# Patient Record
Sex: FEMALE | Race: WHITE | NOT HISPANIC OR LATINO | Employment: OTHER | URBAN - METROPOLITAN AREA
[De-identification: names, ages, dates, MRNs, and addresses within clinical notes are randomized per-mention and may not be internally consistent; named-entity substitution may affect disease eponyms.]

---

## 2017-04-20 ENCOUNTER — HOSPITAL ENCOUNTER (OUTPATIENT)
Facility: HOSPITAL | Age: 77
Setting detail: OBSERVATION
LOS: 1 days | Discharge: NURSING, REHAB OR LONG-TERM CARE | End: 2017-04-22
Attending: EMERGENCY MEDICINE | Admitting: FAMILY MEDICINE
Payer: COMMERCIAL

## 2017-04-20 DIAGNOSIS — D64.9 ANEMIA: ICD-10-CM

## 2017-04-20 DIAGNOSIS — R19.5 GUAIAC POSITIVE STOOLS: Primary | ICD-10-CM

## 2017-04-20 DIAGNOSIS — R13.10 DYSPHAGIA: ICD-10-CM

## 2017-04-20 PROBLEM — K62.5 RECTAL BLEEDING: Status: ACTIVE | Noted: 2017-04-20

## 2017-04-20 LAB
ABO GROUP BLD: NORMAL
ANION GAP SERPL CALCULATED.3IONS-SCNC: 8 MMOL/L (ref 4–13)
APTT PPP: 38 SECONDS (ref 24–36)
BASOPHILS # BLD AUTO: 0.03 THOUSANDS/ΜL (ref 0–0.1)
BASOPHILS NFR BLD AUTO: 1 % (ref 0–1)
BILIRUB UR QL STRIP: NEGATIVE
BLD GP AB SCN SERPL QL: NEGATIVE
BUN SERPL-MCNC: 12 MG/DL (ref 5–25)
CALCIUM SERPL-MCNC: 8.8 MG/DL (ref 8.3–10.1)
CHLORIDE SERPL-SCNC: 102 MMOL/L (ref 100–108)
CLARITY UR: CLEAR
CO2 SERPL-SCNC: 32 MMOL/L (ref 21–32)
COLOR UR: YELLOW
CREAT SERPL-MCNC: 0.79 MG/DL (ref 0.6–1.3)
EOSINOPHIL # BLD AUTO: 0.19 THOUSAND/ΜL (ref 0–0.61)
EOSINOPHIL NFR BLD AUTO: 3 % (ref 0–6)
ERYTHROCYTE [DISTWIDTH] IN BLOOD BY AUTOMATED COUNT: 17.1 % (ref 11.6–15.1)
GFR SERPL CREATININE-BSD FRML MDRD: >60 ML/MIN/1.73SQ M
GLUCOSE SERPL-MCNC: 99 MG/DL (ref 65–140)
GLUCOSE UR STRIP-MCNC: NEGATIVE MG/DL
HCT VFR BLD AUTO: 30.5 % (ref 34.8–46.1)
HGB BLD-MCNC: 8.5 G/DL (ref 11.5–15.4)
HGB UR QL STRIP.AUTO: NEGATIVE
INR PPP: 1.53 (ref 0.86–1.16)
KETONES UR STRIP-MCNC: NEGATIVE MG/DL
LEUKOCYTE ESTERASE UR QL STRIP: NEGATIVE
LYMPHOCYTES # BLD AUTO: 1.83 THOUSANDS/ΜL (ref 0.6–4.47)
LYMPHOCYTES NFR BLD AUTO: 28 % (ref 14–44)
MCH RBC QN AUTO: 21.5 PG (ref 26.8–34.3)
MCHC RBC AUTO-ENTMCNC: 27.9 G/DL (ref 31.4–37.4)
MCV RBC AUTO: 77 FL (ref 82–98)
MONOCYTES # BLD AUTO: 0.56 THOUSAND/ΜL (ref 0.17–1.22)
MONOCYTES NFR BLD AUTO: 8 % (ref 4–12)
NEUTROPHILS # BLD AUTO: 4.05 THOUSANDS/ΜL (ref 1.85–7.62)
NEUTS SEG NFR BLD AUTO: 60 % (ref 43–75)
NITRITE UR QL STRIP: NEGATIVE
PH UR STRIP.AUTO: 7 [PH] (ref 4.5–8)
PLATELET # BLD AUTO: 219 THOUSANDS/UL (ref 149–390)
PMV BLD AUTO: 9.3 FL (ref 8.9–12.7)
POTASSIUM SERPL-SCNC: 3.8 MMOL/L (ref 3.5–5.3)
PROT UR STRIP-MCNC: NEGATIVE MG/DL
PROTHROMBIN TIME: 17.9 SECONDS (ref 12–14.3)
RBC # BLD AUTO: 3.95 MILLION/UL (ref 3.81–5.12)
RH BLD: POSITIVE
SODIUM SERPL-SCNC: 142 MMOL/L (ref 136–145)
SP GR UR STRIP.AUTO: 1.01 (ref 1–1.03)
SPECIMEN EXPIRATION DATE: NORMAL
UROBILINOGEN UR QL STRIP.AUTO: 0.2 E.U./DL
WBC # BLD AUTO: 6.66 THOUSAND/UL (ref 4.31–10.16)

## 2017-04-20 PROCEDURE — 80048 BASIC METABOLIC PNL TOTAL CA: CPT | Performed by: PHYSICIAN ASSISTANT

## 2017-04-20 PROCEDURE — 99285 EMERGENCY DEPT VISIT HI MDM: CPT

## 2017-04-20 PROCEDURE — 85730 THROMBOPLASTIN TIME PARTIAL: CPT | Performed by: PHYSICIAN ASSISTANT

## 2017-04-20 PROCEDURE — 85025 COMPLETE CBC W/AUTO DIFF WBC: CPT | Performed by: PHYSICIAN ASSISTANT

## 2017-04-20 PROCEDURE — 94760 N-INVAS EAR/PLS OXIMETRY 1: CPT

## 2017-04-20 PROCEDURE — 85610 PROTHROMBIN TIME: CPT | Performed by: PHYSICIAN ASSISTANT

## 2017-04-20 PROCEDURE — 81003 URINALYSIS AUTO W/O SCOPE: CPT | Performed by: FAMILY MEDICINE

## 2017-04-20 PROCEDURE — 36415 COLL VENOUS BLD VENIPUNCTURE: CPT | Performed by: PHYSICIAN ASSISTANT

## 2017-04-20 PROCEDURE — 85014 HEMATOCRIT: CPT | Performed by: FAMILY MEDICINE

## 2017-04-20 PROCEDURE — 86900 BLOOD TYPING SEROLOGIC ABO: CPT | Performed by: PHYSICIAN ASSISTANT

## 2017-04-20 PROCEDURE — 86850 RBC ANTIBODY SCREEN: CPT | Performed by: PHYSICIAN ASSISTANT

## 2017-04-20 PROCEDURE — 94664 DEMO&/EVAL PT USE INHALER: CPT

## 2017-04-20 PROCEDURE — 85018 HEMOGLOBIN: CPT | Performed by: FAMILY MEDICINE

## 2017-04-20 PROCEDURE — 86901 BLOOD TYPING SEROLOGIC RH(D): CPT | Performed by: PHYSICIAN ASSISTANT

## 2017-04-20 RX ORDER — MAGNESIUM HYDROXIDE/ALUMINUM HYDROXICE/SIMETHICONE 120; 1200; 1200 MG/30ML; MG/30ML; MG/30ML
30 SUSPENSION ORAL EVERY 6 HOURS PRN
Status: DISCONTINUED | OUTPATIENT
Start: 2017-04-20 | End: 2017-04-22 | Stop reason: HOSPADM

## 2017-04-20 RX ORDER — NYSTATIN 100000 U/G
1 CREAM TOPICAL 2 TIMES DAILY
Status: DISCONTINUED | OUTPATIENT
Start: 2017-04-20 | End: 2017-04-22 | Stop reason: HOSPADM

## 2017-04-20 RX ORDER — NITROGLYCERIN 0.4 MG/1
0.4 TABLET SUBLINGUAL
Status: DISCONTINUED | OUTPATIENT
Start: 2017-04-20 | End: 2017-04-22 | Stop reason: HOSPADM

## 2017-04-20 RX ORDER — GABAPENTIN 300 MG/1
600 CAPSULE ORAL 3 TIMES DAILY
Status: DISCONTINUED | OUTPATIENT
Start: 2017-04-20 | End: 2017-04-22 | Stop reason: HOSPADM

## 2017-04-20 RX ORDER — LOSARTAN POTASSIUM 50 MG/1
50 TABLET ORAL DAILY
Status: DISCONTINUED | OUTPATIENT
Start: 2017-04-21 | End: 2017-04-22 | Stop reason: HOSPADM

## 2017-04-20 RX ORDER — MELATONIN
1000 DAILY
Status: DISCONTINUED | OUTPATIENT
Start: 2017-04-21 | End: 2017-04-22 | Stop reason: HOSPADM

## 2017-04-20 RX ORDER — AMLODIPINE BESYLATE 10 MG/1
10 TABLET ORAL DAILY
Status: DISCONTINUED | OUTPATIENT
Start: 2017-04-21 | End: 2017-04-22 | Stop reason: HOSPADM

## 2017-04-20 RX ORDER — TOLTERODINE 2 MG/1
4 CAPSULE, EXTENDED RELEASE ORAL DAILY
Status: DISCONTINUED | OUTPATIENT
Start: 2017-04-21 | End: 2017-04-22 | Stop reason: HOSPADM

## 2017-04-20 RX ORDER — ONDANSETRON 2 MG/ML
4 INJECTION INTRAMUSCULAR; INTRAVENOUS EVERY 6 HOURS PRN
Status: DISCONTINUED | OUTPATIENT
Start: 2017-04-20 | End: 2017-04-22 | Stop reason: HOSPADM

## 2017-04-20 RX ORDER — PANTOPRAZOLE SODIUM 40 MG/1
40 TABLET, DELAYED RELEASE ORAL
Status: DISCONTINUED | OUTPATIENT
Start: 2017-04-21 | End: 2017-04-22 | Stop reason: HOSPADM

## 2017-04-20 RX ORDER — ESCITALOPRAM OXALATE 10 MG/1
10 TABLET ORAL DAILY
Status: DISCONTINUED | OUTPATIENT
Start: 2017-04-21 | End: 2017-04-22 | Stop reason: HOSPADM

## 2017-04-20 RX ORDER — CRANBERRY FRUIT EXTRACT 425 MG
425 CAPSULE ORAL DAILY
Status: DISCONTINUED | OUTPATIENT
Start: 2017-04-21 | End: 2017-04-22 | Stop reason: HOSPADM

## 2017-04-20 RX ORDER — ACETAMINOPHEN 325 MG/1
650 TABLET ORAL EVERY 6 HOURS PRN
Status: DISCONTINUED | OUTPATIENT
Start: 2017-04-20 | End: 2017-04-22 | Stop reason: HOSPADM

## 2017-04-20 RX ORDER — ALBUTEROL SULFATE 2.5 MG/3ML
2.5 SOLUTION RESPIRATORY (INHALATION) EVERY 6 HOURS PRN
Status: DISCONTINUED | OUTPATIENT
Start: 2017-04-20 | End: 2017-04-22 | Stop reason: HOSPADM

## 2017-04-20 RX ORDER — METHOCARBAMOL 750 MG/1
750 TABLET, FILM COATED ORAL 4 TIMES DAILY
Status: DISCONTINUED | OUTPATIENT
Start: 2017-04-20 | End: 2017-04-22 | Stop reason: HOSPADM

## 2017-04-20 RX ADMIN — ACETAMINOPHEN 650 MG: 325 TABLET ORAL at 22:41

## 2017-04-20 RX ADMIN — NYSTATIN 1 APPLICATION: 100000 CREAM TOPICAL at 22:41

## 2017-04-20 RX ADMIN — METHOCARBAMOL 750 MG: 750 TABLET ORAL at 21:10

## 2017-04-20 RX ADMIN — GABAPENTIN 600 MG: 300 CAPSULE ORAL at 21:10

## 2017-04-21 LAB
ANION GAP SERPL CALCULATED.3IONS-SCNC: 11 MMOL/L (ref 4–13)
BUN SERPL-MCNC: 12 MG/DL (ref 5–25)
C DIFF TOX GENS STL QL NAA+PROBE: NORMAL
CALCIUM SERPL-MCNC: 9.1 MG/DL (ref 8.3–10.1)
CHLORIDE SERPL-SCNC: 104 MMOL/L (ref 100–108)
CHOLEST SERPL-MCNC: 138 MG/DL (ref 50–200)
CO2 SERPL-SCNC: 29 MMOL/L (ref 21–32)
CREAT SERPL-MCNC: 0.8 MG/DL (ref 0.6–1.3)
ERYTHROCYTE [DISTWIDTH] IN BLOOD BY AUTOMATED COUNT: 17.3 % (ref 11.6–15.1)
EST. AVERAGE GLUCOSE BLD GHB EST-MCNC: 105 MG/DL
FERRITIN SERPL-MCNC: 24 NG/ML (ref 8–388)
FOLATE SERPL-MCNC: 17.2 NG/ML (ref 3.1–17.5)
GFR SERPL CREATININE-BSD FRML MDRD: >60 ML/MIN/1.73SQ M
GLUCOSE SERPL-MCNC: 110 MG/DL (ref 65–140)
HBA1C MFR BLD: 5.3 % (ref 4.2–6.3)
HCT VFR BLD AUTO: 30 % (ref 34.8–46.1)
HCT VFR BLD AUTO: 32.9 % (ref 34.8–46.1)
HDLC SERPL-MCNC: 46 MG/DL (ref 40–60)
HEMOCCULT STL QL: NEGATIVE
HGB BLD-MCNC: 8.3 G/DL (ref 11.5–15.4)
HGB BLD-MCNC: 9 G/DL (ref 11.5–15.4)
IRON SATN MFR SERPL: 6 %
IRON SERPL-MCNC: 23 UG/DL (ref 50–170)
LDLC SERPL CALC-MCNC: 70 MG/DL (ref 0–100)
MCH RBC QN AUTO: 21 PG (ref 26.8–34.3)
MCHC RBC AUTO-ENTMCNC: 27.4 G/DL (ref 31.4–37.4)
MCV RBC AUTO: 77 FL (ref 82–98)
PLATELET # BLD AUTO: 234 THOUSANDS/UL (ref 149–390)
PMV BLD AUTO: 9.6 FL (ref 8.9–12.7)
POTASSIUM SERPL-SCNC: 3.8 MMOL/L (ref 3.5–5.3)
RBC # BLD AUTO: 4.29 MILLION/UL (ref 3.81–5.12)
SODIUM SERPL-SCNC: 144 MMOL/L (ref 136–145)
TIBC SERPL-MCNC: 404 UG/DL (ref 250–450)
TRIGL SERPL-MCNC: 109 MG/DL
VIT B12 SERPL-MCNC: 267 PG/ML (ref 100–900)
WBC # BLD AUTO: 5.52 THOUSAND/UL (ref 4.31–10.16)

## 2017-04-21 PROCEDURE — 87899 AGENT NOS ASSAY W/OPTIC: CPT | Performed by: FAMILY MEDICINE

## 2017-04-21 PROCEDURE — 82728 ASSAY OF FERRITIN: CPT | Performed by: FAMILY MEDICINE

## 2017-04-21 PROCEDURE — 82746 ASSAY OF FOLIC ACID SERUM: CPT | Performed by: FAMILY MEDICINE

## 2017-04-21 PROCEDURE — 82272 OCCULT BLD FECES 1-3 TESTS: CPT | Performed by: FAMILY MEDICINE

## 2017-04-21 PROCEDURE — G8998 SWALLOW D/C STATUS: HCPCS

## 2017-04-21 PROCEDURE — 83540 ASSAY OF IRON: CPT | Performed by: FAMILY MEDICINE

## 2017-04-21 PROCEDURE — 83036 HEMOGLOBIN GLYCOSYLATED A1C: CPT | Performed by: FAMILY MEDICINE

## 2017-04-21 PROCEDURE — 87493 C DIFF AMPLIFIED PROBE: CPT | Performed by: FAMILY MEDICINE

## 2017-04-21 PROCEDURE — 85027 COMPLETE CBC AUTOMATED: CPT | Performed by: FAMILY MEDICINE

## 2017-04-21 PROCEDURE — G8997 SWALLOW GOAL STATUS: HCPCS

## 2017-04-21 PROCEDURE — 80061 LIPID PANEL: CPT | Performed by: FAMILY MEDICINE

## 2017-04-21 PROCEDURE — 87045 FECES CULTURE AEROBIC BACT: CPT | Performed by: FAMILY MEDICINE

## 2017-04-21 PROCEDURE — 89055 LEUKOCYTE ASSESSMENT FECAL: CPT | Performed by: FAMILY MEDICINE

## 2017-04-21 PROCEDURE — G8996 SWALLOW CURRENT STATUS: HCPCS

## 2017-04-21 PROCEDURE — 87209 SMEAR COMPLEX STAIN: CPT | Performed by: FAMILY MEDICINE

## 2017-04-21 PROCEDURE — 80048 BASIC METABOLIC PNL TOTAL CA: CPT | Performed by: FAMILY MEDICINE

## 2017-04-21 PROCEDURE — 92610 EVALUATE SWALLOWING FUNCTION: CPT

## 2017-04-21 PROCEDURE — 82607 VITAMIN B-12: CPT | Performed by: FAMILY MEDICINE

## 2017-04-21 PROCEDURE — 87015 SPECIMEN INFECT AGNT CONCNTJ: CPT | Performed by: FAMILY MEDICINE

## 2017-04-21 PROCEDURE — 87177 OVA AND PARASITES SMEARS: CPT | Performed by: FAMILY MEDICINE

## 2017-04-21 PROCEDURE — 87046 STOOL CULTR AEROBIC BACT EA: CPT | Performed by: FAMILY MEDICINE

## 2017-04-21 PROCEDURE — 83550 IRON BINDING TEST: CPT | Performed by: FAMILY MEDICINE

## 2017-04-21 RX ORDER — FENTANYL 50 UG/H
50 PATCH TRANSDERMAL
Status: DISCONTINUED | OUTPATIENT
Start: 2017-04-21 | End: 2017-04-22 | Stop reason: HOSPADM

## 2017-04-21 RX ADMIN — LOSARTAN POTASSIUM 50 MG: 50 TABLET, FILM COATED ORAL at 09:22

## 2017-04-21 RX ADMIN — GABAPENTIN 600 MG: 300 CAPSULE ORAL at 21:58

## 2017-04-21 RX ADMIN — ACETAMINOPHEN 650 MG: 325 TABLET ORAL at 16:14

## 2017-04-21 RX ADMIN — PANTOPRAZOLE SODIUM 40 MG: 40 TABLET, DELAYED RELEASE ORAL at 06:18

## 2017-04-21 RX ADMIN — FENTANYL 50 MCG: 50 PATCH, EXTENDED RELEASE TRANSDERMAL at 16:01

## 2017-04-21 RX ADMIN — NYSTATIN 1 APPLICATION: 100000 CREAM TOPICAL at 18:01

## 2017-04-21 RX ADMIN — NYSTATIN 1 APPLICATION: 100000 CREAM TOPICAL at 09:23

## 2017-04-21 RX ADMIN — TOLTERODINE TARTRATE 4 MG: 2 CAPSULE, EXTENDED RELEASE ORAL at 09:22

## 2017-04-21 RX ADMIN — METHOCARBAMOL 750 MG: 750 TABLET ORAL at 21:58

## 2017-04-21 RX ADMIN — GABAPENTIN 600 MG: 300 CAPSULE ORAL at 09:21

## 2017-04-21 RX ADMIN — METHOCARBAMOL 750 MG: 750 TABLET ORAL at 09:22

## 2017-04-21 RX ADMIN — CHOLECALCIFEROL TAB 25 MCG (1000 UNIT) 1000 UNITS: 25 TAB at 09:21

## 2017-04-21 RX ADMIN — METHOCARBAMOL 750 MG: 750 TABLET ORAL at 14:24

## 2017-04-21 RX ADMIN — METHOCARBAMOL 750 MG: 750 TABLET ORAL at 18:00

## 2017-04-21 RX ADMIN — ESCITALOPRAM OXALATE 10 MG: 10 TABLET ORAL at 09:22

## 2017-04-21 RX ADMIN — GABAPENTIN 600 MG: 300 CAPSULE ORAL at 16:01

## 2017-04-22 VITALS
BODY MASS INDEX: 44.48 KG/M2 | OXYGEN SATURATION: 98 % | WEIGHT: 275.57 LBS | DIASTOLIC BLOOD PRESSURE: 71 MMHG | SYSTOLIC BLOOD PRESSURE: 127 MMHG | HEART RATE: 68 BPM | RESPIRATION RATE: 18 BRPM | TEMPERATURE: 98 F

## 2017-04-22 LAB
ANION GAP SERPL CALCULATED.3IONS-SCNC: 8 MMOL/L (ref 4–13)
BASOPHILS # BLD AUTO: 0.02 THOUSANDS/ΜL (ref 0–0.1)
BASOPHILS NFR BLD AUTO: 0 % (ref 0–1)
BUN SERPL-MCNC: 10 MG/DL (ref 5–25)
CALCIUM SERPL-MCNC: 8.8 MG/DL (ref 8.3–10.1)
CHLORIDE SERPL-SCNC: 105 MMOL/L (ref 100–108)
CO2 SERPL-SCNC: 29 MMOL/L (ref 21–32)
CREAT SERPL-MCNC: 0.73 MG/DL (ref 0.6–1.3)
EOSINOPHIL # BLD AUTO: 0.2 THOUSAND/ΜL (ref 0–0.61)
EOSINOPHIL NFR BLD AUTO: 3 % (ref 0–6)
ERYTHROCYTE [DISTWIDTH] IN BLOOD BY AUTOMATED COUNT: 17 % (ref 11.6–15.1)
GFR SERPL CREATININE-BSD FRML MDRD: >60 ML/MIN/1.73SQ M
GLUCOSE P FAST SERPL-MCNC: 108 MG/DL (ref 65–99)
GLUCOSE SERPL-MCNC: 108 MG/DL (ref 65–140)
HCT VFR BLD AUTO: 30.7 % (ref 34.8–46.1)
HGB BLD-MCNC: 8.7 G/DL (ref 11.5–15.4)
LYMPHOCYTES # BLD AUTO: 2.03 THOUSANDS/ΜL (ref 0.6–4.47)
LYMPHOCYTES NFR BLD AUTO: 32 % (ref 14–44)
MCH RBC QN AUTO: 21.7 PG (ref 26.8–34.3)
MCHC RBC AUTO-ENTMCNC: 28.3 G/DL (ref 31.4–37.4)
MCV RBC AUTO: 77 FL (ref 82–98)
MONOCYTES # BLD AUTO: 0.61 THOUSAND/ΜL (ref 0.17–1.22)
MONOCYTES NFR BLD AUTO: 10 % (ref 4–12)
NEUTROPHILS # BLD AUTO: 3.48 THOUSANDS/ΜL (ref 1.85–7.62)
NEUTS SEG NFR BLD AUTO: 55 % (ref 43–75)
PLATELET # BLD AUTO: 229 THOUSANDS/UL (ref 149–390)
PMV BLD AUTO: 9.8 FL (ref 8.9–12.7)
POTASSIUM SERPL-SCNC: 3.8 MMOL/L (ref 3.5–5.3)
RBC # BLD AUTO: 4.01 MILLION/UL (ref 3.81–5.12)
SODIUM SERPL-SCNC: 142 MMOL/L (ref 136–145)
WBC # BLD AUTO: 6.34 THOUSAND/UL (ref 4.31–10.16)

## 2017-04-22 PROCEDURE — 85025 COMPLETE CBC W/AUTO DIFF WBC: CPT | Performed by: FAMILY MEDICINE

## 2017-04-22 PROCEDURE — 80048 BASIC METABOLIC PNL TOTAL CA: CPT | Performed by: FAMILY MEDICINE

## 2017-04-22 RX ADMIN — ACETAMINOPHEN 650 MG: 325 TABLET ORAL at 09:33

## 2017-04-22 RX ADMIN — NYSTATIN 1 APPLICATION: 100000 CREAM TOPICAL at 09:34

## 2017-04-22 RX ADMIN — PANTOPRAZOLE SODIUM 40 MG: 40 TABLET, DELAYED RELEASE ORAL at 06:37

## 2017-04-22 RX ADMIN — METHOCARBAMOL 750 MG: 750 TABLET ORAL at 09:33

## 2017-04-22 RX ADMIN — TOLTERODINE TARTRATE 4 MG: 2 CAPSULE, EXTENDED RELEASE ORAL at 09:33

## 2017-04-22 RX ADMIN — AMLODIPINE BESYLATE 10 MG: 10 TABLET ORAL at 09:33

## 2017-04-22 RX ADMIN — CHOLECALCIFEROL TAB 25 MCG (1000 UNIT) 1000 UNITS: 25 TAB at 09:33

## 2017-04-22 RX ADMIN — GABAPENTIN 600 MG: 300 CAPSULE ORAL at 09:33

## 2017-04-22 RX ADMIN — GABAPENTIN 600 MG: 300 CAPSULE ORAL at 15:26

## 2017-04-22 RX ADMIN — METHOCARBAMOL 750 MG: 750 TABLET ORAL at 15:26

## 2017-04-22 RX ADMIN — ESCITALOPRAM OXALATE 10 MG: 10 TABLET ORAL at 09:33

## 2017-04-22 RX ADMIN — ACETAMINOPHEN 650 MG: 325 TABLET ORAL at 15:26

## 2017-04-22 RX ADMIN — LOSARTAN POTASSIUM 50 MG: 50 TABLET, FILM COATED ORAL at 09:33

## 2017-04-23 LAB
BACTERIA STL CULT: NORMAL
BACTERIA STL CULT: NORMAL

## 2017-04-24 LAB — WBC SPEC QL GRAM STN: NORMAL

## 2017-04-25 ENCOUNTER — ALLSCRIPTS OFFICE VISIT (OUTPATIENT)
Dept: OTHER | Facility: OTHER | Age: 77
End: 2017-04-25

## 2017-04-27 LAB — O+P STL CONC: NORMAL

## 2017-06-08 ENCOUNTER — HOSPITAL ENCOUNTER (EMERGENCY)
Facility: HOSPITAL | Age: 77
Discharge: HOME | DRG: 177 | End: 2017-06-08
Attending: EMERGENCY MEDICINE
Payer: COMMERCIAL

## 2017-06-08 VITALS
OXYGEN SATURATION: 97 % | DIASTOLIC BLOOD PRESSURE: 58 MMHG | SYSTOLIC BLOOD PRESSURE: 114 MMHG | HEART RATE: 67 BPM | TEMPERATURE: 99.1 F | WEIGHT: 264.99 LBS | RESPIRATION RATE: 14 BRPM | BODY MASS INDEX: 42.77 KG/M2

## 2017-06-08 DIAGNOSIS — R50.9 FEVER: Primary | ICD-10-CM

## 2017-06-08 PROCEDURE — 96361 HYDRATE IV INFUSION ADD-ON: CPT

## 2017-06-08 PROCEDURE — 96360 HYDRATION IV INFUSION INIT: CPT

## 2017-06-08 PROCEDURE — 99284 EMERGENCY DEPT VISIT MOD MDM: CPT

## 2017-06-08 RX ORDER — DOXYCYCLINE 100 MG/1
200 TABLET ORAL DAILY
COMMUNITY
End: 2017-06-14 | Stop reason: HOSPADM

## 2017-06-08 RX ORDER — SODIUM CHLORIDE 9 MG/ML
150 INJECTION, SOLUTION INTRAVENOUS CONTINUOUS
Status: DISCONTINUED | OUTPATIENT
Start: 2017-06-08 | End: 2017-06-08 | Stop reason: HOSPADM

## 2017-06-08 RX ORDER — ALPRAZOLAM 0.5 MG/1
0.5 TABLET ORAL
COMMUNITY
End: 2017-06-14 | Stop reason: HOSPADM

## 2017-06-08 RX ORDER — TRAMADOL HYDROCHLORIDE 50 MG/1
50 TABLET ORAL EVERY 6 HOURS PRN
Status: ON HOLD | COMMUNITY
End: 2018-07-24

## 2017-06-08 RX ORDER — ERTAPENEM 1 G/1
1 INJECTION, POWDER, LYOPHILIZED, FOR SOLUTION INTRAMUSCULAR; INTRAVENOUS
COMMUNITY
End: 2017-06-14 | Stop reason: HOSPADM

## 2017-06-08 RX ORDER — FENTANYL 50 UG/H
1 PATCH TRANSDERMAL
COMMUNITY
End: 2017-06-14 | Stop reason: HOSPADM

## 2017-06-08 RX ADMIN — SODIUM CHLORIDE 150 ML/HR: 0.9 INJECTION, SOLUTION INTRAVENOUS at 16:48

## 2017-06-10 ENCOUNTER — HOSPITAL ENCOUNTER (INPATIENT)
Facility: HOSPITAL | Age: 77
LOS: 4 days | Discharge: NURSING, REHAB OR LONG-TERM CARE | DRG: 177 | End: 2017-06-14
Attending: EMERGENCY MEDICINE | Admitting: INTERNAL MEDICINE
Payer: COMMERCIAL

## 2017-06-10 ENCOUNTER — APPOINTMENT (EMERGENCY)
Dept: RADIOLOGY | Facility: HOSPITAL | Age: 77
DRG: 177 | End: 2017-06-10
Payer: COMMERCIAL

## 2017-06-10 ENCOUNTER — APPOINTMENT (EMERGENCY)
Dept: CT IMAGING | Facility: HOSPITAL | Age: 77
DRG: 177 | End: 2017-06-10
Payer: COMMERCIAL

## 2017-06-10 DIAGNOSIS — E87.70 FLUID OVERLOAD: ICD-10-CM

## 2017-06-10 DIAGNOSIS — N39.0 URINARY TRACT INFECTION WITHOUT HEMATURIA, SITE UNSPECIFIED: ICD-10-CM

## 2017-06-10 DIAGNOSIS — R41.0 DELIRIUM: Primary | ICD-10-CM

## 2017-06-10 DIAGNOSIS — J18.9 PNEUMONIA OF LEFT LOWER LOBE DUE TO INFECTIOUS ORGANISM: ICD-10-CM

## 2017-06-10 DIAGNOSIS — I48.91 ATRIAL FIBRILLATION (HCC): Chronic | ICD-10-CM

## 2017-06-10 PROBLEM — N32.81 OVERACTIVE BLADDER: Status: ACTIVE | Noted: 2017-06-10

## 2017-06-10 PROBLEM — R41.82 ALTERED MENTAL STATUS: Status: ACTIVE | Noted: 2017-06-10

## 2017-06-10 PROBLEM — G93.40 ENCEPHALOPATHY ACUTE: Status: ACTIVE | Noted: 2017-06-10

## 2017-06-10 LAB
ALBUMIN SERPL BCP-MCNC: 2.8 G/DL (ref 3.5–5)
ALP SERPL-CCNC: 63 U/L (ref 46–116)
ALT SERPL W P-5'-P-CCNC: 13 U/L (ref 12–78)
ANION GAP SERPL CALCULATED.3IONS-SCNC: 8 MMOL/L (ref 4–13)
APTT PPP: 36 SECONDS (ref 23–35)
AST SERPL W P-5'-P-CCNC: 17 U/L (ref 5–45)
BACTERIA UR QL AUTO: ABNORMAL /HPF
BASE EXCESS BLDA CALC-SCNC: 2 MMOL/L (ref -2–3)
BASOPHILS # BLD AUTO: 0.02 THOUSANDS/ΜL (ref 0–0.1)
BASOPHILS NFR BLD AUTO: 0 % (ref 0–1)
BILIRUB DIRECT SERPL-MCNC: 0.11 MG/DL (ref 0–0.2)
BILIRUB SERPL-MCNC: 0.4 MG/DL (ref 0.2–1)
BILIRUB UR QL STRIP: ABNORMAL
BUN SERPL-MCNC: 21 MG/DL (ref 5–25)
CA-I BLD-SCNC: 1.18 MMOL/L (ref 1.12–1.32)
CALCIUM SERPL-MCNC: 8.6 MG/DL (ref 8.3–10.1)
CHLORIDE SERPL-SCNC: 106 MMOL/L (ref 100–108)
CLARITY UR: CLEAR
CO2 SERPL-SCNC: 30 MMOL/L (ref 21–32)
COLOR UR: YELLOW
CREAT SERPL-MCNC: 1.08 MG/DL (ref 0.6–1.3)
EOSINOPHIL # BLD AUTO: 0.21 THOUSAND/ΜL (ref 0–0.61)
EOSINOPHIL NFR BLD AUTO: 2 % (ref 0–6)
ERYTHROCYTE [DISTWIDTH] IN BLOOD BY AUTOMATED COUNT: 19.8 % (ref 11.6–15.1)
GFR SERPL CREATININE-BSD FRML MDRD: 49.5 ML/MIN/1.73SQ M
GLUCOSE SERPL-MCNC: 103 MG/DL (ref 65–140)
GLUCOSE SERPL-MCNC: 105 MG/DL (ref 65–140)
GLUCOSE UR STRIP-MCNC: NEGATIVE MG/DL
HCO3 BLDA-SCNC: 28.1 MMOL/L (ref 24–30)
HCT VFR BLD AUTO: 30.1 % (ref 34.8–46.1)
HCT VFR BLD CALC: 30 % (ref 34.8–46.1)
HGB BLD-MCNC: 8.5 G/DL (ref 11.5–15.4)
HGB BLDA-MCNC: 10.2 G/DL (ref 11.5–15.4)
HGB UR QL STRIP.AUTO: ABNORMAL
INR PPP: 1.34 (ref 0.86–1.16)
KETONES UR STRIP-MCNC: NEGATIVE MG/DL
LACTATE SERPL-SCNC: 0.7 MMOL/L (ref 0.5–2)
LEUKOCYTE ESTERASE UR QL STRIP: ABNORMAL
LYMPHOCYTES # BLD AUTO: 1.6 THOUSANDS/ΜL (ref 0.6–4.47)
LYMPHOCYTES NFR BLD AUTO: 17 % (ref 14–44)
MCH RBC QN AUTO: 20.9 PG (ref 26.8–34.3)
MCHC RBC AUTO-ENTMCNC: 28.2 G/DL (ref 31.4–37.4)
MCV RBC AUTO: 74 FL (ref 82–98)
MONOCYTES # BLD AUTO: 0.65 THOUSAND/ΜL (ref 0.17–1.22)
MONOCYTES NFR BLD AUTO: 7 % (ref 4–12)
MUCOUS THREADS UR QL AUTO: ABNORMAL
NEUTROPHILS # BLD AUTO: 7.08 THOUSANDS/ΜL (ref 1.85–7.62)
NEUTS SEG NFR BLD AUTO: 74 % (ref 43–75)
NITRITE UR QL STRIP: NEGATIVE
NON-SQ EPI CELLS URNS QL MICRO: ABNORMAL /HPF
NT-PROBNP SERPL-MCNC: 1786 PG/ML
OTHER STN SPEC: ABNORMAL
PCO2 BLD: 30 MMOL/L (ref 21–32)
PCO2 BLD: 48.2 MM HG (ref 42–50)
PH BLD: 7.37 [PH] (ref 7.3–7.4)
PH UR STRIP.AUTO: 5.5 [PH] (ref 4.5–8)
PLATELET # BLD AUTO: 201 THOUSANDS/UL (ref 149–390)
PMV BLD AUTO: 9.8 FL (ref 8.9–12.7)
PO2 BLD: 46 MM HG (ref 35–45)
POTASSIUM BLD-SCNC: 3.8 MMOL/L (ref 3.5–5.3)
POTASSIUM SERPL-SCNC: 3.9 MMOL/L (ref 3.5–5.3)
PROT SERPL-MCNC: 7.4 G/DL (ref 6.4–8.2)
PROT UR STRIP-MCNC: NEGATIVE MG/DL
PROTHROMBIN TIME: 17 SECONDS (ref 12.1–14.4)
RBC # BLD AUTO: 4.06 MILLION/UL (ref 3.81–5.12)
RBC #/AREA URNS AUTO: ABNORMAL /HPF
SAO2 % BLD FROM PO2: 80 % (ref 95–98)
SODIUM BLD-SCNC: 143 MMOL/L (ref 136–145)
SODIUM SERPL-SCNC: 144 MMOL/L (ref 136–145)
SP GR UR STRIP.AUTO: 1.02 (ref 1–1.03)
SPECIMEN SOURCE: ABNORMAL
TROPONIN I SERPL-MCNC: <0.02 NG/ML
UROBILINOGEN UR QL STRIP.AUTO: 0.2 E.U./DL
WBC # BLD AUTO: 9.56 THOUSAND/UL (ref 4.31–10.16)
WBC #/AREA URNS AUTO: ABNORMAL /HPF

## 2017-06-10 PROCEDURE — 80048 BASIC METABOLIC PNL TOTAL CA: CPT | Performed by: EMERGENCY MEDICINE

## 2017-06-10 PROCEDURE — 96374 THER/PROPH/DIAG INJ IV PUSH: CPT

## 2017-06-10 PROCEDURE — 71020 HB CHEST X-RAY 2VW FRONTAL&LATL: CPT

## 2017-06-10 PROCEDURE — 99285 EMERGENCY DEPT VISIT HI MDM: CPT

## 2017-06-10 PROCEDURE — 80076 HEPATIC FUNCTION PANEL: CPT | Performed by: EMERGENCY MEDICINE

## 2017-06-10 PROCEDURE — 93005 ELECTROCARDIOGRAM TRACING: CPT | Performed by: EMERGENCY MEDICINE

## 2017-06-10 PROCEDURE — 85025 COMPLETE CBC W/AUTO DIFF WBC: CPT | Performed by: EMERGENCY MEDICINE

## 2017-06-10 PROCEDURE — 87040 BLOOD CULTURE FOR BACTERIA: CPT | Performed by: EMERGENCY MEDICINE

## 2017-06-10 PROCEDURE — 70450 CT HEAD/BRAIN W/O DYE: CPT

## 2017-06-10 PROCEDURE — 83880 ASSAY OF NATRIURETIC PEPTIDE: CPT | Performed by: EMERGENCY MEDICINE

## 2017-06-10 PROCEDURE — 85014 HEMATOCRIT: CPT

## 2017-06-10 PROCEDURE — 85610 PROTHROMBIN TIME: CPT | Performed by: EMERGENCY MEDICINE

## 2017-06-10 PROCEDURE — 81001 URINALYSIS AUTO W/SCOPE: CPT | Performed by: EMERGENCY MEDICINE

## 2017-06-10 PROCEDURE — 84295 ASSAY OF SERUM SODIUM: CPT

## 2017-06-10 PROCEDURE — 85730 THROMBOPLASTIN TIME PARTIAL: CPT | Performed by: EMERGENCY MEDICINE

## 2017-06-10 PROCEDURE — 82803 BLOOD GASES ANY COMBINATION: CPT

## 2017-06-10 PROCEDURE — 87086 URINE CULTURE/COLONY COUNT: CPT | Performed by: EMERGENCY MEDICINE

## 2017-06-10 PROCEDURE — 84484 ASSAY OF TROPONIN QUANT: CPT | Performed by: EMERGENCY MEDICINE

## 2017-06-10 PROCEDURE — 82947 ASSAY GLUCOSE BLOOD QUANT: CPT

## 2017-06-10 PROCEDURE — 82330 ASSAY OF CALCIUM: CPT

## 2017-06-10 PROCEDURE — 83605 ASSAY OF LACTIC ACID: CPT | Performed by: EMERGENCY MEDICINE

## 2017-06-10 PROCEDURE — 84132 ASSAY OF SERUM POTASSIUM: CPT

## 2017-06-10 PROCEDURE — 36415 COLL VENOUS BLD VENIPUNCTURE: CPT | Performed by: EMERGENCY MEDICINE

## 2017-06-10 RX ORDER — FUROSEMIDE 40 MG/1
40 TABLET ORAL DAILY
COMMUNITY
End: 2020-11-13 | Stop reason: ALTCHOICE

## 2017-06-10 RX ORDER — ESCITALOPRAM OXALATE 10 MG/1
10 TABLET ORAL DAILY
Status: DISCONTINUED | OUTPATIENT
Start: 2017-06-11 | End: 2017-06-14 | Stop reason: HOSPADM

## 2017-06-10 RX ORDER — AMLODIPINE BESYLATE 10 MG/1
10 TABLET ORAL DAILY
Status: DISCONTINUED | OUTPATIENT
Start: 2017-06-11 | End: 2017-06-14 | Stop reason: HOSPADM

## 2017-06-10 RX ORDER — DOCUSATE SODIUM 100 MG/1
100 CAPSULE, LIQUID FILLED ORAL 2 TIMES DAILY PRN
Status: DISCONTINUED | OUTPATIENT
Start: 2017-06-10 | End: 2017-06-14 | Stop reason: HOSPADM

## 2017-06-10 RX ORDER — CALCIUM CARBONATE 200(500)MG
1000 TABLET,CHEWABLE ORAL DAILY PRN
Status: DISCONTINUED | OUTPATIENT
Start: 2017-06-10 | End: 2017-06-14 | Stop reason: HOSPADM

## 2017-06-10 RX ORDER — GABAPENTIN 300 MG/1
600 CAPSULE ORAL 3 TIMES DAILY
Status: DISCONTINUED | OUTPATIENT
Start: 2017-06-10 | End: 2017-06-14 | Stop reason: HOSPADM

## 2017-06-10 RX ORDER — ALPRAZOLAM 0.5 MG/1
0.5 TABLET ORAL
Status: DISCONTINUED | OUTPATIENT
Start: 2017-06-10 | End: 2017-06-12

## 2017-06-10 RX ORDER — OXYCODONE HYDROCHLORIDE 5 MG/1
5 TABLET ORAL EVERY 4 HOURS PRN
Status: DISCONTINUED | OUTPATIENT
Start: 2017-06-10 | End: 2017-06-14 | Stop reason: HOSPADM

## 2017-06-10 RX ORDER — TRAMADOL HYDROCHLORIDE 50 MG/1
50 TABLET ORAL EVERY 6 HOURS PRN
Status: DISCONTINUED | OUTPATIENT
Start: 2017-06-10 | End: 2017-06-14 | Stop reason: HOSPADM

## 2017-06-10 RX ORDER — FUROSEMIDE 10 MG/ML
40 INJECTION INTRAMUSCULAR; INTRAVENOUS ONCE
Status: COMPLETED | OUTPATIENT
Start: 2017-06-10 | End: 2017-06-10

## 2017-06-10 RX ORDER — METHOCARBAMOL 750 MG/1
750 TABLET, FILM COATED ORAL 4 TIMES DAILY
Status: DISCONTINUED | OUTPATIENT
Start: 2017-06-10 | End: 2017-06-14 | Stop reason: HOSPADM

## 2017-06-10 RX ORDER — CRANBERRY FRUIT EXTRACT 425 MG
425 CAPSULE ORAL DAILY
Status: DISCONTINUED | OUTPATIENT
Start: 2017-06-11 | End: 2017-06-11

## 2017-06-10 RX ORDER — MAGNESIUM HYDROXIDE/ALUMINUM HYDROXICE/SIMETHICONE 120; 1200; 1200 MG/30ML; MG/30ML; MG/30ML
15 SUSPENSION ORAL 2 TIMES DAILY PRN
Status: DISCONTINUED | OUTPATIENT
Start: 2017-06-10 | End: 2017-06-14 | Stop reason: HOSPADM

## 2017-06-10 RX ORDER — ALBUTEROL SULFATE 2.5 MG/3ML
2.5 SOLUTION RESPIRATORY (INHALATION) EVERY 4 HOURS PRN
Status: DISCONTINUED | OUTPATIENT
Start: 2017-06-10 | End: 2017-06-14 | Stop reason: HOSPADM

## 2017-06-10 RX ORDER — PANTOPRAZOLE SODIUM 40 MG/1
40 TABLET, DELAYED RELEASE ORAL
Status: DISCONTINUED | OUTPATIENT
Start: 2017-06-11 | End: 2017-06-14 | Stop reason: HOSPADM

## 2017-06-10 RX ORDER — TOLTERODINE 2 MG/1
4 CAPSULE, EXTENDED RELEASE ORAL DAILY
Status: DISCONTINUED | OUTPATIENT
Start: 2017-06-11 | End: 2017-06-14 | Stop reason: HOSPADM

## 2017-06-10 RX ORDER — LOSARTAN POTASSIUM 50 MG/1
50 TABLET ORAL DAILY
Status: DISCONTINUED | OUTPATIENT
Start: 2017-06-11 | End: 2017-06-14 | Stop reason: HOSPADM

## 2017-06-10 RX ORDER — ACETAMINOPHEN 325 MG/1
650 TABLET ORAL EVERY 6 HOURS PRN
Status: DISCONTINUED | OUTPATIENT
Start: 2017-06-10 | End: 2017-06-14 | Stop reason: HOSPADM

## 2017-06-10 RX ORDER — ONDANSETRON 2 MG/ML
4 INJECTION INTRAMUSCULAR; INTRAVENOUS EVERY 6 HOURS PRN
Status: DISCONTINUED | OUTPATIENT
Start: 2017-06-10 | End: 2017-06-14 | Stop reason: HOSPADM

## 2017-06-10 RX ORDER — NYSTATIN 100000 U/G
1 CREAM TOPICAL 2 TIMES DAILY
Status: DISCONTINUED | OUTPATIENT
Start: 2017-06-10 | End: 2017-06-14 | Stop reason: HOSPADM

## 2017-06-10 RX ADMIN — GABAPENTIN 600 MG: 300 CAPSULE ORAL at 21:23

## 2017-06-10 RX ADMIN — OXYCODONE HYDROCHLORIDE 5 MG: 5 TABLET ORAL at 23:11

## 2017-06-10 RX ADMIN — METHOCARBAMOL 750 MG: 750 TABLET ORAL at 21:23

## 2017-06-10 RX ADMIN — CEFEPIME 2000 MG: 2 INJECTION, POWDER, FOR SOLUTION INTRAMUSCULAR; INTRAVENOUS at 16:41

## 2017-06-10 RX ADMIN — ALPRAZOLAM 0.5 MG: 0.5 TABLET ORAL at 21:29

## 2017-06-10 RX ADMIN — FUROSEMIDE 40 MG: 10 INJECTION, SOLUTION INTRAMUSCULAR; INTRAVENOUS at 16:34

## 2017-06-11 ENCOUNTER — APPOINTMENT (INPATIENT)
Dept: NON INVASIVE DIAGNOSTICS | Facility: HOSPITAL | Age: 77
DRG: 177 | End: 2017-06-11
Payer: COMMERCIAL

## 2017-06-11 LAB
ANION GAP SERPL CALCULATED.3IONS-SCNC: 7 MMOL/L (ref 4–13)
ATRIAL RATE: 220 BPM
BUN SERPL-MCNC: 17 MG/DL (ref 5–25)
CALCIUM SERPL-MCNC: 8.8 MG/DL (ref 8.3–10.1)
CHLORIDE SERPL-SCNC: 102 MMOL/L (ref 100–108)
CO2 SERPL-SCNC: 30 MMOL/L (ref 21–32)
CREAT SERPL-MCNC: 0.81 MG/DL (ref 0.6–1.3)
ERYTHROCYTE [DISTWIDTH] IN BLOOD BY AUTOMATED COUNT: 19.9 % (ref 11.6–15.1)
GFR SERPL CREATININE-BSD FRML MDRD: >60 ML/MIN/1.73SQ M
GLUCOSE SERPL-MCNC: 88 MG/DL (ref 65–140)
HCT VFR BLD AUTO: 30.8 % (ref 34.8–46.1)
HEMOCCULT STL QL: NEGATIVE
HGB BLD-MCNC: 8.5 G/DL (ref 11.5–15.4)
MAGNESIUM SERPL-MCNC: 1.9 MG/DL (ref 1.6–2.6)
MCH RBC QN AUTO: 20.4 PG (ref 26.8–34.3)
MCHC RBC AUTO-ENTMCNC: 27.6 G/DL (ref 31.4–37.4)
MCV RBC AUTO: 74 FL (ref 82–98)
PLATELET # BLD AUTO: 191 THOUSANDS/UL (ref 149–390)
PMV BLD AUTO: 10.1 FL (ref 8.9–12.7)
POTASSIUM SERPL-SCNC: 3.4 MMOL/L (ref 3.5–5.3)
QRS AXIS: 76 DEGREES
QRSD INTERVAL: 86 MS
QT INTERVAL: 386 MS
QTC INTERVAL: 436 MS
RBC # BLD AUTO: 4.16 MILLION/UL (ref 3.81–5.12)
SODIUM SERPL-SCNC: 139 MMOL/L (ref 136–145)
T WAVE AXIS: 68 DEGREES
VENTRICULAR RATE: 77 BPM
WBC # BLD AUTO: 6.97 THOUSAND/UL (ref 4.31–10.16)

## 2017-06-11 PROCEDURE — 87081 CULTURE SCREEN ONLY: CPT | Performed by: INTERNAL MEDICINE

## 2017-06-11 PROCEDURE — 85027 COMPLETE CBC AUTOMATED: CPT | Performed by: INTERNAL MEDICINE

## 2017-06-11 PROCEDURE — 83735 ASSAY OF MAGNESIUM: CPT | Performed by: INTERNAL MEDICINE

## 2017-06-11 PROCEDURE — 93306 TTE W/DOPPLER COMPLETE: CPT

## 2017-06-11 PROCEDURE — 80048 BASIC METABOLIC PNL TOTAL CA: CPT | Performed by: INTERNAL MEDICINE

## 2017-06-11 PROCEDURE — 82272 OCCULT BLD FECES 1-3 TESTS: CPT | Performed by: INTERNAL MEDICINE

## 2017-06-11 RX ORDER — POTASSIUM CHLORIDE 20 MEQ/1
40 TABLET, EXTENDED RELEASE ORAL ONCE
Status: COMPLETED | OUTPATIENT
Start: 2017-06-11 | End: 2017-06-11

## 2017-06-11 RX ORDER — FUROSEMIDE 40 MG/1
40 TABLET ORAL DAILY
Status: DISCONTINUED | OUTPATIENT
Start: 2017-06-11 | End: 2017-06-12

## 2017-06-11 RX ADMIN — GABAPENTIN 600 MG: 300 CAPSULE ORAL at 21:51

## 2017-06-11 RX ADMIN — GABAPENTIN 600 MG: 300 CAPSULE ORAL at 08:47

## 2017-06-11 RX ADMIN — PANTOPRAZOLE SODIUM 40 MG: 40 TABLET, DELAYED RELEASE ORAL at 05:46

## 2017-06-11 RX ADMIN — METHOCARBAMOL 750 MG: 750 TABLET ORAL at 21:51

## 2017-06-11 RX ADMIN — METHOCARBAMOL 750 MG: 750 TABLET ORAL at 08:47

## 2017-06-11 RX ADMIN — NYSTATIN 1 APPLICATION: 100000 CREAM TOPICAL at 08:49

## 2017-06-11 RX ADMIN — CEFEPIME HYDROCHLORIDE 2000 MG: 2 INJECTION, POWDER, FOR SOLUTION INTRAVENOUS at 19:38

## 2017-06-11 RX ADMIN — AMLODIPINE BESYLATE 10 MG: 10 TABLET ORAL at 08:47

## 2017-06-11 RX ADMIN — METHOCARBAMOL 750 MG: 750 TABLET ORAL at 17:19

## 2017-06-11 RX ADMIN — POTASSIUM CHLORIDE 40 MEQ: 1500 TABLET, EXTENDED RELEASE ORAL at 08:47

## 2017-06-11 RX ADMIN — NYSTATIN 1 APPLICATION: 100000 CREAM TOPICAL at 19:39

## 2017-06-11 RX ADMIN — ESCITALOPRAM OXALATE 10 MG: 10 TABLET ORAL at 08:47

## 2017-06-11 RX ADMIN — TOLTERODINE TARTRATE 4 MG: 2 CAPSULE, EXTENDED RELEASE ORAL at 08:47

## 2017-06-11 RX ADMIN — FUROSEMIDE 40 MG: 40 TABLET ORAL at 12:54

## 2017-06-11 RX ADMIN — METHOCARBAMOL 750 MG: 750 TABLET ORAL at 12:54

## 2017-06-11 RX ADMIN — GABAPENTIN 600 MG: 300 CAPSULE ORAL at 17:19

## 2017-06-11 RX ADMIN — CEFEPIME HYDROCHLORIDE 2000 MG: 2 INJECTION, POWDER, FOR SOLUTION INTRAVENOUS at 05:47

## 2017-06-11 RX ADMIN — LOSARTAN POTASSIUM 50 MG: 50 TABLET, FILM COATED ORAL at 08:47

## 2017-06-11 RX ADMIN — ENOXAPARIN SODIUM 40 MG: 40 INJECTION SUBCUTANEOUS at 08:46

## 2017-06-12 LAB
ANION GAP SERPL CALCULATED.3IONS-SCNC: 6 MMOL/L (ref 4–13)
BACTERIA UR CULT: NORMAL
BUN SERPL-MCNC: 14 MG/DL (ref 5–25)
CALCIUM SERPL-MCNC: 9.3 MG/DL (ref 8.3–10.1)
CHLORIDE SERPL-SCNC: 101 MMOL/L (ref 100–108)
CO2 SERPL-SCNC: 30 MMOL/L (ref 21–32)
CREAT SERPL-MCNC: 0.83 MG/DL (ref 0.6–1.3)
ERYTHROCYTE [DISTWIDTH] IN BLOOD BY AUTOMATED COUNT: 19.6 % (ref 11.6–15.1)
GFR SERPL CREATININE-BSD FRML MDRD: >60 ML/MIN/1.73SQ M
GLUCOSE SERPL-MCNC: 96 MG/DL (ref 65–140)
HCT VFR BLD AUTO: 32.7 % (ref 34.8–46.1)
HEMOCCULT STL QL: NEGATIVE
HGB BLD-MCNC: 9.2 G/DL (ref 11.5–15.4)
MCH RBC QN AUTO: 20.2 PG (ref 26.8–34.3)
MCHC RBC AUTO-ENTMCNC: 28.1 G/DL (ref 31.4–37.4)
MCV RBC AUTO: 72 FL (ref 82–98)
PLATELET # BLD AUTO: 225 THOUSANDS/UL (ref 149–390)
PMV BLD AUTO: 9.9 FL (ref 8.9–12.7)
POTASSIUM SERPL-SCNC: 3.5 MMOL/L (ref 3.5–5.3)
RBC # BLD AUTO: 4.55 MILLION/UL (ref 3.81–5.12)
SODIUM SERPL-SCNC: 137 MMOL/L (ref 136–145)
WBC # BLD AUTO: 6.35 THOUSAND/UL (ref 4.31–10.16)

## 2017-06-12 PROCEDURE — 87449 NOS EACH ORGANISM AG IA: CPT | Performed by: INTERNAL MEDICINE

## 2017-06-12 PROCEDURE — 97110 THERAPEUTIC EXERCISES: CPT

## 2017-06-12 PROCEDURE — G8978 MOBILITY CURRENT STATUS: HCPCS

## 2017-06-12 PROCEDURE — G8980 MOBILITY D/C STATUS: HCPCS

## 2017-06-12 PROCEDURE — 80048 BASIC METABOLIC PNL TOTAL CA: CPT | Performed by: INTERNAL MEDICINE

## 2017-06-12 PROCEDURE — 82272 OCCULT BLD FECES 1-3 TESTS: CPT | Performed by: INTERNAL MEDICINE

## 2017-06-12 PROCEDURE — 97163 PT EVAL HIGH COMPLEX 45 MIN: CPT

## 2017-06-12 PROCEDURE — 85027 COMPLETE CBC AUTOMATED: CPT | Performed by: INTERNAL MEDICINE

## 2017-06-12 RX ORDER — FUROSEMIDE 10 MG/ML
40 INJECTION INTRAMUSCULAR; INTRAVENOUS DAILY
Status: DISCONTINUED | OUTPATIENT
Start: 2017-06-13 | End: 2017-06-12

## 2017-06-12 RX ORDER — QUETIAPINE FUMARATE 25 MG/1
12.5 TABLET, FILM COATED ORAL 2 TIMES DAILY PRN
Status: DISCONTINUED | OUTPATIENT
Start: 2017-06-12 | End: 2017-06-14 | Stop reason: HOSPADM

## 2017-06-12 RX ORDER — FUROSEMIDE 10 MG/ML
40 INJECTION INTRAMUSCULAR; INTRAVENOUS DAILY
Status: DISCONTINUED | OUTPATIENT
Start: 2017-06-12 | End: 2017-06-12

## 2017-06-12 RX ORDER — FUROSEMIDE 40 MG/1
40 TABLET ORAL DAILY
Status: DISCONTINUED | OUTPATIENT
Start: 2017-06-13 | End: 2017-06-14 | Stop reason: HOSPADM

## 2017-06-12 RX ORDER — LORAZEPAM 2 MG/ML
1 INJECTION INTRAMUSCULAR EVERY 4 HOURS PRN
Status: DISCONTINUED | OUTPATIENT
Start: 2017-06-12 | End: 2017-06-12

## 2017-06-12 RX ORDER — ALPRAZOLAM 0.5 MG/1
0.5 TABLET ORAL
Status: DISCONTINUED | OUTPATIENT
Start: 2017-06-12 | End: 2017-06-13

## 2017-06-12 RX ADMIN — LOSARTAN POTASSIUM 50 MG: 50 TABLET, FILM COATED ORAL at 07:53

## 2017-06-12 RX ADMIN — METHOCARBAMOL 750 MG: 750 TABLET ORAL at 21:30

## 2017-06-12 RX ADMIN — PANTOPRAZOLE SODIUM 40 MG: 40 TABLET, DELAYED RELEASE ORAL at 05:56

## 2017-06-12 RX ADMIN — ESCITALOPRAM OXALATE 10 MG: 10 TABLET ORAL at 07:52

## 2017-06-12 RX ADMIN — ALPRAZOLAM 0.5 MG: 0.5 TABLET ORAL at 00:14

## 2017-06-12 RX ADMIN — METHOCARBAMOL 750 MG: 750 TABLET ORAL at 07:53

## 2017-06-12 RX ADMIN — CEFEPIME HYDROCHLORIDE 1000 MG: 1 INJECTION, POWDER, FOR SOLUTION INTRAMUSCULAR; INTRAVENOUS at 17:21

## 2017-06-12 RX ADMIN — AMLODIPINE BESYLATE 10 MG: 10 TABLET ORAL at 07:53

## 2017-06-12 RX ADMIN — CEFEPIME HYDROCHLORIDE 2000 MG: 2 INJECTION, POWDER, FOR SOLUTION INTRAVENOUS at 05:56

## 2017-06-12 RX ADMIN — FUROSEMIDE 40 MG: 10 INJECTION, SOLUTION INTRAMUSCULAR; INTRAVENOUS at 10:15

## 2017-06-12 RX ADMIN — METHOCARBAMOL 750 MG: 750 TABLET ORAL at 17:12

## 2017-06-12 RX ADMIN — TOLTERODINE TARTRATE 4 MG: 2 CAPSULE, EXTENDED RELEASE ORAL at 07:53

## 2017-06-12 RX ADMIN — QUETIAPINE 12.5 MG: 25 TABLET, FILM COATED ORAL at 14:00

## 2017-06-12 RX ADMIN — ENOXAPARIN SODIUM 40 MG: 40 INJECTION SUBCUTANEOUS at 07:52

## 2017-06-12 RX ADMIN — METHOCARBAMOL 750 MG: 750 TABLET ORAL at 11:53

## 2017-06-12 RX ADMIN — GABAPENTIN 600 MG: 300 CAPSULE ORAL at 21:30

## 2017-06-12 RX ADMIN — NYSTATIN 1 APPLICATION: 100000 CREAM TOPICAL at 07:54

## 2017-06-12 RX ADMIN — GABAPENTIN 600 MG: 300 CAPSULE ORAL at 17:12

## 2017-06-12 RX ADMIN — FUROSEMIDE 40 MG: 40 TABLET ORAL at 07:53

## 2017-06-12 RX ADMIN — NYSTATIN 1 APPLICATION: 100000 CREAM TOPICAL at 17:13

## 2017-06-12 RX ADMIN — GABAPENTIN 600 MG: 300 CAPSULE ORAL at 07:52

## 2017-06-13 LAB
L PNEUMO1 AG UR QL IA.RAPID: NEGATIVE
MRSA NOSE QL CULT: NORMAL
S PNEUM AG UR QL: NEGATIVE

## 2017-06-13 RX ORDER — SACCHAROMYCES BOULARDII 250 MG
250 CAPSULE ORAL 2 TIMES DAILY
Status: DISCONTINUED | OUTPATIENT
Start: 2017-06-13 | End: 2017-06-14 | Stop reason: HOSPADM

## 2017-06-13 RX ORDER — ALPRAZOLAM 0.25 MG/1
0.25 TABLET ORAL
Status: DISCONTINUED | OUTPATIENT
Start: 2017-06-13 | End: 2017-06-14 | Stop reason: HOSPADM

## 2017-06-13 RX ORDER — QUETIAPINE FUMARATE 25 MG/1
25 TABLET, FILM COATED ORAL
Status: DISCONTINUED | OUTPATIENT
Start: 2017-06-13 | End: 2017-06-14 | Stop reason: HOSPADM

## 2017-06-13 RX ORDER — AMOXICILLIN AND CLAVULANATE POTASSIUM 875; 125 MG/1; MG/1
1 TABLET, FILM COATED ORAL EVERY 12 HOURS SCHEDULED
Status: DISCONTINUED | OUTPATIENT
Start: 2017-06-13 | End: 2017-06-14 | Stop reason: HOSPADM

## 2017-06-13 RX ADMIN — ALPRAZOLAM 0.25 MG: 0.25 TABLET ORAL at 21:46

## 2017-06-13 RX ADMIN — METHOCARBAMOL 750 MG: 750 TABLET ORAL at 12:01

## 2017-06-13 RX ADMIN — GABAPENTIN 600 MG: 300 CAPSULE ORAL at 17:27

## 2017-06-13 RX ADMIN — AMOXICILLIN AND CLAVULANATE POTASSIUM 1 TABLET: 875; 125 TABLET, FILM COATED ORAL at 21:46

## 2017-06-13 RX ADMIN — GABAPENTIN 600 MG: 300 CAPSULE ORAL at 21:46

## 2017-06-13 RX ADMIN — AMLODIPINE BESYLATE 10 MG: 10 TABLET ORAL at 10:38

## 2017-06-13 RX ADMIN — METHOCARBAMOL 750 MG: 750 TABLET ORAL at 21:46

## 2017-06-13 RX ADMIN — METHOCARBAMOL 750 MG: 750 TABLET ORAL at 17:27

## 2017-06-13 RX ADMIN — TOLTERODINE TARTRATE 4 MG: 2 CAPSULE, EXTENDED RELEASE ORAL at 10:37

## 2017-06-13 RX ADMIN — NYSTATIN 1 APPLICATION: 100000 CREAM TOPICAL at 17:28

## 2017-06-13 RX ADMIN — ESCITALOPRAM OXALATE 10 MG: 10 TABLET ORAL at 10:38

## 2017-06-13 RX ADMIN — LOSARTAN POTASSIUM 50 MG: 50 TABLET, FILM COATED ORAL at 10:37

## 2017-06-13 RX ADMIN — PANTOPRAZOLE SODIUM 40 MG: 40 TABLET, DELAYED RELEASE ORAL at 05:29

## 2017-06-13 RX ADMIN — QUETIAPINE 25 MG: 25 TABLET, FILM COATED ORAL at 21:46

## 2017-06-13 RX ADMIN — FUROSEMIDE 40 MG: 40 TABLET ORAL at 10:38

## 2017-06-13 RX ADMIN — Medication 250 MG: at 17:27

## 2017-06-13 RX ADMIN — GABAPENTIN 600 MG: 300 CAPSULE ORAL at 10:38

## 2017-06-13 RX ADMIN — CEFEPIME HYDROCHLORIDE 1000 MG: 1 INJECTION, POWDER, FOR SOLUTION INTRAMUSCULAR; INTRAVENOUS at 05:29

## 2017-06-13 RX ADMIN — Medication 250 MG: at 14:29

## 2017-06-13 RX ADMIN — AMOXICILLIN AND CLAVULANATE POTASSIUM 1 TABLET: 875; 125 TABLET, FILM COATED ORAL at 14:29

## 2017-06-13 RX ADMIN — METHOCARBAMOL 750 MG: 750 TABLET ORAL at 10:38

## 2017-06-13 RX ADMIN — NYSTATIN 1 APPLICATION: 100000 CREAM TOPICAL at 10:39

## 2017-06-14 VITALS
HEART RATE: 33 BPM | SYSTOLIC BLOOD PRESSURE: 137 MMHG | RESPIRATION RATE: 18 BRPM | TEMPERATURE: 98.1 F | OXYGEN SATURATION: 97 % | DIASTOLIC BLOOD PRESSURE: 65 MMHG | BODY MASS INDEX: 35.03 KG/M2 | HEIGHT: 70 IN | WEIGHT: 244.71 LBS

## 2017-06-14 PROBLEM — E87.6 HYPOKALEMIA: Status: ACTIVE | Noted: 2017-06-14

## 2017-06-14 PROBLEM — J96.01 ACUTE RESPIRATORY FAILURE WITH HYPOXIA (HCC): Status: ACTIVE | Noted: 2017-06-14

## 2017-06-14 PROBLEM — J15.6 PNEUMONIA DUE TO GRAM-NEGATIVE BACTERIA (HCC): Status: ACTIVE | Noted: 2017-06-14

## 2017-06-14 PROBLEM — J18.9 HCAP (HEALTHCARE-ASSOCIATED PNEUMONIA): Status: ACTIVE | Noted: 2017-06-14

## 2017-06-14 PROBLEM — G93.41 ACUTE METABOLIC ENCEPHALOPATHY: Status: ACTIVE | Noted: 2017-06-14

## 2017-06-14 LAB
ANION GAP SERPL CALCULATED.3IONS-SCNC: 9 MMOL/L (ref 4–13)
BASOPHILS # BLD AUTO: 0.04 THOUSANDS/ΜL (ref 0–0.1)
BASOPHILS NFR BLD AUTO: 1 % (ref 0–1)
BUN SERPL-MCNC: 16 MG/DL (ref 5–25)
CALCIUM SERPL-MCNC: 9.6 MG/DL (ref 8.3–10.1)
CHLORIDE SERPL-SCNC: 103 MMOL/L (ref 100–108)
CO2 SERPL-SCNC: 29 MMOL/L (ref 21–32)
CREAT SERPL-MCNC: 0.91 MG/DL (ref 0.6–1.3)
EOSINOPHIL # BLD AUTO: 0.23 THOUSAND/ΜL (ref 0–0.61)
EOSINOPHIL NFR BLD AUTO: 3 % (ref 0–6)
ERYTHROCYTE [DISTWIDTH] IN BLOOD BY AUTOMATED COUNT: 19.9 % (ref 11.6–15.1)
GFR SERPL CREATININE-BSD FRML MDRD: >60 ML/MIN/1.73SQ M
GLUCOSE SERPL-MCNC: 122 MG/DL (ref 65–140)
HCT VFR BLD AUTO: 37.2 % (ref 34.8–46.1)
HGB BLD-MCNC: 10.7 G/DL (ref 11.5–15.4)
LYMPHOCYTES # BLD AUTO: 1.85 THOUSANDS/ΜL (ref 0.6–4.47)
LYMPHOCYTES NFR BLD AUTO: 28 % (ref 14–44)
MCH RBC QN AUTO: 20.4 PG (ref 26.8–34.3)
MCHC RBC AUTO-ENTMCNC: 28.8 G/DL (ref 31.4–37.4)
MCV RBC AUTO: 71 FL (ref 82–98)
MONOCYTES # BLD AUTO: 0.81 THOUSAND/ΜL (ref 0.17–1.22)
MONOCYTES NFR BLD AUTO: 12 % (ref 4–12)
NEUTROPHILS # BLD AUTO: 3.75 THOUSANDS/ΜL (ref 1.85–7.62)
NEUTS SEG NFR BLD AUTO: 56 % (ref 43–75)
PLATELET # BLD AUTO: 286 THOUSANDS/UL (ref 149–390)
PMV BLD AUTO: 9.7 FL (ref 8.9–12.7)
POTASSIUM SERPL-SCNC: 3.1 MMOL/L (ref 3.5–5.3)
RBC # BLD AUTO: 5.24 MILLION/UL (ref 3.81–5.12)
SODIUM SERPL-SCNC: 141 MMOL/L (ref 136–145)
WBC # BLD AUTO: 6.68 THOUSAND/UL (ref 4.31–10.16)

## 2017-06-14 PROCEDURE — 85025 COMPLETE CBC W/AUTO DIFF WBC: CPT | Performed by: INTERNAL MEDICINE

## 2017-06-14 PROCEDURE — 80048 BASIC METABOLIC PNL TOTAL CA: CPT | Performed by: INTERNAL MEDICINE

## 2017-06-14 RX ORDER — AMOXICILLIN AND CLAVULANATE POTASSIUM 875; 125 MG/1; MG/1
1 TABLET, FILM COATED ORAL EVERY 12 HOURS SCHEDULED
Qty: 14 TABLET | Refills: 0 | Status: SHIPPED | OUTPATIENT
Start: 2017-06-14 | End: 2017-06-21

## 2017-06-14 RX ORDER — CALCIUM CARBONATE 200(500)MG
1000 TABLET,CHEWABLE ORAL DAILY PRN
Qty: 30 TABLET | Refills: 0 | Status: SHIPPED | OUTPATIENT
Start: 2017-06-14 | End: 2021-03-18 | Stop reason: HOSPADM

## 2017-06-14 RX ORDER — QUETIAPINE FUMARATE 25 MG/1
25 TABLET, FILM COATED ORAL
Qty: 30 TABLET | Refills: 0 | Status: SHIPPED | OUTPATIENT
Start: 2017-06-14 | End: 2018-07-24 | Stop reason: HOSPADM

## 2017-06-14 RX ORDER — SACCHAROMYCES BOULARDII 250 MG
250 CAPSULE ORAL 2 TIMES DAILY
Qty: 60 CAPSULE | Refills: 0 | Status: SHIPPED | OUTPATIENT
Start: 2017-06-14 | End: 2017-07-14

## 2017-06-14 RX ORDER — DOCUSATE SODIUM 100 MG/1
100 CAPSULE, LIQUID FILLED ORAL 2 TIMES DAILY PRN
Qty: 10 CAPSULE | Refills: 0 | Status: SHIPPED | OUTPATIENT
Start: 2017-06-14 | End: 2020-11-13 | Stop reason: ALTCHOICE

## 2017-06-14 RX ORDER — ALPRAZOLAM 0.25 MG/1
0.25 TABLET ORAL
Qty: 10 TABLET | Refills: 0 | Status: SHIPPED | OUTPATIENT
Start: 2017-06-14 | End: 2018-07-24 | Stop reason: HOSPADM

## 2017-06-14 RX ORDER — POTASSIUM CHLORIDE 20 MEQ/1
40 TABLET, EXTENDED RELEASE ORAL EVERY 4 HOURS
Status: COMPLETED | OUTPATIENT
Start: 2017-06-14 | End: 2017-06-14

## 2017-06-14 RX ADMIN — GABAPENTIN 600 MG: 300 CAPSULE ORAL at 08:41

## 2017-06-14 RX ADMIN — FUROSEMIDE 40 MG: 40 TABLET ORAL at 08:40

## 2017-06-14 RX ADMIN — TOLTERODINE TARTRATE 4 MG: 2 CAPSULE, EXTENDED RELEASE ORAL at 08:40

## 2017-06-14 RX ADMIN — ENOXAPARIN SODIUM 40 MG: 40 INJECTION SUBCUTANEOUS at 08:40

## 2017-06-14 RX ADMIN — LOSARTAN POTASSIUM 50 MG: 50 TABLET, FILM COATED ORAL at 08:40

## 2017-06-14 RX ADMIN — PANTOPRAZOLE SODIUM 40 MG: 40 TABLET, DELAYED RELEASE ORAL at 05:28

## 2017-06-14 RX ADMIN — ESCITALOPRAM OXALATE 10 MG: 10 TABLET ORAL at 08:41

## 2017-06-14 RX ADMIN — POTASSIUM CHLORIDE 40 MEQ: 1500 TABLET, EXTENDED RELEASE ORAL at 10:00

## 2017-06-14 RX ADMIN — Medication 250 MG: at 08:40

## 2017-06-14 RX ADMIN — METHOCARBAMOL 750 MG: 750 TABLET ORAL at 11:34

## 2017-06-14 RX ADMIN — NYSTATIN 1 APPLICATION: 100000 CREAM TOPICAL at 08:42

## 2017-06-14 RX ADMIN — METHOCARBAMOL 750 MG: 750 TABLET ORAL at 08:40

## 2017-06-14 RX ADMIN — AMOXICILLIN AND CLAVULANATE POTASSIUM 1 TABLET: 875; 125 TABLET, FILM COATED ORAL at 08:41

## 2017-06-14 RX ADMIN — AMLODIPINE BESYLATE 10 MG: 10 TABLET ORAL at 08:41

## 2017-06-14 RX ADMIN — POTASSIUM CHLORIDE 40 MEQ: 1500 TABLET, EXTENDED RELEASE ORAL at 12:07

## 2017-06-16 LAB
BACTERIA BLD CULT: NORMAL
BACTERIA BLD CULT: NORMAL

## 2017-10-16 ENCOUNTER — ALLSCRIPTS OFFICE VISIT (OUTPATIENT)
Dept: OTHER | Facility: OTHER | Age: 77
End: 2017-10-16

## 2017-10-17 NOTE — PROGRESS NOTES
Assessment  1  Frequent UTI (599 0) (N39 0)   2  Overactive bladder (596 51) (N32 81)    Discussion/Summary  Discussion Summary:   57-year-old female with overactive bladder  discussed that the 2 medications year on do the same thing and there is no point taking both medications  I will discontinue the VESIcare and increase her oxybutynin to 15 mg daily  We will continue on his medicine regimen  We briefly discussed tertiary treatment options for overactive bladder but she is not interested in any of these  She will follow up in 6 months  Chief Complaint  Chief Complaint Free Text Note Form: OAB; Frequent UTI      History of Present Illness  HPI: 57-year-old female presents for follow-up of overactive bladder symptoms  The patient is on VESIcare and oxybutynin  She was previously on Myrbetriq and VESIcare  She is unsure about the change in medication  She thought she had stop all medications  She states that her urinary symptoms improved significantly for about 3-4 months but she is starting to began to have urge incontinence again  She recently had a spine stimulator that did not work and was taken out and started with physical therapy and she is unsure if these are worsening her urinary symptoms  She has no other complaints  Review of Systems  Complete-Female Urology:   Constitutional: No fever, no chills, feels well, no tiredness, no recent weight gain or weight loss  Respiratory: No complaints of shortness of breath, no wheezing, no cough, no SOB on exertion, no orthopnea, no PND  Cardiovascular: No complaints of slow heart rate, no fast heart rate, no chest pain, no palpitations, no leg claudication, no lower extremity edema  Gastrointestinal: No complaints of abdominal pain, no constipation, no nausea or vomiting, no diarrhea, no bloody stools     Genitourinary: stream varies,-- Empty sensation-- and-- stream quality fair, but-- no dysuria,-- no urinary hesitancy,-- no feelings of urinary urgency-- and-- no hematuria--    The patient presents with complaints of nocturia (1x)  Musculoskeletal: No complaints of arthralgias, no myalgias, no joint swelling or stiffness, no limb pain or swelling  Integumentary: No complaints of skin rash or lesions, no itching, no skin wounds, no breast pain or lump  Hematologic/Lymphatic: No complaints of swollen glands, no swollen glands in the neck, does not bleed easily, does not bruise easily  Neurological: No complaints of headache, no confusion, no convulsions, no numbness, no dizziness or fainting, no tingling, no limb weakness, no difficulty walking  ROS Reviewed:   ROS reviewed  Active Problems  1  Anxiety (300 00) (F41 9)   2  Atrial fibrillation (427 31) (I48 91)   3  Bipolar disorder (296 80) (F31 9)   4  Bone/cartilage disorder (733 90) (M89 9,M94 9)   5  Chronic pain syndrome (338 4) (G89 4)   6  Conjunctivitis, acute (372 00) (H10 30)   7  Debility (799 3) (R53 81)   8  Dementia without behavioral disturbance (294 20) (F03 90)   9  Difficulty in walking (719 7) (R26 2)   10  Disc degeneration, lumbar (722 52) (M51 36)   11  Esophageal reflux (530 81) (K21 9)   12  Frequent UTI (599 0) (N39 0)   13  Hypertension (401 9) (I10)   14  Lumbar canal stenosis (724 02) (M48 061)   15  Muscle weakness (728 87) (M62 81)   16  Opioid dependence (304 00) (F11 20)   17  Overactive bladder (596 51) (N32 81)   18  Peripheral arterial disease (443 9) (I73 9)   19  Psychoses (298 9) (F29)   20  Rectal bleed (569 3) (K62 5)   21  Sciatica (724 3) (M54 30)   22  Thoracic back pain (724 1) (M54 6)    Past Medical History  1  History of pneumonia (V12 61) (Z87 01)   2  History of Presence of artificial hip (V43 64) (A84 146)  Active Problems And Past Medical History Reviewed: The active problems and past medical history were reviewed and updated today  Surgical History  1  History of Hip Surgery   2  History of Knee Surgery   3   History of Lower Back Surgery  Surgical History Reviewed: The surgical history was reviewed and updated today  Family History  Mother    1  No pertinent family history  Family History Reviewed: The family history was reviewed and updated today  Social History   · Never a smoker  Social History Reviewed: The social history was reviewed and is unchanged  Current Meds   1  Acetaminophen 325 MG Oral Tablet Recorded   2  Acetaminophen 650 MG Rectal Suppository; Therapy: (Recorded:16Oct2017) to Recorded   3  Albuterol Sulfate (2 5 MG/3ML) 0 083% Inhalation Nebulization Solution; USE 1 UNIT   DOSE EVERY 4-6 HOURS AS NEEDED FOR WHEEZING ; Therapy: (Recorded:88Qwm9821) to Recorded   4  Alendronate Sodium 70 MG Oral Tablet; TAKE 1 TABLET ONCE WEEKLY; Therapy: (Recorded:70Lay9444) to Recorded   5  Aluminum & Magnesium Hydroxide 200-200 MG/5ML SUSP; SWALLOW 15 ML Every   twelve hours; Therapy: (Recorded:87Tyf9738) to Recorded   6  AmLODIPine Besylate 10 MG Oral Tablet; TAKE 1 TABLET DAILY AS DIRECTED; Therapy: (Recorded:00Oul5862) to Recorded   7  Bisacodyl 10 MG Rectal Suppository; INSERT 1 SUPP  PRN; Therapy: (Recorded:97Qlr7215) to Recorded   8  Cholecalciferol Crystals; GIVE 1000 UNITS BY MOUTH ONCE DAILY; Therapy: (Recorded:12Iix5548) to Recorded   9  Citalopram Hydrobromide 10 MG Oral Tablet; Therapy: (Recorded:16Oct2017) to Recorded   10  Colace CAPS; TAKE 200 MG Twice daily; Therapy: (Recorded:47Enz0127) to Recorded   11  Cranberry TABS; TAKE 425 MG Daily; Therapy: (Recorded:20Qoo6366) to Recorded   12  Escitalopram Oxalate 10 MG Oral Tablet; TAKE 1 TABLET DAILY; Therapy: (Recorded:44Zrn2800) to Recorded   13  FentaNYL 25 MCG/HR Transdermal Patch 72 Hour; APPLY 1 PATCH EVERY 3 DAYS; Therapy: (Recorded:85Gkz1034) to Recorded   14  Florastor 250 MG Oral Capsule; Therapy: (Recorded:16Oct2017) to Recorded   15  Gabapentin 600 MG Oral Tablet Recorded   16   Lasix 40 MG Oral Tablet; TAKE 1 TABLET DAILY; Therapy: (Recorded:26Hid3604) to Recorded   17  Losartan Potassium 50 MG Oral Tablet; TAKE 1 TABLET DAILY; Therapy: (Recorded:93Obo0677) to Recorded   18  Magnesium Hydroxide SUSP; SWALLOW 30 ML  PRN; Therapy: (Recorded:75Mtr0474) to Recorded   19  Methocarbamol 750 MG Oral Tablet; TAKE 1 TABLET 4 TIMES DAILY; Therapy: (Recorded:13Vno1056) to Recorded   20  Nitroglycerin 0 4 MG Sublingual Tablet Sublingual; DISSOLVE 1 TABLET UNDER THE    TONGUE AS NEEDED FOR CHEST PAIN;    Therapy: (Recorded:85Idr8377) to Recorded   21  Nystatin-Triamcinolone 525482-0 5 UNIT/GM-% External Cream;    Therapy: (Recorded:19Jun2014) to Recorded   22  Omeprazole 20 MG Oral Capsule Delayed Release; TAKE 1 CAPSULE DAILY; Therapy: (Recorded:61Oav8056) to Recorded   23  Oxybutynin Chloride ER 10 MG Oral Tablet Extended Release 24 Hour; Therapy: (Recorded:16Oct2017) to Recorded   24  Percocet 5-325 MG Oral Tablet; TAKE 1 TABLET Every 8 hours; Therapy: (Recorded:22Xhn5367) to Recorded   25  Potassium Chloride 20 MEQ TBCR; TAKE 1 TABLET DAILY; Therapy: (Recorded:91Nau7554) to Recorded   26  QUEtiapine Fumarate 25 MG Oral Tablet; Therapy: (Recorded:16Oct2017) to Recorded   27  Refresh Tears 0 5 % Ophthalmic Solution; Therapy: (Recorded:16Oct2017) to Recorded   28  Solifenacin Succinate 10 MG TABS; Therapy: (Recorded:16Oct2017) to Recorded   29  TraMADol HCl - 50 MG Oral Tablet Recorded   30  Ultram 50 MG Oral Tablet; TAKE 2 TABLET 3 times daily; Therapy: (Recorded:93Nwl2161) to Recorded   31  Vitamin D3 1000 UNIT Oral Capsule; Therapy: (China Hayes) to Recorded   32  Xalatan 0 005 % Ophthalmic Solution; Therapy: (Recorded:16Oct2017) to Recorded   33  Xanax 0 5 MG Oral Tablet; TAKE 1 TABLET 3 TIMES DAILY; Therapy: (Recorded:26Gsu0079) to Recorded   34  Zantac 150 MG Oral Tablet; Therapy: (Recorded:16Oct2017) to Recorded  Medication List Reviewed:    The medication list was reviewed and updated today  Allergies  1  Iodine Crystals    Vitals  Vital Signs    Recorded: 53FGV5988 09:58AM   Heart Rate 60   Systolic 224   Diastolic 60   Height Unobtainable Yes   Weight Unobtainable Yes     Physical Exam    Constitutional   General appearance: Abnormal  -- Morbidly obese in wheelchair  Pulmonary   Respiratory effort: No increased work of breathing or signs of respiratory distress  Cardiovascular   Examination of extremities for edema and/or varicosities: Normal     Abdomen   Abdomen: Non-tender, no masses  Liver and spleen: No hepatomegaly or splenomegaly  Musculoskeletal   Gait and station: Normal     Skin   Skin and subcutaneous tissue: Normal without rashes or lesions         Future Appointments    Date/Time Provider Specialty Site   04/16/2018 10:30 AM Enid Hand, [facility] [place]     Signatures   Electronically signed by : DENSY Ramirez ; Oct 16 2017 10:52AM EST                       (Author)

## 2017-12-13 ENCOUNTER — GENERIC CONVERSION - ENCOUNTER (OUTPATIENT)
Dept: OTHER | Facility: OTHER | Age: 77
End: 2017-12-13

## 2018-01-13 VITALS
DIASTOLIC BLOOD PRESSURE: 80 MMHG | HEIGHT: 65 IN | WEIGHT: 290 LBS | SYSTOLIC BLOOD PRESSURE: 136 MMHG | BODY MASS INDEX: 48.32 KG/M2 | HEART RATE: 72 BPM

## 2018-01-13 VITALS — HEART RATE: 60 BPM | DIASTOLIC BLOOD PRESSURE: 60 MMHG | SYSTOLIC BLOOD PRESSURE: 124 MMHG

## 2018-01-15 NOTE — CONSULTS
Plan  Overactive bladder    · Myrbetriq 50 MG Oral Tablet Extended Release 24 Hour; Take 1 tablet daily   Rx By: Jonathan Monroe; Dispense: 90 Days ; #:90 Tablet Extended Release 24 Hour; Refill: 3; For: Overactive bladder; OLGA = N; Print Rx   · Urine Dip Non-Automated- POC; Status:Complete - Retrospective By Protocol  Authorization;   Done: 16EUA8428 01:41PM   Performed: In Office; JHD:[id number]; Last Updated Chelo Vargas; 2016 1:43:01 PM;Ordered;  For:Overactive bladder; Ordered By:Hannah Neves; Discussion/Summary  Discussion Summary:   69 yo F with urinary frequency and vaginal voidin ) Overactive bladder: Increase Myrbetriq to 50 mg daily, return in 1 month to evaluate for improvement  Consider surgical management if medical management fails  2 ) Vaginal voiding: Recommend continuing to lean forward after voiding to avoid leaking urine  Chief Complaint  Chief Complaint Free Text Note Form: Patient presents for OAB      History of Present Illness  HPI: 69 yo F presents with a 4 month history of urinary frequency and nocturia  She reports occasional urge incontinence but denies any dysuria or hematuria  She reports having to lean forward to completely void  Over the last 4 months she has tried Vesicare 5 mg, 10 mg and Myrbetriq 25 mg  She had no improvement of symptoms until this last week when her nocturia decreased from 5x per night to once per night  She is ambulatory with a walker  Wearing pads is not an option for her as 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 and needs to keep the area dry  Review of Systems  Complete-Female Urology:   Constitutional: No fever, no chills, feels well, no tiredness, no recent weight gain or weight loss  Respiratory: No complaints of shortness of breath, no wheezing, no cough, no SOB on exertion, no orthopnea, no PND     Cardiovascular: No complaints of slow heart rate, no fast heart rate, no chest pain, no palpitations, no leg claudication, no lower extremity edema  Gastrointestinal: No complaints of abdominal pain, no constipation, no nausea or vomiting, no diarrhea, no bloody stools  Genitourinary: feelings of urinary urgency, Empty sensation, stream quality good, urinary stream starts and stops, but no dysuria, no urinary hesitancy, no incontinence and no hematuria    The patient presents with complaints of nocturia (5x)  Musculoskeletal: No complaints of arthralgias, no myalgias, no joint swelling or stiffness, no limb pain or swelling  Integumentary: No complaints of skin rash or lesions, no itching, no skin wounds, no breast pain or lump  Hematologic/Lymphatic: No complaints of swollen glands, no swollen glands in the neck, does not bleed easily, does not bruise easily  Neurological: No complaints of headache, no confusion, no convulsions, no numbness, no dizziness or fainting, no tingling, no limb weakness, no difficulty walking  ROS Reviewed:   ROS reviewed  Active Problems    1  Bone/cartilage disorder (733 90) (M89 9,M94 9)   2  Debility (799 3) (R53 81)   3  Dementia without behavioral disturbance (294 20) (F03 90)   4  Difficulty in walking (719 7) (R26 2)   5  Esophageal reflux (530 81) (K21 9)   6  Hypertension (401 9) (I10)   7  Opioid dependence (304 00) (F11 20)   8  Overactive bladder (596 51) (N32 81)   9  Peripheral arterial disease (443 9) (I73 9)   10  Psychoses (298 9) (F29)   11  Thoracic back pain (724 1) (M54 6)    Past Medical History  Active Problems And Past Medical History Reviewed: The active problems and past medical history were reviewed and updated today  Surgical History  Surgical History Reviewed: The surgical history was reviewed and updated today  Family History    1  No pertinent family history  Family History Reviewed: The family history was reviewed and updated today  Social History    · Never a smoker  Social History Reviewed:  The social history was reviewed and is unchanged  Current Meds   1  AmLODIPine Besylate 10 MG Oral Tablet; Therapy: (Recorded:19Jun2014) to Recorded   2  Bengay PTCH; Therapy: (Cal Neither) to Recorded   3  Cholecalciferol Crystals; Therapy: (Recorded:19Jun2014) to Recorded   4  Coumadin 5 MG Oral Tablet; Therapy: (Recorded:19Jun2014) to Recorded   5  Cranberry TABS; Therapy: (Recorded:19Jun2014) to Recorded   6  Docusate Sodium 100 MG Oral Capsule; Therapy: (Recorded:19Jun2014) to Recorded   7  FentaNYL 25 MCG/HR Transdermal Patch 72 Hour Recorded   8  Gabapentin 100 MG Oral Capsule; Therapy: (Recorded:19Jun2014) to Recorded   9  Hydrochlorothiazide 12 5 MG Oral Capsule; Therapy: (Recorded:19Jun2014) to Recorded   10  Kaopectate 750 MG/15ML LIQD;    Therapy: (Cal Neither) to Recorded   11  Lexapro 10 MG Oral Tablet; Therapy: (Cal Neither) to Recorded   12  Losartan Potassium 50 MG Oral Tablet; Therapy: (Recorded:19Jun2014) to Recorded   13  Methocarbamol TABS; Therapy: (Cal Neither) to Recorded   14  Myrbetriq 25 MG Oral Tablet Extended Release 24 Hour; Therapy: (Cal Neither) to Recorded   15  Nystatin-Triamcinolone 372475-3 9 UNIT/GM-% External Cream;    Therapy: (Recorded:19Jun2014) to Recorded   16  Percocet 5-325 MG Oral Tablet; Therapy: (Cal Neither) to Recorded   17  Potassium Chloride 20 MEQ TBCR; Therapy: (Recorded:19Jun2014) to Recorded   18  Robitussin A-C SYRP;    Therapy: (Cal Neither) to Recorded   19  Tylenol CAPS; Therapy: (Recorded:19Jun2014) to Recorded   20  Ultram 50 MG Oral Tablet; Therapy: (Cal Neither) to Recorded   21  Xanax 0 25 MG Oral Tablet; Therapy: (Recorded:19Jun2014) to Recorded   22  Xanax 0 5 MG Oral Tablet; Therapy: (Recorded:19Jun2014) to Recorded  Medication List Reviewed: The medication list was reviewed and updated today  Allergies    1   Iodine Crystals    Vitals  Vital Signs [Data Includes: Current Encounter]    Recorded: 21CAX1260 01:39PM   Heart Rate 80   Systolic 131   Diastolic 80   Height 5 ft 4 25 in   Weight 300 lb    BMI Calculated 51 09   BSA Calculated 2 33     Physical Exam    Constitutional   General appearance: Abnormal   Morbidly obese with walker  Pulmonary   Respiratory effort: No increased work of breathing or signs of respiratory distress  Cardiovascular   Examination of extremities for edema and/or varicosities: Normal     Abdomen   Abdomen: Non-tender, no masses  Liver and spleen: No hepatomegaly or splenomegaly  Additional Exam:  Neuro exam nonfocal       Results/Data  Encounter Results   Urine Dip Non-Automated- POC [id number] 01:41PM Jonathan Monroe     Test Name Result Flag Reference   Color Clear     Clarity Transparent     Leukocytes -     Nitrite -     Blood -     Protein trace     Ph 5 0     Specific Gravity 1 010     Ketone -     Glucose -         Future Appointments    Date/Time Provider Specialty Site   03/10/2016 12:45 PM DENYS Alston   Urology [address]     Signatures   Electronically signed by : DENYS Quiros ; Jan 26 2016  2:57PM EST                       (Author)

## 2018-01-23 NOTE — MISCELLANEOUS
Message  GI Reminder Recall [address] [place] [address] [address]:   Date: 12/13/2017   Dear Ino Martin:     Review of our records shows you are due for the following: EGD  Or records indicate that you are due at this time to have a follow-up examination for a EGD  As you know, these tests are done to prevent cancer, a very common disease in the [place] and responsible for thousands of patient deaths each year  We at [facility] are concerned for your health, and would very much appreciate you getting in touch with us at your earliest convenience  Again, this examination is vital to your proper health maintenance and for the prevention of cancer and Colonoscopy  Our records indicate that you are due at this time to have a follow-up examination for a colonoscopy  As you now, these tests are done to prevent colon cancer, a very common disease in the [place] and responsible for the thousands of patient deaths each year  We at [facility] are concerned for your health, and would very much appreciate you getting in touch with us at your earliest convenience, Again, this examination is vital to your proper health maintenance and for the prevention of cancer  Please call the following office to schedule your appointment:   [facility] [address], [address], [place], [address] [place] [address] [phone]  We look forward to hearing from you!      Sincerely,     [facility]      Signatures   Electronically signed by : Anup Bean, ; Dec 13 2017 10:30AM EST                       (Author)

## 2018-07-23 ENCOUNTER — HOSPITAL ENCOUNTER (OUTPATIENT)
Facility: HOSPITAL | Age: 78
Setting detail: OBSERVATION
Discharge: NURSING, REHAB OR LONG-TERM CARE | End: 2018-07-24
Attending: EMERGENCY MEDICINE | Admitting: INTERNAL MEDICINE
Payer: COMMERCIAL

## 2018-07-23 ENCOUNTER — APPOINTMENT (EMERGENCY)
Dept: CT IMAGING | Facility: HOSPITAL | Age: 78
End: 2018-07-23
Payer: COMMERCIAL

## 2018-07-23 ENCOUNTER — APPOINTMENT (OUTPATIENT)
Dept: MRI IMAGING | Facility: HOSPITAL | Age: 78
End: 2018-07-23
Payer: COMMERCIAL

## 2018-07-23 DIAGNOSIS — M54.50 ACUTE EXACERBATION OF CHRONIC LOW BACK PAIN: Primary | ICD-10-CM

## 2018-07-23 DIAGNOSIS — G89.29 ACUTE EXACERBATION OF CHRONIC LOW BACK PAIN: Primary | ICD-10-CM

## 2018-07-23 PROBLEM — M54.9 BACK PAIN: Status: ACTIVE | Noted: 2018-07-23

## 2018-07-23 PROBLEM — F99 PSYCHIATRIC DISORDER: Status: ACTIVE | Noted: 2018-07-23

## 2018-07-23 LAB
ALBUMIN SERPL BCP-MCNC: 2.8 G/DL (ref 3.5–5)
ALP SERPL-CCNC: 55 U/L (ref 46–116)
ALT SERPL W P-5'-P-CCNC: 17 U/L (ref 12–78)
ANION GAP SERPL CALCULATED.3IONS-SCNC: 4 MMOL/L (ref 4–13)
APAP SERPL-MCNC: <2 UG/ML (ref 10–30)
AST SERPL W P-5'-P-CCNC: 32 U/L (ref 5–45)
BACTERIA UR QL AUTO: ABNORMAL /HPF
BASOPHILS # BLD AUTO: 0.02 THOUSANDS/ΜL (ref 0–0.1)
BASOPHILS NFR BLD AUTO: 0 % (ref 0–1)
BILIRUB SERPL-MCNC: 0.4 MG/DL (ref 0.2–1)
BILIRUB UR QL STRIP: NEGATIVE
BUN SERPL-MCNC: 11 MG/DL (ref 5–25)
CALCIUM SERPL-MCNC: 9 MG/DL (ref 8.3–10.1)
CHLORIDE SERPL-SCNC: 104 MMOL/L (ref 100–108)
CLARITY UR: CLEAR
CO2 SERPL-SCNC: 29 MMOL/L (ref 21–32)
COLOR UR: YELLOW
CREAT SERPL-MCNC: 0.84 MG/DL (ref 0.6–1.3)
EOSINOPHIL # BLD AUTO: 0.19 THOUSAND/ΜL (ref 0–0.61)
EOSINOPHIL NFR BLD AUTO: 3 % (ref 0–6)
ERYTHROCYTE [DISTWIDTH] IN BLOOD BY AUTOMATED COUNT: 15.6 % (ref 11.6–15.1)
GFR SERPL CREATININE-BSD FRML MDRD: 68 ML/MIN/1.73SQ M
GLUCOSE SERPL-MCNC: 115 MG/DL (ref 65–140)
GLUCOSE UR STRIP-MCNC: NEGATIVE MG/DL
HCT VFR BLD AUTO: 35.1 % (ref 34.8–46.1)
HGB BLD-MCNC: 10.7 G/DL (ref 11.5–15.4)
HGB UR QL STRIP.AUTO: NEGATIVE
INR PPP: 1.63 (ref 0.86–1.17)
KETONES UR STRIP-MCNC: NEGATIVE MG/DL
LACTATE SERPL-SCNC: 1.7 MMOL/L (ref 0.5–2)
LEUKOCYTE ESTERASE UR QL STRIP: NEGATIVE
LIPASE SERPL-CCNC: 41 U/L (ref 73–393)
LYMPHOCYTES # BLD AUTO: 1.87 THOUSANDS/ΜL (ref 0.6–4.47)
LYMPHOCYTES NFR BLD AUTO: 26 % (ref 14–44)
MCH RBC QN AUTO: 25.8 PG (ref 26.8–34.3)
MCHC RBC AUTO-ENTMCNC: 30.5 G/DL (ref 31.4–37.4)
MCV RBC AUTO: 85 FL (ref 82–98)
MONOCYTES # BLD AUTO: 0.69 THOUSAND/ΜL (ref 0.17–1.22)
MONOCYTES NFR BLD AUTO: 10 % (ref 4–12)
NEUTROPHILS # BLD AUTO: 4.35 THOUSANDS/ΜL (ref 1.85–7.62)
NEUTS SEG NFR BLD AUTO: 61 % (ref 43–75)
NITRITE UR QL STRIP: NEGATIVE
NON-SQ EPI CELLS URNS QL MICRO: ABNORMAL /HPF
PH UR STRIP.AUTO: 6 [PH] (ref 4.5–8)
PLATELET # BLD AUTO: 234 THOUSANDS/UL (ref 149–390)
PMV BLD AUTO: 9.7 FL (ref 8.9–12.7)
POTASSIUM SERPL-SCNC: 4.1 MMOL/L (ref 3.5–5.3)
PROT SERPL-MCNC: 7.9 G/DL (ref 6.4–8.2)
PROT UR STRIP-MCNC: ABNORMAL MG/DL
PROTHROMBIN TIME: 18.8 SECONDS (ref 11.8–14.2)
RBC # BLD AUTO: 4.14 MILLION/UL (ref 3.81–5.12)
RBC #/AREA URNS AUTO: ABNORMAL /HPF
SODIUM SERPL-SCNC: 137 MMOL/L (ref 136–145)
SP GR UR STRIP.AUTO: 1.02 (ref 1–1.03)
UROBILINOGEN UR QL STRIP.AUTO: 0.2 E.U./DL
WBC # BLD AUTO: 7.12 THOUSAND/UL (ref 4.31–10.16)
WBC #/AREA URNS AUTO: ABNORMAL /HPF

## 2018-07-23 PROCEDURE — 87081 CULTURE SCREEN ONLY: CPT | Performed by: PHYSICIAN ASSISTANT

## 2018-07-23 PROCEDURE — 74176 CT ABD & PELVIS W/O CONTRAST: CPT

## 2018-07-23 PROCEDURE — 80329 ANALGESICS NON-OPIOID 1 OR 2: CPT | Performed by: PHYSICIAN ASSISTANT

## 2018-07-23 PROCEDURE — 80053 COMPREHEN METABOLIC PANEL: CPT | Performed by: EMERGENCY MEDICINE

## 2018-07-23 PROCEDURE — 99285 EMERGENCY DEPT VISIT HI MDM: CPT

## 2018-07-23 PROCEDURE — 85025 COMPLETE CBC W/AUTO DIFF WBC: CPT | Performed by: EMERGENCY MEDICINE

## 2018-07-23 PROCEDURE — 85610 PROTHROMBIN TIME: CPT | Performed by: EMERGENCY MEDICINE

## 2018-07-23 PROCEDURE — 83605 ASSAY OF LACTIC ACID: CPT | Performed by: EMERGENCY MEDICINE

## 2018-07-23 PROCEDURE — 81001 URINALYSIS AUTO W/SCOPE: CPT

## 2018-07-23 PROCEDURE — 99220 PR INITIAL OBSERVATION CARE/DAY 70 MINUTES: CPT | Performed by: INTERNAL MEDICINE

## 2018-07-23 PROCEDURE — 96374 THER/PROPH/DIAG INJ IV PUSH: CPT

## 2018-07-23 PROCEDURE — 83690 ASSAY OF LIPASE: CPT | Performed by: EMERGENCY MEDICINE

## 2018-07-23 PROCEDURE — 36415 COLL VENOUS BLD VENIPUNCTURE: CPT | Performed by: EMERGENCY MEDICINE

## 2018-07-23 RX ORDER — OXYBUTYNIN CHLORIDE 5 MG/1
15 TABLET, EXTENDED RELEASE ORAL DAILY
Status: DISCONTINUED | OUTPATIENT
Start: 2018-07-23 | End: 2018-07-24 | Stop reason: HOSPADM

## 2018-07-23 RX ORDER — LATANOPROST 50 UG/ML
1 SOLUTION/ DROPS OPHTHALMIC
Status: DISCONTINUED | OUTPATIENT
Start: 2018-07-23 | End: 2018-07-24 | Stop reason: HOSPADM

## 2018-07-23 RX ORDER — MUSCLE RUB CREAM 100; 150 MG/G; MG/G
CREAM TOPICAL 4 TIMES DAILY PRN
Status: DISCONTINUED | OUTPATIENT
Start: 2018-07-23 | End: 2018-07-24 | Stop reason: HOSPADM

## 2018-07-23 RX ORDER — LIDOCAINE 50 MG/G
2 PATCH TOPICAL DAILY
Status: DISCONTINUED | OUTPATIENT
Start: 2018-07-23 | End: 2018-07-24 | Stop reason: HOSPADM

## 2018-07-23 RX ORDER — ALBUTEROL SULFATE 2.5 MG/3ML
2.5 SOLUTION RESPIRATORY (INHALATION) EVERY 4 HOURS PRN
Status: DISCONTINUED | OUTPATIENT
Start: 2018-07-23 | End: 2018-07-24 | Stop reason: HOSPADM

## 2018-07-23 RX ORDER — NYSTATIN 100000 U/G
1 CREAM TOPICAL 2 TIMES DAILY
Status: DISCONTINUED | OUTPATIENT
Start: 2018-07-23 | End: 2018-07-24 | Stop reason: HOSPADM

## 2018-07-23 RX ORDER — AMLODIPINE BESYLATE 10 MG/1
10 TABLET ORAL DAILY
Status: DISCONTINUED | OUTPATIENT
Start: 2018-07-23 | End: 2018-07-24 | Stop reason: HOSPADM

## 2018-07-23 RX ORDER — DOCUSATE SODIUM 100 MG/1
100 CAPSULE, LIQUID FILLED ORAL 2 TIMES DAILY PRN
Status: DISCONTINUED | OUTPATIENT
Start: 2018-07-23 | End: 2018-07-24 | Stop reason: HOSPADM

## 2018-07-23 RX ORDER — QUETIAPINE FUMARATE 25 MG/1
12.5 TABLET, FILM COATED ORAL
Status: DISCONTINUED | OUTPATIENT
Start: 2018-07-23 | End: 2018-07-24 | Stop reason: HOSPADM

## 2018-07-23 RX ORDER — OXYCODONE AND ACETAMINOPHEN 7.5; 325 MG/1; MG/1
1 TABLET ORAL
COMMUNITY
End: 2018-07-24 | Stop reason: HOSPADM

## 2018-07-23 RX ORDER — LOSARTAN POTASSIUM 50 MG/1
50 TABLET ORAL DAILY
Status: DISCONTINUED | OUTPATIENT
Start: 2018-07-23 | End: 2018-07-24 | Stop reason: HOSPADM

## 2018-07-23 RX ORDER — GABAPENTIN 300 MG/1
600 CAPSULE ORAL 3 TIMES DAILY
Status: DISCONTINUED | OUTPATIENT
Start: 2018-07-23 | End: 2018-07-24 | Stop reason: HOSPADM

## 2018-07-23 RX ORDER — NITROGLYCERIN 0.4 MG/1
0.4 TABLET SUBLINGUAL
Status: DISCONTINUED | OUTPATIENT
Start: 2018-07-23 | End: 2018-07-24 | Stop reason: HOSPADM

## 2018-07-23 RX ORDER — CITALOPRAM 10 MG/1
10 TABLET ORAL DAILY
COMMUNITY
End: 2018-09-28 | Stop reason: HOSPADM

## 2018-07-23 RX ORDER — CRANBERRY FRUIT EXTRACT 425 MG
425 CAPSULE ORAL DAILY
Status: DISCONTINUED | OUTPATIENT
Start: 2018-07-23 | End: 2018-07-24 | Stop reason: HOSPADM

## 2018-07-23 RX ORDER — MELATONIN
1000 DAILY
Status: DISCONTINUED | OUTPATIENT
Start: 2018-07-23 | End: 2018-07-24 | Stop reason: HOSPADM

## 2018-07-23 RX ORDER — ALPRAZOLAM 0.25 MG/1
0.25 TABLET ORAL
Status: DISCONTINUED | OUTPATIENT
Start: 2018-07-23 | End: 2018-07-23

## 2018-07-23 RX ORDER — ONDANSETRON 2 MG/ML
4 INJECTION INTRAMUSCULAR; INTRAVENOUS EVERY 4 HOURS PRN
Status: DISCONTINUED | OUTPATIENT
Start: 2018-07-23 | End: 2018-07-24 | Stop reason: HOSPADM

## 2018-07-23 RX ORDER — OXYCODONE HYDROCHLORIDE 10 MG/1
10 TABLET ORAL EVERY 4 HOURS PRN
Status: DISCONTINUED | OUTPATIENT
Start: 2018-07-23 | End: 2018-07-24 | Stop reason: HOSPADM

## 2018-07-23 RX ORDER — TRAMADOL HYDROCHLORIDE 50 MG/1
50 TABLET ORAL EVERY 6 HOURS PRN
Status: DISCONTINUED | OUTPATIENT
Start: 2018-07-23 | End: 2018-07-24 | Stop reason: HOSPADM

## 2018-07-23 RX ORDER — FENTANYL 50 UG/H
1 PATCH TRANSDERMAL
Status: ON HOLD | COMMUNITY
End: 2018-07-24

## 2018-07-23 RX ORDER — CITALOPRAM 20 MG/1
10 TABLET ORAL DAILY
Status: DISCONTINUED | OUTPATIENT
Start: 2018-07-23 | End: 2018-07-24 | Stop reason: HOSPADM

## 2018-07-23 RX ORDER — RANITIDINE 150 MG/1
150 TABLET ORAL
Status: ON HOLD | COMMUNITY
End: 2018-08-28

## 2018-07-23 RX ORDER — CARBOXYMETHYLCELLULOSE SODIUM 5 MG/ML
1 SOLUTION/ DROPS OPHTHALMIC 2 TIMES DAILY
COMMUNITY
End: 2020-11-13 | Stop reason: ALTCHOICE

## 2018-07-23 RX ORDER — CALCIUM CARBONATE 200(500)MG
1000 TABLET,CHEWABLE ORAL DAILY PRN
Status: DISCONTINUED | OUTPATIENT
Start: 2018-07-23 | End: 2018-07-24 | Stop reason: HOSPADM

## 2018-07-23 RX ORDER — CYCLOBENZAPRINE HCL 10 MG
10 TABLET ORAL 3 TIMES DAILY
Status: DISCONTINUED | OUTPATIENT
Start: 2018-07-23 | End: 2018-07-23

## 2018-07-23 RX ORDER — MINERAL OIL AND PETROLATUM 150; 830 MG/G; MG/G
OINTMENT OPHTHALMIC 2 TIMES DAILY
Status: DISCONTINUED | OUTPATIENT
Start: 2018-07-23 | End: 2018-07-24 | Stop reason: HOSPADM

## 2018-07-23 RX ORDER — BISACODYL 10 MG
10 SUPPOSITORY, RECTAL RECTAL DAILY PRN
Status: DISCONTINUED | OUTPATIENT
Start: 2018-07-23 | End: 2018-07-24 | Stop reason: HOSPADM

## 2018-07-23 RX ORDER — FUROSEMIDE 40 MG/1
40 TABLET ORAL DAILY
Status: DISCONTINUED | OUTPATIENT
Start: 2018-07-23 | End: 2018-07-24 | Stop reason: HOSPADM

## 2018-07-23 RX ORDER — FENTANYL 50 UG/H
1 PATCH TRANSDERMAL
Status: DISCONTINUED | OUTPATIENT
Start: 2018-07-23 | End: 2018-07-24 | Stop reason: HOSPADM

## 2018-07-23 RX ORDER — DIAZEPAM 5 MG/ML
5 INJECTION, SOLUTION INTRAMUSCULAR; INTRAVENOUS EVERY 6 HOURS PRN
Status: DISCONTINUED | OUTPATIENT
Start: 2018-07-23 | End: 2018-07-24

## 2018-07-23 RX ORDER — LATANOPROST 50 UG/ML
1 SOLUTION/ DROPS OPHTHALMIC
COMMUNITY
End: 2020-11-13 | Stop reason: SDUPTHER

## 2018-07-23 RX ORDER — OXYBUTYNIN CHLORIDE 5 MG/1
15 TABLET, EXTENDED RELEASE ORAL DAILY
Status: DISCONTINUED | OUTPATIENT
Start: 2018-07-24 | End: 2018-07-23

## 2018-07-23 RX ORDER — FAMOTIDINE 20 MG/1
20 TABLET, FILM COATED ORAL DAILY
Status: DISCONTINUED | OUTPATIENT
Start: 2018-07-23 | End: 2018-07-24 | Stop reason: HOSPADM

## 2018-07-23 RX ORDER — PANTOPRAZOLE SODIUM 20 MG/1
20 TABLET, DELAYED RELEASE ORAL
Status: DISCONTINUED | OUTPATIENT
Start: 2018-07-24 | End: 2018-07-24 | Stop reason: HOSPADM

## 2018-07-23 RX ORDER — MAGNESIUM HYDROXIDE/ALUMINUM HYDROXICE/SIMETHICONE 120; 1200; 1200 MG/30ML; MG/30ML; MG/30ML
15 SUSPENSION ORAL 2 TIMES DAILY PRN
Status: DISCONTINUED | OUTPATIENT
Start: 2018-07-23 | End: 2018-07-24 | Stop reason: HOSPADM

## 2018-07-23 RX ADMIN — RIVAROXABAN 20 MG: 20 TABLET, FILM COATED ORAL at 16:35

## 2018-07-23 RX ADMIN — GABAPENTIN 600 MG: 300 CAPSULE ORAL at 16:35

## 2018-07-23 RX ADMIN — VITAMIN D, TAB 1000IU (100/BT) 1000 UNITS: 25 TAB at 16:30

## 2018-07-23 RX ADMIN — FAMOTIDINE 20 MG: 20 TABLET, FILM COATED ORAL at 16:35

## 2018-07-23 RX ADMIN — HYDROMORPHONE HYDROCHLORIDE 1 MG: 1 INJECTION, SOLUTION INTRAMUSCULAR; INTRAVENOUS; SUBCUTANEOUS at 12:15

## 2018-07-23 RX ADMIN — Medication 5 MG: at 22:32

## 2018-07-23 RX ADMIN — FENTANYL 1 PATCH: 50 PATCH TRANSDERMAL at 19:24

## 2018-07-23 RX ADMIN — GABAPENTIN 600 MG: 300 CAPSULE ORAL at 21:24

## 2018-07-23 RX ADMIN — MENTHOL, METHYL SALICYLATE 1 APPLICATION: 10; 15 CREAM TOPICAL at 21:30

## 2018-07-23 RX ADMIN — AMLODIPINE BESYLATE 10 MG: 10 TABLET ORAL at 16:35

## 2018-07-23 RX ADMIN — QUETIAPINE FUMARATE 12.5 MG: 25 TABLET ORAL at 21:26

## 2018-07-23 RX ADMIN — HYDROMORPHONE HYDROCHLORIDE 0.5 MG: 1 INJECTION, SOLUTION INTRAMUSCULAR; INTRAVENOUS; SUBCUTANEOUS at 15:52

## 2018-07-23 RX ADMIN — LOSARTAN POTASSIUM 50 MG: 50 TABLET, FILM COATED ORAL at 16:30

## 2018-07-23 RX ADMIN — FUROSEMIDE 40 MG: 40 TABLET ORAL at 16:35

## 2018-07-23 RX ADMIN — LIDOCAINE 2 PATCH: 50 PATCH CUTANEOUS at 16:48

## 2018-07-23 RX ADMIN — NYSTATIN 1 APPLICATION: 100000 CREAM TOPICAL at 19:36

## 2018-07-23 RX ADMIN — CITALOPRAM HYDROBROMIDE 10 MG: 20 TABLET ORAL at 16:30

## 2018-07-23 RX ADMIN — MINERAL OIL AND WHITE PETROLATUM: 150; 830 OINTMENT OPHTHALMIC at 19:23

## 2018-07-23 RX ADMIN — LATANOPROST 1 DROP: 50 SOLUTION/ DROPS OPHTHALMIC at 21:25

## 2018-07-23 NOTE — ASSESSMENT & PLAN NOTE
· Patient with reports of acute progressive back pain superimposed on many years of chronic back pain  · She denies any acute traumatic injury  · Prior to arrival she was on Percocet 7 5 mg p o  5 times a day, fentanyl patch 50 mcg every 72 hours, Robaxin 500 mg 4 times a day, and gabapentin 600 mg t i d ; she also apparently has been taking fairly sizable quantities of Tylenol (will check Tylenol level but hold any additional doses at this point )  I was unable to confirm this in the [facility] website but did review the records from the nursing home and specifically with her nurse  · For pain management during this hospitalization we will provide: tramadol 50 mg every 6 hours for moderate pain, Oxy Contin for 10 mg q 4 hours for severe pain, and Dilaudid 0 5 mg IV q 4 hours for breakthrough pain  We will continue gabapentin for neuropathic pain and also add Lidoderm patch/Aqua K-pad/menthol rub for topical relief; will change Robaxin to Valium 5 mg IV q6h for muscle spasm/agitation  ·  CT scan of lumbar sacral spine "There is extensive postoperative change of the lumbar spine with significant artifacts related to fixation hardware which extends from L1 to S1  There is a chronic appearing compression deformity of the posterior aspect of L5 with grade 2 anterolisthesis L5 on S1  Within the limits of the exam, there does not appear to be any definitive evidence of abnormal fluid collections in the paraspinal region    Not able to assess central canal   Of note, the patient's posterior spinal fixation rods appear fractured and malalignment at the lumbosacral junction "  · CT scan was reviewed by Neurosurgery and they will see her in consultation; in the meantime we will do an MRI of the lumbar sacral spine to assess for significant radiculopathy

## 2018-07-23 NOTE — ASSESSMENT & PLAN NOTE
· Patient reporting overflow and stress incontinence-I do not feel this is in any way related to her back pain or representing acute neurologic compromise  She has longstanding history of bladder dysfunction previously evaluated by Urology and is noncompliant with taking her medication  Would resume oxybutynin

## 2018-07-23 NOTE — ED NOTES
Pharmacy notified of released admission orders  Medications to be verified by pharmacy and sent to AMADA Martínez RN  07/23/18 0074

## 2018-07-23 NOTE — ASSESSMENT & PLAN NOTE
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

## 2018-07-23 NOTE — H&P
H&P- Yamel Roll 1940, 70 y o  female MRN: [id number]    Unit/Bed#: ED 09 Encounter: [id number]    Primary Care Provider: Telma Ravi MD   Date and time admitted to hospital: 7/23/2018  8:58 AM  * Back pain   Assessment & Plan    · Patient with reports of acute progressive back pain superimposed on many years of chronic back pain  · She denies any acute traumatic injury  · Prior to arrival she was on Percocet 7 5 mg p o  5 times a day, fentanyl patch 50 mcg every 72 hours, Robaxin 500 mg 4 times a day, and gabapentin 600 mg t i d ; she also apparently has been taking fairly sizable quantities of Tylenol (will check Tylenol level but hold any additional doses at this point )  I was unable to confirm this in the [facility] website but did review the records from the nursing home and specifically with her nurse  · For pain management during this hospitalization we will provide: tramadol 50 mg every 6 hours for moderate pain, Oxy Contin for 10 mg q 4 hours for severe pain, and Dilaudid 0 5 mg IV q 4 hours for breakthrough pain  We will continue gabapentin for neuropathic pain and also add Lidoderm patch/Aqua K-pad/menthol rub for topical relief; will change Robaxin to Valium 5 mg IV q6h for muscle spasm/agitation  ·  CT scan of lumbar sacral spine "There is extensive postoperative change of the lumbar spine with significant artifacts related to fixation hardware which extends from L1 to S1  There is a chronic appearing compression deformity of the posterior aspect of L5 with grade 2 anterolisthesis L5 on S1  Within the limits of the exam, there does not appear to be any definitive evidence of abnormal fluid collections in the paraspinal region    Not able to assess central canal   Of note, the patient's posterior spinal fixation rods appear fractured and malalignment at the lumbosacral junction "  · CT scan was reviewed by Neurosurgery and they will see her in consultation; in the meantime we will do an MRI of the lumbar sacral spine to assess for significant radiculopathy          Continuous opioid dependence (HCC)   Assessment & Plan    · Longstanding dependence on fentanyl and Percocet, reportedly being followed by pain management  · I attempted to find her records in the [facility] website but she was not listed   · Caution to avoid overuse of narcotic medication as patient has history of seeking behaviors and significant dependence         Atrial fibrillation ([facility] )   Assessment & Plan    · Patient remains on Xarelto        Morbid obesity due to excess calories ([facility] )   Assessment & Plan    · Weight 283 lb, BMI to be determined        COPD (chronic obstructive pulmonary disease) ([facility] )   Assessment & Plan    · COPD without acute exacerbation  Continue respiratory protocol with albuterol p r n  Please note that she utilizes nasal cannula oxygen only as needed and not routinely at her facility        Overactive bladder   Assessment & Plan    · Patient reporting overflow and stress incontinence-I do not feel this is in any way related to her back pain or representing acute neurologic compromise  She has longstanding history of bladder dysfunction previously evaluated by Urology and is noncompliant with taking her medication  Would resume oxybutynin  Psychiatric disorder   Assessment & Plan    · Patient on Celexa and Seroquel with Xanax as needed routinely  · Has reported significant psychiatric history including bipolar disorder and tells me she previously "was mental" and screaming/carrying on initially when she was admitted to [address] [place] [address] Drive approximately 4 years ago  · In reviewing the case with the nurse at [address] [place] [address] they state that recently she has been carrying on, "yelling and screaming" again due to her back pain; I would not qualify this as  altered mental status" or pursue additional workup    Initially patient was cooperative in the ER but recently was noted to be screaming and uncontrollably agitated  Will try Valium          VTE Prophylaxis: Rivaroxaban (Xarelto)  / sequential compression device   Code Status: full code per record review  POLST: There is no POLST form on file for this patient (pre-hospital)  Discussion with family:     Anticipated Length of Stay:  Patient will be admitted on an Observation basis with an anticipated length of stay of  less than 2 midnights  Justification for Hospital Stay:  Chronic pain with acute exacerbation will proceed with MRI and neurosurgery evaluation  Will also ask for Physical therapy to assess the patient  Total Time for Visit, including Counseling / Coordination of Care: 75 min  Greater than 50% of this total time spent on direct patient counseling and coordination of care  Chief Complaint:   Per patient- Back pain; per records "altered mental status "    History of Present Illness:    Khushi Schneider is a 70 y o  female who was transferred from [address] [place] [address] for reported "altered mental status " I called their facility directly and spoke with the nurse who states that patient has been "yelling and screaming" and carrying on about her pain  There has not been any events of lethargy or confusion reported  They have not noted any fever or chills  They tell me that she has a significant psychiatric history and previously did this in the past initially when she came to the facility  The nurse reports to me that at baseline she takes Percocet 7 5 mg 5 times a day and will complain if the nurse is 5 minutes late with her medication  In speaking with the patient, she reports that she has had approximately 1 week of worsening back pain   She does have longstanding history of chronic back pain with prior surgeries done more than 6 years ago at [facility] and subsequently at [facility]   She does have chronic back pain but states that lately it has been going downhill "  She reports pain is worse with movement and better with lying completely still  She describes the severity as extremely severe and location as low back and into her legs  She denies any numbness, tingling, or other paresthesia type symptoms  She has not had any recent traumatic injury to her back  She has been stating that she can't walk but has been ambulating with her walker at the nursing home according to nursing there  She is supposed to wear a MAFO brace on her left foot for foot drop but refuses to wear it  She is reportedly followed by pain management and they attempted to try a brace a few weeks ago which she states has not helped  She has not had any groin numbness or sudden new bowel or bladder incontinence or any reports of lower extremity weakness  Review of Systems:    Review of Systems   Constitutional: Positive for activity change  Negative for appetite change, chills, diaphoresis, fatigue, fever and unexpected weight change  HENT:        Recent upper respiratory illness   Respiratory: Negative for chest tightness, shortness of breath and wheezing  Cardiovascular: Positive for palpitations (Nursing home states patient complained of palpitations yesterday but had normal vital signs) and leg swelling (She reports left foot swelling)  Negative for chest pain  Gastrointestinal: Negative for abdominal pain, blood in stool, constipation, diarrhea, nausea and vomiting  Genitourinary: Positive for urgency  Patient has incontinence when she can't get to the bathroom in time and when she coughs; she refuses to take her medication for overactive bladder   Musculoskeletal: Positive for back pain and gait problem  Negative for joint swelling  Patient reports that she can't walk" but nursing home tells me that she has been ambulating with a walker   Skin: Positive for rash (Recently had a rash on her face and received steroids)  Negative for color change, pallor and wound     Neurological: Negative for dizziness, weakness (She does not have any leg weakness), numbness and headaches  Psychiatric/Behavioral: Positive for agitation, behavioral problems and dysphoric mood  Negative for confusion  Past Medical and Surgical History:     Past Medical History:   Diagnosis Date    A-fib ([facility] )     Chronic pain     Chronic respiratory failure with hypoxia (HCC)     COPD (chronic obstructive pulmonary disease) (HCC)     Dementia     Dorsalgia, unspecified     Edema     Encephalopathy     GERD (gastroesophageal reflux disease)     Hypertension     Morbid obesity ([facility])     Muscle weakness (generalized)     Opioid dependence ([facility])     Overactive bladder     Pneumonia     Psychiatric disorder     anxiety, bipolar    Radiculopathy of thoracolumbar region     Sciatica     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 Authorizing Provider   acetaminophen (TYLENOL) 325 mg tablet Take 650 mg by mouth every 6 (six) hours as needed for mild pain  Yes Historical Provider, MD   albuterol (2 5 mg/3 mL) 0 083 % nebulizer solution Take 2 5 mg by nebulization every 4 (four) hours as needed for wheezing  Yes Historical Provider, MD   alendronate (FOSAMAX) 70 mg tablet Take 70 mg by mouth every 7 days  Yes Historical Provider, MD   ALPRAZolam Jimmey Maze) 0 25 mg tablet Take 1 tablet by mouth daily at bedtime for 10 days 6/14/17 7/23/18 Yes Enrico Bateman MD   aluminum-magnesium hydroxide-simethicone (MYLANTA) 200-200-20 mg/5 mL suspension Take 15 mL by mouth 2 (two) times a day as needed for indigestion or heartburn     Yes Historical Provider, MD   amLODIPine (NORVASC) 10 mg tablet Take 10 mg by mouth daily  Yes Historical Provider, MD   BISACODYL RE Insert 10 mg into the rectum as needed Indications: hs as needed at bedtime when the patient has not had bm in 4 days  Yes Historical Provider, MD   calcium carbonate (TUMS) 500 mg chewable tablet Chew 2 tablets daily as needed for indigestion or heartburn 6/14/17  Yes Samuel Dandy, MD   carboxymethylcellulose 0 5 % SOLN Administer 1 drop to both eyes 2 (two) times a day   Yes Historical Provider, MD   Cholecalciferol 1000 UNIT/10ML LIQD Take 1 tablet by mouth daily  Yes Historical Provider, MD   citalopram (CeleXA) 10 mg tablet Take 10 mg by mouth daily   Yes Historical Provider, MD   Cranberry 425 MG CAPS Take 1 tablet by mouth daily Indications: once daily for chronic UTI  Yes Historical Provider, MD   docusate sodium (COLACE) 100 mg capsule Take 1 capsule by mouth 2 (two) times a day as needed for constipation 6/14/17  Yes Samuel Dandy, MD   fentaNYL (DURAGESIC) 50 mcg/hr Place 1 patch on the skin every third day   Yes Historical Provider, MD   furosemide (LASIX) 40 mg tablet Take 40 mg by mouth daily   Yes Historical Provider, MD   gabapentin (NEURONTIN) 600 MG tablet Take 600 mg by mouth 3 (three) times a day  Yes Historical Provider, MD   latanoprost (XALATAN) 0 005 % ophthalmic solution Administer 1 drop to both eyes daily at bedtime   Yes Historical Provider, MD   losartan (COZAAR) 50 mg tablet Take 50 mg by mouth daily  Yes Historical Provider, MD   magnesium hydroxide (MILK OF MAGNESIA) 400 mg/5 mL oral suspension Take 30 mL by mouth daily as needed for constipation Indications: 400mg/5ml (30ml) by mouth as needed for constipation at bedtime when no bm in 3 days  Yes Historical Provider, MD   methocarbamol (ROBAXIN) 750 mg tablet Take 750 mg by mouth 4 (four) times a day     Yes Historical Provider, MD   nitroglycerin (NITROSTAT) 0 4 mg SL tablet Place 0 4 mg under the tongue every 5 (five) minutes as needed for chest pain    Yes Historical Provider, MD   nystatin (MYCOSTATIN) cream Apply 1 application topically 2 (two) times a day  Yes Historical Provider, MD   oxyCODONE-acetaminophen (PERCOCET) 7 5-325 MG per tablet Take 1 tablet by mouth 5 (five) times a day   Yes Historical Provider, MD   OXYGEN-HELIUM IN Inhale 3 L as needed 3 L NC O2 as needed for SOB for SpO2 less than 90   Yes Historical Provider, MD   QUEtiapine (SEROquel) 25 mg tablet Take 1 tablet by mouth daily at bedtime for 30 days  Patient taking differently: Take 12 5 mg by mouth daily at bedtime   6/14/17 7/23/18 Yes Jenesn Ramesh MD   ranitidine (ZANTAC) 150 mg tablet Take 150 mg by mouth daily in the early morning   Yes Historical Provider, MD   rivaroxaban (XARELTO) 20 mg tablet Take 20 mg by mouth daily with dinner   Yes Historical Provider, MD   escitalopram (LEXAPRO) 10 mg tablet Take 10 mg by mouth daily  Historical Provider, MD   MIRABEGRON ER PO Take 50 mg by mouth daily  Historical Provider, MD   omeprazole (PriLOSEC) 20 mg delayed release capsule Take 20 mg by mouth daily  Historical Provider, MD   solifenacin (VESICARE) 10 MG tablet Take 10 mg by mouth daily  Historical Provider, MD   traMADol (ULTRAM) 50 mg tablet Take 50 mg by mouth every 6 (six) hours as needed for moderate pain    Historical Provider, MD     I have reveiwed home medications using records provided by [facility]  Allergies: Allergies   Allergen Reactions    Aspirin     Iodinated Diagnostic Agents Throat Swelling    Iodine     Nsaids        Social History:     Marital Status:     Occupation:   Patient Pre-hospital Living Situation:  Patient has been at [address] [place] [address] for approximately 4 years  Patient Pre-hospital Level of Mobility:  Ambulates with walker  Patient Pre-hospital Diet Restrictions:   Substance Use History:   History   Alcohol Use No     History   Smoking Status    Former Smoker    Types: Cigarettes   Smokeless Tobacco    Never Used     History   Drug Use No       Family History:    non-contributory    Physical Exam:     Vitals:   Blood Pressure: 162/77 (07/23/18 1400)  Pulse: 73 (07/23/18 1400)  Temperature: 98 1 °F (36 7 °C) (07/23/18 0900)  Temp Source: Oral (07/23/18 0900)  Respirations: 18 (07/23/18 1400)  Weight - Scale: 129 kg (283 lb 4 7 oz) (07/23/18 0900)  SpO2: 96 % (07/23/18 1400)    Physical Exam   Constitutional: She appears well-developed and well-nourished  No distress  Initially when I entered the room to assess the patient she was sitting up in bed about to eat and appeared comfortable and nontoxic  During the time of my visit she requested to be repositioned several times and as time has she began yelling out   HENT:   Head: Normocephalic and atraumatic  Mouth/Throat: No oropharyngeal exudate  Eyes: Conjunctivae are normal  Right eye exhibits no discharge  Left eye exhibits no discharge  No scleral icterus  Neck: Neck supple  Cardiovascular: Normal rate, regular rhythm and normal heart sounds  No murmur heard  Pulmonary/Chest: Effort normal and breath sounds normal  No respiratory distress  She has no wheezes  She has no rales  Abdominal: Soft  Bowel sounds are normal  She exhibits no distension  There is no tenderness  There is no guarding  Soft obese abdomen   Musculoskeletal: She exhibits edema (Left pedal edema) and deformity (Left drop foot)  Neurological: She is alert  She is awake alert and interactive  No dysarthria or confusion noted  Upon attempts to do straight leg raise patient reported severe pain with only limited movement  She has footdrop noted on the left   Skin: Skin is warm and dry  No rash noted  She is not diaphoretic  No erythema  No pallor  Psychiatric:   Patient was initially cooperative but during her time in the ER became quite combative and agitated   Vitals reviewed  Additional Data:     Lab Results: I have personally reviewed pertinent reports          Results from last 7 days  Lab Units 07/23/18  0925   WBC Thousand/uL 7 12   HEMOGLOBIN g/dL 10 7*   HEMATOCRIT % 35 1   PLATELETS Thousands/uL 234   NEUTROS PCT % 61   LYMPHS PCT % 26   MONOS PCT % 10   EOS PCT % 3       Results from last 7 days  Lab Units 07/23/18  0925   SODIUM mmol/L 137   POTASSIUM mmol/L 4 1   CHLORIDE mmol/L 104   CO2 mmol/L 29   BUN mg/dL 11   CREATININE mg/dL 0 84   CALCIUM mg/dL 9 0   TOTAL PROTEIN g/dL 7 9   BILIRUBIN TOTAL mg/dL 0 40   ALK PHOS U/L 55   ALT U/L 17   AST U/L 32   GLUCOSE RANDOM mg/dL 115       Results from last 7 days  Lab Units 07/23/18  0925   INR  1 63*               Imaging: I have personally reviewed pertinent reports  CT abdomen pelvis wo contrast   Final Result by Shon Buerger, MD (07/23 1122)      No acute abdominopelvic pathology seen  Colonic diverticulosis without diverticulitis  Small fat-containing umbilical and left inguinal hernias  Postop changes of the lumbar spine with evidence of fracture and malalignment of the fixation rods at the L5-S1 level bilaterally with grade 2 anterolisthesis L5 on S1  Workstation performed: [id number]         MRI inpatient order    (Results Pending)       EKG, Pathology, and Other Studies Reviewed on Admission:     Allscripts / Epic Records Reviewed: Yes     ** Please Note: This note has been constructed using a voice recognition system   **

## 2018-07-23 NOTE — ASSESSMENT & PLAN NOTE
· COPD without acute exacerbation  Continue respiratory protocol with albuterol p r n    Please note that she utilizes nasal cannula oxygen only as needed and not routinely at her facility

## 2018-07-23 NOTE — ED NOTES
Pt repositioned self onto left side   Pt states "this feels better for now "     Darlene Aguirre  07/23/18 0905

## 2018-07-23 NOTE — ED NOTES
Provider at bedside - [id number] [facility] [address]  07/23/18 6438

## 2018-07-23 NOTE — ED NOTES
Patient changed into hospital gown, fentanyl patch noted on left side of upper chest     Inge Rankin  07/23/18 1138

## 2018-07-23 NOTE — ASSESSMENT & PLAN NOTE
· Patient on Celexa and Seroquel with Xanax as needed routinely  · Has reported significant psychiatric history including bipolar disorder and tells me she previously "was mental" and screaming/carrying on initially when she was admitted to [address] [place] [address] approximately 4 years ago  · In reviewing the case with the nurse at [address] [place] [address] they state that recently she has been carrying on, "yelling and screaming" again due to her back pain; I would not qualify this as  altered mental status" or pursue additional workup  Initially patient was cooperative in the ER but recently was noted to be screaming and uncontrollably agitated   Will try Valium

## 2018-07-23 NOTE — ED PROVIDER NOTES
History  Chief Complaint   Patient presents with    Back Pain     Pt brought to Er via EMS from [facility] with c/o lower back pain that radiates down B/L LE x 5 days  61-year-old female presents to the emergency department for evaluation of back pain  She relates that this is "the worst pain I've had in a very long time  "  She notes that this started 5 days ago  She had a milder discomfort for the few days prior to that and then notes that a belt was prescribed for her to wear while walking  She notes that the pain has been "progressively worse  "  This radiates down to both legs to the feet  She denies numbness in the legs the note that they do feel weaker than usual   She has had decreased ability to ambulate with her walker  States that she was able to take only 2 steps today to go from the bed to the stretcher  She denies having had any fevers  She notes that approximately a month ago she had  Loss of voice for a couple of weeks and then a cough  She also developed a rash on her face for which she was prescribed oral steroids and noted improvement of all 3 things  She has not had chest discomfort or shortness of breath  She does note difficulty with swallowing were foods feel like they get stuck  She gestures to the epigastrium at the site this location  She has not noticed any lower abdominal discomfort  She denies any problems with bowel movements and notes that she does take a stool softener twice daily assist with this  When questioned regarding urination she denies having had urinary frequency or dysuria but notes that people are frequently watching out for her to be "crazy  "  She then explains that she gets significant confusion with UTIs  She later mention over the last month or so she occasionally has urinary incontinence with coughing  She does report long-term history of back problems  She had 2 back surgeries -approximately 5 years ago and 6 to 7 years ago    The initial was on the lumbar region on the 2nd the sacral region  She reported good relief of back discomfort for approximately a year after the 2nd surgery  She notes that she has been on multiple medications in aim to control the pain since then including fentanyl patch and Percocet  Medical reconciliation form reviewed  Patient does take gabapentin 600 mg t i d , Robaxin 500 mg q i d , Percocet 7 5 mg 5 times daily and fentanyl patch  50 mcg daily  Records accompanying patient notes that she has had increase in confusion, back discomfort & decreased ambulation  Prior to Admission Medications   Prescriptions Last Dose Informant Patient Reported? Taking? ALPRAZolam (XANAX) 0 25 mg tablet 7/22/2018 at 2100  No Yes   Sig: Take 1 tablet by mouth daily at bedtime for 10 days   BISACODYL RE   Yes Yes   Sig: Insert 10 mg into the rectum as needed Indications: hs as needed at bedtime when the patient has not had bm in 4 days  Cholecalciferol 1000 UNIT/10ML LIQD 7/22/2018 at 1000  Yes Yes   Sig: Take 1 tablet by mouth daily  Cranberry 425 MG CAPS 7/22/2018 at 1000  Yes Yes   Sig: Take 1 tablet by mouth daily Indications: once daily for chronic UTI  MIRABEGRON ER PO   Yes No   Sig: Take 50 mg by mouth daily  OXYGEN-HELIUM IN   Yes Yes   Sig: Inhale 3 L as needed 3 L NC O2 as needed for SOB for SpO2 less than 90   QUEtiapine (SEROquel) 25 mg tablet 7/22/2018 at 2100  No Yes   Sig: Take 1 tablet by mouth daily at bedtime for 30 days   Patient taking differently: Take 12 5 mg by mouth daily at bedtime     acetaminophen (TYLENOL) 325 mg tablet 7/22/2018 at 2202  Yes Yes   Sig: Take 650 mg by mouth every 6 (six) hours as needed for mild pain  albuterol (2 5 mg/3 mL) 0 083 % nebulizer solution   Yes Yes   Sig: Take 2 5 mg by nebulization every 4 (four) hours as needed for wheezing  alendronate (FOSAMAX) 70 mg tablet 7/20/2018 at Unknown time  Yes Yes   Sig: Take 70 mg by mouth every 7 days     aluminum-magnesium hydroxide-simethicone (MYLANTA) 200-200-20 mg/5 mL suspension 7/8/2018 at Unknown time  Yes Yes   Sig: Take 15 mL by mouth 2 (two) times a day as needed for indigestion or heartburn  amLODIPine (NORVASC) 10 mg tablet 7/22/2018 at 1000  Yes Yes   Sig: Take 10 mg by mouth daily  calcium carbonate (TUMS) 500 mg chewable tablet   No Yes   Sig: Chew 2 tablets daily as needed for indigestion or heartburn   carboxymethylcellulose 0 5 % SOLN   Yes Yes   Sig: Administer 1 drop to both eyes 2 (two) times a day   citalopram (CeleXA) 10 mg tablet 7/22/2018 at 0900  Yes Yes   Sig: Take 10 mg by mouth daily   docusate sodium (COLACE) 100 mg capsule 7/22/2018 at 1800  No Yes   Sig: Take 1 capsule by mouth 2 (two) times a day as needed for constipation   escitalopram (LEXAPRO) 10 mg tablet   Yes No   Sig: Take 10 mg by mouth daily  fentaNYL (DURAGESIC) 50 mcg/hr 7/20/2018 at Unknown time  Yes Yes   Sig: Place 1 patch on the skin every third day   furosemide (LASIX) 40 mg tablet 7/22/2018 at 1000  Yes Yes   Sig: Take 40 mg by mouth daily   gabapentin (NEURONTIN) 600 MG tablet 7/22/2018 at 2100  Yes Yes   Sig: Take 600 mg by mouth 3 (three) times a day  latanoprost (XALATAN) 0 005 % ophthalmic solution 7/22/2018 at 2100  Yes Yes   Sig: Administer 1 drop to both eyes daily at bedtime   losartan (COZAAR) 50 mg tablet 7/22/2018 at 1000  Yes Yes   Sig: Take 50 mg by mouth daily  magnesium hydroxide (MILK OF MAGNESIA) 400 mg/5 mL oral suspension   Yes Yes   Sig: Take 30 mL by mouth daily as needed for constipation Indications: 400mg/5ml (30ml) by mouth as needed for constipation at bedtime when no bm in 3 days  methocarbamol (ROBAXIN) 750 mg tablet 7/23/2018 at 0700  Yes Yes   Sig: Take 750 mg by mouth 4 (four) times a day  nitroglycerin (NITROSTAT) 0 4 mg SL tablet   Yes Yes   Sig: Place 0 4 mg under the tongue every 5 (five) minutes as needed for chest pain     nystatin (MYCOSTATIN) cream 7/22/2018 at 2100  Yes Yes   Sig: Apply 1 application topically 2 (two) times a day  omeprazole (PriLOSEC) 20 mg delayed release capsule   Yes No   Sig: Take 20 mg by mouth daily  oxyCODONE-acetaminophen (PERCOCET) 7 5-325 MG per tablet 7/23/2018 at 0700  Yes Yes   Sig: Take 1 tablet by mouth 5 (five) times a day   ranitidine (ZANTAC) 150 mg tablet 7/23/2018 at 0600  Yes Yes   Sig: Take 150 mg by mouth daily in the early morning   rivaroxaban (XARELTO) 20 mg tablet 7/22/2018 at 2100  Yes Yes   Sig: Take 20 mg by mouth daily with dinner   solifenacin (VESICARE) 10 MG tablet   Yes No   Sig: Take 10 mg by mouth daily  traMADol (ULTRAM) 50 mg tablet   Yes No   Sig: Take 50 mg by mouth every 6 (six) hours as needed for moderate pain      Facility-Administered Medications: None       Past Medical History:   Diagnosis Date    A-fib ([place] [address] )     Chronic pain     [facility] failure with hypoxia ([facility])     COPD (chronic obstructive pulmonary disease) ([facility])     Dementia     Dorsalgia, unspecified     Edema     Encephalopathy     GERD (gastroesophageal reflux disease)     Hypertension     [facility]bid [facility] ([facility] )     Muscle weakness (generalized)     Opioid dependence ([facility])     Overactive bladder     Pneumonia     Psychiatric disorder     anxiety, bipolar    Radiculopathy of thoracolumbar region     Sciatica     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     Urinary incontinence     UTI (urinary tract infection)        Past Surgical History:   Procedure Laterality Date    APPENDECTOMY      BACK SURGERY      CHOLECYSTECTOMY      KNEE SURGERY         History reviewed  No pertinent family history  I have reviewed and agree with the history as documented      Social History   Substance Use Topics    Smoking status: Former Smoker     Types: Cigarettes    Smokeless tobacco: Never Used    Alcohol use No        Review of Systems   Musculoskeletal:        "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  - chronic & unchanged   Skin: Negative for rash  All other systems reviewed and are negative  Physical Exam  Physical Exam   Constitutional: She is oriented to person, place, and time  She appears well-developed and well-nourished  She appears distressed (uncomfortable appearing upon changing position in the stretcher)  HENT:   Head: Normocephalic  Eyes: Conjunctivae and EOM are normal    Cardiovascular: Normal rate and regular rhythm  Pulmonary/Chest: Effort normal and breath sounds normal    Abdominal: Soft  There is tenderness (epigastric)  There is no guarding  Musculoskeletal: Normal range of motion  Patient with tenderness in the midline low lumbar/ sacral region and  Adjacent paraspinal musculature  No defect/deformity appreciated  Neurological: She is alert and oriented to person, place, and time  Patient with 5/5 strength on hip flexion and plantar flexion  2/5 strength on L dorsiflexion  5/5 strength on R dorsiflexion   Skin: Skin is warm and dry  Psychiatric: She has a normal mood and affect  Her behavior is normal    Nursing note and vitals reviewed        Vital Signs  ED Triage Vitals [07/23/18 0900]   Temperature Pulse Respirations Blood Pressure SpO2   98 1 °F (36 7 °C) (!) 52 18 153/66 97 %      Temp Source Heart Rate Source Patient Position - Orthostatic VS BP Location FiO2 (%)   Oral Monitor Lying Right arm --      Pain Score       7           Vitals:    07/23/18 1759 07/23/18 2319 07/24/18 0716 07/24/18 1504   BP: 133/95 162/75 130/66 127/61   Pulse: 56 62 73 59   Patient Position - Orthostatic VS: Lying Lying Lying Lying       Visual Acuity  Visual Acuity      Most Recent Value   L Pupil Size (mm)  3   R Pupil Size (mm)  3          ED Medications  Medications   HYDROmorphone (DILAUDID) injection 1 mg (1 mg Intravenous Given 7/23/18 1215)   pneumococcal 23-valent polysaccharide vaccine (PNEUMOVAX-23) injection 0 5 mL (0 5 mL Subcutaneous Given 7/24/18 1351)   potassium chloride (K-DUR,KLOR-CON) CR tablet 40 mEq (40 mEq Oral Given 7/24/18 1125)       Diagnostic Studies  Results Reviewed     Procedure Component Value Units Date/Time    MRSA culture [[id number]]  (Normal) Collected:  07/23/18 1640    Lab Status:  Final result Specimen:  Nares from Nose Updated:  07/24/18 2026     MRSA Culture Only No Methicillin Resistant Staphlyococcus aureus (MRSA) isolated    Basic metabolic panel [[id number]]  (Abnormal) Collected:  07/24/18 0545    Lab Status:  Final result Specimen:  Blood from Hand, Left Updated:  07/24/18 1918     Sodium 143 mmol/L      Potassium 3 4 (L) mmol/L      Chloride 104 mmol/L      CO2 30 mmol/L      Anion Gap 9 mmol/L      BUN 10 mg/dL      Creatinine 0 79 mg/dL      Glucose 111 mg/dL      Glucose, Fasting 111 (H) mg/dL      Calcium 9 1 mg/dL      eGFR 73 ml/min/1 73sq m     Narrative:         National Kidney Disease Education Program recommendations are as follows:  GFR calculation is accurate only with a steady state creatinine  Chronic Kidney disease less than 60 ml/min/1 73 sq  meters  Kidney failure less than 15 ml/min/1 73 sq  meters      Acetaminophen level [[id number]]  (Abnormal) Collected:  07/23/18 1606    Lab Status:  Final result Specimen:  Blood from Arm, Right Updated:  07/23/18 1629     Acetaminophen Level <2 (L) ug/mL     Urine Microscopic [[id number]]  (Abnormal) Collected:  07/23/18 1016    Lab Status:  Final result Specimen:  Urine from Urine, Clean Catch Updated:  07/23/18 1046     RBC, UA None Seen /hpf      WBC, UA 2-4 (A) /hpf      Epithelial Cells Occasional /hpf      Bacteria, UA Occasional /hpf     ED Urine Macroscopic [[id number]]  (Abnormal) Collected:  07/23/18 1016    Lab Status:  Final result Specimen:  Urine Updated:  07/23/18 1009     Color, UA Yellow     Clarity, UA Clear     pH, UA 6 0     Leukocytes, UA Negative     Nitrite, UA Negative     Protein, UA Trace (A) mg/dl      Glucose, UA Negative mg/dl      Ketones, UA Negative mg/dl      Urobilinogen, UA 0 2 E U /dl      Bilirubin, UA Negative     Blood, UA Negative     Specific Gravity, UA 1 020    Narrative:       CLINITEK RESULT    Lactic acid, plasma [[id number]]  (Normal) Collected:  07/23/18 0925    Lab Status:  Final result Specimen:  Blood from Arm, Right Updated:  07/23/18 1003     LACTIC ACID 1 7 mmol/L     Narrative:         Result may be elevated if tourniquet was used during collection  Comprehensive metabolic panel [[id number]]  (Abnormal) Collected:  07/23/18 0925    Lab Status:  Final result Specimen:  Blood from Arm, Right Updated:  07/23/18 1001     Sodium 137 mmol/L      Potassium 4 1 mmol/L      Chloride 104 mmol/L      CO2 29 mmol/L      Anion Gap 4 mmol/L      BUN 11 mg/dL      Creatinine 0 84 mg/dL      Glucose 115 mg/dL      Calcium 9 0 mg/dL      AST 32 U/L      ALT 17 U/L      Alkaline Phosphatase 55 U/L      Total Protein 7 9 g/dL      Albumin 2 8 (L) g/dL      Total Bilirubin 0 40 mg/dL      eGFR 68 ml/min/1 73sq m     Narrative:         National Kidney Disease Education Program recommendations are as follows:  GFR calculation is accurate only with a steady state creatinine  Chronic Kidney disease less than 60 ml/min/1 73 sq  meters  Kidney failure less than 15 ml/min/1 73 sq  meters      Lipase [[id number]]  (Abnormal) Collected:  07/23/18 0925    Lab Status:  Final result Specimen:  Blood from Arm, Right Updated:  07/23/18 0955     Lipase 41 (L) u/L     Protime-INR [[id number]]  (Abnormal) Collected:  07/23/18 0925    Lab Status:  Final result Specimen:  Blood from Arm, Right Updated:  07/23/18 0952     Protime 18 8 (H) seconds      INR 1 63 (H)    CBC and differential [[id number]]  (Abnormal) Collected:  07/23/18 0925    Lab Status:  Final result Specimen:  Blood from Arm, Right Updated:  07/23/18 0940     WBC 7 12 Thousand/uL      RBC 4 14 Million/uL      Hemoglobin 10 7 (L) g/dL      Hematocrit 35 1 %      MCV 85 fL      MCH 25 8 (L) pg      MCHC 30 5 (L) g/dL      RDW 15 6 (H) %      MPV 9 7 fL      Platelets 100 Thousands/uL      Neutrophils Relative 61 %      Lymphocytes Relative 26 %      Monocytes Relative 10 %      Eosinophils Relative 3 %      Basophils Relative 0 %      Neutrophils Absolute 4 35 Thousands/µL      Lymphocytes Absolute 1 87 Thousands/µL      Monocytes Absolute 0 69 Thousand/µL      Eosinophils Absolute 0 19 Thousand/µL      Basophils Absolute 0 02 Thousands/µL                  CT abdomen pelvis wo contrast   Final Result by Cammy Reynolds MD (07/23 1122)      No acute abdominopelvic pathology seen  Colonic diverticulosis without diverticulitis  Small fat-containing umbilical and left inguinal hernias  Postop changes of the lumbar spine with evidence of fracture and malalignment of the fixation rods at the L5-S1 level bilaterally with grade 2 anterolisthesis L5 on S1  Workstation performed: [id number]                    Procedures  Procedures       Phone Contacts  ED Phone Contact    ED Course  ED Course as of Jul 26 1016   Mon Jul 23, 2018   1201   Lab studies unremarkable  CT scan reveals evidence of hardware  failure at the L5/S1 region  Going back patient did have an x-ray in 2016 of this region which suggested the presence of hardware failure and grade 1 anterolisthesis of L5 on S1  A subsequent  neurosurgery counter was reviewed  Physician question whether this was true the hardware failure but noted that the patient was not a surgical candidate and recommended additional nonsurgical options  Patient relates that she had tried to spinal cord stimulator but had no relief with that  She notes that she had side effects from some of the medications most recently recommended by her pain specialist and was switched back to her older regimen    Her next appointment is not until August Will d/w Ortho     1225 I spoke w/ VELVET Morales (Ortho) re: concern for hardware failure & stability of this site/ appropriate management of pt  She will review case w/ Orthopedic Surgeon & re-contact me  1250 I spoke w/ Dixon Hobbs from [employer]  He notes that Orthopedics does not have anyone on for Spine here & advises I speak w/ Neurosurgery  [phone]  I spoke with the neurosurgical physician assistant Kerri Jesus  She will review patient images and recontact me     1331 I respoke w/ Kerri Jesus  She is uncertain whether the findings truly represent hardware fracture verses these having been 2 entirely separate sets of hardware  Patient does have a degree of anterolisthesis though no surgical treatment is needed this time  Instability should not limit patient activity/ preclude patient  from participating in physical therapy  MDM  CritCare Time    Disposition  Final diagnoses:   Acute exacerbation of chronic low back pain     Time reflects when diagnosis was documented in both MDM as applicable and the Disposition within this note     Time User Action Codes Description Comment    7/23/2018  2:01 PM Cyndee ESCOBAR Add [M54 5,  G89 29] Acute exacerbation of chronic low back pain       ED Disposition     ED Disposition Condition Comment    Admit  Case was discussed with Dr Alejandra Chong and the patient's admission status was agreed to be Admission Status: observation status to the service of Lisa Harrington MD Documentation      Most Recent Value   Accepting Facility Name, [facility] at [address] [place] [address] by [employer] and Unit #)  [facility]      RN Documentation      Most [address] Name, [facility] at [address] [place] [address] by [employer] and Unit #)  [facility]      Follow-up Information     Follow up With Specialties Details Why Contact Info    Santos Qureshi MD Pain Medicine Schedule an appointment as soon as possible for a visit in 1 week(s) Patient has an appt scheduled for August 7th, 2018 [address] [place] [address] [address] [place] [address]  [phone]      Sandrine Best MD Internal Medicine Schedule an appointment as soon as possible for a visit in 1 week(s)  [address]  [phone]            Discharge Medication List as of 7/24/2018  1:25 PM      START taking these medications    Details   diazepam (VALIUM) 5 mg tablet Take 1 tablet (5 mg total) by mouth 4 (four) times a day for 10 days, Starting Tue 7/24/2018, Until Fri 8/3/2018, Print      ibuprofen (MOTRIN) 400 mg tablet Take 1 tablet (400 mg total) by mouth every 6 (six) hours as needed for mild pain, Starting Tue 7/24/2018, No Print      lidocaine (LIDODERM) 5 % Place 2 patches on the skin daily Remove & Discard patch within 12 hours or as directed by MD, Starting Wed 7/25/2018, Print      menthol-methyl salicylate (BENGAY) 16-12 % cream Apply topically 4 (four) times a day as needed (back pain), Starting Tue 7/24/2018, No Print      oxybutynin (DITROPAN XL) 15 MG 24 hr tablet Take 1 tablet (15 mg total) by mouth daily, Starting Wed 7/25/2018, No Print      oxyCODONE (ROXICODONE) 10 MG TABS Take 1 tablet (10 mg total) by mouth every 4 (four) hours as needed for severe pain for up to 10 days Max Daily Amount: 60 mg, Starting Tue 7/24/2018, Until Fri 8/3/2018, Print         CONTINUE these medications which have CHANGED    Details   fentaNYL (DURAGESIC) 50 mcg/hr Place 1 patch on the skin every third day for 10 days Max Daily Amount: 1 patch, Starting Tue 7/24/2018, Until Fri 8/3/2018, Print      QUEtiapine (SEROquel) 25 mg tablet Take 0 5 tablets (12 5 mg total) by mouth daily at bedtime, Starting Tue 7/24/2018, No Print      traMADol (ULTRAM) 50 mg tablet Take 1 tablet (50 mg total) by mouth every 6 (six) hours as needed for moderate pain for up to 10 days, Starting Tue 7/24/2018, Until Fri 8/3/2018, Print         CONTINUE these medications which have NOT CHANGED    Details   albuterol (2 5 mg/3 mL) 0 083 % nebulizer solution Take 2 5 mg by nebulization every 4 (four) hours as needed for wheezing , Until Discontinued, Historical Med      alendronate (FOSAMAX) 70 mg tablet Take 70 mg by mouth every 7 days  , Until Discontinued, Historical Med      aluminum-magnesium hydroxide-simethicone (MYLANTA) 200-200-20 mg/5 mL suspension Take 15 mL by mouth 2 (two) times a day as needed for indigestion or heartburn , Until Discontinued, Historical Med      amLODIPine (NORVASC) 10 mg tablet Take 10 mg by mouth daily  , Until Discontinued, Historical Med      BISACODYL RE Insert 10 mg into the rectum as needed Indications: hs as needed at bedtime when the patient has not had bm in 4 days  , Until Discontinued, Historical Med      calcium carbonate (TUMS) 500 mg chewable tablet Chew 2 tablets daily as needed for indigestion or heartburn, Starting Wed 6/14/2017, Print      carboxymethylcellulose 0 5 % SOLN Administer 1 drop to both eyes 2 (two) times a day, Historical Med      Cholecalciferol 1000 UNIT/10ML LIQD Take 1 tablet by mouth daily  , Until Discontinued, Historical Med      citalopram (CeleXA) 10 mg tablet Take 10 mg by mouth daily, Historical Med      Cranberry 425 MG CAPS Take 1 tablet by mouth daily Indications: once daily for chronic UTI   , Until Discontinued, Historical Med      docusate sodium (COLACE) 100 mg capsule Take 1 capsule by mouth 2 (two) times a day as needed for constipation, Starting Wed 6/14/2017, Print      furosemide (LASIX) 40 mg tablet Take 40 mg by mouth daily, Until Discontinued, Historical Med      gabapentin (NEURONTIN) 600 MG tablet Take 600 mg by mouth 3 (three) times a day , Until Discontinued, Historical Med      latanoprost (XALATAN) 0 005 % ophthalmic solution Administer 1 drop to both eyes daily at bedtime, Historical Med      losartan (COZAAR) 50 mg tablet Take 50 mg by mouth daily  , Until Discontinued, Historical Med      magnesium hydroxide (MILK OF MAGNESIA) 400 mg/5 mL oral suspension Take 30 mL by mouth daily as needed for constipation Indications: 400mg/5ml (30ml) by mouth as needed for constipation at bedtime when no bm in 3 days  , Until Discontinued, Historical Med      nitroglycerin (NITROSTAT) 0 4 mg SL tablet Place 0 4 mg under the tongue every 5 (five) minutes as needed for chest pain , Until Discontinued, Historical Med      nystatin (MYCOSTATIN) cream Apply 1 application topically 2 (two) times a day , Until Discontinued, Historical Med      OXYGEN-HELIUM IN Inhale 3 L as needed 3 L NC O2 as needed for SOB for SpO2 less than 90, Historical Med      ranitidine (ZANTAC) 150 mg tablet Take 150 mg by mouth daily in the early morning, Historical Med      rivaroxaban (XARELTO) 20 mg tablet Take 20 mg by mouth daily with dinner, Historical Med      omeprazole (PriLOSEC) 20 mg delayed release capsule Take 20 mg by mouth daily  , Until Discontinued, Historical Med         STOP taking these medications       acetaminophen (TYLENOL) 325 mg tablet Comments:   Reason for Stopping:         ALPRAZolam (XANAX) 0 25 mg tablet Comments:   Reason for Stopping:         methocarbamol (ROBAXIN) 750 mg tablet Comments:   Reason for Stopping:         oxyCODONE-acetaminophen (PERCOCET) 7 5-325 MG per tablet Comments:   Reason for Stopping:         escitalopram (LEXAPRO) 10 mg tablet Comments:   Reason for Stopping:         MIRABEGRON ER PO Comments:   Reason for Stopping:         solifenacin (VESICARE) 10 MG tablet Comments:   Reason for Stopping:               Outpatient Discharge Orders  Discharge Diet     Physical Therapy Eval And Treat     Occupational Therapy Eval and Treat         ED Provider  Electronically Signed by           Efrain Adames MD  07/26/18 1016

## 2018-07-24 VITALS
DIASTOLIC BLOOD PRESSURE: 61 MMHG | OXYGEN SATURATION: 92 % | RESPIRATION RATE: 18 BRPM | BODY MASS INDEX: 40.65 KG/M2 | SYSTOLIC BLOOD PRESSURE: 127 MMHG | TEMPERATURE: 98.6 F | HEART RATE: 59 BPM | WEIGHT: 283.29 LBS

## 2018-07-24 LAB
ANION GAP SERPL CALCULATED.3IONS-SCNC: 9 MMOL/L (ref 4–13)
BUN SERPL-MCNC: 10 MG/DL (ref 5–25)
CALCIUM SERPL-MCNC: 9.1 MG/DL (ref 8.3–10.1)
CHLORIDE SERPL-SCNC: 104 MMOL/L (ref 100–108)
CO2 SERPL-SCNC: 30 MMOL/L (ref 21–32)
CREAT SERPL-MCNC: 0.79 MG/DL (ref 0.6–1.3)
GFR SERPL CREATININE-BSD FRML MDRD: 73 ML/MIN/1.73SQ M
GLUCOSE P FAST SERPL-MCNC: 111 MG/DL (ref 65–99)
GLUCOSE SERPL-MCNC: 111 MG/DL (ref 65–140)
MRSA NOSE QL CULT: NORMAL
POTASSIUM SERPL-SCNC: 3.4 MMOL/L (ref 3.5–5.3)
SODIUM SERPL-SCNC: 143 MMOL/L (ref 136–145)

## 2018-07-24 PROCEDURE — 97163 PT EVAL HIGH COMPLEX 45 MIN: CPT | Performed by: PHYSICAL THERAPIST

## 2018-07-24 PROCEDURE — G0009 ADMIN PNEUMOCOCCAL VACCINE: HCPCS | Performed by: INTERNAL MEDICINE

## 2018-07-24 PROCEDURE — 99204 OFFICE O/P NEW MOD 45 MIN: CPT | Performed by: NEUROLOGICAL SURGERY

## 2018-07-24 PROCEDURE — 99217 PR OBSERVATION CARE DISCHARGE MANAGEMENT: CPT | Performed by: PHYSICIAN ASSISTANT

## 2018-07-24 PROCEDURE — 80048 BASIC METABOLIC PNL TOTAL CA: CPT | Performed by: PHYSICIAN ASSISTANT

## 2018-07-24 PROCEDURE — G8979 MOBILITY GOAL STATUS: HCPCS | Performed by: PHYSICAL THERAPIST

## 2018-07-24 PROCEDURE — 97116 GAIT TRAINING THERAPY: CPT | Performed by: PHYSICAL THERAPIST

## 2018-07-24 PROCEDURE — G8978 MOBILITY CURRENT STATUS: HCPCS | Performed by: PHYSICAL THERAPIST

## 2018-07-24 PROCEDURE — 90732 PPSV23 VACC 2 YRS+ SUBQ/IM: CPT | Performed by: INTERNAL MEDICINE

## 2018-07-24 RX ORDER — TRAMADOL HYDROCHLORIDE 50 MG/1
50 TABLET ORAL EVERY 6 HOURS PRN
Qty: 30 TABLET | Refills: 0 | Status: SHIPPED | OUTPATIENT
Start: 2018-07-24 | End: 2018-08-03

## 2018-07-24 RX ORDER — DIAZEPAM 5 MG/1
5 TABLET ORAL 4 TIMES DAILY
Qty: 30 TABLET | Refills: 0 | Status: SHIPPED | OUTPATIENT
Start: 2018-07-24 | End: 2018-09-28 | Stop reason: HOSPADM

## 2018-07-24 RX ORDER — FENTANYL 50 UG/H
1 PATCH TRANSDERMAL
Qty: 3 PATCH | Refills: 0 | Status: SHIPPED | OUTPATIENT
Start: 2018-07-24 | End: 2018-08-03

## 2018-07-24 RX ORDER — OXYCODONE HYDROCHLORIDE 10 MG/1
10 TABLET ORAL EVERY 4 HOURS PRN
Qty: 30 TABLET | Refills: 0 | Status: ON HOLD | OUTPATIENT
Start: 2018-07-24 | End: 2018-08-01

## 2018-07-24 RX ORDER — LIDOCAINE 50 MG/G
2 PATCH TOPICAL DAILY
Qty: 30 PATCH | Refills: 0 | Status: SHIPPED | OUTPATIENT
Start: 2018-07-25 | End: 2018-07-29

## 2018-07-24 RX ORDER — IBUPROFEN 400 MG/1
400 TABLET ORAL EVERY 6 HOURS PRN
Start: 2018-07-24 | End: 2020-11-13 | Stop reason: ALTCHOICE

## 2018-07-24 RX ORDER — DIAZEPAM 5 MG/1
5 TABLET ORAL 4 TIMES DAILY
Status: DISCONTINUED | OUTPATIENT
Start: 2018-07-24 | End: 2018-07-24 | Stop reason: HOSPADM

## 2018-07-24 RX ORDER — OXYBUTYNIN CHLORIDE 15 MG/1
15 TABLET, EXTENDED RELEASE ORAL DAILY
Refills: 0
Start: 2018-07-25 | End: 2020-11-13 | Stop reason: ALTCHOICE

## 2018-07-24 RX ORDER — QUETIAPINE FUMARATE 25 MG/1
12.5 TABLET, FILM COATED ORAL
Refills: 0 | Status: ON HOLD
Start: 2018-07-24 | End: 2018-08-01

## 2018-07-24 RX ORDER — MUSCLE RUB CREAM 100; 150 MG/G; MG/G
CREAM TOPICAL 4 TIMES DAILY PRN
Refills: 0
Start: 2018-07-24 | End: 2020-11-13 | Stop reason: ALTCHOICE

## 2018-07-24 RX ORDER — POTASSIUM CHLORIDE 20 MEQ/1
40 TABLET, EXTENDED RELEASE ORAL ONCE
Status: COMPLETED | OUTPATIENT
Start: 2018-07-24 | End: 2018-07-24

## 2018-07-24 RX ADMIN — Medication 5 MG: at 10:07

## 2018-07-24 RX ADMIN — VITAMIN D, TAB 1000IU (100/BT) 1000 UNITS: 25 TAB at 07:45

## 2018-07-24 RX ADMIN — POTASSIUM CHLORIDE 40 MEQ: 1500 TABLET, EXTENDED RELEASE ORAL at 11:25

## 2018-07-24 RX ADMIN — PNEUMOCOCCAL VACCINE POLYVALENT 0.5 ML
25; 25; 25; 25; 25; 25; 25; 25; 25; 25; 25; 25; 25; 25; 25; 25; 25; 25; 25; 25; 25; 25; 25 INJECTION, SOLUTION INTRAMUSCULAR; SUBCUTANEOUS at 13:51

## 2018-07-24 RX ADMIN — GABAPENTIN 600 MG: 300 CAPSULE ORAL at 07:38

## 2018-07-24 RX ADMIN — OXYCODONE HYDROCHLORIDE 10 MG: 10 TABLET ORAL at 11:54

## 2018-07-24 RX ADMIN — PANTOPRAZOLE SODIUM 20 MG: 20 TABLET, DELAYED RELEASE ORAL at 05:53

## 2018-07-24 RX ADMIN — AMLODIPINE BESYLATE 10 MG: 10 TABLET ORAL at 07:38

## 2018-07-24 RX ADMIN — OXYBUTYNIN CHLORIDE 15 MG: 5 TABLET, EXTENDED RELEASE ORAL at 07:45

## 2018-07-24 RX ADMIN — LIDOCAINE 2 PATCH: 50 PATCH CUTANEOUS at 07:40

## 2018-07-24 RX ADMIN — FUROSEMIDE 40 MG: 40 TABLET ORAL at 07:45

## 2018-07-24 RX ADMIN — OXYCODONE HYDROCHLORIDE 10 MG: 10 TABLET ORAL at 07:37

## 2018-07-24 RX ADMIN — DIAZEPAM 5 MG: 5 TABLET ORAL at 11:26

## 2018-07-24 RX ADMIN — CITALOPRAM HYDROBROMIDE 10 MG: 20 TABLET ORAL at 07:44

## 2018-07-24 RX ADMIN — LOSARTAN POTASSIUM 50 MG: 50 TABLET, FILM COATED ORAL at 07:45

## 2018-07-24 RX ADMIN — FAMOTIDINE 20 MG: 20 TABLET, FILM COATED ORAL at 07:45

## 2018-07-24 NOTE — PLAN OF CARE
Problem: DISCHARGE PLANNING - CARE MANAGEMENT  Goal: Discharge to post-acute care or home with appropriate resources  INTERVENTIONS:  - Conduct assessment to determine patient/family and health care team treatment goals, and need for post-acute services based on payer coverage, community resources, and patient preferences, and barriers to discharge  - Address psychosocial, clinical, and financial barriers to discharge as identified in assessment in conjunction with the patient/family and health care team  - Arrange appropriate level of post-acute services according to patients   needs and preference and payer coverage in collaboration with the physician and health care team  - Communicate with and update the patient/family, physician, and health care team regarding progress on the discharge plan  - Arrange appropriate transportation to post-acute venues   Outcome: Completed Date Met: 07/24/18  [facility], [address] is able to return and receive therapy  CM called [employer] and spoke to [facility] to start [employer]  Reference number is [id number] and [facility] will [facility] with [facility] when clinicals are due  CM provided [facility] number to [facility] through [facility]  Pt will need [facility] transport as discussed with JO ANN Hammonds earlier, CM called [employer] and spoke to Brandon Vinson to arrange [facility] to [facility]  Per [facility] will  Pt at 2pm  JO ANN made BLOSSOM Thompson, Pt, and Kashif at [facility]  Pt reports she already spoke to her daughter and declines CM offer to call daughter with  time

## 2018-07-24 NOTE — SOCIAL WORK
Pt resides at [facility], [address] is able to return and receive therapy  CM called [employer] and spoke to [facility] to start [employer]  Reference number is [id number] and [facility] will follow up with [facility] when clinicals are due  CM provided reference number to [facility] through [facility]  Pt will need [facility] transport as discussed with JO ANN Hammonds earlier, CM called [employer] and spoke to Simon Khan to arrange [facility]CV to [facility]  Per [facility] will  Pt at 2pm  JO ANN made nurse BLOSSOM Wilhelm, Pt, and Kashif at [facility]  Pt reports she already spoke to her daughter and declines CM offer to call daughter with  time

## 2018-07-24 NOTE — ASSESSMENT & PLAN NOTE
· Patient with reports of acute progressive back pain superimposed on many years of chronic back pain  · She denies any acute traumatic injury  · Prior to arrival she was on Percocet 7 5 mg p o  5 times a day, fentanyl patch 50 mcg every 72 hours, Robaxin 500 mg 4 times a day, and gabapentin 600 mg t i d ; she also apparently has been taking fairly sizable quantities of Tylenol  I was unable to confirm this in the [facility] website but did review the records from the nursing home and specifically with her nurse  · For pain management we will provide: tramadol 50 mg every 6 hours for moderate pain, Oxy Contin for 10 mg q 4 hours for severe pain  We will continue gabapentin for neuropathic pain and Lidoderm patch/Aqua K-pad/menthol rub for topical relief; will change Robaxin to Valium 5 mg po q6h for muscle spasm/agitation  I reviewed this medication plan with the patient in detail  ·  CT scan of lumbar sacral spine "There is extensive postoperative change of the lumbar spine with significant artifacts related to fixation hardware which extends from L1 to S1  There is a chronic appearing compression deformity of the posterior aspect of L5 with grade 2 anterolisthesis L5 on S1  Within the limits of the exam, there does not appear to be any definitive evidence of abnormal fluid collections in the paraspinal region  Not able to assess central canal   Of note, the patient's posterior spinal fixation rods appear fractured and malalignment at the lumbosacral junction "  · CT scan was reviewed by Neurosurgery--appreciate their consultation  They do not feel patient has any hardware fracture or malalignment and do not recommend any surgical intervention    · Patient refused MRI  · Would not continue to utilize IV narcotics in this patient  · She needs close follow-up with her pain management specialist

## 2018-07-24 NOTE — DISCHARGE SUMMARY
Addendum 12:49 p m  called case management to review the case with them; she will be set up for short-term rehab at [address] [place] [address] which was approved by her insurance company  I also attempted to call her daughter but the phone number would not go through on 2 attempts  Discharge- Aviva Richards 1940, 70 y o  female MRN: [id number]    Unit/Bed#: -01 Encounter: [id number]    Primary Care Provider: Chago Chen MD   Date and time admitted to hospital: 7/23/2018  8:58 AM  Psychiatric disorder   Assessment & Plan    · Patient on Celexa and Seroquel   · Has reported significant psychiatric history including bipolar disorder and tells me she previously "was mental" and screaming/carrying on initially when she was admitted to [address] [place] [address] approximately 4 years ago  · In reviewing the case with the nurse at [address] [place] [address] they state that recently she has been carrying on, "yelling and screaming" again due to her back pain; I would not qualify this as  altered mental status" or pursue additional workup  Would continue Valium PO QID        * Back pain   Assessment & Plan    · Patient with reports of acute progressive back pain superimposed on many years of chronic back pain  · She denies any acute traumatic injury  · Prior to arrival she was on Percocet 7 5 mg p o  5 times a day, fentanyl patch 50 mcg every 72 hours, Robaxin 500 mg 4 times a day, and gabapentin 600 mg t i d ; she also apparently has been taking fairly sizable quantities of Tylenol  I was unable to confirm this [facility] the [facility] website but did review the records from the nursing home and specifically with her nurse  · For pain management we will provide: tramadol 50 mg every 6 hours for moderate pain, Oxy Contin for 10 mg q 4 hours for severe pain    We will continue gabapentin for neuropathic pain and Lidoderm patch/Aqua K-pad/menthol rub for topical relief; will change Robaxin to Valium 5 mg po q6h for muscle spasm/agitation  I reviewed this medication plan with the patient in detail  ·  CT scan of lumbar sacral spine "There is extensive postoperative change of the lumbar spine with significant artifacts related to fixation hardware which extends from L1 to S1  There is a chronic appearing compression deformity of the posterior aspect of L5 with grade 2 anterolisthesis L5 on S1  Within the limits of the exam, there does not appear to be any definitive evidence of abnormal fluid collections in the paraspinal region  Not able to assess central canal   Of note, the patient's posterior spinal fixation rods appear fractured and malalignment at the lumbosacral junction "  · CT scan was reviewed by Neurosurgery--appreciate their consultation  They do not feel patient has any hardware fracture or malalignment and do not recommend any surgical intervention  · Patient refused MRI  · Would not continue to utilize IV narcotics in this patient  · She needs close follow-up with her pain management specialist          Continuous opioid dependence ([facility] )   Assessment & Plan    · Longstanding dependence on fentanyl and Percocet, reportedly being followed by pain management Dr Bill Bella  · I attempted to find her records in the [facility] website but she was not listed   · Caution to avoid overuse of narcotic medication as patient has history of seeking behaviors and significant dependence         Atrial fibrillation ([address] )   Assessment & Plan    · Patient remains on Xarelto        Morbid obesity due to excess calories ([address] )   Assessment & Plan    · Weight 283 lb, BMI to be determined        COPD (chronic obstructive pulmonary disease) ([facility] )   Assessment & Plan    · COPD without acute exacerbation  Continue respiratory protocol with albuterol p r n    Please note that she utilizes nasal cannula oxygen only as needed and not routinely at her facility        Overactive bladder   Assessment & Plan    · Patient reporting overflow and stress incontinence-I do not feel this is in any way related to her back pain or representing acute neurologic compromise  She has longstanding history of bladder dysfunction previously evaluated by Urology and is noncompliant with taking her medication  Would resume oxybutynin  Discharging Physician / Practitioner: Nerissa Harry PA-C  PCP: Cresencio Dennison MD  Admission Date:   Admission Orders     Ordered        07/23/18 1401  Place in Observation (expected length of stay for this patient is less than two midnights)  Once             Discharge Date: 07/24/18    Resolved Problems  Date Reviewed: 7/24/2018    None        Consultations During Hospital Stay:  · Neurosurgery    Procedures Performed:     · CT scan as above    Significant Findings / Test Results:     · None    Incidental Findings:   · none    Test Results Pending at Discharge (will require follow up):   · none     Outpatient Tests Requested:  · none    Complications:  Extreme agitation    Reason for Admission:  Back pain with associated agitation    Hospital Course:     Patricia Wilson is a 70 y o  female patient with underlying psychiatric conditions/bipolar disorder who presented to the hospital on 7/23/2018 due to uncontrollable back pain  Patient has longstanding history of back pain followed by pain management as an outpatient with associated continuous opioid dependence  CT scan on admission initially was concerning for malfunction of her hardware from prior surgery but she was evaluated by Neurosurgery and they felt her spine with stable and no surgical intervention was warranted  We augmented her pain medication as listed above and consulted Physical therapy  We explained to her in detail that her planned for chronic back pain would continue after this hospitalization  Patient did become extremely agitated and yelling out  She was noted to have severe mood swings    As such we felt she would benefit from use of Valium for her muscle spasms and for her agitation  Ongoing physical therapy would be of benefit for her and she will need close follow-up with her pain management physician as she does have prior history of significant drug-seeking behaviors  Please see above list of diagnoses and related plan for additional information  Condition at Discharge: fair     Discharge Day Visit / Exam:     Subjective:  Patient did have a bowel movement today  She continues to complain of pain but has hardly taken any medication overnight  In addition she says that she cannot walk but was up walking with therapy with the roller walker earlier  Vitals: Blood Pressure: 130/66 (07/24/18 0716)  Pulse: 73 (07/24/18 0716)  Temperature: 98 4 °F (36 9 °C) (07/24/18 0716)  Temp Source: Oral (07/24/18 0716)  Respirations: 20 (07/24/18 0716)  Weight - Scale: 129 kg (283 lb 4 7 oz) (07/23/18 0900)  SpO2: 95 % (07/24/18 0716)  Exam:   Physical Exam   Constitutional: No distress  HENT:   Head: Normocephalic and atraumatic  Eyes: Conjunctivae are normal  Right eye exhibits no discharge  Left eye exhibits no discharge  No scleral icterus  Neck: Neck supple  Cardiovascular: Normal rate and regular rhythm  No murmur heard  Pulmonary/Chest: Effort normal and breath sounds normal  No respiratory distress  She has no wheezes  She has no rales  Abdominal: Soft  obese   Musculoskeletal: She exhibits no edema  Neurological: She is alert  Awake alert and interactive she is not noted to be lethargic or confused  Skin: Skin is warm and dry  No rash noted  She is not diaphoretic  No erythema  No pallor  Psychiatric:   Patient's moods are wildly fluctuating  One moment she is referring to me as "honey", smiling and talkative and the next moment she is screaming out uncontrollably and agitated   Vitals reviewed      Discussion with Family:     Discharge instructions/Information to patient and family:   See after visit summary for information provided to patient and family  Provisions for Follow-Up Care:  See after visit summary for information related to follow-up care and any pertinent home health orders  Disposition:     Other: long term SNF at [address] [place] [address], with PT    For Discharges to [facility] SNF:   · Not Applicable to this Patient - Not Applicable to this Patient    Planned Readmission: none     Discharge Statement:  I spent 45 minutes discharging the patient  This time was spent on the day of discharge  I had direct contact with the patient on the day of discharge  Greater than 50% of the total time was spent examining patient, answering all patient questions, arranging and discussing plan of care with patient as well as directly providing post-discharge instructions  Additional time then spent on discharge activities  Bedside nursing rounds performed twice, case discussed with Physical therapy twice and case management  Discharge Medications:  See after visit summary for reconciled discharge medications provided to patient and family        ** Please Note: This note has been constructed using a voice recognition system **

## 2018-07-24 NOTE — CONSULTS
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  Recent CT scan is stable compared to plain films from 2016 without evidence of infection, hardware failure or change in lumbosacral alignment  Would not recommend any surgical intervention as this will not help her chronic back pain or chronic left footdrop  Neurosurgery will sign off  Follow up on a p r n  basis  Plan:  1  Try to optimize pain control  2   Physical therapy to in curve inch ambulation  3   Consider initiating DVT prophylaxis  Currently no SCDs on   2   Neurosurgery will sign off  Follow up p r n  History of Present Illness     HPI: Kirsten Mcneil is a 70y o  year old female who presents with acute exacerbation of more chronic lower back pain  Patient's last back operation was 6 years ago and she has had back pain since  Approximately 1 week ago this became acutely worse without inciting event  She was unable to ambulate as she had beforehand and present to the emergency department for further assessment  She has a longstanding history of alternating bilateral leg pain and left footdrop since at least the last surgery  She follows with Dr Nette úNñez, and serial injections and spinal cord stimulator trial were not helpful  She denies any difficulties with bowel bladder function or changing perineal sensation  She denies any fever, chills or sweats  The neurosurgical service has been consulted  Inpatient consult to Neurosurgery  Consult performed by: Zenon Mendoza  Consult ordered by: Vaishali Rock          Review of Systems   HENT: Negative  Eyes: Negative  Respiratory: Negative  Cardiovascular: Negative  Gastrointestinal: Negative  Endocrine: Negative  Genitourinary: Negative for difficulty urinating  Musculoskeletal: Positive for back pain  Skin: Negative  Allergic/Immunologic: Negative  Neurological: Positive for weakness (Longstanding left footdrop )  Hematological: Negative  Psychiatric/Behavioral: Negative  Historical Information   Past Medical History:   Diagnosis Date    A-fib ([facility] [address] )     Chronic pain     Chronic respiratory failure with hypoxia (HCC)     COPD (chronic obstructive pulmonary disease) (HCC)     Dementia     Dorsalgia, unspecified     Edema     Encephalopathy     GERD (gastroesophageal reflux disease)     Hypertension     Morbid obesity (HCC)     Muscle weakness (generalized)     Opioid dependence ([facility])     Overactive bladder     Pneumonia     Psychiatric disorder     anxiety, bipolar    Radiculopathy of thoracolumbar region     Sciatica     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     Urinary incontinence     UTI (urinary tract infection)      Past Surgical History:   Procedure Laterality Date    APPENDECTOMY      BACK SURGERY      CHOLECYSTECTOMY      KNEE SURGERY       History   Alcohol Use No     History   Drug Use No     History   Smoking Status    Former Smoker    Types: Cigarettes   Smokeless Tobacco    Never Used     History reviewed  No pertinent family history      Meds/Allergies   all current active meds have been reviewed, current meds:   Current Facility-Administered Medications   Medication Dose Route Frequency    albuterol inhalation solution 2 5 mg  2 5 mg Nebulization Q4H PRN    aluminum-magnesium hydroxide-simethicone (MYLANTA) 200-200-20 mg/5 mL oral suspension 15 mL  15 mL Oral BID PRN    amLODIPine (NORVASC) tablet 10 mg  10 mg Oral Daily    artificial tear (LUBRIFRESH P M ) ophthalmic ointment   Both Eyes BID    bisacodyl (DULCOLAX) rectal suppository 10 mg  10 mg Rectal Daily PRN    calcium carbonate (TUMS) chewable tablet 1,000 mg  1,000 mg Oral Daily PRN    cholecalciferol (VITAMIN D3) tablet 1,000 Units  1,000 Units Oral Daily    citalopram (CeleXA) tablet 10 mg  10 mg Oral Daily    Cranberry CAPS 425 mg  425 mg Oral Daily    diazepam (VALIUM) injection 5 mg  5 mg Intravenous Q6H PRN    docusate sodium (COLACE) capsule 100 mg  100 mg Oral BID PRN    famotidine (PEPCID) tablet 20 mg  20 mg Oral Daily    fentaNYL (DURAGESIC) 50 mcg/hr TD 72 hr patch 1 patch  1 patch Transdermal Q72H    furosemide (LASIX) tablet 40 mg  40 mg Oral Daily    gabapentin (NEURONTIN) capsule 600 mg  600 mg Oral TID    HYDROmorphone (DILAUDID) injection 0 5 mg  0 5 mg Intravenous Q4H PRN    HYDROmorphone (DILAUDID) injection 1 mg  1 mg Intravenous Once    latanoprost (XALATAN) 0 005 % ophthalmic solution 1 drop  1 drop Both Eyes HS    lidocaine (LIDODERM) 5 % patch 2 patch  2 patch Transdermal Daily    losartan (COZAAR) tablet 50 mg  50 mg Oral Daily    magnesium hydroxide (MILK OF MAGNESIA) 400 mg/5 mL oral suspension 30 mL  30 mL Oral Daily PRN    menthol-methyl salicylate (BENGAY) 78-40 % cream   Apply externally 4x Daily PRN    nitroglycerin (NITROSTAT) SL tablet 0 4 mg  0 4 mg Sublingual Q5 Min PRN    nystatin (MYCOSTATIN) cream 1 application  1 application Topical BID    ondansetron (ZOFRAN) injection 4 mg  4 mg Intravenous Q4H PRN    oxybutynin (DITROPAN-XL) 24 hr tablet 15 mg  15 mg Oral Daily    oxyCODONE (ROXICODONE) immediate release tablet 10 mg  10 mg Oral Q4H PRN    pantoprazole (PROTONIX) EC tablet 20 mg  20 mg Oral Early Morning    pneumococcal 23-valent polysaccharide vaccine (PNEUMOVAX-23) injection 0 5 mL  0 5 mL Subcutaneous Prior to discharge    QUEtiapine (SEROquel) tablet 12 5 mg  12 5 mg Oral HS    rivaroxaban (XARELTO) tablet 20 mg  20 mg Oral Daily With Dinner    traMADol (ULTRAM) tablet 50 mg  50 mg Oral Q6H PRN    and PTA meds:   Prior to Admission Medications   Prescriptions Last Dose Informant Patient Reported? Taking? ALPRAZolam (XANAX) 0 25 mg tablet 7/22/2018 at 2100  No Yes   Sig: Take 1 tablet by mouth daily at bedtime for 10 days   BISACODYL RE   Yes Yes   Sig: Insert 10 mg into the rectum as needed Indications: hs as needed at bedtime when the patient has not had bm in 4 days  Cholecalciferol 1000 UNIT/10ML LIQD 7/22/2018 at 1000  Yes Yes   Sig: Take 1 tablet by mouth daily  Cranberry 425 MG CAPS 7/22/2018 at 1000  Yes Yes   Sig: Take 1 tablet by mouth daily Indications: once daily for chronic UTI  MIRABEGRON ER PO   Yes No   Sig: Take 50 mg by mouth daily  OXYGEN-HELIUM IN   Yes Yes   Sig: Inhale 3 L as needed 3 L NC O2 as needed for SOB for SpO2 less than 90   QUEtiapine (SEROquel) 25 mg tablet 7/22/2018 at 2100  No Yes   Sig: Take 1 tablet by mouth daily at bedtime for 30 days   Patient taking differently: Take 12 5 mg by mouth daily at bedtime     acetaminophen (TYLENOL) 325 mg tablet 7/22/2018 at 2202  Yes Yes   Sig: Take 650 mg by mouth every 6 (six) hours as needed for mild pain  albuterol (2 5 mg/3 mL) 0 083 % nebulizer solution   Yes Yes   Sig: Take 2 5 mg by nebulization every 4 (four) hours as needed for wheezing  alendronate (FOSAMAX) 70 mg tablet 7/20/2018 at Unknown time  Yes Yes   Sig: Take 70 mg by mouth every 7 days  aluminum-magnesium hydroxide-simethicone (MYLANTA) 200-200-20 mg/5 mL suspension 7/8/2018 at Unknown time  Yes Yes   Sig: Take 15 mL by mouth 2 (two) times a day as needed for indigestion or heartburn  amLODIPine (NORVASC) 10 mg tablet 7/22/2018 at 1000  Yes Yes   Sig: Take 10 mg by mouth daily     calcium carbonate (TUMS) 500 mg chewable tablet   No Yes   Sig: Chew 2 tablets daily as needed for indigestion or heartburn   carboxymethylcellulose 0 5 % SOLN   Yes Yes   Sig: Administer 1 drop to both eyes 2 (two) times a day   citalopram (CeleXA) 10 mg tablet 7/22/2018 at 0900  Yes Yes   Sig: Take 10 mg by mouth daily   docusate sodium (COLACE) 100 mg capsule 7/22/2018 at 1800  No Yes   Sig: Take 1 capsule by mouth 2 (two) times a day as needed for constipation   escitalopram (LEXAPRO) 10 mg tablet   Yes No   Sig: Take 10 mg by mouth daily  fentaNYL (DURAGESIC) 50 mcg/hr 7/20/2018 at Unknown time  Yes Yes   Sig: Place 1 patch on the skin every third day   furosemide (LASIX) 40 mg tablet 7/22/2018 at 1000  Yes Yes   Sig: Take 40 mg by mouth daily   gabapentin (NEURONTIN) 600 MG tablet 7/22/2018 at 2100  Yes Yes   Sig: Take 600 mg by mouth 3 (three) times a day  latanoprost (XALATAN) 0 005 % ophthalmic solution 7/22/2018 at 2100  Yes Yes   Sig: Administer 1 drop to both eyes daily at bedtime   losartan (COZAAR) 50 mg tablet 7/22/2018 at 1000  Yes Yes   Sig: Take 50 mg by mouth daily  magnesium hydroxide (MILK OF MAGNESIA) 400 mg/5 mL oral suspension   Yes Yes   Sig: Take 30 mL by mouth daily as needed for constipation Indications: 400mg/5ml (30ml) by mouth as needed for constipation at bedtime when no bm in 3 days  methocarbamol (ROBAXIN) 750 mg tablet 7/23/2018 at 0700  Yes Yes   Sig: Take 750 mg by mouth 4 (four) times a day  nitroglycerin (NITROSTAT) 0 4 mg SL tablet   Yes Yes   Sig: Place 0 4 mg under the tongue every 5 (five) minutes as needed for chest pain  nystatin (MYCOSTATIN) cream 7/22/2018 at 2100  Yes Yes   Sig: Apply 1 application topically 2 (two) times a day  omeprazole (PriLOSEC) 20 mg delayed release capsule   Yes No   Sig: Take 20 mg by mouth daily  oxyCODONE-acetaminophen (PERCOCET) 7 5-325 MG per tablet 7/23/2018 at 0700  Yes Yes   Sig: Take 1 tablet by mouth 5 (five) times a day   ranitidine (ZANTAC) 150 mg tablet 7/23/2018 at 0600  Yes Yes   Sig: Take 150 mg by mouth daily in the early morning   rivaroxaban (XARELTO) 20 mg tablet 7/22/2018 at 2100  Yes Yes   Sig: Take 20 mg by mouth daily with dinner   solifenacin (VESICARE) 10 MG tablet   Yes No   Sig: Take 10 mg by mouth daily     traMADol (ULTRAM) 50 mg tablet   Yes No   Sig: Take 50 mg by mouth every 6 (six) hours as needed for moderate pain      Facility-Administered Medications: None     Allergies   Allergen Reactions    Aspirin     Iodinated Diagnostic Agents Throat Swelling    Iodine     Nsaids        Objective     Intake/Output Summary (Last 24 hours) at 07/24/18 0707  Last data filed at 07/24/18 0558   Gross per 24 hour   Intake                0 ml   Output             1550 ml   Net            -1550 ml       Physical Exam   Constitutional: She is oriented to person, place, and time  She appears well-developed and well-nourished  No distress  Musculoskeletal:        Lumbar back: She exhibits pain  She exhibits no tenderness  Neurological: She is alert and oriented to person, place, and time  Skin: Skin is warm and dry  Midline lumbar incision well healed  Vitals reviewed  Neurologic Exam     Mental Status   Oriented to person, place, and time  Motor Exam 5/5 power in lower extremities aside from 0/5 power on left dorsiflexion and EHL  Patient has 4/5 power on hip flexion bilaterally which is likely antalgic in nature as this exacerbates her lower back pain  Sensory Exam   Right leg light touch: normal  Left leg light touch: decreased from knee  Right leg pinprick: normal  Left leg pinprick: decreased from knee    Gait, Coordination, and Reflexes     Reflexes   Right plantar: normal  Left plantar: normal  Right ankle clonus: absent  Left ankle clonus: absent      Vitals:Blood pressure 162/75, pulse 62, temperature 98 6 °F (37 °C), temperature source Oral, resp  rate 18, weight 129 kg (283 lb 4 7 oz), SpO2 95 %  ,Body mass index is 40 65 kg/m²  Lab Results:   I have personally reviewed pertinent results      Lab Results   Component Value Date    WBC 7 12 07/23/2018    HGB 10 7 (L) 07/23/2018    HCT 35 1 07/23/2018    MCV 85 07/23/2018     07/23/2018    MCH 25 8 (L) 07/23/2018    MCHC 30 5 (L) 07/23/2018    RDW 15 6 (H) 07/23/2018    MPV 9 7 07/23/2018  07/24/2018     07/24/2018    CO2 30 07/24/2018    ANIONGAP 9 07/24/2018    BUN 10 07/24/2018    CREATININE 0 79 07/24/2018    GLUCOSE 111 07/24/2018    CALCIUM 9 1 07/24/2018    AST 32 07/23/2018    ALT 17 07/23/2018    ALKPHOS 55 07/23/2018    PROT 7 9 07/23/2018    BILITOT 0 40 07/23/2018    EGFR 73 07/24/2018    COLORU Yellow 07/23/2018    CLARITYU Clear 07/23/2018    SPECGRAV 1 020 07/23/2018    PHUR 6 0 07/23/2018    LEUKOCYTESUR Negative 07/23/2018    NITRITE Negative 07/23/2018    PROTEINUA Trace (A) 07/23/2018    GLUCOSEU Negative 07/23/2018    KETONESU Negative 07/23/2018    BILIRUBINUR Negative 07/23/2018    BLOODU Negative 07/23/2018    INR 1 63 (H) 07/23/2018       Imaging Studies: I have personally reviewed pertinent reports  and I have personally reviewed pertinent films in PACS    CT scan of the abdomen pelvis with spinal reconstructions dated June 24, 2018  Previous L1 to pelvis fusion with posterior lateral fusion mass extending from L2-L5  Construct is in continuity with cross connectors connecting the L1-S1 rods to the iliac wing screws  Overall the appearance of instrumentation is stable compared to plain film from 2016 without evidence of hardware loosening or failure in the interval   Stable lumbar alignment with grade 1-2 L5-S1 anterolisthesis compared to 2016  While limited to some extent by instrumentation artifact, there is no significant central or foraminal stenosis  EKG, Pathology, and Other Studies: I have personally reviewed pertinent reports  VTE Prophylaxis: Reason for no pharmacologic prophylaxis Unknown  Code Status: Level 1 - Full Code  Advance Directive and Living Will:      Power of :    POLST:      Counseling / Coordination of Care  Counseling/Coordination of Care: Total floor / unit time spent today 20 minutes  Greater than 50% of total time was spent with the patient and / or family counseling and / or coordination of care   A description of the counseling / coordination of care: see assessment and plan documentation above for description

## 2018-07-24 NOTE — ASSESSMENT & PLAN NOTE
· Longstanding dependence on fentanyl and Percocet, reportedly being followed by pain management Dr Marko Lazcano  · I attempted to find her records in the [facility]med website but she was not listed   · Caution to avoid overuse of narcotic medication as patient has history of seeking behaviors and significant dependence

## 2018-07-24 NOTE — PLAN OF CARE
Problem: PHYSICAL THERAPY ADULT  Goal: Performs mobility at highest level of function for planned discharge setting  See evaluation for individualized goals  Treatment/Interventions: Functional transfer training, LE strengthening/ROM, Therapeutic exercise, Endurance training, Cognitive reorientation, Gait training, Spoke to MD, Spoke to nursing, Spoke to case management  Equipment Recommended: Noe Fields (Comment) (may need dependent means for OOB mobility pending pain/cog)       See flowsheet documentation for full assessment, interventions and recommendations  Prognosis: Fair  Problem List: Decreased strength, Decreased range of motion, Decreased endurance, Impaired balance, Decreased mobility, Decreased coordination, Decreased cognition, Impaired judgement, Decreased safety awareness, Obesity, Pain  Assessment: Wojciech Clarke is a 70 y o  female who was admitted to [address] [place] [address] on 7/23/18, 2/2 altered mental status and chronic LBP  PMH includes but is not limited to dementia, HTN, bipolar and anxiety d/o and LBP  Prior to admission, patient lived in nursing home at [facility]  She reports that she was mostly immobile, would take occasional walks down the [place] but over the last 2 weeks has had too much pain to do so   Upon evaluation, they demonstrate impairments in LE strength, ROM, balance, endurance  She demonstrates emotional liability, initially yelling and screaming when attempting to roll and sit up  Once calm and encouragement was provided she sat up with min A and stood with Min/mod A  Advance retreat performed 3x with fair balance  She then needed to sit and lie back in bed 2/2 pain  Until DC from hospital, They can continue to benefit from IP PT at this time in order to address the impairments discussed with pain, bed mobility, transfers and amb  Treatment to focus on improving independence with the impairments and functional limitations discussed  Recommend return to [facility] with PT  Clinical complexity is unpredictable 2/2 complicated PMH, current pain and resulting mobility impairments  Barriers to Discharge: Decreased caregiver support     Recommendation: Other (Comment) (return to [facility] with [facility])     PT - OK to Discharge: Yes    See flowsheet documentation for full assessment

## 2018-07-24 NOTE — PHYSICAL THERAPY NOTE
PHYSICAL THERAPY EVALUATION NOTE   07/24/18 1005   Note Type   Note type Eval/Treat   Pain Assessment   Pain Assessment 0-10   Pain Score 8   Pain Location Back;Leg   Pain Orientation Bilateral;Lower   Home Living   Type of Home Assisted living   Home Layout One level   Home Equipment (rollator)   Additional Comments Patient lives in [facility], reports being amb very minimally throughout the day  Would use a rollator to walk to nurses station  Prior Function   Level of [place] Needs assistance with IADLs; Needs assistance with ADLs and functional mobility   Lives With Facility staff   Receives Help From Personal care attendant   ADL Assistance Needs assistance   IADLs Needs assistance   Falls in the last 6 months 0  (none reported)   Restrictions/Precautions   Weight Bearing Precautions Per Order No   Other Precautions Fall Risk;Pain;Bed Alarm;Cognitive   General   Additional Pertinent History per H&P: Khushi Schneider is a 70 y o  female who was transferred from [address] [place] [address] for reported "altered mental status " I called their facility directly and spoke with the nurse who states that patient has been "yelling and screaming" and carrying on about her pain  There has not been any events of lethargy or confusion reported  They have not noted any fever or chills  They tell me that she has a significant psychiatric history and previously did this in the past initially when she came to the facility  The nurse reports to me that at baseline she takes Percocet 7 5 mg 5 times a day and will complain if the nurse is 5 minutes late with her medication  In speaking with the patient, she reports that she has had approximately 1 week of worsening back pain   She does have longstanding history of chronic back pain with prior surgeries done more than 6 years ago at [place] and subsequently at [place]   She does have chronic back pain but states that lately it has been going downhill "  She reports pain is worse with movement and better with lying completely still  She describes the severity as extremely severe and location as low back and into her legs  She denies any numbness, tingling, or other paresthesia type symptoms  She has not had any recent traumatic injury to her back  She has been stating that she can't walk but has been ambulating with her walker at the nursing home according to nursing there  She is supposed to wear a MAFO brace on her left foot for foot drop but refuses to wear it  She is reportedly followed by pain management and they attempted to try a brace a few weeks ago which she states has not helped  She has not had any groin numbness or sudden new bowel or bladder incontinence or any reports of lower extremity weakness  Cognition   Overall Cognitive Status Impaired   Arousal/Participation Cooperative   Orientation Level Oriented X4   Memory Decreased recall of recent events;Decreased short term memory   Following Commands Follows one step commands inconsistently   RUE Assessment   RUE Assessment WFL   LUE Assessment   LUE Assessment WFL   RLE Assessment   RLE Assessment X  (ROM 50%, strength 3/5)   LLE Assessment   LLE Assessment X  (ROM 50%, strength 3/5)   Coordination   Movements are Fluid and Coordinated 0   Bed Mobility   Rolling R 4  Minimal assistance   Additional items Assist x 1;Verbal cues; Bedrails   Supine to Sit 4  Minimal assistance   Additional items Assist x 1;Bedrails; Increased time required;Verbal cues;LE management   Sit to Supine 3  Moderate assistance   Additional items Assist x 1;LE management;Verbal cues   Transfers   Sit to Stand 4  Minimal assistance   Additional items Assist x 1;Verbal cues   Stand to Sit 4  Minimal assistance   Additional items Assist x 1;Verbal cues   Ambulation/Elevation   Gait pattern (left foot drag  )   Gait Assistance 3  Moderate assist   Additional items Assist x 1 Assistive Device Rolling walker   Distance advance retreat performed 3x   Stair Management Assistance Not tested   Balance   Static Sitting Fair   Static Standing Poor   Ambulatory Poor   Endurance Deficit   Endurance Deficit No   Activity Tolerance   Activity Tolerance Patient limited by pain   Medical Staff Made Aware spoke with [address] [place] [address]  with on call RN   Assessment   Prognosis Fair   Problem List Decreased strength;Decreased range of motion;Decreased endurance; Impaired balance;Decreased mobility; Decreased coordination;Decreased cognition; Impaired judgement;Decreased safety awareness; Obesity;Pain   Assessment Mckayla Nunes is a 70 y o  female who was admitted to [facility] on 7/23/18, 2/2 altered mental status and chronic LBP  PMH includes but is not limited to dementia, HTN, bipolar and anxiety d/o and LBP  Prior to admission, patient lived in nursing home at [facility]  She reports that she was mostly immobile, would take occasional walks down the [place]a but over the last 2 weeks has had too much pain to do so   Upon evaluation, they demonstrate impairments in LE strength, ROM, balance, endurance  She demonstrates emotional liability, initially yelling and screaming when attempting to roll and sit up  Once calm and encouragement was provided she sat up with min A and stood with Min/mod A  Advance retreat performed 3x with fair balance  She then needed to sit and lie back in bed 2/2 pain  Until DC from hospital, They can continue to benefit from IP PT at this time in order to address the impairments discussed with pain, bed mobility, transfers and amb  Treatment to focus on improving independence with the impairments and functional limitations discussed  Recommend return to [facility] with PT  Clinical complexity is unpredictable 2/2 complicated PMH, current pain and resulting mobility impairments     Barriers to Discharge Decreased caregiver support   Goals Patient Goals be able to walk   STG Expiration Date 07/31/18   Short Term Goal #1 Patient will be mod I with all bed mobility using bed rail PRN  Patient will perform sit to stand with supervision  Patient will amb 25 feet with RW and [place]  Patient will decrease LBP to 4/10 to allow for improved activity tolerance with bed mobility, transfers and walking   Plan   Treatment/Interventions Functional transfer training;LE strengthening/ROM; Therapeutic exercise; Endurance training;Cognitive reorientation;Gait training;Spoke to MD;Spoke to nursing;Spoke to case management   PT Frequency 2-3x/wk   Recommendation   Recommendation Other (Comment)  (return to [facility] with PT)   Equipment Recommended Walker; Other (Comment)  (may need dependent means for OOB mobility pending pain/cog)   PT - OK to Discharge Yes   Additional Comments to [facility]   Barthel Index   Feeding 5   Bathing 0   Grooming Score 0   Dressing Score 5   Bladder Score 5   Bowels Score 5   Toilet Use Score 0   Transfers (Bed/Chair) Score 5   Mobility (Level Surface) Score 0   Stairs Score 0   Barthel Index Score 25       Lula Johnson PT        Patient Name: Robin Avilez  Today's Date: 7/24/2018     Time In: 1130 am  Time Out: 11 48 am  Total Time: 23 minutes      S:  Patient reports that she has to be able to walk more then 2 steps before leaving hospital    O: Min A with supine to sit, sit to stand performed with supervision  Amb 6 feet to window, stand pivot to chair and sat  Return sit to stand from chair made with mod A 2/2 to emotional liability  A:  Patient demonstrated improved amb distance vs eval this morning but reported increased pain  Difficulty to differentiate level of pain 2/2 emotional liability  P:  Return to [employer] with PT  Rest breaks required as well as increased time for positive reinforcement       Lula Johnson PT

## 2018-07-24 NOTE — PLAN OF CARE
DISCHARGE PLANNING     Discharge to home or other facility with appropriate resources Progressing        Knowledge Deficit     Patient/family/caregiver demonstrates understanding of disease process, treatment plan, medications, and discharge instructions Progressing        MUSCULOSKELETAL - ADULT     Maintain or return mobility to safest level of function Progressing     Maintain proper alignment of affected body part Progressing        PAIN - ADULT     Verbalizes/displays adequate comfort level or baseline comfort level Progressing        Potential for Falls     Patient will remain free of falls Progressing        Prexisting or High Potential for Compromised Skin Integrity     Skin integrity is maintained or improved Progressing        SKIN/TISSUE INTEGRITY - ADULT     Skin integrity remains intact Progressing     Incision(s), wounds(s) or drain site(s) healing without S/S of infection Progressing     Oral mucous membranes remain intact Progressing

## 2018-07-24 NOTE — SOCIAL WORK
CM met with patient at bedside, patient alert and oriented  Patient is a resident at [place] [facility]  OBS status explained to patient, patient signed OBS form, copy given to patient and copy sent to medical records for scanning  Patient will need WCV to return to the facility  CM sent referral through [employer] to [place] [facility]  Patient requested that CM reach out to her daughter Simon Khan to let her know that patient is being treated at [facility]  CM left voicemail for patient's daughter  CM Department will continue to assess for needs and will follow through discharge

## 2018-07-24 NOTE — SOCIAL WORK
[facility] WC-V will not transport Pt due to Pt screaming in pain  CM called [facility] and arranged BLS  Per [facility] will  Pt BLS within 45 minutes  CM made nurse Edite aware

## 2018-07-24 NOTE — CASE MANAGEMENT
Initial Clinical Review    Admission: Date/Time/Statement: 2018  1400 OBSERVATION    Orders Placed This Encounter   Procedures    Place in Observation (expected length of stay for this patient is less than two midnights)     Standing Status:   Standing     Number of Occurrences:   1     Order Specific Question:   Admitting Physician     Answer:   Roberto Goodson [[id number]]     Order Specific Question:   Level of Care     Answer:   Med Surg [16]         ED: Date/Time/Mode of Arrival:   ED Arrival Information     Expected Arrival Acuity Means of Arrival Escorted By Service Admission Type    - 2018 08:58 Urgent Ambulance [facility] General Medicine Urgent    Arrival Complaint    weakness          Chief Complaint:   Chief Complaint   Patient presents with    Back Pain     Pt brought to Er via EMS from NH with c/o lower back pain that radiates down B/L LE x 5 days  History of Illness:  Per attendin y o  female who was transferred from [address] [place] [address] for reported "altered mental status " I called their facility directly and spoke with the nurse who states that patient has been "yelling and screaming" and carrying on about her pain  There has not been any events of lethargy or confusion reported  They have not noted any fever or chills  They tell me that she has a significant psychiatric history and previously did this in the past initially when she came to the facility  The nurse reports to me that at baseline she takes Percocet 7 5 mg 5 times a day and will complain if the nurse is 5 minutes late with her medication        In speaking with the patient, she reports that she has had approximately 1 week of worsening back pain   She does have longstanding history of chronic back pain with prior surgeries done more than 6 years ago at [place] and subsequently at [facility]   She does have chronic back pain but states that lately it has been going downhill "  She reports pain is worse with movement and better with lying completely still  She describes the severity as extremely severe and location as low back and into her legs  She has been stating that she can't walk but has been ambulating with her walker at the nursing home according to nursing there  She is supposed to wear a MAFO brace on her left foot for foot drop but refuses to wear it  She is reportedly followed by pain management and they attempted to try a brace a few weeks ago which she states has not helped  She has not had any groin numbness or sudden new bowel or bladder incontinence or any reports of lower extremity weakness    ED Vital Signs:   ED Triage Vitals [07/23/18 0900]   Temperature Pulse Respirations Blood Pressure SpO2   98 1 °F (36 7 °C) (!) 52 18 153/66 97 %      Temp Source Heart Rate Source Patient Position - Orthostatic VS BP Location FiO2 (%)   Oral Monitor Lying Right arm --      Pain Score       7        Wt Readings from Last 1 Encounters:   07/23/18 129 kg (283 lb 4 7 oz)       Vital Signs (abnormal): none  Exam - epigastric tenderness  Tenderness in the midline low lumbar/sacral region and adjacent paraspinal musculature  Abnormal Labs/Diagnostic Test Results: Albumin 2 8  INR 1 63  Wbc 7 12, hgb 10 7, hct 35 1  Ct abdomen - No acute abdominopelvic pathology seen  Colonic diverticulosis without diverticulitis  Small fat-containing umbilical and left inguinal hernias  Postop changes of the lumbar spine with evidence of fracture and malalignment of the fixation rods at the L5-S1 level bilaterally with grade 2 anterolisthesis L5 on S1    7/24/2018-  K 3 4  Glucose 111        ED Treatment:   Medication Administration from 07/23/2018 0858 to 07/23/2018 1715       Date/Time Order Dose Route Action Comments     07/23/2018 1215 HYDROmorphone (DILAUDID) injection 1 mg 1 mg Intravenous Given      07/23/2018 1552 HYDROmorphone (DILAUDID) injection 1 mg 1 mg Intravenous Not Given pt medicated by Angie Elmore RN with Dilaudid 0 5mg per order     07/23/2018 1635 amLODIPine (NORVASC) tablet 10 mg 10 mg Oral Given      07/23/2018 1630 citalopram (CeleXA) tablet 10 mg 10 mg Oral Given      07/23/2018 1635 rivaroxaban (XARELTO) tablet 20 mg 20 mg Oral Given      07/23/2018 1635 famotidine (PEPCID) tablet 20 mg 20 mg Oral Given      07/23/2018 1630 cholecalciferol (VITAMIN D3) tablet 1,000 Units 1,000 Units Oral Given      07/23/2018 1635 furosemide (LASIX) tablet 40 mg 40 mg Oral Given      07/23/2018 1635 gabapentin (NEURONTIN) capsule 600 mg 600 mg Oral Given      07/23/2018 1630 losartan (COZAAR) tablet 50 mg 50 mg Oral Given      07/23/2018 1648 lidocaine (LIDODERM) 5 % patch 2 patch 2 patch Transdermal Medication Applied      07/23/2018 1552 HYDROmorphone (DILAUDID) injection 0 5 mg 0 5 mg Intravenous Given      07/23/2018 1630 oxybutynin (DITROPAN-XL) 24 hr tablet 15 mg 15 mg Oral Not Given           Past Medical/Surgical History:    Active Ambulatory Problems     Diagnosis Date Noted    Atrial fibrillation ([facility] ) 05/11/2016    Hypertension 05/11/2016    Dementia with behavioral disturbance 05/11/2016    PAD (peripheral artery disease) ([facility] ) 05/11/2016    Warfarin-induced coagulopathy ([facility] ) 05/11/2016    Morbid obesity due to excess calories ([facility] ) 05/12/2016    Acute cystitis without hematuria 07/17/2016    Bronchitis 07/21/2016    Continuous opioid dependence ([facility] ) 07/21/2016    Chronic anemia 08/06/2016    COPD (chronic obstructive pulmonary disease) ([facility] ) 08/06/2016    UTI (urinary tract infection) 08/07/2016    Rectal bleeding 04/20/2017    Overactive bladder 06/10/2017    Hypokalemia 06/14/2017    HCAP (healthcare-associated pneumonia) 06/14/2017    Pneumonia due to gram-negative bacteria ([facility] ) 06/14/2017    Acute metabolic encephalopathy 04/01/4959    Acute respiratory failure with hypoxia ([facility] ) 06/14/2017     Resolved Ambulatory Problems     Diagnosis Date Noted    Encephalopathy acute 07/19/2016    Dehydration 08/06/2016     Past Medical History:   Diagnosis Date    A-fib [facility])     Chronic pain     Chronic respiratory failure with hypoxia (HCC)     COPD (chronic obstructive pulmonary disease) (HCC)     Dementia     Dorsalgia, unspecified     Edema     Encephalopathy     GERD (gastroesophageal reflux disease)     Hypertension     Morbid obesity (HCC)     Muscle weakness (generalized)     Opioid dependence (HCC)     Overactive bladder     Pneumonia     Psychiatric disorder     Radiculopathy of thoracolumbar region     Sciatica     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     Urinary incontinence     UTI (urinary tract infection)        Admitting Diagnosis: Weakness [R53 1]  Acute exacerbation of chronic low back pain [M54 5, G89 29]    Age/Sex: 70 y o  female    Assessment/Plan:   Back pain   Assessment & Plan     · Patient with reports of acute progressive back pain superimposed on many years of chronic back pain  · She denies any acute traumatic injury  · Prior to arrival she was on Percocet 7 5 mg p o  5 times a day, fentanyl patch 50 mcg every 72 hours, Robaxin 500 mg 4 times a day, and gabapentin 600 mg t i d ; she also apparently has been taking fairly sizable quantities of Tylenol (will check Tylenol level but hold any additional doses at this point )  I was unable to confirm this in the [facility] website but did review the records from the nursing home and specifically with her nurse  · For pain management during this hospitalization we will provide: tramadol 50 mg every 6 hours for moderate pain, Oxy Contin for 10 mg q 4 hours for severe pain, and Dilaudid 0 5 mg IV q 4 hours for breakthrough pain    We will continue gabapentin for neuropathic pain and also add Lidoderm patch/Aqua K-pad/menthol rub for topical relief; will change Robaxin to Valium 5 mg IV q6h for muscle spasm/agitation  ·  CT scan of lumbar sacral spine "There is extensive postoperative change of the lumbar spine with significant artifacts related to fixation hardware which extends from L1 to S1   There is a chronic appearing compression deformity of the posterior aspect of L5 with grade 2 anterolisthesis L5 on S1   Within the limits of the exam, there does not appear to be any definitive evidence of abnormal fluid collections in the paraspinal region   Not able to assess central canal   Of note, the patient's posterior spinal fixation rods appear fractured and malalignment at the lumbosacral junction "  · CT scan was reviewed by Neurosurgery and they will see her in consultation; in the meantime we will do an MRI of the lumbar sacral spine to assess for significant radiculopathy             Continuous opioid dependence (HCC)   Assessment & Plan     · Longstanding dependence on fentanyl and Percocet, reportedly being followed by pain management  · I attempted to find her records in the [facility] website but she was not listed   · Caution to avoid overuse of narcotic medication as patient has history of seeking behaviors and significant dependence           Atrial fibrillation ([facility] )   Assessment & Plan     · Patient remains on Xarelto          Morbid obesity due to excess calories ([facility] )   Assessment & Plan     · Weight 283 lb, BMI to be determined          COPD (chronic obstructive pulmonary disease) ([facility])   Assessment & Plan     · COPD without acute exacerbation  Continue respiratory protocol with albuterol p r n  Please note that she utilizes nasal cannula oxygen only as needed and not routinely at her facility          Overactive bladder   Assessment & Plan     · Patient reporting overflow and stress incontinence-I do not feel this is in any way related to her back pain or representing acute neurologic compromise  She has longstanding history of bladder dysfunction previously evaluated by Urology and is noncompliant with taking her medication  Would resume oxybutynin           Psychiatric disorder   Assessment & Plan     · Patient on Celexa and Seroquel with Xanax as needed routinely  · Has reported significant psychiatric history including bipolar disorder and tells me she previously "was mental" and screaming/carrying on initially when she was admitted to [address] [place] [address] Drive approximately 4 years ago  · In reviewing the case with the nurse at [address] [place] [address] Drive they state that recently she has been carrying on, "yelling and screaming" again due to her back pain; I would not qualify this as  altered mental status" or pursue additional workup  Initially patient was cooperative in the ER but recently was noted to be screaming and uncontrollably agitated  Will try Valium         Admission Orders:  7/23/2018  1400 OBSERVATION  Scheduled Meds:   Current Facility-Administered Medications:  amLODIPine 10 mg Oral Daily   artificial tear  Both Eyes BID   cholecalciferol 1,000 Units Oral Daily   citalopram 10 mg Oral Daily   Cranberry 425 mg Oral Daily   famotidine 20 mg Oral Daily   fentaNYL 1 patch Transdermal Q72H   furosemide 40 mg Oral Daily   gabapentin 600 mg Oral TID   HYDROmorphone 1 mg Intravenous Once   latanoprost 1 drop Both Eyes HS   lidocaine 2 patch Transdermal Daily   losartan 50 mg Oral Daily   menthol-methyl salicylate  Apply externally 4x Daily PRN   nitroglycerin 0 4 mg Sublingual Q5 Min PRN   nystatin 1 application Topical BID   oxybutynin 15 mg Oral Daily   pantoprazole 20 mg Oral Early Morning   pneumococcal 23-valent polysaccharide vaccine 0 5 mL Subcutaneous Prior to discharge   QUEtiapine 12 5 mg Oral HS   rivaroxaban 20 mg Oral Daily With Dinner     Continuous Infusions:    PRN Meds:    Diazepam 5 mg iv - used x 1      HYDROmorphone 0 5 iv - used x 1  OTHER ORDERS:  scds  Aqua K   Knee high compression stockings  MAFO brace left     Consult neuro surgery  PT/OT      Thank you,  Rasheeda Aqq  [facility] Utilization Review Department  Phone: [phone]; Fax [phone]  ATTENTION: The Network Utilization Review Department is now centralized for our 9 Facilities  Make a note that we have a new phone and fax numbers for our Department  Please call with any questions or concerns to [phone] and carefully follow the prompts so that you are directed to the right person  All voicemails are confidential  Fax any determinations, approvals, denials, and requests for initial or continue stay review clinical to [phone]  Due to HIGH CALL volume, it would be easier if you could please send faxed requests to expedite your requests and in part, help us provide discharge notifications faster

## 2018-07-24 NOTE — ASSESSMENT & PLAN NOTE
· Patient on Celexa and Seroquel   · Has reported significant psychiatric history including bipolar disorder and tells me she previously "was mental" and screaming/carrying on initially when she was admitted to [address] [place] [address] approximately 4 years ago  · In reviewing the case with the nurse at [address] [place] [address] they state that recently she has been carrying on, "yelling and screaming" again due to her back pain; I would not qualify this as  altered mental status" or pursue additional workup    Would continue Valium PO QID

## 2018-07-24 NOTE — PLAN OF CARE
Problem: DISCHARGE PLANNING - CARE MANAGEMENT  Goal: Discharge to post-acute care or home with appropriate resources  INTERVENTIONS:  - Conduct assessment to determine patient/family and health care team treatment goals, and need for post-acute services based on payer coverage, community resources, and patient preferences, and barriers to discharge  - Address psychosocial, clinical, and financial barriers to discharge as identified in assessment in conjunction with the patient/family and health care team  - Arrange appropriate level of post-acute services according to patients   needs and preference and payer coverage in collaboration with the physician and health care team  - Communicate with and update the patient/family, physician, and health care team regarding progress on the discharge plan  - Arrange appropriate transportation to post-acute venues  Outcome: Progressing  CM met with patient at bedside, patient alert and oriented  Patient is a resident at [facility]  OBS status explained to patient, patient signed OBS form, copy given to patient and copy sent to medical records for scanning  Patient will need WCV to return to the facility  CM sent referral through [employer] to [facility]  Patient requested that CM reach out to her daughter Karina Boyd to let her know that patient is being [facility] at [facility]  CM left voicemail for patient's daughter  CM Department will continue to assess for needs and will follow through discharge

## 2018-07-24 NOTE — PLAN OF CARE
Problem: PHYSICAL THERAPY ADULT  Goal: Performs mobility at highest level of function for planned discharge setting  See evaluation for individualized goals  Treatment/Interventions: Functional transfer training, LE strengthening/ROM, Therapeutic exercise, Endurance training, Cognitive reorientation, Gait training, Spoke to MD, Spoke to nursing, Spoke to case management  Equipment Recommended: Noe Fields (Comment) (may need dependent means for OOB mobility pending pain/cog)       See flowsheet documentation for full assessment, interventions and recommendations  Outcome: Progressing  Prognosis: Fair  Problem List: Decreased strength, Decreased range of motion, Decreased endurance, Impaired balance, Decreased mobility, Decreased coordination, Decreased cognition, Impaired judgement, Decreased safety awareness, Obesity, Pain  Assessment: Wojciech Clarke is a 70 y o  female who was admitted to [address] [place] [address] on 7/23/18, 2/2 altered mental status and chronic LBP  PMH includes but is not limited to dementia, HTN, bipolar and anxiety d/o and LBP  Prior to admission, patient lived in nursing home at [facility]  She reports that she was mostly immobile, would take occasional walks down the shea but over the last 2 weeks has had too much pain to do so   Upon evaluation, they demonstrate impairments in LE strength, ROM, balance, endurance  She demonstrates emotional liability, initially yelling and screaming when attempting to roll and sit up  Once calm and encouragement was provided she sat up with min A and stood with Min/mod A  Advance retreat performed 3x with fair balance  She then needed to sit and lie back in bed 2/2 pain  Until DC from hospital, They can continue to benefit from IP PT at this time in order to address the impairments discussed with pain, bed mobility, transfers and amb  Treatment to focus on improving independence with the impairments and functional limitations discussed  Recommend [facility] to [facility]  Clinical complexity is unpredictable 2/2 complicated PMH, current pain and resulting mobility impairments  Barriers to Discharge: Decreased caregiver support     Recommendation: Other (Comment) (return to [facility] with PT)     PT - OK to Discharge: Yes    See flowsheet documentation for full assessment

## 2018-07-24 NOTE — CASE MANAGEMENT
Notification of Observation Care Status/Observation Authorization Request  This is a Notification of Observation Care Status to our facility [facility]  Please be advised that this patient is currently in our facility under Observation Status  Below you will find the Attending Physician and Facilitys information including NPI# and contact information for the Utilization  assigned to the [facility] where the patient is receiving services  Please feel free to contact the Utilization Review Department with any questions  Patient Information:  PATIENT NAME: Aviva Richards  MRN: [id number]  YOB: 1940    PRESENTATION DATE/TIME: 7/23/2018  8:58 AM  OBS ADMISSION DATE/TIME: 7/23/18 1400pm  DISCHARGE DATE/TIME: 7/24/2018  3:14 PM   DISPOSITION: Non [facility] SNF/TCU/SNU    Attending Physician:  DENYS Oseguera  Specialty- Internal Medicine,   [facility] ID- [id number]  Primary Office:  [address] [place] [address]  Phone: [phone]  Fax: [phone]  Facility:  [facility]  Address: [address] [place] [address], [address] [place] [address]    Phone: [phone]  Tax ID [id number]  NPI [id number]   Medicare: [id number]    Thank you,  Rasheeda Smith  [facility] Utilization Review Department  Phone: [phone]; Fax [phone]  ATTENTION: The Network Utilization Review Department is now centralized for our 9 Facilities  Make a note that we have a new phone and fax numbers for our Department  Please call with any questions or concerns to [phone] and carefully follow the prompts so that you are directed to the right person  All voicemails are confidential  Fax any determinations, approvals, denials, and requests for initial or continue stay review clinical to [phone]   Due to HIGH CALL volume, it would be easier if you could please send faxed requests to expedite your requests and in part, help us provide discharge notifications faster

## 2018-07-29 ENCOUNTER — APPOINTMENT (EMERGENCY)
Dept: RADIOLOGY | Facility: HOSPITAL | Age: 78
DRG: 864 | End: 2018-07-29
Payer: COMMERCIAL

## 2018-07-29 ENCOUNTER — APPOINTMENT (EMERGENCY)
Dept: CT IMAGING | Facility: HOSPITAL | Age: 78
DRG: 864 | End: 2018-07-29
Payer: COMMERCIAL

## 2018-07-29 ENCOUNTER — HOSPITAL ENCOUNTER (INPATIENT)
Facility: HOSPITAL | Age: 78
LOS: 2 days | Discharge: NURSING, REHAB OR LONG-TERM CARE | DRG: 864 | End: 2018-08-01
Attending: EMERGENCY MEDICINE | Admitting: INTERNAL MEDICINE
Payer: COMMERCIAL

## 2018-07-29 DIAGNOSIS — M54.50 ACUTE EXACERBATION OF CHRONIC LOW BACK PAIN: ICD-10-CM

## 2018-07-29 DIAGNOSIS — N39.0 UTI (URINARY TRACT INFECTION): Primary | ICD-10-CM

## 2018-07-29 DIAGNOSIS — R50.9 FEVER: ICD-10-CM

## 2018-07-29 DIAGNOSIS — G89.29 ACUTE EXACERBATION OF CHRONIC LOW BACK PAIN: ICD-10-CM

## 2018-07-29 DIAGNOSIS — R41.82 ALTERED MENTAL STATUS: ICD-10-CM

## 2018-07-29 LAB
ALBUMIN SERPL BCP-MCNC: 2.7 G/DL (ref 3.5–5)
ALP SERPL-CCNC: 78 U/L (ref 46–116)
ALT SERPL W P-5'-P-CCNC: 27 U/L (ref 12–78)
ANION GAP SERPL CALCULATED.3IONS-SCNC: 4 MMOL/L (ref 4–13)
AST SERPL W P-5'-P-CCNC: 17 U/L (ref 5–45)
BACTERIA UR QL AUTO: ABNORMAL /HPF
BASOPHILS # BLD AUTO: 0.03 THOUSANDS/ΜL (ref 0–0.1)
BASOPHILS NFR BLD AUTO: 0 % (ref 0–1)
BILIRUB SERPL-MCNC: 0.7 MG/DL (ref 0.2–1)
BILIRUB UR QL STRIP: ABNORMAL
BUN SERPL-MCNC: 15 MG/DL (ref 5–25)
CALCIUM SERPL-MCNC: 9.1 MG/DL (ref 8.3–10.1)
CHLORIDE SERPL-SCNC: 101 MMOL/L (ref 100–108)
CLARITY UR: CLEAR
CO2 SERPL-SCNC: 33 MMOL/L (ref 21–32)
COLOR UR: YELLOW
CREAT SERPL-MCNC: 0.96 MG/DL (ref 0.6–1.3)
EOSINOPHIL # BLD AUTO: 0.06 THOUSAND/ΜL (ref 0–0.61)
EOSINOPHIL NFR BLD AUTO: 1 % (ref 0–6)
ERYTHROCYTE [DISTWIDTH] IN BLOOD BY AUTOMATED COUNT: 15.4 % (ref 11.6–15.1)
GFR SERPL CREATININE-BSD FRML MDRD: 58 ML/MIN/1.73SQ M
GLUCOSE SERPL-MCNC: 126 MG/DL (ref 65–140)
GLUCOSE UR STRIP-MCNC: NEGATIVE MG/DL
HCT VFR BLD AUTO: 36.6 % (ref 34.8–46.1)
HGB BLD-MCNC: 11 G/DL (ref 11.5–15.4)
HGB UR QL STRIP.AUTO: ABNORMAL
KETONES UR STRIP-MCNC: NEGATIVE MG/DL
LEUKOCYTE ESTERASE UR QL STRIP: NEGATIVE
LYMPHOCYTES # BLD AUTO: 1.28 THOUSANDS/ΜL (ref 0.6–4.47)
LYMPHOCYTES NFR BLD AUTO: 12 % (ref 14–44)
MCH RBC QN AUTO: 25.3 PG (ref 26.8–34.3)
MCHC RBC AUTO-ENTMCNC: 30.1 G/DL (ref 31.4–37.4)
MCV RBC AUTO: 84 FL (ref 82–98)
MONOCYTES # BLD AUTO: 1.06 THOUSAND/ΜL (ref 0.17–1.22)
MONOCYTES NFR BLD AUTO: 10 % (ref 4–12)
MUCOUS THREADS UR QL AUTO: ABNORMAL
NEUTROPHILS # BLD AUTO: 8.61 THOUSANDS/ΜL (ref 1.85–7.62)
NEUTS SEG NFR BLD AUTO: 78 % (ref 43–75)
NITRITE UR QL STRIP: NEGATIVE
NON-SQ EPI CELLS URNS QL MICRO: ABNORMAL /HPF
PH UR STRIP.AUTO: 6 [PH] (ref 4.5–8)
PLATELET # BLD AUTO: 251 THOUSANDS/UL (ref 149–390)
PMV BLD AUTO: 9.5 FL (ref 8.9–12.7)
POTASSIUM SERPL-SCNC: 4.1 MMOL/L (ref 3.5–5.3)
PROT SERPL-MCNC: 8.4 G/DL (ref 6.4–8.2)
PROT UR STRIP-MCNC: ABNORMAL MG/DL
RBC # BLD AUTO: 4.34 MILLION/UL (ref 3.81–5.12)
RBC #/AREA URNS AUTO: ABNORMAL /HPF
SODIUM SERPL-SCNC: 138 MMOL/L (ref 136–145)
SP GR UR STRIP.AUTO: 1.02 (ref 1–1.03)
UROBILINOGEN UR QL STRIP.AUTO: 1 E.U./DL
WBC # BLD AUTO: 11.04 THOUSAND/UL (ref 4.31–10.16)
WBC #/AREA URNS AUTO: ABNORMAL /HPF

## 2018-07-29 PROCEDURE — 70450 CT HEAD/BRAIN W/O DYE: CPT

## 2018-07-29 PROCEDURE — 36415 COLL VENOUS BLD VENIPUNCTURE: CPT | Performed by: EMERGENCY MEDICINE

## 2018-07-29 PROCEDURE — 85610 PROTHROMBIN TIME: CPT | Performed by: EMERGENCY MEDICINE

## 2018-07-29 PROCEDURE — 71045 X-RAY EXAM CHEST 1 VIEW: CPT

## 2018-07-29 PROCEDURE — 80053 COMPREHEN METABOLIC PANEL: CPT | Performed by: EMERGENCY MEDICINE

## 2018-07-29 PROCEDURE — 85730 THROMBOPLASTIN TIME PARTIAL: CPT | Performed by: EMERGENCY MEDICINE

## 2018-07-29 PROCEDURE — 83605 ASSAY OF LACTIC ACID: CPT | Performed by: EMERGENCY MEDICINE

## 2018-07-29 PROCEDURE — 81001 URINALYSIS AUTO W/SCOPE: CPT | Performed by: EMERGENCY MEDICINE

## 2018-07-29 PROCEDURE — 96365 THER/PROPH/DIAG IV INF INIT: CPT

## 2018-07-29 PROCEDURE — 87040 BLOOD CULTURE FOR BACTERIA: CPT | Performed by: EMERGENCY MEDICINE

## 2018-07-29 PROCEDURE — 85025 COMPLETE CBC W/AUTO DIFF WBC: CPT | Performed by: EMERGENCY MEDICINE

## 2018-07-29 PROCEDURE — 96374 THER/PROPH/DIAG INJ IV PUSH: CPT

## 2018-07-29 RX ORDER — 0.9 % SODIUM CHLORIDE 0.9 %
3 VIAL (ML) INJECTION AS NEEDED
Status: DISCONTINUED | OUTPATIENT
Start: 2018-07-29 | End: 2018-08-01 | Stop reason: HOSPADM

## 2018-07-29 RX ORDER — LORAZEPAM 2 MG/ML
1 INJECTION INTRAMUSCULAR ONCE
Status: COMPLETED | OUTPATIENT
Start: 2018-07-30 | End: 2018-07-29

## 2018-07-29 RX ADMIN — CEFTRIAXONE SODIUM 2000 MG: 2 INJECTION, POWDER, FOR SOLUTION INTRAMUSCULAR; INTRAVENOUS at 23:38

## 2018-07-29 RX ADMIN — LORAZEPAM 1 MG: 2 INJECTION INTRAMUSCULAR; INTRAVENOUS at 23:53

## 2018-07-30 ENCOUNTER — APPOINTMENT (INPATIENT)
Dept: CT IMAGING | Facility: HOSPITAL | Age: 78
DRG: 864 | End: 2018-07-30
Payer: COMMERCIAL

## 2018-07-30 PROBLEM — R09.02 HYPOXIA: Status: ACTIVE | Noted: 2018-07-30

## 2018-07-30 LAB
ANION GAP SERPL CALCULATED.3IONS-SCNC: 6 MMOL/L (ref 4–13)
APTT PPP: 37 SECONDS (ref 24–36)
ATRIAL RATE: 110 BPM
BASE EX.OXY STD BLDV CALC-SCNC: 95.1 % (ref 60–80)
BASE EXCESS BLDV CALC-SCNC: 3.3 MMOL/L
BASOPHILS # BLD AUTO: 0.02 THOUSANDS/ΜL (ref 0–0.1)
BASOPHILS NFR BLD AUTO: 0 % (ref 0–1)
BUN SERPL-MCNC: 16 MG/DL (ref 5–25)
CALCIUM SERPL-MCNC: 9 MG/DL (ref 8.3–10.1)
CHLORIDE SERPL-SCNC: 102 MMOL/L (ref 100–108)
CO2 SERPL-SCNC: 31 MMOL/L (ref 21–32)
CREAT SERPL-MCNC: 0.87 MG/DL (ref 0.6–1.3)
EOSINOPHIL # BLD AUTO: 0.06 THOUSAND/ΜL (ref 0–0.61)
EOSINOPHIL NFR BLD AUTO: 1 % (ref 0–6)
ERYTHROCYTE [DISTWIDTH] IN BLOOD BY AUTOMATED COUNT: 15.4 % (ref 11.6–15.1)
FOLATE SERPL-MCNC: >20 NG/ML (ref 3.1–17.5)
GFR SERPL CREATININE-BSD FRML MDRD: 65 ML/MIN/1.73SQ M
GLUCOSE SERPL-MCNC: 126 MG/DL (ref 65–140)
HCO3 BLDV-SCNC: 28.5 MMOL/L (ref 24–30)
HCT VFR BLD AUTO: 35 % (ref 34.8–46.1)
HGB BLD-MCNC: 10.5 G/DL (ref 11.5–15.4)
INR PPP: 1.49 (ref 0.86–1.17)
LACTATE SERPL-SCNC: 0.7 MMOL/L (ref 0.5–2)
LYMPHOCYTES # BLD AUTO: 1.31 THOUSANDS/ΜL (ref 0.6–4.47)
LYMPHOCYTES NFR BLD AUTO: 13 % (ref 14–44)
MCH RBC QN AUTO: 25.6 PG (ref 26.8–34.3)
MCHC RBC AUTO-ENTMCNC: 30 G/DL (ref 31.4–37.4)
MCV RBC AUTO: 85 FL (ref 82–98)
MONOCYTES # BLD AUTO: 1.1 THOUSAND/ΜL (ref 0.17–1.22)
MONOCYTES NFR BLD AUTO: 11 % (ref 4–12)
NEUTROPHILS # BLD AUTO: 7.69 THOUSANDS/ΜL (ref 1.85–7.62)
NEUTS SEG NFR BLD AUTO: 76 % (ref 43–75)
O2 CT BLDV-SCNC: 15.3 ML/DL
PCO2 BLDV: 45.6 MM HG (ref 42–50)
PH BLDV: 7.41 [PH] (ref 7.3–7.4)
PLATELET # BLD AUTO: 251 THOUSANDS/UL (ref 149–390)
PMV BLD AUTO: 9.7 FL (ref 8.9–12.7)
PO2 BLDV: 108.5 MM HG (ref 35–45)
POTASSIUM SERPL-SCNC: 3.7 MMOL/L (ref 3.5–5.3)
PROCALCITONIN SERPL-MCNC: 0.11 NG/ML
PROTHROMBIN TIME: 17.6 SECONDS (ref 11.8–14.2)
QRS AXIS: 42 DEGREES
QRSD INTERVAL: 96 MS
QT INTERVAL: 338 MS
QTC INTERVAL: 417 MS
RBC # BLD AUTO: 4.1 MILLION/UL (ref 3.81–5.12)
SODIUM SERPL-SCNC: 139 MMOL/L (ref 136–145)
T WAVE AXIS: 18 DEGREES
TSH SERPL DL<=0.05 MIU/L-ACNC: 0.48 UIU/ML (ref 0.36–3.74)
VENTRICULAR RATE: 92 BPM
VIT B12 SERPL-MCNC: 294 PG/ML (ref 100–900)
WBC # BLD AUTO: 10.18 THOUSAND/UL (ref 4.31–10.16)

## 2018-07-30 PROCEDURE — 84145 PROCALCITONIN (PCT): CPT | Performed by: INTERNAL MEDICINE

## 2018-07-30 PROCEDURE — 84443 ASSAY THYROID STIM HORMONE: CPT | Performed by: PHYSICIAN ASSISTANT

## 2018-07-30 PROCEDURE — 97163 PT EVAL HIGH COMPLEX 45 MIN: CPT

## 2018-07-30 PROCEDURE — G8978 MOBILITY CURRENT STATUS: HCPCS

## 2018-07-30 PROCEDURE — 96375 TX/PRO/DX INJ NEW DRUG ADDON: CPT

## 2018-07-30 PROCEDURE — 82746 ASSAY OF FOLIC ACID SERUM: CPT | Performed by: PHYSICIAN ASSISTANT

## 2018-07-30 PROCEDURE — 85025 COMPLETE CBC W/AUTO DIFF WBC: CPT | Performed by: PHYSICIAN ASSISTANT

## 2018-07-30 PROCEDURE — 94760 N-INVAS EAR/PLS OXIMETRY 1: CPT

## 2018-07-30 PROCEDURE — 80048 BASIC METABOLIC PNL TOTAL CA: CPT | Performed by: PHYSICIAN ASSISTANT

## 2018-07-30 PROCEDURE — G8979 MOBILITY GOAL STATUS: HCPCS

## 2018-07-30 PROCEDURE — 97530 THERAPEUTIC ACTIVITIES: CPT

## 2018-07-30 PROCEDURE — 71250 CT THORAX DX C-: CPT

## 2018-07-30 PROCEDURE — 99285 EMERGENCY DEPT VISIT HI MDM: CPT

## 2018-07-30 PROCEDURE — 82607 VITAMIN B-12: CPT | Performed by: PHYSICIAN ASSISTANT

## 2018-07-30 PROCEDURE — 93005 ELECTROCARDIOGRAM TRACING: CPT

## 2018-07-30 PROCEDURE — 82805 BLOOD GASES W/O2 SATURATION: CPT | Performed by: PHYSICIAN ASSISTANT

## 2018-07-30 PROCEDURE — 87081 CULTURE SCREEN ONLY: CPT | Performed by: PHYSICIAN ASSISTANT

## 2018-07-30 PROCEDURE — 94664 DEMO&/EVAL PT USE INHALER: CPT

## 2018-07-30 PROCEDURE — 99223 1ST HOSP IP/OBS HIGH 75: CPT | Performed by: INTERNAL MEDICINE

## 2018-07-30 PROCEDURE — 93010 ELECTROCARDIOGRAM REPORT: CPT | Performed by: INTERNAL MEDICINE

## 2018-07-30 RX ORDER — QUETIAPINE FUMARATE 25 MG/1
25 TABLET, FILM COATED ORAL
Status: DISCONTINUED | OUTPATIENT
Start: 2018-07-30 | End: 2018-08-01 | Stop reason: HOSPADM

## 2018-07-30 RX ORDER — LOSARTAN POTASSIUM 50 MG/1
50 TABLET ORAL DAILY
Status: DISCONTINUED | OUTPATIENT
Start: 2018-07-30 | End: 2018-08-01 | Stop reason: HOSPADM

## 2018-07-30 RX ORDER — BISACODYL 10 MG
10 SUPPOSITORY, RECTAL RECTAL DAILY PRN
Status: DISCONTINUED | OUTPATIENT
Start: 2018-07-30 | End: 2018-08-01 | Stop reason: HOSPADM

## 2018-07-30 RX ORDER — FENTANYL 50 UG/H
1 PATCH TRANSDERMAL
Status: DISCONTINUED | OUTPATIENT
Start: 2018-07-31 | End: 2018-08-01 | Stop reason: HOSPADM

## 2018-07-30 RX ORDER — ACETAMINOPHEN 325 MG/1
650 TABLET ORAL EVERY 6 HOURS PRN
Status: DISCONTINUED | OUTPATIENT
Start: 2018-07-30 | End: 2018-08-01 | Stop reason: HOSPADM

## 2018-07-30 RX ORDER — PANTOPRAZOLE SODIUM 40 MG/1
40 TABLET, DELAYED RELEASE ORAL
Status: DISCONTINUED | OUTPATIENT
Start: 2018-07-30 | End: 2018-08-01 | Stop reason: HOSPADM

## 2018-07-30 RX ORDER — ONDANSETRON 2 MG/ML
4 INJECTION INTRAMUSCULAR; INTRAVENOUS EVERY 6 HOURS PRN
Status: DISCONTINUED | OUTPATIENT
Start: 2018-07-30 | End: 2018-08-01 | Stop reason: HOSPADM

## 2018-07-30 RX ORDER — TRAMADOL HYDROCHLORIDE 50 MG/1
50 TABLET ORAL EVERY 6 HOURS PRN
Status: DISCONTINUED | OUTPATIENT
Start: 2018-07-30 | End: 2018-08-01 | Stop reason: HOSPADM

## 2018-07-30 RX ORDER — OXYBUTYNIN CHLORIDE 5 MG/1
15 TABLET, EXTENDED RELEASE ORAL DAILY
Status: DISCONTINUED | OUTPATIENT
Start: 2018-07-30 | End: 2018-08-01 | Stop reason: HOSPADM

## 2018-07-30 RX ORDER — GABAPENTIN 300 MG/1
600 CAPSULE ORAL 3 TIMES DAILY
Status: DISCONTINUED | OUTPATIENT
Start: 2018-07-30 | End: 2018-08-01 | Stop reason: HOSPADM

## 2018-07-30 RX ORDER — MELATONIN
1000 DAILY
Status: DISCONTINUED | OUTPATIENT
Start: 2018-07-30 | End: 2018-08-01 | Stop reason: HOSPADM

## 2018-07-30 RX ORDER — NYSTATIN 100000 U/G
1 CREAM TOPICAL 2 TIMES DAILY
Status: DISCONTINUED | OUTPATIENT
Start: 2018-07-30 | End: 2018-08-01 | Stop reason: HOSPADM

## 2018-07-30 RX ORDER — DIAZEPAM 5 MG/1
5 TABLET ORAL 4 TIMES DAILY
Status: DISCONTINUED | OUTPATIENT
Start: 2018-07-30 | End: 2018-08-01 | Stop reason: HOSPADM

## 2018-07-30 RX ORDER — CALCIUM CARBONATE 200(500)MG
1000 TABLET,CHEWABLE ORAL DAILY PRN
Status: DISCONTINUED | OUTPATIENT
Start: 2018-07-30 | End: 2018-08-01 | Stop reason: HOSPADM

## 2018-07-30 RX ORDER — NITROGLYCERIN 0.4 MG/1
0.4 TABLET SUBLINGUAL
Status: DISCONTINUED | OUTPATIENT
Start: 2018-07-30 | End: 2018-08-01 | Stop reason: HOSPADM

## 2018-07-30 RX ORDER — ALBUTEROL SULFATE 2.5 MG/3ML
2.5 SOLUTION RESPIRATORY (INHALATION) EVERY 6 HOURS PRN
Status: DISCONTINUED | OUTPATIENT
Start: 2018-07-30 | End: 2018-08-01 | Stop reason: HOSPADM

## 2018-07-30 RX ORDER — CARBOXYMETHYLCELLULOSE SODIUM 5 MG/ML
1 SOLUTION/ DROPS OPHTHALMIC 2 TIMES DAILY
Status: DISCONTINUED | OUTPATIENT
Start: 2018-07-30 | End: 2018-08-01 | Stop reason: HOSPADM

## 2018-07-30 RX ORDER — OXYCODONE HYDROCHLORIDE 10 MG/1
10 TABLET ORAL EVERY 4 HOURS PRN
Status: DISCONTINUED | OUTPATIENT
Start: 2018-07-30 | End: 2018-08-01 | Stop reason: HOSPADM

## 2018-07-30 RX ORDER — MAGNESIUM HYDROXIDE/ALUMINUM HYDROXICE/SIMETHICONE 120; 1200; 1200 MG/30ML; MG/30ML; MG/30ML
15 SUSPENSION ORAL 2 TIMES DAILY PRN
Status: DISCONTINUED | OUTPATIENT
Start: 2018-07-30 | End: 2018-08-01 | Stop reason: HOSPADM

## 2018-07-30 RX ORDER — AMLODIPINE BESYLATE 10 MG/1
10 TABLET ORAL DAILY
Status: DISCONTINUED | OUTPATIENT
Start: 2018-07-30 | End: 2018-08-01 | Stop reason: HOSPADM

## 2018-07-30 RX ORDER — MUSCLE RUB CREAM 100; 150 MG/G; MG/G
CREAM TOPICAL 4 TIMES DAILY PRN
Status: DISCONTINUED | OUTPATIENT
Start: 2018-07-30 | End: 2018-08-01 | Stop reason: HOSPADM

## 2018-07-30 RX ORDER — LATANOPROST 50 UG/ML
1 SOLUTION/ DROPS OPHTHALMIC
Status: DISCONTINUED | OUTPATIENT
Start: 2018-07-30 | End: 2018-08-01 | Stop reason: HOSPADM

## 2018-07-30 RX ORDER — CITALOPRAM 20 MG/1
10 TABLET ORAL DAILY
Status: DISCONTINUED | OUTPATIENT
Start: 2018-07-30 | End: 2018-08-01 | Stop reason: HOSPADM

## 2018-07-30 RX ORDER — DOCUSATE SODIUM 100 MG/1
100 CAPSULE, LIQUID FILLED ORAL 2 TIMES DAILY PRN
Status: DISCONTINUED | OUTPATIENT
Start: 2018-07-30 | End: 2018-08-01 | Stop reason: HOSPADM

## 2018-07-30 RX ORDER — FUROSEMIDE 40 MG/1
40 TABLET ORAL DAILY
Status: DISCONTINUED | OUTPATIENT
Start: 2018-07-30 | End: 2018-08-01 | Stop reason: HOSPADM

## 2018-07-30 RX ORDER — LIDOCAINE 50 MG/G
2 PATCH TOPICAL DAILY
Status: DISCONTINUED | OUTPATIENT
Start: 2018-07-30 | End: 2018-08-01 | Stop reason: HOSPADM

## 2018-07-30 RX ORDER — FAMOTIDINE 20 MG/1
20 TABLET, FILM COATED ORAL DAILY
Status: DISCONTINUED | OUTPATIENT
Start: 2018-07-30 | End: 2018-08-01 | Stop reason: HOSPADM

## 2018-07-30 RX ORDER — ALBUTEROL SULFATE 2.5 MG/3ML
2.5 SOLUTION RESPIRATORY (INHALATION) EVERY 4 HOURS PRN
Status: DISCONTINUED | OUTPATIENT
Start: 2018-07-30 | End: 2018-07-30

## 2018-07-30 RX ADMIN — LIDOCAINE 2 PATCH: 50 PATCH TOPICAL at 16:48

## 2018-07-30 RX ADMIN — NYSTATIN 1 APPLICATION: 100000 CREAM TOPICAL at 17:06

## 2018-07-30 RX ADMIN — OXYBUTYNIN CHLORIDE 15 MG: 5 TABLET, EXTENDED RELEASE ORAL at 08:55

## 2018-07-30 RX ADMIN — LATANOPROST 1 DROP: 50 SOLUTION OPHTHALMIC at 22:38

## 2018-07-30 RX ADMIN — GABAPENTIN 600 MG: 300 CAPSULE ORAL at 16:47

## 2018-07-30 RX ADMIN — GABAPENTIN 600 MG: 300 CAPSULE ORAL at 08:55

## 2018-07-30 RX ADMIN — DIAZEPAM 5 MG: 5 TABLET ORAL at 08:55

## 2018-07-30 RX ADMIN — AMLODIPINE BESYLATE 10 MG: 10 TABLET ORAL at 08:55

## 2018-07-30 RX ADMIN — FUROSEMIDE 40 MG: 40 TABLET ORAL at 08:55

## 2018-07-30 RX ADMIN — PANTOPRAZOLE SODIUM 40 MG: 40 TABLET, DELAYED RELEASE ORAL at 06:05

## 2018-07-30 RX ADMIN — AZITHROMYCIN MONOHYDRATE 500 MG: 500 INJECTION, POWDER, LYOPHILIZED, FOR SOLUTION INTRAVENOUS at 10:20

## 2018-07-30 RX ADMIN — VITAMIN D, TAB 1000IU (100/BT) 1000 UNITS: 25 TAB at 08:55

## 2018-07-30 RX ADMIN — LOSARTAN POTASSIUM 50 MG: 50 TABLET ORAL at 08:55

## 2018-07-30 RX ADMIN — CITALOPRAM HYDROBROMIDE 10 MG: 20 TABLET ORAL at 08:55

## 2018-07-30 RX ADMIN — DIAZEPAM 5 MG: 5 TABLET ORAL at 17:06

## 2018-07-30 RX ADMIN — FAMOTIDINE 20 MG: 20 TABLET ORAL at 08:55

## 2018-07-30 RX ADMIN — DIAZEPAM 5 MG: 5 TABLET ORAL at 21:28

## 2018-07-30 RX ADMIN — GABAPENTIN 600 MG: 300 CAPSULE ORAL at 21:28

## 2018-07-30 RX ADMIN — NYSTATIN 1 APPLICATION: 100000 CREAM TOPICAL at 08:56

## 2018-07-30 RX ADMIN — HYDROMORPHONE HYDROCHLORIDE 1 MG: 1 INJECTION, SOLUTION INTRAMUSCULAR; INTRAVENOUS; SUBCUTANEOUS at 00:26

## 2018-07-30 RX ADMIN — DIAZEPAM 5 MG: 5 TABLET ORAL at 11:49

## 2018-07-30 RX ADMIN — QUETIAPINE FUMARATE 25 MG: 25 TABLET ORAL at 22:37

## 2018-07-30 RX ADMIN — RIVAROXABAN 20 MG: 20 TABLET, FILM COATED ORAL at 17:05

## 2018-07-30 NOTE — ED PROVIDER NOTES
History  Chief Complaint   Patient presents with    Fever - 76 years or older     pt presents via EMS from [facility] with fever of 101 per EMS starting today, also reports pt althought normally confused, is more altered than normal  also report "goopiness" of the right eye     60-year-old female sent in from the nursing home for fever and altered mental status  Nursing home staff reports that she seemed more confused than her baseline today took her fever and reported to be 101  Also noted some redness and draining of her right eye  The patient unable to to give any significant history  Patient has no complaints        History provided by:  Nursing home and EMS personnel   used: No    Fever - 75 years or older   Max temp prior to arrival:  101  Temp source:  Oral  Severity:  Moderate  Onset quality:  Sudden  Duration:  1 hour  Timing:  Constant  Progression:  Improving  Chronicity:  New  Ineffective treatments:  None tried  Associated symptoms: confusion    Associated symptoms: no chest pain, no congestion, no cough, no diarrhea, no ear pain, no headaches, no rash and no vomiting    Risk factors: no contaminated food, no occupational exposure and no recent travel        Prior to Admission Medications   Prescriptions Last Dose Informant Patient Reported? Taking? BISACODYL RE   Yes Yes   Sig: Insert 10 mg into the rectum as needed Indications: hs as needed at bedtime when the patient has not had bm in 4 days  Cholecalciferol 1000 UNIT/10ML LIQD   Yes Yes   Sig: Take 1 tablet by mouth daily  Cranberry 425 MG CAPS   Yes Yes   Sig: Take 1 tablet by mouth daily Indications: once daily for chronic UTI       OXYGEN-HELIUM IN   Yes Yes   Sig: Inhale 3 L as needed 3 L NC O2 as needed for SOB for SpO2 less than 90   QUEtiapine (SEROquel) 25 mg tablet   No Yes   Sig: Take 0 5 tablets (12 5 mg total) by mouth daily at bedtime   Patient taking differently: Take 25 mg by mouth daily at bedtime     albuterol (2 5 mg/3 mL) 0 083 % nebulizer solution   Yes Yes   Sig: Take 2 5 mg by nebulization every 4 (four) hours as needed for wheezing  alendronate (FOSAMAX) 70 mg tablet   Yes Yes   Sig: Take 70 mg by mouth every 7 days  aluminum-magnesium hydroxide-simethicone (MYLANTA) 200-200-20 mg/5 mL suspension   Yes Yes   Sig: Take 15 mL by mouth 2 (two) times a day as needed for indigestion or heartburn  amLODIPine (NORVASC) 10 mg tablet   Yes Yes   Sig: Take 10 mg by mouth daily  calcium carbonate (TUMS) 500 mg chewable tablet   No Yes   Sig: Chew 2 tablets daily as needed for indigestion or heartburn   carboxymethylcellulose 0 5 % SOLN   Yes Yes   Sig: Administer 1 drop to both eyes 2 (two) times a day   citalopram (CeleXA) 10 mg tablet   Yes Yes   Sig: Take 10 mg by mouth daily   diazepam (VALIUM) 5 mg tablet   No Yes   Sig: Take 1 tablet (5 mg total) by mouth 4 (four) times a day for 10 days   docusate sodium (COLACE) 100 mg capsule   No Yes   Sig: Take 1 capsule by mouth 2 (two) times a day as needed for constipation   fentaNYL (DURAGESIC) 50 mcg/hr   No Yes   Sig: Place 1 patch on the skin every third day for 10 days Max Daily Amount: 1 patch   furosemide (LASIX) 40 mg tablet   Yes Yes   Sig: Take 40 mg by mouth daily   gabapentin (NEURONTIN) 600 MG tablet   Yes Yes   Sig: Take 600 mg by mouth 3 (three) times a day  ibuprofen (MOTRIN) 400 mg tablet   No Yes   Sig: Take 1 tablet (400 mg total) by mouth every 6 (six) hours as needed for mild pain   latanoprost (XALATAN) 0 005 % ophthalmic solution   Yes Yes   Sig: Administer 1 drop to both eyes daily at bedtime   losartan (COZAAR) 50 mg tablet   Yes Yes   Sig: Take 50 mg by mouth daily  magnesium hydroxide (MILK OF MAGNESIA) 400 mg/5 mL oral suspension   Yes Yes   Sig: Take 30 mL by mouth daily as needed for constipation Indications: 400mg/5ml (30ml) by mouth as needed for constipation at bedtime when no bm in 3 days     menthol-methyl salicylate (BENGAY) 68-00 % cream   No Yes   Sig: Apply topically 4 (four) times a day as needed (back pain)   nitroglycerin (NITROSTAT) 0 4 mg SL tablet   Yes Yes   Sig: Place 0 4 mg under the tongue every 5 (five) minutes as needed for chest pain  nystatin (MYCOSTATIN) cream   Yes Yes   Sig: Apply 1 application topically 2 (two) times a day  omeprazole (PriLOSEC) 20 mg delayed release capsule   Yes Yes   Sig: Take 20 mg by mouth daily     oxyCODONE (ROXICODONE) 10 MG TABS   No Yes   Sig: Take 1 tablet (10 mg total) by mouth every 4 (four) hours as needed for severe pain for up to 10 days Max Daily Amount: 60 mg   oxybutynin (DITROPAN XL) 15 MG 24 hr tablet   No Yes   Sig: Take 1 tablet (15 mg total) by mouth daily   ranitidine (ZANTAC) 150 mg tablet   Yes Yes   Sig: Take 150 mg by mouth daily in the early morning   rivaroxaban (XARELTO) 20 mg tablet   Yes Yes   Sig: Take 20 mg by mouth daily with dinner   traMADol (ULTRAM) 50 mg tablet   No Yes   Sig: Take 1 tablet (50 mg total) by mouth every 6 (six) hours as needed for moderate pain for up to 10 days      Facility-Administered Medications: None       Past Medical History:   Diagnosis Date    [facility]     Chronic pain     Chronic respiratory failure with hypoxia (HCC)     COPD (chronic obstructive pulmonary disease) (HCC)     Dementia     Dorsalgia, unspecified     Edema     Encephalopathy     GERD (gastroesophageal reflux disease)     Hypertension     Morbid obesity (HCC)     Muscle weakness (generalized)     Opioid dependence (HCC)     Overactive bladder     Pneumonia     Psychiatric disorder     anxiety, bipolar    Radiculopathy of thoracolumbar region     Sciatica     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     Urinary incontinence     UTI (urinary tract infection)        Past Surgical History:   Procedure Laterality Date    APPENDECTOMY      BACK SURGERY      CHOLECYSTECTOMY      KNEE SURGERY         History reviewed  No pertinent family history  I have reviewed and agree with the history as documented  Social History   Substance Use Topics    Smoking status: Former Smoker     Types: Cigarettes    Smokeless tobacco: Never Used    Alcohol use No        Review of Systems   Constitutional: Negative for fatigue and fever  HENT: Negative for congestion and ear pain  Eyes: Negative for discharge and redness  Respiratory: Negative for apnea, cough, shortness of breath and wheezing  Cardiovascular: Negative for chest pain  Gastrointestinal: Negative for abdominal pain, diarrhea and vomiting  Endocrine: Negative for cold intolerance and polydipsia  Genitourinary: Negative for difficulty urinating and hematuria  Musculoskeletal: Negative for arthralgias and back pain  Skin: Negative for color change and rash  Allergic/Immunologic: Negative for environmental allergies and immunocompromised state  Neurological: Negative for numbness and headaches  Hematological: Negative for adenopathy  Does not bruise/bleed easily  Psychiatric/Behavioral: Positive for confusion  Negative for agitation and behavioral problems  Physical Exam  Physical Exam   Constitutional: She is oriented to person, place, and time  Vital signs are normal  She appears well-developed and well-nourished  Non-toxic appearance  She appears ill  She appears distressed  HENT:   Head: Normocephalic and atraumatic  Right Ear: Tympanic membrane and external ear normal    Left Ear: Tympanic membrane and external ear normal    Nose: Nose normal  No rhinorrhea, sinus tenderness or nasal deformity  Mouth/Throat: Uvula is midline and oropharynx is clear and moist  Normal dentition  Eyes: EOM and lids are normal  Pupils are equal, round, and reactive to light  Right eye exhibits no discharge  Left eye exhibits no discharge  Right conjunctiva is injected  Neck: Trachea normal and normal range of motion  Neck supple  No JVD present  Carotid bruit is not present  Cardiovascular: Normal rate, regular rhythm, intact distal pulses and normal pulses  No extrasystoles are present  PMI is not displaced  Pulmonary/Chest: Effort normal and breath sounds normal  No accessory muscle usage  No respiratory distress  She has no wheezes  She has no rhonchi  She has no rales  Abdominal: Soft  Normal appearance and bowel sounds are normal  She exhibits no mass  There is no tenderness  There is no rigidity, no rebound and no guarding  Musculoskeletal:        Right shoulder: She exhibits normal range of motion, no bony tenderness, no swelling and no deformity  Cervical back: Normal  She exhibits normal range of motion, no tenderness, no bony tenderness and no deformity  Lymphadenopathy:     She has no cervical adenopathy  She has no axillary adenopathy  Neurological: She is alert and oriented to person, place, and time  She has normal strength and normal reflexes  No cranial nerve deficit or sensory deficit  GCS eye subscore is 4  GCS verbal subscore is 5  GCS motor subscore is 6  Skin: Skin is warm and dry  No rash noted  Psychiatric: She has a normal mood and affect  Her speech is normal and behavior is normal    Nursing note and vitals reviewed        Vital Signs  ED Triage Vitals [07/29/18 2245]   Temperature Pulse Respirations Blood Pressure SpO2   100 3 °F (37 9 °C) 97 18 148/96 (!) 84 %      Temp Source Heart Rate Source Patient Position - Orthostatic VS BP Location FiO2 (%)   Oral Monitor Sitting Right arm --      Pain Score       5           Vitals:    07/30/18 0031 07/30/18 0100 07/30/18 0130 07/30/18 0208   BP: 157/65 149/73 150/64 158/71   Pulse: 89 90 94 95   Patient Position - Orthostatic VS: Lying Lying Lying Lying       Visual Acuity      ED Medications  Medications   sodium chloride (PF) 0 9 % injection 3 mL (not administered)   amLODIPine (NORVASC) tablet 10 mg (not administered) gabapentin (NEURONTIN) capsule 600 mg (not administered)   aluminum-magnesium hydroxide-simethicone (MYLANTA) 200-200-20 mg/5 mL oral suspension 15 mL (not administered)   losartan (COZAAR) tablet 50 mg (not administered)   nitroglycerin (NITROSTAT) SL tablet 0 4 mg (not administered)   pantoprazole (PROTONIX) EC tablet 40 mg (not administered)   nystatin (MYCOSTATIN) cream 1 application (not administered)   albuterol inhalation solution 2 5 mg (not administered)   bisacodyl (DULCOLAX) rectal suppository 10 mg (not administered)   magnesium hydroxide (MILK OF MAGNESIA) 400 mg/5 mL oral suspension 30 mL (not administered)   furosemide (LASIX) tablet 40 mg (not administered)   calcium carbonate (TUMS) chewable tablet 1,000 mg (not administered)   docusate sodium (COLACE) capsule 100 mg (not administered)   citalopram (CeleXA) tablet 10 mg (not administered)   latanoprost (XALATAN) 0 005 % ophthalmic solution 1 drop (not administered)   rivaroxaban (XARELTO) tablet 20 mg (not administered)   famotidine (PEPCID) tablet 20 mg (not administered)   carboxymethylcellulose 0 5 % ophthalmic solution 1 drop (not administered)   diazepam (VALIUM) tablet 5 mg (not administered)   fentaNYL (DURAGESIC) 50 mcg/hr TD 72 hr patch 1 patch (not administered)   menthol-methyl salicylate (BENGAY) 45-19 % cream (not administered)   oxybutynin (DITROPAN-XL) 24 hr tablet 15 mg (not administered)   oxyCODONE (ROXICODONE) immediate release tablet 10 mg (not administered)   QUEtiapine (SEROquel) tablet 25 mg (not administered)   traMADol (ULTRAM) tablet 50 mg (not administered)   cholecalciferol (VITAMIN D3) tablet 1,000 Units (not administered)   ondansetron (ZOFRAN) injection 4 mg (not administered)   acetaminophen (TYLENOL) tablet 650 mg (not administered)   cefTRIAXone (ROCEPHIN) 1,000 mg in dextrose 5 % 50 mL IVPB (not administered)   cefTRIAXone (ROCEPHIN) 2,000 mg in dextrose 5 % 50 mL IVPB (0 mg Intravenous Stopped 7/30/18 0014) LORazepam (ATIVAN) 2 mg/mL injection 1 mg (1 mg Intravenous Given 7/29/18 2353)   HYDROmorphone (DILAUDID) injection 1 mg (1 mg Intravenous Given 7/30/18 0026)       Diagnostic Studies  Results Reviewed     Procedure Component Value Units Date/Time    APTT [[id number]]  (Abnormal) Collected:  07/29/18 2334    Lab Status:  Final result Specimen:  Blood from Arm, Left Updated:  07/30/18 0044     PTT 37 (H) seconds     Protime-INR [[id number]]  (Abnormal) Collected:  07/29/18 2334    Lab Status:  Final result Specimen:  Blood from Arm, Left Updated:  07/30/18 0044     Protime 17 6 (H) seconds      INR 1 49 (H)    Blood culture #2 [[id number]] Collected:  07/29/18 2305    Lab Status: In process Specimen:  Blood from Arm, Right Updated:  07/30/18 0017    Lactic Acid x2 [[id number]]  (Normal) Collected:  07/29/18 2334    Lab Status:  Final result Specimen:  Blood from Arm, Left Updated:  07/30/18 0000     LACTIC ACID 0 7 mmol/L     Narrative:         Result may be elevated if tourniquet was used during collection  Comprehensive metabolic panel [[id number]]  (Abnormal) Collected:  07/29/18 2334    Lab Status:  Final result Specimen:  Blood from Arm, Left Updated:  07/29/18 2358     Sodium 138 mmol/L      Potassium 4 1 mmol/L      Chloride 101 mmol/L      CO2 33 (H) mmol/L      Anion Gap 4 mmol/L      BUN 15 mg/dL      Creatinine 0 96 mg/dL      Glucose 126 mg/dL      Calcium 9 1 mg/dL      AST 17 U/L      ALT 27 U/L      Alkaline Phosphatase 78 U/L      Total Protein 8 4 (H) g/dL      Albumin 2 7 (L) g/dL      Total Bilirubin 0 70 mg/dL      eGFR 58 ml/min/1 73sq m     Narrative:         National Kidney Disease Education Program recommendations are as follows:  GFR calculation is accurate only with a steady state creatinine  Chronic Kidney disease less than 60 ml/min/1 73 sq  meters  Kidney failure less than 15 ml/min/1 73 sq  meters      Urine Microscopic [[id number]]  (Abnormal) Collected:  07/29/18 2333    Lab Status:  Final result Specimen:  Urine from Urine, Indwelling Talbot Catheter Updated:  07/29/18 2355     RBC, UA 1-2 (A) /hpf      WBC, UA 2-4 (A) /hpf      Epithelial Cells Occasional /hpf      Bacteria, UA Occasional /hpf      MUCOUS THREADS Occasional (A)    UA w Reflex to Microscopic w Reflex to Culture [[id number]]  (Abnormal) Collected:  07/29/18 2333    Lab Status:  Final result Specimen:  Urine from Urine, Indwelling Talbot Catheter Updated:  07/29/18 2342     Color, UA Yellow     Clarity, UA Clear     Specific Gravity, UA 1 025     pH, UA 6 0     Leukocytes, UA Negative     Nitrite, UA Negative     Protein, UA Trace (A) mg/dl      Glucose, UA Negative mg/dl      Ketones, UA Negative mg/dl      Urobilinogen, UA 1 0 E U /dl      Bilirubin, UA Small (A)     Blood, UA Moderate (A)    CBC and differential [[id number]]  (Abnormal) Collected:  07/29/18 2334    Lab Status:  Final result Specimen:  Blood from Arm, Left Updated:  07/29/18 2341     WBC 11 04 (H) Thousand/uL      RBC 4 34 Million/uL      Hemoglobin 11 0 (L) g/dL      Hematocrit 36 6 %      MCV 84 fL      MCH 25 3 (L) pg      MCHC 30 1 (L) g/dL      RDW 15 4 (H) %      MPV 9 5 fL      Platelets 176 Thousands/uL      Neutrophils Relative 78 (H) %      Lymphocytes Relative 12 (L) %      Monocytes Relative 10 %      Eosinophils Relative 1 %      Basophils Relative 0 %      Neutrophils Absolute 8 61 (H) Thousands/µL      Lymphocytes Absolute 1 28 Thousands/µL      Monocytes Absolute 1 06 Thousand/µL      Eosinophils Absolute 0 06 Thousand/µL      Basophils Absolute 0 03 Thousands/µL     Blood culture #1 [[id number]] Collected:  07/29/18 2334    Lab Status: In process Specimen:  Blood from Arm, Left Updated:  07/29/18 2338                 CT head without contrast   Final Result by Joel Germain MD (07/30 0251)      No acute intracranial abnormality  Moderate scattered sinus mucosal thickening is noted          Findings are consistent with the preliminary report from [facility] which was provided shortly after completion of the exam                Workstation performed: [id number]         XR chest 1 view portable    (Results Pending)              Procedures  ECG 12 Lead Documentation  Date/Time: 7/30/2018 1:27 AM  Performed by: Abraham Brewster  Authorized by: Santi WHITE     Patient location:  ED  Rate:     ECG rate:  92  Rhythm:     Rhythm: atrial fibrillation             Phone Contacts  ED Phone Contact    ED Course                         Initial Sepsis Screening     [address] Name 07/30/18 0113                Is the patient's history suggestive of a new or worsening infection? (!)  Yes (Proceed)  -MT        Suspected source of infection suspect infection, source unknown  -MT        Are two or more of the following signs & symptoms of infection both present and new to the patient?         Indicate SIRS criteria Altered mental status; Hyperthemia > 38 3C (100 9F)  -MT        If the answer is yes to both questions, suspicion of sepsis is present          If severe sepsis is present AND tissue hypoperfusion perists in the hour after fluid resuscitation or lactate > 4, the patient meets criteria for SEPTIC SHOCK          Are any of the following organ dysfunction criteria present within 6 hours of suspected infection and SIRS criteria that are NOT considered to be chronic conditions? No  -MT        Organ dysfunction          Date of presentation of severe sepsis          Time of presentation of severe sepsis          Tissue hypoperfusion persists in the hour after crystalloid fluid administration, evidenced, by either:          Was hypotension present within one hour of the conclusion of crystalloid fluid administration?           Date of presentation of septic shock          Time of presentation of septic shock            User Key  (r) = Recorded By, (t) = Taken By, (c) = Cosigned By    Initials Name Provider Type    [address] [place] [address] Physician MDM  Number of Diagnoses or Management Options  Altered mental status: new and requires workup  Fever: new and requires workup  UTI (urinary tract infection): new and requires workup     Amount and/or Complexity of Data Reviewed  Clinical lab tests: ordered and reviewed  Tests in the radiology section of CPT®: ordered and reviewed  Tests in the medicine section of CPT®: ordered and reviewed  Review and summarize past medical records: yes  Discuss the patient with other providers: yes  Independent visualization of images, tracings, or specimens: yes    Patient Progress  Patient progress: stable    CritCare Time    Disposition  Final diagnoses:   UTI (urinary tract infection)   Fever   Altered mental status     Time reflects when diagnosis was documented in both MDM as applicable and the Disposition within this note     Time User Action Codes Description Comment    7/30/2018  1:16 AM Stephanie Madsen Add [N39 0] UTI (urinary tract infection)     7/30/2018  1:16 AM Kita Madsen Add [R50 9] Fever     7/30/2018  1:16 AM Kita Madsen Add [R41 82] Altered mental status       ED Disposition     ED Disposition Condition Comment    Admit  Case was discussed with Rosemarie Singh and the patient's admission status was agreed to be Admission Status: inpatient status to the service of Dr Leslie New   Follow-up Information    None         Current Discharge Medication List      CONTINUE these medications which have NOT CHANGED    Details   albuterol (2 5 mg/3 mL) 0 083 % nebulizer solution Take 2 5 mg by nebulization every 4 (four) hours as needed for wheezing  alendronate (FOSAMAX) 70 mg tablet Take 70 mg by mouth every 7 days  aluminum-magnesium hydroxide-simethicone (MYLANTA) 200-200-20 mg/5 mL suspension Take 15 mL by mouth 2 (two) times a day as needed for indigestion or heartburn  amLODIPine (NORVASC) 10 mg tablet Take 10 mg by mouth daily        BISACODYL RE Insert 10 mg into the rectum as needed Indications: hs as needed at bedtime when the patient has not had bm in 4 days  calcium carbonate (TUMS) 500 mg chewable tablet Chew 2 tablets daily as needed for indigestion or heartburn  Qty: 30 tablet, Refills: 0      carboxymethylcellulose 0 5 % SOLN Administer 1 drop to both eyes 2 (two) times a day      Cholecalciferol 1000 UNIT/10ML LIQD Take 1 tablet by mouth daily  citalopram (CeleXA) 10 mg tablet Take 10 mg by mouth daily      Cranberry 425 MG CAPS Take 1 tablet by mouth daily Indications: once daily for chronic UTI         diazepam (VALIUM) 5 mg tablet Take 1 tablet (5 mg total) by mouth 4 (four) times a day for 10 days  Qty: 30 tablet, Refills: 0    Comments: Hold for sedation  Associated Diagnoses: Acute exacerbation of chronic low back pain      docusate sodium (COLACE) 100 mg capsule Take 1 capsule by mouth 2 (two) times a day as needed for constipation  Qty: 10 capsule, Refills: 0      fentaNYL (DURAGESIC) 50 mcg/hr Place 1 patch on the skin every third day for 10 days Max Daily Amount: 1 patch  Qty: 3 patch, Refills: 0    Associated Diagnoses: Acute exacerbation of chronic low back pain      furosemide (LASIX) 40 mg tablet Take 40 mg by mouth daily      gabapentin (NEURONTIN) 600 MG tablet Take 600 mg by mouth 3 (three) times a day  ibuprofen (MOTRIN) 400 mg tablet Take 1 tablet (400 mg total) by mouth every 6 (six) hours as needed for mild pain    Associated Diagnoses: Acute exacerbation of chronic low back pain      latanoprost (XALATAN) 0 005 % ophthalmic solution Administer 1 drop to both eyes daily at bedtime      losartan (COZAAR) 50 mg tablet Take 50 mg by mouth daily  magnesium hydroxide (MILK OF MAGNESIA) 400 mg/5 mL oral suspension Take 30 mL by mouth daily as needed for constipation Indications: 400mg/5ml (30ml) by mouth as needed for constipation at bedtime when no bm in 3 days        menthol-methyl salicylate (BENGAY) 98-61 % cream Apply topically 4 (four) times a day as needed (back pain)  Refills: 0    Associated Diagnoses: Acute exacerbation of chronic low back pain      nitroglycerin (NITROSTAT) 0 4 mg SL tablet Place 0 4 mg under the tongue every 5 (five) minutes as needed for chest pain  nystatin (MYCOSTATIN) cream Apply 1 application topically 2 (two) times a day  omeprazole (PriLOSEC) 20 mg delayed release capsule Take 20 mg by mouth daily  oxybutynin (DITROPAN XL) 15 MG 24 hr tablet Take 1 tablet (15 mg total) by mouth daily  Refills: 0    Associated Diagnoses: Acute exacerbation of chronic low back pain      oxyCODONE (ROXICODONE) 10 MG TABS Take 1 tablet (10 mg total) by mouth every 4 (four) hours as needed for severe pain for up to 10 days Max Daily Amount: 60 mg  Qty: 30 tablet, Refills: 0    Associated Diagnoses: Acute exacerbation of chronic low back pain      OXYGEN-HELIUM IN Inhale 3 L as needed 3 L NC O2 as needed for SOB for SpO2 less than 90      QUEtiapine (SEROquel) 25 mg tablet Take 0 5 tablets (12 5 mg total) by mouth daily at bedtime  Refills: 0    Associated Diagnoses: Acute exacerbation of chronic low back pain      ranitidine (ZANTAC) 150 mg tablet Take 150 mg by mouth daily in the early morning      rivaroxaban (XARELTO) 20 mg tablet Take 20 mg by mouth daily with dinner      traMADol (ULTRAM) 50 mg tablet Take 1 tablet (50 mg total) by mouth every 6 (six) hours as needed for moderate pain for up to 10 days  Qty: 30 tablet, Refills: 0    Associated Diagnoses: Acute exacerbation of chronic low back pain           No discharge procedures on file      ED Provider  Electronically Signed by           Roxanne Roy,   07/30/18 9870

## 2018-07-30 NOTE — PLAN OF CARE
Problem: Potential for Falls  Goal: Patient will remain free of falls  INTERVENTIONS:  - Assess patient frequently for physical needs  -  Identify cognitive and physical deficits and behaviors that affect risk of falls    -  [place] fall precautions as indicated by assessment   - Educate patient/family on patient safety including physical limitations  - Instruct patient to call for assistance with activity based on assessment  - Modify environment to reduce risk of injury  - Consider OT/PT consult to assist with strengthening/mobility   Outcome: Progressing  Alarm on    Problem: Prexisting or High Potential for Compromised Skin Integrity  Goal: Skin integrity is maintained or improved  INTERVENTIONS:  - Identify patients at risk for skin breakdown  - Assess and monitor skin integrity  - Assess and monitor nutrition and hydration status  - Monitor labs (i e  albumin)  - Assess for incontinence   - Turn and reposition patient  - Assist with mobility/ambulation  - Relieve pressure over bony prominences  - Avoid friction and shearing  - Provide appropriate hygiene as needed including keeping skin clean and dry  - Evaluate need for skin moisturizer/barrier cream  - Collaborate with interdisciplinary team (i e  Nutrition, Rehabilitation, etc )   - Patient/family teaching   Outcome: Progressing

## 2018-07-30 NOTE — ED NOTES
Pt's vital signs stable and hemodynamically stable  Pt (-) new arrhythmias present and (-) chest pain  Therefore, pt transported to assigned room by OFELIA Mejia RN  07/30/18 4746

## 2018-07-30 NOTE — RESPIRATORY THERAPY NOTE
RT Protocol Note  Deshawn Yanes 70 y o  female MRN: [id number]  Unit/Bed#: MS 12-5 Encounter: [id number]    Assessment    Principal Problem:    UTI (urinary tract infection)  Active Problems:    Atrial fibrillation ([place] [address] )    Hypertension    Dementia with behavioral disturbance    Morbid obesity due to excess calories (HCC)    Acute encephalopathy    Continuous opioid dependence (HCC)    COPD (chronic obstructive pulmonary disease) ([facility])    Hypoxia      Home Pulmonary Medications:  Albuterol PRN       Past Medical History:   Diagnosis Date    A-fib ([place] [address] )     Chronic pain     Chronic respiratory failure with hypoxia ([facility])     COPD (chronic obstructive pulmonary disease) ([facility])     Dementia     Dorsalgia, unspecified     Edema     Encephalopathy     GERD (gastroesophageal reflux disease)     Hypertension     Morbid obesity ([place] [address] )     Muscle weakness (generalized)     Opioid dependence ([facility])     Overactive bladder     Pneumonia     Psychiatric disorder     anxiety, bipolar    Radiculopathy of thoracolumbar region     Sciatica     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     Urinary incontinence     UTI (urinary tract infection)      Social History     Social History    Marital status:      Spouse name: N/A    Number of children: N/A    Years of education: N/A     Social History Main Topics    Smoking status: Former Smoker     Types: Cigarettes    Smokeless tobacco: Never Used    Alcohol use No    Drug use: No    Sexual activity: Not Asked     Other Topics Concern    None     Social History Narrative    None       Subjective  Patient unable to answer any questions appropriately  Objective  Patient awake but extremely inappropriate  Shouting and very combative  No respiratory distress observed  Breath sounds clear bilaterally to auscultation  No wheezing present     Physical Exam:   Assessment Type: Assess only  General Appearance: Awake  Respiratory Pattern: Normal  Chest Assessment: Chest expansion symmetrical  Bilateral Breath Sounds: Clear  R Breath Sounds: Clear  L Breath Sounds: Clear    Vitals:  Blood pressure 158/71, pulse 95, temperature 98 7 °F (37 1 °C), temperature source Oral, resp  rate 18, height 5' 6" (1 676 m), weight 128 kg (282 lb 3 oz), SpO2 94 %  Imaging and other studies: I have personally reviewed pertinent reports  Plan    Respiratory Plan: Home Bronchodilator Patient pathway        Resp Comments: Patient examined per RT protocol  SPO2 94% on 4L NC  Breath sounds clear bilaterally  No respiratory distress observed  Will order Q6PRN ALB per home regiment

## 2018-07-30 NOTE — ASSESSMENT & PLAN NOTE
· Presented with reported fever of 101 at facility as well as altered mental status/ increased confusion  · Afebrile thus far  WBC 11 04 on presentation  Lactic acid level WNL  · Obtain repeat CBC in AM    · Urine: moderate blood, 2-4 WBC  · Follow-up on urine culture  · Received 1 dose of IV ceftriaxone in the ED for suspected UTI; continue IV ceftriaxone and follow-up on results of urine culture  · Of note, nelson catheter was placed in the ED on presentation  Discontinue nelson catheter and initiate voiding trial    · Follow-up on blood cultures

## 2018-07-30 NOTE — PLAN OF CARE
Problem: Potential for Falls  Goal: Patient will remain free of falls  INTERVENTIONS:  - Assess patient frequently for physical needs  -  Identify cognitive and physical deficits and behaviors that affect risk of falls  -  [place] fall precautions as indicated by assessment   - Educate patient/family on patient safety including physical limitations  - Instruct patient to call for assistance with activity based on assessment  - Modify environment to reduce risk of injury  - Consider OT/PT consult to assist with strengthening/mobility   Outcome: Progressing      Problem: Prexisting or High Potential for Compromised Skin Integrity  Goal: Skin integrity is maintained or improved  INTERVENTIONS:  - Identify patients at risk for skin breakdown  - Assess and monitor skin integrity  - Assess and monitor nutrition and hydration status  - Monitor labs (i e  albumin)  - Assess for incontinence   - Turn and reposition patient  - Assist with mobility/ambulation  - Relieve pressure over bony prominences  - Avoid friction and shearing  - Provide appropriate hygiene as needed including keeping skin clean and dry  - Evaluate need for skin moisturizer/barrier cream  - Collaborate with interdisciplinary team (i e  Nutrition, Rehabilitation, etc )   - Patient/family teaching   Outcome: Progressing      Problem: NEUROSENSORY - ADULT  Goal: Achieves stable or improved neurological status  INTERVENTIONS  - Monitor and report changes in neurological status  - Initiate measures to prevent increased intracranial pressure  - Maintain blood pressure and fluid volume within ordered parameters to optimize cerebral perfusion  - Monitor temperature, glucose, and sodium or any other associated labs   Initiate appropriate interventions as ordered  - Monitor for seizure activity   - Administer anti-seizure medications as ordered  Outcome: Progressing    Goal: Achieves maximal functionality and self care  INTERVENTIONS  - Monitor swallowing and airway patency with patient fatigue and changes in neurological status  - Encourage and assist patient to increase activity and self care with guidance from rehab services  - Encourage visually impaired, hearing impaired and aphasic patients to use assistive/communication devices  Outcome: Progressing

## 2018-07-30 NOTE — ASSESSMENT & PLAN NOTE
· Suspect due to infectious process/ UTI in the setting of dementia  · CT head: No acute intracranial abnormality  Moderate scattered sinus mucosal thickening is noted  · Continue IV antibiotics as per above  · Monitor neuro checks    · Obtain TSH level, vitamin W43 and folic acid level in AM

## 2018-07-30 NOTE — PLAN OF CARE
Problem: PHYSICAL THERAPY ADULT  Goal: Performs mobility at highest level of function for planned discharge setting  See evaluation for individualized goals  Treatment/Interventions: Functional transfer training, LE strengthening/ROM, Elevations, Therapeutic exercise, Endurance training, Equipment eval/education, Bed mobility, Gait training, Patient/family training, Cognitive reorientation, Spoke to nursing  Equipment Recommended: Hurtis Shannon, Wheelchair       See flowsheet documentation for full assessment, interventions and recommendations  Prognosis: Guarded  Problem List: Decreased strength, Decreased range of motion, Decreased endurance, Decreased mobility, Impaired balance, Decreased coordination, Decreased cognition, Decreased safety awareness, Impaired judgement, Obesity, Pain  Assessment: Assessment: Pt is a 70 y o  female seen for PT evaluation s/p admit to [facility] on 7/29/2018 w/ UTI (urinary tract infection)  Order placed for PT  Prior to admission, pt required A for mobility and lived at the [address] [place] [address] Drive where she had been waling a few weeks ago with restorative therapy, but not recently due to back pain per pt  Zulema Perdomo Upon evaluation: Pt requires 2 person assistance for bed mobilty and not appropriate for WB transfers nor ambulation due to pain  Pt's clinical presentation is currently unstable/unpredictable given the functional mobility deficits above, especially weakness, decreased ROM, decreased endurance, pain, decreased activity tolerance and decreased functional mobility tolerance, coupled with fall risks including hx of falls, impaired balance, impaired coordination and decreased cognition, and combined with medical complications of multiple readmissions and agitation  Pt to benefit from continued skilled PT tx while in hospital and upon DC to address deficits as defined above and maximize level of functional mobility   From PT/mobility standpoint, recommendation at time of d/c would be inpatient rehab pending progress in order to maximize pt's functional independence and consistency w/ mobility in order to facilitate return to PLOF  Recommend trial with walker next 1-2 sessions, trial with quick move next 1-2 sessions, ther ex next 1-2 sessions and mechanical conveyance from nursing standpoint for OOB mobility  Pt may benefit from further intervention from [facility] seating once further assessed as she does display a gluteal shelf body shape, which can be aided by specific back supports in [facility]  Barriers to Discharge: Decreased caregiver support     Recommendation: Post acute IP rehab          See flowsheet documentation for full assessment

## 2018-07-30 NOTE — CASE MANAGEMENT
Initial Clinical Review    Admission: Date/Time/Statement: 7/30/18 @ 0117 Inpatient Written     Orders Placed This Encounter   Procedures    Inpatient Admission (expected length of stay for this patient is greater than two midnights)     Standing Status:   Standing     Number of Occurrences:   1     Order Specific Question:   Admitting Physician     Answer:   Bibi Lawrence [[id number]]     Order Specific Question:   Level of Care     Answer:   Med Surg [[id number]]     Order Specific Question:   Estimated length of stay     Answer:   More than 2 Midnights     Order Specific Question:   Certification     Answer:   I certify that inpatient services are medically necessary for this patient for a duration of greater than two midnights  See H&P and MD Progress Notes for additional information about the patient's course of treatment  ED: Date/Time/Mode of Arrival:   ED Arrival Information     Expected Arrival Acuity Means of Arrival Escorted By Service Admission Type    - 7/29/2018 22:40 Emergent Ambulance [facility] Emergency    Arrival Complaint    ALTERED MENTAL STATUS          Chief Complaint:   Chief Complaint   Patient presents with    Fever - 76 years or older     pt presents via EMS from [facility] with fever of 101 per EMS starting today, also reports pt although normally confused, is more altered than normal  also report "goopiness" of the right eye       History of Illness: 17-year-old female sent in from the nursing home for fever and altered mental status  Nursing home staff reports that she seemed more confused than her baseline today took her fever and reported to be 101  Also noted some redness and draining of her right eye  The patient unable to to give any significant history       ED Vital Signs:   ED Triage Vitals [07/29/18 2245]   Temperature Pulse Respirations Blood Pressure SpO2   100 3 °F (37 9 °C) 97 18 148/96 (!) 84 %      Temp Source Heart Rate Source Patient Position - Orthostatic VS BP Location FiO2 (%)   Oral Monitor Sitting Right arm --      Pain Score       5        Wt Readings from Last 1 Encounters:   07/30/18 128 kg (282 lb 3 oz)       Vital Signs (abnormal): temp 100 3, O2 sat 84% on RA    Abnormal Labs/Diagnostic Test Results:   WBC 10 18*   HEMOGLOBIN 10 5*   NEUTROS PCT 76*   LYMPHS PCT 13*     CO2 33*   TOTAL PROTEIN 8 4*     Protime 17 6     INR 1 49         Protein, UA Trace     Bilirubin, UA Small     Blood, UA Moderate             RBC, UA 1-2     WBC, UA 2-4     MUCOUS THREADS Occasional             CT head without contrast   No acute intracranial abnormality  Moderate scattered sinus mucosal thickening is noted  EKG:  AFib  CXR:  NAD    ED Treatment:   Medication Administration from 07/29/2018 2240 to 07/30/2018 0201       Date/Time Order Dose Route Action     07/29/2018 2338 cefTRIAXone (ROCEPHIN) 2,000 mg in dextrose 5 % 50 mL IVPB 2,000 mg Intravenous New Bag     07/29/2018 2353 LORazepam (ATIVAN) 2 mg/mL injection 1 mg 1 mg Intravenous Given     07/30/2018 0026 HYDROmorphone (DILAUDID) injection 1 mg 1 mg Intravenous Given          Past Medical/Surgical History:    Active Ambulatory Problems     Diagnosis Date Noted    Atrial fibrillation ([facility] ) 05/11/2016    Hypertension 05/11/2016    Dementia with behavioral disturbance 05/11/2016    PAD (peripheral artery disease) ([facility] ) 05/11/2016    Warfarin-induced coagulopathy ([facility] ) 05/11/2016    Morbid obesity due to excess calories ([facility] ) 05/12/2016    Acute cystitis without hematuria 07/17/2016    Acute encephalopathy 07/19/2016    Bronchitis 07/21/2016    [facility] 07/21/2016    Chronic anemia 08/06/2016    COPD (chronic obstructive pulmonary disease) ([facility] ) 08/06/2016    UTI (urinary tract infection) 08/07/2016    Rectal bleeding 04/20/2017    Overactive bladder 06/10/2017    Hypokalemia 06/14/2017    HCAP (healthcare-associated pneumonia) 06/14/2017    Pneumonia due to gram-negative bacteria ([facility] ) 06/14/2017    Acute metabolic encephalopathy 48/86/2358    Acute respiratory failure with hypoxia ([facility] ) 06/14/2017    Back pain 07/23/2018    Psychiatric disorder 07/23/2018     Resolved Ambulatory Problems     Diagnosis Date Noted    Dehydration 08/06/2016     Past Medical History:   Diagnosis Date    A-fib [facility])     Chronic pain     [facility]     COPD (chronic obstructive pulmonary disease) (HCC)     Dementia     Dorsalgia, unspecified     Edema     Encephalopathy     GERD (gastroesophageal reflux disease)     Hypertension     Morbid obesity (HCC)     Muscle weakness (generalized)     Opioid dependence (HCC)     Overactive bladder     Pneumonia     Psychiatric disorder     Radiculopathy of thoracolumbar region     Sciatica     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     Urinary incontinence     UTI (urinary tract infection)        Admitting Diagnosis: UTI (urinary tract infection) [N39 0]  Altered mental status [R41 82]  Fever [R50 9]    Age/Sex: 70 y o  female    Assessment/Plan:   UTI (urinary tract infection)   Assessment & Plan     · Presented with reported fever of 101 at facility as well as altered mental status/ increased confusion  · Afebrile thus far  WBC 11 04 on presentation  Lactic acid level WNL  ? Obtain repeat CBC in AM    · Urine: moderate blood, 2-4 WBC  · Follow-up on urine culture  · Received 1 dose of IV ceftriaxone in the ED for suspected UTI; continue IV ceftriaxone and follow-up on results of urine culture  ? Of note, nelson catheter was placed in the ED on presentation  Discontinue nelson catheter and initiate voiding trial    · Follow-up on blood cultures               Hypoxia   Assessment & Plan     · Pulse ox recorded at 84% on RA on presentation  · Pulse ox currently stable in the mid 90s on 6L nasal cannula oxygen     · Does not use oxygen at baseline  · Follow-up on official chest x-ray results in AM   · Respiratory protocol  · Obtain ABG given AMS/ confusion  · Wean O2 as tolerated  Obtain ambulatory pulse ox prior to d/c           Acute encephalopathy   Assessment & Plan     · Suspect due to infectious process/ UTI in the setting of dementia  · CT head: No acute intracranial abnormality  Moderate scattered sinus mucosal thickening is noted  · Continue IV antibiotics as per above  · Monitor neuro checks  · Obtain TSH level, vitamin W98 and folic acid level in AM            COPD (chronic obstructive pulmonary disease) ([facility])   Assessment & Plan     · Respiratory protocol  · Continue prn albuterol           Continuous opioid dependence ([facility])   Assessment & Plan     · Chronic pain and continuous opioid dependence  · Continue outpatient pain medication regimen with prn oxycodone, prn Tramadol, Valium and Fentanyl            Morbid obesity due to excess calories ([facility])   Assessment & Plan     · BMI 45 69   · Recommend diet modifications           Dementia with behavioral disturbance   Assessment & Plan     · Patient reportedly became agitated while in the ED and wrist restraints were placed  · Supportive care  · Continue Seroquel            Hypertension   Assessment & Plan     · -150  · Continue amlodipine and losartan            Atrial fibrillation ([facility])   Assessment & Plan     · Rate controlled  · AC with Xarelto               VTE Prophylaxis: Rivaroxaban (Xarelto)  / sequential compression device     Anticipated Length of Stay:  Patient will be admitted on an Inpatient basis with an anticipated length of stay of  > 2 midnights  Justification for Hospital Stay: suspected UTI, need for IV antibiotics pending cultures       Admission Orders:  Telemetry, Ambulating pulse ox  OOB with assist  Daily weights, I/O  Neuro checks q4h  Respiratory protocol  CT chest  O2 NC 2L  Sequential compression device  Blood cxs pending  ABG    Scheduled Meds:   Current Facility-Administered Medications:  acetaminophen 650 mg Oral Q6H PRN   albuterol 2 5 mg Nebulization Q6H PRN   aluminum-magnesium hydroxide-simethicone 15 mL Oral BID PRN   amLODIPine 10 mg Oral Daily   azithromycin 500 mg Intravenous Q24H   bisacodyl 10 mg Rectal Daily PRN   calcium carbonate 1,000 mg Oral Daily PRN   carboxymethylcellulose 1 drop Both Eyes BID   [START ON 7/31/2018] cefTRIAXone 1,000 mg Intravenous Q24H   cholecalciferol 1,000 Units Oral Daily   citalopram 10 mg Oral Daily   diazepam 5 mg Oral 4x Daily   docusate sodium 100 mg Oral BID PRN   famotidine 20 mg Oral Daily   [START ON 7/31/2018] fentaNYL 1 patch Transdermal Q72H   furosemide 40 mg Oral Daily   gabapentin 600 mg Oral TID   latanoprost 1 drop Both Eyes HS   losartan 50 mg Oral Daily   magnesium hydroxide 30 mL Oral Daily PRN   menthol-methyl salicylate  Apply externally 4x Daily PRN   nitroglycerin 0 4 mg Sublingual Q5 Min PRN   nystatin 1 application Topical BID   ondansetron 4 mg Intravenous Q6H PRN   oxybutynin 15 mg Oral Daily   oxyCODONE 10 mg Oral Q4H PRN   pantoprazole 40 mg Oral Early Morning   QUEtiapine 25 mg Oral HS   rivaroxaban 20 mg Oral Daily With Dinner   sodium chloride (PF) 3 mL Intravenous PRN   traMADol 50 mg Oral Q6H PRN     Continuous Infusions:    PRN Meds:   acetaminophen    albuterol    aluminum-magnesium hydroxide-simethicone    bisacodyl    calcium carbonate    docusate sodium    magnesium hydroxide    menthol-methyl salicylate    nitroglycerin    ondansetron    oxyCODONE    sodium chloride (PF)    traMADol    Thank you,  Rasheeda Aqq  [facility] Utilization Review Department  Phone: [phone]; Fax [phone]  ATTENTION: The Network Utilization Review Department is now centralized for our 9 Facilities  Make a note that we have a new phone and fax numbers for our Department   Please call with any questions or concerns to [phone] and carefully follow the prompts so that you are directed to the right person  All voicemails are confidential  Fax any determinations, approvals, denials, and requests for initial or continue stay review clinical to [phone]  Due to HIGH CALL volume, it would be easier if you could please send faxed requests to expedite your requests and in part, help us provide discharge notifications faster

## 2018-07-30 NOTE — ASSESSMENT & PLAN NOTE
· Patient reportedly became agitated while in the ED and wrist restraints were placed  · Supportive care  · Continue Seroquel

## 2018-07-30 NOTE — H&P
H&P- Milton Lentz 1940, 70 y o  female MRN: [id number]    Unit/Bed#: -01 Encounter: [id number]    Primary Care Provider: Criselda Marinelli MD   Date and time admitted to hospital: 7/29/2018 10:40 PM        * UTI (urinary tract infection)   Assessment & Plan    · Presented with reported fever of 101 at facility as well as altered mental status/ increased confusion  · Afebrile thus far  WBC 11 04 on presentation  Lactic acid level WNL  · Obtain repeat CBC in AM    · Urine: moderate blood, 2-4 WBC  · Follow-up on urine culture  · Received 1 dose of IV ceftriaxone in the ED for suspected UTI; continue IV ceftriaxone and follow-up on results of urine culture  · Of note, nelson catheter was placed in the ED on presentation  Discontinue nelson catheter and initiate voiding trial    · Follow-up on blood cultures  Hypoxia   Assessment & Plan    · Pulse ox recorded at 84% on RA on presentation  · Pulse ox currently stable in the mid 90s on 6L nasal cannula oxygen  · Does not use oxygen at baseline  · Follow-up on official chest x-ray results in AM   · Respiratory protocol  · Obtain ABG given AMS/ confusion  · Wean O2 as tolerated  Obtain ambulatory pulse ox prior to d/c  Acute encephalopathy   Assessment & Plan    · Suspect due to infectious process/ UTI in the setting of dementia  · CT head: No acute intracranial abnormality  Moderate scattered sinus mucosal thickening is noted  · Continue IV antibiotics as per above  · Monitor neuro checks  · Obtain TSH level, vitamin N05 and folic acid level in AM          COPD (chronic obstructive pulmonary disease) (HCC)   Assessment & Plan    · Respiratory protocol  · Continue prn albuterol  Continuous opioid dependence ([facility])   Assessment & Plan    · Chronic pain and continuous opioid dependence  · Continue outpatient pain medication regimen with prn oxycodone, prn Tramadol, Valium and Fentanyl           Morbid obesity due to excess calories (HCC)   Assessment & Plan    · BMI 45 69   · Recommend diet modifications  Dementia with behavioral disturbance   Assessment & Plan    · Patient reportedly became agitated while in the ED and wrist restraints were placed  · Supportive care  · Continue Seroquel  Hypertension   Assessment & Plan    · -150  · Continue amlodipine and losartan  Atrial fibrillation ([address] )   Assessment & Plan    · Rate controlled  · AC with Xarelto  VTE Prophylaxis: Rivaroxaban (Xarelto)  / sequential compression device   Code Status: Level 1- Full Code per SNF paperwork  POLST: POLST form is on file already (pre-hospital)    Anticipated Length of Stay:  Patient will be admitted on an Inpatient basis with an anticipated length of stay of  > 2 midnights  Justification for Hospital Stay: suspected UTI, need for IV antibiotics pending cultures  Total Time for Visit, including Counseling / Coordination of Care: 45 minutes  Greater than 50% of this total time spent on direct patient counseling and coordination of care  Chief Complaint:   Altered mental status, fever    History of Present Illness:    Jesusita Mercado is a 70 y o  female with a history of dementia, chronic pain with opioid dependence, atrial fibrillation, HTN and psychiatric disorder who presents with altered mental status  History was obtained from the patient's medical records given her altered mental status/ dementia  Patient resides at [facility] and was reportedly noted to have a fever with temp of 101 last evening  Patient was also reportedly more confused than at baseline  Staff at the facility also reported some erythema and drainage from her right eye  On arrival to the hospital, patient was afebrile but was noted to have a pulse ox of 84% on RA  CT head was obtained and showed no acute findings  Urine sample was obtained revealing 2-4 WBC and moderate blood   Patient was started on IV ceftriaxone for suspected UTI  Of note, nelson catheter was placed in the ED on presentation  Patient reportedly became combative/ agitated in the ED and wrist restraints were placed  Patient currently reports pain "all over " She is disoriented  Patient was recently admitted from 7/23-7/24/18 for acute on chronic back pain during which time her pain medication regimen was adjusted and she was evaluated by PT  She was discharged back to [facility] and was instructed to follow-up with her pain management specialist as an outpatient  Review of Systems:    Review of Systems   Unable to perform ROS: Mental status change       Past Medical and Surgical History:     Past Medical History:   Diagnosis Date    [facility]     Chronic pain     [facility] with [facility] ([facility])     COPD (chronic obstructive pulmonary disease) ([facility])     Dementia     Dorsalgia, unspecified     Edema     Encephalopathy     GERD (gastroesophageal reflux disease)     Hypertension     Morbid obesity ([facility] )     Muscle weakness (generalized)     Opioid dependence ([facility])     Overactive bladder     Pneumonia     Psychiatric disorder     anxiety, bipolar    Radiculopathy of thoracolumbar region     Sciatica     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 Authorizing Provider   albuterol (2 5 mg/3 mL) 0 083 % nebulizer solution Take 2 5 mg by nebulization every 4 (four) hours as needed for wheezing  Yes Historical Provider, MD   alendronate (FOSAMAX) 70 mg tablet Take 70 mg by mouth every 7 days     Yes Historical Provider, MD   aluminum-magnesium hydroxide-simethicone (MYLANTA) 200-200-20 mg/5 mL suspension Take 15 mL by mouth 2 (two) times a day as needed for indigestion or heartburn  Yes Historical Provider, MD   amLODIPine (NORVASC) 10 mg tablet Take 10 mg by mouth daily  Yes Historical Provider, MD   BISACODYL RE Insert 10 mg into the rectum as needed Indications: hs as needed at bedtime when the patient has not had bm in 4 days  Yes Historical Provider, MD   calcium carbonate (TUMS) 500 mg chewable tablet Chew 2 tablets daily as needed for indigestion or heartburn 6/14/17  Yes Terrance Mittal MD   carboxymethylcellulose 0 5 % SOLN Administer 1 drop to both eyes 2 (two) times a day   Yes Historical Provider, MD   Cholecalciferol 1000 UNIT/10ML LIQD Take 1 tablet by mouth daily  Yes Historical Provider, MD   citalopram (CeleXA) 10 mg tablet Take 10 mg by mouth daily   Yes Historical Provider, MD   Cranberry 425 MG CAPS Take 1 tablet by mouth daily Indications: once daily for chronic UTI  Yes Historical Provider, MD   diazepam (VALIUM) 5 mg tablet Take 1 tablet (5 mg total) by mouth 4 (four) times a day for 10 days 7/24/18 8/3/18 Yes Muriel Evans PA-C   docusate sodium (COLACE) 100 mg capsule Take 1 capsule by mouth 2 (two) times a day as needed for constipation 6/14/17  Yes Terrance Mittal MD   fentaNYL (DURAGESIC) 50 mcg/hr Place 1 patch on the skin every third day for 10 days Max Daily Amount: 1 patch 7/24/18 8/3/18 Yes Muriel Evans PA-C   furosemide (LASIX) 40 mg tablet Take 40 mg by mouth daily   Yes Historical Provider, MD   gabapentin (NEURONTIN) 600 MG tablet Take 600 mg by mouth 3 (three) times a day     Yes Historical Provider, MD   ibuprofen (MOTRIN) 400 mg tablet Take 1 tablet (400 mg total) by mouth every 6 (six) hours as needed for mild pain 7/24/18  Yes Muriel Evans PA-C   latanoprost (XALATAN) 0 005 % ophthalmic solution Administer 1 drop to both eyes daily at bedtime   Yes Historical Provider, MD   losartan (COZAAR) 50 mg tablet Take 50 mg by mouth daily  Yes Historical Provider, MD   magnesium hydroxide (MILK OF MAGNESIA) 400 mg/5 mL oral suspension Take 30 mL by mouth daily as needed for constipation Indications: 400mg/5ml (30ml) by mouth as needed for constipation at bedtime when no bm in 3 days  Yes Historical Provider, MD   menthol-methyl salicylate (BENGAY) 36-16 % cream Apply topically 4 (four) times a day as needed (back pain) 7/24/18  Yes Muriel Evans PA-C   nitroglycerin (NITROSTAT) 0 4 mg SL tablet Place 0 4 mg under the tongue every 5 (five) minutes as needed for chest pain  Yes Historical Provider, MD   nystatin (MYCOSTATIN) cream Apply 1 application topically 2 (two) times a day  Yes Historical Provider, MD   omeprazole (PriLOSEC) 20 mg delayed release capsule Take 20 mg by mouth daily  Yes Historical Provider, MD   oxybutynin (DITROPAN XL) 15 MG 24 hr tablet Take 1 tablet (15 mg total) by mouth daily 7/25/18  Yes Muriel Evans PA-C   oxyCODONE (ROXICODONE) 10 MG TABS Take 1 tablet (10 mg total) by mouth every 4 (four) hours as needed for severe pain for up to 10 days Max Daily Amount: 60 mg 7/24/18 8/3/18 Yes Muriel Evans PA-C   OXYGEN-HELIUM IN Inhale 3 L as needed 3 L NC O2 as needed for SOB for SpO2 less than 90   Yes Historical Provider, MD   QUEtiapine (SEROquel) 25 mg tablet Take 0 5 tablets (12 5 mg total) by mouth daily at bedtime  Patient taking differently: Take 25 mg by mouth daily at bedtime   7/24/18  Yes Muriel Evans PA-C   ranitidine (ZANTAC) 150 mg tablet Take 150 mg by mouth daily in the early morning   Yes Historical Provider, MD   rivaroxaban (XARELTO) 20 mg tablet Take 20 mg by mouth daily with dinner   Yes Historical Provider, MD   traMADol (ULTRAM) 50 mg tablet Take 1 tablet (50 mg total) by mouth every 6 (six) hours as needed for moderate pain for up to 10 days 7/24/18 8/3/18 Yes Muriel Evans PA-C     I have reveiwed home medications using records provided by [facility]  Allergies:    Allergies Allergen Reactions    Aspirin     Iodinated Diagnostic Agents Throat Swelling    Iodine     Nsaids        Social History:     Marital Status:    [facility]  Patient Pre-hospital Level of Mobility: ambulates with walker  Patient Pre-hospital Diet Restrictions:   Substance Use History:   History   Alcohol Use No     History   Smoking Status    Former Smoker    Types: Cigarettes   Smokeless Tobacco    Never Used     History   Drug Use No       Family History:    History reviewed  No pertinent family history  Physical Exam:     Vitals:   Blood Pressure: 158/71 (07/30/18 0208)  Pulse: 95 (07/30/18 0208)  Temperature: 98 7 °F (37 1 °C) (07/30/18 0208)  Temp Source: Oral (07/30/18 6705)  Respirations: 18 (07/30/18 0208)  Height: 5' 6" (167 6 cm) (07/30/18 5315)  Weight - Scale: 128 kg (282 lb 3 oz) (07/30/18 0553)  SpO2: 94 % (07/30/18 0208)    Physical Exam   Constitutional: No distress  HENT:   Head: Normocephalic and atraumatic  Eyes: Conjunctivae are normal    Cardiovascular: Normal rate  An irregularly irregular rhythm present  Pulmonary/Chest: Effort normal  No respiratory distress  She has decreased breath sounds  Nasal cannula oxygen in place  Abdominal: Soft  Bowel sounds are normal  There is no tenderness  obeses   Neurological:   Patient asleep upon entering room, awoke to hearing her name called and became agitated, asking that the lights be turned out  Answers select questions briefly like when asked if she is having pain reports "yes, all over " Patient disoriented to place  Skin: Skin is warm and dry  She is not diaphoretic  No erythema  Nursing note and vitals reviewed  Additional Data:     Lab Results: I have personally reviewed pertinent reports          Results from last 7 days  Lab Units 07/30/18  0533   WBC Thousand/uL 10 18*   HEMOGLOBIN g/dL 10 5*   HEMATOCRIT % 35 0   PLATELETS Thousands/uL 251   NEUTROS PCT % 76*   LYMPHS PCT % 13*   MONOS PCT % 11   EOS PCT % 1       Results from last 7 days  Lab Units 07/29/18  2334   SODIUM mmol/L 138   POTASSIUM mmol/L 4 1   CHLORIDE mmol/L 101   CO2 mmol/L 33*   BUN mg/dL 15   CREATININE mg/dL 0 96   CALCIUM mg/dL 9 1   TOTAL PROTEIN g/dL 8 4*   BILIRUBIN TOTAL mg/dL 0 70   ALK PHOS U/L 78   ALT U/L 27   AST U/L 17   GLUCOSE RANDOM mg/dL 126       Results from last 7 days  Lab Units 07/29/18  2334   INR  1 49*               Imaging: I have personally reviewed pertinent reports  CT head without contrast   Final Result by Nunu Alas MD (07/30 0251)      No acute intracranial abnormality  Moderate scattered sinus mucosal thickening is noted  Findings are consistent with the preliminary report from [facility] which was provided shortly after completion of the exam                Workstation performed: [id number]         XR chest 1 view portable    (Results Pending)       EKG, Pathology, and Other Studies Reviewed on Admission:   · EKG: atrial fibrillation  Allscripts / Epic Records Reviewed: Yes     ** Please Note: This note has been constructed using a voice recognition system   **

## 2018-07-30 NOTE — ED NOTES
Provider aware pt interfering w/ care, pulling off blankets, exposing genitals, pulling at urinary cath, pulling off NC  Soft restraint order for bilateral arms to follow        Enriqueta Peace RN  07/30/18 7687

## 2018-07-30 NOTE — ASSESSMENT & PLAN NOTE
· Chronic pain and continuous opioid dependence  · Continue outpatient pain medication regimen with prn oxycodone, prn Tramadol, Valium and Fentanyl

## 2018-07-30 NOTE — PLAN OF CARE
Problem: PHYSICAL THERAPY ADULT  Goal: Performs mobility at highest level of function for planned discharge setting  See evaluation for individualized goals  Treatment/Interventions: Functional transfer training, LE strengthening/ROM, Elevations, Therapeutic exercise, Endurance training, Equipment eval/education, Bed mobility, Gait training, Patient/family training, Cognitive reorientation, Spoke to nursing  Equipment Recommended: Javier Hernandez, Wheelchair       See flowsheet documentation for full assessment, interventions and recommendations  Outcome: Progressing  Prognosis: Guarded  Problem List: Decreased strength, Decreased range of motion, Decreased endurance, Decreased mobility, Impaired balance, Decreased coordination, Decreased cognition, Decreased safety awareness, Impaired judgement, Obesity, Pain  Assessment: Pt still requires A of 2 for bed mobility, but did initiate weight bearing by performing lateral scoot  Barriers to Discharge: Decreased caregiver support     Recommendation: Post acute IP rehab          See flowsheet documentation for full assessment

## 2018-07-30 NOTE — PHYSICAL THERAPY NOTE
PHYSICAL THERAPY EVALUATION  NAME:  Pepe Warner  DATE: 07/30/18    AGE:   70 y o  Mrn:   [id number]  ADMIT DX:  UTI (urinary tract infection) [N39 0]  Altered mental status [R41 82]  Fever [R50 9]    Past Medical History:   Diagnosis Date    [facility] ([facility] )     Chronic pain     Chronic respiratory failure with hypoxia (HCC)     COPD (chronic obstructive pulmonary disease) (HCC)     Dementia     Dorsalgia, unspecified     Edema     Encephalopathy     GERD (gastroesophageal reflux disease)     Hypertension     Morbid obesity (HCC)     Muscle weakness (generalized)     Opioid dependence (HCC)     Overactive bladder     Pneumonia     Psychiatric disorder     anxiety, bipolar    Radiculopathy of thoracolumbar region     Sciatica     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     Urinary incontinence     UTI (urinary tract infection)      Length Of Stay: 0  Performed at least 2 patient identifiers during session: Name and Birthday  PHYSICAL THERAPY EVALUATION :    07/30/18 1410-14:40   Note Type   Note type Eval only   Pain Assessment   Pain Type Chronic pain   Pain Location Back   Home Living   Type of Home Other (Comment)  ([facility]   [address] [place] [address],[address]   Additional Comments Pt only amb with restrorative therapy using walker 1x/day, but not over the last 2 weeks  Prior Function   Level of [place] Needs assistance with IADLs; Needs assistance with ADLs and functional mobility   ADL Assistance Needs assistance   IADLs Needs assistance   Falls in the last 6 months (as per pt)   Restrictions/Precautions   Weight Bearing Precautions Per Order No   General   Additional Pertinent History Per H&P: Patient was recently admitted from 7/23-7/24/18 for acute on chronic back pain during which time her pain medication regimen was adjusted and she was evaluated by PT   She was discharged back to [facility] and was instructed to follow-up with her pain management specialist as an outpatient  Pt reports that pain management has not happened yet  Family/Caregiver Present No   Cognition   Overall Cognitive Status Impaired   Arousal/Participation Alert   Orientation Level Oriented to person   Memory Decreased recall of precautions;Decreased recall of recent events;Decreased short term memory   Following Commands Follows one step commands inconsistently   RUE Strength   RUE Overall Strength Unable to assess  (due to time spent supporting self while eating)   LUE Strength   LUE Overall Strength Unable to assess  (due to time spent supporting self while eating)   RLE Assessment   RLE Assessment (limited hip flexion due to back pain; - SLR)   Strength RLE   R Hip Flexion (tested to 3)   R Knee Extension (tested to 3)   R Ankle Dorsiflexion (tested to 3)   LLE Assessment   LLE Assessment (limited hip flexion due to back pain, - SLR)   Strength LLE   L Hip Flexion (tested to 3)   L Knee Extension (tested to 3)   L Ankle Dorsiflexion (tested to 3)   Coordination   Movements are Fluid and Coordinated 0   Coordination and Movement Description ataxic   Sensation WFL  (vision and hearing during session)   Light Touch   RLE Light Touch Grossly intact   LLE Light Touch Grossly intact   Bed Mobility   Supine to Sit 2  Maximal assistance   Additional items Assist x 2; Increased time required;Verbal cues;LE management; Bedrails  (using sidelying to sit)   Transfers   Sit to Stand Unable to assess  (due to pain)   Ambulation/Elevation   Gait pattern Not appropriate  (due to increased pain)   Kim's Bariatric Body type: + Gluteal shelf   Balance   Static Sitting Fair +   Dynamic Sitting Fair   Endurance Deficit   Endurance Deficit Yes   Endurance Deficit Description limited sititng tolerance on edge of bed   Activity Tolerance   Activity Tolerance Patient limited by pain; Patient limited by fatigue;Treatment limited secondary to agitation  (Pt redirected after several bouts of cursing)   Nurse Made Aware spoke to Bernie RN and Meron Mendoza MS2   Assessment   Prognosis Guarded   Problem List Decreased strength;Decreased range of motion;Decreased endurance;Decreased mobility; Impaired balance;Decreased coordination;Decreased cognition;Decreased safety awareness; Impaired judgement;Obesity;Pain   Barriers to Discharge Decreased caregiver support   Goals   Patient Goals Initially she reported to lay in mbed, but later reported that she would like to get back to walking with much less pain while using her walker  STG Expiration Date 08/10/18   Treatment Day 1  (treatment documented in notes)   Plan   Treatment/Interventions Functional transfer training;LE strengthening/ROM; Elevations; Therapeutic exercise; Endurance training;Equipment eval/education; Bed mobility;Gait training;Patient/family training;Cognitive reorientation;Spoke to nursing   PT Frequency Other (Comment)  (3-5x/wk)   Recommendation   Recommendation Post acute IP rehab   Equipment Recommended Nancy Ga; Wheelchair   Barthel Index   Feeding 5   Bathing 0   Grooming Score 0   Dressing Score 0   Bladder Score 5   Bowels Score 10   Toilet Use Score 0   Transfers (Bed/Chair) Score 0   Mobility (Level Surface) Score 0   Stairs Score 0   Barthel Index Score 20   (Please find full objective findings from PT assessment regarding body systems outlined above)  Assessment: Pt is a 70 y o  female seen for PT evaluation s/p admit to [facility] on 7/29/2018 w/ UTI (urinary tract infection)  Order placed for PT  Prior to admission, pt required A for mobility and lived at the [address] [place] [address] where she had been waling a few weeks ago with restorative therapy, but not recently due to back pain per pt  Melvina Juárez Upon evaluation: Pt requires 2 person assistance for bed mobilty and not appropriate for WB transfers nor ambulation due to pain    Pt's clinical presentation is currently unstable/unpredictable given the functional mobility deficits above, especially weakness, decreased ROM, decreased endurance, pain, decreased activity tolerance and decreased functional mobility tolerance, coupled with fall risks including hx of falls, impaired balance, impaired coordination and decreased cognition, and combined with medical complications of multiple readmissions and agitation  Pt to benefit from continued skilled PT tx while in hospital and upon DC to address deficits as defined above and maximize level of functional mobility  From PT/mobility standpoint, recommendation at time of d/c would be inpatient rehab pending progress in order to maximize pt's functional independence and consistency w/ mobility in order to facilitate return to PLOF  Recommend trial with walker next 1-2 sessions, trial with quick move next 1-2 sessions, ther ex next 1-2 sessions and mechanical conveyance from nursing standpoint for OOB mobility  Pt may benefit from further intervention from [facility] seating once further assessed as she does display a gluteal shelf body shape, which can be aided by specific back supports in   Goals: Pt will: Perform bed mobility tasks to consistent A Of 1 using sidelying to sit techniques to decrease burden of care and increase Indep in prior living environment  Perform sit to stand transfers to consistent A of 1 to decrease burden of care, decrease risk for falls and improve ease of transfers  Assess   ambulation with rolling walker and set goals to decrease burden of care, increase Indep in prior living environment and progress pt to goals of amb with less pain  Perform 75% upright standing posture to improve ability to perform upright tasks at home and perform quick move to increase mobility into chair  Increase sitting tolerance to 30 min either w/ supported or unsupported back to improve activity tolerance    The following objective measures were performed on IE: Barthel Index 20/100     Comorbidities affecting pt's physical performance at time of assessment include: HTN, obesity, limited cognition, COPD and chronic pain, back and knee surgery  Personal factors affecting pt at time of IE include: anxiety, behavioral pattern, inability to perform IADLs, inability to perform ADLs, inability to ambulate household distances, inability to navigate community distances, limited insight into impairments and bipolar  Estrella Rossi PT, DPT  PHYSICAL THERAPY TREATMENT NOTE  Time In:14:46  Time Out:14:55  Total Time: 9 min    S:  Pt needs encouragement to assist with further mobility trials  O:  Pt declined trials with walker or full upright standing, but Did perform lateral scoot with S on edge of bed 2x via WB into legs to get higher to head of bed  Bed mobility: Pt initially performed without A, but was screaming in pain and required A of 2 to complete sit--> supine transfer despite not twisting body, but having longer level arm with LE's  Pt required Max A of 2 for repositioning toward head of bed , using her BUE's to assist herself along with verbal instruction  A:  Pt still requires A of 2 for bed mobility, but did initiate weight bearing by performing lateral scoot  P:  Recommend addition of therapeutic exercises to current functional mobility program, and progression as able with standing tolerance of walker vs quick move       Estrella Rossi PT, DPT

## 2018-07-31 LAB
AMMONIA PLAS-SCNC: <10 UMOL/L (ref 11–35)
ANION GAP SERPL CALCULATED.3IONS-SCNC: 6 MMOL/L (ref 4–13)
BASOPHILS # BLD AUTO: 0.04 THOUSANDS/ΜL (ref 0–0.1)
BASOPHILS NFR BLD AUTO: 1 % (ref 0–1)
BUN SERPL-MCNC: 16 MG/DL (ref 5–25)
CALCIUM SERPL-MCNC: 9 MG/DL (ref 8.3–10.1)
CHLORIDE SERPL-SCNC: 105 MMOL/L (ref 100–108)
CO2 SERPL-SCNC: 32 MMOL/L (ref 21–32)
CREAT SERPL-MCNC: 0.83 MG/DL (ref 0.6–1.3)
EOSINOPHIL # BLD AUTO: 0.23 THOUSAND/ΜL (ref 0–0.61)
EOSINOPHIL NFR BLD AUTO: 3 % (ref 0–6)
ERYTHROCYTE [DISTWIDTH] IN BLOOD BY AUTOMATED COUNT: 15.3 % (ref 11.6–15.1)
GFR SERPL CREATININE-BSD FRML MDRD: 69 ML/MIN/1.73SQ M
GLUCOSE SERPL-MCNC: 115 MG/DL (ref 65–140)
HCT VFR BLD AUTO: 35.9 % (ref 34.8–46.1)
HGB BLD-MCNC: 10.7 G/DL (ref 11.5–15.4)
LYMPHOCYTES # BLD AUTO: 1.06 THOUSANDS/ΜL (ref 0.6–4.47)
LYMPHOCYTES NFR BLD AUTO: 16 % (ref 14–44)
MCH RBC QN AUTO: 25.4 PG (ref 26.8–34.3)
MCHC RBC AUTO-ENTMCNC: 29.8 G/DL (ref 31.4–37.4)
MCV RBC AUTO: 85 FL (ref 82–98)
MONOCYTES # BLD AUTO: 0.88 THOUSAND/ΜL (ref 0.17–1.22)
MONOCYTES NFR BLD AUTO: 13 % (ref 4–12)
MRSA NOSE QL CULT: NORMAL
NEUTROPHILS # BLD AUTO: 4.61 THOUSANDS/ΜL (ref 1.85–7.62)
NEUTS SEG NFR BLD AUTO: 68 % (ref 43–75)
PLATELET # BLD AUTO: 241 THOUSANDS/UL (ref 149–390)
PMV BLD AUTO: 10 FL (ref 8.9–12.7)
POTASSIUM SERPL-SCNC: 3.6 MMOL/L (ref 3.5–5.3)
RBC # BLD AUTO: 4.21 MILLION/UL (ref 3.81–5.12)
SODIUM SERPL-SCNC: 143 MMOL/L (ref 136–145)
VIT B12 SERPL-MCNC: 318 PG/ML (ref 100–900)
WBC # BLD AUTO: 6.82 THOUSAND/UL (ref 4.31–10.16)

## 2018-07-31 PROCEDURE — 82607 VITAMIN B-12: CPT | Performed by: INTERNAL MEDICINE

## 2018-07-31 PROCEDURE — 80048 BASIC METABOLIC PNL TOTAL CA: CPT | Performed by: INTERNAL MEDICINE

## 2018-07-31 PROCEDURE — 82140 ASSAY OF AMMONIA: CPT | Performed by: INTERNAL MEDICINE

## 2018-07-31 PROCEDURE — 99232 SBSQ HOSP IP/OBS MODERATE 35: CPT | Performed by: INTERNAL MEDICINE

## 2018-07-31 PROCEDURE — 85025 COMPLETE CBC W/AUTO DIFF WBC: CPT | Performed by: INTERNAL MEDICINE

## 2018-07-31 RX ADMIN — LATANOPROST 1 DROP: 50 SOLUTION OPHTHALMIC at 21:41

## 2018-07-31 RX ADMIN — RIVAROXABAN 20 MG: 20 TABLET, FILM COATED ORAL at 17:00

## 2018-07-31 RX ADMIN — DIAZEPAM 5 MG: 5 TABLET ORAL at 17:00

## 2018-07-31 RX ADMIN — LIDOCAINE 2 PATCH: 50 PATCH TOPICAL at 08:19

## 2018-07-31 RX ADMIN — DIAZEPAM 5 MG: 5 TABLET ORAL at 08:19

## 2018-07-31 RX ADMIN — AMLODIPINE BESYLATE 10 MG: 10 TABLET ORAL at 08:18

## 2018-07-31 RX ADMIN — DIAZEPAM 5 MG: 5 TABLET ORAL at 21:41

## 2018-07-31 RX ADMIN — FAMOTIDINE 20 MG: 20 TABLET ORAL at 08:19

## 2018-07-31 RX ADMIN — GABAPENTIN 600 MG: 300 CAPSULE ORAL at 21:41

## 2018-07-31 RX ADMIN — VITAMIN D, TAB 1000IU (100/BT) 1000 UNITS: 25 TAB at 08:18

## 2018-07-31 RX ADMIN — AZITHROMYCIN MONOHYDRATE 500 MG: 500 INJECTION, POWDER, LYOPHILIZED, FOR SOLUTION INTRAVENOUS at 10:10

## 2018-07-31 RX ADMIN — QUETIAPINE FUMARATE 25 MG: 25 TABLET ORAL at 21:41

## 2018-07-31 RX ADMIN — GABAPENTIN 600 MG: 300 CAPSULE ORAL at 17:00

## 2018-07-31 RX ADMIN — OXYBUTYNIN CHLORIDE 15 MG: 5 TABLET, EXTENDED RELEASE ORAL at 08:18

## 2018-07-31 RX ADMIN — OXYCODONE HYDROCHLORIDE 10 MG: 10 TABLET ORAL at 12:39

## 2018-07-31 RX ADMIN — FUROSEMIDE 40 MG: 40 TABLET ORAL at 08:18

## 2018-07-31 RX ADMIN — CITALOPRAM HYDROBROMIDE 10 MG: 20 TABLET ORAL at 08:19

## 2018-07-31 RX ADMIN — GABAPENTIN 600 MG: 300 CAPSULE ORAL at 08:19

## 2018-07-31 RX ADMIN — NYSTATIN 1 APPLICATION: 100000 CREAM TOPICAL at 17:01

## 2018-07-31 RX ADMIN — OXYCODONE HYDROCHLORIDE 10 MG: 10 TABLET ORAL at 21:41

## 2018-07-31 RX ADMIN — LOSARTAN POTASSIUM 50 MG: 50 TABLET ORAL at 08:19

## 2018-07-31 RX ADMIN — CEFTRIAXONE 1000 MG: 1 INJECTION, POWDER, FOR SOLUTION INTRAMUSCULAR; INTRAVENOUS at 01:12

## 2018-07-31 RX ADMIN — PANTOPRAZOLE SODIUM 40 MG: 40 TABLET, DELAYED RELEASE ORAL at 06:21

## 2018-07-31 RX ADMIN — DIAZEPAM 5 MG: 5 TABLET ORAL at 12:19

## 2018-07-31 RX ADMIN — NYSTATIN 1 APPLICATION: 100000 CREAM TOPICAL at 08:20

## 2018-07-31 RX ADMIN — FENTANYL 1 PATCH: 50 PATCH TRANSDERMAL at 17:00

## 2018-07-31 NOTE — PROGRESS NOTES
Progress Note - Geno Engle 1940, 70 y o  female MRN: [id number]    Unit/Bed#: -01 Encounter: [id number]    Primary Care Provider: Dixie Harp MD   Date and time admitted to hospital: 7/29/2018 10:40 PM    Hypoxia   Assessment & Plan    · Patient at baseline on continues 3 L supplemental O2  Was up at 4 L on admission  Probably related to CT findings  · Will titrate to baseline as tolerated  · Respiratory protocol, continue nebulizers, incentive spirometry as able      COPD (chronic obstructive pulmonary disease) ([facility] )   Assessment & Plan    · Respiratory protocol  · Continue prn albuterol  Continuous opioid dependence (HCC)   Assessment & Plan    · Chronic pain and continuous opioid dependence  · Continue outpatient pain medication regimen with prn oxycodone, prn Tramadol, Valium and Fentanyl  Acute encephalopathy   Assessment & Plan    · Suspected secondary to infectious process however workup so far has been unremarkable except CT of the chest revealed "Multifocal airspace disease in the right upper lobe and left lower lobe suspicious for multifocal pneumonia and/or atelectasis or aspiration  Recommend follow-up repeat CT of the chest in 3-6 months to evaluate for resolution and to exclude underlying central obstructing mass lesion"  · Procalcitonin level was unremarkable at 0 11  · She is currently on azithromycin/ceftriaxone  Will recheck procalcitonin level tomorrow  If remains negative, will discontinue antibiotics      Morbid obesity due to excess calories (HCC)   Assessment & Plan    · BMI 45 69   · Recommend lifestyle modification to promote weight loss      Dementia with behavioral disturbance   Assessment & Plan    · Continue Seroquel, supportive care  Currently off restraints      Hypertension   Assessment & Plan    · BP stable   · Continue amlodipine and losartan  Atrial fibrillation ([facility] )   Assessment & Plan    · Rate controlled    Continue anticoagulation with Xarelto      * SIRS (systemic inflammatory response syndrome) ([facility])   Assessment & Plan    · Presented with reported fever of 101 at facility as well as altered mental status/ increased confusion  · WBC 11 04 on presentation on now resolved to within normal limits  Lactic acid level WNL  · No significant pyuria noted on urinalysis  Blood cultures negative times 24 hours  Urine cultures not done  · She has been afebrile since admission        VTE Pharmacologic Prophylaxis:   Pharmacologic: Rivaroxaban (Xarelto)  Mechanical VTE Prophylaxis in Place: Yes    Patient Centered Rounds: I have performed bedside rounds with nursing staff today  Discussions with Specialists or Other Care Team Provider:  Nursing    Education and Discussions with Family / Patient:     Current Length of Stay: 1 day(s)    Current Patient Status: Inpatient   Certification Statement: The patient will continue to require additional inpatient hospital stay due to Acute encephalopathy, hypoxia    Discharge Plan:  Not medically cleared for discharge    Code Status: Level 1 - Full Code      Subjective:   Patient agitated and yelling in the room  She is confused    Objective:     Vitals:   Temp (24hrs), Av 1 °F (36 7 °C), Min:98 °F (36 7 °C), Max:98 2 °F (36 8 °C)    HR:  [75-95] 78  Resp:  [18-20] 18  BP: (122-142)/(58-78) 130/60  SpO2:  [95 %-96 %] 96 %  Body mass index is 43 66 kg/m²  Input and Output Summary (last 24 hours):        Intake/Output Summary (Last 24 hours) at 18 1838  Last data filed at 18 1408   Gross per 24 hour   Intake              500 ml   Output              300 ml   Net              200 ml       Physical Exam:  General Appearance:    Yelling in the room, alert but appropriately responsive and follows commands    Head:    Normocephalic, without obvious abnormality, atraumatic, mucous membranes moist    Eyes:    Conjunctiva/corneas clear   Neck:   Supple   Lungs:     Course breath sounds bilaterally with scattered rhonchi    Heart:    Irregularly irregular, S1 and S2    Abdomen:     Soft, obese, non-tender, nondistended   Extremities:   No edema   Neurologic:  Confused, moves all extremities         Additional Data:     Labs:      Results from last 7 days  Lab Units 07/31/18  0626   WBC Thousand/uL 6 82   HEMOGLOBIN g/dL 10 7*   HEMATOCRIT % 35 9   PLATELETS Thousands/uL 241   NEUTROS PCT % 68   LYMPHS PCT % 16   MONOS PCT % 13*   EOS PCT % 3       Results from last 7 days  Lab Units 07/31/18  0626  07/29/18  2334   SODIUM mmol/L 143  < > 138   POTASSIUM mmol/L 3 6  < > 4 1   CHLORIDE mmol/L 105  < > 101   CO2 mmol/L 32  < > 33*   BUN mg/dL 16  < > 15   CREATININE mg/dL 0 83  < > 0 96   CALCIUM mg/dL 9 0  < > 9 1   TOTAL PROTEIN g/dL  --   --  8 4*   BILIRUBIN TOTAL mg/dL  --   --  0 70   ALK PHOS U/L  --   --  78   ALT U/L  --   --  27   AST U/L  --   --  17   GLUCOSE RANDOM mg/dL 115  < > 126   < > = values in this interval not displayed  Results from last 7 days  Lab Units 07/29/18  2334   INR  1 49*       * I Have Reviewed All Lab Data Listed Above  * Additional Pertinent Lab Tests Reviewed: [facility] Admission Reviewed    Cultures:   Blood Culture:   Lab Results   Component Value Date    BLOODCX No Growth at 24 hrs  07/29/2018    BLOODCX No Growth at 24 hrs  07/29/2018    BLOODCX No Growth After 5 Days  06/10/2017    BLOODCX No Growth After 5 Days  06/10/2017    BLOODCX No Growth After 5 Days  08/05/2016    BLOODCX No Growth After 5 Days  08/05/2016    BLOODCX No Growth After 5 Days   07/17/2016     Urine Culture:   Lab Results   Component Value Date    URINECX No Growth <1000 cfu/mL 06/10/2017    URINECX >100,000 cfu/ml Escherichia coli 08/05/2016    URINECX No Growth <1000 cfu/mL 07/17/2016    URINECX >100,000 cfu/ml Enterococcus faecalis 05/27/2016     Sputum Culture: No components found for: SPUTUMCX  Wound Culture: No results found for: WOUNDCULT    Last 24 Hours Medication List:     Current Facility-Administered Medications:  acetaminophen 650 mg Oral Q6H PRN Maverick Souza PA-C    albuterol 2 5 mg Nebulization Q6H PRN Joyner Mass, DO    aluminum-magnesium hydroxide-simethicone 15 mL Oral BID PRN VELVET Astudillo-C    amLODIPine 10 mg Oral Daily Maverick Souza, PA-C    azithromycin 500 mg Intravenous Q24H Joyner Mass, DO Last Rate: 500 mg (07/31/18 1010)   bisacodyl 10 mg Rectal Daily PRN VELVET Astudillo-C    calcium carbonate 1,000 mg Oral Daily PRN Maverick Souza, PA-C    carboxymethylcellulose 1 drop Both Eyes BID Maverick Souza PA-C    cefTRIAXone 1,000 mg Intravenous Q24H VELVET Astudillo-SIOBHAN Last Rate: 1,000 mg (07/31/18 0112)   cholecalciferol 1,000 Units Oral Daily VELVET Astudillo-C    citalopram 10 mg Oral Daily Maverick Souza PA-C    diazepam 5 mg Oral 4x Daily VELVET Milian-SIOBHAN    docusate sodium 100 mg Oral BID PRN VELVET Astudillo-C    famotidine 20 mg Oral Daily Maverick Souza, PA-C    fentaNYL 1 patch Transdermal Q72H Maverick Souza PA-C    furosemide 40 mg Oral Daily Maverick Souza, PA-C    gabapentin 600 mg Oral TID Maverick Souza, PA-C    latanoprost 1 drop Both Eyes HS VELVET Astudillo-C    lidocaine 2 patch Transdermal Daily Joyner Mass, DO    losartan 50 mg Oral Daily VELVET Milian-SIOBHAN    magnesium hydroxide 30 mL Oral Daily PRN Maverick Souza PA-C    menthol-methyl salicylate  Apply externally 4x Daily PRN Maverick Souza PA-C    nitroglycerin 0 4 mg Sublingual Q5 Min PRN Maverick Souza, PA-C    nystatin 1 application Topical BID Maverick Souza PA-C    ondansetron 4 mg Intravenous Q6H PRN Maverick Souza PA-C    oxybutynin 15 mg Oral Daily Maverick Souza, PA-C    oxyCODONE 10 mg Oral Q4H PRN Maverick Souza, PA-C    pantoprazole 40 mg Oral Early Morning VELVET Milian-C    QUEtiapine 25 mg Oral HS Maverick Souza, PA-C    rivaroxaban 20 mg Oral Daily With [employer], PA-C    sodium chloride (PF) 3 mL Intravenous PRN Catheline Alosa, DO traMADol 50 mg Oral Q6H PRN Chance BLOSSOM Hewitt         Today, Patient Was Seen By: Cathleen Davis MD    ** Please Note: Dragon 360 Dictation voice to text software may have been used in the creation of this document   **

## 2018-07-31 NOTE — ASSESSMENT & PLAN NOTE
· Patient at baseline on continues 3 L supplemental O2  Was up at 4 L on admission    Probably related to CT findings  · Will titrate to baseline as tolerated  · Respiratory protocol, continue nebulizers, incentive spirometry as able

## 2018-07-31 NOTE — ASSESSMENT & PLAN NOTE
· Presented with reported fever of 101 at facility as well as altered mental status/ increased confusion  · WBC 11 04 on presentation on now resolved to within normal limits  Lactic acid level WNL  · No significant pyuria noted on urinalysis  Blood cultures negative times 24 hours    Urine cultures not done  · She has been afebrile since admission

## 2018-07-31 NOTE — PLAN OF CARE
NEUROSENSORY - ADULT     Achieves stable or improved neurological status Progressing     Achieves maximal functionality and self care Progressing        Potential for Falls     Patient will remain free of falls Progressing        Prexisting or High Potential for Compromised Skin Integrity     Skin integrity is maintained or improved Progressing

## 2018-07-31 NOTE — ASSESSMENT & PLAN NOTE
· Suspected secondary to infectious process however workup so far has been unremarkable except CT of the chest revealed "Multifocal airspace disease in the right upper lobe and left lower lobe suspicious for multifocal pneumonia and/or atelectasis or aspiration  Recommend follow-up repeat CT of the chest in 3-6 months to evaluate for resolution and to exclude underlying central obstructing mass lesion"  · Procalcitonin level was unremarkable at 0 11  · She is currently on azithromycin/ceftriaxone  Will recheck procalcitonin level tomorrow    If remains negative, will discontinue antibiotics

## 2018-08-01 VITALS
DIASTOLIC BLOOD PRESSURE: 65 MMHG | BODY MASS INDEX: 43.26 KG/M2 | RESPIRATION RATE: 18 BRPM | SYSTOLIC BLOOD PRESSURE: 131 MMHG | WEIGHT: 269.18 LBS | HEART RATE: 73 BPM | HEIGHT: 66 IN | TEMPERATURE: 98.3 F | OXYGEN SATURATION: 97 %

## 2018-08-01 LAB — PROCALCITONIN SERPL-MCNC: 0.07 NG/ML

## 2018-08-01 PROCEDURE — 99239 HOSP IP/OBS DSCHRG MGMT >30: CPT | Performed by: INTERNAL MEDICINE

## 2018-08-01 PROCEDURE — 97530 THERAPEUTIC ACTIVITIES: CPT

## 2018-08-01 PROCEDURE — 84145 PROCALCITONIN (PCT): CPT | Performed by: INTERNAL MEDICINE

## 2018-08-01 RX ORDER — QUETIAPINE FUMARATE 25 MG/1
12.5 TABLET, FILM COATED ORAL
Refills: 0
Start: 2018-08-01 | End: 2018-09-28 | Stop reason: HOSPADM

## 2018-08-01 RX ORDER — OXYCODONE HYDROCHLORIDE 10 MG/1
10 TABLET ORAL EVERY 4 HOURS PRN
Qty: 30 TABLET | Refills: 0 | Status: SHIPPED | OUTPATIENT
Start: 2018-08-01 | End: 2018-08-11

## 2018-08-01 RX ADMIN — GABAPENTIN 600 MG: 300 CAPSULE ORAL at 08:35

## 2018-08-01 RX ADMIN — DIAZEPAM 5 MG: 5 TABLET ORAL at 17:43

## 2018-08-01 RX ADMIN — CEFTRIAXONE 1000 MG: 1 INJECTION, POWDER, FOR SOLUTION INTRAMUSCULAR; INTRAVENOUS at 00:37

## 2018-08-01 RX ADMIN — VITAMIN D, TAB 1000IU (100/BT) 1000 UNITS: 25 TAB at 08:36

## 2018-08-01 RX ADMIN — NYSTATIN 1 APPLICATION: 100000 CREAM TOPICAL at 17:43

## 2018-08-01 RX ADMIN — AMLODIPINE BESYLATE 10 MG: 10 TABLET ORAL at 08:38

## 2018-08-01 RX ADMIN — LIDOCAINE 2 PATCH: 50 PATCH TOPICAL at 08:36

## 2018-08-01 RX ADMIN — PANTOPRAZOLE SODIUM 40 MG: 40 TABLET, DELAYED RELEASE ORAL at 05:20

## 2018-08-01 RX ADMIN — DIAZEPAM 5 MG: 5 TABLET ORAL at 11:47

## 2018-08-01 RX ADMIN — NYSTATIN 1 APPLICATION: 100000 CREAM TOPICAL at 08:39

## 2018-08-01 RX ADMIN — CITALOPRAM HYDROBROMIDE 10 MG: 20 TABLET ORAL at 08:37

## 2018-08-01 RX ADMIN — OXYCODONE HYDROCHLORIDE 10 MG: 10 TABLET ORAL at 08:51

## 2018-08-01 RX ADMIN — AZITHROMYCIN MONOHYDRATE 500 MG: 500 INJECTION, POWDER, LYOPHILIZED, FOR SOLUTION INTRAVENOUS at 11:41

## 2018-08-01 RX ADMIN — GABAPENTIN 600 MG: 300 CAPSULE ORAL at 17:43

## 2018-08-01 RX ADMIN — OXYBUTYNIN CHLORIDE 15 MG: 5 TABLET, EXTENDED RELEASE ORAL at 08:35

## 2018-08-01 RX ADMIN — RIVAROXABAN 20 MG: 20 TABLET, FILM COATED ORAL at 17:42

## 2018-08-01 RX ADMIN — FAMOTIDINE 20 MG: 20 TABLET ORAL at 08:36

## 2018-08-01 RX ADMIN — DIAZEPAM 5 MG: 5 TABLET ORAL at 08:36

## 2018-08-01 RX ADMIN — FUROSEMIDE 40 MG: 40 TABLET ORAL at 08:37

## 2018-08-01 RX ADMIN — OXYCODONE HYDROCHLORIDE 10 MG: 10 TABLET ORAL at 14:21

## 2018-08-01 RX ADMIN — LOSARTAN POTASSIUM 50 MG: 50 TABLET ORAL at 08:36

## 2018-08-01 NOTE — SOCIAL WORK
CM informed by SLIM this morning that patient is a tentative discharge today  CM met with patient at bedside, patient resides at [facility] [place]  Patient had been using a walker to ambulate up until two weeks ago but has had increasing weakness and has been bed bound recently  Patient was receiving STR at [facility] prior to admission and is interested in STR at discharge  Patient asked that CM also contact her daughter, Harmeet Tabares, to discuss discharge planning  Harmeet Tabares is in agreement with discharge to STR  CM called [employer] at [phone] and spoke to Irons to start [employer]  Reference number is [id number] and [facility] with [facility] when clinicals are due  Pre auth not needed for transportation  CM faxed current clinicals to Fernie Caceres at [phone] as per the payor grid  CM provided reference number to [facility] [place] through [facility]  Pt will require BLS transport at discharge  CM called [facility] and spoke to Maria E Zuluaga to arrange BLS to [facility]  Medical necessity and facesheet faxed and placed in sticker chart  Per [facility] will  Pt at 8:30 pm  CM made RN, SLIM, Patient, patient's daughter and [facility] at [facility]  Patient does not appear to have any other needs at this time  CM Department will continue to assess for needs and will follow through discharge

## 2018-08-01 NOTE — DISCHARGE SUMMARY
Discharge Summary - [facility] Internal Medicine    Patient Information: Kevyn Ramos 70 y o  female MRN: [id number]  Unit/Bed#: -01 Encounter: [id number]    Discharging Physician / Practitioner: Kenneth Cobos MD  PCP: Ya Burgos MD  Admission Date: 7/29/2018  Discharge Date: 08/01/18    Reason for Admission:  Hypoxic respiratory failure, acute on chronic    Discharge Diagnoses:     Principal Problem:    SIRS (systemic inflammatory response syndrome) ([facility] )  Active Problems:    Atrial fibrillation ([facility] )    Hypertension    Dementia with behavioral disturbance    Morbid obesity due to excess calories ([facility] )    Acute encephalopathy    Continuous opioid dependence ([facility] )    COPD (chronic obstructive pulmonary disease) ([facility] )    Hypoxia      Consultations During Hospital Stay:  · None    Procedures Performed:   · None    Significant findings:  · CT chest - Multifocal airspace disease in the right upper lobe and left lower lobe suspicious for multifocal pneumonia and/or atelectasis or aspiration  Recommend follow-up repeat CT of the chest in 3-6 months to evaluate for resolution and to exclude underlying central obstructing mass lesion    Hospital Course:   Kevyn Ramos is a 70 y o  female patient who originally presented to the hospital on 7/29/2018 due to altered mental status  Patient had been noted with a fever of 1 0 prior to admission with increased confusion  She has a history of chronic hypoxic respiratory failure on 3 L supplemental O2 via nasal cannular at baseline  On presentation she was placed on 4 L due to hypoxia in the 80s  CT of the chest showed findings as indicated above  She was initially started on IV antibiotics for pneumonia however over the course of her hospital stay, pneumonia was ruled out  She was afebrile and had resolution of leukocytosis  Procalcitonin levels were negative  Antibiotics were discontinued prior to discharge   At the request of patient's daughter, instructions were given to rehab facility to wean the patient off seroquel as it was indicated that this was started for depression  Patient's mental status improved back to her baseline prior to discharge  O2 requirements were also back at her baseline  She was discharged to rehab in stable condition  Condition at Discharge: stable     Discharge Day Visit / Exam:     Subjective:  Complains of chronic pain  Otherwise no new complaints  No fever or shortness of breath  Vitals: Blood Pressure: 124/60 (08/01/18 0701)  Pulse: 71 (08/01/18 0701)  Temperature: 98 4 °F (36 9 °C) (08/01/18 0701)  Temp Source: Axillary (08/01/18 0100)  Respirations: 18 (08/01/18 0701)  Height: 5' 6" (167 6 cm) (07/30/18 0208)  Weight - Scale: 122 kg (269 lb 2 9 oz) (08/01/18 0600)  SpO2: 92 % (08/01/18 0701)    General Appearance:    Alert, cooperative, no distress, appropriately responsive    Head:    Normocephalic, without obvious abnormality, atraumatic, mucous membranes moist    Eyes:    Conjunctiva/corneas clear, EOM's intact   Neck:   Supple   Lungs:     Course breath sounds bilaterally improved from day prior, no wheezes    Heart:    Irregularly irregular, S1 and S2    Abdomen:     Soft, non-tender, bowel sounds active all four quadrants,     no masses, no organomegaly   Extremities:   Extremities normal, atraumatic, no cyanosis or edema   Neurologic:  nonfocal, alert and oriented x3      Discharge instructions/Information to patient and family:   See after visit summary for information provided to patient and family  Provisions for Follow-Up Care:  See after visit summary for information related to follow-up care and any pertinent home health orders  Disposition: Rehab    Discussed with patient's daughter Keyur Gomez on phone  All questions answered  Updated on clinical status and discharge plans for today    Discharge Statement:  I spent >30 minutes discharging the patient   This time was spent on the day of discharge  I had direct contact with the patient on the day of discharge  Greater than 50% of the total time was spent examining patient, answering all patient questions, arranging and discussing plan of care with patient as well as directly providing post-discharge instructions  Additional time then spent on discharge activities  Discharge Medications:  See after visit summary for reconciled discharge medications provided to patient and family  ** Please Note: Dragon 360 Dictation voice to text software may have been used in the creation of this document   **

## 2018-08-01 NOTE — PROGRESS NOTES
Pt sleeping comfortably in bed  Pt has no signs of distress and has no pain  Call bell within reach  Will continue to monitor

## 2018-08-01 NOTE — PLAN OF CARE
Problem: PHYSICAL THERAPY ADULT  Goal: Performs mobility at highest level of function for planned discharge setting  See evaluation for individualized goals  Treatment/Interventions: Functional transfer training, LE strengthening/ROM, Elevations, Therapeutic exercise, Endurance training, Equipment eval/education, Bed mobility, Gait training, Patient/family training, Cognitive reorientation, Spoke to nursing  Equipment Recommended: Merlin Sovereign, Wheelchair       See flowsheet documentation for full assessment, interventions and recommendations  Outcome: Progressing  Prognosis: Guarded  Problem List: Decreased strength, Decreased range of motion, Decreased endurance, Decreased mobility, Impaired balance, Decreased coordination, Decreased cognition, Decreased safety awareness, Impaired judgement, Obesity, Pain  Assessment: Patient requires max assist x 2 for supine<>sit transfers with sidelying technique and verbal instruction for body positioning and hand use of bredrail to assist  Patient tolerated sitting EOB for 13 minutes with redirection and assurance required  Patient with increased agitiation and screaming during transfer  Continue to focus on bed mobiity tasks and transfers with progression to sit to stand as appropriate  Barriers to Discharge: Decreased caregiver support     Recommendation: Post acute IP rehab          See flowsheet documentation for full assessment

## 2018-08-01 NOTE — PHYSICAL THERAPY NOTE
PHYSICAL THERAPY NOTE    Patient Name: Myriam Marti  UVSLQ'R Date: 18 1158   Pain Assessment   Pain Assessment 0-10   Pain Score 8   Restrictions/Precautions   Weight Bearing Precautions Per Order No   General   Family/Caregiver Present No   Subjective   Subjective Patient supine in bed and is agreeable to therapy session  Patient identifers obtained by name &   Bed Mobility   Supine to Sit 2  Maximal assistance   Additional items Assist x 2;HOB elevated; Bedrails; Increased time required;Verbal cues;LE management  (sidelying sit)   Sit to Supine 2  Maximal assistance   Additional items Assist x 2;Bedrails; Increased time required;Verbal cues;LE management   Additional Comments Tolerated sitting EOB with support x 13 minutes   Balance   Static Sitting Fair +   Dynamic Sitting Fair   Endurance Deficit   Endurance Deficit Yes   Endurance Deficit Description limited sitting tolerance   Activity Tolerance   Activity Tolerance Patient limited by pain; Patient limited by fatigue;Treatment limited secondary to agitation  (screamining with mobilitization)   Nurse Made Aware Spoke to Damion Nowak RN & PCA   Assessment   Prognosis Guarded   Problem List Decreased strength;Decreased range of motion;Decreased endurance;Decreased mobility; Impaired balance;Decreased coordination;Decreased cognition;Decreased safety awareness; Impaired judgement;Obesity;Pain   Assessment Patient requires max assist x 2 for supine<>sit transfers with sidelying technique and verbal instruction for body positioning and hand use of bredrail to assist  Patient tolerated sitting EOB for 13 minutes with redirection and assurance required  Patient with increased agitiation and screaming during transfer  Continue to focus on bed mobiity tasks and transfers with progression to sit to stand as appropriate     Barriers to Discharge Decreased caregiver support Goals   Patient Goals none specific stated when asked   STG Expiration Date 08/10/18   Treatment Day 2   Plan   Treatment/Interventions Functional transfer training;LE strengthening/ROM; Elevations; Therapeutic exercise; Endurance training;Equipment eval/education; Bed mobility;Gait training;Patient/family training;Cognitive reorientation;Spoke to nursing   PT Frequency Other (Comment)  (3-5x/week)   Recommendation   Recommendation Post acute IP rehab   Equipment Recommended Ruthine Prime; Wheelchair       [place], [place]

## 2018-08-01 NOTE — DISCHARGE INSTRUCTIONS
· Wean off Seroquel over the next week and discontinue    · Follow-up with your primary care physician for repeat CT of the chest in 3-6 months to evaluate for resolution of multifocal airspace disease and to exclude underlying central obstructing mass lesion

## 2018-08-01 NOTE — PLAN OF CARE
Problem: DISCHARGE PLANNING - CARE MANAGEMENT  Goal: Discharge to post-acute care or home with appropriate resources  INTERVENTIONS:  - Conduct assessment to determine patient/family and health care team treatment goals, and need for post-acute services based on payer coverage, community resources, and patient preferences, and barriers to discharge  - Address psychosocial, clinical, and financial barriers to discharge as identified in assessment in conjunction with the patient/family and health care team  - Arrange appropriate level of post-acute services according to patients   needs and preference and payer coverage in collaboration with the physician and health care team  - Communicate with and update the patient/family, physician, and health care team regarding progress on the discharge plan  - Arrange appropriate transportation to post-acute venues  Outcome: Progressing  CM informed by SLIM this morning that patient is a tentative discharge today  CM met with patient at bedside, patient resides at [facility]  Patient had been using a walker to ambulate up until two weeks ago but has had increasing weakness and has been bed bound recently  Patient was receiving STR at [place] prior to admission and is interested in STR at discharge  Patient asked that CM also contact her daughter, Marygrace Boast, to discuss discharge planning  Marygrace Boast is in agreement with discharge to STR  CM called [employer] at [phone] and spoke to Werner Stout to start [employer]  Reference number is [id number] and [facility] will follow up with [facility] [place] when clinicals are due  Pre auth not needed for transportation  CM faxed current clinicals to [facility] at [phone] as per the payor grid  CM provided reference number to [facility] through [address]  Pt will require BLS transport at discharge  CM called [facility] and spoke to [place] to arrange BLS to [place] [facility] [place]   Medical necessity and facesheet faxed and placed in sticker chart  [facility] will  Pt at 8:30 pm  CM made RN, SLIM, Patient, patient's daughter and [facility] [employer]  Patient does not appear to have any other needs at this time  CM Department will continue to assess for needs and will follow through discharge

## 2018-08-01 NOTE — PROGRESS NOTES
Pt found asleep at beginning of shift  As shift progressed pt was found screaming and attempting to get out of bed  RN and PCA attempted to reorient patient and patient continued to yell  Call bell was within reach, vitals stable

## 2018-08-04 LAB
BACTERIA BLD CULT: NORMAL
BACTERIA BLD CULT: NORMAL

## 2018-08-27 ENCOUNTER — HOSPITAL ENCOUNTER (INPATIENT)
Facility: HOSPITAL | Age: 78
LOS: 4 days | DRG: 884 | End: 2018-09-01
Attending: EMERGENCY MEDICINE | Admitting: HOSPITALIST
Payer: COMMERCIAL

## 2018-08-27 ENCOUNTER — APPOINTMENT (EMERGENCY)
Dept: RADIOLOGY | Facility: HOSPITAL | Age: 78
DRG: 884 | End: 2018-08-27
Payer: COMMERCIAL

## 2018-08-27 DIAGNOSIS — R46.89 AGGRESSION: ICD-10-CM

## 2018-08-27 DIAGNOSIS — F03.91 DEMENTIA WITH BEHAVIORAL DISTURBANCE, UNSPECIFIED DEMENTIA TYPE (HCC): Chronic | ICD-10-CM

## 2018-08-27 DIAGNOSIS — F03.91 DEMENTIA WITH BEHAVIORAL DISTURBANCE (HCC): Chronic | ICD-10-CM

## 2018-08-27 DIAGNOSIS — I48.91 ATRIAL FIBRILLATION, UNSPECIFIED TYPE (HCC): Chronic | ICD-10-CM

## 2018-08-27 DIAGNOSIS — E87.2 LACTIC ACIDOSIS: Primary | ICD-10-CM

## 2018-08-27 DIAGNOSIS — R41.82 ALTERED MENTAL STATUS: ICD-10-CM

## 2018-08-27 PROBLEM — E87.20 LACTIC ACIDOSIS: Status: ACTIVE | Noted: 2018-08-27

## 2018-08-27 LAB
ALBUMIN SERPL BCP-MCNC: 3.4 G/DL (ref 3.5–5)
ALP SERPL-CCNC: 65 U/L (ref 46–116)
ALT SERPL W P-5'-P-CCNC: 32 U/L (ref 12–78)
AMPHETAMINES SERPL QL SCN: NEGATIVE
ANION GAP SERPL CALCULATED.3IONS-SCNC: 9 MMOL/L (ref 4–13)
APTT PPP: 38 SECONDS (ref 24–36)
AST SERPL W P-5'-P-CCNC: 50 U/L (ref 5–45)
ATRIAL RATE: 375 BPM
BACTERIA UR QL AUTO: ABNORMAL /HPF
BARBITURATES UR QL: NEGATIVE
BASOPHILS # BLD AUTO: 0.05 THOUSANDS/ΜL (ref 0–0.1)
BASOPHILS NFR BLD AUTO: 1 % (ref 0–1)
BENZODIAZ UR QL: POSITIVE
BILIRUB SERPL-MCNC: 0.62 MG/DL (ref 0.2–1)
BILIRUB UR QL STRIP: ABNORMAL
BUN SERPL-MCNC: 12 MG/DL (ref 5–25)
CALCIUM SERPL-MCNC: 9.7 MG/DL (ref 8.3–10.1)
CAOX CRY URNS QL MICRO: ABNORMAL /HPF
CHLORIDE SERPL-SCNC: 106 MMOL/L (ref 100–108)
CLARITY UR: CLEAR
CO2 SERPL-SCNC: 25 MMOL/L (ref 21–32)
COCAINE UR QL: NEGATIVE
COLOR UR: YELLOW
COLOR, POC: NORMAL
CREAT SERPL-MCNC: 1.09 MG/DL (ref 0.6–1.3)
EOSINOPHIL # BLD AUTO: 0.09 THOUSAND/ΜL (ref 0–0.61)
EOSINOPHIL NFR BLD AUTO: 1 % (ref 0–6)
ERYTHROCYTE [DISTWIDTH] IN BLOOD BY AUTOMATED COUNT: 16.5 % (ref 11.6–15.1)
GFR SERPL CREATININE-BSD FRML MDRD: 49 ML/MIN/1.73SQ M
GLUCOSE SERPL-MCNC: 91 MG/DL (ref 65–140)
GLUCOSE UR STRIP-MCNC: NEGATIVE MG/DL
HCT VFR BLD AUTO: 44.5 % (ref 34.8–46.1)
HGB BLD-MCNC: 13.6 G/DL (ref 11.5–15.4)
HGB UR QL STRIP.AUTO: ABNORMAL
HYALINE CASTS #/AREA URNS LPF: ABNORMAL /LPF
IMM GRANULOCYTES # BLD AUTO: 0.03 THOUSAND/UL (ref 0–0.2)
IMM GRANULOCYTES NFR BLD AUTO: 0 % (ref 0–2)
INR PPP: 2.28 (ref 0.86–1.17)
KETONES UR STRIP-MCNC: ABNORMAL MG/DL
LACTATE SERPL-SCNC: 3.1 MMOL/L (ref 0.5–2)
LEUKOCYTE ESTERASE UR QL STRIP: ABNORMAL
LYMPHOCYTES # BLD AUTO: 1.88 THOUSANDS/ΜL (ref 0.6–4.47)
LYMPHOCYTES NFR BLD AUTO: 25 % (ref 14–44)
MCH RBC QN AUTO: 25.1 PG (ref 26.8–34.3)
MCHC RBC AUTO-ENTMCNC: 30.6 G/DL (ref 31.4–37.4)
MCV RBC AUTO: 82 FL (ref 82–98)
METHADONE UR QL: NEGATIVE
MONOCYTES # BLD AUTO: 0.68 THOUSAND/ΜL (ref 0.17–1.22)
MONOCYTES NFR BLD AUTO: 9 % (ref 4–12)
NEUTROPHILS # BLD AUTO: 4.76 THOUSANDS/ΜL (ref 1.85–7.62)
NEUTS SEG NFR BLD AUTO: 64 % (ref 43–75)
NITRITE UR QL STRIP: NEGATIVE
NON-SQ EPI CELLS URNS QL MICRO: ABNORMAL /HPF
NRBC BLD AUTO-RTO: 0 /100 WBCS
OPIATES UR QL SCN: NEGATIVE
PCP UR QL: NEGATIVE
PH UR STRIP.AUTO: 5 [PH] (ref 4.5–8)
PLATELET # BLD AUTO: 273 THOUSANDS/UL (ref 149–390)
PMV BLD AUTO: 10.6 FL (ref 8.9–12.7)
POTASSIUM SERPL-SCNC: 4.2 MMOL/L (ref 3.5–5.3)
PROT SERPL-MCNC: 8.8 G/DL (ref 6.4–8.2)
PROT UR STRIP-MCNC: ABNORMAL MG/DL
PROTHROMBIN TIME: 25.2 SECONDS (ref 11.8–14.2)
QRS AXIS: 5 DEGREES
QRSD INTERVAL: 86 MS
QT INTERVAL: 384 MS
QTC INTERVAL: 414 MS
RBC # BLD AUTO: 5.42 MILLION/UL (ref 3.81–5.12)
RBC #/AREA URNS AUTO: ABNORMAL /HPF
SODIUM SERPL-SCNC: 140 MMOL/L (ref 136–145)
SP GR UR STRIP.AUTO: >=1.03 (ref 1–1.03)
T WAVE AXIS: 17 DEGREES
THC UR QL: NEGATIVE
TROPONIN I SERPL-MCNC: <0.02 NG/ML
UROBILINOGEN UR QL STRIP.AUTO: 0.2 E.U./DL
VENTRICULAR RATE: 70 BPM
WBC # BLD AUTO: 7.49 THOUSAND/UL (ref 4.31–10.16)
WBC #/AREA URNS AUTO: ABNORMAL /HPF

## 2018-08-27 PROCEDURE — 81001 URINALYSIS AUTO W/SCOPE: CPT

## 2018-08-27 PROCEDURE — 80307 DRUG TEST PRSMV CHEM ANLYZR: CPT | Performed by: EMERGENCY MEDICINE

## 2018-08-27 PROCEDURE — 99220 PR INITIAL OBSERVATION CARE/DAY 70 MINUTES: CPT | Performed by: HOSPITALIST

## 2018-08-27 PROCEDURE — 96361 HYDRATE IV INFUSION ADD-ON: CPT

## 2018-08-27 PROCEDURE — 93010 ELECTROCARDIOGRAM REPORT: CPT | Performed by: INTERNAL MEDICINE

## 2018-08-27 PROCEDURE — 96372 THER/PROPH/DIAG INJ SC/IM: CPT

## 2018-08-27 PROCEDURE — 99285 EMERGENCY DEPT VISIT HI MDM: CPT

## 2018-08-27 PROCEDURE — 93005 ELECTROCARDIOGRAM TRACING: CPT

## 2018-08-27 PROCEDURE — 85025 COMPLETE CBC W/AUTO DIFF WBC: CPT | Performed by: EMERGENCY MEDICINE

## 2018-08-27 PROCEDURE — 85610 PROTHROMBIN TIME: CPT | Performed by: EMERGENCY MEDICINE

## 2018-08-27 PROCEDURE — 71045 X-RAY EXAM CHEST 1 VIEW: CPT

## 2018-08-27 PROCEDURE — 36415 COLL VENOUS BLD VENIPUNCTURE: CPT

## 2018-08-27 PROCEDURE — 96374 THER/PROPH/DIAG INJ IV PUSH: CPT

## 2018-08-27 PROCEDURE — 85730 THROMBOPLASTIN TIME PARTIAL: CPT | Performed by: EMERGENCY MEDICINE

## 2018-08-27 PROCEDURE — 87040 BLOOD CULTURE FOR BACTERIA: CPT | Performed by: EMERGENCY MEDICINE

## 2018-08-27 PROCEDURE — 80053 COMPREHEN METABOLIC PANEL: CPT | Performed by: EMERGENCY MEDICINE

## 2018-08-27 PROCEDURE — 83605 ASSAY OF LACTIC ACID: CPT | Performed by: EMERGENCY MEDICINE

## 2018-08-27 PROCEDURE — 84484 ASSAY OF TROPONIN QUANT: CPT | Performed by: EMERGENCY MEDICINE

## 2018-08-27 RX ORDER — QUETIAPINE FUMARATE 25 MG/1
12.5 TABLET, FILM COATED ORAL
Status: DISCONTINUED | OUTPATIENT
Start: 2018-08-27 | End: 2018-08-28

## 2018-08-27 RX ORDER — LORAZEPAM 2 MG/ML
2 INJECTION INTRAMUSCULAR ONCE
Status: COMPLETED | OUTPATIENT
Start: 2018-08-27 | End: 2018-08-27

## 2018-08-27 RX ORDER — DIAZEPAM 5 MG/1
5 TABLET ORAL 4 TIMES DAILY
Status: DISCONTINUED | OUTPATIENT
Start: 2018-08-27 | End: 2018-08-28

## 2018-08-27 RX ORDER — CITALOPRAM 10 MG/1
10 TABLET ORAL DAILY
Status: DISCONTINUED | OUTPATIENT
Start: 2018-08-28 | End: 2018-08-31

## 2018-08-27 RX ORDER — FUROSEMIDE 40 MG/1
40 TABLET ORAL DAILY
Status: DISCONTINUED | OUTPATIENT
Start: 2018-08-28 | End: 2018-08-28

## 2018-08-27 RX ORDER — LORAZEPAM 2 MG/ML
1 INJECTION INTRAMUSCULAR EVERY 4 HOURS PRN
Status: DISCONTINUED | OUTPATIENT
Start: 2018-08-27 | End: 2018-08-28

## 2018-08-27 RX ORDER — LATANOPROST 50 UG/ML
1 SOLUTION/ DROPS OPHTHALMIC
Status: DISCONTINUED | OUTPATIENT
Start: 2018-08-27 | End: 2018-09-01 | Stop reason: HOSPADM

## 2018-08-27 RX ORDER — NYSTATIN 100000 U/G
1 CREAM TOPICAL 2 TIMES DAILY
Status: DISCONTINUED | OUTPATIENT
Start: 2018-08-27 | End: 2018-09-01 | Stop reason: HOSPADM

## 2018-08-27 RX ORDER — MUSCLE RUB CREAM 100; 150 MG/G; MG/G
CREAM TOPICAL 4 TIMES DAILY PRN
Status: DISCONTINUED | OUTPATIENT
Start: 2018-08-27 | End: 2018-09-01 | Stop reason: HOSPADM

## 2018-08-27 RX ORDER — HALOPERIDOL 5 MG/ML
5 INJECTION INTRAMUSCULAR EVERY 6 HOURS PRN
Status: DISCONTINUED | OUTPATIENT
Start: 2018-08-27 | End: 2018-08-28

## 2018-08-27 RX ORDER — HALOPERIDOL 5 MG/ML
10 INJECTION INTRAMUSCULAR ONCE
Status: DISCONTINUED | OUTPATIENT
Start: 2018-08-27 | End: 2018-08-27

## 2018-08-27 RX ORDER — PANTOPRAZOLE SODIUM 40 MG/1
40 TABLET, DELAYED RELEASE ORAL
Status: DISCONTINUED | OUTPATIENT
Start: 2018-08-28 | End: 2018-09-01 | Stop reason: HOSPADM

## 2018-08-27 RX ORDER — HALOPERIDOL 5 MG/ML
10 INJECTION INTRAMUSCULAR ONCE
Status: COMPLETED | OUTPATIENT
Start: 2018-08-27 | End: 2018-08-27

## 2018-08-27 RX ORDER — HEPARIN SODIUM 5000 [USP'U]/ML
5000 INJECTION, SOLUTION INTRAVENOUS; SUBCUTANEOUS EVERY 8 HOURS SCHEDULED
Status: DISCONTINUED | OUTPATIENT
Start: 2018-08-27 | End: 2018-08-28

## 2018-08-27 RX ORDER — LOSARTAN POTASSIUM 50 MG/1
50 TABLET ORAL DAILY
Status: DISCONTINUED | OUTPATIENT
Start: 2018-08-28 | End: 2018-09-01 | Stop reason: HOSPADM

## 2018-08-27 RX ORDER — ALBUTEROL SULFATE 2.5 MG/3ML
2.5 SOLUTION RESPIRATORY (INHALATION) EVERY 4 HOURS PRN
Status: DISCONTINUED | OUTPATIENT
Start: 2018-08-27 | End: 2018-09-01 | Stop reason: HOSPADM

## 2018-08-27 RX ORDER — NITROGLYCERIN 0.4 MG/1
0.4 TABLET SUBLINGUAL
Status: DISCONTINUED | OUTPATIENT
Start: 2018-08-27 | End: 2018-09-01 | Stop reason: HOSPADM

## 2018-08-27 RX ORDER — AMLODIPINE BESYLATE 10 MG/1
10 TABLET ORAL DAILY
Status: DISCONTINUED | OUTPATIENT
Start: 2018-08-28 | End: 2018-09-01 | Stop reason: HOSPADM

## 2018-08-27 RX ADMIN — SODIUM CHLORIDE 1000 ML: 0.9 INJECTION, SOLUTION INTRAVENOUS at 23:58

## 2018-08-27 RX ADMIN — HALOPERIDOL LACTATE 10 MG: 5 INJECTION, SOLUTION INTRAMUSCULAR at 18:55

## 2018-08-27 RX ADMIN — HALOPERIDOL LACTATE 5 MG: 5 INJECTION, SOLUTION INTRAMUSCULAR at 22:04

## 2018-08-27 RX ADMIN — LORAZEPAM 2 MG: 2 INJECTION INTRAMUSCULAR; INTRAVENOUS at 19:31

## 2018-08-27 RX ADMIN — QUETIAPINE FUMARATE 12.5 MG: 25 TABLET ORAL at 23:22

## 2018-08-27 RX ADMIN — SODIUM CHLORIDE 1000 ML: 0.9 INJECTION, SOLUTION INTRAVENOUS at 19:47

## 2018-08-27 RX ADMIN — HEPARIN SODIUM 5000 UNITS: 5000 INJECTION, SOLUTION INTRAVENOUS; SUBCUTANEOUS at 23:21

## 2018-08-27 RX ADMIN — LORAZEPAM 2 MG: 2 INJECTION INTRAMUSCULAR; INTRAVENOUS at 20:38

## 2018-08-27 RX ADMIN — CEFEPIME HYDROCHLORIDE 2000 MG: 2 INJECTION, POWDER, FOR SOLUTION INTRAVENOUS at 20:57

## 2018-08-28 PROBLEM — R41.0 DELIRIUM: Status: ACTIVE | Noted: 2018-08-28

## 2018-08-28 PROBLEM — Z79.899 POLYPHARMACY: Status: ACTIVE | Noted: 2018-08-28

## 2018-08-28 PROBLEM — R53.81 PHYSICAL DECONDITIONING: Status: ACTIVE | Noted: 2018-08-28

## 2018-08-28 PROBLEM — G89.29 CHRONIC PAIN: Status: ACTIVE | Noted: 2018-08-28

## 2018-08-28 LAB
ALBUMIN SERPL BCP-MCNC: 3 G/DL (ref 3.5–5)
ALP SERPL-CCNC: 61 U/L (ref 46–116)
ALT SERPL W P-5'-P-CCNC: 31 U/L (ref 12–78)
ANION GAP SERPL CALCULATED.3IONS-SCNC: 8 MMOL/L (ref 4–13)
AST SERPL W P-5'-P-CCNC: 65 U/L (ref 5–45)
BASOPHILS # BLD AUTO: 0.04 THOUSANDS/ΜL (ref 0–0.1)
BASOPHILS NFR BLD AUTO: 1 % (ref 0–1)
BILIRUB SERPL-MCNC: 0.63 MG/DL (ref 0.2–1)
BUN SERPL-MCNC: 11 MG/DL (ref 5–25)
CALCIUM SERPL-MCNC: 8.7 MG/DL (ref 8.3–10.1)
CHLORIDE SERPL-SCNC: 109 MMOL/L (ref 100–108)
CK MB SERPL-MCNC: 7.4 NG/ML (ref 0–5)
CK MB SERPL-MCNC: <1 % (ref 0–2.5)
CK SERPL-CCNC: 1137 U/L (ref 26–192)
CO2 SERPL-SCNC: 23 MMOL/L (ref 21–32)
CREAT SERPL-MCNC: 0.83 MG/DL (ref 0.6–1.3)
EOSINOPHIL # BLD AUTO: 0.25 THOUSAND/ΜL (ref 0–0.61)
EOSINOPHIL NFR BLD AUTO: 4 % (ref 0–6)
ERYTHROCYTE [DISTWIDTH] IN BLOOD BY AUTOMATED COUNT: 16.4 % (ref 11.6–15.1)
FOLATE SERPL-MCNC: 16.1 NG/ML (ref 3.1–17.5)
GFR SERPL CREATININE-BSD FRML MDRD: 69 ML/MIN/1.73SQ M
GLUCOSE P FAST SERPL-MCNC: 101 MG/DL (ref 65–99)
GLUCOSE SERPL-MCNC: 101 MG/DL (ref 65–140)
HCT VFR BLD AUTO: 40.5 % (ref 34.8–46.1)
HGB BLD-MCNC: 12.1 G/DL (ref 11.5–15.4)
IMM GRANULOCYTES # BLD AUTO: 0.02 THOUSAND/UL (ref 0–0.2)
IMM GRANULOCYTES NFR BLD AUTO: 0 % (ref 0–2)
INR PPP: 1.56 (ref 0.86–1.17)
LACTATE SERPL-SCNC: 1.1 MMOL/L (ref 0.5–2)
LYMPHOCYTES # BLD AUTO: 2.45 THOUSANDS/ΜL (ref 0.6–4.47)
LYMPHOCYTES NFR BLD AUTO: 37 % (ref 14–44)
MAGNESIUM SERPL-MCNC: 1.9 MG/DL (ref 1.6–2.6)
MCH RBC QN AUTO: 25.2 PG (ref 26.8–34.3)
MCHC RBC AUTO-ENTMCNC: 29.9 G/DL (ref 31.4–37.4)
MCV RBC AUTO: 84 FL (ref 82–98)
MONOCYTES # BLD AUTO: 0.66 THOUSAND/ΜL (ref 0.17–1.22)
MONOCYTES NFR BLD AUTO: 10 % (ref 4–12)
NEUTROPHILS # BLD AUTO: 3.15 THOUSANDS/ΜL (ref 1.85–7.62)
NEUTS SEG NFR BLD AUTO: 48 % (ref 43–75)
NRBC BLD AUTO-RTO: 0 /100 WBCS
PHOSPHATE SERPL-MCNC: 2.8 MG/DL (ref 2.3–4.1)
PLATELET # BLD AUTO: 204 THOUSANDS/UL (ref 149–390)
PLATELET # BLD AUTO: 205 THOUSANDS/UL (ref 149–390)
PMV BLD AUTO: 10.2 FL (ref 8.9–12.7)
PMV BLD AUTO: 11 FL (ref 8.9–12.7)
POTASSIUM SERPL-SCNC: 3.1 MMOL/L (ref 3.5–5.3)
PROCALCITONIN SERPL-MCNC: <0.05 NG/ML
PROT SERPL-MCNC: 7.8 G/DL (ref 6.4–8.2)
PROTHROMBIN TIME: 18.8 SECONDS (ref 11.8–14.2)
RBC # BLD AUTO: 4.81 MILLION/UL (ref 3.81–5.12)
SODIUM SERPL-SCNC: 140 MMOL/L (ref 136–145)
TSH SERPL DL<=0.05 MIU/L-ACNC: 1.28 UIU/ML (ref 0.36–3.74)
WBC # BLD AUTO: 6.57 THOUSAND/UL (ref 4.31–10.16)

## 2018-08-28 PROCEDURE — 97163 PT EVAL HIGH COMPLEX 45 MIN: CPT

## 2018-08-28 PROCEDURE — G8979 MOBILITY GOAL STATUS: HCPCS

## 2018-08-28 PROCEDURE — 80053 COMPREHEN METABOLIC PANEL: CPT | Performed by: HOSPITALIST

## 2018-08-28 PROCEDURE — 82553 CREATINE MB FRACTION: CPT | Performed by: HOSPITALIST

## 2018-08-28 PROCEDURE — 84100 ASSAY OF PHOSPHORUS: CPT | Performed by: HOSPITALIST

## 2018-08-28 PROCEDURE — 84145 PROCALCITONIN (PCT): CPT | Performed by: NURSE PRACTITIONER

## 2018-08-28 PROCEDURE — 83605 ASSAY OF LACTIC ACID: CPT | Performed by: HOSPITALIST

## 2018-08-28 PROCEDURE — 84443 ASSAY THYROID STIM HORMONE: CPT | Performed by: HOSPITALIST

## 2018-08-28 PROCEDURE — 97167 OT EVAL HIGH COMPLEX 60 MIN: CPT

## 2018-08-28 PROCEDURE — 83918 ORGANIC ACIDS TOTAL QUANT: CPT | Performed by: NURSE PRACTITIONER

## 2018-08-28 PROCEDURE — 99232 SBSQ HOSP IP/OBS MODERATE 35: CPT | Performed by: PHYSICIAN ASSISTANT

## 2018-08-28 PROCEDURE — 82746 ASSAY OF FOLIC ACID SERUM: CPT | Performed by: NURSE PRACTITIONER

## 2018-08-28 PROCEDURE — 83735 ASSAY OF MAGNESIUM: CPT | Performed by: HOSPITALIST

## 2018-08-28 PROCEDURE — G8978 MOBILITY CURRENT STATUS: HCPCS

## 2018-08-28 PROCEDURE — 85610 PROTHROMBIN TIME: CPT | Performed by: HOSPITALIST

## 2018-08-28 PROCEDURE — G8987 SELF CARE CURRENT STATUS: HCPCS

## 2018-08-28 PROCEDURE — 85025 COMPLETE CBC W/AUTO DIFF WBC: CPT | Performed by: HOSPITALIST

## 2018-08-28 PROCEDURE — 99223 1ST HOSP IP/OBS HIGH 75: CPT | Performed by: PHYSICIAN ASSISTANT

## 2018-08-28 PROCEDURE — 82550 ASSAY OF CK (CPK): CPT | Performed by: HOSPITALIST

## 2018-08-28 PROCEDURE — 99254 IP/OBS CNSLTJ NEW/EST MOD 60: CPT | Performed by: PSYCHIATRY & NEUROLOGY

## 2018-08-28 PROCEDURE — G8988 SELF CARE GOAL STATUS: HCPCS

## 2018-08-28 PROCEDURE — 85049 AUTOMATED PLATELET COUNT: CPT | Performed by: HOSPITALIST

## 2018-08-28 PROCEDURE — 94760 N-INVAS EAR/PLS OXIMETRY 1: CPT

## 2018-08-28 RX ORDER — SODIUM CHLORIDE 9 MG/ML
75 INJECTION, SOLUTION INTRAVENOUS ONCE
Status: COMPLETED | OUTPATIENT
Start: 2018-08-28 | End: 2018-08-28

## 2018-08-28 RX ORDER — METHOCARBAMOL 500 MG/1
500 TABLET, FILM COATED ORAL EVERY 6 HOURS PRN
Status: DISCONTINUED | OUTPATIENT
Start: 2018-08-28 | End: 2018-09-01 | Stop reason: HOSPADM

## 2018-08-28 RX ORDER — FENTANYL 50 UG/H
1 PATCH TRANSDERMAL
COMMUNITY
End: 2018-09-28 | Stop reason: HOSPADM

## 2018-08-28 RX ORDER — DOCUSATE SODIUM 100 MG/1
100 CAPSULE, LIQUID FILLED ORAL 2 TIMES DAILY
Status: DISCONTINUED | OUTPATIENT
Start: 2018-08-28 | End: 2018-09-01 | Stop reason: HOSPADM

## 2018-08-28 RX ORDER — METHOCARBAMOL 500 MG/1
500 TABLET, FILM COATED ORAL 4 TIMES DAILY
Status: DISCONTINUED | OUTPATIENT
Start: 2018-08-28 | End: 2018-08-28

## 2018-08-28 RX ORDER — OXYCODONE HYDROCHLORIDE 10 MG/1
10 TABLET ORAL EVERY 4 HOURS PRN
Status: ON HOLD | COMMUNITY
End: 2018-08-28

## 2018-08-28 RX ORDER — METHOCARBAMOL 500 MG/1
500 TABLET, FILM COATED ORAL 4 TIMES DAILY
COMMUNITY
End: 2020-11-13 | Stop reason: ALTCHOICE

## 2018-08-28 RX ORDER — YOHIMBE BARK 500 MG
1 CAPSULE ORAL 2 TIMES DAILY
COMMUNITY
Start: 2018-08-06 | End: 2018-09-28 | Stop reason: HOSPADM

## 2018-08-28 RX ORDER — OXYCODONE HYDROCHLORIDE 5 MG/1
5 TABLET ORAL ONCE
Status: COMPLETED | OUTPATIENT
Start: 2018-08-28 | End: 2018-08-28

## 2018-08-28 RX ORDER — METHOCARBAMOL 500 MG/1
500 TABLET, FILM COATED ORAL EVERY 6 HOURS PRN
Status: DISCONTINUED | OUTPATIENT
Start: 2018-08-28 | End: 2018-08-28

## 2018-08-28 RX ORDER — SODIUM PHOSPHATE, DIBASIC AND SODIUM PHOSPHATE, MONOBASIC 7; 19 G/133ML; G/133ML
1 ENEMA RECTAL DAILY PRN
COMMUNITY
End: 2020-11-13 | Stop reason: ALTCHOICE

## 2018-08-28 RX ORDER — ALPRAZOLAM 0.25 MG/1
0.25 TABLET ORAL
Status: DISCONTINUED | OUTPATIENT
Start: 2018-08-28 | End: 2018-08-31

## 2018-08-28 RX ORDER — OXYCODONE AND ACETAMINOPHEN 7.5; 325 MG/1; MG/1
1 TABLET ORAL
COMMUNITY
End: 2020-11-13 | Stop reason: ALTCHOICE

## 2018-08-28 RX ORDER — OXYCODONE HYDROCHLORIDE AND ACETAMINOPHEN 5; 325 MG/1; MG/1
1 TABLET ORAL EVERY 4 HOURS PRN
Status: DISCONTINUED | OUTPATIENT
Start: 2018-08-28 | End: 2018-08-28

## 2018-08-28 RX ORDER — ALPRAZOLAM 0.25 MG/1
0.25 TABLET ORAL
COMMUNITY
End: 2018-09-28 | Stop reason: HOSPADM

## 2018-08-28 RX ORDER — QUETIAPINE FUMARATE 25 MG/1
12.5 TABLET, FILM COATED ORAL
Status: DISCONTINUED | OUTPATIENT
Start: 2018-08-28 | End: 2018-09-01

## 2018-08-28 RX ORDER — OXYCODONE HYDROCHLORIDE 5 MG/1
5 TABLET ORAL EVERY 4 HOURS PRN
Status: DISCONTINUED | OUTPATIENT
Start: 2018-08-28 | End: 2018-09-01

## 2018-08-28 RX ORDER — DIAZEPAM 5 MG/1
5 TABLET ORAL 4 TIMES DAILY PRN
Status: DISCONTINUED | OUTPATIENT
Start: 2018-08-28 | End: 2018-08-28

## 2018-08-28 RX ORDER — ACETAMINOPHEN 325 MG/1
650 TABLET ORAL EVERY 8 HOURS SCHEDULED
Status: DISCONTINUED | OUTPATIENT
Start: 2018-08-28 | End: 2018-09-01 | Stop reason: HOSPADM

## 2018-08-28 RX ORDER — POTASSIUM CHLORIDE 20MEQ/15ML
40 LIQUID (ML) ORAL ONCE
Status: COMPLETED | OUTPATIENT
Start: 2018-08-28 | End: 2018-08-28

## 2018-08-28 RX ORDER — FENTANYL 50 UG/H
50 PATCH TRANSDERMAL
Status: DISCONTINUED | OUTPATIENT
Start: 2018-08-28 | End: 2018-08-31

## 2018-08-28 RX ORDER — SODIUM CHLORIDE 9 MG/ML
75 INJECTION, SOLUTION INTRAVENOUS CONTINUOUS
Status: DISCONTINUED | OUTPATIENT
Start: 2018-08-28 | End: 2018-08-28

## 2018-08-28 RX ADMIN — HEPARIN SODIUM 5000 UNITS: 5000 INJECTION, SOLUTION INTRAVENOUS; SUBCUTANEOUS at 06:19

## 2018-08-28 RX ADMIN — CITALOPRAM 10 MG: 10 TABLET ORAL at 09:38

## 2018-08-28 RX ADMIN — NYSTATIN 1 APPLICATION: 100000 CREAM TOPICAL at 17:47

## 2018-08-28 RX ADMIN — SODIUM CHLORIDE 1000 ML: 0.9 INJECTION, SOLUTION INTRAVENOUS at 02:16

## 2018-08-28 RX ADMIN — ACETAMINOPHEN 650 MG: 325 TABLET, FILM COATED ORAL at 22:28

## 2018-08-28 RX ADMIN — ACETAMINOPHEN 650 MG: 325 TABLET, FILM COATED ORAL at 14:20

## 2018-08-28 RX ADMIN — SODIUM CHLORIDE 75 ML/HR: 0.9 INJECTION, SOLUTION INTRAVENOUS at 11:34

## 2018-08-28 RX ADMIN — DIAZEPAM 5 MG: 5 TABLET ORAL at 00:01

## 2018-08-28 RX ADMIN — LORAZEPAM 1 MG: 2 INJECTION INTRAMUSCULAR; INTRAVENOUS at 01:20

## 2018-08-28 RX ADMIN — RIVAROXABAN 20 MG: 20 TABLET, FILM COATED ORAL at 16:04

## 2018-08-28 RX ADMIN — PANTOPRAZOLE SODIUM 40 MG: 40 TABLET, DELAYED RELEASE ORAL at 06:19

## 2018-08-28 RX ADMIN — LATANOPROST 1 DROP: 50 SOLUTION OPHTHALMIC at 22:28

## 2018-08-28 RX ADMIN — DOCUSATE SODIUM 100 MG: 100 CAPSULE, LIQUID FILLED ORAL at 17:48

## 2018-08-28 RX ADMIN — DIAZEPAM 5 MG: 5 TABLET ORAL at 11:29

## 2018-08-28 RX ADMIN — NYSTATIN 1 APPLICATION: 100000 CREAM TOPICAL at 09:38

## 2018-08-28 RX ADMIN — QUETIAPINE FUMARATE 12.5 MG: 25 TABLET ORAL at 19:57

## 2018-08-28 RX ADMIN — SODIUM CHLORIDE 75 ML/HR: 0.9 INJECTION, SOLUTION INTRAVENOUS at 14:25

## 2018-08-28 RX ADMIN — POTASSIUM CHLORIDE 40 MEQ: 20 SOLUTION ORAL at 11:29

## 2018-08-28 RX ADMIN — OXYCODONE HYDROCHLORIDE 5 MG: 5 TABLET ORAL at 11:52

## 2018-08-28 RX ADMIN — FENTANYL 50 MCG: 50 PATCH TRANSDERMAL at 14:16

## 2018-08-29 LAB
ANION GAP SERPL CALCULATED.3IONS-SCNC: 7 MMOL/L (ref 4–13)
ATRIAL RATE: 80 BPM
BACTERIA UR QL AUTO: ABNORMAL /HPF
BASOPHILS # BLD AUTO: 0.05 THOUSANDS/ΜL (ref 0–0.1)
BASOPHILS NFR BLD AUTO: 1 % (ref 0–1)
BILIRUB UR QL STRIP: NEGATIVE
BUN SERPL-MCNC: 7 MG/DL (ref 5–25)
CALCIUM SERPL-MCNC: 8.9 MG/DL (ref 8.3–10.1)
CHLORIDE SERPL-SCNC: 109 MMOL/L (ref 100–108)
CK MB SERPL-MCNC: 1.9 NG/ML (ref 0–5)
CK MB SERPL-MCNC: <1 % (ref 0–2.5)
CK SERPL-CCNC: 276 U/L (ref 26–192)
CLARITY UR: CLEAR
CO2 SERPL-SCNC: 27 MMOL/L (ref 21–32)
COLOR UR: YELLOW
CREAT SERPL-MCNC: 0.72 MG/DL (ref 0.6–1.3)
EOSINOPHIL # BLD AUTO: 0.47 THOUSAND/ΜL (ref 0–0.61)
EOSINOPHIL NFR BLD AUTO: 9 % (ref 0–6)
ERYTHROCYTE [DISTWIDTH] IN BLOOD BY AUTOMATED COUNT: 16.6 % (ref 11.6–15.1)
GFR SERPL CREATININE-BSD FRML MDRD: 82 ML/MIN/1.73SQ M
GLUCOSE SERPL-MCNC: 98 MG/DL (ref 65–140)
GLUCOSE UR STRIP-MCNC: NEGATIVE MG/DL
HCT VFR BLD AUTO: 39.3 % (ref 34.8–46.1)
HGB BLD-MCNC: 11.4 G/DL (ref 11.5–15.4)
HGB UR QL STRIP.AUTO: NEGATIVE
HYALINE CASTS #/AREA URNS LPF: ABNORMAL /LPF
IMM GRANULOCYTES # BLD AUTO: 0.02 THOUSAND/UL (ref 0–0.2)
IMM GRANULOCYTES NFR BLD AUTO: 0 % (ref 0–2)
INR PPP: 1.42 (ref 0.86–1.17)
KETONES UR STRIP-MCNC: NEGATIVE MG/DL
LEUKOCYTE ESTERASE UR QL STRIP: ABNORMAL
LYMPHOCYTES # BLD AUTO: 2.23 THOUSANDS/ΜL (ref 0.6–4.47)
LYMPHOCYTES NFR BLD AUTO: 45 % (ref 14–44)
MCH RBC QN AUTO: 24.8 PG (ref 26.8–34.3)
MCHC RBC AUTO-ENTMCNC: 29 G/DL (ref 31.4–37.4)
MCV RBC AUTO: 85 FL (ref 82–98)
MONOCYTES # BLD AUTO: 0.48 THOUSAND/ΜL (ref 0.17–1.22)
MONOCYTES NFR BLD AUTO: 9 % (ref 4–12)
NEUTROPHILS # BLD AUTO: 1.83 THOUSANDS/ΜL (ref 1.85–7.62)
NEUTS SEG NFR BLD AUTO: 36 % (ref 43–75)
NITRITE UR QL STRIP: NEGATIVE
NON-SQ EPI CELLS URNS QL MICRO: ABNORMAL /HPF
NRBC BLD AUTO-RTO: 0 /100 WBCS
PH UR STRIP.AUTO: 6 [PH] (ref 4.5–8)
PLATELET # BLD AUTO: 197 THOUSANDS/UL (ref 149–390)
PMV BLD AUTO: 10.5 FL (ref 8.9–12.7)
POTASSIUM SERPL-SCNC: 3.4 MMOL/L (ref 3.5–5.3)
PROT UR STRIP-MCNC: NEGATIVE MG/DL
PROTHROMBIN TIME: 17.5 SECONDS (ref 11.8–14.2)
QRS AXIS: 1 DEGREES
QRSD INTERVAL: 92 MS
QT INTERVAL: 414 MS
QTC INTERVAL: 477 MS
RBC # BLD AUTO: 4.6 MILLION/UL (ref 3.81–5.12)
RBC #/AREA URNS AUTO: ABNORMAL /HPF
SODIUM SERPL-SCNC: 143 MMOL/L (ref 136–145)
SP GR UR STRIP.AUTO: 1.01 (ref 1–1.03)
T WAVE AXIS: 14 DEGREES
UROBILINOGEN UR QL STRIP.AUTO: 0.2 E.U./DL
VENTRICULAR RATE: 80 BPM
WBC # BLD AUTO: 5.08 THOUSAND/UL (ref 4.31–10.16)
WBC #/AREA URNS AUTO: ABNORMAL /HPF

## 2018-08-29 PROCEDURE — 93010 ELECTROCARDIOGRAM REPORT: CPT | Performed by: INTERNAL MEDICINE

## 2018-08-29 PROCEDURE — 87086 URINE CULTURE/COLONY COUNT: CPT | Performed by: NURSE PRACTITIONER

## 2018-08-29 PROCEDURE — 93005 ELECTROCARDIOGRAM TRACING: CPT

## 2018-08-29 PROCEDURE — 82550 ASSAY OF CK (CPK): CPT | Performed by: NURSE PRACTITIONER

## 2018-08-29 PROCEDURE — 80048 BASIC METABOLIC PNL TOTAL CA: CPT | Performed by: PHYSICIAN ASSISTANT

## 2018-08-29 PROCEDURE — 85610 PROTHROMBIN TIME: CPT | Performed by: HOSPITALIST

## 2018-08-29 PROCEDURE — 81001 URINALYSIS AUTO W/SCOPE: CPT | Performed by: NURSE PRACTITIONER

## 2018-08-29 PROCEDURE — 99232 SBSQ HOSP IP/OBS MODERATE 35: CPT | Performed by: PHYSICIAN ASSISTANT

## 2018-08-29 PROCEDURE — 94760 N-INVAS EAR/PLS OXIMETRY 1: CPT

## 2018-08-29 PROCEDURE — 85025 COMPLETE CBC W/AUTO DIFF WBC: CPT | Performed by: PHYSICIAN ASSISTANT

## 2018-08-29 PROCEDURE — 82553 CREATINE MB FRACTION: CPT | Performed by: NURSE PRACTITIONER

## 2018-08-29 RX ORDER — POTASSIUM CHLORIDE 20 MEQ/1
40 TABLET, EXTENDED RELEASE ORAL ONCE
Status: DISCONTINUED | OUTPATIENT
Start: 2018-08-29 | End: 2018-08-29

## 2018-08-29 RX ORDER — OLANZAPINE 10 MG/1
2.5 INJECTION, POWDER, LYOPHILIZED, FOR SOLUTION INTRAMUSCULAR ONCE
Status: COMPLETED | OUTPATIENT
Start: 2018-08-29 | End: 2018-08-29

## 2018-08-29 RX ORDER — POTASSIUM CHLORIDE 20MEQ/15ML
40 LIQUID (ML) ORAL ONCE
Status: COMPLETED | OUTPATIENT
Start: 2018-08-29 | End: 2018-08-29

## 2018-08-29 RX ORDER — OLANZAPINE 10 MG/1
2.5 INJECTION, POWDER, LYOPHILIZED, FOR SOLUTION INTRAMUSCULAR EVERY 8 HOURS PRN
Status: DISCONTINUED | OUTPATIENT
Start: 2018-08-29 | End: 2018-08-30

## 2018-08-29 RX ADMIN — QUETIAPINE FUMARATE 12.5 MG: 25 TABLET ORAL at 21:01

## 2018-08-29 RX ADMIN — DOCUSATE SODIUM 100 MG: 100 CAPSULE, LIQUID FILLED ORAL at 08:33

## 2018-08-29 RX ADMIN — CITALOPRAM 10 MG: 10 TABLET ORAL at 08:35

## 2018-08-29 RX ADMIN — METHOCARBAMOL 500 MG: 500 TABLET ORAL at 15:41

## 2018-08-29 RX ADMIN — AMLODIPINE BESYLATE 10 MG: 10 TABLET ORAL at 08:32

## 2018-08-29 RX ADMIN — NYSTATIN 1 APPLICATION: 100000 CREAM TOPICAL at 19:04

## 2018-08-29 RX ADMIN — POTASSIUM CHLORIDE 40 MEQ: 20 SOLUTION ORAL at 08:31

## 2018-08-29 RX ADMIN — LOSARTAN POTASSIUM 50 MG: 50 TABLET ORAL at 08:31

## 2018-08-29 RX ADMIN — DOCUSATE SODIUM 100 MG: 100 CAPSULE, LIQUID FILLED ORAL at 18:59

## 2018-08-29 RX ADMIN — ACETAMINOPHEN 650 MG: 325 TABLET, FILM COATED ORAL at 21:02

## 2018-08-29 RX ADMIN — CEFTRIAXONE 1000 MG: 1 INJECTION, POWDER, FOR SOLUTION INTRAMUSCULAR; INTRAVENOUS at 18:59

## 2018-08-29 RX ADMIN — OLANZAPINE 2.5 MG: 10 INJECTION, POWDER, FOR SOLUTION INTRAMUSCULAR at 20:37

## 2018-08-29 RX ADMIN — ACETAMINOPHEN 650 MG: 325 TABLET, FILM COATED ORAL at 14:05

## 2018-08-29 RX ADMIN — OLANZAPINE 2.5 MG: 10 INJECTION, POWDER, FOR SOLUTION INTRAMUSCULAR at 14:05

## 2018-08-29 RX ADMIN — OXYCODONE HYDROCHLORIDE 5 MG: 5 TABLET ORAL at 19:57

## 2018-08-29 RX ADMIN — ALPRAZOLAM 0.25 MG: 0.25 TABLET ORAL at 21:01

## 2018-08-29 RX ADMIN — NYSTATIN 1 APPLICATION: 100000 CREAM TOPICAL at 09:00

## 2018-08-29 RX ADMIN — OXYCODONE HYDROCHLORIDE 5 MG: 5 TABLET ORAL at 15:41

## 2018-08-29 RX ADMIN — RIVAROXABAN 20 MG: 20 TABLET, FILM COATED ORAL at 18:59

## 2018-08-29 RX ADMIN — OLANZAPINE 2.5 MG: 10 INJECTION, POWDER, FOR SOLUTION INTRAMUSCULAR at 13:07

## 2018-08-30 LAB
ANION GAP SERPL CALCULATED.3IONS-SCNC: 5 MMOL/L (ref 4–13)
BACTERIA UR CULT: NORMAL
BASOPHILS # BLD AUTO: 0.02 THOUSANDS/ΜL (ref 0–0.1)
BASOPHILS NFR BLD AUTO: 1 % (ref 0–1)
BUN SERPL-MCNC: 6 MG/DL (ref 5–25)
CALCIUM SERPL-MCNC: 9.3 MG/DL (ref 8.3–10.1)
CHLORIDE SERPL-SCNC: 107 MMOL/L (ref 100–108)
CO2 SERPL-SCNC: 28 MMOL/L (ref 21–32)
CREAT SERPL-MCNC: 0.74 MG/DL (ref 0.6–1.3)
EOSINOPHIL # BLD AUTO: 0.34 THOUSAND/ΜL (ref 0–0.61)
EOSINOPHIL NFR BLD AUTO: 8 % (ref 0–6)
ERYTHROCYTE [DISTWIDTH] IN BLOOD BY AUTOMATED COUNT: 16.8 % (ref 11.6–15.1)
GFR SERPL CREATININE-BSD FRML MDRD: 79 ML/MIN/1.73SQ M
GLUCOSE SERPL-MCNC: 118 MG/DL (ref 65–140)
HCT VFR BLD AUTO: 42.7 % (ref 34.8–46.1)
HGB BLD-MCNC: 12.8 G/DL (ref 11.5–15.4)
IMM GRANULOCYTES # BLD AUTO: 0.01 THOUSAND/UL (ref 0–0.2)
IMM GRANULOCYTES NFR BLD AUTO: 0 % (ref 0–2)
INR PPP: 1.47 (ref 0.86–1.17)
LYMPHOCYTES # BLD AUTO: 1.13 THOUSANDS/ΜL (ref 0.6–4.47)
LYMPHOCYTES NFR BLD AUTO: 28 % (ref 14–44)
MCH RBC QN AUTO: 25.4 PG (ref 26.8–34.3)
MCHC RBC AUTO-ENTMCNC: 30 G/DL (ref 31.4–37.4)
MCV RBC AUTO: 85 FL (ref 82–98)
MONOCYTES # BLD AUTO: 0.37 THOUSAND/ΜL (ref 0.17–1.22)
MONOCYTES NFR BLD AUTO: 9 % (ref 4–12)
NEUTROPHILS # BLD AUTO: 2.18 THOUSANDS/ΜL (ref 1.85–7.62)
NEUTS SEG NFR BLD AUTO: 54 % (ref 43–75)
NRBC BLD AUTO-RTO: 0 /100 WBCS
PLATELET # BLD AUTO: 191 THOUSANDS/UL (ref 149–390)
PMV BLD AUTO: 9.8 FL (ref 8.9–12.7)
POTASSIUM SERPL-SCNC: 3.9 MMOL/L (ref 3.5–5.3)
PROTHROMBIN TIME: 17.9 SECONDS (ref 11.8–14.2)
RBC # BLD AUTO: 5.04 MILLION/UL (ref 3.81–5.12)
SODIUM SERPL-SCNC: 140 MMOL/L (ref 136–145)
WBC # BLD AUTO: 4.05 THOUSAND/UL (ref 4.31–10.16)

## 2018-08-30 PROCEDURE — 85610 PROTHROMBIN TIME: CPT | Performed by: HOSPITALIST

## 2018-08-30 PROCEDURE — 85025 COMPLETE CBC W/AUTO DIFF WBC: CPT | Performed by: NURSE PRACTITIONER

## 2018-08-30 PROCEDURE — 80048 BASIC METABOLIC PNL TOTAL CA: CPT | Performed by: NURSE PRACTITIONER

## 2018-08-30 PROCEDURE — 94760 N-INVAS EAR/PLS OXIMETRY 1: CPT

## 2018-08-30 PROCEDURE — 99232 SBSQ HOSP IP/OBS MODERATE 35: CPT | Performed by: PHYSICIAN ASSISTANT

## 2018-08-30 RX ORDER — DIVALPROEX SODIUM 250 MG/1
250 TABLET, DELAYED RELEASE ORAL EVERY 12 HOURS SCHEDULED
Status: DISCONTINUED | OUTPATIENT
Start: 2018-08-30 | End: 2018-09-01 | Stop reason: HOSPADM

## 2018-08-30 RX ORDER — LIDOCAINE 50 MG/G
1 PATCH TOPICAL DAILY
Status: DISCONTINUED | OUTPATIENT
Start: 2018-08-30 | End: 2018-09-01 | Stop reason: HOSPADM

## 2018-08-30 RX ADMIN — QUETIAPINE FUMARATE 12.5 MG: 25 TABLET ORAL at 21:06

## 2018-08-30 RX ADMIN — CEFTRIAXONE 1000 MG: 1 INJECTION, POWDER, FOR SOLUTION INTRAMUSCULAR; INTRAVENOUS at 17:46

## 2018-08-30 RX ADMIN — RIVAROXABAN 20 MG: 20 TABLET, FILM COATED ORAL at 17:47

## 2018-08-30 RX ADMIN — LATANOPROST 1 DROP: 50 SOLUTION OPHTHALMIC at 22:29

## 2018-08-30 RX ADMIN — METHOCARBAMOL 500 MG: 500 TABLET ORAL at 10:50

## 2018-08-30 RX ADMIN — NYSTATIN 1 APPLICATION: 100000 CREAM TOPICAL at 21:18

## 2018-08-30 RX ADMIN — AMLODIPINE BESYLATE 10 MG: 10 TABLET ORAL at 10:55

## 2018-08-30 RX ADMIN — OXYCODONE HYDROCHLORIDE 5 MG: 5 TABLET ORAL at 00:07

## 2018-08-30 RX ADMIN — NYSTATIN 1 APPLICATION: 100000 CREAM TOPICAL at 11:02

## 2018-08-30 RX ADMIN — ACETAMINOPHEN 650 MG: 325 TABLET, FILM COATED ORAL at 21:05

## 2018-08-30 RX ADMIN — DOCUSATE SODIUM 100 MG: 100 CAPSULE, LIQUID FILLED ORAL at 17:47

## 2018-08-30 RX ADMIN — DIVALPROEX SODIUM 250 MG: 250 TABLET, DELAYED RELEASE ORAL at 21:07

## 2018-08-30 RX ADMIN — CITALOPRAM 10 MG: 10 TABLET ORAL at 10:58

## 2018-08-30 RX ADMIN — DOCUSATE SODIUM 100 MG: 100 CAPSULE, LIQUID FILLED ORAL at 10:51

## 2018-08-30 RX ADMIN — LOSARTAN POTASSIUM 50 MG: 50 TABLET ORAL at 10:55

## 2018-08-30 RX ADMIN — OXYCODONE HYDROCHLORIDE 5 MG: 5 TABLET ORAL at 10:50

## 2018-08-31 LAB
ALBUMIN SERPL BCP-MCNC: 2.9 G/DL (ref 3.5–5)
ALP SERPL-CCNC: 62 U/L (ref 46–116)
ALT SERPL W P-5'-P-CCNC: 24 U/L (ref 12–78)
ANION GAP SERPL CALCULATED.3IONS-SCNC: 8 MMOL/L (ref 4–13)
AST SERPL W P-5'-P-CCNC: 23 U/L (ref 5–45)
BASOPHILS # BLD AUTO: 0.06 THOUSANDS/ΜL (ref 0–0.1)
BASOPHILS NFR BLD AUTO: 1 % (ref 0–1)
BILIRUB SERPL-MCNC: 0.32 MG/DL (ref 0.2–1)
BUN SERPL-MCNC: 10 MG/DL (ref 5–25)
CALCIUM SERPL-MCNC: 9.3 MG/DL (ref 8.3–10.1)
CHLORIDE SERPL-SCNC: 107 MMOL/L (ref 100–108)
CO2 SERPL-SCNC: 26 MMOL/L (ref 21–32)
CREAT SERPL-MCNC: 0.87 MG/DL (ref 0.6–1.3)
EOSINOPHIL # BLD AUTO: 0.48 THOUSAND/ΜL (ref 0–0.61)
EOSINOPHIL NFR BLD AUTO: 8 % (ref 0–6)
ERYTHROCYTE [DISTWIDTH] IN BLOOD BY AUTOMATED COUNT: 16.6 % (ref 11.6–15.1)
GFR SERPL CREATININE-BSD FRML MDRD: 65 ML/MIN/1.73SQ M
GLUCOSE SERPL-MCNC: 108 MG/DL (ref 65–140)
HCT VFR BLD AUTO: 40.2 % (ref 34.8–46.1)
HGB BLD-MCNC: 11.8 G/DL (ref 11.5–15.4)
IMM GRANULOCYTES # BLD AUTO: 0.02 THOUSAND/UL (ref 0–0.2)
IMM GRANULOCYTES NFR BLD AUTO: 0 % (ref 0–2)
LYMPHOCYTES # BLD AUTO: 1.86 THOUSANDS/ΜL (ref 0.6–4.47)
LYMPHOCYTES NFR BLD AUTO: 31 % (ref 14–44)
MCH RBC QN AUTO: 24.9 PG (ref 26.8–34.3)
MCHC RBC AUTO-ENTMCNC: 29.4 G/DL (ref 31.4–37.4)
MCV RBC AUTO: 85 FL (ref 82–98)
METHYLMALONATE SERPL-SCNC: 193 NMOL/L (ref 0–378)
MONOCYTES # BLD AUTO: 0.56 THOUSAND/ΜL (ref 0.17–1.22)
MONOCYTES NFR BLD AUTO: 10 % (ref 4–12)
NEUTROPHILS # BLD AUTO: 2.94 THOUSANDS/ΜL (ref 1.85–7.62)
NEUTS SEG NFR BLD AUTO: 50 % (ref 43–75)
NRBC BLD AUTO-RTO: 0 /100 WBCS
PLATELET # BLD AUTO: 195 THOUSANDS/UL (ref 149–390)
PMV BLD AUTO: 10.7 FL (ref 8.9–12.7)
POTASSIUM SERPL-SCNC: 3.6 MMOL/L (ref 3.5–5.3)
PROT SERPL-MCNC: 7.5 G/DL (ref 6.4–8.2)
RBC # BLD AUTO: 4.73 MILLION/UL (ref 3.81–5.12)
SL AMB DISCLAIMER: NORMAL
SODIUM SERPL-SCNC: 141 MMOL/L (ref 136–145)
WBC # BLD AUTO: 5.92 THOUSAND/UL (ref 4.31–10.16)

## 2018-08-31 PROCEDURE — 85025 COMPLETE CBC W/AUTO DIFF WBC: CPT | Performed by: PHYSICIAN ASSISTANT

## 2018-08-31 PROCEDURE — 99232 SBSQ HOSP IP/OBS MODERATE 35: CPT | Performed by: NURSE PRACTITIONER

## 2018-08-31 PROCEDURE — 80053 COMPREHEN METABOLIC PANEL: CPT | Performed by: PHYSICIAN ASSISTANT

## 2018-08-31 PROCEDURE — 94760 N-INVAS EAR/PLS OXIMETRY 1: CPT

## 2018-08-31 RX ORDER — ALPRAZOLAM 0.25 MG/1
0.25 TABLET ORAL
Status: DISCONTINUED | OUTPATIENT
Start: 2018-08-31 | End: 2018-09-01 | Stop reason: HOSPADM

## 2018-08-31 RX ORDER — CITALOPRAM 10 MG/1
5 TABLET ORAL DAILY
Status: DISCONTINUED | OUTPATIENT
Start: 2018-09-01 | End: 2018-09-01

## 2018-08-31 RX ORDER — FUROSEMIDE 40 MG/1
40 TABLET ORAL DAILY
Status: DISCONTINUED | OUTPATIENT
Start: 2018-08-31 | End: 2018-09-01 | Stop reason: HOSPADM

## 2018-08-31 RX ORDER — FENTANYL 50 UG/H
50 PATCH TRANSDERMAL
Status: DISCONTINUED | OUTPATIENT
Start: 2018-08-31 | End: 2018-09-01 | Stop reason: HOSPADM

## 2018-08-31 RX ORDER — ALPRAZOLAM 0.25 MG/1
0.25 TABLET ORAL ONCE AS NEEDED
Status: COMPLETED | OUTPATIENT
Start: 2018-08-31 | End: 2018-08-31

## 2018-08-31 RX ADMIN — FUROSEMIDE 40 MG: 40 TABLET ORAL at 09:56

## 2018-08-31 RX ADMIN — DOCUSATE SODIUM 100 MG: 100 CAPSULE, LIQUID FILLED ORAL at 09:31

## 2018-08-31 RX ADMIN — OXYCODONE HYDROCHLORIDE 5 MG: 5 TABLET ORAL at 13:57

## 2018-08-31 RX ADMIN — DIVALPROEX SODIUM 250 MG: 250 TABLET, DELAYED RELEASE ORAL at 09:31

## 2018-08-31 RX ADMIN — ALPRAZOLAM 0.25 MG: 0.25 TABLET ORAL at 21:18

## 2018-08-31 RX ADMIN — LOSARTAN POTASSIUM 50 MG: 50 TABLET ORAL at 09:31

## 2018-08-31 RX ADMIN — ACETAMINOPHEN 650 MG: 325 TABLET, FILM COATED ORAL at 13:55

## 2018-08-31 RX ADMIN — ACETAMINOPHEN 650 MG: 325 TABLET, FILM COATED ORAL at 21:18

## 2018-08-31 RX ADMIN — CITALOPRAM 10 MG: 10 TABLET ORAL at 09:31

## 2018-08-31 RX ADMIN — DIVALPROEX SODIUM 250 MG: 250 TABLET, DELAYED RELEASE ORAL at 21:18

## 2018-08-31 RX ADMIN — DOCUSATE SODIUM 100 MG: 100 CAPSULE, LIQUID FILLED ORAL at 17:05

## 2018-08-31 RX ADMIN — AMLODIPINE BESYLATE 10 MG: 10 TABLET ORAL at 09:31

## 2018-08-31 RX ADMIN — QUETIAPINE FUMARATE 12.5 MG: 25 TABLET ORAL at 21:18

## 2018-08-31 RX ADMIN — LIDOCAINE 1 PATCH: 50 PATCH TOPICAL at 09:30

## 2018-08-31 RX ADMIN — FENTANYL 50 MCG: 50 PATCH TRANSDERMAL at 09:56

## 2018-08-31 RX ADMIN — ACETAMINOPHEN 650 MG: 325 TABLET, FILM COATED ORAL at 05:42

## 2018-08-31 RX ADMIN — METHOCARBAMOL 500 MG: 500 TABLET ORAL at 16:33

## 2018-08-31 RX ADMIN — METHOCARBAMOL 500 MG: 500 TABLET ORAL at 09:32

## 2018-08-31 RX ADMIN — OXYCODONE HYDROCHLORIDE 5 MG: 5 TABLET ORAL at 09:31

## 2018-08-31 RX ADMIN — NYSTATIN 1 APPLICATION: 100000 CREAM TOPICAL at 09:51

## 2018-08-31 RX ADMIN — NYSTATIN 1 APPLICATION: 100000 CREAM TOPICAL at 17:05

## 2018-08-31 RX ADMIN — LATANOPROST 1 DROP: 50 SOLUTION OPHTHALMIC at 21:18

## 2018-08-31 RX ADMIN — RIVAROXABAN 20 MG: 20 TABLET, FILM COATED ORAL at 16:33

## 2018-08-31 RX ADMIN — PANTOPRAZOLE SODIUM 40 MG: 40 TABLET, DELAYED RELEASE ORAL at 05:42

## 2018-08-31 RX ADMIN — ALPRAZOLAM 0.25 MG: 0.25 TABLET ORAL at 17:35

## 2018-09-01 ENCOUNTER — HOSPITAL ENCOUNTER (INPATIENT)
Facility: HOSPITAL | Age: 78
LOS: 27 days | Discharge: NURSING, REHAB OR LONG-TERM CARE | DRG: 885 | End: 2018-09-28
Attending: PSYCHIATRY & NEUROLOGY | Admitting: PSYCHIATRY & NEUROLOGY
Payer: COMMERCIAL

## 2018-09-01 VITALS
HEART RATE: 94 BPM | OXYGEN SATURATION: 99 % | SYSTOLIC BLOOD PRESSURE: 149 MMHG | WEIGHT: 268.96 LBS | BODY MASS INDEX: 43.23 KG/M2 | DIASTOLIC BLOOD PRESSURE: 78 MMHG | RESPIRATION RATE: 22 BRPM | TEMPERATURE: 99.3 F | HEIGHT: 66 IN

## 2018-09-01 DIAGNOSIS — G93.40 ACUTE ENCEPHALOPATHY: ICD-10-CM

## 2018-09-01 DIAGNOSIS — T45.515A WARFARIN-INDUCED COAGULOPATHY (HCC): ICD-10-CM

## 2018-09-01 DIAGNOSIS — M54.50 CHRONIC MIDLINE LOW BACK PAIN WITHOUT SCIATICA: ICD-10-CM

## 2018-09-01 DIAGNOSIS — G31.09 BEHAVIORAL VARIANT FRONTOTEMPORAL DEMENTIA (HCC): Chronic | ICD-10-CM

## 2018-09-01 DIAGNOSIS — E87.6 HYPOKALEMIA: ICD-10-CM

## 2018-09-01 DIAGNOSIS — R82.79 URINE CULTURE POSITIVE: ICD-10-CM

## 2018-09-01 DIAGNOSIS — F11.20 CONTINUOUS OPIOID DEPENDENCE (HCC): ICD-10-CM

## 2018-09-01 DIAGNOSIS — D64.9 CHRONIC ANEMIA: ICD-10-CM

## 2018-09-01 DIAGNOSIS — I73.9 PAD (PERIPHERAL ARTERY DISEASE) (HCC): Chronic | ICD-10-CM

## 2018-09-01 DIAGNOSIS — R41.82 MENTAL STATUS CHANGE: ICD-10-CM

## 2018-09-01 DIAGNOSIS — E66.01 MORBID OBESITY DUE TO EXCESS CALORIES (HCC): Chronic | ICD-10-CM

## 2018-09-01 DIAGNOSIS — R09.02 HYPOXIA: ICD-10-CM

## 2018-09-01 DIAGNOSIS — R41.0 DELIRIUM: ICD-10-CM

## 2018-09-01 DIAGNOSIS — J44.9 COPD (CHRONIC OBSTRUCTIVE PULMONARY DISEASE) (HCC): ICD-10-CM

## 2018-09-01 DIAGNOSIS — G89.29 CHRONIC MIDLINE LOW BACK PAIN WITHOUT SCIATICA: ICD-10-CM

## 2018-09-01 DIAGNOSIS — I10 HYPERTENSION: Chronic | ICD-10-CM

## 2018-09-01 DIAGNOSIS — F03.91 DEMENTIA WITH BEHAVIORAL DISTURBANCE (HCC): ICD-10-CM

## 2018-09-01 DIAGNOSIS — S81.801A WOUND OF RIGHT LOWER EXTREMITY, INITIAL ENCOUNTER: ICD-10-CM

## 2018-09-01 DIAGNOSIS — G93.41 ACUTE METABOLIC ENCEPHALOPATHY: ICD-10-CM

## 2018-09-01 DIAGNOSIS — G89.29 CHRONIC PAIN: ICD-10-CM

## 2018-09-01 DIAGNOSIS — F02.81 BEHAVIORAL VARIANT FRONTOTEMPORAL DEMENTIA (HCC): Chronic | ICD-10-CM

## 2018-09-01 DIAGNOSIS — I48.91 ATRIAL FIBRILLATION, UNSPECIFIED TYPE (HCC): Primary | Chronic | ICD-10-CM

## 2018-09-01 DIAGNOSIS — N30.00 ACUTE CYSTITIS WITHOUT HEMATURIA: ICD-10-CM

## 2018-09-01 DIAGNOSIS — D68.32 WARFARIN-INDUCED COAGULOPATHY (HCC): ICD-10-CM

## 2018-09-01 DIAGNOSIS — F33.1 MODERATE EPISODE OF RECURRENT MAJOR DEPRESSIVE DISORDER (HCC): ICD-10-CM

## 2018-09-01 LAB
ATRIAL RATE: 258 BPM
BACTERIA BLD CULT: NORMAL
BACTERIA BLD CULT: NORMAL
QRS AXIS: 10 DEGREES
QRSD INTERVAL: 98 MS
QT INTERVAL: 408 MS
QTC INTERVAL: 431 MS
T WAVE AXIS: 0 DEGREES
VENTRICULAR RATE: 67 BPM

## 2018-09-01 PROCEDURE — 99239 HOSP IP/OBS DSCHRG MGMT >30: CPT | Performed by: NURSE PRACTITIONER

## 2018-09-01 PROCEDURE — 93010 ELECTROCARDIOGRAM REPORT: CPT | Performed by: INTERNAL MEDICINE

## 2018-09-01 PROCEDURE — 93005 ELECTROCARDIOGRAM TRACING: CPT

## 2018-09-01 RX ORDER — OLANZAPINE 10 MG/1
2.5 INJECTION, POWDER, LYOPHILIZED, FOR SOLUTION INTRAMUSCULAR ONCE
Status: COMPLETED | OUTPATIENT
Start: 2018-09-01 | End: 2018-09-01

## 2018-09-01 RX ORDER — ALPRAZOLAM 0.25 MG/1
0.25 TABLET ORAL ONCE
Status: COMPLETED | OUTPATIENT
Start: 2018-09-01 | End: 2018-09-01

## 2018-09-01 RX ORDER — QUETIAPINE FUMARATE 25 MG/1
25 TABLET, FILM COATED ORAL 2 TIMES DAILY
Status: DISCONTINUED | OUTPATIENT
Start: 2018-09-01 | End: 2018-09-01 | Stop reason: HOSPADM

## 2018-09-01 RX ORDER — MAGNESIUM HYDROXIDE/ALUMINUM HYDROXICE/SIMETHICONE 120; 1200; 1200 MG/30ML; MG/30ML; MG/30ML
15 SUSPENSION ORAL EVERY 4 HOURS PRN
Status: DISCONTINUED | OUTPATIENT
Start: 2018-09-01 | End: 2018-09-28 | Stop reason: HOSPADM

## 2018-09-01 RX ORDER — OLANZAPINE 10 MG/1
5 INJECTION, POWDER, LYOPHILIZED, FOR SOLUTION INTRAMUSCULAR ONCE
Status: COMPLETED | OUTPATIENT
Start: 2018-09-01 | End: 2018-09-01

## 2018-09-01 RX ORDER — QUETIAPINE FUMARATE 25 MG/1
25 TABLET, FILM COATED ORAL
Status: DISCONTINUED | OUTPATIENT
Start: 2018-09-01 | End: 2018-09-01

## 2018-09-01 RX ORDER — RISPERIDONE 0.5 MG/1
0.5 TABLET, ORALLY DISINTEGRATING ORAL EVERY 8 HOURS PRN
Status: DISCONTINUED | OUTPATIENT
Start: 2018-09-01 | End: 2018-09-13

## 2018-09-01 RX ORDER — ACETAMINOPHEN 325 MG/1
325 TABLET ORAL EVERY 4 HOURS PRN
Status: DISCONTINUED | OUTPATIENT
Start: 2018-09-01 | End: 2018-09-02

## 2018-09-01 RX ORDER — HALOPERIDOL 5 MG/ML
2 INJECTION INTRAMUSCULAR EVERY 8 HOURS PRN
Status: CANCELLED | OUTPATIENT
Start: 2018-09-01

## 2018-09-01 RX ORDER — HYDROXYZINE HYDROCHLORIDE 25 MG/1
25 TABLET, FILM COATED ORAL EVERY 6 HOURS PRN
Status: CANCELLED | OUTPATIENT
Start: 2018-09-01

## 2018-09-01 RX ORDER — HALOPERIDOL 2 MG/1
2 TABLET ORAL EVERY 8 HOURS PRN
Status: DISCONTINUED | OUTPATIENT
Start: 2018-09-01 | End: 2018-09-08

## 2018-09-01 RX ORDER — HALOPERIDOL 5 MG/ML
2 INJECTION INTRAMUSCULAR EVERY 8 HOURS PRN
Status: DISCONTINUED | OUTPATIENT
Start: 2018-09-01 | End: 2018-09-08

## 2018-09-01 RX ORDER — RISPERIDONE 0.5 MG/1
0.5 TABLET, ORALLY DISINTEGRATING ORAL EVERY 8 HOURS PRN
Status: CANCELLED | OUTPATIENT
Start: 2018-09-01

## 2018-09-01 RX ORDER — OLANZAPINE 10 MG/1
5 INJECTION, POWDER, LYOPHILIZED, FOR SOLUTION INTRAMUSCULAR 2 TIMES DAILY PRN
Status: DISCONTINUED | OUTPATIENT
Start: 2018-09-02 | End: 2018-09-01 | Stop reason: HOSPADM

## 2018-09-01 RX ORDER — OXYCODONE HYDROCHLORIDE 5 MG/1
5 TABLET ORAL EVERY 4 HOURS PRN
Status: DISCONTINUED | OUTPATIENT
Start: 2018-09-01 | End: 2018-09-01 | Stop reason: HOSPADM

## 2018-09-01 RX ORDER — BENZTROPINE MESYLATE 1 MG/ML
1 INJECTION INTRAMUSCULAR; INTRAVENOUS EVERY 8 HOURS PRN
Status: CANCELLED | OUTPATIENT
Start: 2018-09-01

## 2018-09-01 RX ORDER — HYDROXYZINE HYDROCHLORIDE 25 MG/1
25 TABLET, FILM COATED ORAL EVERY 6 HOURS PRN
Status: DISCONTINUED | OUTPATIENT
Start: 2018-09-01 | End: 2018-09-28 | Stop reason: HOSPADM

## 2018-09-01 RX ORDER — HALOPERIDOL 1 MG/1
2 TABLET ORAL EVERY 8 HOURS PRN
Status: CANCELLED | OUTPATIENT
Start: 2018-09-01

## 2018-09-01 RX ORDER — ACETAMINOPHEN 325 MG/1
325 TABLET ORAL EVERY 4 HOURS PRN
Status: CANCELLED | OUTPATIENT
Start: 2018-09-01

## 2018-09-01 RX ORDER — BENZTROPINE MESYLATE 1 MG/1
1 TABLET ORAL EVERY 8 HOURS PRN
Status: CANCELLED | OUTPATIENT
Start: 2018-09-01

## 2018-09-01 RX ORDER — BENZTROPINE MESYLATE 1 MG/1
1 TABLET ORAL EVERY 8 HOURS PRN
Status: DISCONTINUED | OUTPATIENT
Start: 2018-09-01 | End: 2018-09-28 | Stop reason: HOSPADM

## 2018-09-01 RX ORDER — MAGNESIUM HYDROXIDE/ALUMINUM HYDROXICE/SIMETHICONE 120; 1200; 1200 MG/30ML; MG/30ML; MG/30ML
15 SUSPENSION ORAL EVERY 4 HOURS PRN
Status: CANCELLED | OUTPATIENT
Start: 2018-09-01

## 2018-09-01 RX ORDER — BENZTROPINE MESYLATE 1 MG/ML
1 INJECTION INTRAMUSCULAR; INTRAVENOUS EVERY 8 HOURS PRN
Status: DISCONTINUED | OUTPATIENT
Start: 2018-09-01 | End: 2018-09-28 | Stop reason: HOSPADM

## 2018-09-01 RX ADMIN — AMLODIPINE BESYLATE 10 MG: 10 TABLET ORAL at 09:31

## 2018-09-01 RX ADMIN — ALPRAZOLAM 0.25 MG: 0.25 TABLET ORAL at 10:20

## 2018-09-01 RX ADMIN — OXYCODONE HYDROCHLORIDE 5 MG: 5 TABLET ORAL at 09:30

## 2018-09-01 RX ADMIN — NYSTATIN 1 APPLICATION: 100000 CREAM TOPICAL at 09:32

## 2018-09-01 RX ADMIN — OLANZAPINE 5 MG: 10 INJECTION, POWDER, FOR SOLUTION INTRAMUSCULAR at 17:28

## 2018-09-01 RX ADMIN — METHOCARBAMOL 500 MG: 500 TABLET ORAL at 17:27

## 2018-09-01 RX ADMIN — OLANZAPINE 5 MG: 10 INJECTION, POWDER, FOR SOLUTION INTRAMUSCULAR at 11:09

## 2018-09-01 RX ADMIN — ACETAMINOPHEN 650 MG: 325 TABLET, FILM COATED ORAL at 21:34

## 2018-09-01 RX ADMIN — LIDOCAINE 1 PATCH: 50 PATCH TOPICAL at 09:32

## 2018-09-01 RX ADMIN — LATANOPROST 1 DROP: 50 SOLUTION OPHTHALMIC at 21:34

## 2018-09-01 RX ADMIN — METHOCARBAMOL 500 MG: 500 TABLET ORAL at 09:30

## 2018-09-01 RX ADMIN — NYSTATIN 1 APPLICATION: 100000 CREAM TOPICAL at 17:29

## 2018-09-01 RX ADMIN — FUROSEMIDE 40 MG: 40 TABLET ORAL at 09:31

## 2018-09-01 RX ADMIN — RIVAROXABAN 20 MG: 20 TABLET, FILM COATED ORAL at 17:27

## 2018-09-01 RX ADMIN — QUETIAPINE FUMARATE 25 MG: 25 TABLET ORAL at 17:27

## 2018-09-01 RX ADMIN — ALPRAZOLAM 0.25 MG: 0.25 TABLET ORAL at 21:34

## 2018-09-01 RX ADMIN — DOCUSATE SODIUM 100 MG: 100 CAPSULE, LIQUID FILLED ORAL at 17:27

## 2018-09-01 RX ADMIN — DIVALPROEX SODIUM 250 MG: 250 TABLET, DELAYED RELEASE ORAL at 09:32

## 2018-09-01 RX ADMIN — LOSARTAN POTASSIUM 50 MG: 50 TABLET ORAL at 09:31

## 2018-09-01 RX ADMIN — OXYCODONE HYDROCHLORIDE 5 MG: 5 TABLET ORAL at 13:33

## 2018-09-01 RX ADMIN — DIVALPROEX SODIUM 250 MG: 250 TABLET, DELAYED RELEASE ORAL at 21:34

## 2018-09-01 RX ADMIN — DOCUSATE SODIUM 100 MG: 100 CAPSULE, LIQUID FILLED ORAL at 09:32

## 2018-09-01 RX ADMIN — ACETAMINOPHEN 650 MG: 325 TABLET, FILM COATED ORAL at 13:33

## 2018-09-01 RX ADMIN — OLANZAPINE 2.5 MG: 10 INJECTION, POWDER, FOR SOLUTION INTRAMUSCULAR at 13:54

## 2018-09-01 RX ADMIN — CITALOPRAM HYDROBROMIDE 5 MG: 10 TABLET ORAL at 09:31

## 2018-09-02 PROBLEM — F33.1 MODERATE EPISODE OF RECURRENT MAJOR DEPRESSIVE DISORDER (HCC): Status: ACTIVE | Noted: 2018-07-23

## 2018-09-02 PROCEDURE — 99232 SBSQ HOSP IP/OBS MODERATE 35: CPT | Performed by: NURSE PRACTITIONER

## 2018-09-02 PROCEDURE — 93005 ELECTROCARDIOGRAM TRACING: CPT

## 2018-09-02 PROCEDURE — 99223 1ST HOSP IP/OBS HIGH 75: CPT | Performed by: STUDENT IN AN ORGANIZED HEALTH CARE EDUCATION/TRAINING PROGRAM

## 2018-09-02 RX ORDER — QUETIAPINE FUMARATE 25 MG/1
25 TABLET, FILM COATED ORAL 2 TIMES DAILY
Status: DISCONTINUED | OUTPATIENT
Start: 2018-09-02 | End: 2018-09-06

## 2018-09-02 RX ORDER — LORAZEPAM 2 MG/ML
1 INJECTION INTRAMUSCULAR EVERY 6 HOURS PRN
Status: DISCONTINUED | OUTPATIENT
Start: 2018-09-02 | End: 2018-09-02

## 2018-09-02 RX ORDER — ALENDRONATE SODIUM 70 MG/1
70 TABLET ORAL
Status: DISCONTINUED | OUTPATIENT
Start: 2018-09-07 | End: 2018-09-08 | Stop reason: RX

## 2018-09-02 RX ORDER — FUROSEMIDE 40 MG/1
40 TABLET ORAL DAILY
Status: DISCONTINUED | OUTPATIENT
Start: 2018-09-02 | End: 2018-09-28 | Stop reason: HOSPADM

## 2018-09-02 RX ORDER — DIVALPROEX SODIUM 250 MG/1
250 TABLET, DELAYED RELEASE ORAL EVERY 12 HOURS SCHEDULED
Status: DISCONTINUED | OUTPATIENT
Start: 2018-09-02 | End: 2018-09-04

## 2018-09-02 RX ORDER — FENTANYL 50 UG/H
50 PATCH TRANSDERMAL
Status: DISCONTINUED | OUTPATIENT
Start: 2018-09-02 | End: 2018-09-18

## 2018-09-02 RX ORDER — LIDOCAINE 50 MG/G
1 PATCH TOPICAL DAILY
Status: DISCONTINUED | OUTPATIENT
Start: 2018-09-02 | End: 2018-09-28 | Stop reason: HOSPADM

## 2018-09-02 RX ORDER — LANOLIN ALCOHOL/MO/W.PET/CERES
3 CREAM (GRAM) TOPICAL
Status: DISCONTINUED | OUTPATIENT
Start: 2018-09-02 | End: 2018-09-16

## 2018-09-02 RX ORDER — ACETAMINOPHEN 325 MG/1
650 TABLET ORAL EVERY 8 HOURS SCHEDULED
Status: DISCONTINUED | OUTPATIENT
Start: 2018-09-02 | End: 2018-09-28 | Stop reason: HOSPADM

## 2018-09-02 RX ORDER — POLYETHYLENE GLYCOL 3350 17 G/17G
17 POWDER, FOR SOLUTION ORAL DAILY
Status: DISCONTINUED | OUTPATIENT
Start: 2018-09-02 | End: 2018-09-02

## 2018-09-02 RX ORDER — LOSARTAN POTASSIUM 25 MG/1
25 TABLET ORAL DAILY
Status: DISCONTINUED | OUTPATIENT
Start: 2018-09-02 | End: 2018-09-28 | Stop reason: HOSPADM

## 2018-09-02 RX ORDER — SENNOSIDES 8.6 MG
2 TABLET ORAL
Status: DISCONTINUED | OUTPATIENT
Start: 2018-09-02 | End: 2018-09-02

## 2018-09-02 RX ORDER — MELATONIN
1000 DAILY
Status: DISCONTINUED | OUTPATIENT
Start: 2018-09-02 | End: 2018-09-28 | Stop reason: HOSPADM

## 2018-09-02 RX ORDER — QUETIAPINE FUMARATE 25 MG/1
25 TABLET, FILM COATED ORAL
Status: DISCONTINUED | OUTPATIENT
Start: 2018-09-02 | End: 2018-09-11

## 2018-09-02 RX ORDER — PANTOPRAZOLE SODIUM 40 MG/1
40 TABLET, DELAYED RELEASE ORAL
Status: DISCONTINUED | OUTPATIENT
Start: 2018-09-02 | End: 2018-09-23

## 2018-09-02 RX ORDER — LATANOPROST 50 UG/ML
1 SOLUTION/ DROPS OPHTHALMIC
Status: DISCONTINUED | OUTPATIENT
Start: 2018-09-02 | End: 2018-09-28 | Stop reason: HOSPADM

## 2018-09-02 RX ORDER — ZOLPIDEM TARTRATE 5 MG/1
2.5 TABLET ORAL
Status: DISCONTINUED | OUTPATIENT
Start: 2018-09-02 | End: 2018-09-02

## 2018-09-02 RX ORDER — GABAPENTIN 300 MG/1
600 CAPSULE ORAL 3 TIMES DAILY
Status: DISCONTINUED | OUTPATIENT
Start: 2018-09-02 | End: 2018-09-11

## 2018-09-02 RX ORDER — QUETIAPINE FUMARATE 25 MG/1
25 TABLET, FILM COATED ORAL
Status: DISCONTINUED | OUTPATIENT
Start: 2018-09-02 | End: 2018-09-02

## 2018-09-02 RX ORDER — ALPRAZOLAM 0.25 MG/1
0.25 TABLET ORAL
Status: DISCONTINUED | OUTPATIENT
Start: 2018-09-02 | End: 2018-09-11

## 2018-09-02 RX ORDER — METHOCARBAMOL 500 MG/1
500 TABLET, FILM COATED ORAL EVERY 6 HOURS SCHEDULED
Status: DISCONTINUED | OUTPATIENT
Start: 2018-09-02 | End: 2018-09-28 | Stop reason: HOSPADM

## 2018-09-02 RX ORDER — LORAZEPAM 0.5 MG/1
0.5 TABLET ORAL EVERY 6 HOURS PRN
Status: DISCONTINUED | OUTPATIENT
Start: 2018-09-02 | End: 2018-09-02

## 2018-09-02 RX ORDER — OXYCODONE HYDROCHLORIDE 5 MG/1
5 TABLET ORAL EVERY 4 HOURS PRN
Status: DISCONTINUED | OUTPATIENT
Start: 2018-09-02 | End: 2018-09-04

## 2018-09-02 RX ORDER — AMLODIPINE BESYLATE 10 MG/1
10 TABLET ORAL DAILY
Status: DISCONTINUED | OUTPATIENT
Start: 2018-09-02 | End: 2018-09-28 | Stop reason: HOSPADM

## 2018-09-02 RX ORDER — DOCUSATE SODIUM 100 MG/1
100 CAPSULE, LIQUID FILLED ORAL 2 TIMES DAILY
Status: DISCONTINUED | OUTPATIENT
Start: 2018-09-02 | End: 2018-09-22

## 2018-09-02 RX ADMIN — LOSARTAN POTASSIUM 25 MG: 25 TABLET, FILM COATED ORAL at 10:12

## 2018-09-02 RX ADMIN — LIDOCAINE 1 PATCH: 50 PATCH CUTANEOUS at 09:16

## 2018-09-02 RX ADMIN — OXYCODONE HYDROCHLORIDE 5 MG: 5 TABLET ORAL at 17:29

## 2018-09-02 RX ADMIN — Medication 1 SPRAY: at 10:13

## 2018-09-02 RX ADMIN — FUROSEMIDE 40 MG: 40 TABLET ORAL at 09:15

## 2018-09-02 RX ADMIN — QUETIAPINE 25 MG: 25 TABLET ORAL at 13:36

## 2018-09-02 RX ADMIN — ACETAMINOPHEN 650 MG: 325 TABLET ORAL at 09:15

## 2018-09-02 RX ADMIN — OXYCODONE HYDROCHLORIDE 5 MG: 5 TABLET ORAL at 09:16

## 2018-09-02 RX ADMIN — RIVAROXABAN 20 MG: 20 TABLET, FILM COATED ORAL at 17:29

## 2018-09-02 RX ADMIN — METHOCARBAMOL 500 MG: 500 TABLET ORAL at 09:15

## 2018-09-02 RX ADMIN — VITAMIN D, TAB 1000IU (100/BT) 1000 UNITS: 25 TAB at 09:16

## 2018-09-02 RX ADMIN — MELATONIN 3 MG: at 22:15

## 2018-09-02 RX ADMIN — METHOCARBAMOL 500 MG: 500 TABLET ORAL at 23:26

## 2018-09-02 RX ADMIN — ACETAMINOPHEN 650 MG: 325 TABLET ORAL at 22:15

## 2018-09-02 RX ADMIN — OXYCODONE HYDROCHLORIDE 5 MG: 5 TABLET ORAL at 22:14

## 2018-09-02 RX ADMIN — DIVALPROEX SODIUM 250 MG: 250 TABLET, DELAYED RELEASE ORAL at 09:15

## 2018-09-02 RX ADMIN — DIVALPROEX SODIUM 250 MG: 250 TABLET, DELAYED RELEASE ORAL at 22:15

## 2018-09-02 RX ADMIN — LATANOPROST 1 DROP: 50 SOLUTION OPHTHALMIC at 22:18

## 2018-09-02 RX ADMIN — ACETAMINOPHEN 650 MG: 325 TABLET ORAL at 13:36

## 2018-09-02 RX ADMIN — AMLODIPINE BESYLATE 10 MG: 10 TABLET ORAL at 09:15

## 2018-09-02 RX ADMIN — FENTANYL 50 MCG: 50 PATCH, EXTENDED RELEASE TRANSDERMAL at 09:16

## 2018-09-02 RX ADMIN — GABAPENTIN 600 MG: 300 CAPSULE ORAL at 22:15

## 2018-09-02 RX ADMIN — METHOCARBAMOL 500 MG: 500 TABLET ORAL at 17:28

## 2018-09-02 RX ADMIN — GABAPENTIN 600 MG: 300 CAPSULE ORAL at 09:15

## 2018-09-02 RX ADMIN — RISPERIDONE 0.5 MG: 0.5 TABLET, ORALLY DISINTEGRATING ORAL at 18:19

## 2018-09-02 RX ADMIN — QUETIAPINE 25 MG: 25 TABLET ORAL at 17:28

## 2018-09-02 RX ADMIN — PANTOPRAZOLE SODIUM 40 MG: 40 TABLET, DELAYED RELEASE ORAL at 09:16

## 2018-09-02 RX ADMIN — LORAZEPAM 0.5 MG: 0.5 TABLET ORAL at 10:15

## 2018-09-02 RX ADMIN — HALOPERIDOL LACTATE 2 MG: 5 INJECTION, SOLUTION INTRAMUSCULAR at 11:20

## 2018-09-02 RX ADMIN — GABAPENTIN 600 MG: 300 CAPSULE ORAL at 17:29

## 2018-09-02 NOTE — PLAN OF CARE
Alteration in Orientation     Treatment Goal: Demonstrate a reduction of confusion and improved orientation to person, place, time and/or situation upon discharge, according to optimum baseline/ability Not Progressing     Express concerns related to confused thinking related to: Not Progressing        Alteration in Thoughts and Perception     Treatment Goal: Gain control of psychotic behaviors/thinking, reduce/eliminate presenting symptoms and demonstrate improved reality functioning upon discharge Not Progressing     Refrain from acting on delusional thinking/internal stimuli Not Progressing        Anxiety     Anxiety is at manageable level Not Progressing        Risk for Violence/Aggression Toward Others     Control angry outbursts Not Progressing        Patient is labile, delusional and hallucinating    Alteration in Thoughts and Perception     Agree to be compliant with medication regime, as prescribed and report medication side effects Progressing        Prexisting or High Potential for Compromised Skin Integrity     Skin integrity is maintained or improved Progressing

## 2018-09-02 NOTE — CONSULTS
[address] Internal Medicine    Consult- Wojciech Clarke 1940, 70 y o  female MRN: [id number]    Unit/Bed#: CY3 571-01 Encounter: [id number]    Primary Care Provider: Dari Hudson MD   Date and time admitted to hospital: 9/1/2018 11:11 PM      Inpatient consult to Internal Medicine  Consult performed by: Lesley Nuno ordered by: Kirby Bernstein        Will sign off, please call with questions  * Delirium   Assessment & Plan    · Plan as per primary team   Infection has been extensively ruled out during recent medical admission  · Would avoid doing any further urine studies unless there is strong suspicion for infection such has fevers, chills, leukocytosis, specific urinary complaints  · Avoid deliriogenic meds, anticholinergic has been stopped  · Safety precautions  Dementia with behavioral disturbance   Assessment & Plan    · Re-orient PRN  · Safety precautions  · Additional plan as per primary team        Atrial fibrillation [facility])   Assessment & Plan    · Currently rate controlled without medications  · Continue Xarelto for stroke prevention  · Monitor QTc with psychiatric meds  · Will check EKG today  Hypertension   Assessment & Plan    · Blood pressures are acceptable  · Continue Norvasc and Cozaar  · Monitor per unit routine        Continuous opioid dependence ([facility] [address] )   Assessment & Plan    · Longstanding history  Continue Fentanyl patch Q72 hours  · Continue scheduled Tylenol Q8 hours along with lidocaine patch and Oxycodone PRN  · Continue Robaxin Q6 hours  · Bowel regimen  Patient has been constipated for several days, likely opioid induced, per nursing staff patient had a large loose bowel movement today  Will stop Miralax and Senna  Continue colace, hold for loose stools  Recommendations for Discharge:  · Follow up with PCP  · Additional neuro cog testing as outpatient  Counseling / Coordination of Care Time: 45 minutes  Greater than 50% of total time spent on patient counseling and coordination of care  Collaboration of Care: Were Recommendations Directly Discussed with Primary Treatment Team? - Yes Yuki Chan    History of Present Illness:    Nestor Contreras is a 70 y o  female who is originally admitted to the medical service and later transitioned to psychiatric service on 9/1/2018 due to ongoing delirium, agitation, anxiety and aggressive behavior  We are consulted for medical management  I discussed the patient extensively with the [address] on psych Mamadou Campbell  Review of Systems:    Review of Systems   Respiratory: Negative for shortness of breath  Cardiovascular: Negative for chest pain and palpitations  Gastrointestinal: Negative for abdominal pain  Genitourinary: Negative for difficulty urinating, dysuria, frequency and urgency  Musculoskeletal: Positive for back pain  Neurological: Negative for dizziness and light-headedness  Psychiatric/Behavioral: The patient is nervous/anxious          Past Medical and Surgical History:     Past Medical History:   Diagnosis Date    [facility]     Chronic pain     Chronic respiratory failure with hypoxia (HCC)     COPD (chronic obstructive pulmonary disease) (HCC)     Dementia     Dorsalgia, unspecified     Edema     Encephalopathy     GERD (gastroesophageal reflux disease)     Hypertension     Morbid obesity (HCC)     Muscle weakness (generalized)     Opioid dependence (HCC)     Overactive bladder     Pneumonia     Psychiatric disorder     anxiety, bipolar    Radiculopathy of thoracolumbar region     Sciatica     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     Urinary incontinence     UTI (urinary tract infection)        Past Surgical History:   Procedure Laterality Date    APPENDECTOMY      BACK SURGERY      CHOLECYSTECTOMY      KNEE SURGERY         Meds/Allergies:    PTA meds:   Prior to Admission Medications Prescriptions Last Dose Informant Patient Reported? Taking? ALPRAZolam (XANAX) 0 25 mg tablet Unknown at Unknown time  Yes No   Sig: Take 0 25 mg by mouth daily at bedtime as needed for anxiety     BISACODYL RE Unknown at Unknown time  Yes No   Sig: Insert 10 mg into the rectum as needed Indications: hs as needed at bedtime when the patient has not had bm in 4 days  Cholecalciferol 1000 units tablet Unknown at Unknown time  Yes No   Sig: Take 1 tablet by mouth daily  Cranberry 425 MG CAPS Unknown at Unknown time  Yes No   Sig: Take 1 tablet by mouth daily Indications: once daily for chronic UTI  Lactobacillus (ACIDOPHILUS) 100 MG CAPS Unknown at Unknown time  Yes No   Sig: Take 1 capsule by mouth 2 (two) times a day   OXYGEN-HELIUM IN Unknown at Unknown time  Yes No   Sig: Inhale 3 L as needed 3 L NC O2 as needed for SOB for SpO2 less than 90   QUEtiapine (SEROquel) 25 mg tablet Unknown at Unknown time  No No   Sig: Take 0 5 tablets (12 5 mg total) by mouth daily at bedtime   albuterol (2 5 mg/3 mL) 0 083 % nebulizer solution Unknown at Unknown time  Yes No   Sig: Take 2 5 mg by nebulization every 4 (four) hours as needed for wheezing  alendronate (FOSAMAX) 70 mg tablet Unknown at Unknown time  Yes No   Sig: Take 70 mg by mouth once a week Friday    aluminum-magnesium hydroxide-simethicone (MYLANTA) 200-200-20 mg/5 mL suspension Unknown at Unknown time  Yes No   Sig: Take 15 mL by mouth 2 (two) times a day as needed for indigestion or heartburn  amLODIPine (NORVASC) 10 mg tablet Unknown at Unknown time  Yes No   Sig: Take 10 mg by mouth daily     calcium carbonate (TUMS) 500 mg chewable tablet Unknown at Unknown time  No No   Sig: Chew 2 tablets daily as needed for indigestion or heartburn   carboxymethylcellulose 0 5 % SOLN Unknown at Unknown time  Yes No   Sig: Administer 1 drop to both eyes 2 (two) times a day   citalopram (CeleXA) 10 mg tablet Unknown at Unknown time  Yes No   Sig: Take 10 mg by mouth daily   diazepam (VALIUM) 5 mg tablet   No No   Sig: Take 1 tablet (5 mg total) by mouth 4 (four) times a day for 10 days   docusate sodium (COLACE) 100 mg capsule Unknown at Unknown time  No No   Sig: Take 1 capsule by mouth 2 (two) times a day as needed for constipation   Patient taking differently: Take 100 mg by mouth 2 (two) times a day     fentaNYL (DURAGESIC) 50 mcg/hr Unknown at Unknown time  Yes No   Sig: Place 1 patch on the skin every third day   furosemide (LASIX) 40 mg tablet Unknown at Unknown time  Yes No   Sig: Take 40 mg by mouth daily   gabapentin (NEURONTIN) 600 MG tablet Unknown at Unknown time  Yes No   Sig: Take 600 mg by mouth 3 (three) times a day  ibuprofen (MOTRIN) 400 mg tablet Unknown at Unknown time  No No   Sig: Take 1 tablet (400 mg total) by mouth every 6 (six) hours as needed for mild pain   latanoprost (XALATAN) 0 005 % ophthalmic solution Unknown at Unknown time  Yes No   Sig: Administer 1 drop to both eyes daily at bedtime   losartan (COZAAR) 50 mg tablet Unknown at Unknown time  Yes No   Sig: Take 50 mg by mouth daily  magnesium hydroxide (MILK OF MAGNESIA) 400 mg/5 mL oral suspension Unknown at Unknown time  Yes No   Sig: Take 30 mL by mouth daily as needed for constipation Indications: 400mg/5ml (30ml) by mouth as needed for constipation at bedtime when no bm in 3 days  menthol-methyl salicylate (BENGAY) 48-51 % cream Unknown at Unknown time  No No   Sig: Apply topically 4 (four) times a day as needed (back pain)   methocarbamol (ROBAXIN) 500 mg tablet Unknown at Unknown time  Yes No   Sig: Take 500 mg by mouth 4 (four) times a day   nitroglycerin (NITROSTAT) 0 4 mg SL tablet Unknown at Unknown time  Yes No   Sig: Place 0 4 mg under the tongue every 5 (five) minutes as needed for chest pain  nystatin (MYCOSTATIN) cream Unknown at Unknown time  Yes No   Sig: Apply 1 application topically 2 (two) times a day     omeprazole (PriLOSEC) 20 mg delayed release capsule Unknown at Unknown time  Yes No   Sig: Take 20 mg by mouth daily  oxyCODONE-acetaminophen (PERCOCET) 7 5-325 MG per tablet Unknown at Unknown time  Yes No   Sig: Take 1 tablet by mouth 5 (five) times a day As needed   oxybutynin (DITROPAN XL) 15 MG 24 hr tablet Unknown at Unknown time  No No   Sig: Take 1 tablet (15 mg total) by mouth daily   rivaroxaban (XARELTO) 20 mg tablet Unknown at Unknown time  Yes No   Sig: Take 20 mg by mouth daily with dinner   sodium phosphate-biphosphate (FLEET) 7-19 g 118 mL enema Unknown at Unknown time  Yes No   Sig: Insert 1 application into the rectum daily as needed      Facility-Administered Medications: None       Allergies: Allergies   Allergen Reactions    Aspirin     Iodinated Diagnostic Agents Throat Swelling    Iodine     Nsaids        Social History:     Marital Status:     Substance Use History:   History   Alcohol Use No     History   Smoking Status    Former Smoker    Types: Cigarettes   Smokeless Tobacco    Never Used     History   Drug Use No       Family History:    History reviewed  No pertinent family history  Physical Exam:     Vitals:   Blood Pressure: 130/73 (09/02/18 0744)  Pulse: 88 (09/02/18 0744)  Temperature: 98 1 °F (36 7 °C) (09/02/18 0744)  Temp Source: Tympanic (09/02/18 0744)  Respirations: 20 (09/02/18 0744)  Height: 5' 6 5" (168 9 cm) (09/01/18 2320)  Weight - Scale: 118 kg (261 lb 3 9 oz) (09/01/18 2320)  SpO2: 96 % (09/02/18 0744)    Physical Exam   Constitutional: No distress  Obese    HENT:   Head: Normocephalic  Neck: Normal range of motion  Cardiovascular: Intact distal pulses  An irregularly irregular rhythm present  No murmur heard  Pulmonary/Chest: Effort normal and breath sounds normal    Abdominal: Soft  Bowel sounds are normal    Musculoskeletal: Normal range of motion  She exhibits no edema  Neurological: She is alert  [place] to person only, able to say she is in the hospital but unsure which one    She does know who the president is  Skin: Skin is warm and dry  Psychiatric: Her mood appears anxious  She is agitated  Cognition and memory are impaired  She expresses impulsivity and inappropriate judgment  Patient was very fixated on pain medication during my assessment  She was specifically asking for Xanax and "6 pain pills "    Nursing note and vitals reviewed  Additional Data:     Lab Results: I have personally reviewed pertinent reports  Results from last 7 days  Lab Units 08/31/18  0450   WBC Thousand/uL 5 92   HEMOGLOBIN g/dL 11 8   HEMATOCRIT % 40 2   PLATELETS Thousands/uL 195   NEUTROS PCT % 50   LYMPHS PCT % 31   MONOS PCT % 10   EOS PCT % 8*       Results from last 7 days  Lab Units 08/31/18  0450   SODIUM mmol/L 141   POTASSIUM mmol/L 3 6   CHLORIDE mmol/L 107   CO2 mmol/L 26   BUN mg/dL 10   CREATININE mg/dL 0 87   CALCIUM mg/dL 9 3   ALK PHOS U/L 62   ALT U/L 24   AST U/L 23       Results from last 7 days  Lab Units 08/30/18  1049   INR  1 47*       Imaging: I have personally reviewed pertinent reports  Xr Chest 1 View Portable    Result Date: 8/28/2018  Narrative: CHEST INDICATION:   UTI Change of mental status  COMPARISON:  Chest radiographs July 29, 2018 and CT chest August 30, 2018  EXAM PERFORMED/VIEWS:  XR CHEST PORTABLE FINDINGS: The heart is enlarged  Atherosclerotic changes in the aorta  Lung volumes diminished  Elevation of right hemidiaphragm  Near complete resolution of multifocal pneumonia  Persistent right perihilar infiltrate  Osseous structures appear within normal limits for patient age  Impression: Diminished inspiration  Near complete resolution of multifocal pneumonia  Persistent infiltrate in the right perihilar region  Workstation performed: [id number]       EKG, Pathology, and Other Studies Reviewed on Admission:   · EKG: reviewed    ** Please Note: This note has been constructed using a voice recognition system   **

## 2018-09-02 NOTE — ASSESSMENT & PLAN NOTE
· Plan as per primary team   Infection has been extensively ruled out during recent medical admission  · Would avoid doing any further urine studies unless there is strong suspicion for infection such has fevers, chills, leukocytosis, specific urinary complaints  · Avoid deliriogenic meds, anticholinergic has been stopped  · Safety precautions

## 2018-09-02 NOTE — PROGRESS NOTES
Pt is a resident of [address] [place] [address] a [facility] admitted to [facility] Med/ Surgical unit P-9 on 8/27/18 after experiencing worsening confusion/agitation  Pt was suffering from dehydration which resulted in mild LIU which resolved after IVF  Pt's PMH includes Dementia, and pt was suspected to be suffering from opioid/benzo withdrawal while under care at another facility  As per [facility] note from 8/27/2018, pt's family are reluctant to accept her condition as psychiatric in nature  While on P-9, pt experienced agitation, paranoia and aggression with episodes of screaming and yelling out  She had also been in 4 pt locked restraints which were D/C on 8/31  Pt was medically cleared and transferred to [facility] on 9/1/2018

## 2018-09-02 NOTE — PROGRESS NOTES
Patient has been yelling agitated today  Patient threw her food against the wall this morning  Patient had a very large loose BM medical NP reports that patient has not had a BM for some time and was on medication to facilitate BM  Patient is only oriented to self  Patient demonstrates confusion refers to this writer as "Linette Hirsch" and has been screaming for a "Mr Garcia Oquendo"  Patient is actively hallucinating  Patient is shouting at people who are not in the room and asking staff to remove them from her room  Patient received PRN PO Ativan at 1015 with no demonstrable effect negative or positive  Patient received PRN IM Haldol at 1120 also with no effect  Patient was screaming different statements one of which was, "I want to kill myself"  Patient does not recall saying this and currently denies SI  Patient does admit to being anxious and depressed  Patient currently denies any pain despite being preoccupied with pain the majority of the morning  Patient has been labile at times apologetic and at times cursing  Patient has expressed that she loves staff members and also cursed at the same staff members

## 2018-09-02 NOTE — ASSESSMENT & PLAN NOTE
· Currently rate controlled without medications  · Continue Xarelto for stroke prevention  · Monitor QTc with psychiatric meds  · Will check EKG today

## 2018-09-02 NOTE — ASSESSMENT & PLAN NOTE
· Longstanding history  Continue Fentanyl patch Q72 hours  · Continue scheduled Tylenol Q8 hours along with lidocaine patch and Oxycodone PRN  · Continue Robaxin Q6 hours  · Bowel regimen  Patient has been constipated for several days, likely opioid induced, per nursing staff patient had a large loose bowel movement today  Will stop Miralax and Senna  Continue colace, hold for loose stools
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
## 2018-09-03 ENCOUNTER — APPOINTMENT (INPATIENT)
Dept: RADIOLOGY | Facility: HOSPITAL | Age: 78
DRG: 885 | End: 2018-09-03
Payer: COMMERCIAL

## 2018-09-03 PROCEDURE — 71045 X-RAY EXAM CHEST 1 VIEW: CPT

## 2018-09-03 PROCEDURE — 99232 SBSQ HOSP IP/OBS MODERATE 35: CPT | Performed by: STUDENT IN AN ORGANIZED HEALTH CARE EDUCATION/TRAINING PROGRAM

## 2018-09-03 RX ADMIN — MELATONIN 3 MG: at 21:31

## 2018-09-03 RX ADMIN — DOCUSATE SODIUM 100 MG: 100 CAPSULE, LIQUID FILLED ORAL at 17:40

## 2018-09-03 RX ADMIN — GABAPENTIN 600 MG: 300 CAPSULE ORAL at 17:39

## 2018-09-03 RX ADMIN — VITAMIN D, TAB 1000IU (100/BT) 1000 UNITS: 25 TAB at 08:38

## 2018-09-03 RX ADMIN — ACETAMINOPHEN 650 MG: 325 TABLET ORAL at 13:05

## 2018-09-03 RX ADMIN — GABAPENTIN 600 MG: 300 CAPSULE ORAL at 21:32

## 2018-09-03 RX ADMIN — QUETIAPINE 25 MG: 25 TABLET ORAL at 21:32

## 2018-09-03 RX ADMIN — LATANOPROST 1 DROP: 50 SOLUTION OPHTHALMIC at 21:32

## 2018-09-03 RX ADMIN — QUETIAPINE 25 MG: 25 TABLET ORAL at 08:38

## 2018-09-03 RX ADMIN — PANTOPRAZOLE SODIUM 40 MG: 40 TABLET, DELAYED RELEASE ORAL at 06:38

## 2018-09-03 RX ADMIN — GABAPENTIN 600 MG: 300 CAPSULE ORAL at 08:37

## 2018-09-03 RX ADMIN — METHOCARBAMOL 500 MG: 500 TABLET ORAL at 12:42

## 2018-09-03 RX ADMIN — METHOCARBAMOL 500 MG: 500 TABLET ORAL at 06:38

## 2018-09-03 RX ADMIN — ALPRAZOLAM 0.25 MG: 0.25 TABLET ORAL at 21:31

## 2018-09-03 RX ADMIN — DIVALPROEX SODIUM 250 MG: 250 TABLET, DELAYED RELEASE ORAL at 21:31

## 2018-09-03 RX ADMIN — METHOCARBAMOL 500 MG: 500 TABLET ORAL at 17:40

## 2018-09-03 RX ADMIN — QUETIAPINE 25 MG: 25 TABLET ORAL at 17:40

## 2018-09-03 RX ADMIN — DIVALPROEX SODIUM 250 MG: 250 TABLET, DELAYED RELEASE ORAL at 08:38

## 2018-09-03 RX ADMIN — RISPERIDONE 0.5 MG: 0.5 TABLET, ORALLY DISINTEGRATING ORAL at 14:13

## 2018-09-03 RX ADMIN — HALOPERIDOL 2 MG: 2 TABLET ORAL at 17:40

## 2018-09-03 RX ADMIN — ACETAMINOPHEN 650 MG: 325 TABLET ORAL at 21:31

## 2018-09-03 RX ADMIN — OXYCODONE HYDROCHLORIDE 5 MG: 5 TABLET ORAL at 12:42

## 2018-09-03 RX ADMIN — RIVAROXABAN 20 MG: 20 TABLET, FILM COATED ORAL at 17:43

## 2018-09-03 RX ADMIN — LIDOCAINE 1 PATCH: 50 PATCH CUTANEOUS at 08:37

## 2018-09-03 RX ADMIN — ACETAMINOPHEN 650 MG: 325 TABLET ORAL at 06:38

## 2018-09-03 RX ADMIN — DOCUSATE SODIUM 100 MG: 100 CAPSULE, LIQUID FILLED ORAL at 08:38

## 2018-09-03 RX ADMIN — OXYCODONE HYDROCHLORIDE 5 MG: 5 TABLET ORAL at 19:15

## 2018-09-03 NOTE — PROGRESS NOTES
Patient agitated and screaming at staff, not redirectable  PRN Risperdal given  Will continue to monitor

## 2018-09-03 NOTE — PROGRESS NOTES
Patient's daughter called she would like the treatment team to be aware that in her opinion the patient had a dramatic improvement on medical when she was placed on antibiotics and was dramatically worse when she was taken off antibiotics

## 2018-09-03 NOTE — PROGRESS NOTES
Patient out of bed, sitting in activity room  Patient cooperative with care and medications  Patient labile, occasionally yelling out and screaming at staff  Patient denies all symptoms  Will continue to monitor

## 2018-09-03 NOTE — PROGRESS NOTES
Progress Note - Behavioral Health     Katerin Story 70 y o  female MRN: [id number]   Unit/Bed#: NW5 571-01 Encounter: [id number]    Behavior over the last 24 hours: minimal improvement  Yasir Dorman denies active suicidal or homicidal ideation though she continues to have a passive death wish  She is very tangential 1 moment telling team members that she loves them and the next she is screaming at them and cursing at them  Overall she is less agitated and aggressive today  She is more cooperative with the team she remains demanding  She remains disorganized and illogical in her thought process she continues to have flight of ideas and she is negative in her thinking  She continues to have paranoid somatic fixed delusions  She is focused on her chronic back pain for which she is receiving scheduled pain medications that she has received for many years  Yasir Dorman is oriented to self only  She is tolerating her diet without issue, she is compliant with medications  Her vital signs are stable, patient remains labile  Yasir Dorman had approximately 12 hours of restfulness from 10:00 p m  last evening until 10:00 a m  this morning  No episodes of shouting or screaming were noted during this time      Sleep: normal  Appetite: normal  Medication side effects: No   ROS: reports back pain and "pain all over", denies any headache, shortness of breath, chest pain or abdominal pain    Mental Status Evaluation:    Appearance:  disheveled, wearing hospital clothes   Behavior:  demanding, less agitated, more cooperative, more pleasant   Speech:  normal rate, normal volume, normal pitch, loud, Screaming out at times   Mood:  slightly less anxious, slightly less depressed   Affect:  labile   Thought Process:  disorganized, illogical, tangential, flight of ideas, negative thinking   Associations: tangential associations, flight of ideas   Thought Content:  paranoid, somatic and fixed delusions   Perceptual Disturbances: denies auditory or visual hallucinations when asked, but appears distracted, does not appear responding to internal stimuli   Risk Potential: Suicidal ideation - None at present, continues to have passive death wish  Homicidal ideation - None  Potential for aggression - Yes, due to agitation   Sensorium:  oriented to person   Memory:  recent memory mildly impaired, remote memory mildly impaired, recent and remote memory: unable to assess due to lack of cooperation   Consciousness:  alert and awake   Attention: poor concentration and poor attention span   Insight:  nil   Judgment: nil   Gait/Station: in bed and unable to assess   Motor Activity: no abnormal movements     Vital signs in last 24 hours:    Temp:  [98 4 °F (36 9 °C)] 98 4 °F (36 9 °C)  HR:  [67] 67  Resp:  [18] 18  BP: (106)/(62) 106/62    Laboratory results:  No new labs to review    Progress Toward Goals: limited improvement, patient remained labile, oriented to self only  Assessment/Plan   Principal Problem:    Delirium  Active Problems:    Atrial fibrillation (HCC)    Hypertension    Dementia with behavioral disturbance    PAD (peripheral artery disease) (HCC)    Morbid obesity due to excess calories (HCC)    Acute cystitis without hematuria    Continuous opioid dependence (HCC)    COPD (chronic obstructive pulmonary disease) (HCC)    Moderate episode of recurrent major depressive disorder ([facility] [address] )    Recommended Treatment:     Planned medication and treatment changes:    -All current active medications have been reviewed  -Encourage group therapy, milieu therapy and occupational therapy  -Behavioral Health checks every 7 minutes  -Patient here on a 302 commitment  -Infection has been ruled out per internal medicine (repeat CXR results pending)  (0/3 object recall in 1 and 5 minutes, only oriented to self) Possible neuro-psych testing when patient clears to determine baseline cognitive status    -Check Depakote level in francisco j Perez   -Continued Depakote  mg p o  Q 12 hours  -Continue Neurontin 600 mg p o  T i d   -Continue melatonin 3 mg p o  Q h s   -Continue Seroquel 25 mg p o  B i d   -Continue current medications:    Current Facility-Administered Medications:  acetaminophen 650 mg Oral Q8H [address] Yamel M Homa, CRNP   [START ON 9/7/2018] alendronate 70 mg Oral Weekly Before Breakfast Yamel M Homa, CRNP   ALPRAZolam 0 25 mg Oral HS PRN Yamel M Homa, CRNP   aluminum-magnesium hydroxide-simethicone 15 mL Oral Q4H PRN Aura Hernandez MD   amLODIPine 10 mg Oral Daily Yamel M Homa, CRNP   benztropine 1 mg Intramuscular Q8H PRN Aura Hernandez MD   benztropine 1 mg Oral Q8H PRN Aura Hernandez MD   cholecalciferol 1,000 Units Oral Daily Yamel M Homa, CRNP   divalproex sodium 250 mg Oral Q12H [address] Yamel M Homa, CRNP   docusate sodium 100 mg Oral BID Yamel M Homa, CRNP   fentaNYL 50 mcg Transdermal Q72H Yamel M Homa, CRNP   furosemide 40 mg Oral Daily Yamel M Homa, CRNP   gabapentin 600 mg Oral TID Yamel M Homa, CRNP   haloperidol 2 mg Oral Q8H PRN Aura Hernandez MD   haloperidol lactate 2 mg Intramuscular Q8H PRN Aura Hernandez MD   hydrOXYzine HCL 25 mg Oral Q6H PRN Aura Hernandez MD   latanoprost 1 drop Both Eyes HS Yamel M Homa, CRNP   lidocaine 1 patch Transdermal Daily Yamel M Homa, CRNP   losartan 25 mg Oral Daily Yamel M Homa, CRNP   magnesium hydroxide 30 mL Oral Daily PRN Aura Hernandez MD   melatonin 3 mg Oral HS Muniz Primrose, CRNP   methocarbamol 500 mg Oral Q6H [address] Yamel Corinda Canal, CRNP   oxyCODONE 5 mg Oral Q4H PRN Yamel M Homa, CRNP   pantoprazole 40 mg Oral Early Morning Yamel M Homa, CRNP   phenol 1 spray Mouth/Throat Q2H PRN Yamel M Homa, CRNP   QUEtiapine 25 mg Oral BID Muniz Primrose, CRNP   QUEtiapine 25 mg Oral HS PRN Flavio Primrose, CRNP   risperiDONE 0 5 mg Oral Q8H PRN Aura Hernandez MD   rivaroxaban 20 mg Oral Daily With [employer], CRNP       Risks / Benefits of Treatment:    Risks, benefits, and possible side effects of medications explained to patient and patient verbalizes understanding and agreement for treatment  Counseling / Coordination of Care:    Patient's progress discussed with staff in treatment team meeting  Medications, treatment progress and treatment plan reviewed with patient  Supportive counseling provided to the patient

## 2018-09-03 NOTE — PROGRESS NOTES
Pt continues to display increasing agitation  Pt is screaming at hallucinations    Medication had no effect

## 2018-09-03 NOTE — PROGRESS NOTES
Patient's daughter signed CARMELO for [address], phone number is [phone]  Ask for the second floor nurse's station, and then ask for [facility]

## 2018-09-03 NOTE — PLAN OF CARE
Alteration in Orientation     Treatment Goal: Demonstrate a reduction of confusion and improved orientation to person, place, time and/or situation upon discharge, according to optimum baseline/ability Not Progressing     Express concerns related to confused thinking related to: Not Progressing        Alteration in Thoughts and Perception     Treatment Goal: Gain control of psychotic behaviors/thinking, reduce/eliminate presenting symptoms and demonstrate improved reality functioning upon discharge Not Progressing     Refrain from acting on delusional thinking/internal stimuli Not Progressing        Anxiety     Anxiety is at manageable level Not Progressing        Risk for Violence/Aggression Toward Others     Control angry outbursts Not Progressing          Alteration in Thoughts and Perception     Agree to be compliant with medication regime, as prescribed and report medication side effects Progressing        Prexisting or High Potential for Compromised Skin Integrity     Skin integrity is maintained or improved Progressing

## 2018-09-04 ENCOUNTER — APPOINTMENT (INPATIENT)
Dept: RADIOLOGY | Facility: HOSPITAL | Age: 78
DRG: 885 | End: 2018-09-04
Payer: COMMERCIAL

## 2018-09-04 PROBLEM — F31.62 BIPOLAR DISORDER, CURRENT EPISODE MIXED, MODERATE (HCC): Chronic | Status: ACTIVE | Noted: 2018-09-04

## 2018-09-04 PROBLEM — R41.0 DELIRIUM: Status: RESOLVED | Noted: 2018-08-28 | Resolved: 2018-09-04

## 2018-09-04 LAB
ATRIAL RATE: 100 BPM
BASOPHILS # BLD AUTO: 0.02 THOUSANDS/ΜL (ref 0–0.1)
BASOPHILS NFR BLD AUTO: 0 % (ref 0–1)
EOSINOPHIL # BLD AUTO: 0.16 THOUSAND/ΜL (ref 0–0.61)
EOSINOPHIL NFR BLD AUTO: 3 % (ref 0–6)
ERYTHROCYTE [DISTWIDTH] IN BLOOD BY AUTOMATED COUNT: 16.4 % (ref 11.6–15.1)
HCT VFR BLD AUTO: 40.5 % (ref 34.8–46.1)
HGB BLD-MCNC: 12.2 G/DL (ref 11.5–15.4)
IMM GRANULOCYTES # BLD AUTO: 0.02 THOUSAND/UL (ref 0–0.2)
IMM GRANULOCYTES NFR BLD AUTO: 0 % (ref 0–2)
LYMPHOCYTES # BLD AUTO: 1.67 THOUSANDS/ΜL (ref 0.6–4.47)
LYMPHOCYTES NFR BLD AUTO: 31 % (ref 14–44)
MCH RBC QN AUTO: 25.6 PG (ref 26.8–34.3)
MCHC RBC AUTO-ENTMCNC: 30.1 G/DL (ref 31.4–37.4)
MCV RBC AUTO: 85 FL (ref 82–98)
MONOCYTES # BLD AUTO: 0.36 THOUSAND/ΜL (ref 0.17–1.22)
MONOCYTES NFR BLD AUTO: 7 % (ref 4–12)
NEUTROPHILS # BLD AUTO: 3.13 THOUSANDS/ΜL (ref 1.85–7.62)
NEUTS SEG NFR BLD AUTO: 59 % (ref 43–75)
NRBC BLD AUTO-RTO: 0 /100 WBCS
PLATELET # BLD AUTO: 144 THOUSANDS/UL (ref 149–390)
PMV BLD AUTO: 10.6 FL (ref 8.9–12.7)
PROCALCITONIN SERPL-MCNC: <0.05 NG/ML
QRS AXIS: -11 DEGREES
QRSD INTERVAL: 92 MS
QT INTERVAL: 388 MS
QTC INTERVAL: 455 MS
RBC # BLD AUTO: 4.77 MILLION/UL (ref 3.81–5.12)
T WAVE AXIS: 6 DEGREES
VALPROATE SERPL-MCNC: 25 UG/ML (ref 50–100)
VENTRICULAR RATE: 83 BPM
WBC # BLD AUTO: 5.36 THOUSAND/UL (ref 4.31–10.16)

## 2018-09-04 PROCEDURE — 93010 ELECTROCARDIOGRAM REPORT: CPT | Performed by: INTERNAL MEDICINE

## 2018-09-04 PROCEDURE — 71250 CT THORAX DX C-: CPT

## 2018-09-04 PROCEDURE — 99232 SBSQ HOSP IP/OBS MODERATE 35: CPT | Performed by: PSYCHIATRY & NEUROLOGY

## 2018-09-04 PROCEDURE — 80164 ASSAY DIPROPYLACETIC ACD TOT: CPT | Performed by: NURSE PRACTITIONER

## 2018-09-04 PROCEDURE — 85025 COMPLETE CBC W/AUTO DIFF WBC: CPT | Performed by: NURSE PRACTITIONER

## 2018-09-04 PROCEDURE — 84145 PROCALCITONIN (PCT): CPT | Performed by: NURSE PRACTITIONER

## 2018-09-04 RX ORDER — ACETAMINOPHEN 325 MG/1
325 TABLET ORAL EVERY 8 HOURS PRN
Status: DISCONTINUED | OUTPATIENT
Start: 2018-09-04 | End: 2018-09-28 | Stop reason: HOSPADM

## 2018-09-04 RX ORDER — DIVALPROEX SODIUM 500 MG/1
500 TABLET, DELAYED RELEASE ORAL EVERY 12 HOURS SCHEDULED
Status: DISCONTINUED | OUTPATIENT
Start: 2018-09-04 | End: 2018-09-06

## 2018-09-04 RX ORDER — OXYCODONE HYDROCHLORIDE 5 MG/1
5 TABLET ORAL EVERY 4 HOURS PRN
Status: DISCONTINUED | OUTPATIENT
Start: 2018-09-04 | End: 2018-09-28 | Stop reason: HOSPADM

## 2018-09-04 RX ADMIN — AMLODIPINE BESYLATE 10 MG: 10 TABLET ORAL at 08:21

## 2018-09-04 RX ADMIN — LATANOPROST 1 DROP: 50 SOLUTION OPHTHALMIC at 21:16

## 2018-09-04 RX ADMIN — ACETAMINOPHEN 650 MG: 325 TABLET ORAL at 14:41

## 2018-09-04 RX ADMIN — ACETAMINOPHEN 650 MG: 325 TABLET ORAL at 08:20

## 2018-09-04 RX ADMIN — FUROSEMIDE 40 MG: 40 TABLET ORAL at 08:22

## 2018-09-04 RX ADMIN — ALPRAZOLAM 0.25 MG: 0.25 TABLET ORAL at 21:16

## 2018-09-04 RX ADMIN — RIVAROXABAN 20 MG: 20 TABLET, FILM COATED ORAL at 17:20

## 2018-09-04 RX ADMIN — RISPERIDONE 0.5 MG: 0.5 TABLET, ORALLY DISINTEGRATING ORAL at 19:06

## 2018-09-04 RX ADMIN — DIVALPROEX SODIUM 250 MG: 250 TABLET, DELAYED RELEASE ORAL at 08:22

## 2018-09-04 RX ADMIN — ACETAMINOPHEN 650 MG: 325 TABLET ORAL at 21:17

## 2018-09-04 RX ADMIN — LIDOCAINE 1 PATCH: 50 PATCH CUTANEOUS at 08:23

## 2018-09-04 RX ADMIN — GABAPENTIN 600 MG: 300 CAPSULE ORAL at 17:20

## 2018-09-04 RX ADMIN — METHOCARBAMOL 500 MG: 500 TABLET ORAL at 17:20

## 2018-09-04 RX ADMIN — GABAPENTIN 600 MG: 300 CAPSULE ORAL at 08:21

## 2018-09-04 RX ADMIN — OXYCODONE HYDROCHLORIDE 5 MG: 5 TABLET ORAL at 17:21

## 2018-09-04 RX ADMIN — MELATONIN 3 MG: at 21:17

## 2018-09-04 RX ADMIN — GABAPENTIN 600 MG: 300 CAPSULE ORAL at 21:17

## 2018-09-04 RX ADMIN — QUETIAPINE 25 MG: 25 TABLET ORAL at 08:22

## 2018-09-04 RX ADMIN — DOCUSATE SODIUM 100 MG: 100 CAPSULE, LIQUID FILLED ORAL at 08:20

## 2018-09-04 RX ADMIN — DOCUSATE SODIUM 100 MG: 100 CAPSULE, LIQUID FILLED ORAL at 17:20

## 2018-09-04 RX ADMIN — PANTOPRAZOLE SODIUM 40 MG: 40 TABLET, DELAYED RELEASE ORAL at 08:21

## 2018-09-04 RX ADMIN — METHOCARBAMOL 500 MG: 500 TABLET ORAL at 08:21

## 2018-09-04 RX ADMIN — HYDROXYZINE HYDROCHLORIDE 25 MG: 25 TABLET ORAL at 14:42

## 2018-09-04 RX ADMIN — METHOCARBAMOL 500 MG: 500 TABLET ORAL at 12:19

## 2018-09-04 RX ADMIN — DIVALPROEX SODIUM 500 MG: 500 TABLET, DELAYED RELEASE ORAL at 21:16

## 2018-09-04 RX ADMIN — VITAMIN D, TAB 1000IU (100/BT) 1000 UNITS: 25 TAB at 08:22

## 2018-09-04 RX ADMIN — QUETIAPINE 25 MG: 25 TABLET ORAL at 21:16

## 2018-09-04 RX ADMIN — LOSARTAN POTASSIUM 25 MG: 25 TABLET, FILM COATED ORAL at 08:33

## 2018-09-04 RX ADMIN — QUETIAPINE 25 MG: 25 TABLET ORAL at 17:20

## 2018-09-04 RX ADMIN — METHOCARBAMOL 500 MG: 500 TABLET ORAL at 23:53

## 2018-09-04 RX ADMIN — Medication 1 SPRAY: at 09:10

## 2018-09-04 NOTE — PROGRESS NOTES
Patient was screaming  I went into room  She needed throat spray for a sore throat  Gave chloraseptic, effective  We got patient out of bed with sanjuanita lift  She did not want to get out of bed  She was very upset, yelling, so she was placed back in bed by sanjuanita lift  She said should would get up later  Agreement was that she would stop screaming if she was put back into bed and to get out of bed for group therapy  Patient ate bed in breakfast  Patient is currently at CAT scan  She is compliant with medications  She denies all symptoms  C/o of back pain which she received scheduled medication for  Patient states she has mild dizziness that is constant  Teachback was done with incentive spirometer  She will calm down when spoken to and redirected

## 2018-09-04 NOTE — CMS CERTIFICATION NOTE
Certification: Based upon physical, mental and social evaluations, I certify that inpatient psychiatric services are medically necessary for this patient for a duration of 8 midnights for the treatment of bipolar disorder  Available alternative community resources do not meet the patient's mental health care needs  I further attest that an established written individualized plan of care has been implemented and is outlined in the patient's medical records

## 2018-09-04 NOTE — PLAN OF CARE
Alteration in Thoughts and Perception     Treatment Goal: Gain control of psychotic behaviors/thinking, reduce/eliminate presenting symptoms and demonstrate improved reality functioning upon discharge Not Progressing        Risk for Violence/Aggression Toward Others     Control angry outbursts Not Progressing        Patient had two outbursts this morning   Alteration in Orientation     Treatment Goal: Demonstrate a reduction of confusion and improved orientation to person, place, time and/or situation upon discharge, according to optimum baseline/ability Progressing     Express concerns related to confused thinking related to: Progressing        Alteration in Thoughts and Perception     Refrain from acting on delusional thinking/internal stimuli Progressing     Agree to be compliant with medication regime, as prescribed and report medication side effects Progressing        Anxiety     Anxiety is at manageable level Progressing        Prexisting or High Potential for Compromised Skin Integrity     Skin integrity is maintained or improved Progressing

## 2018-09-04 NOTE — MEDICAL STUDENT
Psychiatric Evaluation - [address] 70 y o  female MRN: [id number]  Unit/Bed#: CC7 571-01 Encounter: [id number]    Assessment/Plan   Principal Problem:    Delirium  Active Problems:    Atrial fibrillation ([place] [address] )    Hypertension    Dementia with behavioral disturbance    PAD (peripheral artery disease) ([place] [address] )    Morbid obesity due to excess calories ([place] [address] )    Acute cystitis without hematuria    Continuous opioid dependence ([facility])    COPD (chronic obstructive pulmonary disease) ([facility])    Moderate episode of recurrent major depressive disorder ([place] [address] )    Plan:   Encourage group therapy, milieu therapy, and occupational therapy  Behavioral Health checks every 5 minutes  Start close observation for safety  Increase Seroquel  Check Depakote levels on 9/4/18  Continue other medications    Current Facility-Administered Medications:     acetaminophen (TYLENOL) tablet 325 mg, 325 mg, Oral, Q8H PRN, LUCY Cain    acetaminophen (TYLENOL) tablet 650 mg, 650 mg, Oral, Q8H [facility], LUCY Sanchez, 650 mg at 09/05/18 0803    [START ON 9/7/2018] alendronate (FOSAMAX) tablet 70 mg, 70 mg, Oral, Weekly Before Breakfast, LUCY Sanchez    ALPRAZolam Gayleen Orozco) tablet 0 25 mg, 0 25 mg, Oral, HS PRN, LUCY Sanchez, 0 25 mg at 09/04/18 2116    aluminum-magnesium hydroxide-simethicone (MYLANTA) 200-200-20 mg/5 mL oral suspension 15 mL, 15 mL, Oral, Q4H PRN, Richard Soriano MD    amLODIPine (NORVASC) tablet 10 mg, 10 mg, Oral, Daily, LUCY Sanchez, 10 mg at 09/05/18 0810    benztropine (COGENTIN) injection 1 mg, 1 mg, Intramuscular, Q8H PRN, Richard Soriano MD    benztropine (COGENTIN) tablet 1 mg, 1 mg, Oral, Q8H PRN, Richard Soriano MD    cholecalciferol (VITAMIN D3) tablet 1,000 Units, 1,000 Units, Oral, Daily, LUCY Sanchez, 1,000 Units at 09/05/18 0809    divalproex sodium (DEPAKOTE) EC tablet 500 mg, 500 mg, Oral, Q12H [facility], Eric De La Fuente MD, 500 mg at 09/05/18 0809    docusate sodium (COLACE) capsule 100 mg, 100 mg, Oral, BID, Yamel M Homa, CRNP, 100 mg at 09/05/18 0810    fentaNYL (DURAGESIC) 50 mcg/hr TD 72 hr patch 50 mcg, 50 mcg, Transdermal, Q72H, Yamel M Homa, CRNP, 50 mcg at 09/05/18 0813    furosemide (LASIX) tablet 40 mg, 40 mg, Oral, Daily, Yamel M Homa, CRNP, 40 mg at 09/05/18 0810    gabapentin (NEURONTIN) capsule 600 mg, 600 mg, Oral, TID, Yamel M Homa, CRNP, 600 mg at 09/05/18 0809    haloperidol (HALDOL) tablet 2 mg, 2 mg, Oral, Q8H PRN, Chris Bundy MD, 2 mg at 09/05/18 0805    haloperidol lactate (HALDOL) injection 2 mg, 2 mg, Intramuscular, Q8H PRN, Chris Bundy MD, 2 mg at 09/02/18 1120    hydrOXYzine HCL (ATARAX) tablet 25 mg, 25 mg, Oral, Q6H PRN, Chris Bundy MD, 25 mg at 09/05/18 1144    latanoprost (XALATAN) 0 005 % ophthalmic solution 1 drop, 1 drop, Both Eyes, HS, Yamel DENYS Luther, CRNP, 1 drop at 09/04/18 2116    lidocaine (LIDODERM) 5 % patch 1 patch, 1 patch, Transdermal, Daily, Yamel DENYS Homa, CRNP, 1 patch at 09/05/18 1709    losartan (COZAAR) tablet 25 mg, 25 mg, Oral, Daily, Yamel M Homa, CRNP, 25 mg at 09/05/18 0810    magnesium hydroxide (MILK OF MAGNESIA) 400 mg/5 mL oral suspension 30 mL, 30 mL, Oral, Daily PRN, Chris Bundy MD    melatonin tablet 3 mg, 3 mg, Oral, HS, Tricia Sue, CRNP, 3 mg at 09/04/18 2117    methocarbamol (ROBAXIN) tablet 500 mg, 500 mg, Oral, Q6H [address], Yamel M Homa, CRNP, 500 mg at 09/05/18 1144    oxyCODONE (ROXICODONE) IR tablet 5 mg, 5 mg, Oral, Q4H PRN, Doloris Asai, CRNP, 5 mg at 09/04/18 1721    pantoprazole (PROTONIX) EC tablet 40 mg, 40 mg, Oral, Early Morning, Yamel M LISA LutherNP, 40 mg at 09/05/18 0606    phenol (CHLORASEPTIC) 1 4 % mucosal liquid 1 spray, 1 spray, Mouth/Throat, Q2H PRN, Yamel M LUCY Luther, 1 spray at 09/04/18 0910    QUEtiapine (SEROquel) tablet 25 mg, 25 mg, Oral, BID, Tricia Sue, CRNP, 25 mg at 09/05/18 0809    QUEtiapine (SEROquel) tablet 25 mg, 25 mg, Oral, HS PRN, LUCY Wagner, 25 mg at 09/04/18 2116    risperiDONE (RisperDAL M-TABS) dispersible tablet 0 5 mg, 0 5 mg, Oral, Q8H PRN, Lewis Buckner MD, 0 5 mg at 09/04/18 1906    rivaroxaban (XARELTO) tablet 20 mg, 20 mg, Oral, Daily With Dinner, LUCY Awan, 20 mg at 09/04/18 1724    Chief Complaint: "I am crazy"    History of Present Illness     Patient is a 70 y o  female with history of depression vs  Bipolar disorder, generalized anxiety disorder, and substance use was admitted to psychiatric unit on a involuntarily 302 commitment basis due to depression, anxiety, signs of acute psychosis, substance abuse, aggressive behaviors, increased confusion, and suicidal ideation without plan  Aleyda Cook states that she was brought to the hospital by her son because she "was losing it" and has been "acting weird" in the past year  She hasn't been feeling like herself  When asked what she has been doing, she reports that she has been "singing and dancing a lot " She also reports that she has been getting into fights with "girls" at the nursing home she stays at in [place]  She seems confused, and doesn't recall many events prior to admission  Presently, Aleyda Cook is in a happy mood, is cooperative and joking during assessment  She denies any depression or anxiety  She seems to be moderately confused and is not oriented to time, but is oriented to person and place  Aleyda Cook also kept knocking on her bed stating that she is "calling the nurse " She also also denies any SI/HI and hallucinations  [facility] SLUMS Examination:Patient was not able to complete because she went down for a CT scan but has already scored below 20 points which suggests dementia  Patient is not a reliable historian        Psychiatric Review Of Systems:  sleep: no, states that she's been sleeping well 7-8 hours a night  appetite changes: no  weight changes: no  energy/anergy: yes, "usually fine, but sometimes I have very low energy some days"  interest/pleasure/anhedonia: no  somatic symptoms: no  anxiety/panic: no  dalia: no  guilty/hopeless: no  self injurious behavior/risky behavior: no    Historical Information     Past Psychiatric History:    In Patient has history of inpatient hospitalizations, but patient denies any at this moment  Currently in treatment with Dr Sheryle Minor, according to patient, is not currently seeing a therapist   Past Suicide attempts: denies  Past Violent behavior: denies  Past Psychiatric medication trial: Celexa, Lexapro, Buspar, patient can't recall any other the rest    Substance Abuse History:  Social History     Tobacco History     Smoking Status  Former Smoker Smoking Tobacco Type  Cigarettes    Smokeless Tobacco Use  Never Used          Alcohol History     Alcohol Use Status  No          Drug Use     Drug Use Status  No          Sexual Activity     Sexually Active  Not Currently          Activities of Daily Living    Not Asked               Additional Substance Use Detail     Questions Responses    Substance Use Assessment Denies substance use within the past 12 months    Alcohol Use Frequency Denies use in past 12 months    Cannabis frequency Never used    Comment: Never used on 9/2/2018     Heroin Frequency Denies use in past 12 months    Cocaine frequency Never used    Comment: Never used on 9/2/2018     Crack Cocaine Frequency Denies use in past 12 months    Methamphetamine Frequency Denies use in past 12 months    Narcotic Frequency Denies use in past 12 months    Benzodiazepine Frequency Denies use in past 12 months    Amphetamine frequency Denies use in past 12 months    Barbituate Frequency Denies use use in past 12 months    Inhalant frequency Never used    Comment: Never used on 9/2/2018     Hallucinogen frequency Never used    Comment: Never used on 9/2/2018     Ecstasy frequency Never used    Comment: Never used on 9/2/2018     Other drug frequency Never used Comment: Never used on 9/2/2018     Opiate frequency Denies use in past 12 months    Last reviewed by Bettie Sherman RN on 9/2/2018        I have assessed this patient for substance use within the past 12 months    Family Psychiatric History:   Psychiatric Illness: denies   Substance Abuse: denies  Suicide Attempts: denies  Social History:  Education: high school diploma/GED  Learning Disabilities: denies  Marital history:   Living arrangement, social support: lives in nursing home in [place]  Occupational History: retired  Functioning Relationships: good support system  Other Pertinent History: None      Traumatic History:   Abuse: none  Other Traumatic Events: none    Past Medical History:   Diagnosis Date    A-fib ([facility] [address] )     Chronic pain     [facility] failure with hypoxia [facility])     COPD (chronic obstructive pulmonary disease) ([facility])     Dementia     Dorsalgia, unspecified     Edema     Encephalopathy     GERD (gastroesophageal reflux disease)     Hypertension     Morbid obesity ([facility])     Muscle weakness (generalized)     Opioid dependence ([facility])     Overactive bladder     Pneumonia     Psychiatric disorder     anxiety, bipolar    Radiculopathy of thoracolumbar region     Sciatica     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     Urinary incontinence     UTI (urinary tract infection)        Medical Review Of Systems:  Pertinent items are noted in HPI      Meds/Allergies   current meds:   Current Facility-Administered Medications   Medication Dose Route Frequency    acetaminophen (TYLENOL) tablet 325 mg  325 mg Oral Q8H PRN    acetaminophen (TYLENOL) tablet 650 mg  650 mg Oral Q8H [address]    [START ON 9/7/2018] alendronate (FOSAMAX) tablet 70 mg  70 mg Oral Weekly Before Breakfast    ALPRAZolam (XANAX) tablet 0 25 mg  0 25 mg Oral HS PRN    aluminum-magnesium hydroxide-simethicone (MYLANTA) 200-200-20 mg/5 mL oral suspension 15 mL  15 mL Oral Q4H PRN    amLODIPine (NORVASC) tablet 10 mg  10 mg Oral Daily    benztropine (COGENTIN) injection 1 mg  1 mg Intramuscular Q8H PRN    benztropine (COGENTIN) tablet 1 mg  1 mg Oral Q8H PRN    cholecalciferol (VITAMIN D3) tablet 1,000 Units  1,000 Units Oral Daily    divalproex sodium (DEPAKOTE) EC tablet 250 mg  250 mg Oral Q12H [address]    docusate sodium (COLACE) capsule 100 mg  100 mg Oral BID    fentaNYL (DURAGESIC) 50 mcg/hr TD 72 hr patch 50 mcg  50 mcg Transdermal Q72H    furosemide (LASIX) tablet 40 mg  40 mg Oral Daily    gabapentin (NEURONTIN) capsule 600 mg  600 mg Oral TID    haloperidol (HALDOL) tablet 2 mg  2 mg Oral Q8H PRN    haloperidol lactate (HALDOL) injection 2 mg  2 mg Intramuscular Q8H PRN    hydrOXYzine HCL (ATARAX) tablet 25 mg  25 mg Oral Q6H PRN    latanoprost (XALATAN) 0 005 % ophthalmic solution 1 drop  1 drop Both Eyes HS    lidocaine (LIDODERM) 5 % patch 1 patch  1 patch Transdermal Daily    losartan (COZAAR) tablet 25 mg  25 mg Oral Daily    magnesium hydroxide (MILK OF MAGNESIA) 400 mg/5 mL oral suspension 30 mL  30 mL Oral Daily PRN    melatonin tablet 3 mg  3 mg Oral HS    methocarbamol (ROBAXIN) tablet 500 mg  500 mg Oral Q6H [address]    oxyCODONE (ROXICODONE) IR tablet 5 mg  5 mg Oral Q4H PRN    pantoprazole (PROTONIX) EC tablet 40 mg  40 mg Oral Early Morning    phenol (CHLORASEPTIC) 1 4 % mucosal liquid 1 spray  1 spray Mouth/Throat Q2H PRN    QUEtiapine (SEROquel) tablet 25 mg  25 mg Oral BID    QUEtiapine (SEROquel) tablet 25 mg  25 mg Oral HS PRN    risperiDONE (RisperDAL M-TABS) dispersible tablet 0 5 mg  0 5 mg Oral Q8H PRN    rivaroxaban (XARELTO) tablet 20 mg  20 mg Oral Daily With Dinner     Allergies   Allergen Reactions    Aspirin     Iodinated Diagnostic Agents Throat Swelling    Iodine     Nsaids        Objective   Vital signs in last 24 hours:  Temp:  [97 8 °F (36 6 °C)-99 °F (37 2 °C)] 97 8 °F (36 6 °C)  HR:  [69-85] 69  Resp:  [16] 16  BP: (122-153)/(72-76) 153/76    No intake or output data in the 24 hours ending 09/04/18 0948    Mental Status Evaluation:  Appearance:  disheveled and overweight   Behavior:  cooperative   Speech:  loud   Mood:  normal   Affect:  mood-congruent   Language: naming objects   Thought Process:  disorganized and loose associations   Thought Content:  confused   Perceptual Disturbances: None   Risk Potential: Suicidal Ideations none and Homicidal Ideations none   Sensorium:  person and place   Cognition:  impaired due to possible dementia   Consciousness:  alert    Attention: attention span and concentration were age appropriate   Intellect: decreased   Fund of Knowledge: awareness of current events: fair, past history: fair and vocabulary: fair   Insight:  limited   Judgment: limited   Muscle Strength and Tone: unable to assess   Gait/Station: unable to assess   Motor Activity: no abnormal movements     Lab Results:   CBC:   Lab Results   Component Value Date    WBC 5 36 09/04/2018    HGB 12 2 09/04/2018    HCT 40 5 09/04/2018    MCV 85 09/04/2018     (L) 09/04/2018    MCH 25 6 (L) 09/04/2018    MCHC 30 1 (L) 09/04/2018    RDW 16 4 (H) 09/04/2018    MPV 10 6 09/04/2018    NRBC 0 09/04/2018   , BMP/CMP: No results found for: NA, K, CL, CO2, ANIONGAP, BUN, CREATININE, GLUCOSE, CALCIUM, AST, ALT, ALKPHOS, PROT, ALBUMIN, BILITOT, EGFR, TSH: No results found for: TSH, Drug Screen: No results found for: AMPHETUR, BARBTUR, BDZUR, THCUR, COCAINEUR, METHADONEUR, OPIATEUR, PCPUR, MTHAMUR, ECSTASYUR, TRICYCLICSUR        Code Status: Level 1 - Full Code  Advance Directive and Living Will:      Power of : Yes  POLST:        Patient Strengths/Assets: cooperative, interpersonal skills    Patient Barriers/Limitations: impaired cognition, patient is on an involuntary commitment, poor insight

## 2018-09-04 NOTE — PLAN OF CARE
Alteration in Thoughts and Perception     Treatment Goal: Gain control of psychotic behaviors/thinking, reduce/eliminate presenting symptoms and demonstrate improved reality functioning upon discharge Not Progressing        Risk for Violence/Aggression Toward Others     Control angry outbursts Not Progressing          Alteration in Orientation     Treatment Goal: Demonstrate a reduction of confusion and improved orientation to person, place, time and/or situation upon discharge, according to optimum baseline/ability Progressing     Express concerns related to confused thinking related to: Progressing        Alteration in Thoughts and Perception     Refrain from acting on delusional thinking/internal stimuli Progressing     Agree to be compliant with medication regime, as prescribed and report medication side effects Progressing        Anxiety     Anxiety is at manageable level Progressing        Prexisting or High Potential for Compromised Skin Integrity     Skin integrity is maintained or improved Progressing

## 2018-09-04 NOTE — PROGRESS NOTES
Pt display agitations, yelling at staff and audio/visual hallucinations  Pt is compliant with treatment  Pt denies depression, anxiety, SI/HI  Pt hallucinates that children are in her room and behind/under her bed  Pt sat at the nurse's station and was visible and social for dinner  After dinner pt became agitated again and wanted to go to her room    Pt continues to yell

## 2018-09-04 NOTE — TREATMENT PLAN
TREATMENT PLAN REVIEW - [address] 70 y o  1940 female MRN: [id number]    [address] [facility] Room / Bed: [place] [address] 571-01 Encounter: [id number]          Admit Date/Time:  9/1/2018 11:11 PM    Treatment Team: Attending Provider: Vick Teague MD; Registered Nurse: Abigail Murillo RN; Patient Care Technician: So Andrade; Patient Care Technician: Consuelo Perez;  Registered Nurse: Dalia Diaz RN; : Hannah Gutierrez OT; Nurse Practitioner: LUCY Cabral; Occupational Therapy Assistant: JAZZY Calixto    Diagnosis: Principal Problem:    Bipolar disorder, current episode mixed, moderate ([facility] [address] )  Active Problems:    Dementia with behavioral disturbance    Acute cystitis without hematuria    Continuous opioid dependence ([facility] )      Patient Strengths/Assets: cooperative    Patient Barriers/Limitations: difficulty adapting    Short Term Goals:  Improvement of disruptive behavior    Long Term Goals:  Avoid hospitalization    Progress Towards Goals: starting psychiatric medications as prescribed    Recommended Treatment: medication management, patient medication education, group therapy, milieu therapy, continued Behavioral Health psychiatric evaluation/assessment process    Treatment Frequency: daily medication monitoring, group and milieu therapy daily, monitoring through interdisciplinary rounds, monitoring through weekly patient care conferences    Expected Discharge Date:  Seven days    Discharge Plan: return to previous living arrangement    Treatment Plan Created/Updated By: Vick Teague MD

## 2018-09-04 NOTE — PROGRESS NOTES
Pt has been yelling and agitated  Pt continues to have audio/visual hallucinations  Constant reminders needed for pt to stop yelling  Pt was given PRN haldol at 1740 without improvement  Pt is cooperative and compliant with medications and treatment  Pt denies depression or anxiety  Denies HI/SI  Reported back pain and received PRN oxycodone at 1915   Pt reported pain relief was satisfactory    Pt is reclusive to room

## 2018-09-04 NOTE — PROGRESS NOTES
Progress Note - [address] 70 y o  female MRN: [id number]  Unit/Bed#: JO8 571-01 Encounter: [id number]    The patient was seen for continuing care and reviewed with treatment team   The patient is well known to me from prior inpatient in outpatient treatment  She carries a diagnosis of bipolar disorder with depressed and mixed episodes  She is currently calm and pleasant  She instantly recognized me  The patient does not appear to be delirious at this time  Valproic acid level is 25    Mental Status Evaluation:  Appearance:  Good eye contact and disheveled   Behavior:  calm, cooperative and friendly   Mood:  euthymic or even slightly elevated   Thought Content:  Does not verbalize delusional material   Perceptual Disturbances: Denies hallucinations and does not appear to be responding to internal stimuli   Risk Potential: Potentially aggresive and violent   Orientation:            Assessment/Plan    Principal Problem:    Bipolar disorder, current episode mixed, moderate (HCC)  Active Problems:    Dementia with behavioral disturbance    Acute cystitis without hematuria    Continuous opioid dependence ([facility] [address] )    Delirium      Recommended Treatment:  Increase Depakote Continue with pharmacotherapy, group therapy, milieu therapy and occupational therapy    The patient will be maintained on the following medications:    Current Facility-Administered Medications:  acetaminophen 325 mg Oral Q8H PRN LUCY Dowell   acetaminophen 650 mg Oral [facility] [place], [address]   [START ON 9/7/2018] alendronate 70 mg Oral Weekly Before Breakfast LUCY Sanchez   ALPRAZolam 0 25 mg Oral HS PRN LUCY Sanchez   aluminum-magnesium hydroxide-simethicone 15 mL Oral Q4H PRN Chris Bundy MD   amLODIPine 10 mg Oral Daily LUCY Humphrey   benztropine 1 mg Intramuscular Q8H PRN Chris Bundy MD   benztropine 1 mg Oral Q8H PRN Chris Bundy MD   cholecalciferol 1,000 Units Oral Daily Yamel M Homa, CRNP   divalproex sodium 250 mg Oral Q12H [address] Yamel M Homa, CRNP   docusate sodium 100 mg Oral BID Yamel M Homa, CRNP   fentaNYL 50 mcg Transdermal Q72H Yamel M Homa, CRNP   furosemide 40 mg Oral Daily Yamel M Homa, CRNP   gabapentin 600 mg Oral TID Yamel M Homa, CRNP   haloperidol 2 mg Oral Q8H PRN Lewis Buckner MD   haloperidol lactate 2 mg Intramuscular Q8H PRN Lewis Buckner MD   hydrOXYzine HCL 25 mg Oral Q6H PRN Lewis Buckner MD   latanoprost 1 drop Both Eyes HS Yamel M Homa, CRNP   lidocaine 1 patch Transdermal Daily Yamel M Homa, CRNP   losartan 25 mg Oral Daily Yamel M Homa, CRNP   magnesium hydroxide 30 mL Oral Daily PRN Lewis Buckner MD   melatonin 3 mg Oral HS William Velazco, CRNP   methocarbamol 500 mg Oral Q6H [address] Yamel Roland Montejo, CRNP   oxyCODONE 5 mg Oral Q4H PRN Micheal Mash, CRNP   pantoprazole 40 mg Oral Early Morning Yamel M Homa, CRNP   phenol 1 spray Mouth/Throat Q2H PRN Yamel M Homa, CRNP   QUEtiapine 25 mg Oral BID William Velazco, CRNP   QUEtiapine 25 mg Oral HS PRN William Velazco, CRNP   risperiDONE 0 5 mg Oral Q8H PRN Lewis Buckner MD   rivaroxaban 20 mg Oral Daily With [employer], [address]

## 2018-09-04 NOTE — PROGRESS NOTES
Patient having hallucinations this afternoon  She said she thought that a girl was coming through the door

## 2018-09-04 NOTE — PROGRESS NOTES
Reviewed CT scan of chest, no radiographic evidence of pneumonia  Procalcitonin normal   No leukocytosis or fevers  Patient does not have an active infection contributing to her delirium  Encourage incentive spirometer  Please defer any additional imaging, urinary studies etc unless patient develops symptoms concerning for any infection such as fevers  Continue psychiatric management as per primary team   No further medical recommendations at this time      Dragan Robles, [facility]   Internal Medicine

## 2018-09-04 NOTE — PROGRESS NOTES
CXR ordered by psychiatry to assess further resolution of pneumonia  Portable CXR was done, showing worsening bilateral pneumonia with bilateral hilar adenopathy, recommending follow up CT scan  Will check CT  Patient with no respiratory complaints, no cough  On room air  No leukocytosis, no fevers  Will check repeat CBC and pro-calcitonin and order incentive spirometry  F/U results of CT scan  Monitor off antibiotics for now

## 2018-09-05 PROCEDURE — 99232 SBSQ HOSP IP/OBS MODERATE 35: CPT | Performed by: NURSE PRACTITIONER

## 2018-09-05 RX ORDER — LORAZEPAM 2 MG/ML
1 INJECTION INTRAMUSCULAR ONCE
Status: COMPLETED | OUTPATIENT
Start: 2018-09-05 | End: 2018-09-05

## 2018-09-05 RX ORDER — LORAZEPAM 2 MG/ML
1 INJECTION INTRAMUSCULAR ONCE
Status: DISCONTINUED | OUTPATIENT
Start: 2018-09-05 | End: 2018-09-05

## 2018-09-05 RX ADMIN — MELATONIN 3 MG: at 21:31

## 2018-09-05 RX ADMIN — ACETAMINOPHEN 650 MG: 325 TABLET ORAL at 08:03

## 2018-09-05 RX ADMIN — VITAMIN D, TAB 1000IU (100/BT) 1000 UNITS: 25 TAB at 08:09

## 2018-09-05 RX ADMIN — FUROSEMIDE 40 MG: 40 TABLET ORAL at 08:10

## 2018-09-05 RX ADMIN — DIVALPROEX SODIUM 500 MG: 500 TABLET, DELAYED RELEASE ORAL at 08:09

## 2018-09-05 RX ADMIN — GABAPENTIN 600 MG: 300 CAPSULE ORAL at 08:09

## 2018-09-05 RX ADMIN — ACETAMINOPHEN 650 MG: 325 TABLET ORAL at 13:17

## 2018-09-05 RX ADMIN — ALPRAZOLAM 0.25 MG: 0.25 TABLET ORAL at 21:30

## 2018-09-05 RX ADMIN — AMLODIPINE BESYLATE 10 MG: 10 TABLET ORAL at 08:10

## 2018-09-05 RX ADMIN — METHOCARBAMOL 500 MG: 500 TABLET ORAL at 11:44

## 2018-09-05 RX ADMIN — METHOCARBAMOL 500 MG: 500 TABLET ORAL at 06:08

## 2018-09-05 RX ADMIN — DIVALPROEX SODIUM 500 MG: 500 TABLET, DELAYED RELEASE ORAL at 21:30

## 2018-09-05 RX ADMIN — METHOCARBAMOL 500 MG: 500 TABLET ORAL at 17:05

## 2018-09-05 RX ADMIN — HALOPERIDOL 2 MG: 2 TABLET ORAL at 08:05

## 2018-09-05 RX ADMIN — GABAPENTIN 600 MG: 300 CAPSULE ORAL at 21:30

## 2018-09-05 RX ADMIN — HYDROXYZINE HYDROCHLORIDE 25 MG: 25 TABLET ORAL at 11:44

## 2018-09-05 RX ADMIN — QUETIAPINE 25 MG: 25 TABLET ORAL at 08:09

## 2018-09-05 RX ADMIN — DOCUSATE SODIUM 100 MG: 100 CAPSULE, LIQUID FILLED ORAL at 08:10

## 2018-09-05 RX ADMIN — DOCUSATE SODIUM 100 MG: 100 CAPSULE, LIQUID FILLED ORAL at 17:06

## 2018-09-05 RX ADMIN — LATANOPROST 1 DROP: 50 SOLUTION OPHTHALMIC at 21:30

## 2018-09-05 RX ADMIN — RIVAROXABAN 20 MG: 20 TABLET, FILM COATED ORAL at 17:06

## 2018-09-05 RX ADMIN — QUETIAPINE 25 MG: 25 TABLET ORAL at 21:31

## 2018-09-05 RX ADMIN — LIDOCAINE 1 PATCH: 50 PATCH CUTANEOUS at 08:14

## 2018-09-05 RX ADMIN — QUETIAPINE 25 MG: 25 TABLET ORAL at 17:05

## 2018-09-05 RX ADMIN — PANTOPRAZOLE SODIUM 40 MG: 40 TABLET, DELAYED RELEASE ORAL at 06:06

## 2018-09-05 RX ADMIN — HALOPERIDOL 2 MG: 2 TABLET ORAL at 13:17

## 2018-09-05 RX ADMIN — FENTANYL 50 MCG: 50 PATCH, EXTENDED RELEASE TRANSDERMAL at 08:13

## 2018-09-05 RX ADMIN — LOSARTAN POTASSIUM 25 MG: 25 TABLET, FILM COATED ORAL at 08:10

## 2018-09-05 RX ADMIN — GABAPENTIN 600 MG: 300 CAPSULE ORAL at 17:00

## 2018-09-05 RX ADMIN — ACETAMINOPHEN 650 MG: 325 TABLET ORAL at 21:31

## 2018-09-05 RX ADMIN — LORAZEPAM 1 MG: 2 INJECTION INTRAMUSCULAR; INTRAVENOUS at 14:30

## 2018-09-05 RX ADMIN — METHOCARBAMOL 500 MG: 500 TABLET ORAL at 23:32

## 2018-09-05 RX ADMIN — LORAZEPAM 1 MG: 2 INJECTION INTRAMUSCULAR; INTRAVENOUS at 14:44

## 2018-09-05 NOTE — CASE MANAGEMENT
Approached pt in her room as she was yelling for help  As I entered the room she immediately smiled and complimented this writer and asked what she could do for me  I introduced myself as he  and proceeded to inform her of her 18 rights regarding the upcoming hearing on 9/6 to ask the courts for further time to treat this pt    She told me to call her daughter about having a  represent her vs hiring her own   Pt then asked if she could have the paper with her rights so she can read it later  I obliged  Pt gave the date as March 2018  She is not willing to participate in review of her Tx plan and directs me to call her daughter regarding same  I have spoken to Meghanfrancisco j MAGGI Ignacio/ daughter and reviewed Tx plan with her and she is supportive of same / Holly Ling is also supportive of the 303 and may attend, but knows this it not expected  She is supportive of the Magisto representing her mother at the 18  Holly Ling is not pleased with how [facility] [address] has been sending her mother to hospitals and not providing the care she hoped would be available  She is now hopeful that we will stabilize Lei Hayes before sending her back  I have been in contact with Dr Janie Vance, the physician petitioner for the [phone]   He will be available by phone on 9/6 at 9:45 at [phone]

## 2018-09-05 NOTE — PLAN OF CARE
Alteration in Orientation     Treatment Goal: Demonstrate a reduction of confusion and improved orientation to person, place, time and/or situation upon discharge, according to optimum baseline/ability Progressing     Express concerns related to confused thinking related to: Progressing        Alteration in Thoughts and Perception     Treatment Goal: Gain control of psychotic behaviors/thinking, reduce/eliminate presenting symptoms and demonstrate improved reality functioning upon discharge Progressing     Refrain from acting on delusional thinking/internal stimuli Progressing     Agree to be compliant with medication regime, as prescribed and report medication side effects Progressing        Anxiety     Anxiety is at manageable level [address] Discharge to home or other facility with appropriate resources Progressing        Ineffective Coping     Participates in unit activities Progressing        Prexisting or High Potential for Compromised Skin Integrity     Skin integrity is maintained or improved Progressing        Risk for Violence/Aggression Toward Others     Control angry outbursts Progressing

## 2018-09-05 NOTE — PLAN OF CARE
Problem: Ineffective Coping  Goal: Participates in unit activities  Interventions:  - Provide therapeutic environment   - Provide required programming   - Redirect inappropriate behaviors    Outcome: Not Progressing  No group attendance today

## 2018-09-05 NOTE — PROGRESS NOTES
Pt continues to have periods of screaming and banging on bed  Verbal redirection is effective but required constantly  Fluids offered and accepted   Monitoring,

## 2018-09-05 NOTE — PROGRESS NOTES
Pt continues to yell out and banging on bed  Not able to verbally redirect  Ativan 1mg IM given   Will continue to monitor

## 2018-09-05 NOTE — PLAN OF CARE
Problem: DISCHARGE PLANNING  Goal: Discharge to home or other facility with appropriate resources  INTERVENTIONS:  - Identify barriers to discharge w/patient and caregiver  - Arrange for needed discharge resources and transportation as appropriate  - Identify discharge learning needs (meds, wound care, etc )  - Arrange for interpretive services to assist at discharge as needed  - Refer to Case Management Department for coordinating discharge planning if the patient needs post-hospital services based on physician/advanced practitioner order or complex needs related to functional status, cognitive ability, or social support system   Outcome: Progressing  Treating pt, scheduled 303 and have been in contact with pt's daughter and The [address] [place] [address]

## 2018-09-05 NOTE — PROGRESS NOTES
Pt found to have opening of sacrum  Applied barrier cream to this area, abdominal fold and inner thighs  Haldol effective for agitation

## 2018-09-05 NOTE — PROGRESS NOTES
Pt calm, cooperative, and brightens with approach  Medication compliant, is responding to internal stimuli  Continues to yell out at times but is verbally redirectable at this time   Will continue to monitor

## 2018-09-05 NOTE — PROGRESS NOTES
Progress Note - [address] 70 y o  female MRN: [id number]  Unit/Bed#: EF2 571-01 Encounter: [id number]    The patient was seen for continuing care and reviewed with treatment team   Staff reports that the patient brightens upon approach and is medication compliant  She continues to yell out at times and is difficult to redirect at times  This morning she woke up and was very loud and agitated and was medicated with p r n  Haldol  The patient brightens upon approach and is very pleasant with me although I have observed her yelling at and being demanding and unpleasant with other staff  She tells me that she is feeling better now  She says that she was just depressed from being hospitalized and that throws her down a black hole  She said now she is sleeping wonderful and her appetite is always wonderful  She denies auditory and visual hallucinations but according to staff seems to be responding to internal stimuli at times  She knows she is 70years old but she thinks we are in [id number]  She is oriented to year  She continues to complain about back pain  No complaints regarding medication side effects  Appears to be tolerating increased Depakote well        Mental Status Evaluation:  Appearance:  Hospital gown, lying in bed, disheveled   Behavior:  cooperative and friendly   Mood:  euthymic   Affect: labile   Speech: Normal rate and Normal volume   Thought Process:  Goal directed and coherent   Thought Content:  Does not verbalize delusional material   Perceptual Disturbances: Appears to be responding to internal stimuli at times   Risk Potential: No suicidal or homicidal ideation   Attention/Concentration [place] than expected   Orientation:   Oriented to self and year   Gait/Station: Not observed   Motor Activity: No abnormal movement noted     Progress Toward Goals:  No change    Assessment/Plan    Principal Problem:    Bipolar disorder, current episode mixed, moderate (HCC)  Active Problems:    Dementia with behavioral disturbance    Acute cystitis without hematuria    Continuous opioid dependence ([facility] [address] )      Recommended Treatment:      Continue Depakote 500 mg Q 12 hr  Monitor response and check level in a few days as this was increased yesterday  Continue gabapentin 600 mg t i d       Continue melatonin 3 mg HS  Continue Seroquel 25 mg b i d       Continue with pharmacotherapy, group therapy, milieu therapy and occupational therapy    The patient will be maintained on the following medications:    Current Facility-Administered Medications:  acetaminophen 325 mg Oral Q8H PRN LUCY Callaway   acetaminophen 650 mg Oral [facility] LUCY Sanchez   [START ON 9/7/2018] alendronate 70 mg Oral Weekly Before Breakfast LUCY Sanchez   ALPRAZolam 0 25 mg Oral HS PRN LUCY Sanchez   aluminum-magnesium hydroxide-simethicone 15 mL Oral Q4H PRN Mark Decker MD   amLODIPine 10 mg Oral Daily LUCY Sanchez   benztropine 1 mg Intramuscular Q8H PRN Mark Decker MD   benztropine 1 mg Oral Q8H PRN Mark Decker MD   cholecalciferol 1,000 Units Oral Daily Yamel DENYS Luther, LUCY   divalproex sodium 500 mg Oral Q12H [facility] Benjamin Pierson MD   docusate sodium 100 mg Oral BID LUCY Sanchez   fentaNYL 50 mcg Transdermal Q72H Yamel DENYS Luther, LUCY   furosemide 40 mg Oral Daily Yamel DENYS Luther, LUCY   gabapentin 600 mg Oral TID LUCY Sanchez   haloperidol 2 mg Oral Q8H PRN Mark Decker MD   haloperidol lactate 2 mg Intramuscular Q8H PRN Mark Decker MD   hydrOXYzine HCL 25 mg Oral Q6H PRN Mark Decker MD   latanoprost 1 drop Both Eyes HS Yamel DENYS Luther, LUCY   lidocaine 1 patch Transdermal Daily Yamel DENYS Luther, LUCY   losartan 25 mg Oral Daily Yamel DENYS Luther, LUCY   magnesium hydroxide 30 mL Oral Daily PRN Mark Decker MD   melatonin 3 mg Oral HS LuLUCY Max   methocarbamol 500 mg Oral Q6H [facility] HOME LUCY Peck oxyCODONE 5 mg Oral Q4H PRN LUCY Olivia   pantoprazole 40 mg Oral Early Morning Yamel LUCY Ko   phenol 1 spray Mouth/Throat Q2H PRN Yamel M Homa CRNP   QUEtiapine 25 mg Oral BID [place] LUCY Mauro   QUEtiapine 25 mg Oral HS PRN [place] LUCY Mauro   risperiDONE 0 5 mg Oral Q8H PRN Deyanira Llanos MD   rivaroxaban 20 mg Oral Daily With [employer], CRNP       Risks, benefits and possible side effects of Medications:   Patient does not verbalize understanding at this time and will require further explanation

## 2018-09-05 NOTE — PROGRESS NOTES
Pt is screaming, banging on the wall, and constantly ringing call bell with out needing anything  PRN Atarax 25mg given  Will continue to monitor

## 2018-09-05 NOTE — PROGRESS NOTES
Pt continues to yell out, threw her lunch all over her bed and floor, and is not verbally redirectable  Is having both visual and auditory hallucinations of people in her room  PRN haldol given  Will continue to monitor

## 2018-09-06 PROCEDURE — 99232 SBSQ HOSP IP/OBS MODERATE 35: CPT | Performed by: PSYCHIATRY & NEUROLOGY

## 2018-09-06 RX ORDER — OLANZAPINE 5 MG/1
5 TABLET ORAL
Status: DISCONTINUED | OUTPATIENT
Start: 2018-09-06 | End: 2018-09-11

## 2018-09-06 RX ORDER — LORAZEPAM 2 MG/ML
1 INJECTION INTRAMUSCULAR EVERY 6 HOURS PRN
Status: DISCONTINUED | OUTPATIENT
Start: 2018-09-06 | End: 2018-09-28 | Stop reason: HOSPADM

## 2018-09-06 RX ORDER — OLANZAPINE 10 MG/1
5 INJECTION, POWDER, LYOPHILIZED, FOR SOLUTION INTRAMUSCULAR
Status: DISCONTINUED | OUTPATIENT
Start: 2018-09-06 | End: 2018-09-11

## 2018-09-06 RX ORDER — QUETIAPINE FUMARATE 25 MG/1
50 TABLET, FILM COATED ORAL
Status: DISCONTINUED | OUTPATIENT
Start: 2018-09-06 | End: 2018-09-11

## 2018-09-06 RX ORDER — LORAZEPAM 0.5 MG/1
0.5 TABLET ORAL EVERY 8 HOURS PRN
Status: DISCONTINUED | OUTPATIENT
Start: 2018-09-06 | End: 2018-09-11

## 2018-09-06 RX ORDER — QUETIAPINE FUMARATE 25 MG/1
50 TABLET, FILM COATED ORAL 2 TIMES DAILY
Status: DISCONTINUED | OUTPATIENT
Start: 2018-09-06 | End: 2018-09-11

## 2018-09-06 RX ADMIN — FUROSEMIDE 40 MG: 40 TABLET ORAL at 09:21

## 2018-09-06 RX ADMIN — AMLODIPINE BESYLATE 10 MG: 10 TABLET ORAL at 09:21

## 2018-09-06 RX ADMIN — METHOCARBAMOL 500 MG: 500 TABLET ORAL at 17:30

## 2018-09-06 RX ADMIN — QUETIAPINE 50 MG: 25 TABLET ORAL at 17:30

## 2018-09-06 RX ADMIN — DIVALPROEX SODIUM 750 MG: 500 TABLET, DELAYED RELEASE ORAL at 21:13

## 2018-09-06 RX ADMIN — METHOCARBAMOL 500 MG: 500 TABLET ORAL at 13:00

## 2018-09-06 RX ADMIN — ACETAMINOPHEN 650 MG: 325 TABLET ORAL at 13:05

## 2018-09-06 RX ADMIN — METHOCARBAMOL 500 MG: 500 TABLET ORAL at 06:59

## 2018-09-06 RX ADMIN — VITAMIN D, TAB 1000IU (100/BT) 1000 UNITS: 25 TAB at 09:21

## 2018-09-06 RX ADMIN — GABAPENTIN 600 MG: 300 CAPSULE ORAL at 09:21

## 2018-09-06 RX ADMIN — OLANZAPINE 5 MG: 5 TABLET, FILM COATED ORAL at 13:41

## 2018-09-06 RX ADMIN — ACETAMINOPHEN 650 MG: 325 TABLET ORAL at 06:59

## 2018-09-06 RX ADMIN — GABAPENTIN 600 MG: 300 CAPSULE ORAL at 21:12

## 2018-09-06 RX ADMIN — LORAZEPAM 1 MG: 2 INJECTION INTRAMUSCULAR; INTRAVENOUS at 19:01

## 2018-09-06 RX ADMIN — RIVAROXABAN 20 MG: 20 TABLET, FILM COATED ORAL at 17:30

## 2018-09-06 RX ADMIN — GABAPENTIN 600 MG: 300 CAPSULE ORAL at 17:31

## 2018-09-06 RX ADMIN — ACETAMINOPHEN 650 MG: 325 TABLET ORAL at 21:13

## 2018-09-06 RX ADMIN — QUETIAPINE 25 MG: 25 TABLET ORAL at 09:21

## 2018-09-06 RX ADMIN — MELATONIN 3 MG: at 21:13

## 2018-09-06 RX ADMIN — DOCUSATE SODIUM 100 MG: 100 CAPSULE, LIQUID FILLED ORAL at 09:21

## 2018-09-06 RX ADMIN — METHOCARBAMOL 500 MG: 500 TABLET ORAL at 23:07

## 2018-09-06 RX ADMIN — DOCUSATE SODIUM 100 MG: 100 CAPSULE, LIQUID FILLED ORAL at 17:30

## 2018-09-06 RX ADMIN — LIDOCAINE 1 PATCH: 50 PATCH CUTANEOUS at 09:21

## 2018-09-06 RX ADMIN — LATANOPROST 1 DROP: 50 SOLUTION OPHTHALMIC at 21:14

## 2018-09-06 RX ADMIN — QUETIAPINE 50 MG: 25 TABLET ORAL at 21:13

## 2018-09-06 RX ADMIN — LOSARTAN POTASSIUM 25 MG: 25 TABLET, FILM COATED ORAL at 09:21

## 2018-09-06 RX ADMIN — PANTOPRAZOLE SODIUM 40 MG: 40 TABLET, DELAYED RELEASE ORAL at 06:59

## 2018-09-06 RX ADMIN — DIVALPROEX SODIUM 500 MG: 500 TABLET, DELAYED RELEASE ORAL at 09:21

## 2018-09-06 NOTE — PLAN OF CARE
Alteration in Thoughts and Perception     Refrain from acting on delusional thinking/internal stimuli Not Progressing        Risk for Violence/Aggression Toward Others     Control angry outbursts Not Progressing          Alteration in Orientation     Treatment Goal: Demonstrate a reduction of confusion and improved orientation to person, place, time and/or situation upon discharge, according to optimum baseline/ability Progressing     Express concerns related to confused thinking related to: Progressing        Alteration in Thoughts and Perception     Treatment Goal: Gain control of psychotic behaviors/thinking, reduce/eliminate presenting symptoms and demonstrate improved reality functioning upon discharge Progressing     Agree to be compliant with medication regime, as prescribed and report medication side effects Progressing        Anxiety     Anxiety is at manageable level Progressing        Prexisting or High Potential for Compromised Skin Integrity     Skin integrity is maintained or improved Progressing

## 2018-09-06 NOTE — DISCHARGE INSTRUCTIONS
Metabolic Syndrome X   WHAT YOU NEED TO KNOW:   Metabolic syndrome is a group of medical conditions that can lead to heart disease, stroke, or type 2 diabetes  The medical conditions include high triglycerides, low HDL cholesterol, high blood pressure, high blood sugar levels, and extra abdominal fat  DISCHARGE INSTRUCTIONS:   Medicines:   · Cholesterol medicine: This type of medicine is given to help decrease (lower) the amount of cholesterol (fat) in your blood  Cholesterol medicine works best if you also exercise and eat a healthy diet that is low in certain kinds of fats  Some cholesterol medicines may cause liver problems  You may need to have blood taken for tests while using this medicine  · Blood pressure medicine: This is given to lower your blood pressure  A controlled blood pressure helps protect your organs, such as your heart, lungs, brain, and kidneys  Take your blood pressure medicine exactly as directed  · Hypoglycemic medicine: This medicine may be given to decrease the amount of sugar in your blood  Hypoglycemic medicine helps your body move the sugar to your cells, where it is needed for energy  · Take your medicine as directed  Contact your healthcare provider if you think your medicine is not helping or if you have side effects  Tell him of her if you are allergic to any medicine  Keep a list of the medicines, vitamins, and herbs you take  Include the amounts, and when and why you take them  Bring the list or the pill bottles to follow-up visits  Carry your medicine list with you in case of an emergency  Follow up with your healthcare provider as directed:  Write down your questions so you remember to ask them during your visits  Manage metabolic syndrome:   · Maintain a healthy weight:  Weight loss helps lower cholesterol, triglycerides, blood pressure, and blood glucose levels  It can also raise HDL (good cholesterol)   Ask your healthcare provider how much you should weigh  Ask him to help you create a weight loss plan if you are overweight  · Eat a variety of healthy foods:  Healthy foods include fruits, vegetables, whole-grain breads, low-fat dairy products, beans, lean meats, and fish  Eat foods that are low in fat and sodium (salt)  A dietitian can help you plan healthy meals  · Exercise:  Ask your healthcare provider to help you create an exercise plan  Exercise can help lower your blood pressure, cholesterol, and blood sugar levels  Exercise can also help raise your HDL level and help you to lose weight  Get at least 30 minutes of exercise, 5 days each week  · Check your blood pressure as directed: You may be asked to keep a record of your blood pressure and bring it with you to follow-up visits  Ask your healthcare provider what your blood pressure should be and how to check it  · Limit alcohol:  Women should limit alcohol to 1 drink a day  Men should limit alcohol to 2 drinks a day  A drink of alcohol is 12 ounces of beer, 5 ounces of wine, or 1½ ounces of liquor  · Do not smoke: If you smoke, it is never too late to quit  Smoking further increases your risk for heart disease and stroke  Ask your healthcare provider for information if you need help quitting  Contact your healthcare provider if:   · You have more thirst or hunger than usual      · You urinate more frequently  · You have blurred vision  · You have questions or concerns about your condition or care  Return to the emergency department if:   · Your blood pressure is higher than healthcare providers told you it should be  · You have chest pain or discomfort that spreads to your arms, jaw, or back  · You have a severe headache or dizziness  · You have trouble thinking, speaking, or understanding others  © 2017 [facility] Information is for End User's use only and may not be sold, redistributed or otherwise used for commercial purposes   All illustrations and images included in CareNotes® are the copyrighted property of A D A M , Inc  or Nilesh Freitas  The above information is an  only  It is not intended as medical advice for individual conditions or treatments  Talk to your doctor, nurse or pharmacist before following any medical regimen to see if it is safe and effective for you  Metabolic Syndrome X   WHAT YOU NEED TO KNOW:   What is metabolic syndrome? Metabolic syndrome is a group of medical conditions that can increase your risk for heart disease, stroke, or type 2 diabetes  You may have metabolic syndrome if you have at least 3 of the following medical conditions:  · High triglycerides (a type of fat in your blood)    · Low HDL cholesterol (good cholesterol)    · High blood pressure    · High blood sugar levels    · Extra abdominal fat  What increases my risk for metabolic syndrome? The exact cause of metabolic syndrome is not known  Your risk for metabolic syndrome increases if you have insulin resistance  Insulin is a hormone that helps your body take sugar out of your blood and use it for energy  Insulin resistance means your pancreas keeps making insulin but your body cannot use it correctly  Your risk for metabolic syndrome also increases as you age, if you are overweight or obese, or you do not exercise  What are the signs and symptoms of metabolic syndrome? Most people with metabolic syndrome do not have any signs or symptoms  You may have more thirst or hunger than usual, urinate more often, or have blurred vision or headaches  How is metabolic syndrome diagnosed? Your healthcare provider will examine you and ask about other medical conditions you may have  He may ask if you have any family members with metabolic syndrome, diabetes, obesity, or heart disease  · Blood tests: These are used to check your glucose and cholesterol levels       · Oral glucose tolerance test:  Your blood sugar level is tested after you fast for 8 hours, then again after you are given a glucose drink  The test measures how high your blood sugar level rises from the glucose drink  How is metabolic syndrome treated? · Cholesterol medicine: This type of medicine is given to help decrease (lower) the amount of cholesterol (fat) in your blood  Cholesterol medicine works best if you also exercise and eat a healthy diet that is low in certain kinds of fats  Some cholesterol medicines may cause liver problems  You may need to have blood taken for tests while using this medicine  · Blood pressure medicine: This is given to lower your blood pressure  A controlled blood pressure helps protect your organs, such as your heart, lungs, brain, and kidneys  Take your blood pressure medicine exactly as directed  · Hypoglycemic medicine: This medicine may be given to decrease the amount of sugar in your blood  Hypoglycemic medicine helps your body move the sugar to your cells, where it is needed for energy  What are the risks of metabolic syndrome? If untreated, metabolic syndrome increases your risk of heart disease, stroke, and diabetes  These conditions lead to life-threatening illness  How can I manage metabolic syndrome? · Maintain a healthy weight:  Weight loss helps lower cholesterol, triglycerides, blood pressure, and blood glucose levels  It can also raise HDL (good cholesterol)  Ask your healthcare provider how much you should weigh  Ask him to help you create a weight loss plan if you are overweight  · Eat a variety of healthy foods:  Healthy foods include fruits, vegetables, whole-grain breads, low-fat dairy products, beans, lean meats, and fish  Eat foods that are low in fat and sodium (salt)  A dietitian can help you plan healthy meals  · Exercise:  Ask your healthcare provider to help you create an exercise plan  Exercise can help lower your blood pressure, cholesterol, and blood sugar levels   Exercise can also help raise your HDL level and help you to lose weight  Get at least 30 minutes of exercise, 5 days each week  · Check your blood pressure as directed: You may be asked to keep a record of your blood pressure and bring it with you to follow-up visits  Ask your healthcare provider what your blood pressure should be and how to check it  · Limit alcohol:  Women should limit alcohol to 1 drink a day  Men should limit alcohol to 2 drinks a day  A drink of alcohol is 12 ounces of beer, 5 ounces of wine, or 1½ ounces of liquor  · Do not smoke: If you smoke, it is never too late to quit  Smoking further increases your risk for heart disease and stroke  Ask your healthcare provider for information if you need help quitting  When should I contact my healthcare provider? · You have more thirst or hunger than usual      · You urinate more frequently  · You have blurred vision  · You have questions or concerns about your condition or care  When should I seek immediate care or call 911? · Your blood pressure is higher than your healthcare provider told you it should be  · You have chest pain or discomfort that spreads to your arms, jaw, or back  · You have a severe headache or dizziness  · You have trouble thinking, speaking, or understanding others  CARE AGREEMENT:   You have the right to help plan your care  Learn about your health condition and how it may be treated  Discuss treatment options with your caregivers to decide what care you want to receive  You always have the right to refuse treatment  The above information is an  only  It is not intended as medical advice for individual conditions or treatments  Talk to your doctor, nurse or pharmacist before following any medical regimen to see if it is safe and effective for you    © 2017 [employer] Information is for End User's use only and may not be sold, redistributed or otherwise used for commercial purposes  All illustrations and images included in CareNotes® are the copyrighted property of A D A M , Inc  or Nilesh Freitas

## 2018-09-06 NOTE — PLAN OF CARE
Alteration in Thoughts and Perception     Refrain from acting on delusional thinking/internal stimuli Not Progressing        Anxiety     Anxiety is at manageable level Not Progressing        Risk for Violence/Aggression Toward Others     Control angry outbursts Not Progressing          Alteration in Orientation     Treatment Goal: Demonstrate a reduction of confusion and improved orientation to person, place, time and/or situation upon discharge, according to optimum baseline/ability Progressing     Express concerns related to confused thinking related to: Progressing        Alteration in Thoughts and Perception     Treatment Goal: Gain control of psychotic behaviors/thinking, reduce/eliminate presenting symptoms and demonstrate improved reality functioning upon discharge Progressing     Agree to be compliant with medication regime, as prescribed and report medication side effects Progressing        Prexisting or High Potential for Compromised Skin Integrity     Skin integrity is maintained or improved Progressing

## 2018-09-06 NOTE — CASE MANAGEMENT
303 hearing held by [facility]  A  met with patient (for quite a while) prior to hearing and [facility] [employer] returned stating that patient was waiving her right to appear and is in agreement with request for additional treatment for up to 20 days  Petition upheld  303 will  on 18  If a 304 will be needed, a request must be submitted by 18  Pt's daughter is aware of outcome of the hearing

## 2018-09-06 NOTE — PROGRESS NOTES
Pt was cooperative with morning ADL's, and is medication compliant  Pt was being assisted using ila lift and was uncooperative with transfer  Pt was agitated and aggressive toward staff  Pt was redirectable  Pt is currently in her bed  Will monitor

## 2018-09-06 NOTE — PLAN OF CARE
Problem: Ineffective Coping  Goal: Participates in unit activities  Interventions:  - Provide therapeutic environment   - Provide required programming   - Redirect inappropriate behaviors    Outcome: Not Progressing  Pt not attending groups at this time due to verbally disruptive behavior

## 2018-09-06 NOTE — PROGRESS NOTES
Pt is agitated and responding to internal stimuli  Pt can be verbally aggressive  Pt brightens with approach  Verbal redirection is easy but required constantly  Pt is cooperative and compliant with medications  Pt is reclusive to room and not social or visible in the milieu  Pts daughter visited tonight and the visit went well

## 2018-09-06 NOTE — PROGRESS NOTES
Progress Note - [address] 70 y o  female MRN: [id number]  Unit/Bed#: OI5 571-01 Encounter: [id number]    The patient was seen for continuing care and reviewed with treatment team   303 hearing was attended  The patient has been disorganized and loud and aggressive, assaulting staff when approached to provide care  She calls out incessantly demanding different things  She threw her plate and a banana skin on the floor as I was interviewing her  Her behavior is quite disruptive to the milieu    Mental Status Evaluation:  Appearance:  Good eye contact and disheveled   Behavior:  restless and fidgety and Uncooperative   Mood:  irritable   Affect: labile   Speech: Loud and Pressured   Thought Process:  disorganized   Thought Content:  Paranoid and mistrustful   Perceptual Disturbances: Denies hallucinations and does not appear to be responding to internal stimuli   Risk Potential: Potentially aggresive and violent   Attention/Concentration Distractible and inattentive   Orientation:      Gait/Station: Not observed   Motor Activity: No abnormal movement noted     Progress Toward Goals:  No improvement    Assessment/Plan    Principal Problem:    Bipolar disorder, current episode mixed, moderate (HCC)  Active Problems:    Dementia with behavioral disturbance    Acute cystitis without hematuria    Continuous opioid dependence ([facility] [address] )      Recommended Treatment: We will gradually increase divalproex and quetiapine Continue with pharmacotherapy, group therapy, milieu therapy and occupational therapy  The patient will be maintained on the following medications:  Scan    Risks, benefits and possible side effects of Medications:   Patient does not verbalize understanding at this time and will require further explanation

## 2018-09-06 NOTE — PROGRESS NOTES
Pt displays increasing agitation and aggression with staff  Pt experiencing verbal/auditory hallucinations  Pt is sometimes verbally redirectable  Pt was more agitated after daughter visit  Pt is in bed

## 2018-09-06 NOTE — PROGRESS NOTES
Pt had increased agitation and active hallucinations  PRN Zyprexa administered as ordered  Will continue to monitor

## 2018-09-06 NOTE — PROGRESS NOTES
Spoke with Lorrie Mondragon the pts daughter about bringing in pts scheduled Fosamax from her facility  She is unsure if she can get it  If she can't she was wondering if a prescription could be written for her to  and bring in as we do not carry it in our pharmacy

## 2018-09-07 LAB
BACTERIA UR QL AUTO: ABNORMAL /HPF
BASOPHILS # BLD AUTO: 0.03 THOUSANDS/ΜL (ref 0–0.1)
BASOPHILS NFR BLD AUTO: 1 % (ref 0–1)
BILIRUB UR QL STRIP: NEGATIVE
CLARITY UR: CLEAR
COLOR UR: ABNORMAL
EOSINOPHIL # BLD AUTO: 0.16 THOUSAND/ΜL (ref 0–0.61)
EOSINOPHIL NFR BLD AUTO: 4 % (ref 0–6)
ERYTHROCYTE [DISTWIDTH] IN BLOOD BY AUTOMATED COUNT: 16.3 % (ref 11.6–15.1)
GLUCOSE UR STRIP-MCNC: NEGATIVE MG/DL
HCT VFR BLD AUTO: 40.9 % (ref 34.8–46.1)
HGB BLD-MCNC: 12.3 G/DL (ref 11.5–15.4)
HGB UR QL STRIP.AUTO: NEGATIVE
HYALINE CASTS #/AREA URNS LPF: ABNORMAL /LPF
IMM GRANULOCYTES # BLD AUTO: 0.01 THOUSAND/UL (ref 0–0.2)
IMM GRANULOCYTES NFR BLD AUTO: 0 % (ref 0–2)
KETONES UR STRIP-MCNC: NEGATIVE MG/DL
LEUKOCYTE ESTERASE UR QL STRIP: ABNORMAL
LYMPHOCYTES # BLD AUTO: 1.62 THOUSANDS/ΜL (ref 0.6–4.47)
LYMPHOCYTES NFR BLD AUTO: 36 % (ref 14–44)
MCH RBC QN AUTO: 25.4 PG (ref 26.8–34.3)
MCHC RBC AUTO-ENTMCNC: 30.1 G/DL (ref 31.4–37.4)
MCV RBC AUTO: 85 FL (ref 82–98)
MONOCYTES # BLD AUTO: 0.36 THOUSAND/ΜL (ref 0.17–1.22)
MONOCYTES NFR BLD AUTO: 8 % (ref 4–12)
NEUTROPHILS # BLD AUTO: 2.35 THOUSANDS/ΜL (ref 1.85–7.62)
NEUTS SEG NFR BLD AUTO: 51 % (ref 43–75)
NITRITE UR QL STRIP: NEGATIVE
NON-SQ EPI CELLS URNS QL MICRO: ABNORMAL /HPF
NRBC BLD AUTO-RTO: 0 /100 WBCS
PH UR STRIP.AUTO: 5.5 [PH] (ref 4.5–8)
PLATELET # BLD AUTO: 165 THOUSANDS/UL (ref 149–390)
PMV BLD AUTO: 10.4 FL (ref 8.9–12.7)
PROT UR STRIP-MCNC: NEGATIVE MG/DL
RBC # BLD AUTO: 4.84 MILLION/UL (ref 3.81–5.12)
RBC #/AREA URNS AUTO: ABNORMAL /HPF
SP GR UR STRIP.AUTO: 1.02 (ref 1–1.03)
UROBILINOGEN UR QL STRIP.AUTO: 0.2 E.U./DL
WBC # BLD AUTO: 4.53 THOUSAND/UL (ref 4.31–10.16)
WBC #/AREA URNS AUTO: ABNORMAL /HPF

## 2018-09-07 PROCEDURE — 99232 SBSQ HOSP IP/OBS MODERATE 35: CPT | Performed by: PSYCHIATRY & NEUROLOGY

## 2018-09-07 PROCEDURE — 85025 COMPLETE CBC W/AUTO DIFF WBC: CPT | Performed by: PSYCHIATRY & NEUROLOGY

## 2018-09-07 PROCEDURE — 87186 SC STD MICRODIL/AGAR DIL: CPT | Performed by: PSYCHIATRY & NEUROLOGY

## 2018-09-07 PROCEDURE — 87077 CULTURE AEROBIC IDENTIFY: CPT | Performed by: PSYCHIATRY & NEUROLOGY

## 2018-09-07 PROCEDURE — 87086 URINE CULTURE/COLONY COUNT: CPT | Performed by: PSYCHIATRY & NEUROLOGY

## 2018-09-07 PROCEDURE — 81001 URINALYSIS AUTO W/SCOPE: CPT | Performed by: PSYCHIATRY & NEUROLOGY

## 2018-09-07 RX ADMIN — QUETIAPINE 50 MG: 25 TABLET ORAL at 21:25

## 2018-09-07 RX ADMIN — BENZTROPINE MESYLATE 1 MG: 1 TABLET ORAL at 21:32

## 2018-09-07 RX ADMIN — VITAMIN D, TAB 1000IU (100/BT) 1000 UNITS: 25 TAB at 08:21

## 2018-09-07 RX ADMIN — ACETAMINOPHEN 650 MG: 325 TABLET ORAL at 13:45

## 2018-09-07 RX ADMIN — ACETAMINOPHEN 650 MG: 325 TABLET ORAL at 06:55

## 2018-09-07 RX ADMIN — OXYCODONE HYDROCHLORIDE 5 MG: 5 TABLET ORAL at 10:26

## 2018-09-07 RX ADMIN — QUETIAPINE 50 MG: 25 TABLET ORAL at 08:20

## 2018-09-07 RX ADMIN — RIVAROXABAN 20 MG: 20 TABLET, FILM COATED ORAL at 17:23

## 2018-09-07 RX ADMIN — METHOCARBAMOL 500 MG: 500 TABLET ORAL at 11:49

## 2018-09-07 RX ADMIN — MELATONIN 3 MG: at 21:25

## 2018-09-07 RX ADMIN — AMLODIPINE BESYLATE 10 MG: 10 TABLET ORAL at 08:24

## 2018-09-07 RX ADMIN — METHOCARBAMOL 500 MG: 500 TABLET ORAL at 17:23

## 2018-09-07 RX ADMIN — DOCUSATE SODIUM 100 MG: 100 CAPSULE, LIQUID FILLED ORAL at 08:20

## 2018-09-07 RX ADMIN — LOSARTAN POTASSIUM 25 MG: 25 TABLET, FILM COATED ORAL at 08:25

## 2018-09-07 RX ADMIN — ACETAMINOPHEN 650 MG: 325 TABLET ORAL at 21:29

## 2018-09-07 RX ADMIN — DIVALPROEX SODIUM 750 MG: 500 TABLET, DELAYED RELEASE ORAL at 21:26

## 2018-09-07 RX ADMIN — GABAPENTIN 600 MG: 300 CAPSULE ORAL at 08:20

## 2018-09-07 RX ADMIN — LIDOCAINE 1 PATCH: 50 PATCH CUTANEOUS at 08:30

## 2018-09-07 RX ADMIN — DIVALPROEX SODIUM 750 MG: 500 TABLET, DELAYED RELEASE ORAL at 08:20

## 2018-09-07 RX ADMIN — OXYCODONE HYDROCHLORIDE 5 MG: 5 TABLET ORAL at 21:32

## 2018-09-07 RX ADMIN — LORAZEPAM 0.5 MG: 0.5 TABLET ORAL at 10:26

## 2018-09-07 RX ADMIN — OLANZAPINE 5 MG: 10 INJECTION, POWDER, FOR SOLUTION INTRAMUSCULAR at 18:59

## 2018-09-07 RX ADMIN — GABAPENTIN 600 MG: 300 CAPSULE ORAL at 17:22

## 2018-09-07 RX ADMIN — DOCUSATE SODIUM 100 MG: 100 CAPSULE, LIQUID FILLED ORAL at 17:22

## 2018-09-07 RX ADMIN — PANTOPRAZOLE SODIUM 40 MG: 40 TABLET, DELAYED RELEASE ORAL at 06:52

## 2018-09-07 RX ADMIN — METHOCARBAMOL 500 MG: 500 TABLET ORAL at 23:33

## 2018-09-07 RX ADMIN — QUETIAPINE 50 MG: 25 TABLET ORAL at 17:23

## 2018-09-07 RX ADMIN — FUROSEMIDE 40 MG: 40 TABLET ORAL at 08:25

## 2018-09-07 RX ADMIN — GABAPENTIN 600 MG: 300 CAPSULE ORAL at 21:33

## 2018-09-07 RX ADMIN — LATANOPROST 1 DROP: 50 SOLUTION OPHTHALMIC at 21:24

## 2018-09-07 RX ADMIN — METHOCARBAMOL 500 MG: 500 TABLET ORAL at 06:52

## 2018-09-07 NOTE — PROGRESS NOTES
Progress Note - Behavioral Health   Dana Flores 70 y o  female MRN: [id number]  Unit/Bed#: NF4 571-01 Encounter: [id number]    The patient was seen for continuing care and reviewed with treatment team   The patient continues to have episodes of calling out and agitation and in fact she has become physically aggressive  She received p r n  doses of lorazepam and olanzapine last night with minimal effects  Several days ago she had WBCs in her urine  Her platelet count has also been trending down  Mental Status Evaluation:  Appearance:  Marginal/poor hygiene and Good eye contact   Behavior:  psychomotor agitation   Mood:  irritable   Thought Content:  Does not verbalize delusional material   Perceptual Disturbances: Uncertain   Risk Potential: Potentially aggresive and violent   Orientation:            Assessment/Plan    Principal Problem:    Bipolar disorder, current episode mixed, moderate (HCC)  Active Problems:    Dementia with behavioral disturbance    Acute cystitis without hematuria    Continuous opioid dependence ([facility] [address] )      Recommended Treatment: We will check for UTI and also for thrombocytopenia  Depakote has been known to cause that Continue with pharmacotherapy, group therapy, milieu therapy and occupational therapy    The patient will be maintained on the following medications:    Current Facility-Administered Medications:  acetaminophen 325 mg Oral Q8H PRN LUCY Rios   acetaminophen 650 mg Oral Q8H [facility] LUCY Sanchez   alendronate 70 mg Oral Weekly Before Breakfast LUCY Sanchez   ALPRAZolam 0 25 mg Oral HS PRN LUCY Sanchez   aluminum-magnesium hydroxide-simethicone 15 mL Oral Q4H PRN Stephanie Avilez MD   amLODIPine 10 mg Oral Daily LUCY Sanchez   benztropine 1 mg Intramuscular Q8H PRN Stephanie Avilez MD   benztropine 1 mg Oral Q8H PRN Stephanie Avilez MD   cholecalciferol 1,000 Units Oral Daily LUCY Sanchez   divalproex sodium 750 mg Oral Q12H [address] Taylor Nguyen MD   docusate sodium 100 mg Oral BID Yamel M Homa, CRNP   fentaNYL 50 mcg Transdermal Q72H Ymael M Homa, CRNP   furosemide 40 mg Oral Daily Yamel M Homa, CRNP   gabapentin 600 mg Oral TID Yamel Mariam Alisa, CRNP   haloperidol 2 mg Oral Q8H PRN Jose Francisco Vergara MD   haloperidol lactate 2 mg Intramuscular Q8H PRN Jose Francisco Vergara MD   hydrOXYzine HCL 25 mg Oral Q6H PRN Jose Francisco Vergara MD   latanoprost 1 drop Both Eyes HS Yamel M Homa, CRNP   lidocaine 1 patch Transdermal Daily Yamel M Homa, CRNP   LORazepam 1 mg Intramuscular Q6H PRN Henry Kenney, CRNP   LORazepam 0 5 mg Oral Q8H PRN Henry Kenney, CRNP   losartan 25 mg Oral Daily Yamel M Homa, CRNP   magnesium hydroxide 30 mL Oral Daily PRN Jose Francisco Vergara MD   melatonin 3 mg Oral HS Kelprisca Shirvanesa, CRNP   methocarbamol 500 mg Oral Q6H [address] Yamel M Homa, CRNP   OLANZapine 5 mg Intramuscular Q3H PRN Henry Kenney, CRNP   OLANZapine 5 mg Oral Q3H PRN Henry Kenney, CRNP   oxyCODONE 5 mg Oral Q4H PRN Henry Kenney, CRNP   pantoprazole 40 mg Oral Early Morning Yamel M Homa, CRNP   phenol 1 spray Mouth/Throat Q2H PRN Yamel M Homa, CRNP   QUEtiapine 25 mg Oral HS PRN Kelselmaen Shirts, CRNP   QUEtiapine 50 mg Oral BID Taylor Nguyen MD   QUEtiapine 50 mg Oral HS Taylor Nguyen MD   risperiDONE 0 5 mg Oral Q8H PRN Jose Francisco Vergara MD   rivaroxaban 20 mg Oral Daily With [employer], [place]

## 2018-09-07 NOTE — PLAN OF CARE
Problem: Ineffective Coping  Goal: Participates in unit activities  Interventions:  - Provide therapeutic environment   - Provide required programming   - Redirect inappropriate behaviors    Outcome: Not Progressing  Pt calm in the morning yet in her bed and verbally disruptive in the afternoon  No group participation today

## 2018-09-07 NOTE — PROGRESS NOTES
PRN oxycodone was effective  No further c/o B/L lower leg pain reported  PRN Ativan not effective  Pt continues to scream, yell and bang on table  Pt redirectable at present time but reverts back to behaviors shortly after  Monitoring

## 2018-09-07 NOTE — MEDICAL STUDENT
Progress Note - Behavioral Health   Vero Class 70 y o  female MRN: [id number]  Unit/Bed#: WS1 571-01 Encounter: [id number]    Assessment/Plan   Principal Problem:    Bipolar disorder, current episode mixed, moderate (HCC)  Active Problems:    Dementia with behavioral disturbance    Acute cystitis without hematuria    Continuous opioid dependence ([facility] [address] )    Arvind Ayoub was happy and smiling a lot this morning  She recognized me but does not remember my name  She is calm and cooperative but continues to be very confused  Upon entering, Arvind Ayoub was eating breakfast and pouring milk on her pancakes  She stated that she is "like a kid smooshing my food " When asked if that is how she usually eats her breakfast, she responded, "no, just today " She was pleasant and states that she has no thoughts of hurting herself or others  Arvind Ayoub states that he is feeling tired today  Denies any auditory or visual hallucinations  Behavior over the last 24 hours:  unchanged  Sleep: states  Appetite: normal  ROS: unable to accurately assess    Mental Status Evaluation:  Appearance:  disheveled and overweight   Behavior:  evasive   Speech:  loud   Mood:  normal   Affect:  mood-congruent   Thought Process:  disorganized, illogical, loose associations and perserverative   Thought Content:  delusions  obsessive/rumination   Perceptual Disturbances: None   Risk Potential: Suicidal Ideations none and Homicidal Ideations none   Sensorium:  person   Cognition:  impaired due to possible dementia   Consciousness:  alert    Attention: attention span appeared shorter than expected for age   Insight:  limited   Judgment: limited   Gait/Station: unable to assess   Motor Activity: no abnormal movements     Progress Toward Goals: decrease aggressive behaviors, return to baseline    Recommended Treatment: Continue with group therapy, milieu therapy and occupational therapy        Risks, benefits and possible side effects of Medications:   Risks, benefits, and possible side effects of medications explained to patient and patient verbalizes understanding  Medications: all current active meds have been reviewed

## 2018-09-07 NOTE — PROGRESS NOTES
Prn Zyprexa IM 5mg given for increased agitation  Pt began screaming and throwing stuff in her room

## 2018-09-07 NOTE — PROGRESS NOTES
Pt c/o B/L lower extremity pain 8/10  PRN Oxycodone given  Increase anxiety noted  Pt screaming yelling and was not cooperative with care  PRN Ativan given   Will continue to monitor

## 2018-09-08 PROCEDURE — 99232 SBSQ HOSP IP/OBS MODERATE 35: CPT | Performed by: PHYSICIAN ASSISTANT

## 2018-09-08 RX ORDER — HALOPERIDOL 5 MG
5 TABLET ORAL EVERY 8 HOURS PRN
Status: DISCONTINUED | OUTPATIENT
Start: 2018-09-08 | End: 2018-09-17

## 2018-09-08 RX ORDER — HALOPERIDOL 5 MG/ML
5 INJECTION INTRAMUSCULAR EVERY 8 HOURS PRN
Status: DISCONTINUED | OUTPATIENT
Start: 2018-09-08 | End: 2018-09-17

## 2018-09-08 RX ADMIN — OXYCODONE HYDROCHLORIDE 5 MG: 5 TABLET ORAL at 08:19

## 2018-09-08 RX ADMIN — HALOPERIDOL 5 MG: 5 TABLET ORAL at 13:29

## 2018-09-08 RX ADMIN — METHOCARBAMOL 500 MG: 500 TABLET ORAL at 17:12

## 2018-09-08 RX ADMIN — QUETIAPINE 50 MG: 25 TABLET ORAL at 17:13

## 2018-09-08 RX ADMIN — ALPRAZOLAM 0.25 MG: 0.25 TABLET ORAL at 21:03

## 2018-09-08 RX ADMIN — AMLODIPINE BESYLATE 10 MG: 10 TABLET ORAL at 08:22

## 2018-09-08 RX ADMIN — ACETAMINOPHEN 650 MG: 325 TABLET ORAL at 13:24

## 2018-09-08 RX ADMIN — GABAPENTIN 600 MG: 300 CAPSULE ORAL at 17:12

## 2018-09-08 RX ADMIN — QUETIAPINE 50 MG: 25 TABLET ORAL at 08:19

## 2018-09-08 RX ADMIN — FUROSEMIDE 40 MG: 40 TABLET ORAL at 08:22

## 2018-09-08 RX ADMIN — LATANOPROST 1 DROP: 50 SOLUTION OPHTHALMIC at 21:07

## 2018-09-08 RX ADMIN — BENZTROPINE MESYLATE 1 MG: 1 TABLET ORAL at 14:31

## 2018-09-08 RX ADMIN — GABAPENTIN 600 MG: 300 CAPSULE ORAL at 21:02

## 2018-09-08 RX ADMIN — METHOCARBAMOL 500 MG: 500 TABLET ORAL at 13:24

## 2018-09-08 RX ADMIN — DIVALPROEX SODIUM 750 MG: 500 TABLET, DELAYED RELEASE ORAL at 21:03

## 2018-09-08 RX ADMIN — PANTOPRAZOLE SODIUM 40 MG: 40 TABLET, DELAYED RELEASE ORAL at 06:31

## 2018-09-08 RX ADMIN — DIVALPROEX SODIUM 750 MG: 500 TABLET, DELAYED RELEASE ORAL at 08:19

## 2018-09-08 RX ADMIN — METHOCARBAMOL 500 MG: 500 TABLET ORAL at 08:17

## 2018-09-08 RX ADMIN — GABAPENTIN 600 MG: 300 CAPSULE ORAL at 08:18

## 2018-09-08 RX ADMIN — OXYCODONE HYDROCHLORIDE 5 MG: 5 TABLET ORAL at 13:24

## 2018-09-08 RX ADMIN — ACETAMINOPHEN 650 MG: 325 TABLET ORAL at 08:16

## 2018-09-08 RX ADMIN — ACETAMINOPHEN 325 MG: 325 TABLET, FILM COATED ORAL at 14:31

## 2018-09-08 RX ADMIN — METHOCARBAMOL 500 MG: 500 TABLET ORAL at 23:22

## 2018-09-08 RX ADMIN — DOCUSATE SODIUM 100 MG: 100 CAPSULE, LIQUID FILLED ORAL at 08:19

## 2018-09-08 RX ADMIN — VITAMIN D, TAB 1000IU (100/BT) 1000 UNITS: 25 TAB at 08:19

## 2018-09-08 RX ADMIN — RIVAROXABAN 20 MG: 20 TABLET, FILM COATED ORAL at 17:12

## 2018-09-08 RX ADMIN — ACETAMINOPHEN 650 MG: 325 TABLET ORAL at 21:03

## 2018-09-08 RX ADMIN — LIDOCAINE 1 PATCH: 50 PATCH CUTANEOUS at 08:19

## 2018-09-08 RX ADMIN — QUETIAPINE 50 MG: 25 TABLET ORAL at 21:03

## 2018-09-08 RX ADMIN — MELATONIN 3 MG: at 21:02

## 2018-09-08 RX ADMIN — OXYCODONE HYDROCHLORIDE 5 MG: 5 TABLET ORAL at 18:14

## 2018-09-08 RX ADMIN — FENTANYL 50 MCG: 50 PATCH, EXTENDED RELEASE TRANSDERMAL at 08:34

## 2018-09-08 RX ADMIN — DOCUSATE SODIUM 100 MG: 100 CAPSULE, LIQUID FILLED ORAL at 17:12

## 2018-09-08 RX ADMIN — LOSARTAN POTASSIUM 25 MG: 25 TABLET, FILM COATED ORAL at 08:19

## 2018-09-08 NOTE — PROGRESS NOTES
Patient has not showed any positive effect from PRN Haldol  Patient continued to scream and threw the contents of her lunch tray onto the floor  Patient was given PRN cogentin for apparent restlessness possibly caused by PRN Haldol

## 2018-09-08 NOTE — PROGRESS NOTES
Pt is slightly less agitated, pt screaming and banging on the walls     Zyprexa 5mg IM was minimally effective

## 2018-09-08 NOTE — PROGRESS NOTES
Patient continued to be restless PRN cogentin ineffective  Patient's son visited and spoke with this Angel Lopez  Patient's son had a letter and with multiple questions  Patient's son was recommended to ask the doctor and  questions to get a more complete understanding  Patient remained agitated while her son was visiting often shouting  After her son had left patient began to respond to unknown stimuli telling an unseen person to leave her room

## 2018-09-08 NOTE — PROGRESS NOTES
Pt c/o pain of Rt  Leg and hip  Medicated with oxycodone which was effective for pain  Pt appeared to be experiencing restless legs after Zyprexa injection  Effective for symptom

## 2018-09-08 NOTE — PROGRESS NOTES
Patient is only oriented to self  Patient states she feels "horrible"  Patient has been yelling often  Patient demonstrates confusion  Patient's speech is disorganized  Patient is compliant with medication

## 2018-09-08 NOTE — PLAN OF CARE
Alteration in Orientation     Treatment Goal: Demonstrate a reduction of confusion and improved orientation to person, place, time and/or situation upon discharge, according to optimum baseline/ability Not Progressing     Express concerns related to confused thinking related to: Not Progressing        Alteration in Thoughts and Perception     Treatment Goal: Gain control of psychotic behaviors/thinking, reduce/eliminate presenting symptoms and demonstrate improved reality functioning upon discharge Not Progressing     Refrain from acting on delusional thinking/internal stimuli Not Progressing        Anxiety     Anxiety is at manageable level Not Progressing        Risk for Violence/Aggression Toward Others     Control angry outbursts Not Progressing        Patient is confused and continually shouting    Alteration in Thoughts and Perception     Agree to be compliant with medication regime, as prescribed and report medication side effects Progressing        Prexisting or High Potential for Compromised Skin Integrity     Skin integrity is maintained or improved Progressing

## 2018-09-08 NOTE — PROGRESS NOTES
Patient continues to scream  Patient is not redirectable  Patient will often scream even if a person is in the room actively trying to calm her down  Patient was given PRN PO Haldol

## 2018-09-08 NOTE — PROGRESS NOTES
Pt displays increasing agitation and aggression  Pt experiencing auditory/visual hallucinations  Pt is not verbally redirectable  Pt is in bed, not social or visible in the milieu

## 2018-09-08 NOTE — PROGRESS NOTES
Progress Note - Behavioral Health     Mckayla Nunes 70 y o  female MRN: [id number]  Unit/Bed#: GX2 571-01 Encounter: [id number]    Mckayla Nunes was seen for continuing care and reviewed with treatment team   Pt is in bed on my arrival   She is pleasant this morning and later did not remember our conversation  In the morning she reported less depression and anxiety "Not really a lot" and denied any SI, HI or psychotic Sxs  She became demanding and agitated a very short while later and started yelling incessantly for various things and threatening to throw things  (She did throw things yesterday)  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 and seems to often seek attention calling people to come into her room  Haloperidol 2mg then raised to 5mg prn given  Staff relayed she has been angry, labile in general, and requiring multiple prn doses of antipsychotic medications since admission due to her behaviors  She has been medication compliant however  She has been evaluated by PT and OT who recommended ST rehab and a walker  Sleep:Good  Appetite: Good   Medication side effects: None per Pt    ROS: No complaints per Pt    Labs/CXR/EKG: Reviewed      Mental Status Evaluation:  Appearance:  Dressed in hospital attire, a bit disheveled, Good eye contact, a bit disheveled   Behavior:  Pleasant and cooperative for interview, quickly then became agitated   Speech:  Clear overal but loud and voice is raspy, with normal rate  Mood:  Agitated, Labile   Affect:  Labile   Thought Process:  Disorganized   Associations: Tangential associations   Thought Content:  Paranoid delusions   Perceptual Disturbances: Pt denies these Sxs    She does not seem overtly to be responding to internal stimuli    Risk Potential: Pt presently denies SI or HI    Sensorium:  Self   Memory:  short term memory impaired   Consciousness:  alert, awake   Attention: Fair   Insight:  Nil   Judgment: Poor   Gait/Station: Unobserved--Pt in bed   Motor Activity: No abnormal movements     Vitals:    09/07/18 0820 09/07/18 1534 09/08/18 0700 09/08/18 0818   BP: 118/67 121/69 136/78 135/66   BP Location:  Right arm Left arm    Pulse:  68 76    Resp:  16 18    Temp:  97 8 °F (36 6 °C) 97 8 °F (36 6 °C)    TempSrc:  Tympanic Tympanic    SpO2:  94% 98%    Weight:       Height:           Admission on 09/01/2018   Component Date Value    Valproic Acid, Total 09/04/2018 25*    Procalcitonin 09/04/2018 <0 05     WBC 09/04/2018 5 36     RBC 09/04/2018 4 77     Hemoglobin 09/04/2018 12 2     Hematocrit 09/04/2018 40 5     MCV 09/04/2018 85     MCH 09/04/2018 25 6*    MCHC 09/04/2018 30 1*    RDW 09/04/2018 16 4*    MPV 09/04/2018 10 6     Platelets 54/54/6570 144*    nRBC 09/04/2018 0     Neutrophils Relative 09/04/2018 59     Immat GRANS % 09/04/2018 0     Lymphocytes Relative 09/04/2018 31     Monocytes Relative 09/04/2018 7     Eosinophils Relative 09/04/2018 3     Basophils Relative 09/04/2018 0     Neutrophils Absolute 09/04/2018 3 13     Immature Grans Absolute 09/04/2018 0 02     Lymphocytes Absolute 09/04/2018 1 67     Monocytes Absolute 09/04/2018 0 36     Eosinophils Absolute 09/04/2018 0 16     Basophils Absolute 09/04/2018 0 02     Ventricular Rate 09/02/2018 83     Atrial Rate 09/02/2018 100     QRSD Interval 09/02/2018 92     QT Interval 09/02/2018 388     QTC Interval 09/02/2018 455     QRS Axis 09/02/2018 -11     T Wave Axis 09/02/2018 6     Color, UA 09/07/2018 Dk Yellow     Clarity, UA 09/07/2018 Clear     Specific Gravity, UA 09/07/2018 1 025     pH, UA 09/07/2018 5 5     Leukocytes, UA 09/07/2018 Moderate*    Nitrite, UA 09/07/2018 Negative     Protein, UA 09/07/2018 Negative     Glucose, UA 09/07/2018 Negative     Ketones, UA 09/07/2018 Negative     Urobilinogen, UA 09/07/2018 0 2     Bilirubin, UA 09/07/2018 Negative     Blood, UA 09/07/2018 Negative     WBC 09/07/2018 4 53     RBC 09/07/2018 4 84     Hemoglobin 09/07/2018 12 3     Hematocrit 09/07/2018 40 9     MCV 09/07/2018 85     MCH 09/07/2018 25 4*    MCHC 09/07/2018 30 1*    RDW 09/07/2018 16 3*    MPV 09/07/2018 10 4     Platelets 90/80/2215 165     nRBC 09/07/2018 0     Neutrophils Relative 09/07/2018 51     Immat GRANS % 09/07/2018 0     Lymphocytes Relative 09/07/2018 36     Monocytes Relative 09/07/2018 8     Eosinophils Relative 09/07/2018 4     Basophils Relative 09/07/2018 1     Neutrophils Absolute 09/07/2018 2 35     Immature Grans Absolute 09/07/2018 0 01     Lymphocytes Absolute 09/07/2018 1 62     Monocytes Absolute 09/07/2018 0 36     Eosinophils Absolute 09/07/2018 0 16     Basophils Absolute 09/07/2018 0 03     RBC, UA 09/07/2018 None Seen     WBC, UA 09/07/2018 30-50*    Epithelial Cells 09/07/2018 None Seen     Bacteria, UA 09/07/2018 Occasional     Hyaline Casts, UA 09/07/2018 None Seen        Progress Toward Goals:   No apparent progress  Depakote was increased yesterday and I will observe for effect and increase tomorrow if no change in behavior  Will cautiously consider medication adjustments given her h/o prolonged QT and that there is a Urine Cx pending  Assessment/Plan   Principal Problem:    Bipolar disorder, current episode mixed, moderate (HCC)  Active Problems:    Dementia with behavioral disturbance    Acute cystitis without hematuria    Continuous opioid dependence ([facility] [address] )      Recommended Treatment: Continue with pharmacotherapy, group therapy, milieu therapy and occupational therapy    The patient will be maintained on the following medications:    Current Facility-Administered Medications:  acetaminophen 325 mg Oral Q8H PRN Dorothy LaxLISANP   acetaminophen 650 mg Oral Q8H [address] Yamel M LUCY Luther   ALPRAZolam 0 25 mg Oral HS PRN Yamel M LUCY Luther   aluminum-magnesium hydroxide-simethicone 15 mL Oral Q4H PRN Harini Smith MD   amLODIPine 10 mg Oral Daily Yamel M Homa, CRNP   benztropine 1 mg Intramuscular Q8H PRN Lewis Buckner MD   benztropine 1 mg Oral Q8H PRN Lewis Buckner MD   cholecalciferol 1,000 Units Oral Daily Yamel M Homa, CRNP   divalproex sodium 750 mg Oral Q12H [address] Rafi Sellers MD   docusate sodium 100 mg Oral BID Yamel M Homa, CRNP   fentaNYL 50 mcg Transdermal Q72H Yamel M Homa, CRNP   furosemide 40 mg Oral Daily Yamel M Homa, CRNP   gabapentin 600 mg Oral TID Yamel M Homa, CRNP   haloperidol 5 mg Oral Q8H PRN Carmella Kline PA-C   haloperidol lactate 5 mg Intramuscular Q8H PRN Carmella Kline PA-C   hydrOXYzine HCL 25 mg Oral Q6H PRN Lewis Buckner MD   latanoprost 1 drop Both Eyes HS Ymael M Homa, CRNP   lidocaine 1 patch Transdermal Daily Yamel M Homa, CRNP   LORazepam 1 mg Intramuscular Q6H PRN Micheal Mash, CRNP   LORazepam 0 5 mg Oral Q8H PRN Micheal Mash, CRNP   losartan 25 mg Oral Daily Yamel M Homa, CRNP   magnesium hydroxide 30 mL Oral Daily PRN Lewis Buckner MD   melatonin 3 mg Oral HS William Pizza, CRNP   methocarbamol 500 mg Oral Q6H [address] Yamel M Homa, CRNP   OLANZapine 5 mg Intramuscular Q3H PRN Micheal Mash, CRNP   OLANZapine 5 mg Oral Q3H PRN Micheal Mash, CRNP   oxyCODONE 5 mg Oral Q4H PRN Micheal Mash, CRNP   pantoprazole 40 mg Oral Early Morning Yamel M Homa, CRNP   phenol 1 spray Mouth/Throat Q2H PRN Yamel M Homa, CRNP   QUEtiapine 25 mg Oral HS PRN William Pizza, CRNP   QUEtiapine 50 mg Oral BID Rafi Sellers MD   QUEtiapine 50 mg Oral HS Rafi Sellers MD   risperiDONE 0 5 mg Oral Q8H PRN Lewis Buckner MD   rivaroxaban 20 mg Oral Daily With [employer], CRNP   sterile water           Risks, benefits and possible side effects of Medications:   Patient does not verbalize understanding at this time and will require further explanation

## 2018-09-09 PROCEDURE — 99232 SBSQ HOSP IP/OBS MODERATE 35: CPT | Performed by: PSYCHIATRY & NEUROLOGY

## 2018-09-09 RX ORDER — NYSTATIN 100000 [USP'U]/G
POWDER TOPICAL 2 TIMES DAILY
Status: DISCONTINUED | OUTPATIENT
Start: 2018-09-09 | End: 2018-09-28 | Stop reason: HOSPADM

## 2018-09-09 RX ORDER — BUSPIRONE HYDROCHLORIDE 10 MG/1
10 TABLET ORAL 2 TIMES DAILY
Status: DISCONTINUED | OUTPATIENT
Start: 2018-09-09 | End: 2018-09-11

## 2018-09-09 RX ADMIN — FUROSEMIDE 40 MG: 40 TABLET ORAL at 08:17

## 2018-09-09 RX ADMIN — LOSARTAN POTASSIUM 25 MG: 25 TABLET, FILM COATED ORAL at 08:18

## 2018-09-09 RX ADMIN — LATANOPROST 1 DROP: 50 SOLUTION OPHTHALMIC at 21:05

## 2018-09-09 RX ADMIN — DIVALPROEX SODIUM 750 MG: 500 TABLET, DELAYED RELEASE ORAL at 21:04

## 2018-09-09 RX ADMIN — MELATONIN 3 MG: at 21:05

## 2018-09-09 RX ADMIN — ACETAMINOPHEN 325 MG: 325 TABLET, FILM COATED ORAL at 08:57

## 2018-09-09 RX ADMIN — METHOCARBAMOL 500 MG: 500 TABLET ORAL at 23:05

## 2018-09-09 RX ADMIN — DOCUSATE SODIUM 100 MG: 100 CAPSULE, LIQUID FILLED ORAL at 17:12

## 2018-09-09 RX ADMIN — NYSTATIN 1 APPLICATION: 100000 POWDER TOPICAL at 17:25

## 2018-09-09 RX ADMIN — QUETIAPINE 50 MG: 25 TABLET ORAL at 17:12

## 2018-09-09 RX ADMIN — ACETAMINOPHEN 650 MG: 325 TABLET ORAL at 05:06

## 2018-09-09 RX ADMIN — QUETIAPINE 50 MG: 25 TABLET ORAL at 08:17

## 2018-09-09 RX ADMIN — OXYCODONE HYDROCHLORIDE 5 MG: 5 TABLET ORAL at 12:33

## 2018-09-09 RX ADMIN — PANTOPRAZOLE SODIUM 40 MG: 40 TABLET, DELAYED RELEASE ORAL at 05:06

## 2018-09-09 RX ADMIN — ACETAMINOPHEN 650 MG: 325 TABLET ORAL at 15:08

## 2018-09-09 RX ADMIN — GABAPENTIN 600 MG: 300 CAPSULE ORAL at 17:12

## 2018-09-09 RX ADMIN — AMLODIPINE BESYLATE 10 MG: 10 TABLET ORAL at 08:16

## 2018-09-09 RX ADMIN — QUETIAPINE 50 MG: 25 TABLET ORAL at 21:05

## 2018-09-09 RX ADMIN — VITAMIN D, TAB 1000IU (100/BT) 1000 UNITS: 25 TAB at 08:18

## 2018-09-09 RX ADMIN — DOCUSATE SODIUM 100 MG: 100 CAPSULE, LIQUID FILLED ORAL at 08:18

## 2018-09-09 RX ADMIN — METHOCARBAMOL 500 MG: 500 TABLET ORAL at 05:06

## 2018-09-09 RX ADMIN — BUSPIRONE HYDROCHLORIDE 10 MG: 10 TABLET ORAL at 09:50

## 2018-09-09 RX ADMIN — METHOCARBAMOL 500 MG: 500 TABLET ORAL at 12:33

## 2018-09-09 RX ADMIN — GABAPENTIN 600 MG: 300 CAPSULE ORAL at 08:16

## 2018-09-09 RX ADMIN — LORAZEPAM 0.5 MG: 0.5 TABLET ORAL at 08:16

## 2018-09-09 RX ADMIN — ACETAMINOPHEN 650 MG: 325 TABLET ORAL at 21:05

## 2018-09-09 RX ADMIN — RIVAROXABAN 20 MG: 20 TABLET, FILM COATED ORAL at 17:12

## 2018-09-09 RX ADMIN — DIVALPROEX SODIUM 750 MG: 500 TABLET, DELAYED RELEASE ORAL at 08:17

## 2018-09-09 RX ADMIN — METHOCARBAMOL 500 MG: 500 TABLET ORAL at 17:12

## 2018-09-09 RX ADMIN — GABAPENTIN 600 MG: 300 CAPSULE ORAL at 21:04

## 2018-09-09 RX ADMIN — BUSPIRONE HYDROCHLORIDE 10 MG: 10 TABLET ORAL at 17:12

## 2018-09-09 RX ADMIN — LIDOCAINE 1 PATCH: 50 PATCH CUTANEOUS at 08:20

## 2018-09-09 RX ADMIN — OXYCODONE HYDROCHLORIDE 5 MG: 5 TABLET ORAL at 08:17

## 2018-09-09 NOTE — PROGRESS NOTES
Patient is oriented to self, with limited orientation to time she knows the year the season and who the president is  Patient has been shouting loudly for most of the morning  Patient was given PRN Ativan for anxiety and PRN Oxycodone and PRN Tylenol for pain, all of which have been ineffective  Patient was showered at her request but screamed throughout  Patient has been incontinent of urine multiple times this morning  Patient has redness in folds under panis and underneath armpits  Patient is compliant with medication  Patient at times has shouted, "I am going to kill myself"

## 2018-09-09 NOTE — PROGRESS NOTES
Pt calling out most of evening  Yelling for her children Changed and cleaned for urinary incontinence  Actively hallucinating at times  Medication compliant

## 2018-09-09 NOTE — PLAN OF CARE
Alteration in Orientation     Treatment Goal: Demonstrate a reduction of confusion and improved orientation to person, place, time and/or situation upon discharge, according to optimum baseline/ability Not Progressing     Express concerns related to confused thinking related to: Not Progressing        Alteration in Thoughts and Perception     Treatment Goal: Gain control of psychotic behaviors/thinking, reduce/eliminate presenting symptoms and demonstrate improved reality functioning upon discharge Not Progressing     Refrain from acting on delusional thinking/internal stimuli Not Progressing        Anxiety     Anxiety is at manageable level Not Progressing        Risk for Violence/Aggression Toward Others     Control angry outbursts Not Progressing        Patient continues to be confused and agitated    Alteration in Thoughts and Perception     Agree to be compliant with medication regime, as prescribed and report medication side effects Progressing        Prexisting or High Potential for Compromised Skin Integrity     Skin integrity is maintained or improved Progressing

## 2018-09-09 NOTE — PROGRESS NOTES
C/O" I discussed not of 8 have to keep calling so I can get my intention'    Report from staff regarding this patient received and record reviewed  prior to seeing this patient   Behavior over the last 24 hours: This patient seen chart reviewed, she lays in the her room in the bed and keep calling loudly nonstop, I went and talked to her, she was oriented, appeared anxious nervous, going to help herself, when you talk to her she becomes some attended sleep, pleasant, she is a little off on the month and day, she thought she was in at [facility], this is a 2, year 2018  Heart calling loudly nonstop has become annoying for her as well as disturbing the therapeutic meliew  Sleep: Good  Appetite:ok  Medication side effects:none noticed and reported  ROS:significant louldy calling non stop   Mental Status Evaluation:  Appearance:  Dressed appropraitely   Behavior:  cooperative   Speech:  Normal but call out loudly   Mood:  anxious   Affect:  blunted   Thought Process:  Goal directed   Thought Content:  paranoid   Perceptual Disturbances: Denied AV hallucination   Risk Potential: NO BATSHEVA    Sensorium:  intact   Cognition:  intact   Consciousness:  Alert, OX2   Attention: Fair   Insight:  poor   Judgment: poor   Gait/Station: In wheal chair   Motor Activity: No changes compare to yesterday      Progress Toward Goals: working on current treatment goals, no changes  Made in treatment plan   Recommended Treatment: Continue with group therapy, milieu therapy and occupational therapy  Risks, benefits and possible side effects of Medications:   Risks, benefits, and possible side effects of medications explained to patient and patient verbalizes understanding        Medications:   current meds:   Current Facility-Administered Medications   Medication Dose Route Frequency    acetaminophen (TYLENOL) tablet 325 mg  325 mg Oral Q8H PRN    acetaminophen (TYLENOL) tablet 650 mg  650 mg Oral Q8H [facility]    Ariel ClemonsmsMichael tablet 0 25 mg  0 25 mg Oral HS PRN    aluminum-magnesium hydroxide-simethicone (MYLANTA) 200-200-20 mg/5 mL oral suspension 15 mL  15 mL Oral Q4H PRN    amLODIPine (NORVASC) tablet 10 mg  10 mg Oral Daily    benztropine (COGENTIN) injection 1 mg  1 mg Intramuscular Q8H PRN    benztropine (COGENTIN) tablet 1 mg  1 mg Oral Q8H PRN    cholecalciferol (VITAMIN D3) tablet 1,000 Units  1,000 Units Oral Daily    divalproex sodium (DEPAKOTE) EC tablet 750 mg  750 mg Oral Q12H [address]    docusate sodium (COLACE) capsule 100 mg  100 mg Oral BID    fentaNYL (DURAGESIC) 50 mcg/hr TD 72 hr patch 50 mcg  50 mcg Transdermal Q72H    furosemide (LASIX) tablet 40 mg  40 mg Oral Daily    gabapentin (NEURONTIN) capsule 600 mg  600 mg Oral TID    haloperidol (HALDOL) tablet 5 mg  5 mg Oral Q8H PRN    haloperidol lactate (HALDOL) injection 5 mg  5 mg Intramuscular Q8H PRN    hydrOXYzine HCL (ATARAX) tablet 25 mg  25 mg Oral Q6H PRN    latanoprost (XALATAN) 0 005 % ophthalmic solution 1 drop  1 drop Both Eyes HS    lidocaine (LIDODERM) 5 % patch 1 patch  1 patch Transdermal Daily    LORazepam (ATIVAN) 2 mg/mL injection 1 mg  1 mg Intramuscular Q6H PRN    LORazepam (ATIVAN) tablet 0 5 mg  0 5 mg Oral Q8H PRN    losartan (COZAAR) tablet 25 mg  25 mg Oral Daily    magnesium hydroxide (MILK OF MAGNESIA) 400 mg/5 mL oral suspension 30 mL  30 mL Oral Daily PRN    melatonin tablet 3 mg  3 mg Oral HS    methocarbamol (ROBAXIN) tablet 500 mg  500 mg Oral Q6H CHRISTINE    OLANZapine (ZyPREXA) IM injection 5 mg  5 mg Intramuscular Q3H PRN    OLANZapine (ZyPREXA) tablet 5 mg  5 mg Oral Q3H PRN    oxyCODONE (ROXICODONE) IR tablet 5 mg  5 mg Oral Q4H PRN    pantoprazole (PROTONIX) EC tablet 40 mg  40 mg Oral Early Morning    phenol (CHLORASEPTIC) 1 4 % mucosal liquid 1 spray  1 spray Mouth/Throat Q2H PRN    QUEtiapine (SEROquel) tablet 25 mg  25 mg Oral HS PRN    QUEtiapine (SEROquel) tablet 50 mg  50 mg Oral BID    QUEtiapine (SEROquel) tablet 50 mg  50 mg Oral HS    risperiDONE (RisperDAL M-TABS) dispersible tablet 0 5 mg  0 5 mg Oral Q8H PRN    rivaroxaban (XARELTO) tablet 20 mg  20 mg Oral Daily With [employer] sterile water injection **AcuDose Override Pull**         Labs: NA    Assessment, Diagnosis  and Plan:  Patient is showing agitation, loudly calling due to impatient and anxious,  No change in diagnosis, continue with current meds and goals, WILL ADD BUSPAR 10 MG bid TO ADDDRESS HER ANXIETY , F/U tomorrow    Counseling / Coordination of Care  Total floor / unit time spent today20 minutes  minutes  Greater than 50% of total time was spent with the patient and / or family counseling and / or coordination of care  A description of the counseling / coordination of care: , start on buspar 10 mg bid  for anxiety and lability      Barry Ann MD

## 2018-09-10 LAB
BACTERIA UR CULT: ABNORMAL
BACTERIA UR CULT: ABNORMAL

## 2018-09-10 PROCEDURE — 99232 SBSQ HOSP IP/OBS MODERATE 35: CPT | Performed by: PSYCHIATRY & NEUROLOGY

## 2018-09-10 PROCEDURE — G8979 MOBILITY GOAL STATUS: HCPCS | Performed by: PHYSICAL THERAPIST

## 2018-09-10 PROCEDURE — G8978 MOBILITY CURRENT STATUS: HCPCS | Performed by: PHYSICAL THERAPIST

## 2018-09-10 PROCEDURE — 97163 PT EVAL HIGH COMPLEX 45 MIN: CPT | Performed by: PHYSICAL THERAPIST

## 2018-09-10 RX ADMIN — HYDROXYZINE HYDROCHLORIDE 25 MG: 25 TABLET ORAL at 09:25

## 2018-09-10 RX ADMIN — GABAPENTIN 600 MG: 300 CAPSULE ORAL at 17:31

## 2018-09-10 RX ADMIN — QUETIAPINE 50 MG: 25 TABLET ORAL at 17:31

## 2018-09-10 RX ADMIN — METHOCARBAMOL 500 MG: 500 TABLET ORAL at 12:15

## 2018-09-10 RX ADMIN — DIVALPROEX SODIUM 750 MG: 500 TABLET, DELAYED RELEASE ORAL at 21:15

## 2018-09-10 RX ADMIN — OLANZAPINE 5 MG: 5 TABLET, FILM COATED ORAL at 14:02

## 2018-09-10 RX ADMIN — FUROSEMIDE 40 MG: 40 TABLET ORAL at 09:09

## 2018-09-10 RX ADMIN — GABAPENTIN 600 MG: 300 CAPSULE ORAL at 09:08

## 2018-09-10 RX ADMIN — MELATONIN 3 MG: at 21:16

## 2018-09-10 RX ADMIN — GABAPENTIN 600 MG: 300 CAPSULE ORAL at 21:15

## 2018-09-10 RX ADMIN — ACETAMINOPHEN 650 MG: 325 TABLET ORAL at 07:06

## 2018-09-10 RX ADMIN — DOCUSATE SODIUM 100 MG: 100 CAPSULE, LIQUID FILLED ORAL at 17:31

## 2018-09-10 RX ADMIN — DOCUSATE SODIUM 100 MG: 100 CAPSULE, LIQUID FILLED ORAL at 09:12

## 2018-09-10 RX ADMIN — LOSARTAN POTASSIUM 25 MG: 25 TABLET, FILM COATED ORAL at 09:18

## 2018-09-10 RX ADMIN — DIVALPROEX SODIUM 750 MG: 500 TABLET, DELAYED RELEASE ORAL at 09:10

## 2018-09-10 RX ADMIN — VITAMIN D, TAB 1000IU (100/BT) 1000 UNITS: 25 TAB at 09:11

## 2018-09-10 RX ADMIN — NYSTATIN: 100000 POWDER TOPICAL at 10:20

## 2018-09-10 RX ADMIN — ACETAMINOPHEN 650 MG: 325 TABLET ORAL at 14:02

## 2018-09-10 RX ADMIN — METHOCARBAMOL 500 MG: 500 TABLET ORAL at 23:09

## 2018-09-10 RX ADMIN — AMLODIPINE BESYLATE 10 MG: 10 TABLET ORAL at 09:08

## 2018-09-10 RX ADMIN — BUSPIRONE HYDROCHLORIDE 10 MG: 10 TABLET ORAL at 09:10

## 2018-09-10 RX ADMIN — ACETAMINOPHEN 650 MG: 325 TABLET ORAL at 21:16

## 2018-09-10 RX ADMIN — LORAZEPAM 1 MG: 2 INJECTION INTRAMUSCULAR; INTRAVENOUS at 14:54

## 2018-09-10 RX ADMIN — NYSTATIN 1 APPLICATION: 100000 POWDER TOPICAL at 17:33

## 2018-09-10 RX ADMIN — OXYCODONE HYDROCHLORIDE 5 MG: 5 TABLET ORAL at 09:25

## 2018-09-10 RX ADMIN — BUSPIRONE HYDROCHLORIDE 10 MG: 10 TABLET ORAL at 17:31

## 2018-09-10 RX ADMIN — PANTOPRAZOLE SODIUM 40 MG: 40 TABLET, DELAYED RELEASE ORAL at 07:07

## 2018-09-10 RX ADMIN — LATANOPROST 1 DROP: 50 SOLUTION OPHTHALMIC at 21:18

## 2018-09-10 RX ADMIN — RIVAROXABAN 20 MG: 20 TABLET, FILM COATED ORAL at 17:35

## 2018-09-10 RX ADMIN — HALOPERIDOL 5 MG: 5 TABLET ORAL at 12:59

## 2018-09-10 RX ADMIN — LIDOCAINE 1 PATCH: 50 PATCH CUTANEOUS at 09:26

## 2018-09-10 RX ADMIN — QUETIAPINE 50 MG: 25 TABLET ORAL at 09:09

## 2018-09-10 RX ADMIN — QUETIAPINE 50 MG: 25 TABLET ORAL at 21:15

## 2018-09-10 RX ADMIN — METHOCARBAMOL 500 MG: 500 TABLET ORAL at 17:31

## 2018-09-10 NOTE — PLAN OF CARE
Alteration in Orientation     Treatment Goal: Demonstrate a reduction of confusion and improved orientation to person, place, time and/or situation upon discharge, according to optimum baseline/ability Not Progressing     Express concerns related to confused thinking related to: Not Progressing        Alteration in Thoughts and Perception     Treatment Goal: Gain control of psychotic behaviors/thinking, reduce/eliminate presenting symptoms and demonstrate improved reality functioning upon discharge Not Progressing     Refrain from acting on delusional thinking/internal stimuli Not Progressing        Risk for Violence/Aggression Toward Others     Control angry outbursts Not Progressing          Alteration in Thoughts and Perception     Agree to be compliant with medication regime, as prescribed and report medication side effects Progressing        Anxiety     Anxiety is at manageable level Progressing        Prexisting or High Potential for Compromised Skin Integrity     Skin integrity is maintained or improved Progressing

## 2018-09-10 NOTE — PHYSICAL THERAPY NOTE
Physical Therapy Evaluation      Patient Active Problem List   Diagnosis    Atrial fibrillation ([facility] [address] )    Hypertension    Dementia with behavioral disturbance    PAD (peripheral artery disease) (HCC)    Warfarin-induced coagulopathy (HCC)    Morbid obesity due to excess calories (HCC)    Acute cystitis without hematuria    Acute encephalopathy    Bronchitis    Continuous opioid dependence (HCC)    Chronic anemia    COPD (chronic obstructive pulmonary disease) (HCC)    Rectal bleeding    Overactive bladder    Hypokalemia    HCAP (healthcare-associated pneumonia)    Pneumonia due to gram-negative bacteria (HCC)    Acute metabolic encephalopathy    Acute respiratory failure with hypoxia (HCC)    Back pain    Moderate episode of recurrent major depressive disorder (HCC)    Hypoxia    Lactic acidosis    Polypharmacy    Chronic pain    Physical deconditioning    Bipolar disorder, current episode mixed, moderate (HCC)       Past Medical History:   Diagnosis Date    A-fib (HCC)     Chronic pain     Chronic respiratory failure with hypoxia (HCC)     COPD (chronic obstructive pulmonary disease) (HCC)     Dementia     Dorsalgia, unspecified     Edema     Encephalopathy     GERD (gastroesophageal reflux disease)     Hypertension     Morbid obesity (HCC)     Muscle weakness (generalized)     Opioid dependence (HCC)     Overactive bladder     Pneumonia     Psychiatric disorder     anxiety, bipolar    Radiculopathy of thoracolumbar region     Sciatica     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     Urinary incontinence     UTI (urinary tract infection)        Past Surgical History:   Procedure Laterality Date    APPENDECTOMY      BACK SURGERY      CHOLECYSTECTOMY      KNEE SURGERY          09/10/18 1300   Note Type   Note type Eval only   Pain Assessment   Pain Assessment No/denies pain   Home Living   Type of Home SNF  ([facility]) Home Layout One level   [address]  (per pt, poor historian)   Prior Function   Level of [place] (per pt able to amb until farily recently, using rw)   Lives With Facility staff   Receives Help From Personal care attendant   ADL Assistance Needs assistance   IADLs Needs assistance   Falls in the last 6 months 1 to 4  (unknown number)   Comments per pt she was only walking a short distance using rw and what sounds like a gait belt, recnelty stopped due to "something that happened"   Restrictions/Precautions   Weight Bearing Precautions Per Order No   Other Precautions Cognitive; Chair Alarm; Bed Alarm; Impulsive;Agitated; Fall Risk;Pain   General   Family/Caregiver Present No   Cognition   Overall Cognitive Status Impaired   Arousal/Participation (generally cooperative with tasks, requires some bargaining)   Orientation Level Oriented to person   RUE Assessment   RUE Assessment WNL   LUE Assessment   LUE Assessment WNL   RLE Assessment   RLE Assessment X  (strength 4/5)   LLE Assessment   LLE Assessment (strnegth 4/5 except L foot drop "dead foot " per pt  )   Coordination   Movements are Fluid and Coordinated 1   Sensation X  (hypersensitive in feet)   Bed Mobility   Rolling R 5  Supervision   Rolling L 5  Supervision   Supine to Sit 4  Minimal assistance  (long sit in bed)   Additional items Verbal cues   Sit to Supine 4  Minimal assistance   Additional items Assist x 1; Increased time required;LE management   Transfers   Sit to Stand (not tested)   Ambulation/Elevation   Gait pattern Not tested   Balance   Static Sitting Fair  (held only a few seconds  )   Endurance Deficit   Endurance Deficit Yes   Endurance Deficit Description fatigue   Activity Tolerance   Activity Tolerance Treatment limited secondary to agitation   Nurse Made Aware yes- present   Assessment   Prognosis Guarded   Problem List Impaired balance;Decreased mobility; Decreased coordination;Decreased cognition; Impaired judgement;Decreased safety awareness; Obesity;Pain; Impaired sensation   Assessment pt admitted to [facility] with change in mental status, agitation and impulsivity  pt seen while an inpatient and before transfer to behavioral health  pt now refered back to PT from [facility]  pt was resident of [facility], an snf but not sure if pt long term resident or not  pt was very confused and could not keep a train of thought  pt was loud, occasionally agitated and incontinent of large amount of stool  pt demonstrated severe functional limitatoins and has been sanjuanita lifted OOB to chair  pt demonstrated deficits in strength, balance, continence, activity tolerance due to agitatoin, cognition and self care  pt was able to roll side to side in bed with verbal cues  pt needed min assist for supine to sitting in bed but only tolerated a few seconds  pt OOB mobility defereed due to need for cleaning  pt will benefit from trial of P T for 1 week  assess standing and transfers/ amb  pt can return to [facility]  Barriers to Discharge None   Goals   Patient Goals eat lunch with me in her room   STG Expiration Date 09/24/18   Short Term Goal #1 bed mobility wtih supervision  sitting edge of bed for > 10 minutes  improve balance by 1/2 grade, eval standing, transfers, gait using rw  demonstrate good safety practices  Plan   Treatment/Interventions LE strengthening/ROM; Therapeutic exercise; Endurance training;Cognitive reorientation;Patient/family training;Equipment eval/education; Bed mobility;Continued evaluation;Spoke to nursing   PT Frequency 1-2x/wk   Recommendation   Recommendation (return to facility)   PT - OK to Discharge Yes   Modified Toole Scale   Modified Toole Scale 5   Barthel Index   Feeding 5   Bathing 0   Grooming Score 0   Dressing Score 5   Bladder Score 0   Bowels Score 0   Toilet Use Score 0   Transfers (Bed/Chair) Score 5   Mobility (Level Surface) Score 0   Stairs Score 0   Barthel Index Score 15   History: co - morbidities, fall risk, use of assistive device, assist for adl's, cognition,   Exam: impairments in locomotion, musculoskeletal, balance, joint integrity, cognition   Clinical: unstable/unpredictable  Complexity:high        Malcolm Buchanan PT

## 2018-09-10 NOTE — PROGRESS NOTES
Progress Note - Behavioral Health   Lobito Soto 70 y o  female MRN: [id number]  Unit/Bed#: FB9 571-01 Encounter: [id number]    The patient was seen for continuing care and reviewed with treatment team   The patient continues to have episodes of calling out and head banging as well as responding to internal stimuli  She has been sleeping with adequate oral intake  The patient has not been ambulating and indicates that up until two months ago and prior to her admission to the hospital for an episode of confusion she was able to ambulate to a limited extent  During several hospitalizations, she has had a number of evaluations including CT of head and neuro surgery eval for her back pain  No acute abnormalities have been found   Mental Status Evaluation:  Appearance:  Marginal/poor hygiene and Good eye contact   Behavior:  calm, cooperative and friendly   Mood:  euthymic   Thought Content:  Does not verbalize delusional material   Perceptual Disturbances: The patient is responding to internal stimuli seemingly visual hallucinations   Risk Potential: No suicidal or homicidal ideation   Orientation:   Oriented to person and place         Assessment/Plan    Principal Problem:    Bipolar disorder, current episode mixed, moderate (HCC)  Active Problems:    Dementia with behavioral disturbance    Acute cystitis without hematuria    Continuous opioid dependence ([facility] [address] )      Recommended Treatment:  I have contacted hospitalist group to evaluate elevated WBC in the urine and the urine culture results  I will also order an EKG to evaluate her QTC interval since she has been taking quetiapine  We will also check valproic acid level Continue with pharmacotherapy, group therapy, milieu therapy and occupational therapy    The patient will be maintained on the following medications:    Current Facility-Administered Medications:  acetaminophen 325 mg Oral Q8H PRN LUCY Segura   acetaminophen 650 mg Oral [facility] Yamel MCFARLANE Homa, CRNP   ALPRAZolam 0 25 mg Oral HS PRN Yamel M Homa, CRNP   aluminum-magnesium hydroxide-simethicone 15 mL Oral Q4H PRN Deyanira Llanos MD   amLODIPine 10 mg Oral Daily Yamel M Homa, CRNP   benztropine 1 mg Intramuscular Q8H PRN Deyanira Llanos MD   benztropine 1 mg Oral Q8H PRN Deyanira Llanos MD   busPIRone 10 mg Oral BID Ricky Houston MD   cholecalciferol 1,000 Units Oral Daily Yamel M Homa, CRNP   divalproex sodium 750 mg Oral Q12H [facility] Cinthia Sanchez MD   docusate sodium 100 mg Oral BID Yamel M Homa, CRNP   fentaNYL 50 mcg Transdermal Q72H Yamel M Ohma, CRNP   furosemide 40 mg Oral Daily Yamel M Homa, CRNP   gabapentin 600 mg Oral TID Yamel M Homa, CRNP   haloperidol 5 mg Oral Q8H PRN Carmella Kline PA-C   haloperidol lactate 5 mg Intramuscular Q8H PRN Carmella Kline PA-C   hydrOXYzine HCL 25 mg Oral Q6H PRN Deyanira Llanos MD   latanoprost 1 drop Both Eyes HS Yamel M Homa, CRNP   lidocaine 1 patch Transdermal Daily Yamel M Homa, CRNP   LORazepam 1 mg Intramuscular Q6H PRN Gearold Span, CRNP   LORazepam 0 5 mg Oral Q8H PRN Gearold Span, CRNP   losartan 25 mg Oral Daily Yamel M Homa, CRNP   magnesium hydroxide 30 mL Oral Daily PRN Deyanira Llanos MD   melatonin 3 mg Oral HS [place], CRNP   methocarbamol 500 mg Oral Q6H [facility] Yamel M Homa, CRNP   nystatin  Topical BID Carmella Kline PA-C   OLANZapine 5 mg Intramuscular Q3H PRN Gearold Span, CRNP   OLANZapine 5 mg Oral Q3H PRN Gearold Span, CRNP   oxyCODONE 5 mg Oral Q4H PRN Gearold Span, CRNP   pantoprazole 40 mg Oral Early Morning Yamel M Homa, CRNP   phenol 1 spray Mouth/Throat Q2H PRN Yamel M Homa, CRNP   QUEtiapine 25 mg Oral HS PRN [place], CRNP   QUEtiapine 50 mg Oral BID Cinthia Sanchez MD   QUEtiapine 50 mg Oral HS Cinthia Sanchez MD   risperiDONE 0 5 mg Oral Q8H PRN Deyanira Llanos MD   rivaroxaban 20 mg Oral Daily With [employer], CRNP   sterile water

## 2018-09-10 NOTE — PROGRESS NOTES
Pt continues to shout, yell, bang on bed and pressing the call bell through out this shift  Attempts to redirect are ineffective  Pt continues to respond to internal stimuli  No s/s of pain/discomfort noted  Will continue to monitor

## 2018-09-10 NOTE — PROGRESS NOTES
Patient states she has a lot of back pain: 8/10  She also feels anxious  Gave patient prn oxy and prn atarax   Will continue to monitor

## 2018-09-10 NOTE — PROGRESS NOTES
Pt yelling out, not redirectable when staff present  Pt actively hallucinating, talking about 'the people in the hallway    Look at them go (pointing in the shea where no one was present ) They are messing with me  They won't put this down ' Pt knocking the food off of her tray, not redirectable and attempting to kick at staff when they get close to her bed  Pt non-consolable and continues to yell  PRN haldol PO given  Will continue to monitor
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
## 2018-09-10 NOTE — PLAN OF CARE
Problem: Ineffective Coping  Goal: Participates in unit activities  Interventions:  - Provide therapeutic environment   - Provide required programming   - Redirect inappropriate behaviors    Outcome: Not Progressing  Pt  Not engaging to groups today

## 2018-09-10 NOTE — PROGRESS NOTES
Patient is labile  She is agitated, anxious and has paranoia  She has been given prn medications to help  They have been ineffective so far  She is hallucinating seeing people in her room  She also is blaming people walking by her room saying they are doing something to her room  She is getting upset with staff and cursing at them when they are in the room or after they leave the room  She is compliant with medication scheduled and prns  There is no evidence of SI/ HI  Continuing to monitor

## 2018-09-11 LAB
ANION GAP SERPL CALCULATED.3IONS-SCNC: 5 MMOL/L (ref 4–13)
ATRIAL RATE: 326 BPM
BASOPHILS # BLD AUTO: 0.01 THOUSANDS/ΜL (ref 0–0.1)
BASOPHILS NFR BLD AUTO: 0 % (ref 0–1)
BILIRUB UR QL STRIP: NEGATIVE
BUN SERPL-MCNC: 12 MG/DL (ref 5–25)
CALCIUM SERPL-MCNC: 8.8 MG/DL (ref 8.3–10.1)
CHLORIDE SERPL-SCNC: 105 MMOL/L (ref 100–108)
CLARITY UR: CLEAR
CO2 SERPL-SCNC: 31 MMOL/L (ref 21–32)
COLOR UR: YELLOW
CREAT SERPL-MCNC: 0.71 MG/DL (ref 0.6–1.3)
EOSINOPHIL # BLD AUTO: 0.23 THOUSAND/ΜL (ref 0–0.61)
EOSINOPHIL NFR BLD AUTO: 6 % (ref 0–6)
ERYTHROCYTE [DISTWIDTH] IN BLOOD BY AUTOMATED COUNT: 16.5 % (ref 11.6–15.1)
GFR SERPL CREATININE-BSD FRML MDRD: 83 ML/MIN/1.73SQ M
GLUCOSE P FAST SERPL-MCNC: 101 MG/DL (ref 65–99)
GLUCOSE SERPL-MCNC: 101 MG/DL (ref 65–140)
GLUCOSE UR STRIP-MCNC: NEGATIVE MG/DL
HCT VFR BLD AUTO: 37.9 % (ref 34.8–46.1)
HGB BLD-MCNC: 11 G/DL (ref 11.5–15.4)
HGB UR QL STRIP.AUTO: NEGATIVE
IMM GRANULOCYTES # BLD AUTO: 0.01 THOUSAND/UL (ref 0–0.2)
IMM GRANULOCYTES NFR BLD AUTO: 0 % (ref 0–2)
KETONES UR STRIP-MCNC: NEGATIVE MG/DL
LEUKOCYTE ESTERASE UR QL STRIP: NEGATIVE
LYMPHOCYTES # BLD AUTO: 1.77 THOUSANDS/ΜL (ref 0.6–4.47)
LYMPHOCYTES NFR BLD AUTO: 43 % (ref 14–44)
MCH RBC QN AUTO: 25.1 PG (ref 26.8–34.3)
MCHC RBC AUTO-ENTMCNC: 29 G/DL (ref 31.4–37.4)
MCV RBC AUTO: 86 FL (ref 82–98)
MONOCYTES # BLD AUTO: 0.39 THOUSAND/ΜL (ref 0.17–1.22)
MONOCYTES NFR BLD AUTO: 9 % (ref 4–12)
NEUTROPHILS # BLD AUTO: 1.72 THOUSANDS/ΜL (ref 1.85–7.62)
NEUTS SEG NFR BLD AUTO: 42 % (ref 43–75)
NITRITE UR QL STRIP: NEGATIVE
NRBC BLD AUTO-RTO: 0 /100 WBCS
PH UR STRIP.AUTO: 7.5 [PH] (ref 4.5–8)
PLATELET # BLD AUTO: 163 THOUSANDS/UL (ref 149–390)
PMV BLD AUTO: 10.1 FL (ref 8.9–12.7)
POTASSIUM SERPL-SCNC: 3.4 MMOL/L (ref 3.5–5.3)
PROCALCITONIN SERPL-MCNC: <0.05 NG/ML
PROT UR STRIP-MCNC: NEGATIVE MG/DL
QRS AXIS: 5 DEGREES
QRSD INTERVAL: 94 MS
QT INTERVAL: 444 MS
QTC INTERVAL: 454 MS
RBC # BLD AUTO: 4.39 MILLION/UL (ref 3.81–5.12)
SODIUM SERPL-SCNC: 141 MMOL/L (ref 136–145)
SP GR UR STRIP.AUTO: 1.01 (ref 1–1.03)
T WAVE AXIS: -4 DEGREES
UROBILINOGEN UR QL STRIP.AUTO: 0.2 E.U./DL
VALPROATE SERPL-MCNC: 57 UG/ML (ref 50–100)
VENTRICULAR RATE: 63 BPM
WBC # BLD AUTO: 4.13 THOUSAND/UL (ref 4.31–10.16)

## 2018-09-11 PROCEDURE — 97530 THERAPEUTIC ACTIVITIES: CPT | Performed by: PHYSICAL THERAPIST

## 2018-09-11 PROCEDURE — 97112 NEUROMUSCULAR REEDUCATION: CPT | Performed by: PHYSICAL THERAPIST

## 2018-09-11 PROCEDURE — 80048 BASIC METABOLIC PNL TOTAL CA: CPT | Performed by: PHYSICIAN ASSISTANT

## 2018-09-11 PROCEDURE — 84145 PROCALCITONIN (PCT): CPT | Performed by: PHYSICIAN ASSISTANT

## 2018-09-11 PROCEDURE — 85025 COMPLETE CBC W/AUTO DIFF WBC: CPT | Performed by: PHYSICIAN ASSISTANT

## 2018-09-11 PROCEDURE — 93005 ELECTROCARDIOGRAM TRACING: CPT

## 2018-09-11 PROCEDURE — 99222 1ST HOSP IP/OBS MODERATE 55: CPT | Performed by: NURSE PRACTITIONER

## 2018-09-11 PROCEDURE — 99232 SBSQ HOSP IP/OBS MODERATE 35: CPT | Performed by: PSYCHIATRY & NEUROLOGY

## 2018-09-11 PROCEDURE — 93010 ELECTROCARDIOGRAM REPORT: CPT | Performed by: INTERNAL MEDICINE

## 2018-09-11 PROCEDURE — 81003 URINALYSIS AUTO W/O SCOPE: CPT | Performed by: PSYCHIATRY & NEUROLOGY

## 2018-09-11 PROCEDURE — 80164 ASSAY DIPROPYLACETIC ACD TOT: CPT | Performed by: PSYCHIATRY & NEUROLOGY

## 2018-09-11 RX ORDER — DIVALPROEX SODIUM 500 MG/1
1000 TABLET, DELAYED RELEASE ORAL EVERY 12 HOURS SCHEDULED
Status: DISCONTINUED | OUTPATIENT
Start: 2018-09-11 | End: 2018-09-16

## 2018-09-11 RX ORDER — LORAZEPAM 1 MG/1
1 TABLET ORAL EVERY 8 HOURS PRN
Status: DISCONTINUED | OUTPATIENT
Start: 2018-09-11 | End: 2018-09-13

## 2018-09-11 RX ORDER — QUETIAPINE FUMARATE 25 MG/1
75 TABLET, FILM COATED ORAL 2 TIMES DAILY
Status: DISCONTINUED | OUTPATIENT
Start: 2018-09-11 | End: 2018-09-12

## 2018-09-11 RX ORDER — QUETIAPINE FUMARATE 100 MG/1
100 TABLET, FILM COATED ORAL ONCE
Status: COMPLETED | OUTPATIENT
Start: 2018-09-11 | End: 2018-09-11

## 2018-09-11 RX ORDER — QUETIAPINE FUMARATE 100 MG/1
100 TABLET, FILM COATED ORAL
Status: DISCONTINUED | OUTPATIENT
Start: 2018-09-11 | End: 2018-09-13

## 2018-09-11 RX ORDER — OLANZAPINE 10 MG/1
10 INJECTION, POWDER, LYOPHILIZED, FOR SOLUTION INTRAMUSCULAR EVERY 8 HOURS PRN
Status: DISCONTINUED | OUTPATIENT
Start: 2018-09-11 | End: 2018-09-28 | Stop reason: HOSPADM

## 2018-09-11 RX ORDER — GABAPENTIN 400 MG/1
400 CAPSULE ORAL 3 TIMES DAILY
Status: DISCONTINUED | OUTPATIENT
Start: 2018-09-11 | End: 2018-09-12

## 2018-09-11 RX ORDER — LORAZEPAM 1 MG/1
2 TABLET ORAL ONCE
Status: COMPLETED | OUTPATIENT
Start: 2018-09-11 | End: 2018-09-11

## 2018-09-11 RX ADMIN — ACETAMINOPHEN 650 MG: 325 TABLET ORAL at 21:14

## 2018-09-11 RX ADMIN — GABAPENTIN 400 MG: 400 CAPSULE ORAL at 21:14

## 2018-09-11 RX ADMIN — METHOCARBAMOL 500 MG: 500 TABLET ORAL at 06:34

## 2018-09-11 RX ADMIN — QUETIAPINE FUMARATE 100 MG: 100 TABLET ORAL at 09:39

## 2018-09-11 RX ADMIN — LOSARTAN POTASSIUM 25 MG: 25 TABLET, FILM COATED ORAL at 09:03

## 2018-09-11 RX ADMIN — DIVALPROEX SODIUM 750 MG: 500 TABLET, DELAYED RELEASE ORAL at 09:03

## 2018-09-11 RX ADMIN — FENTANYL 50 MCG: 50 PATCH, EXTENDED RELEASE TRANSDERMAL at 09:02

## 2018-09-11 RX ADMIN — NYSTATIN 1 APPLICATION: 100000 POWDER TOPICAL at 17:48

## 2018-09-11 RX ADMIN — QUETIAPINE 75 MG: 25 TABLET ORAL at 17:47

## 2018-09-11 RX ADMIN — GABAPENTIN 400 MG: 400 CAPSULE ORAL at 17:47

## 2018-09-11 RX ADMIN — LORAZEPAM 1 MG: 1 TABLET ORAL at 19:12

## 2018-09-11 RX ADMIN — QUETIAPINE 50 MG: 25 TABLET ORAL at 09:03

## 2018-09-11 RX ADMIN — GABAPENTIN 600 MG: 300 CAPSULE ORAL at 09:03

## 2018-09-11 RX ADMIN — ACETAMINOPHEN 650 MG: 325 TABLET ORAL at 06:34

## 2018-09-11 RX ADMIN — DOCUSATE SODIUM 100 MG: 100 CAPSULE, LIQUID FILLED ORAL at 09:03

## 2018-09-11 RX ADMIN — LIDOCAINE 1 PATCH: 50 PATCH CUTANEOUS at 09:04

## 2018-09-11 RX ADMIN — PANTOPRAZOLE SODIUM 40 MG: 40 TABLET, DELAYED RELEASE ORAL at 06:34

## 2018-09-11 RX ADMIN — RIVAROXABAN 20 MG: 20 TABLET, FILM COATED ORAL at 17:48

## 2018-09-11 RX ADMIN — NYSTATIN: 100000 POWDER TOPICAL at 09:02

## 2018-09-11 RX ADMIN — BUSPIRONE HYDROCHLORIDE 10 MG: 10 TABLET ORAL at 09:03

## 2018-09-11 RX ADMIN — FUROSEMIDE 40 MG: 40 TABLET ORAL at 09:03

## 2018-09-11 RX ADMIN — MELATONIN 3 MG: at 21:14

## 2018-09-11 RX ADMIN — LORAZEPAM 2 MG: 1 TABLET ORAL at 11:12

## 2018-09-11 RX ADMIN — METHOCARBAMOL 500 MG: 500 TABLET ORAL at 11:12

## 2018-09-11 RX ADMIN — AMLODIPINE BESYLATE 10 MG: 10 TABLET ORAL at 09:03

## 2018-09-11 RX ADMIN — DIVALPROEX SODIUM 1000 MG: 500 TABLET, DELAYED RELEASE ORAL at 21:14

## 2018-09-11 RX ADMIN — METHOCARBAMOL 500 MG: 500 TABLET ORAL at 17:47

## 2018-09-11 RX ADMIN — VITAMIN D, TAB 1000IU (100/BT) 1000 UNITS: 25 TAB at 09:03

## 2018-09-11 RX ADMIN — METHOCARBAMOL 500 MG: 500 TABLET ORAL at 23:38

## 2018-09-11 RX ADMIN — QUETIAPINE FUMARATE 100 MG: 100 TABLET ORAL at 21:15

## 2018-09-11 RX ADMIN — LATANOPROST 1 DROP: 50 SOLUTION OPHTHALMIC at 21:16

## 2018-09-11 RX ADMIN — RISPERIDONE 0.5 MG: 0.5 TABLET, ORALLY DISINTEGRATING ORAL at 09:03

## 2018-09-11 NOTE — PROGRESS NOTES
Asked to evaluate UA/urine culture results and patient seen/interviewed however she denies examination  UA shows 30-50 WBC (previously 4-10), Bacteria occasional (previously none), urine culture growing 20-29,000 E coli ESBL, 20-29,000 Enterococcus faecalis  On exam, history is limited due to her mental status  She is agitated and yells throughout exam and does not always answer questions appropriately  However she reports no fevers, no dysuria, hematuria  Pt is afebrile, no leukocytosis on most recent exam, pt denies UTI sx  Will f/u procalcitonin and CBC in AM   Would hold off on abx at this time as this culture could be contaminant/asx bacteriuria

## 2018-09-11 NOTE — PLAN OF CARE
Problem: Ineffective Coping  Goal: Participates in unit activities  Interventions:  - Provide therapeutic environment   - Provide required programming   - Redirect inappropriate behaviors    Outcome: Not Progressing  Pt with disruptive yelling throughout the day  Not appropriate for group participation today

## 2018-09-11 NOTE — CASE MANAGEMENT
Approached pt as she lay in bed to inform her of her 56 rights as we are scheduling same for 9/13 with [facility]  She asked for a kis and complimented the CM while informing me she thinks she may not be working here by then as she expects she is being fired  Pt is not oriented to reality and did not understand my explaination of her rights       I have faxed the request for the 304 to [facility]

## 2018-09-11 NOTE — PLAN OF CARE
Problem: PHYSICAL THERAPY ADULT  Goal: Performs mobility at highest level of function for planned discharge setting  See evaluation for individualized goals  Treatment/Interventions: LE strengthening/ROM, Therapeutic exercise, Endurance training, Cognitive reorientation, Patient/family training, Equipment eval/education, Bed mobility, Continued evaluation, Spoke to nursing          See flowsheet documentation for full assessment, interventions and recommendations  Outcome: Progressing  Prognosis: Guarded  Problem List: Decreased strength, Decreased endurance, Impaired balance, Decreased mobility, Decreased cognition, Impaired judgement, Decreased safety awareness, Obesity  Assessment: pt continues to have periods of agitation and is hallucinating, demanding that the dog house be removed from her room  pt did cooperate with PT for treatment session  pt needed min assist for rolling today  mod assist for sitting and max assist for sit to stand  pt stood only briefly  needs frequent re-direction to task  pt  is unsafe to try stand pivot transfer to chair  continue to recommend mechanical aide to transfer OOB to chair  spoke with nursing about trying sit to stand device for standing at bedside  and then to bedside chair  pt will need skilled PT to maximize function to reduce caregiver burden  Barriers to Discharge: None     Recommendation:  (return to snf)     PT - OK to Discharge: Yes (snf)    See flowsheet documentation for full assessment

## 2018-09-11 NOTE — PROGRESS NOTES
Progress Note - Behavioral Health   Cee Rico 70 y o  female MRN: [id number]  Unit/Bed#: SD3 571-01 Encounter: [id number]    The patient was seen for continuing care and reviewed with treatment team   The patient has continued to have episodes of agitation and screaming and attention seeking (by her own acknowledgement)  She also reports that she sees things in her room such as animals  However when she is distracted, she becomes quite coherent  This morning she calmed down after 100 mg of quetiapine was given  Valproic acid level is 57 which is on the lower side of therapeutic range  She was able to sleep through the night  Mental Status Evaluation:  Appearance:  Marginal/poor hygiene, Good eye contact and disheveled   Behavior:  psychomotor agitation   Mood:  irritable   Thought Content:  Paranoid and mistrustful   Perceptual Disturbances: Appears to be responding to internal stimuli   Risk Potential: No suicidal or homicidal ideation   Orientation:   Alert and oriented to place and person         Assessment/Plan    Principal Problem:    Bipolar disorder, current episode mixed, moderate (HCC)  Active Problems:    Dementia with behavioral disturbance      Recommended Treatment: We will obtain urine sample by straight cath and consult Urology  We will gradually increase quetiapine and Depakote Continue with pharmacotherapy, group therapy, milieu therapy and occupational therapy    The patient will be maintained on the following medications:    Current Facility-Administered Medications:  acetaminophen 325 mg Oral Q8H PRN LUCY Shirley   acetaminophen 650 mg Oral Q8H [address] LUCY Sanchez   aluminum-magnesium hydroxide-simethicone 15 mL Oral Q4H PRN Jose Francisco Vergara MD   amLODIPine 10 mg Oral Daily LUCY Sanchez   benztropine 1 mg Intramuscular Q8H PRN Jose Francisco Vergara MD   benztropine 1 mg Oral Q8H PRN Jose Francisco Vergara MD   cholecalciferol 1,000 Units Oral Daily Yamel Tobias divalproex sodium 1,000 mg Oral Q12H [address] [facility], MD   docusate sodium 100 mg Oral BID Yamel M Homa, CRNP   fentaNYL 50 mcg Transdermal Q72H Yamel M Homa, CRNP   furosemide 40 mg Oral Daily Yamel M Homa, CRNP   gabapentin 400 mg Oral TID Maciej Odonnell MD   haloperidol 5 mg Oral Q8H PRN Carmella Kline PA-C   haloperidol lactate 5 mg Intramuscular Q8H PRN Carmella Kline PA-C   hydrOXYzine HCL 25 mg Oral Q6H PRN Shawn Ahn MD   latanoprost 1 drop Both Eyes HS Yamel M Homa, CRNP   lidocaine 1 patch Transdermal Daily Yamel M Homa, CRNP   LORazepam 1 mg Intramuscular Q6H PRN LUCY Segura   LORazepam 1 mg Oral Q8H PRN Maciej Odonnell MD   losartan 25 mg Oral Daily Yamel M Homa, CRNP   magnesium hydroxide 30 mL Oral Daily PRN Shawn Ahn MD   melatonin 3 mg Oral HS Rc Elders, CRNP   methocarbamol 500 mg Oral Q6H [address] Yamel M Homa, CRNP   nystatin  Topical BID Carmella Kline PA-C   OLANZapine 10 mg Intramuscular Q8H PRN Maciej Odonnell MD   oxyCODONE 5 mg Oral Q4H PRN LUCY Segura   pantoprazole 40 mg Oral Early Morning Yamel M Homa, CRNP   phenol 1 spray Mouth/Throat Q2H PRN Yamel M Homa, CRNP   QUEtiapine 100 mg Oral HS Maciej Odonnell MD   QUEtiapine 75 mg Oral BID Maciej Odonnell MD   risperiDONE 0 5 mg Oral Q8H PRN Shawn Ahn MD   rivaroxaban 20 mg Oral Daily With [employer], CRNP   sterile water

## 2018-09-11 NOTE — PROGRESS NOTES
Pt is medication compliant without additional encouragement needed  Pt was cooperative with ADL care this morning  Pt has loud verbal outbursts, no aggression noted  Pt is currently in bed, will monitor

## 2018-09-11 NOTE — PROGRESS NOTES
Pt is irritable and agitated  Pt continues to yell and scream, not redirectable  Pt screams "get the fuck out" and "help!" when staff enters the room  Pt denies all symptoms  Pt is confused and hallucinating about animals in her room states "There's a mahmood in here!"  Pt rings call bell and asks staff to leave when answered, states "I don't want anything from you!"  Pt is cooperative with medications  Pt is reclusive to room
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
## 2018-09-11 NOTE — CONSULTS
CONSULT    Patient Name: Vero Dhillon  Patient MRN: [id number]  Date of Service: 9/11/2018   Date / Time Note Created: 9/11/2018 3:33 PM   Referring Provider: Kassy Hsu MD  Provider Creating Note: LUCY Ruth    PCP: Olaf Pryor  Attending Provider:  Kassy Hsu MD    Reason for Consult:  Recurrent UTI    History of present illness:   Ms Shelly Harp is a 68-year-old female with debilitating psychiatric illness and history of substance abuse admitted to older adult behavioral unit  Per office documentation, patient is under long-standing treatment for overactive bladder and has failed VESIcare and Mybertiq with mild initial improvement with development of refractory symptoms in approximately 3 in 4 months  Patient was recently admitted from nursing home with change in mental status and received extensive workup for possible pneumonia and UTI as source of acute encephalopathy  Urinalysis was unconcerning for UTI  Most recent urine culture shows [id number] CFU/per mL ESBL producing E coli  Patient has remained afebrile and without leukocytosis  Patient denies any flank or groin pain  Unfortunately, patient is uncooperative with indepth interview and physical examination  Urologic consultation is requested determine appropriateness of antibiosis       Active Problems:    Patient Active Problem List   Diagnosis    Atrial fibrillation ([address] )    Hypertension    Dementia with behavioral disturbance    PAD (peripheral artery disease) (HCC)    Warfarin-induced coagulopathy (HCC)    Morbid obesity due to excess calories (HCC)    Acute cystitis without hematuria    Acute encephalopathy    Bronchitis    Continuous opioid dependence (HCC)    Chronic anemia    COPD (chronic obstructive pulmonary disease) (HCC)    Rectal bleeding    Overactive bladder    Hypokalemia    HCAP (healthcare-associated pneumonia)    Pneumonia due to gram-negative bacteria ([address] )    Acute metabolic encephalopathy  Acute respiratory failure with hypoxia (HCC)    Back pain    Moderate episode of recurrent major depressive disorder (HCC)    Hypoxia    Lactic acidosis    Polypharmacy    Chronic pain    Physical deconditioning    Bipolar disorder, current episode mixed, moderate (HCC)        Impressions  History of overactive bladder-as patient has failed multiple pharmacologic agents for such  Recurrent UTI--per family's report  However, review of electronic medical record is inconsistent with definition of recurrency  Recommendations  Refrain from use of antimicrobial in patient who is otherwise clinically asymptomatic  Unfortunately, patient is psychiatrically complex and has multiple potential etiologies for change in behavior  Bladder scan is 225 m L  However, secondary to combativeness catheterization is unsuccessful  Furthermore, will not minimize risk for future UTI  Avoid anticholinergics at this time  Allow for patient to passively void, if needed  Prefer incontinence over fulminant retention  Past Medical History:   Diagnosis Date    A-fib [facility])     Chronic pain     Chronic respiratory failure with hypoxia (HCC)     COPD (chronic obstructive pulmonary disease) (HCC)     Dementia     Dorsalgia, unspecified     Edema     Encephalopathy     GERD (gastroesophageal reflux disease)     Hypertension     Morbid obesity (HCC)     Muscle weakness (generalized)     Opioid dependence (HCC)     Overactive bladder     Pneumonia     Psychiatric disorder     anxiety, bipolar    Radiculopathy of thoracolumbar region     Sciatica     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     Urinary incontinence     UTI (urinary tract infection)        Past Surgical History:   Procedure Laterality Date    APPENDECTOMY      BACK SURGERY      CHOLECYSTECTOMY      KNEE SURGERY         History reviewed  No pertinent family history      Social History     Social History    Marital status:      Spouse name: N/A    Number of children: N/A    Years of education: N/A     Occupational History    Not on file  Social History Main Topics    Smoking status: Former Smoker     Types: Cigarettes    Smokeless tobacco: Never Used    Alcohol use No    Drug use: No    Sexual activity: Not Currently     Other Topics Concern    Not on file     Social History Narrative    No narrative on file       Allergies   Allergen Reactions    Aspirin     Iodinated Diagnostic Agents Throat Swelling    Iodine     Nsaids        Review of Systems  10 point review of systems negative except as noted in HPI  Review of Systems - History obtained from unobtainable from patient due to mental status         Chart Review   Allergies, current medications, history, problem list    Vital Signs  /62 (BP Location: Right arm)   Pulse 83   Temp 97 9 °F (36 6 °C) (Tympanic)   Resp 16   Ht 5' 6 5" (1 689 m)   Wt 118 kg (261 lb 3 9 oz)   LMP  (LMP Unknown)   SpO2 91%   Breastfeeding?  No   BMI 41 53 kg/m²     Physical Exam  General appearance: alert and delirious  Head: Normocephalic, without obvious abnormality, atraumatic  Abdomen: soft, non-tender; bowel sounds normal; no masses,  no organomegaly  Extremities: extremities normal, warm and well-perfused; no cyanosis, clubbing, or edema  Pulses: 2+ and symmetric  Neurologic: Mental status: alertness: alert, orientation: person, affect: increased in intensity     Laboratory Studies  Lab Results   Component Value Date    HGBA1C 5 3 04/21/2017     09/11/2018    K 3 4 (L) 09/11/2018     09/11/2018    CO2 31 09/11/2018    GLUCOSE 103 06/10/2017    CREATININE 0 71 09/11/2018    BUN 12 09/11/2018    MG 1 9 08/28/2018    PHOS 2 8 08/28/2018     Lab Results   Component Value Date    WBC 4 13 (L) 09/11/2018    RBC 4 39 09/11/2018    HGB 11 0 (L) 09/11/2018    HCT 37 9 09/11/2018    MCV 86 09/11/2018    MCH 25 1 (L) 09/11/2018 RDW 16 5 (H) 09/11/2018     09/11/2018       Imaging and Other Studies  )  Xr Chest Portable    Result Date: 9/4/2018  Narrative: CHEST INDICATION:   compare to imaging on 8/28/18 resolving pneumonia  COMPARISON:  CT chest July 30, 2018 and chest radiographs August 2017, 2018  EXAM PERFORMED/VIEWS:  XR CHEST PORTABLE FINDINGS: The heart is enlarged  Atherosclerotic changes in the aorta  Lung volumes diminished  Worsening bilateral alveolar infiltrates with more focal areas of consolidation in the demario bilaterally  Worsening bilateral hilar adenopathy  Osseous structures appear within normal limits for patient age  Impression: Worsening bilateral pneumonia with bilateral hilar adenopathy  Recommend follow-up CT scan of the chest for further evaluation  The study was marked in EPIC for significant notification  Workstation performed: [id number]     Xr Chest 1 View Portable    Result Date: 8/28/2018  Narrative: CHEST INDICATION:   UTI Change of mental status  COMPARISON:  Chest radiographs July 29, 2018 and CT chest August 30, 2018  EXAM PERFORMED/VIEWS:  XR CHEST PORTABLE FINDINGS: The heart is enlarged  Atherosclerotic changes in the aorta  Lung volumes diminished  Elevation of right hemidiaphragm  Near complete resolution of multifocal pneumonia  Persistent right perihilar infiltrate  Osseous structures appear within normal limits for patient age  Impression: Diminished inspiration  Near complete resolution of multifocal pneumonia  Persistent infiltrate in the right perihilar region  Workstation performed: [id number]     Ct Chest Wo Contrast    Result Date: 9/4/2018  Narrative: CT CHEST WITHOUT IV CONTRAST INDICATION:   Abnormal chest x-ray  Further evaluation of pulmonary infiltrates   COMPARISON:  Prior chest CT 7/30/2018, portable chest x-ray 9/3/2018 TECHNIQUE: CT examination of the chest was performed without intravenous contrast   Axial, sagittal, and coronal 2D reformatted images were created from the source data and submitted for interpretation  Radiation dose length product (DLP) for this visit:  511 39 mGy-cm   This examination, like all CT scans performed in the [facility], was performed utilizing techniques to minimize radiation dose exposure, including the use of iterative  reconstruction and automated exposure control  FINDINGS: LUNGS:  There is no focal airspace consolidation to suggest pneumonia  There is mild bibasilar subsegmental atelectasis  There is resolution of previously seen airspace disease noted on prior chest CT  PLEURA:  Unremarkable  HEART/GREAT VESSELS:  Cardiomegaly noted  Coronary artery calcifications are present and calcification in the region of the aortic valve/root  No pericardial effusion  MEDIASTINUM AND MOISES:  No suspicious adenopathy  Prominence of pulmonary moises noted on prior chest x-ray felt to be related to prominent central pulmonary arteries  CHEST WALL AND LOWER NECK:   Unremarkable  VISUALIZED STRUCTURES IN THE UPPER ABDOMEN:  Unremarkable  OSSEOUS STRUCTURES:  No acute fracture or destructive osseous lesion  Impression: 1  No radiographic evidence for pneumonia  Mild bilateral subsegmental atelectasis 2  Cardiomegaly with prominence of the central pulmonary vasculature  Correlate for any clinical suspicion of pulmonary congestion or pulmonary arterial hypertension  3  No significant mediastinal or hilar adenopathy   Workstation performed: [id number]       Medications   Scheduled Meds:  Current Facility-Administered Medications:  acetaminophen 325 mg Oral Q8H PRN LUCY Kellogg   acetaminophen 650 mg Oral Q8H [address] LUCY Sanchez   aluminum-magnesium hydroxide-simethicone 15 mL Oral Q4H PRN Manuel Palomares MD   amLODIPine 10 mg Oral Daily LUCY Cardenas   benztropine 1 mg Intramuscular Q8H PRN Manuel Palomares MD   benztropine 1 mg Oral Q8H PRN Manuel Palomares MD   cholecalciferol 1,000 Units Oral Daily Yamel Ute Klaudia, CRNP   divalproex sodium 1,000 mg Oral Q12H [facility], MD   docusate sodium 100 mg Oral BID Yamel M Homa, CRNP   fentaNYL 50 mcg Transdermal Q72H Yamel M Homa, CRNP   furosemide 40 mg Oral Daily Yamel M Homa, CRNP   gabapentin 400 mg Oral TID [facility], MD   haloperidol 5 mg Oral Q8H PRN Carmella Kline PA-C   haloperidol lactate 5 mg Intramuscular Q8H PRN Carmella Kline PA-C   hydrOXYzine HCL 25 mg Oral Q6H PRN Fany Wheeler MD   latanoprost 1 drop Both Eyes HS Yamel M Homa, CRNP   lidocaine 1 patch Transdermal Daily Yamel M Homa, CRNP   LORazepam 1 mg Intramuscular Q6H PRN Ubaldo Siemens, CRNP   LORazepam 1 mg Oral Q8H PRN [facility], MD   losartan 25 mg Oral Daily Yamel M Homa, CRNP   magnesium hydroxide 30 mL Oral Daily PRN Fany Wheeler MD   melatonin 3 mg Oral HS Magdalena Ashley, CRNP   methocarbamol 500 mg Oral Q6H [facility] Yamel M Homa, CRNP   nystatin  Topical BID Carmella Kline PA-C   OLANZapine 10 mg Intramuscular Q8H PRN [facility], MD   oxyCODONE 5 mg Oral Q4H PRN Brown Siemens, CRNP   pantoprazole 40 mg Oral Early Morning Yamel M Homa, CRNP   phenol 1 spray Mouth/Throat Q2H PRN Yamel Rudolph, CRNP   QUEtiapine 100 mg Oral HS [facility], MD   QUEtiapine 75 mg Oral BID [facility], MD   risperiDONE 0 5 mg Oral Q8H PRN Fany Wheeler MD   rivaroxaban 20 mg Oral Daily With [employer], CRNP   sterile water         Continuous Infusions:   PRN Meds:   acetaminophen    aluminum-magnesium hydroxide-simethicone    benztropine    benztropine    haloperidol    haloperidol lactate    hydrOXYzine HCL    LORazepam    LORazepam    magnesium hydroxide    OLANZapine    oxyCODONE    phenol    risperiDONE      Total time spent with patient 30 minutes, >50% spent counseling and/or coordination of care           LUCY Rondon

## 2018-09-12 PROCEDURE — 99232 SBSQ HOSP IP/OBS MODERATE 35: CPT | Performed by: PSYCHIATRY & NEUROLOGY

## 2018-09-12 RX ORDER — QUETIAPINE FUMARATE 100 MG/1
100 TABLET, FILM COATED ORAL 2 TIMES DAILY
Status: DISCONTINUED | OUTPATIENT
Start: 2018-09-12 | End: 2018-09-14

## 2018-09-12 RX ORDER — QUETIAPINE FUMARATE 100 MG/1
100 TABLET, FILM COATED ORAL ONCE
Status: COMPLETED | OUTPATIENT
Start: 2018-09-12 | End: 2018-09-12

## 2018-09-12 RX ORDER — GABAPENTIN 300 MG/1
300 CAPSULE ORAL 3 TIMES DAILY
Status: DISCONTINUED | OUTPATIENT
Start: 2018-09-12 | End: 2018-09-15

## 2018-09-12 RX ADMIN — LOSARTAN POTASSIUM 25 MG: 25 TABLET, FILM COATED ORAL at 10:08

## 2018-09-12 RX ADMIN — FUROSEMIDE 40 MG: 40 TABLET ORAL at 10:09

## 2018-09-12 RX ADMIN — LATANOPROST 1 DROP: 50 SOLUTION OPHTHALMIC at 21:56

## 2018-09-12 RX ADMIN — GABAPENTIN 300 MG: 300 CAPSULE ORAL at 17:54

## 2018-09-12 RX ADMIN — ACETAMINOPHEN 650 MG: 325 TABLET ORAL at 21:56

## 2018-09-12 RX ADMIN — HALOPERIDOL 5 MG: 5 TABLET ORAL at 14:30

## 2018-09-12 RX ADMIN — DOCUSATE SODIUM 100 MG: 100 CAPSULE, LIQUID FILLED ORAL at 10:08

## 2018-09-12 RX ADMIN — DOCUSATE SODIUM 100 MG: 100 CAPSULE, LIQUID FILLED ORAL at 17:54

## 2018-09-12 RX ADMIN — QUETIAPINE FUMARATE 100 MG: 100 TABLET ORAL at 17:55

## 2018-09-12 RX ADMIN — DIVALPROEX SODIUM 1000 MG: 500 TABLET, DELAYED RELEASE ORAL at 10:07

## 2018-09-12 RX ADMIN — QUETIAPINE FUMARATE 100 MG: 100 TABLET ORAL at 21:56

## 2018-09-12 RX ADMIN — VITAMIN D, TAB 1000IU (100/BT) 1000 UNITS: 25 TAB at 10:08

## 2018-09-12 RX ADMIN — LIDOCAINE 1 PATCH: 50 PATCH CUTANEOUS at 10:10

## 2018-09-12 RX ADMIN — MELATONIN 3 MG: at 21:56

## 2018-09-12 RX ADMIN — METHOCARBAMOL 500 MG: 500 TABLET ORAL at 23:24

## 2018-09-12 RX ADMIN — METHOCARBAMOL 500 MG: 500 TABLET ORAL at 17:54

## 2018-09-12 RX ADMIN — QUETIAPINE FUMARATE 100 MG: 100 TABLET ORAL at 11:21

## 2018-09-12 RX ADMIN — RIVAROXABAN 20 MG: 20 TABLET, FILM COATED ORAL at 17:55

## 2018-09-12 RX ADMIN — AMLODIPINE BESYLATE 10 MG: 10 TABLET ORAL at 10:08

## 2018-09-12 RX ADMIN — PANTOPRAZOLE SODIUM 40 MG: 40 TABLET, DELAYED RELEASE ORAL at 06:58

## 2018-09-12 RX ADMIN — GABAPENTIN 300 MG: 300 CAPSULE ORAL at 21:56

## 2018-09-12 RX ADMIN — BENZTROPINE MESYLATE 1 MG: 1 TABLET ORAL at 14:30

## 2018-09-12 RX ADMIN — DIVALPROEX SODIUM 1000 MG: 500 TABLET, DELAYED RELEASE ORAL at 21:56

## 2018-09-12 RX ADMIN — METHOCARBAMOL 500 MG: 500 TABLET ORAL at 06:58

## 2018-09-12 RX ADMIN — NYSTATIN 1 APPLICATION: 100000 POWDER TOPICAL at 17:56

## 2018-09-12 RX ADMIN — QUETIAPINE 75 MG: 25 TABLET ORAL at 10:08

## 2018-09-12 RX ADMIN — GABAPENTIN 400 MG: 400 CAPSULE ORAL at 10:08

## 2018-09-12 RX ADMIN — ACETAMINOPHEN 650 MG: 325 TABLET ORAL at 06:58

## 2018-09-12 RX ADMIN — METHOCARBAMOL 500 MG: 500 TABLET ORAL at 11:21

## 2018-09-12 RX ADMIN — NYSTATIN 1 APPLICATION: 100000 POWDER TOPICAL at 10:10

## 2018-09-12 NOTE — PROGRESS NOTES
Pt is medication compliant and is cooperative with ADL care  Pt has loud outbursts, is redirectable at times but often needs reenforcement  No aggression noted  Pt was given one time dose of quetiapine 100mg at 11AM, which was minimally effective  Pt is currently in bed, will monitor

## 2018-09-12 NOTE — CASE MANAGEMENT
Leena Davila has declared herself as the primary contact for her mother after I called and explained that we were getting numerous family members calling and requesting full medication and medical updates via faxes and emails  Brandon Vinson was understanding and I gave her an update form case management standpoint then transfetrd her to talk to nursing  During this call we discussed that pt's 303 will  on  and if we need more time we will pursue 304  Brandon Vinson also informed me she does not want her mother to return to [facility] [address] and will take her home before al;lowing her to return their  I offered Brandon Vinson a list of area SNF's with info indicating which have MA beds  Brandon Vinson will be in to visit and  that list and I will be happy top work with her to place her mother elsewhere if need be

## 2018-09-12 NOTE — PLAN OF CARE
Problem: Ineffective Coping  Goal: Participates in unit activities  Interventions:  - Provide therapeutic environment   - Provide required programming   - Redirect inappropriate behaviors    Outcome: Not Progressing  Pt not able to engage in structured peer groups due to intermittent disruptive verbal behavior

## 2018-09-12 NOTE — PROGRESS NOTES
Pt is cooperative with medications and care  Pt continues to yell at times and is verbally redirectable  Pt displays moderate confusion asking to "close the door so the cat doesn't get in "  Pt shows no signs of aggression  Pt is currently in bed

## 2018-09-12 NOTE — PLAN OF CARE
Alteration in Orientation     Treatment Goal: Demonstrate a reduction of confusion and improved orientation to person, place, time and/or situation upon discharge, according to optimum baseline/ability Not Progressing     Express concerns related to confused thinking related to: Not Progressing        Alteration in Thoughts and Perception     Treatment Goal: Gain control of psychotic behaviors/thinking, reduce/eliminate presenting symptoms and demonstrate improved reality functioning upon discharge Not Progressing          Alteration in Thoughts and Perception     Refrain from acting on delusional thinking/internal stimuli Progressing     Agree to be compliant with medication regime, as prescribed and report medication side effects Progressing        Anxiety     Anxiety is at manageable level Progressing        Prexisting or High Potential for Compromised Skin Integrity     Skin integrity is maintained or improved Progressing        Risk for Violence/Aggression Toward Others     Control angry outbursts Progressing

## 2018-09-12 NOTE — PROGRESS NOTES
Pt states Ativan "helping a little" for anxiety  Pt appears calm and no longer crying    Medication was effective

## 2018-09-12 NOTE — PROGRESS NOTES
Progress Note - Behavioral Health   Dana Flores 70 y o  female MRN: [id number]  Unit/Bed#: SV2 571-01 Encounter: [id number]    The patient was seen for continuing care and reviewed with treatment team   The patient had a relatively quiet day although in the evening she became agitated verbally and confused but not physically aggressive  Slept through the night with adequate oral intake  Urinalysis with straight cath was negative  Urology does not feel that the patient needs to be on any antibiotics at this time  It appears that the patient has responded to higher doses of quetiapine  Mental Status Evaluation:  Appearance:  Adequate hygiene and grooming and Good eye contact   Behavior:  cooperative and friendly, occasionally calling out for help   Mood:  euthymic   Thought Content:  Does not verbalize delusional material   Perceptual Disturbances: Denies hallucinations and does not appear to be responding to internal stimuli   Risk Potential: No suicidal or homicidal ideation   Orientation:            Assessment/Plan    Principal Problem:    Bipolar disorder, current episode mixed, moderate (HCC)  Active Problems:    Dementia with behavioral disturbance      Recommended Treatment: We will continue increasing quetiapine until there is persistent behavioral control  Continue with pharmacotherapy, group therapy, milieu therapy and occupational therapy    The patient will be maintained on the following medications:    Current Facility-Administered Medications:  acetaminophen 325 mg Oral Q8H PRN LUCY Rios   acetaminophen 650 mg Oral Q8H [facility] LUCY Sanchez   aluminum-magnesium hydroxide-simethicone 15 mL Oral Q4H PRN Stephanie Avilez MD   amLODIPine 10 mg Oral Daily LUCY Sanchez   benztropine 1 mg Intramuscular Q8H PRN Stephanie Avilez MD   benztropine 1 mg Oral Q8H PRN Stephanie Avilez MD   cholecalciferol 1,000 Units Oral Daily LUCY Sanchez   divalproex sodium 1,000 mg Oral Q12H [facility] MD Marcial   docusate sodium 100 mg Oral BID Yamel M Homa, CRNP   fentaNYL 50 mcg Transdermal Q72H Yamel M Homa, CRNP   furosemide 40 mg Oral Daily Yamel M Homa, CRNP   gabapentin 400 mg Oral TID Juany Taylor MD   haloperidol 5 mg Oral Q8H PRN Carmella Kline PA-C   haloperidol lactate 5 mg Intramuscular Q8H PRN Carmella Kline PA-C   hydrOXYzine HCL 25 mg Oral Q6H PRN Silvia Ashley MD   latanoprost 1 drop Both Eyes HS Yamel M Homa, CRNP   lidocaine 1 patch Transdermal Daily Yamel M Homa, CRNP   LORazepam 1 mg Intramuscular Q6H PRN LUCY Pleitez   LORazepam 1 mg Oral Q8H PRN Juany Taylor MD   losartan 25 mg Oral Daily Yamel M Homa, CRNP   magnesium hydroxide 30 mL Oral Daily PRN Silvia Ashley MD   melatonin 3 mg Oral HS Cuauhtemoc Ungeron, CRNP   methocarbamol 500 mg Oral Q6H [address] Yamel M Homa, CRNP   nystatin  Topical BID Carmella Kline PA-C   OLANZapine 10 mg Intramuscular Q8H PRN Juany Taylor MD   oxyCODONE 5 mg Oral Q4H PRN LUCY Pleitez   pantoprazole 40 mg Oral Early Morning Yamel M Homa, CRNP   phenol 1 spray Mouth/Throat Q2H PRN Yamel M Homa, CRNP   QUEtiapine 100 mg Oral HS Juany Taylor MD   QUEtiapine 100 mg Oral BID Juany Taylor MD   QUEtiapine 100 mg Oral Once Juany Taylor MD   risperiDONE 0 5 mg Oral Q8H PRN Silvia Ashley MD   rivaroxaban 20 mg Oral Daily With [employer], CRNP   sterile water

## 2018-09-12 NOTE — PROGRESS NOTES
Pt cooperative with medications  Pt affect is labile, often agitated with no apparent trigger  Pt moderately confused, speaking word salad and continually asking for us to remove the bed rail stating "take that off and put it over there "  Pt is sometimes verbally redirectable  No aggression noted  Pt is currently in bed

## 2018-09-13 PROBLEM — R94.31 ABNORMAL EKG: Status: ACTIVE | Noted: 2018-09-13

## 2018-09-13 PROCEDURE — 99232 SBSQ HOSP IP/OBS MODERATE 35: CPT | Performed by: PSYCHIATRY & NEUROLOGY

## 2018-09-13 PROCEDURE — 99232 SBSQ HOSP IP/OBS MODERATE 35: CPT | Performed by: PHYSICIAN ASSISTANT

## 2018-09-13 RX ORDER — QUETIAPINE FUMARATE 100 MG/1
200 TABLET, FILM COATED ORAL
Status: DISCONTINUED | OUTPATIENT
Start: 2018-09-13 | End: 2018-09-16

## 2018-09-13 RX ORDER — QUETIAPINE FUMARATE 100 MG/1
100 TABLET, FILM COATED ORAL ONCE
Status: COMPLETED | OUTPATIENT
Start: 2018-09-13 | End: 2018-09-13

## 2018-09-13 RX ORDER — LORAZEPAM 1 MG/1
2 TABLET ORAL EVERY 8 HOURS PRN
Status: DISCONTINUED | OUTPATIENT
Start: 2018-09-13 | End: 2018-09-17

## 2018-09-13 RX ADMIN — MELATONIN 3 MG: at 21:45

## 2018-09-13 RX ADMIN — QUETIAPINE FUMARATE 100 MG: 100 TABLET ORAL at 09:13

## 2018-09-13 RX ADMIN — METHOCARBAMOL 500 MG: 500 TABLET ORAL at 17:38

## 2018-09-13 RX ADMIN — PANTOPRAZOLE SODIUM 40 MG: 40 TABLET, DELAYED RELEASE ORAL at 07:03

## 2018-09-13 RX ADMIN — VITAMIN D, TAB 1000IU (100/BT) 1000 UNITS: 25 TAB at 09:13

## 2018-09-13 RX ADMIN — AMLODIPINE BESYLATE 10 MG: 10 TABLET ORAL at 09:13

## 2018-09-13 RX ADMIN — ACETAMINOPHEN 650 MG: 325 TABLET ORAL at 07:03

## 2018-09-13 RX ADMIN — ACETAMINOPHEN 650 MG: 325 TABLET ORAL at 21:46

## 2018-09-13 RX ADMIN — LOSARTAN POTASSIUM 25 MG: 25 TABLET, FILM COATED ORAL at 09:14

## 2018-09-13 RX ADMIN — QUETIAPINE FUMARATE 100 MG: 100 TABLET ORAL at 11:54

## 2018-09-13 RX ADMIN — FUROSEMIDE 40 MG: 40 TABLET ORAL at 09:13

## 2018-09-13 RX ADMIN — GABAPENTIN 300 MG: 300 CAPSULE ORAL at 17:38

## 2018-09-13 RX ADMIN — ACETAMINOPHEN 650 MG: 325 TABLET ORAL at 13:41

## 2018-09-13 RX ADMIN — LORAZEPAM 2 MG: 1 TABLET ORAL at 09:48

## 2018-09-13 RX ADMIN — GABAPENTIN 300 MG: 300 CAPSULE ORAL at 21:45

## 2018-09-13 RX ADMIN — QUETIAPINE FUMARATE 100 MG: 100 TABLET ORAL at 17:38

## 2018-09-13 RX ADMIN — LATANOPROST 1 DROP: 50 SOLUTION OPHTHALMIC at 21:48

## 2018-09-13 RX ADMIN — QUETIAPINE FUMARATE 200 MG: 100 TABLET ORAL at 22:59

## 2018-09-13 RX ADMIN — METHOCARBAMOL 500 MG: 500 TABLET ORAL at 07:03

## 2018-09-13 RX ADMIN — GABAPENTIN 300 MG: 300 CAPSULE ORAL at 09:13

## 2018-09-13 RX ADMIN — DOCUSATE SODIUM 100 MG: 100 CAPSULE, LIQUID FILLED ORAL at 17:38

## 2018-09-13 RX ADMIN — DIVALPROEX SODIUM 1000 MG: 500 TABLET, DELAYED RELEASE ORAL at 09:13

## 2018-09-13 RX ADMIN — METHOCARBAMOL 500 MG: 500 TABLET ORAL at 23:00

## 2018-09-13 RX ADMIN — RIVAROXABAN 20 MG: 20 TABLET, FILM COATED ORAL at 17:39

## 2018-09-13 RX ADMIN — METHOCARBAMOL 500 MG: 500 TABLET ORAL at 11:54

## 2018-09-13 RX ADMIN — DIVALPROEX SODIUM 1000 MG: 500 TABLET, DELAYED RELEASE ORAL at 21:45

## 2018-09-13 RX ADMIN — LIDOCAINE 1 PATCH: 50 PATCH CUTANEOUS at 09:14

## 2018-09-13 RX ADMIN — DOCUSATE SODIUM 100 MG: 100 CAPSULE, LIQUID FILLED ORAL at 09:14

## 2018-09-13 NOTE — ASSESSMENT & PLAN NOTE
· Currently rate controlled without medications  · Continue Xarelto for stroke prevention     · Monitor QTc with psychiatric meds

## 2018-09-13 NOTE — CASE MANAGEMENT
Received  a call from Bee Coles to let me know she isn't feeling well and may come in today only to  the SNF list and the letter that Dr Gurinder Sun has written for her to give her an update on Mariama's status  I also informed Bee Coles that once pt is near stable, if we don't have a receiving skilled facility lined up, [facility] may have to return to the [facility] and can await transfer to a new facility from there  Bee Coles was very understanding of that  I also informed her that our Tx team will not allow pt to be discharged to live with Bee Coles, as [facility] needs such an intense amount of skilled care and equipment that it would not be safe for either Bee Babbs or [facility]  Bertha Barber understood and agreed  Later today a woman arrived to this unit claiming that she came in place of Bee Coles to  paperwork  I called Bee Coles and she confirmed that I had permission to give the paperwork to Tk Fajardo, whom was here in her place  The paperwork was placed in a sealed envelop and given to [address]

## 2018-09-13 NOTE — PROGRESS NOTES
Pt persistently yelling out, not redirectable  Per dr recommendation, 2mg ativan PRN given  Will continue to monitor

## 2018-09-13 NOTE — PROGRESS NOTES
Progress Note - Feroz Mayres 1940, 70 y o  female MRN: [id number]  Unit/Bed#: AA8 561-01 Encounter: [id number] DOS: 9/13/18  Primary Care Provider: Charis Connell MD   Date and time admitted to hospital: 9/1/2018 11:11 PM    Abnormal EKG   Assessment & Plan    · Called for re-evaluation due to abnormal EKG  She has had multiple EKGs monitoring her QTC  Possible anteroseptal ischemia most recently with T-wave inversions in the anterior leads  Patient although sedated denies chest pain, MARTINEZ  She is currently lying flat  · EKGs over the last 2 months with evidence of anteroseptal ischemic changes and nonspecific T-wave abnormalities  Troponin was negative x1  Echocardiogram in June 2017 with ejection fraction 55%, no comment on regional wall motion abnormalities  No active chest pain  No prior history of CAD or MI  · Not on aspirin as she is already on anticoagulation   · Consider echocardiogram although this can wait in the outpatient setting when patient's mood and mental status improves (she would need to leave unit and is currently unsafe for transfer given agitation)        Continuous opioid dependence ([address] )   Assessment & Plan    · Longstanding history  Continue Fentanyl patch Q72 hours  · Bowel regimen        Dementia with behavioral disturbance   Assessment & Plan    · Per primary team, d/w nursing today  Pt currently sedated due to agitation  Atrial fibrillation ([address] )   Assessment & Plan    · Currently rate controlled without medications  · Continue Xarelto for stroke prevention  · Monitor QTc with psychiatric meds        * Bipolar disorder, current episode mixed, moderate (HCC)   Assessment & Plan    · Per primary team          VTE Pharmacologic Prophylaxis:   Pharmacologic: Rivaroxaban (Xarelto)  Mechanical VTE Prophylaxis in Place: Yes    Patient Centered Rounds: I have performed bedside rounds with nursing staff today      Discussions with Specialists or Other Care Team Provider: nursing     Education and Discussions with Family / Patient: patient     Time Spent for Care: 30 minutes  More than 50% of total time spent on counseling and coordination of care as described above  Current Length of Stay: 12 day(s)    Current Patient Status: Inpatient Psych   Certification Statement: The patient will continue to require additional inpatient hospital stay due to per primary team     Discharge Plan / Estimated Discharge Date: per psych  Recommend outpatient echocardiogram, PCP follow up  Will sign off call with questions or when patient more appropriate for echo  Code Status: Level 1 - Full Code    Subjective:   Pt seen and examined at bedside  No new complaints  Quite sedated  Denies CP or SOB  Lying flat  Objective:     Vitals:   Temp (24hrs), Av 4 °F (36 3 °C), Min:97 2 °F (36 2 °C), Max:97 6 °F (36 4 °C)    HR:  [67-69] 69  Resp:  [18] 18  BP: (118-142)/(60-67) 142/67  SpO2:  [91 %-95 %] 95 %  Body mass index is 41 53 kg/m²  Input and Output Summary (last 24 hours):     No intake or output data in the 24 hours ending 18 1628    Physical Exam:     Physical Exam   Constitutional: She appears well-developed and well-nourished  Elderly female, lying flat    HENT:   Head: Normocephalic and atraumatic  Mouth/Throat: Oropharynx is clear and moist    Eyes: EOM are normal  No scleral icterus  Neck: Normal range of motion  Neck supple  Cardiovascular: Normal rate, regular rhythm and normal heart sounds  No murmur heard  Pulmonary/Chest: Effort normal and breath sounds normal    Abdominal: Soft  Bowel sounds are normal    Musculoskeletal: Normal range of motion  She exhibits no edema  Skin: Skin is warm and dry  Psychiatric: She has a normal mood and affect  Arousable, sedated        Additional Data:    Labs:      Results from last 7 days  Lab Units 18  0647   WBC Thousand/uL 4 13*   HEMOGLOBIN g/dL 11 0*   HEMATOCRIT % 37 9   PLATELETS Thousands/uL 163 NEUTROS PCT % 42*   LYMPHS PCT % 43   MONOS PCT % 9   EOS PCT % 6       Results from last 7 days  Lab Units 09/11/18  0647   SODIUM mmol/L 141   POTASSIUM mmol/L 3 4*   CHLORIDE mmol/L 105   CO2 mmol/L 31   BUN mg/dL 12   CREATININE mg/dL 0 71   CALCIUM mg/dL 8 8           * I Have Reviewed All Lab Data Listed Above  * Additional Pertinent Lab Tests Reviewed:  [address] Admission Reviewed    Imaging:    Imaging Reports Reviewed Today Include: all  Imaging Personally Reviewed by Myself Includes:  none    Recent Cultures (last 7 days):       Results from last 7 days  Lab Units 09/07/18  1657   URINE CULTURE  20,000-29,000 cfu/ml Escherichia coli ESBL*  20,000-29,000 cfu/ml Enterococcus faecalis*       Last 24 Hours Medication List:     Current Facility-Administered Medications:  acetaminophen 325 mg Oral Q8H PRN LUCY Hussein   acetaminophen 650 mg Oral Q8H [address] LUCY Sanchez   aluminum-magnesium hydroxide-simethicone 15 mL Oral Q4H PRN Jose Beckford MD   amLODIPine 10 mg Oral Daily LUCY Sanchez   benztropine 1 mg Intramuscular Q8H PRN Jose Beckford MD   benztropine 1 mg Oral Q8H PRN Jose Beckford MD   cholecalciferol 1,000 Units Oral Daily LUCY Sanchez   divalproex sodium 1,000 mg Oral Q12H [address] Radha Dixon MD   docusate sodium 100 mg Oral BID LUCY Sanchez   fentaNYL 50 mcg Transdermal Q72H LUCY Sanchez   furosemide 40 mg Oral Daily LUCY Sanchez   gabapentin 300 mg Oral TID Radha Dixon MD   haloperidol 5 mg Oral Q8H PRN Carmella Kline PA-C   haloperidol lactate 5 mg Intramuscular Q8H PRN Carmella Kline PA-C   hydrOXYzine HCL 25 mg Oral Q6H PRN Jose Beckford MD   latanoprost 1 drop Both Eyes HS LUCY Sanchez   lidocaine 1 patch Transdermal Daily LUCY Sanchez   LORazepam 1 mg Intramuscular Q6H PRN LUCY Hussein   LORazepam 2 mg Oral Q8H PRN Radha Dixon MD   losartan 25 mg Oral Daily Yamel MCFARLANE LUCY Luther   magnesium hydroxide 30 mL Oral Daily PRN Sherie Hughes MD   melatonin 3 mg Oral HS [facility]   methocarbamol 500 mg Oral Q6H [facility] LUCY Sanchez   nystatin  Topical BID Carmella Kline PA-C   OLANZapine 10 mg Intramuscular Q8H PRN Mehdi Hamilton MD   oxyCODONE 5 mg Oral Q4H PRN LUCY Haskins   pantoprazole 40 mg Oral Early Morning LUCY Sanchez   phenol 1 spray Mouth/Throat Q2H PRN LUCY Sanchez   QUEtiapine 100 mg Oral BID Mehdi Hamilton MD   QUEtiapine 200 mg Oral HS Mehdi Hamilton MD   rivaroxaban 20 mg Oral Daily With [employer] LUCY Fine   sterile water            Today, Patient Was Seen By: Radha Marie PA-C    ** Please Note: This note has been constructed using a voice recognition system   **

## 2018-09-13 NOTE — PROGRESS NOTES
Pt continues to yell out  Occasionally falling asleep, but waking up a short time later and beginning to scream again  Will continue to monitor

## 2018-09-13 NOTE — ASSESSMENT & PLAN NOTE
· Called for re-evaluation due to abnormal EKG  She has had multiple EKGs monitoring her QTC  Possible anteroseptal ischemia most recently with T-wave inversions in the anterior leads  Patient although sedated denies chest pain, MARTINEZ  She is currently lying flat  · EKGs over the last 2 months with evidence of anteroseptal ischemic changes and nonspecific T-wave abnormalities  Troponin was negative x2  Echocardiogram in June 2017 with ejection fraction 55%, no comment on regional wall motion abnormalities  No active chest pain   No prior history of CAD or MI  · Not on aspirin as she is already on anticoagulation   · Consider echocardiogram although this can wait in the outpatient setting when patient's mood and mental status improves (she would need to leave unit and is currently unsafe for transfer given agitation)

## 2018-09-13 NOTE — ASSESSMENT & PLAN NOTE
· Called for re-evaluation due to abnormal EKG  She has had multiple EKGs monitoring her QTC  Possible anteroseptal ischemia most recently with T-wave inversions in the anterior leads  Patient although sedated denies chest pain, MARTINEZ  She is currently lying flat  · EKGs over the last 2 months with evidence of anteroseptal ischemic changes and nonspecific T-wave abnormalities  Troponin was negative x1  Echocardiogram in June 2017 with ejection fraction 55%, no comment on regional wall motion abnormalities  No active chest pain   No prior history of CAD or MI  · Not on aspirin as she is already on anticoagulation   · Consider echocardiogram although this can wait in the outpatient setting when patient's mood and mental status improves (she would need to leave unit and is currently unsafe for transfer given agitation)

## 2018-09-13 NOTE — PLAN OF CARE
Problem: Ineffective Coping  Goal: Participates in unit activities  Interventions:  - Provide therapeutic environment   - Provide required programming   - Redirect inappropriate behaviors    Outcome: Progressing  Some progress in appropriate quiet communication during bedside conversation  She di return to yelling when staff walked away

## 2018-09-13 NOTE — PROGRESS NOTES
Progress Note - [address] 70 y o  female MRN: [id number]  Unit/Bed#: IB2 571-01 Encounter: [id number]    The patient was seen for continuing care and reviewed with treatment team   She is currently calm and pleasant  She was able to sleep through the night  She had milder forms of agitation last night but responded to verbal redirection  She gets more confused in the evening  Yesterday morning she was quite agitated  She is currently on a combination of divalproex and quetiapine  On one occasion earlier this week, she responded to a combination of quetiapine 100 mg followed by lorazepam 2 mg and was calm for the most of the day without being sedated    Mental Status Evaluation:  Appearance:  Good eye contact and disheveled   Behavior:  calm, cooperative and friendly   Mood:  euthymic   Thought Content:  Does not verbalize delusional material   Perceptual Disturbances: Denies hallucinations and does not appear to be responding to internal stimuli   Risk Potential: No suicidal or homicidal ideation   Orientation:   Oriented to place and person         Assessment/Plan    Principal Problem:    Bipolar disorder, current episode mixed, moderate ([address] )  Active Problems:    Dementia with behavioral disturbance      Recommended Treatment: We will increase quetiapine gradually  We will check valproic acid level in a day or two Continue with pharmacotherapy, group therapy, milieu therapy and occupational therapy    The patient will be maintained on the following medications:    Current Facility-Administered Medications:  acetaminophen 325 mg Oral Q8H PRN Kittie Peabody, CRNP   acetaminophen 650 mg Oral Q8H [address] LUCY Sanchez   aluminum-magnesium hydroxide-simethicone 15 mL Oral Q4H PRN Bhaskar Norton MD   amLODIPine 10 mg Oral Daily LUCY Mayorga   benztropine 1 mg Intramuscular Q8H PRN Bhaskar Norton MD   benztropine 1 mg Oral Q8H PRN Bhaskar Norton MD   cholecalciferol 1,000 Units Oral Daily Yamel M Homa, CRNP   divalproex sodium 1,000 mg Oral Q12H [address] [facility] MD Marcial   docusate sodium 100 mg Oral BID Yamel M Homa, CRNP   fentaNYL 50 mcg Transdermal Q72H Yamel M Homa, CRNP   furosemide 40 mg Oral Daily Yamel M Homa, CRNP   gabapentin 300 mg Oral TID Mehdi Hamilton MD   haloperidol 5 mg Oral Q8H PRN Carmella Kline PA-C   haloperidol lactate 5 mg Intramuscular Q8H PRN Carmella Kline PA-C   hydrOXYzine HCL 25 mg Oral Q6H PRN Sherie Hughes MD   latanoprost 1 drop Both Eyes HS Yamel M Homa, CRNP   lidocaine 1 patch Transdermal Daily Yamel M Homa, CRNP   LORazepam 1 mg Intramuscular Q6H PRN LUCY Haskins   LORazepam 1 mg Oral Q8H PRN Mehdi Hamilton MD   losartan 25 mg Oral Daily Yamel M Homa, CRNP   magnesium hydroxide 30 mL Oral Daily PRN Sherie Hughes MD   melatonin 3 mg Oral HS [facility], LUCY   methocarbamol 500 mg Oral Q6H [facility] Yamel M Homa, CRNP   nystatin  Topical BID Carmella Kline PA-C   OLANZapine 10 mg Intramuscular Q8H PRN Mehdi Hamilton MD   oxyCODONE 5 mg Oral Q4H PRN LUCY Haskins   pantoprazole 40 mg Oral Early Morning Yamel M Homa, CRNP   phenol 1 spray Mouth/Throat Q2H PRN Yamel M Homa, CRNP   QUEtiapine 100 mg Oral HS Mehdi Hamilton MD   QUEtiapine 100 mg Oral BID Mehdi Hamilton MD   risperiDONE 0 5 mg Oral Q8H PRN Sherie Hughes MD   rivaroxaban 20 mg Oral Daily With [employer], CRNP   sterile water

## 2018-09-14 PROCEDURE — 99232 SBSQ HOSP IP/OBS MODERATE 35: CPT | Performed by: PSYCHIATRY & NEUROLOGY

## 2018-09-14 RX ORDER — QUETIAPINE FUMARATE 100 MG/1
200 TABLET, FILM COATED ORAL DAILY
Status: DISCONTINUED | OUTPATIENT
Start: 2018-09-14 | End: 2018-09-16

## 2018-09-14 RX ORDER — LORAZEPAM 1 MG/1
2 TABLET ORAL
Status: DISCONTINUED | OUTPATIENT
Start: 2018-09-14 | End: 2018-09-18

## 2018-09-14 RX ADMIN — GABAPENTIN 300 MG: 300 CAPSULE ORAL at 21:45

## 2018-09-14 RX ADMIN — METHOCARBAMOL 500 MG: 500 TABLET ORAL at 23:00

## 2018-09-14 RX ADMIN — LOSARTAN POTASSIUM 25 MG: 25 TABLET, FILM COATED ORAL at 10:04

## 2018-09-14 RX ADMIN — FENTANYL 50 MCG: 50 PATCH, EXTENDED RELEASE TRANSDERMAL at 10:07

## 2018-09-14 RX ADMIN — NYSTATIN: 100000 POWDER TOPICAL at 10:05

## 2018-09-14 RX ADMIN — PANTOPRAZOLE SODIUM 40 MG: 40 TABLET, DELAYED RELEASE ORAL at 05:51

## 2018-09-14 RX ADMIN — METHOCARBAMOL 500 MG: 500 TABLET ORAL at 13:23

## 2018-09-14 RX ADMIN — DOCUSATE SODIUM 100 MG: 100 CAPSULE, LIQUID FILLED ORAL at 17:49

## 2018-09-14 RX ADMIN — ACETAMINOPHEN 650 MG: 325 TABLET ORAL at 05:50

## 2018-09-14 RX ADMIN — QUETIAPINE FUMARATE 200 MG: 100 TABLET ORAL at 21:44

## 2018-09-14 RX ADMIN — DIVALPROEX SODIUM 1000 MG: 500 TABLET, DELAYED RELEASE ORAL at 21:46

## 2018-09-14 RX ADMIN — METHOCARBAMOL 500 MG: 500 TABLET ORAL at 05:51

## 2018-09-14 RX ADMIN — LIDOCAINE 1 PATCH: 50 PATCH CUTANEOUS at 09:55

## 2018-09-14 RX ADMIN — VITAMIN D, TAB 1000IU (100/BT) 1000 UNITS: 25 TAB at 09:55

## 2018-09-14 RX ADMIN — ACETAMINOPHEN 650 MG: 325 TABLET ORAL at 13:23

## 2018-09-14 RX ADMIN — RIVAROXABAN 20 MG: 20 TABLET, FILM COATED ORAL at 17:49

## 2018-09-14 RX ADMIN — FUROSEMIDE 40 MG: 40 TABLET ORAL at 10:03

## 2018-09-14 RX ADMIN — MELATONIN 3 MG: at 21:47

## 2018-09-14 RX ADMIN — METHOCARBAMOL 500 MG: 500 TABLET ORAL at 17:49

## 2018-09-14 RX ADMIN — LORAZEPAM 2 MG: 1 TABLET ORAL at 10:47

## 2018-09-14 RX ADMIN — DOCUSATE SODIUM 100 MG: 100 CAPSULE, LIQUID FILLED ORAL at 09:55

## 2018-09-14 RX ADMIN — LATANOPROST 1 DROP: 50 SOLUTION OPHTHALMIC at 21:47

## 2018-09-14 RX ADMIN — DIVALPROEX SODIUM 1000 MG: 500 TABLET, DELAYED RELEASE ORAL at 09:54

## 2018-09-14 RX ADMIN — GABAPENTIN 300 MG: 300 CAPSULE ORAL at 17:49

## 2018-09-14 RX ADMIN — GABAPENTIN 300 MG: 300 CAPSULE ORAL at 09:53

## 2018-09-14 RX ADMIN — NYSTATIN: 100000 POWDER TOPICAL at 17:52

## 2018-09-14 RX ADMIN — AMLODIPINE BESYLATE 10 MG: 10 TABLET ORAL at 10:04

## 2018-09-14 RX ADMIN — ACETAMINOPHEN 650 MG: 325 TABLET ORAL at 21:46

## 2018-09-14 RX ADMIN — QUETIAPINE FUMARATE 200 MG: 100 TABLET ORAL at 09:54

## 2018-09-14 NOTE — PLAN OF CARE
Alteration in Orientation     Treatment Goal: Demonstrate a reduction of confusion and improved orientation to person, place, time and/or situation upon discharge, according to optimum baseline/ability Not Progressing     Express concerns related to confused thinking related to: Not Progressing        Patient is confused and oriented to self    Alteration in Thoughts and Perception     Treatment Goal: Gain control of psychotic behaviors/thinking, reduce/eliminate presenting symptoms and demonstrate improved reality functioning upon discharge Progressing     Refrain from acting on delusional thinking/internal stimuli Progressing     Agree to be compliant with medication regime, as prescribed and report medication side effects Progressing        Anxiety     Anxiety is at manageable level Progressing        Prexisting or High Potential for Compromised Skin Integrity     Skin integrity is maintained or improved Progressing        Risk for Violence/Aggression Toward Others     Control angry outbursts Progressing

## 2018-09-14 NOTE — PROGRESS NOTES
Progress Note - Behavioral Health   Ahasn Tejeda 70 y o  female MRN: [id number]  Unit/Bed#: SR1 561-01 Encounter: [id number]    The patient was seen for continuing care and reviewed with treatment team   It appears that the patient response to higher doses of quetiapine and lorazepam   Yesterday early in the morning she was quite agitated but after she received both medications, she calmed down for long hours  She has been sleeping and her appetite is adequate  Mental Status Evaluation:  Appearance:  Good eye contact and disheveled   Behavior:  argumentative and restless and fidgety   Mood:  irritable   Thought Content:  Paranoid and mistrustful   Perceptual Disturbances: Denies hallucinations and does not appear to be responding to internal stimuli   Risk Potential: No suicidal or homicidal ideation   Orientation:            Assessment/Plan    Principal Problem:    Bipolar disorder, current episode mixed, moderate (HCC)  Active Problems:    Atrial fibrillation [address] [address] )    Dementia with behavioral disturbance    Continuous opioid dependence ([facility] [address] )    Abnormal EKG      Recommended Treatment: We will increase quetiapine and at mid morning dose of lorazepam and check valproic acid level Continue with pharmacotherapy, group therapy, milieu therapy and occupational therapy    The patient will be maintained on the following medications:    Current Facility-Administered Medications:  acetaminophen 325 mg Oral Q8H PRN LUCY Dodson   acetaminophen 650 mg Oral Q8H [facility] LUCY Sanchez   aluminum-magnesium hydroxide-simethicone 15 mL Oral Q4H PRN Garth Nguyen MD   amLODIPine 10 mg Oral Daily LUCY Sanchez   benztropine 1 mg Intramuscular Q8H PRN Garth Nguyen MD   benztropine 1 mg Oral Q8H PRN Garth Nguyen MD   cholecalciferol 1,000 Units Oral Daily LUCY Sanchez   divalproex sodium 1,000 mg Oral Q12H [address] [facility], MD   docusate sodium 100 mg Oral BID LUCY Hidalgo fentaNYL 50 mcg Transdermal Q72H Yamel DENYS Luther, CRNP   furosemide 40 mg Oral Daily Yamel DENYS Luther, CRNP   gabapentin 300 mg Oral TID Zander Almazan MD   haloperidol 5 mg Oral Q8H PRN Carmella Kline PA-C   haloperidol lactate 5 mg Intramuscular Q8H PRN Carmella Kline PA-C   hydrOXYzine HCL 25 mg Oral Q6H PRN Manuel Palomares MD   latanoprost 1 drop Both Eyes HS Yamel Luther, CRAMARILYS   lidocaine 1 patch Transdermal Daily Yamel DENYS Luther, CRAMARILYS   LORazepam 1 mg Intramuscular Q6H PRN LUCY Kellogg   LORazepam 2 mg Oral Q8H PRN Zander Almazan MD   LORazepam 2 mg Oral Daily Zander Almazan MD   losartan 25 mg Oral Daily Yamel DENYS Luther, LUCY   magnesium hydroxide 30 mL Oral Daily PRN Manuel Palomares MD   melatonin 3 mg Oral HS Margaret De Paz, CRAMARILYS   methocarbamol 500 mg Oral Q6H [facility] Yamel Luther, CRAMARILYS   nystatin  Topical BID Carmella Kline PA-C   OLANZapine 10 mg Intramuscular Q8H PRN Zander Almazan MD   oxyCODONE 5 mg Oral Q4H PRN LUCY Kellogg   pantoprazole 40 mg Oral Early Morning Yamel LUCY Ko   phenol 1 spray Mouth/Throat Q2H PRN LUCY Sanchez   QUEtiapine 200 mg Oral HS Zander Almazan MD   QUEtiapine 200 mg Oral Daily Zander Almazan MD   rivaroxaban 20 mg Oral Daily With [employer] Jetty SaltyLUCY   sterile water

## 2018-09-14 NOTE — PLAN OF CARE
Problem: Ineffective Coping  Goal: Participates in unit activities  Interventions:  - Provide therapeutic environment   - Provide required programming   - Redirect inappropriate behaviors    Outcome: Not Progressing  Pt did engaged in 1-1 bedside interaction with staff  She was diverted to sing songs rather than to yell yet resumed yelling when staff was not in the room  Pt with short attention span

## 2018-09-14 NOTE — PROGRESS NOTES
Pt currently calm and sleeping in bed  No aggression or agitation  Pt has no calling out episodes this evening

## 2018-09-14 NOTE — PROGRESS NOTES
Patient was shouting throughout the morning  After receiving a scheduled order of Ativan at 1047 the patient has been much calmer, she still continues to shout at times but her agitation is diminished  Patient is currently eating lunch  Patient is oriented to self, exhibits lability at times yelling at staff to get out at other times telling them they are wonderful  Patient is medication compliant

## 2018-09-15 PROBLEM — F31.12 BIPOLAR AFFECTIVE DISORDER, CURRENTLY MANIC, MODERATE (HCC): Status: ACTIVE | Noted: 2018-09-04

## 2018-09-15 LAB — VALPROATE SERPL-MCNC: 82 UG/ML (ref 50–100)

## 2018-09-15 PROCEDURE — 80164 ASSAY DIPROPYLACETIC ACD TOT: CPT | Performed by: PSYCHIATRY & NEUROLOGY

## 2018-09-15 PROCEDURE — 99232 SBSQ HOSP IP/OBS MODERATE 35: CPT | Performed by: PSYCHIATRY & NEUROLOGY

## 2018-09-15 RX ORDER — DIVALPROEX SODIUM 250 MG/1
250 TABLET, EXTENDED RELEASE ORAL
Status: DISCONTINUED | OUTPATIENT
Start: 2018-09-15 | End: 2018-09-17

## 2018-09-15 RX ORDER — QUETIAPINE FUMARATE 100 MG/1
100 TABLET, FILM COATED ORAL
Status: DISCONTINUED | OUTPATIENT
Start: 2018-09-15 | End: 2018-09-16

## 2018-09-15 RX ORDER — GABAPENTIN 100 MG/1
200 CAPSULE ORAL 3 TIMES DAILY
Status: DISCONTINUED | OUTPATIENT
Start: 2018-09-15 | End: 2018-09-18

## 2018-09-15 RX ADMIN — ACETAMINOPHEN 650 MG: 325 TABLET ORAL at 06:40

## 2018-09-15 RX ADMIN — FUROSEMIDE 40 MG: 40 TABLET ORAL at 08:27

## 2018-09-15 RX ADMIN — MELATONIN 3 MG: at 21:59

## 2018-09-15 RX ADMIN — QUETIAPINE FUMARATE 100 MG: 100 TABLET ORAL at 13:04

## 2018-09-15 RX ADMIN — NYSTATIN: 100000 POWDER TOPICAL at 11:31

## 2018-09-15 RX ADMIN — LATANOPROST 1 DROP: 50 SOLUTION OPHTHALMIC at 22:25

## 2018-09-15 RX ADMIN — GABAPENTIN 200 MG: 100 CAPSULE ORAL at 17:25

## 2018-09-15 RX ADMIN — METHOCARBAMOL 500 MG: 500 TABLET ORAL at 13:04

## 2018-09-15 RX ADMIN — DOCUSATE SODIUM 100 MG: 100 CAPSULE, LIQUID FILLED ORAL at 17:26

## 2018-09-15 RX ADMIN — QUETIAPINE FUMARATE 200 MG: 100 TABLET ORAL at 22:25

## 2018-09-15 RX ADMIN — LIDOCAINE 1 PATCH: 50 PATCH CUTANEOUS at 09:08

## 2018-09-15 RX ADMIN — PANTOPRAZOLE SODIUM 40 MG: 40 TABLET, DELAYED RELEASE ORAL at 06:40

## 2018-09-15 RX ADMIN — LOSARTAN POTASSIUM 25 MG: 25 TABLET, FILM COATED ORAL at 08:27

## 2018-09-15 RX ADMIN — METHOCARBAMOL 500 MG: 500 TABLET ORAL at 06:40

## 2018-09-15 RX ADMIN — LORAZEPAM 2 MG: 1 TABLET ORAL at 09:31

## 2018-09-15 RX ADMIN — GABAPENTIN 200 MG: 100 CAPSULE ORAL at 21:59

## 2018-09-15 RX ADMIN — VITAMIN D, TAB 1000IU (100/BT) 1000 UNITS: 25 TAB at 08:27

## 2018-09-15 RX ADMIN — METHOCARBAMOL 500 MG: 500 TABLET ORAL at 17:25

## 2018-09-15 RX ADMIN — AMLODIPINE BESYLATE 10 MG: 10 TABLET ORAL at 08:27

## 2018-09-15 RX ADMIN — DIVALPROEX SODIUM 250 MG: 250 TABLET, EXTENDED RELEASE ORAL at 13:04

## 2018-09-15 RX ADMIN — GABAPENTIN 300 MG: 300 CAPSULE ORAL at 08:27

## 2018-09-15 RX ADMIN — ACETAMINOPHEN 650 MG: 325 TABLET ORAL at 22:22

## 2018-09-15 RX ADMIN — DIVALPROEX SODIUM 1000 MG: 500 TABLET, DELAYED RELEASE ORAL at 08:27

## 2018-09-15 RX ADMIN — DOCUSATE SODIUM 100 MG: 100 CAPSULE, LIQUID FILLED ORAL at 08:29

## 2018-09-15 RX ADMIN — ACETAMINOPHEN 650 MG: 325 TABLET ORAL at 13:04

## 2018-09-15 RX ADMIN — RIVAROXABAN 20 MG: 20 TABLET, FILM COATED ORAL at 17:24

## 2018-09-15 RX ADMIN — QUETIAPINE FUMARATE 200 MG: 100 TABLET ORAL at 08:27

## 2018-09-15 RX ADMIN — DIVALPROEX SODIUM 1000 MG: 500 TABLET, DELAYED RELEASE ORAL at 21:59

## 2018-09-15 NOTE — PLAN OF CARE
Alteration in Orientation     Treatment Goal: Demonstrate a reduction of confusion and improved orientation to person, place, time and/or situation upon discharge, according to optimum baseline/ability Not Progressing     Express concerns related to confused thinking related to: Not Progressing        Alteration in Thoughts and Perception     Treatment Goal: Gain control of psychotic behaviors/thinking, reduce/eliminate presenting symptoms and demonstrate improved reality functioning upon discharge Not Progressing     Refrain from acting on delusional thinking/internal stimuli Not Progressing        Risk for Violence/Aggression Toward Others     Control angry outbursts Not Progressing        Patient is confused and agitated at times     Alteration in Thoughts and Perception     Agree to be compliant with medication regime, as prescribed and report medication side effects Progressing        Anxiety     Anxiety is at manageable level Progressing        Prexisting or High Potential for Compromised Skin Integrity     Skin integrity is maintained or improved Progressing

## 2018-09-15 NOTE — PROGRESS NOTES
Patient has been screaming and cursing throughout the morning  After receiving scheduled Ativan at 0930 patient has been quiet up to this point  Patient is confused oriented to self only, some understanding of place and situation stated she was at a "medical empor[facility]" because her "thinking is messed up"  Patient is compliant with medication

## 2018-09-15 NOTE — PROGRESS NOTES
Pt shouting out all evening until 2130  Pt calmed self and was able to answer questions  Medication compliant  Says that she wants to kill self, but then states that she doesn't want to do so  Provided support for her  Denies all other s/s

## 2018-09-15 NOTE — PROGRESS NOTES
Progress Note - Behavioral Health   Carmina Brewster 70 y o  female MRN: [id number]  Unit/Bed#: SF4 561-01 Encounter: [id number]    The patient was seen for continuing care and reviewed with treatment team   Continues to have intermittent agitation and calling out nonstop but it appears that after she has given lorazepam she calms down  Valproic acid level is 82 on 2000 mg daily  She is also on quetiapine 400 mg daily  Today she tells me that she is afraid of everyone here including myself  She is paranoid and delusional believing that the staff intends to harm her and wants to go to a different place  She did sleep well last night and has a normal appetite  Mental Status Evaluation:  Appearance:  disheveled   Behavior:  psychomotor agitation   Mood:  fearful   Thought Content:  Paranoid and mistrustful and Delusions of persecution   Perceptual Disturbances: Uncertain   Risk Potential: No suicidal or homicidal ideation   Orientation:   Oriented to place and person         Assessment/Plan    Principal Problem:    Bipolar affective disorder, currently manic, moderate (HCC)  Active Problems:    Atrial fibrillation (HCC)    Dementia with behavioral disturbance    Continuous opioid dependence ([facility])    Abnormal EKG      Recommended Treatment: Continue with pharmacotherapy, group therapy, milieu therapy and occupational therapy    The patient will be maintained on the following medications:     Current Facility-Administered Medications:  acetaminophen 325 mg Oral Q8H PRN LUCY Arias   acetaminophen 650 mg Oral Q8H [facility] LUCY Sanchez   aluminum-magnesium hydroxide-simethicone 15 mL Oral Q4H PRN Jose Hoffman MD   amLODIPine 10 mg Oral Daily LUCY Willoughby   benztropine 1 mg Intramuscular Q8H PRN Jose Hoffman MD   benztropine 1 mg Oral Q8H PRN Jose Hoffman MD   cholecalciferol 1,000 Units Oral Daily LUCY Sanchez   divalproex sodium 250 mg Oral After Jazlyn Henning MD divalproex sodium 1,000 mg Oral Q12H [facility], MD   docusate sodium 100 mg Oral BID Aymel M Homa, CRNP   fentaNYL 50 mcg Transdermal Q72H Yamel M Homa, CRNP   furosemide 40 mg Oral Daily Yamel M Homa, CRNP   gabapentin 200 mg Oral TID Emiliano Macias MD   haloperidol 5 mg Oral Q8H PRN Carmella Kline PA-C   haloperidol lactate 5 mg Intramuscular Q8H PRN Carmella Kline PA-C   hydrOXYzine HCL 25 mg Oral Q6H PRN Sondra Alonzo MD   latanoprost 1 drop Both Eyes HS Yamel M Homa, CRNP   lidocaine 1 patch Transdermal Daily Yamel M Homa, CRNP   LORazepam 1 mg Intramuscular Q6H PRN Feng Beck, CRNP   LORazepam 2 mg Oral Q8H PRN Emiliano Macias MD   LORazepam 2 mg Oral Daily Emiliano Macias MD   losartan 25 mg Oral Daily Yamel M Homa, CRNP   magnesium hydroxide 30 mL Oral Daily PRN Sondra Alonzo MD   melatonin 3 mg Oral HS Wing Kim, CRNP   methocarbamol 500 mg Oral Q6H [address] Yamel M Homa, CRNP   nystatin  Topical BID Carmella Kline PA-C   OLANZapine 10 mg Intramuscular Q8H PRN Emiliano Macias MD   oxyCODONE 5 mg Oral Q4H PRN Feng Beck, LISANP   pantoprazole 40 mg Oral Early Morning Yamel M Homa, CRNP   phenol 1 spray Mouth/Throat Q2H PRN Yamel M Homa, CRNP   QUEtiapine 100 mg Oral After Lunch Emiliano Macias MD   QUEtiapine 200 mg Oral HS Emiliano Macias MD   QUEtiapine 200 mg Oral Daily Emiliano Macias MD   rivaroxaban 20 mg Oral Daily With [employer] Kelly Valdez, CRNP   sterile water

## 2018-09-16 PROBLEM — F05 DELIRIUM DUE TO ANOTHER MEDICAL CONDITION, ACUTE, HYPERACTIVE: Status: ACTIVE | Noted: 2018-09-16

## 2018-09-16 LAB
ALBUMIN SERPL BCP-MCNC: 3 G/DL (ref 3.5–5)
ALP SERPL-CCNC: 63 U/L (ref 46–116)
ALT SERPL W P-5'-P-CCNC: 15 U/L (ref 12–78)
AMMONIA PLAS-SCNC: <10 UMOL/L (ref 11–35)
ANION GAP SERPL CALCULATED.3IONS-SCNC: 6 MMOL/L (ref 4–13)
ANION GAP SERPL CALCULATED.3IONS-SCNC: 9 MMOL/L (ref 4–13)
AST SERPL W P-5'-P-CCNC: 15 U/L (ref 5–45)
BASOPHILS # BLD AUTO: 0.02 THOUSANDS/ΜL (ref 0–0.1)
BASOPHILS NFR BLD AUTO: 0 % (ref 0–1)
BILIRUB SERPL-MCNC: 0.31 MG/DL (ref 0.2–1)
BILIRUB UR QL STRIP: NEGATIVE
BUN SERPL-MCNC: 14 MG/DL (ref 5–25)
BUN SERPL-MCNC: 14 MG/DL (ref 5–25)
CALCIUM SERPL-MCNC: 9.1 MG/DL (ref 8.3–10.1)
CALCIUM SERPL-MCNC: 9.2 MG/DL (ref 8.3–10.1)
CHLORIDE SERPL-SCNC: 102 MMOL/L (ref 100–108)
CHLORIDE SERPL-SCNC: 103 MMOL/L (ref 100–108)
CLARITY UR: CLEAR
CO2 SERPL-SCNC: 30 MMOL/L (ref 21–32)
CO2 SERPL-SCNC: 33 MMOL/L (ref 21–32)
COLOR UR: YELLOW
CREAT SERPL-MCNC: 0.97 MG/DL (ref 0.6–1.3)
CREAT SERPL-MCNC: 0.98 MG/DL (ref 0.6–1.3)
EOSINOPHIL # BLD AUTO: 0.1 THOUSAND/ΜL (ref 0–0.61)
EOSINOPHIL NFR BLD AUTO: 2 % (ref 0–6)
ERYTHROCYTE [DISTWIDTH] IN BLOOD BY AUTOMATED COUNT: 16.5 % (ref 11.6–15.1)
GFR SERPL CREATININE-BSD FRML MDRD: 56 ML/MIN/1.73SQ M
GFR SERPL CREATININE-BSD FRML MDRD: 57 ML/MIN/1.73SQ M
GLUCOSE SERPL-MCNC: 100 MG/DL (ref 65–140)
GLUCOSE SERPL-MCNC: 105 MG/DL (ref 65–140)
GLUCOSE UR STRIP-MCNC: NEGATIVE MG/DL
HCT VFR BLD AUTO: 42 % (ref 34.8–46.1)
HGB BLD-MCNC: 12.4 G/DL (ref 11.5–15.4)
HGB UR QL STRIP.AUTO: NEGATIVE
IMM GRANULOCYTES # BLD AUTO: 0.08 THOUSAND/UL (ref 0–0.2)
IMM GRANULOCYTES NFR BLD AUTO: 1 % (ref 0–2)
KETONES UR STRIP-MCNC: ABNORMAL MG/DL
LEUKOCYTE ESTERASE UR QL STRIP: NEGATIVE
LYMPHOCYTES # BLD AUTO: 1.04 THOUSANDS/ΜL (ref 0.6–4.47)
LYMPHOCYTES NFR BLD AUTO: 18 % (ref 14–44)
MCH RBC QN AUTO: 25.3 PG (ref 26.8–34.3)
MCHC RBC AUTO-ENTMCNC: 29.5 G/DL (ref 31.4–37.4)
MCV RBC AUTO: 86 FL (ref 82–98)
MONOCYTES # BLD AUTO: 0.47 THOUSAND/ΜL (ref 0.17–1.22)
MONOCYTES NFR BLD AUTO: 8 % (ref 4–12)
NEUTROPHILS # BLD AUTO: 3.93 THOUSANDS/ΜL (ref 1.85–7.62)
NEUTS SEG NFR BLD AUTO: 71 % (ref 43–75)
NITRITE UR QL STRIP: NEGATIVE
NRBC BLD AUTO-RTO: 0 /100 WBCS
PH UR STRIP.AUTO: 6.5 [PH] (ref 4.5–8)
PLATELET # BLD AUTO: 190 THOUSANDS/UL (ref 149–390)
PMV BLD AUTO: 9.9 FL (ref 8.9–12.7)
POTASSIUM SERPL-SCNC: 3.1 MMOL/L (ref 3.5–5.3)
POTASSIUM SERPL-SCNC: 3.2 MMOL/L (ref 3.5–5.3)
PROT SERPL-MCNC: 7.9 G/DL (ref 6.4–8.2)
PROT UR STRIP-MCNC: NEGATIVE MG/DL
RBC # BLD AUTO: 4.9 MILLION/UL (ref 3.81–5.12)
SODIUM SERPL-SCNC: 141 MMOL/L (ref 136–145)
SODIUM SERPL-SCNC: 142 MMOL/L (ref 136–145)
SP GR UR STRIP.AUTO: 1.01 (ref 1–1.03)
UROBILINOGEN UR QL STRIP.AUTO: 1 E.U./DL
WBC # BLD AUTO: 5.64 THOUSAND/UL (ref 4.31–10.16)

## 2018-09-16 PROCEDURE — 80048 BASIC METABOLIC PNL TOTAL CA: CPT | Performed by: PSYCHIATRY & NEUROLOGY

## 2018-09-16 PROCEDURE — 93005 ELECTROCARDIOGRAM TRACING: CPT

## 2018-09-16 PROCEDURE — 99232 SBSQ HOSP IP/OBS MODERATE 35: CPT | Performed by: PSYCHIATRY & NEUROLOGY

## 2018-09-16 PROCEDURE — 81003 URINALYSIS AUTO W/O SCOPE: CPT | Performed by: PSYCHIATRY & NEUROLOGY

## 2018-09-16 PROCEDURE — 82140 ASSAY OF AMMONIA: CPT | Performed by: PSYCHIATRY & NEUROLOGY

## 2018-09-16 PROCEDURE — 80053 COMPREHEN METABOLIC PANEL: CPT | Performed by: PSYCHIATRY & NEUROLOGY

## 2018-09-16 PROCEDURE — 85025 COMPLETE CBC W/AUTO DIFF WBC: CPT | Performed by: PSYCHIATRY & NEUROLOGY

## 2018-09-16 RX ORDER — DIVALPROEX SODIUM 500 MG/1
500 TABLET, DELAYED RELEASE ORAL EVERY 12 HOURS SCHEDULED
Status: DISCONTINUED | OUTPATIENT
Start: 2018-09-16 | End: 2018-09-18

## 2018-09-16 RX ORDER — POTASSIUM CHLORIDE 20 MEQ/1
40 TABLET, EXTENDED RELEASE ORAL ONCE
Status: COMPLETED | OUTPATIENT
Start: 2018-09-16 | End: 2018-09-16

## 2018-09-16 RX ADMIN — QUETIAPINE FUMARATE 200 MG: 100 TABLET ORAL at 08:19

## 2018-09-16 RX ADMIN — LOSARTAN POTASSIUM 25 MG: 25 TABLET, FILM COATED ORAL at 08:19

## 2018-09-16 RX ADMIN — POTASSIUM CHLORIDE 40 MEQ: 1500 TABLET, EXTENDED RELEASE ORAL at 13:22

## 2018-09-16 RX ADMIN — LIDOCAINE 1 PATCH: 50 PATCH CUTANEOUS at 08:20

## 2018-09-16 RX ADMIN — PANTOPRAZOLE SODIUM 40 MG: 40 TABLET, DELAYED RELEASE ORAL at 06:08

## 2018-09-16 RX ADMIN — METHOCARBAMOL 500 MG: 500 TABLET ORAL at 13:23

## 2018-09-16 RX ADMIN — DIVALPROEX SODIUM 500 MG: 500 TABLET, DELAYED RELEASE ORAL at 22:00

## 2018-09-16 RX ADMIN — METHOCARBAMOL 500 MG: 500 TABLET ORAL at 06:07

## 2018-09-16 RX ADMIN — DIVALPROEX SODIUM 1000 MG: 500 TABLET, DELAYED RELEASE ORAL at 08:19

## 2018-09-16 RX ADMIN — GABAPENTIN 200 MG: 100 CAPSULE ORAL at 08:19

## 2018-09-16 RX ADMIN — LORAZEPAM 2 MG: 1 TABLET ORAL at 09:31

## 2018-09-16 RX ADMIN — ACETAMINOPHEN 650 MG: 325 TABLET ORAL at 06:07

## 2018-09-16 RX ADMIN — DIVALPROEX SODIUM 250 MG: 250 TABLET, EXTENDED RELEASE ORAL at 13:23

## 2018-09-16 RX ADMIN — DOCUSATE SODIUM 100 MG: 100 CAPSULE, LIQUID FILLED ORAL at 17:39

## 2018-09-16 RX ADMIN — RIVAROXABAN 20 MG: 20 TABLET, FILM COATED ORAL at 17:38

## 2018-09-16 RX ADMIN — GABAPENTIN 200 MG: 100 CAPSULE ORAL at 17:38

## 2018-09-16 RX ADMIN — ACETAMINOPHEN 650 MG: 325 TABLET ORAL at 13:23

## 2018-09-16 RX ADMIN — NYSTATIN: 100000 POWDER TOPICAL at 17:58

## 2018-09-16 RX ADMIN — METHOCARBAMOL 500 MG: 500 TABLET ORAL at 17:38

## 2018-09-16 RX ADMIN — VITAMIN D, TAB 1000IU (100/BT) 1000 UNITS: 25 TAB at 08:19

## 2018-09-16 RX ADMIN — METHOCARBAMOL 500 MG: 500 TABLET ORAL at 00:25

## 2018-09-16 RX ADMIN — GABAPENTIN 200 MG: 100 CAPSULE ORAL at 22:00

## 2018-09-16 RX ADMIN — POTASSIUM CHLORIDE 40 MEQ: 1500 TABLET, EXTENDED RELEASE ORAL at 18:38

## 2018-09-16 RX ADMIN — DOCUSATE SODIUM 100 MG: 100 CAPSULE, LIQUID FILLED ORAL at 09:31

## 2018-09-16 RX ADMIN — AMLODIPINE BESYLATE 10 MG: 10 TABLET ORAL at 08:19

## 2018-09-16 RX ADMIN — NYSTATIN: 100000 POWDER TOPICAL at 08:20

## 2018-09-16 RX ADMIN — FUROSEMIDE 40 MG: 40 TABLET ORAL at 08:19

## 2018-09-16 RX ADMIN — METHOCARBAMOL 500 MG: 500 TABLET ORAL at 23:05

## 2018-09-16 RX ADMIN — ACETAMINOPHEN 650 MG: 325 TABLET ORAL at 21:58

## 2018-09-16 RX ADMIN — LATANOPROST 1 DROP: 50 SOLUTION OPHTHALMIC at 21:58

## 2018-09-16 NOTE — PROGRESS NOTES
Patient shouting at times  Patient exhibits lability, at times very fond of staff caring for you and can shift to yelling at them to leave  Patient is confused only oriented to self  Patient's expression seems aphasic often using similar words or neologisms in place of intended words for instance referring to a cup of water as a "pill pail" while taking medications  Patient is compliant with medication  She demonstrates evidence of internal stimuli, pointing at  unseen objects and directing this writer to move them

## 2018-09-16 NOTE — PROGRESS NOTES
Progress Note - [address] 70 y o  female MRN: [id number]  Unit/Bed#: EV2 561-01 Encounter: [id number]    The patient was seen for continuing care and reviewed with treatment team   The patient appears to be delirious, has visual hallucinations and talking to nonexisting people and believes that her daughter is outside the room  She was screaming earlier this morning but now she is just talking to herself and is incoherent  She slept through the night and has had variable oral intake  Historically, the patient has become delirious with UTIs  We need to make sure that this is not another case although urinalysis several days ago was negative  Mental Status Evaluation:  Appearance:  Poor eye contact   Behavior:  psychomotor agitation   Mood:  irritable   Thought Content:  Paranoid and mistrustful   Perceptual Disturbances: Visual hallucinations and Appears to be responding to internal stimuli   Risk Potential: Potentially aggresive and violent   Orientation:   Oriented to self only-distractible and inattentive         Assessment/Plan    Principal Problem:    Bipolar affective disorder, currently manic, moderate (HCC)  Active Problems:    Atrial fibrillation (HCC)    Dementia with behavioral disturbance    Continuous opioid dependence (HCC)    Abnormal EKG      Recommended Treatment: We will check basic blood work and CBC and urinalysis  I will also check ammonia level and valproic acid level trying to find the etiology of her delirium  I will also stop quetiapine at this time and lower the dose of valproic acid  Continue with pharmacotherapy, group therapy, milieu therapy and occupational therapy    The patient will be maintained on the following medications:    Current Facility-Administered Medications:  acetaminophen 325 mg Oral Q8H PRN LUCY Dowell   acetaminophen 650 mg Oral Q8H [address] LUCY Sanchez   aluminum-magnesium hydroxide-simethicone 15 mL Oral Q4H PRN Ariella Tay Randa Hollingsworth MD   amLODIPine 10 mg Oral Daily Yamel M Homa, CRNP   benztropine 1 mg Intramuscular Q8H PRN Angelina Mir MD   benztropine 1 mg Oral Q8H PRN Angelina Mir MD   cholecalciferol 1,000 Units Oral Daily Yamel M Homa, CRNP   divalproex sodium 250 mg Oral After Yayo All, MD   divalproex sodium 500 mg Oral Q12H [facility], MD   docusate sodium 100 mg Oral BID Yamel M Homa, CRNP   fentaNYL 50 mcg Transdermal Q72H Yamel M Homa, CRNP   furosemide 40 mg Oral Daily Yamel M Homa, CRNP   gabapentin 200 mg Oral TID Ashutosh Archer MD   haloperidol 5 mg Oral Q8H PRN Carmella Kline PA-C   haloperidol lactate 5 mg Intramuscular Q8H PRN Carmella Kline PA-C   hydrOXYzine HCL 25 mg Oral Q6H PRN Angelina iMr MD   latanoprost 1 drop Both Eyes HS Yamel M Homa, CRNP   lidocaine 1 patch Transdermal Daily Yamel M Homa, CRNP   LORazepam 1 mg Intramuscular Q6H PRN Rajiv Lagunas, CRNP   LORazepam 2 mg Oral Q8H PRN Ashutosh Archer MD   LORazepam 2 mg Oral Daily Ashutosh Archer MD   losartan 25 mg Oral Daily Yamel M Homa, CRNP   magnesium hydroxide 30 mL Oral Daily PRN Angelina Mir MD   methocarbamol 500 mg Oral Q6H [address] Yamel M Homa, CRNP   nystatin  Topical BID Carmella Kline PA-C   OLANZapine 10 mg Intramuscular Q8H PRN Ashutosh Archer MD   oxyCODONE 5 mg Oral Q4H PRN Rajiv Lagunas, CRNP   pantoprazole 40 mg Oral Early Morning Yamel M Homa, CRNP   phenol 1 spray Mouth/Throat Q2H PRN Yamel M Homa, CRNP   rivaroxaban 20 mg Oral Daily With [employer], CRNP   sterile water

## 2018-09-16 NOTE — PROGRESS NOTES
Patient has a one inch red blanchable area on right buttock  Preventive pressure ulcer dressing applied

## 2018-09-16 NOTE — PROGRESS NOTES
Pt continued screaming and calling out all evening  C/O feeling hungry at 2100 and staff helped her to eat her dinner that had been saved  She ate 100%  Pt continued yelling until 2230  Multiple attempts were made to reposition her with pillows and wedge  She resisted these attempts  Pt ripped a pillow apart and disrobed multiple times  Pt has small open areas on sacrum and Rt  Buttock  Barrier cream applied to entire area  Cleaned for urinary incontinence  Pt turned and repositioned on Rt  and Lt  Side lying positions  Encouraged pt to not lie flat on her back to prevent skin breakdown  Pt actively hallucinating at times speaking to her daughter who was not present  Denied all s/s

## 2018-09-16 NOTE — PROGRESS NOTES
Dr Terressa Goldberg notified of low potassium  Nursing instructed to give dose of potassium and recheck lab values in 4 hours

## 2018-09-16 NOTE — PLAN OF CARE
Alteration in Orientation     Treatment Goal: Demonstrate a reduction of confusion and improved orientation to person, place, time and/or situation upon discharge, according to optimum baseline/ability Not Progressing     Express concerns related to confused thinking related to: Not Progressing        Alteration in Thoughts and Perception     Treatment Goal: Gain control of psychotic behaviors/thinking, reduce/eliminate presenting symptoms and demonstrate improved reality functioning upon discharge Not Progressing     Refrain from acting on delusional thinking/internal stimuli Not Progressing        Patient is confused, responds to internal stimuli     Alteration in Thoughts and Perception     Agree to be compliant with medication regime, as prescribed and report medication side effects Progressing        Anxiety     Anxiety is at manageable level Progressing        Prexisting or High Potential for Compromised Skin Integrity     Skin integrity is maintained or improved Progressing        Risk for Violence/Aggression Toward Others     Control angry outbursts Progressing

## 2018-09-16 NOTE — PROGRESS NOTES
Was contacted by the patient's nurse that the patient's potassium was low  On this AM's (11:55) BMP, Potassium was 3 1  The patient was given 40mEq PO K-dur  Repeat BMP (17:10) showed Potassium still low at 3 2  Will give another 1x dose of 40mEq PO K-dur  Repeat BMP in AM  Call with questions

## 2018-09-17 LAB
ANION GAP SERPL CALCULATED.3IONS-SCNC: 8 MMOL/L (ref 4–13)
ATRIAL RATE: 202 BPM
BUN SERPL-MCNC: 13 MG/DL (ref 5–25)
CALCIUM SERPL-MCNC: 9.4 MG/DL (ref 8.3–10.1)
CHLORIDE SERPL-SCNC: 105 MMOL/L (ref 100–108)
CO2 SERPL-SCNC: 27 MMOL/L (ref 21–32)
CREAT SERPL-MCNC: 0.95 MG/DL (ref 0.6–1.3)
GFR SERPL CREATININE-BSD FRML MDRD: 58 ML/MIN/1.73SQ M
GLUCOSE P FAST SERPL-MCNC: 135 MG/DL (ref 65–99)
GLUCOSE SERPL-MCNC: 135 MG/DL (ref 65–140)
POTASSIUM SERPL-SCNC: 3.5 MMOL/L (ref 3.5–5.3)
QRS AXIS: 13 DEGREES
QRSD INTERVAL: 98 MS
QT INTERVAL: 404 MS
QTC INTERVAL: 488 MS
SODIUM SERPL-SCNC: 140 MMOL/L (ref 136–145)
T WAVE AXIS: 11 DEGREES
VALPROATE SERPL-MCNC: 73 UG/ML (ref 50–100)
VENTRICULAR RATE: 88 BPM

## 2018-09-17 PROCEDURE — 99232 SBSQ HOSP IP/OBS MODERATE 35: CPT | Performed by: PSYCHIATRY & NEUROLOGY

## 2018-09-17 PROCEDURE — 80048 BASIC METABOLIC PNL TOTAL CA: CPT | Performed by: PHYSICIAN ASSISTANT

## 2018-09-17 PROCEDURE — 93010 ELECTROCARDIOGRAM REPORT: CPT | Performed by: INTERNAL MEDICINE

## 2018-09-17 PROCEDURE — 80164 ASSAY DIPROPYLACETIC ACD TOT: CPT | Performed by: PSYCHIATRY & NEUROLOGY

## 2018-09-17 RX ORDER — LORAZEPAM 1 MG/1
2 TABLET ORAL EVERY 6 HOURS PRN
Status: DISCONTINUED | OUTPATIENT
Start: 2018-09-17 | End: 2018-09-28 | Stop reason: HOSPADM

## 2018-09-17 RX ORDER — HALOPERIDOL 5 MG
10 TABLET ORAL EVERY 8 HOURS PRN
Status: DISCONTINUED | OUTPATIENT
Start: 2018-09-17 | End: 2018-09-28 | Stop reason: HOSPADM

## 2018-09-17 RX ORDER — LORAZEPAM 1 MG/1
2 TABLET ORAL ONCE
Status: COMPLETED | OUTPATIENT
Start: 2018-09-17 | End: 2018-09-17

## 2018-09-17 RX ORDER — HALOPERIDOL 5 MG/ML
5 INJECTION INTRAMUSCULAR EVERY 6 HOURS PRN
Status: DISCONTINUED | OUTPATIENT
Start: 2018-09-17 | End: 2018-09-28 | Stop reason: HOSPADM

## 2018-09-17 RX ORDER — HALOPERIDOL 5 MG
10 TABLET ORAL ONCE
Status: COMPLETED | OUTPATIENT
Start: 2018-09-17 | End: 2018-09-17

## 2018-09-17 RX ADMIN — DOCUSATE SODIUM 100 MG: 100 CAPSULE, LIQUID FILLED ORAL at 17:21

## 2018-09-17 RX ADMIN — OLANZAPINE 10 MG: 10 INJECTION, POWDER, FOR SOLUTION INTRAMUSCULAR at 09:12

## 2018-09-17 RX ADMIN — METHOCARBAMOL 500 MG: 500 TABLET ORAL at 17:21

## 2018-09-17 RX ADMIN — DIVALPROEX SODIUM 500 MG: 500 TABLET, DELAYED RELEASE ORAL at 09:27

## 2018-09-17 RX ADMIN — GABAPENTIN 200 MG: 100 CAPSULE ORAL at 21:09

## 2018-09-17 RX ADMIN — LORAZEPAM 2 MG: 1 TABLET ORAL at 12:24

## 2018-09-17 RX ADMIN — LIDOCAINE 1 PATCH: 50 PATCH CUTANEOUS at 09:37

## 2018-09-17 RX ADMIN — METHOCARBAMOL 500 MG: 500 TABLET ORAL at 06:17

## 2018-09-17 RX ADMIN — DIVALPROEX SODIUM 500 MG: 500 TABLET, DELAYED RELEASE ORAL at 21:09

## 2018-09-17 RX ADMIN — VITAMIN D, TAB 1000IU (100/BT) 1000 UNITS: 25 TAB at 09:27

## 2018-09-17 RX ADMIN — LATANOPROST 1 DROP: 50 SOLUTION OPHTHALMIC at 21:09

## 2018-09-17 RX ADMIN — AMLODIPINE BESYLATE 10 MG: 10 TABLET ORAL at 09:27

## 2018-09-17 RX ADMIN — GABAPENTIN 200 MG: 100 CAPSULE ORAL at 17:21

## 2018-09-17 RX ADMIN — FENTANYL 50 MCG: 50 PATCH, EXTENDED RELEASE TRANSDERMAL at 09:59

## 2018-09-17 RX ADMIN — RIVAROXABAN 20 MG: 20 TABLET, FILM COATED ORAL at 17:21

## 2018-09-17 RX ADMIN — LOSARTAN POTASSIUM 25 MG: 25 TABLET, FILM COATED ORAL at 09:27

## 2018-09-17 RX ADMIN — OXYCODONE HYDROCHLORIDE 5 MG: 5 TABLET ORAL at 11:45

## 2018-09-17 RX ADMIN — PANTOPRAZOLE SODIUM 40 MG: 40 TABLET, DELAYED RELEASE ORAL at 06:17

## 2018-09-17 RX ADMIN — LORAZEPAM 2 MG: 1 TABLET ORAL at 09:30

## 2018-09-17 RX ADMIN — HALOPERIDOL 10 MG: 5 TABLET ORAL at 12:25

## 2018-09-17 RX ADMIN — NYSTATIN: 100000 POWDER TOPICAL at 17:22

## 2018-09-17 RX ADMIN — FUROSEMIDE 40 MG: 40 TABLET ORAL at 09:27

## 2018-09-17 RX ADMIN — NYSTATIN: 100000 POWDER TOPICAL at 09:37

## 2018-09-17 RX ADMIN — DOCUSATE SODIUM 100 MG: 100 CAPSULE, LIQUID FILLED ORAL at 09:27

## 2018-09-17 RX ADMIN — ACETAMINOPHEN 650 MG: 325 TABLET ORAL at 06:17

## 2018-09-17 RX ADMIN — GABAPENTIN 200 MG: 100 CAPSULE ORAL at 09:27

## 2018-09-17 RX ADMIN — ACETAMINOPHEN 650 MG: 325 TABLET ORAL at 21:09

## 2018-09-17 NOTE — PHYSICAL THERAPY NOTE
Physical Therapy Cancellation Note- pt agitated, yelling, security team here to assist  Not appropriate for PT right now  Will follow    Rochelle Zepeda PT
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
## 2018-09-17 NOTE — PROGRESS NOTES
Pt up screaming in her room until 0130  Continues to pull pillows and unable to keep her in Side lying position

## 2018-09-17 NOTE — PROGRESS NOTES
PRN IM Zyprexa administered for increased agitation and aggression towards staff  Will continue to monitor

## 2018-09-17 NOTE — PROGRESS NOTES
Pt screamed loudly all evening to present  Multiple attempts to keep her in either side lying positions were futile  Pt pulls pillows out from behind her and would not remain positioned off of her back  Reports that she does not like the it feels to be positioned on her side  Pt cleaned for urinary incontinence  Wound dsg intact on buttocks Remedy barrier cream applied to open area on sacrum and to bilateral heels which are becoming reddened  Given several small cups of water to drink  Medication compliant  Repeatedly sets off her bed alarm  Medication compliant  Pt talking to daughter who is not present

## 2018-09-17 NOTE — CASE MANAGEMENT
Left message at The [address] [place] [address] Drive for Yonas asking for a call back to clarify their holding her bed, she called back but left her own scant VM   Now awaiting her 2nd call back   Pt's 304 with [facility] is set for 9/20

## 2018-09-17 NOTE — CASE MANAGEMENT
[facility] [facility] [facility] was in to get an update on this pt and she reports they will accept this pt back, outside of the 15 day bed hold, as long as they have female beds  They do expect she will be much more calm, no screaming, less restless

## 2018-09-17 NOTE — PLAN OF CARE
Problem: Ineffective Coping  Goal: Participates in unit activities  Interventions:  - Provide therapeutic environment   - Provide required programming   - Redirect inappropriate behaviors    Outcome: Not Progressing  Pt  increasingly verbal,yelling uncontrollably in her room  Not appropriate for activity groups at this time

## 2018-09-17 NOTE — PROGRESS NOTES
Pt continues to yell out and scream  Pt does not take take redirection  Pt was aggressive and agitated with staff  PRN medications were not effective  Verbal redirection not effective  Will continue to monitor

## 2018-09-17 NOTE — PLAN OF CARE
SAFETY, RESTRAINT - VIOLENT/SELF-DESTRUCTIVE     Remains free of harm/injury from restraints (Restraint for Violent/Self-Destructive Behavior) Not Progressing     Returns to optimal restraint-free functioning Not Progressing

## 2018-09-17 NOTE — PROGRESS NOTES
Pt continuously yelling and screaming  Not able to redirect  Pt becoming aggressive and agitated with staff  Per MD, PRN haldol and PRN ativan given  Pt currently in seclusion per MD order  Will continue to monitor

## 2018-09-18 PROCEDURE — 99223 1ST HOSP IP/OBS HIGH 75: CPT | Performed by: PSYCHIATRY & NEUROLOGY

## 2018-09-18 PROCEDURE — 99232 SBSQ HOSP IP/OBS MODERATE 35: CPT | Performed by: PSYCHIATRY & NEUROLOGY

## 2018-09-18 RX ORDER — FENTANYL 12 UG/H
12 PATCH TRANSDERMAL
Status: DISCONTINUED | OUTPATIENT
Start: 2018-09-20 | End: 2018-09-24

## 2018-09-18 RX ORDER — GABAPENTIN 400 MG/1
400 CAPSULE ORAL 3 TIMES DAILY
Status: DISCONTINUED | OUTPATIENT
Start: 2018-09-18 | End: 2018-09-28 | Stop reason: HOSPADM

## 2018-09-18 RX ORDER — DIVALPROEX SODIUM 500 MG/1
500 TABLET, DELAYED RELEASE ORAL
Status: DISCONTINUED | OUTPATIENT
Start: 2018-09-18 | End: 2018-09-19

## 2018-09-18 RX ORDER — LORAZEPAM 1 MG/1
2 TABLET ORAL
Status: DISCONTINUED | OUTPATIENT
Start: 2018-09-18 | End: 2018-09-25

## 2018-09-18 RX ORDER — RISPERIDONE 1 MG/1
2 TABLET, ORALLY DISINTEGRATING ORAL 2 TIMES DAILY
Status: DISCONTINUED | OUTPATIENT
Start: 2018-09-18 | End: 2018-09-28 | Stop reason: HOSPADM

## 2018-09-18 RX ORDER — FENTANYL 25 UG/H
25 PATCH TRANSDERMAL
Status: DISCONTINUED | OUTPATIENT
Start: 2018-09-20 | End: 2018-09-28 | Stop reason: HOSPADM

## 2018-09-18 RX ADMIN — NYSTATIN 1 APPLICATION: 100000 POWDER TOPICAL at 18:00

## 2018-09-18 RX ADMIN — METHOCARBAMOL 500 MG: 500 TABLET ORAL at 00:00

## 2018-09-18 RX ADMIN — GABAPENTIN 400 MG: 100 CAPSULE ORAL at 17:58

## 2018-09-18 RX ADMIN — LORAZEPAM 2 MG: 1 TABLET ORAL at 17:58

## 2018-09-18 RX ADMIN — RIVAROXABAN 20 MG: 20 TABLET, FILM COATED ORAL at 17:58

## 2018-09-18 RX ADMIN — METHOCARBAMOL 500 MG: 500 TABLET ORAL at 13:28

## 2018-09-18 RX ADMIN — ACETAMINOPHEN 650 MG: 325 TABLET ORAL at 13:28

## 2018-09-18 RX ADMIN — ACETAMINOPHEN 650 MG: 325 TABLET ORAL at 21:09

## 2018-09-18 RX ADMIN — METHOCARBAMOL 500 MG: 500 TABLET ORAL at 17:58

## 2018-09-18 RX ADMIN — LORAZEPAM 2 MG: 1 TABLET ORAL at 02:22

## 2018-09-18 RX ADMIN — LORAZEPAM 2 MG: 1 TABLET ORAL at 13:28

## 2018-09-18 RX ADMIN — RISPERIDONE 2 MG: 1 TABLET, ORALLY DISINTEGRATING ORAL at 17:58

## 2018-09-18 RX ADMIN — GABAPENTIN 400 MG: 100 CAPSULE ORAL at 21:10

## 2018-09-18 RX ADMIN — DIVALPROEX SODIUM 500 MG: 500 TABLET, DELAYED RELEASE ORAL at 21:10

## 2018-09-18 RX ADMIN — LATANOPROST 1 DROP: 50 SOLUTION OPHTHALMIC at 21:08

## 2018-09-18 RX ADMIN — TRAZODONE HYDROCHLORIDE 150 MG: 100 TABLET ORAL at 21:09

## 2018-09-18 RX ADMIN — LIDOCAINE 1 PATCH: 50 PATCH CUTANEOUS at 10:18
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
## 2018-09-18 NOTE — PROGRESS NOTES
Pt has been restless and yelling out  She has had brief periods of sleep but suddenly awakens and begins calling out  PRN Ativan given @ 7138  Will monitor

## 2018-09-18 NOTE — PROGRESS NOTES
Pt is aggressive with staff during ADL care this morning  Continually disrobes despite many attempts by staff to keep patient covered  Pt refused medication this morning  Continues with loud outbursts, and disorganized speech  Will continue to monitor

## 2018-09-18 NOTE — PLAN OF CARE
Problem: Ineffective Coping  Goal: Participates in unit activities  Interventions:  - Provide therapeutic environment   - Provide required programming   - Redirect inappropriate behaviors    Outcome: Progressing  Pt attended one afternoon group and engaged appropriately in a "Life Stories" task with peers  She was well focused and answered memory questions about her family and her leisure preferences in an appropriate manner  Will complete self assessment interview tomorrow

## 2018-09-18 NOTE — PLAN OF CARE
Problem: Ineffective Coping  Goal: Participates in unit activities  Interventions:  - Provide therapeutic environment   - Provide required programming   - Redirect inappropriate behaviors    Outcome: Not Progressing  Uncontrollable yelling from her bed throughout the day

## 2018-09-18 NOTE — PROGRESS NOTES
Pt has been cooperative with care this evening  She continues to have periods of yelling/screaming but has not had physical aggression towards staff  She was medication compliant  She had 1 episode of urinary incontinence and 1 episode of fecal incontinence  Pt cooperative throughout juan jose care and with helping her to cleanup  She is currently in bed but has been having periods of sleeping then abrupt awakening with subsequent yelling  Will monitor

## 2018-09-19 ENCOUNTER — ANESTHESIA (INPATIENT)
Dept: RADIOLOGY | Facility: HOSPITAL | Age: 78
DRG: 885 | End: 2018-09-19
Payer: COMMERCIAL

## 2018-09-19 ENCOUNTER — APPOINTMENT (INPATIENT)
Dept: RADIOLOGY | Facility: HOSPITAL | Age: 78
DRG: 885 | End: 2018-09-19
Payer: COMMERCIAL

## 2018-09-19 ENCOUNTER — ANESTHESIA EVENT (INPATIENT)
Dept: RADIOLOGY | Facility: HOSPITAL | Age: 78
DRG: 885 | End: 2018-09-19
Payer: COMMERCIAL

## 2018-09-19 PROCEDURE — A9585 GADOBUTROL INJECTION: HCPCS | Performed by: PSYCHIATRY & NEUROLOGY

## 2018-09-19 PROCEDURE — 76376 3D RENDER W/INTRP POSTPROCES: CPT

## 2018-09-19 PROCEDURE — 70553 MRI BRAIN STEM W/O & W/DYE: CPT

## 2018-09-19 PROCEDURE — 99232 SBSQ HOSP IP/OBS MODERATE 35: CPT | Performed by: PSYCHIATRY & NEUROLOGY

## 2018-09-19 RX ORDER — DIPHENHYDRAMINE HYDROCHLORIDE 50 MG/ML
12.5 INJECTION INTRAMUSCULAR; INTRAVENOUS ONCE AS NEEDED
Status: CANCELLED | OUTPATIENT
Start: 2018-09-19

## 2018-09-19 RX ORDER — LIDOCAINE HYDROCHLORIDE 10 MG/ML
INJECTION, SOLUTION INFILTRATION; PERINEURAL AS NEEDED
Status: DISCONTINUED | OUTPATIENT
Start: 2018-09-19 | End: 2018-09-19 | Stop reason: SURG

## 2018-09-19 RX ORDER — SODIUM CHLORIDE, SODIUM LACTATE, POTASSIUM CHLORIDE, CALCIUM CHLORIDE 600; 310; 30; 20 MG/100ML; MG/100ML; MG/100ML; MG/100ML
INJECTION, SOLUTION INTRAVENOUS CONTINUOUS PRN
Status: DISCONTINUED | OUTPATIENT
Start: 2018-09-19 | End: 2018-09-19 | Stop reason: SURG

## 2018-09-19 RX ORDER — PROPOFOL 10 MG/ML
INJECTION, EMULSION INTRAVENOUS AS NEEDED
Status: DISCONTINUED | OUTPATIENT
Start: 2018-09-19 | End: 2018-09-19 | Stop reason: SURG

## 2018-09-19 RX ORDER — DIVALPROEX SODIUM 250 MG/1
250 TABLET, DELAYED RELEASE ORAL
Status: DISCONTINUED | OUTPATIENT
Start: 2018-09-19 | End: 2018-09-20

## 2018-09-19 RX ORDER — METOCLOPRAMIDE HYDROCHLORIDE 5 MG/ML
INJECTION INTRAMUSCULAR; INTRAVENOUS AS NEEDED
Status: DISCONTINUED | OUTPATIENT
Start: 2018-09-19 | End: 2018-09-19 | Stop reason: SURG

## 2018-09-19 RX ORDER — METOCLOPRAMIDE HYDROCHLORIDE 5 MG/ML
10 INJECTION INTRAMUSCULAR; INTRAVENOUS ONCE AS NEEDED
Status: CANCELLED | OUTPATIENT
Start: 2018-09-19

## 2018-09-19 RX ORDER — TRAZODONE HYDROCHLORIDE 100 MG/1
200 TABLET ORAL
Status: DISCONTINUED | OUTPATIENT
Start: 2018-09-19 | End: 2018-09-28 | Stop reason: HOSPADM

## 2018-09-19 RX ORDER — SODIUM CHLORIDE, SODIUM LACTATE, POTASSIUM CHLORIDE, CALCIUM CHLORIDE 600; 310; 30; 20 MG/100ML; MG/100ML; MG/100ML; MG/100ML
75 INJECTION, SOLUTION INTRAVENOUS CONTINUOUS
Status: CANCELLED | OUTPATIENT
Start: 2018-09-19

## 2018-09-19 RX ORDER — ONDANSETRON 2 MG/ML
INJECTION INTRAMUSCULAR; INTRAVENOUS AS NEEDED
Status: DISCONTINUED | OUTPATIENT
Start: 2018-09-19 | End: 2018-09-19 | Stop reason: SURG

## 2018-09-19 RX ORDER — ONDANSETRON 2 MG/ML
4 INJECTION INTRAMUSCULAR; INTRAVENOUS ONCE AS NEEDED
Status: CANCELLED | OUTPATIENT
Start: 2018-09-19

## 2018-09-19 RX ADMIN — RIVAROXABAN 20 MG: 20 TABLET, FILM COATED ORAL at 19:30

## 2018-09-19 RX ADMIN — DIVALPROEX SODIUM 250 MG: 250 TABLET, DELAYED RELEASE ORAL at 21:33

## 2018-09-19 RX ADMIN — SODIUM CHLORIDE, SODIUM LACTATE, POTASSIUM CHLORIDE, AND CALCIUM CHLORIDE: .6; .31; .03; .02 INJECTION, SOLUTION INTRAVENOUS at 12:48

## 2018-09-19 RX ADMIN — ONDANSETRON 4 MG: 2 INJECTION INTRAMUSCULAR; INTRAVENOUS at 13:39

## 2018-09-19 RX ADMIN — LATANOPROST 1 DROP: 50 SOLUTION OPHTHALMIC at 21:33

## 2018-09-19 RX ADMIN — GADOBUTROL 11 ML: 604.72 INJECTION INTRAVENOUS at 14:02

## 2018-09-19 RX ADMIN — METHOCARBAMOL 500 MG: 500 TABLET ORAL at 00:44

## 2018-09-19 RX ADMIN — LIDOCAINE HYDROCHLORIDE 5 ML: 10 INJECTION, SOLUTION INFILTRATION; PERINEURAL at 12:48

## 2018-09-19 RX ADMIN — METHOCARBAMOL 500 MG: 500 TABLET ORAL at 19:30

## 2018-09-19 RX ADMIN — METOCLOPRAMIDE 10 MG: 5 INJECTION, SOLUTION INTRAMUSCULAR; INTRAVENOUS at 13:39

## 2018-09-19 RX ADMIN — GABAPENTIN 400 MG: 100 CAPSULE ORAL at 21:33

## 2018-09-19 RX ADMIN — PROPOFOL 100 MG: 10 INJECTION, EMULSION INTRAVENOUS at 12:48

## 2018-09-19 RX ADMIN — NYSTATIN 1 APPLICATION: 100000 POWDER TOPICAL at 19:33

## 2018-09-19 RX ADMIN — DOCUSATE SODIUM 100 MG: 100 CAPSULE, LIQUID FILLED ORAL at 19:33

## 2018-09-19 RX ADMIN — RISPERIDONE 2 MG: 1 TABLET, ORALLY DISINTEGRATING ORAL at 19:30

## 2018-09-19 RX ADMIN — TRAZODONE HYDROCHLORIDE 200 MG: 100 TABLET ORAL at 21:33

## 2018-09-19 RX ADMIN — LORAZEPAM 2 MG: 1 TABLET ORAL at 19:30

## 2018-09-19 RX ADMIN — ACETAMINOPHEN 650 MG: 325 TABLET ORAL at 21:33

## 2018-09-19 NOTE — PROGRESS NOTES
Patient was NPO for MRI  She slept most of the morning  She was awake approx 11 am but was not yelling  She presented with anxiety  Transport picked her up around 1215  She started yelling and was physical aggressive with staff during juan jose care and [facility]  She was redirectable after she was transferred to [facility]

## 2018-09-19 NOTE — CASE MANAGEMENT
Received a call from [address] [place] [address]   Of [facility] that the 304 will be 9/20 at 9:15 AM

## 2018-09-19 NOTE — PROGRESS NOTES
Daughter Arturo Marcus) notified today that Anesthesia is going to be calling for verbal consent  Spoke to Dr Vannesa Nunez (anesthesiologist) and advised him that Bennie Mauro is POA and advised [phone] is her contact number  He advised that he would contact her for verbal consent for anesthesia

## 2018-09-19 NOTE — PROGRESS NOTES
Pt arousable, pt on O2 SM @10L with help keeping airway open sats holding @100  Kept pt for 40 min d/t drowsiness  PCAs transported pt to floor  Yumiko Blanca RN to give report

## 2018-09-19 NOTE — PROGRESS NOTES
Patient yelling throughout the evening but quiets when staff enters room and engages in conversation  Patient having active visual hallucinations saying "help him off those boxes before he fall" also yelling to "get the cat off the table" Patient incontinent of stool and was cooperative with juan jose care and repositioning  Bed alarm and fall mattress on floor for safety

## 2018-09-19 NOTE — PROGRESS NOTES
Spoke to Dr Kasey Ashley in regards to running fluids  Dr Kasey Ashley advised that the fluids can be discontinued

## 2018-09-19 NOTE — PLAN OF CARE
SAFETY, RESTRAINT - VIOLENT/SELF-DESTRUCTIVE     Remains free of harm/injury from restraints (Restraint for Violent/Self-Destructive Behavior) Completed     Returns to optimal restraint-free functioning Completed          Alteration in Orientation     Treatment Goal: Demonstrate a reduction of confusion and improved orientation to person, place, time and/or situation upon discharge, according to optimum baseline/ability Not Progressing     Express concerns related to confused thinking related to: Not Progressing        Alteration in Thoughts and Perception     Treatment Goal: Gain control of psychotic behaviors/thinking, reduce/eliminate presenting symptoms and demonstrate improved reality functioning upon discharge Not Progressing     Refrain from acting on delusional thinking/internal stimuli Not Progressing        Anxiety     Anxiety is at manageable level Not Progressing        Risk for Violence/Aggression Toward Others     Control angry outbursts Not Progressing        Patient presents with anxiety  She was very upset, yelling and thrashing around when staff provided juan jose care and transferred to the stretcher     Prexisting or High Potential for Compromised Skin Integrity     Skin integrity is maintained or improved Progressing

## 2018-09-19 NOTE — ANESTHESIA PREPROCEDURE EVALUATION
Review of Systems/Medical History  Patient summary reviewed  Chart reviewed  No history of anesthetic complications     Cardiovascular  EKG reviewed, Hypertension controlled, Dysrhythmias , atrial fibrillation, PVD,    Pulmonary  COPD moderate- medication dependent ,        GI/Hepatic    GERD ,             Endo/Other  Negative endo/other ROS   Obesity  morbid obesity   GYN  Negative gynecology ROS          Hematology  Anemia ,  Coagulation disorder currently taking oral anticoagulants,    Musculoskeletal  Back pain , Sciatica,        Neurology  Negative neurology ROS      Psychology   Anxiety, Depression , bipolar disorder,   Chronic opioid dependence Chronic pain, Dementia  Comment: Psychosis, agitation, 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         Physical Exam    Airway    Mallampati score: II  TM Distance: >3 FB  Neck ROM: full     Dental   No notable dental hx     Cardiovascular  Rhythm: regular, Rate: normal, Cardiovascular exam normal    Pulmonary  Pulmonary exam normal Breath sounds clear to auscultation,     Other Findings        Anesthesia Plan  ASA Score- 3     Anesthesia Type- general with ASA Monitors  Additional Monitors:   Airway Plan: LMA  Plan Factors-    Induction- intravenous  Postoperative Plan-     Informed Consent- Anesthetic plan and risks discussed with patient, daughter and healthcare power of             Recent labs personally reviewed:  Lab Results   Component Value Date    WBC 5 64 09/16/2018    HGB 12 4 09/16/2018     09/16/2018     Lab Results   Component Value Date     09/17/2018    K 3 5 09/17/2018    BUN 13 09/17/2018    CREATININE 0 95 09/17/2018    GLUCOSE 103 06/10/2017     Lab Results   Component Value Date    PTT 38 (H) 08/27/2018      Lab Results   Component Value Date    INR 1 47 (H) 08/30/2018       Blood type A    Lab Results   Component Value Date    HGBA1C 5 3 04/21/2017       I, Leonides Klinefelter, MD, have personally seen and evaluated the patient prior to anesthetic care  I have reviewed the pre-anesthetic record, and other medical records if appropriate to the anesthetic care  If a CRNA is involved in the case, I have reviewed the CRNA assessment, if present, and agree  Risks/benefits and alternatives discussed with patient including possible PONV, sore throat, and possibility of rare anesthetic and surgical emergencies

## 2018-09-19 NOTE — PLAN OF CARE
Problem: Ineffective Coping  Goal: Participates in unit activities  Interventions:  - Provide therapeutic environment   - Provide required programming   - Redirect inappropriate behaviors    Outcome: Progressing  Pt yelling less today and off unit for testing

## 2018-09-19 NOTE — PROGRESS NOTES
Progress Note - [address] 70 y o  female MRN: [id number]  Unit/Bed#: AX9 561-01 Encounter: [id number]    The patient was seen for continuing care and reviewed with treatment team    No change in clinical status  She had periods of agitation throughout the evening and did not fall asleep until one o'clock  Currently she is resting  Mental Status Evaluation:  Appearance:  Lying in bed covered with sheets with her eyes closed   Behavior:  Uncooperative   Mood:  Uncertain   Thought Content:  Cannot be assessed due to patient factors   Perceptual Disturbances: Appears to be responding to internal stimuli   Risk Potential: Potentially aggresive and violent   Orientation:            Assessment/Plan    Principal Problem:    Bipolar affective disorder, currently manic, moderate ([facility])  Active Problems:    Dementia with behavioral disturbance    Continuous opioid dependence ([facility])    Abnormal EKG    Delirium due to another medical condition, acute, hyperactive      Recommended Treatment:   Scheduled for MRI  Continue with pharmacotherapy, group therapy, milieu therapy and occupational therapy    The patient will be maintained on the following medications:    Current Facility-Administered Medications:  acetaminophen 325 mg Oral Q8H PRN LUCY Hussein   acetaminophen 650 mg Oral Q8H [address] LUCY Sanchez   aluminum-magnesium hydroxide-simethicone 15 mL Oral Q4H PRN Jose Beckford MD   amLODIPine 10 mg Oral Daily LUCY Sanchez   benztropine 1 mg Intramuscular Q8H PRN Jose Beckford MD   benztropine 1 mg Oral Q8H PRN Jose Beckford MD   cholecalciferol 1,000 Units Oral Daily LUCY Sanchez   divalproex sodium 500 mg Oral HS Radha Dixon MD   docusate sodium 100 mg Oral BID LUCY Sanchez   [START ON 9/20/2018] fentaNYL 12 mcg Transdermal Q72H Radha Dixon MD   [START ON 9/20/2018] fentaNYL 25 mcg Transdermal Q72H Radha Dixon MD   furosemide 40 mg Oral Daily Yamel M Homa, CRNP   gabapentin 400 mg Oral TID Ike Soto MD   haloperidol 10 mg Oral Q8H PRN Ike Soto MD   haloperidol lactate 5 mg Intramuscular Q6H PRN Ike Soto MD   hydrOXYzine HCL 25 mg Oral Q6H PRN Ok Brandt MD   latanoprost 1 drop Both Eyes HS Yamel M Homa, CRNP   lidocaine 1 patch Transdermal Daily Yamel M Homa, CRNP   LORazepam 1 mg Intramuscular Q6H PRN Maryanne Briscoe, CRNP   LORazepam 2 mg Oral Q6H PRN Ike Soto MD   LORazepam 2 mg Oral TID after meals Ike Soto MD   losartan 25 mg Oral Daily Yamel M Homa, CRNP   magnesium hydroxide 30 mL Oral Daily PRN Ok Brandt MD   methocarbamol 500 mg Oral Q6H [address] Yamel M Homa, CRNP   nystatin  Topical BID Carmella Kline PA-C   OLANZapine 10 mg Intramuscular Q8H PRN Ike Soto MD   oxyCODONE 5 mg Oral Q4H PRN Maryanne Briscoe, CRNP   pantoprazole 40 mg Oral Early Morning Yamel M Ohma, CRNP   phenol 1 spray Mouth/Throat Q2H PRN Yamel M Homa, CRNP   risperiDONE 2 mg Oral BID Ike Soto MD   rivaroxaban 20 mg Oral Daily With [employer], CRNP   sterile water       sterile water       traZODone 150 mg Oral HS Ike Stoo MD

## 2018-09-20 PROCEDURE — 99233 SBSQ HOSP IP/OBS HIGH 50: CPT | Performed by: PSYCHIATRY & NEUROLOGY

## 2018-09-20 RX ADMIN — LORAZEPAM 2 MG: 1 TABLET ORAL at 11:54

## 2018-09-20 RX ADMIN — GABAPENTIN 400 MG: 100 CAPSULE ORAL at 11:54

## 2018-09-20 RX ADMIN — LOSARTAN POTASSIUM 25 MG: 25 TABLET, FILM COATED ORAL at 11:53

## 2018-09-20 RX ADMIN — TRAZODONE HYDROCHLORIDE 200 MG: 100 TABLET ORAL at 21:19

## 2018-09-20 RX ADMIN — METHOCARBAMOL 500 MG: 500 TABLET ORAL at 18:28

## 2018-09-20 RX ADMIN — LATANOPROST 1 DROP: 50 SOLUTION OPHTHALMIC at 21:28

## 2018-09-20 RX ADMIN — AMLODIPINE BESYLATE 10 MG: 10 TABLET ORAL at 11:54

## 2018-09-20 RX ADMIN — RISPERIDONE 2 MG: 1 TABLET, ORALLY DISINTEGRATING ORAL at 18:28

## 2018-09-20 RX ADMIN — FENTANYL 12 MCG: 12.5 PATCH TRANSDERMAL at 15:25

## 2018-09-20 RX ADMIN — NYSTATIN: 100000 POWDER TOPICAL at 11:57

## 2018-09-20 RX ADMIN — GABAPENTIN 400 MG: 100 CAPSULE ORAL at 18:28

## 2018-09-20 RX ADMIN — RISPERIDONE 2 MG: 1 TABLET, ORALLY DISINTEGRATING ORAL at 11:54

## 2018-09-20 RX ADMIN — LORAZEPAM 2 MG: 1 TABLET ORAL at 18:29

## 2018-09-20 RX ADMIN — GABAPENTIN 400 MG: 100 CAPSULE ORAL at 21:19

## 2018-09-20 RX ADMIN — NYSTATIN 1 APPLICATION: 100000 POWDER TOPICAL at 18:34

## 2018-09-20 RX ADMIN — FENTANYL 25 MCG: 25 PATCH, EXTENDED RELEASE TRANSDERMAL at 15:24

## 2018-09-20 RX ADMIN — METHOCARBAMOL 500 MG: 500 TABLET ORAL at 11:55

## 2018-09-20 RX ADMIN — FUROSEMIDE 40 MG: 40 TABLET ORAL at 11:54

## 2018-09-20 RX ADMIN — RIVAROXABAN 20 MG: 20 TABLET, FILM COATED ORAL at 18:28

## 2018-09-20 RX ADMIN — VITAMIN D, TAB 1000IU (100/BT) 1000 UNITS: 25 TAB at 11:54

## 2018-09-20 RX ADMIN — ACETAMINOPHEN 650 MG: 325 TABLET ORAL at 21:19

## 2018-09-20 NOTE — CASE MANAGEMENT
Received a call from [address] [place] [address]  Of [facility] and this mornings scheduled 304 has been rescheduled to 9/24 due to Mental Health Review Officer illness

## 2018-09-20 NOTE — PROGRESS NOTES
Pt is medication compliant crushed in applesauce  Pt woke up later this morning, pt was laying in bed moaning Carson Contreras', otherwise patient is disorganized and incoherent  No aggression or agitation noted  Pt is currently in bed, continues to tear at her sheets and disrobe  Will continue to monitor

## 2018-09-20 NOTE — PROGRESS NOTES
Pt is sleeping, responds to verbal and tactile stimuli but falls right back to sleep  Pt medications held at this time to see if patient is more alert after dinner  Pt is currently calm and sleeping in her bed  No agitation/ aggression/ outbursts noted this evening  Will continue to monitor

## 2018-09-20 NOTE — PLAN OF CARE
Alteration in Thoughts and Perception     Treatment Goal: Gain control of psychotic behaviors/thinking, reduce/eliminate presenting symptoms and demonstrate improved reality functioning upon discharge Not Progressing          Alteration in Orientation     Treatment Goal: Demonstrate a reduction of confusion and improved orientation to person, place, time and/or situation upon discharge, according to optimum baseline/ability Progressing     Express concerns related to confused thinking related to: Progressing        Alteration in Thoughts and Perception     Refrain from acting on delusional thinking/internal stimuli Progressing     Agree to be compliant with medication regime, as prescribed and report medication side effects Progressing        Anxiety     Anxiety is at manageable level Progressing        Prexisting or High Potential for Compromised Skin Integrity     Skin integrity is maintained or improved Progressing        Risk for Violence/Aggression Toward Others     Control angry outbursts Progressing

## 2018-09-20 NOTE — PROGRESS NOTES
Progress Note - [address] 70 y o  female MRN: [id number]  Unit/Bed#: ZU3 561-01 Encounter: [id number]    The patient was seen for continuing care and reviewed with treatment team Remains delirious  She is now incoherent and aphasic  After MRI was completed, she remain subdued and sedated but arousable during the evening hours  Earlier this morning she was sedated but she is now alert and awake and continues to be incoherent  I discussed the case with neuro radiology and Neurology  The findings are consistent with frontotemporal dementia  There is no evidence of a CNS infection or any other systemic infections or electrolyte abnormality to explain this delirium  Mental Status Evaluation:  Appearance:  Marginal/poor hygiene and Poor eye contact   Behavior:  Restless and fidgety, unable to stay focused, frequently disrobing   Mood:  Uncertain   Thought Content:  Cannot be assessed due to patient factors   Perceptual Disturbances: Appears to be responding to internal stimuli   Risk Potential: Potentially aggresive and violent   Orientation:            Assessment/Plan    Principal Problem:    Bipolar affective disorder, currently manic, moderate ([facility])  Active Problems:    Dementia with behavioral disturbance    Continuous opioid dependence ([facility])    Abnormal EKG    Delirium due to another medical condition, acute, hyperactive      Recommended Treatment: We will discuss prognosis and options with the family  Continue with pharmacotherapy, group therapy, milieu therapy and occupational therapy    The patient will be maintained on the following medications:    Current Facility-Administered Medications:  acetaminophen 325 mg Oral Q8H PRN LUCY Olivia   acetaminophen 650 mg Oral Q8H [address] LUCY Sanchez   aluminum-magnesium hydroxide-simethicone 15 mL Oral Q4H PRN Deyanira Llanos MD   amLODIPine 10 mg Oral Daily LUCY Sanchez   benztropine 1 mg Intramuscular Q8H PRN Hayley Andre Ximena Mccormick MD   benztropine 1 mg Oral Q8H PRN Mark Decker MD   cholecalciferol 1,000 Units Oral Daily Yamel M Homa, CRNP   divalproex sodium 250 mg Oral HS Benjamin Pierson MD   docusate sodium 100 mg Oral BID Yamel M Homa, CRNP   fentaNYL 12 mcg Transdermal Q72H Benjamin Pierson MD   fentaNYL 25 mcg Transdermal Q72H Benjamin Pierson MD   furosemide 40 mg Oral Daily Yamel M Homa, CRNP   gabapentin 400 mg Oral TID Benjamin Pierson MD   haloperidol 10 mg Oral Q8H PRN Benjamin Pierson MD   haloperidol lactate 5 mg Intramuscular Q6H PRN Benjamin Pierson MD   hydrOXYzine HCL 25 mg Oral Q6H PRN Mark Decker MD   latanoprost 1 drop Both Eyes HS Yamle M Homa, CRNP   lidocaine 1 patch Transdermal Daily Yamel M Homa, CRNP   LORazepam 1 mg Intramuscular Q6H PRN Julieta Sarabia, CRNP   LORazepam 2 mg Oral Q6H PRN Benjamin Pierson MD   LORazepam 2 mg Oral TID after meals Benjamin Pierson MD   losartan 25 mg Oral Daily Yamel M Homa, CRNP   magnesium hydroxide 30 mL Oral Daily PRN Mark Decker MD   methocarbamol 500 mg Oral Q6H [facility] Yamel M Homa, CRNP   nystatin  Topical BID Carmella Kline PA-C   OLANZapine 10 mg Intramuscular Q8H PRN Benjamin Pierson MD   oxyCODONE 5 mg Oral Q4H PRN Julieta Sarabia, CRNP   pantoprazole 40 mg Oral Early Morning Yamel M Homa, CRNP   phenol 1 spray Mouth/Throat Q2H PRN Yamel M Homa, CRNP   risperiDONE 2 mg Oral BID Benjamin Pierson MD   rivaroxaban 20 mg Oral Daily With [employer], CRNP   sterile water       sterile water       traZODone 200 mg Oral HS Benjamin Pierson MD

## 2018-09-20 NOTE — PROGRESS NOTES
Pt calm and subdued this evening  Pt is seen in her room sleeping intermittently  Pt not showing any signs of agitation or aggression  Pt had large bowel movement this evening  Pt is medication compliant

## 2018-09-20 NOTE — PLAN OF CARE
Problem: Ineffective Coping  Goal: Participates in unit activities  Interventions:  - Provide therapeutic environment   - Provide required programming   - Redirect inappropriate behaviors    Outcome: Not Progressing  Pt  Sleeping more this morning

## 2018-09-20 NOTE — CASE MANAGEMENT
I have contacted pt's daughter Bennie Mauro per directive of Dr Sophia Fishman, to request she and her chosen family members come in next week for a family meeting  He wants to rerview recent findings directly with the family as a whole  Bennie Mauro will contact her two brothers and get back to me with a day and time

## 2018-09-21 PROCEDURE — 99231 SBSQ HOSP IP/OBS SF/LOW 25: CPT | Performed by: PSYCHIATRY & NEUROLOGY

## 2018-09-21 PROCEDURE — 99232 SBSQ HOSP IP/OBS MODERATE 35: CPT | Performed by: PSYCHIATRY & NEUROLOGY

## 2018-09-21 RX ADMIN — LORAZEPAM 2 MG: 1 TABLET ORAL at 18:34

## 2018-09-21 RX ADMIN — RIVAROXABAN 20 MG: 20 TABLET, FILM COATED ORAL at 18:34

## 2018-09-21 RX ADMIN — RISPERIDONE 2 MG: 1 TABLET, ORALLY DISINTEGRATING ORAL at 08:49

## 2018-09-21 RX ADMIN — GABAPENTIN 400 MG: 100 CAPSULE ORAL at 18:33

## 2018-09-21 RX ADMIN — LOSARTAN POTASSIUM 25 MG: 25 TABLET, FILM COATED ORAL at 08:50

## 2018-09-21 RX ADMIN — ACETAMINOPHEN 650 MG: 325 TABLET ORAL at 13:55

## 2018-09-21 RX ADMIN — VITAMIN D, TAB 1000IU (100/BT) 1000 UNITS: 25 TAB at 08:49

## 2018-09-21 RX ADMIN — ACETAMINOPHEN 650 MG: 325 TABLET ORAL at 06:36

## 2018-09-21 RX ADMIN — PANTOPRAZOLE SODIUM 40 MG: 40 TABLET, DELAYED RELEASE ORAL at 06:37

## 2018-09-21 RX ADMIN — METHOCARBAMOL 500 MG: 500 TABLET ORAL at 18:34

## 2018-09-21 RX ADMIN — DOCUSATE SODIUM 100 MG: 100 CAPSULE, LIQUID FILLED ORAL at 18:40

## 2018-09-21 RX ADMIN — METHOCARBAMOL 500 MG: 500 TABLET ORAL at 13:55

## 2018-09-21 RX ADMIN — DOCUSATE SODIUM 100 MG: 100 CAPSULE, LIQUID FILLED ORAL at 08:49

## 2018-09-21 RX ADMIN — NYSTATIN: 100000 POWDER TOPICAL at 08:58

## 2018-09-21 RX ADMIN — LORAZEPAM 2 MG: 1 TABLET ORAL at 13:54

## 2018-09-21 RX ADMIN — ACETAMINOPHEN 650 MG: 325 TABLET ORAL at 21:12

## 2018-09-21 RX ADMIN — LORAZEPAM 2 MG: 1 TABLET ORAL at 09:18

## 2018-09-21 RX ADMIN — METHOCARBAMOL 500 MG: 500 TABLET ORAL at 06:36

## 2018-09-21 RX ADMIN — GABAPENTIN 400 MG: 100 CAPSULE ORAL at 08:50

## 2018-09-21 RX ADMIN — GABAPENTIN 400 MG: 100 CAPSULE ORAL at 21:12

## 2018-09-21 RX ADMIN — NYSTATIN: 100000 POWDER TOPICAL at 18:41

## 2018-09-21 RX ADMIN — AMLODIPINE BESYLATE 10 MG: 10 TABLET ORAL at 08:49

## 2018-09-21 RX ADMIN — LIDOCAINE 1 PATCH: 50 PATCH CUTANEOUS at 08:49

## 2018-09-21 RX ADMIN — TRAZODONE HYDROCHLORIDE 200 MG: 100 TABLET ORAL at 21:11

## 2018-09-21 RX ADMIN — RISPERIDONE 2 MG: 1 TABLET, ORALLY DISINTEGRATING ORAL at 18:33

## 2018-09-21 RX ADMIN — FUROSEMIDE 40 MG: 40 TABLET ORAL at 08:50

## 2018-09-21 RX ADMIN — LATANOPROST 1 DROP: 50 SOLUTION OPHTHALMIC at 21:13

## 2018-09-21 NOTE — PROGRESS NOTES
Patient has not been agitated today  Patient has been very confused, speech is repetitive and largely incoherent  Patient has been sitting next to nurses station in chair, often telling staff she loves them  This writer had noted a possible stage 1 pressure ulcer on right buttock last week, currently the patient does not appear to have any pressure ulcers or developing ulcers  Patient is medication compliant

## 2018-09-21 NOTE — PLAN OF CARE
Problem: Ineffective Coping  Goal: Participates in unit activities  Interventions:  - Provide therapeutic environment   - Provide required programming   - Redirect inappropriate behaviors    Outcome: Progressing  Pt calmer when reclining in the milieu  Interacting minimally with her environment

## 2018-09-21 NOTE — PROGRESS NOTES
Patient in bed sleeping throughout evening; easily aroused but goes back to sleep  No yelling or agitation noted  Patient did eat HS snack and cooperative with HS care  Repositioned on side and position changed q2 hrs

## 2018-09-21 NOTE — PROGRESS NOTES
Progress Note - [address] 70 y o  female MRN: [id number]  Unit/Bed#: JX7 561-01 Encounter: [id number]    The patient was seen for continuing care and reviewed with treatment team   According to staff the patient has had less outbursts  She has been calm and sleeping a lot  There will be a family meeting held on Tuesday at 11:00 a m  to discuss the patient's prognosis as well as palliative care  The patient is lying in her bed awake and alert and continues to disrobe  Attempted to cover her and she told me I don't want to be covered" over and over  I asked her how she was feeling, how her sleep and appetite have been and her only response was to say yes honey, yes honey, yes honey" to every question  Attempted to assess orientation but she answered I don't know to everything I asked  She did tell me that she wants to get up out of her bed  Staff will be getting her up and out of bed into a [place]anara chair as she has been lying in her bed for several days and I am worried about skin breakdown      Mental Status Evaluation:  Appearance:  Marginal/poor hygiene and Good eye contact   Behavior:  restless and fidgety, disrobing   Mood:  Uncertain   Affect: blunted   Speech: Repetitive, rambling   Thought Process:  Incoherent at times   Thought Content:  Cannot be assessed due to patient factors   Perceptual Disturbances: Appears to be responding to internal stimuli   Risk Potential: Unable to assess due to patient factors   Attention/Concentration [place] than expected   Orientation:   Oriented to self   Gait/Station: Not observed   Motor Activity: No abnormal movement noted     Progress Toward Goals:  No significant change in the last 24 hours    Assessment/Plan    Principal Problem:    Bipolar affective disorder, currently manic, moderate ([facility])  Active Problems:    Dementia with behavioral disturbance    Continuous opioid dependence (HCC)    Abnormal EKG    Delirium due to another medical condition, acute, hyperactive      Recommended Treatment:        The patient has been calm and is no longer yelling out constantly  Current medications appear to be effective  Continue gabapentin 400 mg t i d       Continue Ativan 2 mg t i d       Continue risperidone 2 mg b i d       Continue trazodone 200 mg hs  Continue with pharmacotherapy, group therapy, milieu therapy and occupational therapy    The patient will be maintained on the following medications:    Current Facility-Administered Medications:  acetaminophen 325 mg Oral Q8H PRN Brown Siemens, CRNP   acetaminophen 650 mg Oral Q8H [address] Yamel M Homa, CRNP   aluminum-magnesium hydroxide-simethicone 15 mL Oral Q4H PRN Fany Wheeler MD   amLODIPine 10 mg Oral Daily Yamel M Homa, CRNP   benztropine 1 mg Intramuscular Q8H PRN Fany Wheeler MD   benztropine 1 mg Oral Q8H PRN Fany Wheeler MD   cholecalciferol 1,000 Units Oral Daily Yamel M Homa, CRNP   docusate sodium 100 mg Oral BID Yamel M Homa, CRNP   fentaNYL 12 mcg Transdermal Q72H [facility], MD   fentaNYL 25 mcg Transdermal Q72H [facility], MD   furosemide 40 mg Oral Daily Yamel M Homa, CRNP   gabapentin 400 mg Oral TID [facility], MD   haloperidol 10 mg Oral Q8H PRN [facility], MD   haloperidol lactate 5 mg Intramuscular Q6H PRN [facility], MD   hydrOXYzine HCL 25 mg Oral Q6H PRN Fany Wheeler MD   latanoprost 1 drop Both Eyes HS Yamel M Homa, CRNP   lidocaine 1 patch Transdermal Daily Yamel M Homa, CRNP   LORazepam 1 mg Intramuscular Q6H PRN Brown Siemens, CRNP   LORazepam 2 mg Oral Q6H PRN [facility], MD   LORazepam 2 mg Oral TID after meals [facility], MD   losartan 25 mg Oral Daily Yamel M Homa, CRNP   magnesium hydroxide 30 mL Oral Daily PRN Fany Wheeler MD   methocarbamol 500 mg Oral Q6H [address] Yamel M Homa, CRNP   nystatin  Topical BID Carmella Kline PA-C   OLANZapine 10 mg Intramuscular Q8H PRN Cleo Lowers MD [place]   oxyCODONE 5 mg Oral Q4H PRN Micheal LUCY Schwartz   pantoprazole 40 mg Oral Early Morning Yamel LUCY Ko   phenol 1 spray Mouth/Throat Q2H PRN Yamel M LUCY Luther   risperiDONE 2 mg Oral BID Rafi Sellers MD   rivaroxaban 20 mg Oral Daily With [employer], CRNP   sterile water       sterile water       traZODone 200 mg Oral HS Rafi Sellers MD       Risks, benefits and possible side effects of Medications:   Patient does not verbalize understanding at this time and will require further explanation

## 2018-09-21 NOTE — PLAN OF CARE
Alteration in Orientation     Treatment Goal: Demonstrate a reduction of confusion and improved orientation to person, place, time and/or situation upon discharge, according to optimum baseline/ability Progressing     Express concerns related to confused thinking related to: Progressing        Alteration in Thoughts and Perception     Treatment Goal: Gain control of psychotic behaviors/thinking, reduce/eliminate presenting symptoms and demonstrate improved reality functioning upon discharge Progressing     Refrain from acting on delusional thinking/internal stimuli Progressing     Agree to be compliant with medication regime, as prescribed and report medication side effects Progressing        Anxiety     Anxiety is at manageable level Progressing        Prexisting or High Potential for Compromised Skin Integrity     Skin integrity is maintained or improved Progressing        Risk for Violence/Aggression Toward Others     Control angry outbursts Progressing

## 2018-09-21 NOTE — PLAN OF CARE

## 2018-09-22 PROCEDURE — 99232 SBSQ HOSP IP/OBS MODERATE 35: CPT | Performed by: PSYCHIATRY & NEUROLOGY

## 2018-09-22 RX ORDER — SENNOSIDES 8.6 MG
1 TABLET ORAL
Status: DISCONTINUED | OUTPATIENT
Start: 2018-09-22 | End: 2018-09-24

## 2018-09-22 RX ADMIN — LATANOPROST 1 DROP: 50 SOLUTION OPHTHALMIC at 21:50

## 2018-09-22 RX ADMIN — METHOCARBAMOL 500 MG: 500 TABLET ORAL at 23:39

## 2018-09-22 RX ADMIN — GABAPENTIN 400 MG: 100 CAPSULE ORAL at 21:48

## 2018-09-22 RX ADMIN — SENNOSIDES 8.6 MG: 8.6 TABLET, FILM COATED ORAL at 21:49

## 2018-09-22 RX ADMIN — GABAPENTIN 400 MG: 100 CAPSULE ORAL at 18:20

## 2018-09-22 RX ADMIN — NYSTATIN: 100000 POWDER TOPICAL at 09:38

## 2018-09-22 RX ADMIN — METHOCARBAMOL 500 MG: 500 TABLET ORAL at 18:21

## 2018-09-22 RX ADMIN — ACETAMINOPHEN 650 MG: 325 TABLET ORAL at 21:48

## 2018-09-22 RX ADMIN — VITAMIN D, TAB 1000IU (100/BT) 1000 UNITS: 25 TAB at 09:36

## 2018-09-22 RX ADMIN — RISPERIDONE 2 MG: 1 TABLET, ORALLY DISINTEGRATING ORAL at 09:36

## 2018-09-22 RX ADMIN — NYSTATIN: 100000 POWDER TOPICAL at 18:21

## 2018-09-22 RX ADMIN — FUROSEMIDE 40 MG: 40 TABLET ORAL at 09:41

## 2018-09-22 RX ADMIN — LIDOCAINE 1 PATCH: 50 PATCH CUTANEOUS at 09:36

## 2018-09-22 RX ADMIN — RISPERIDONE 2 MG: 1 TABLET, ORALLY DISINTEGRATING ORAL at 18:21

## 2018-09-22 RX ADMIN — LORAZEPAM 2 MG: 1 TABLET ORAL at 18:21

## 2018-09-22 RX ADMIN — LOSARTAN POTASSIUM 25 MG: 25 TABLET, FILM COATED ORAL at 09:41

## 2018-09-22 RX ADMIN — LORAZEPAM 2 MG: 1 TABLET ORAL at 09:36

## 2018-09-22 RX ADMIN — TRAZODONE HYDROCHLORIDE 200 MG: 100 TABLET ORAL at 21:49

## 2018-09-22 RX ADMIN — LORAZEPAM 2 MG: 1 TABLET ORAL at 13:55

## 2018-09-22 RX ADMIN — AMLODIPINE BESYLATE 10 MG: 10 TABLET ORAL at 09:41

## 2018-09-22 RX ADMIN — ACETAMINOPHEN 650 MG: 325 TABLET ORAL at 13:55

## 2018-09-22 RX ADMIN — METHOCARBAMOL 500 MG: 500 TABLET ORAL at 13:56

## 2018-09-22 RX ADMIN — ACETAMINOPHEN 650 MG: 325 TABLET ORAL at 06:18

## 2018-09-22 RX ADMIN — RIVAROXABAN 20 MG: 20 TABLET, FILM COATED ORAL at 18:21

## 2018-09-22 RX ADMIN — METHOCARBAMOL 500 MG: 500 TABLET ORAL at 00:00

## 2018-09-22 RX ADMIN — GABAPENTIN 400 MG: 100 CAPSULE ORAL at 09:36

## 2018-09-22 RX ADMIN — METHOCARBAMOL 500 MG: 500 TABLET ORAL at 06:18

## 2018-09-22 NOTE — PROGRESS NOTES
Patient has been lying in bed awake, very confused but pleasant  Patient spit out Colace this morning and a Protonix pill was found on the floor  Other medications were crushed and patient was compliant with their administration

## 2018-09-22 NOTE — PLAN OF CARE
Alteration in Orientation     Treatment Goal: Demonstrate a reduction of confusion and improved orientation to person, place, time and/or situation upon discharge, according to optimum baseline/ability Not Progressing     Express concerns related to confused thinking related to: Not Progressing        Alteration in Thoughts and Perception     Treatment Goal: Gain control of psychotic behaviors/thinking, reduce/eliminate presenting symptoms and demonstrate improved reality functioning upon discharge Not Progressing        Patient is confused, oriented to self    Alteration in Thoughts and Perception     Refrain from acting on delusional thinking/internal stimuli Progressing     Agree to be compliant with medication regime, as prescribed and report medication side effects Progressing        Anxiety     Anxiety is at manageable level Progressing        Prexisting or High Potential for Compromised Skin Integrity     Skin integrity is maintained or improved Progressing        Risk for Violence/Aggression Toward Others     Control angry outbursts Progressing

## 2018-09-22 NOTE — PROGRESS NOTES
Pt remains confused, asking for her son and daughter  Disrobing multiple times  Cleaned for incontinence,turned and repositioned from Rt side lying position to Lt  Side lying position  Barrier cream applied to sacrum, buttocks and bilateral heels

## 2018-09-22 NOTE — PROGRESS NOTES
Progress Note - [address] 70 y o  female MRN: [id number]  Unit/Bed#: SI9 561-01 Encounter: [id number]    Patient seen, chart reviewed, discussed with staff  Nursing staff notes the patient has been compliant with medications and not physically aggressive  Patient does have episodes of screaming and calling out and episodes of somnolence  Patient also has a variable appetite  The patient was seen this morning lying in bed  The patient was disrobing and did not want to be covered  She is unable to answer questions appropriately due to significant cognitive decline and displays echolalia at times  Appears to respond to internal stimuli at times  She was repetitively asking me if I wanted to sell my car  She does not appear to be physically uncomfortable at this time  Behavior over the last 24 hours:  unchanged  Sleep:  Variable  Appetite:  Variable  Medication side effects: No  ROS: no complaints  /64   Pulse 60   Temp 97 8 °F (36 6 °C) (Tympanic)   Resp 18   Ht 5' 6 5" (1 689 m)   Wt 118 kg (261 lb 3 9 oz)   LMP  (LMP Unknown)   SpO2 94%   Breastfeeding?  No   BMI 41 53 kg/m²     Medications:   Current Facility-Administered Medications   Medication Dose Route Frequency    acetaminophen (TYLENOL) tablet 325 mg  325 mg Oral Q8H PRN    acetaminophen (TYLENOL) tablet 650 mg  650 mg Oral Q8H [address]    aluminum-magnesium hydroxide-simethicone (MYLANTA) 200-200-20 mg/5 mL oral suspension 15 mL  15 mL Oral Q4H PRN    amLODIPine (NORVASC) tablet 10 mg  10 mg Oral Daily    benztropine (COGENTIN) injection 1 mg  1 mg Intramuscular Q8H PRN    benztropine (COGENTIN) tablet 1 mg  1 mg Oral Q8H PRN    cholecalciferol (VITAMIN D3) tablet 1,000 Units  1,000 Units Oral Daily    docusate sodium (COLACE) capsule 100 mg  100 mg Oral BID    fentaNYL (DURAGESIC) 12 mcg/hr TD 72 hr patch 12 mcg  12 mcg Transdermal Q72H    fentaNYL (DURAGESIC) 25 mcg/hr TD 72 hr patch 25 mcg  25 mcg Transdermal Q72H    furosemide (LASIX) tablet 40 mg  40 mg Oral Daily    gabapentin (NEURONTIN) capsule 400 mg  400 mg Oral TID    haloperidol (HALDOL) tablet 10 mg  10 mg Oral Q8H PRN    haloperidol lactate (HALDOL) injection 5 mg  5 mg Intramuscular Q6H PRN    hydrOXYzine HCL (ATARAX) tablet 25 mg  25 mg Oral Q6H PRN    latanoprost (XALATAN) 0 005 % ophthalmic solution 1 drop  1 drop Both Eyes HS    lidocaine (LIDODERM) 5 % patch 1 patch  1 patch Transdermal Daily    LORazepam (ATIVAN) 2 mg/mL injection 1 mg  1 mg Intramuscular Q6H PRN    LORazepam (ATIVAN) tablet 2 mg  2 mg Oral Q6H PRN    LORazepam (ATIVAN) tablet 2 mg  2 mg Oral TID after meals    losartan (COZAAR) tablet 25 mg  25 mg Oral Daily    magnesium hydroxide (MILK OF MAGNESIA) 400 mg/5 mL oral suspension 30 mL  30 mL Oral Daily PRN    methocarbamol (ROBAXIN) tablet 500 mg  500 mg Oral Q6H [facility]    nystatin (MYCOSTATIN) powder   Topical BID    OLANZapine (ZyPREXA) IM injection 10 mg  10 mg Intramuscular Q8H PRN    oxyCODONE (ROXICODONE) IR tablet 5 mg  5 mg Oral Q4H PRN    pantoprazole (PROTONIX) EC tablet 40 mg  40 mg Oral Early Morning    phenol (CHLORASEPTIC) 1 4 % mucosal liquid 1 spray  1 spray Mouth/Throat Q2H PRN    risperiDONE (RisperDAL M-TABS) dispersible tablet 2 mg  2 mg Oral BID    rivaroxaban (XARELTO) tablet 20 mg  20 mg Oral Daily With [employer] sterile water injection **AcuDose Override Pull**        sterile water injection **AcuDose Override Pull**        traZODone (DESYREL) tablet 200 mg  200 mg Oral HS       Labs:   Admission on 09/01/2018   No results displayed because visit has over 200 results  Mental Status Evaluation:  Appearance:  disheveled and Disrobing   Behavior:  restless and fidgety and Variable eye contact   Speech:  Repetitive, rambling, nonsensical at times   Mood:  Uncertain   Affect:  blunted   Thought Process:   In coherent, poverty of thought   Thought Content:  Unable to accurately assess due to patient factors   Perceptual Disturbances: Appears to respond to internal stimuli at times   Risk Potential: No recent aggression   Sensorium:  person   Cognition:  Poor short-term memory, poor long-term memory   Consciousness:  awake    Attention: Poor focus and concentration   Insight:  nil   Judgment: nil   Gait/Station: Not observed, patient lying in bed   Motor Activity: no abnormal movements     Progress Toward Goals:  No change    Assessment/Plan   Principal Problem:    Bipolar affective disorder, currently manic, moderate ([facility])  Active Problems:    Dementia with behavioral disturbance    Continuous opioid dependence ([facility])    Abnormal EKG    Delirium due to another medical condition, acute, hyperactive      Recommended Treatment:   Family meeting pending for next week  Continue with medications as ordered including Ativan 2 mg p o  T i d   Risperdal 2 mg b i d   Trazodone 200 mg at HS      Continue with group therapy, milieu therapy and occupational therapy  Risks, benefits and possible side effects of Medications:   Patient does not verbalize understanding at this time and will require further explanation  Counseling / Coordination of Care  Total floor / unit time spent today 25 minutes  Greater than 50% of total time was spent with the patient and / or family counseling and / or coordination of care  A description of the counseling / coordination of care:  Medication management, chart review, patient interview

## 2018-09-23 PROCEDURE — 99232 SBSQ HOSP IP/OBS MODERATE 35: CPT | Performed by: PSYCHIATRY & NEUROLOGY

## 2018-09-23 RX ADMIN — RISPERIDONE 2 MG: 1 TABLET, ORALLY DISINTEGRATING ORAL at 09:05

## 2018-09-23 RX ADMIN — LATANOPROST 1 DROP: 50 SOLUTION OPHTHALMIC at 21:10

## 2018-09-23 RX ADMIN — VITAMIN D, TAB 1000IU (100/BT) 1000 UNITS: 25 TAB at 09:05

## 2018-09-23 RX ADMIN — METHOCARBAMOL 500 MG: 500 TABLET ORAL at 06:05

## 2018-09-23 RX ADMIN — FENTANYL 12 MCG: 12.5 PATCH TRANSDERMAL at 10:06

## 2018-09-23 RX ADMIN — ACETAMINOPHEN 650 MG: 325 TABLET ORAL at 13:05

## 2018-09-23 RX ADMIN — LORAZEPAM 2 MG: 1 TABLET ORAL at 09:05

## 2018-09-23 RX ADMIN — FENTANYL 25 MCG: 25 PATCH, EXTENDED RELEASE TRANSDERMAL at 10:06

## 2018-09-23 RX ADMIN — NYSTATIN: 100000 POWDER TOPICAL at 18:28

## 2018-09-23 RX ADMIN — GABAPENTIN 400 MG: 100 CAPSULE ORAL at 21:08

## 2018-09-23 RX ADMIN — LORAZEPAM 2 MG: 1 TABLET ORAL at 13:05

## 2018-09-23 RX ADMIN — GABAPENTIN 400 MG: 100 CAPSULE ORAL at 09:05

## 2018-09-23 RX ADMIN — SENNOSIDES 8.6 MG: 8.6 TABLET, FILM COATED ORAL at 21:08

## 2018-09-23 RX ADMIN — GABAPENTIN 400 MG: 100 CAPSULE ORAL at 18:24

## 2018-09-23 RX ADMIN — ACETAMINOPHEN 650 MG: 325 TABLET ORAL at 06:06

## 2018-09-23 RX ADMIN — METHOCARBAMOL 500 MG: 500 TABLET ORAL at 13:04

## 2018-09-23 RX ADMIN — LOSARTAN POTASSIUM 25 MG: 25 TABLET, FILM COATED ORAL at 09:05

## 2018-09-23 RX ADMIN — TRAZODONE HYDROCHLORIDE 200 MG: 100 TABLET ORAL at 21:08

## 2018-09-23 RX ADMIN — RIVAROXABAN 20 MG: 20 TABLET, FILM COATED ORAL at 18:24

## 2018-09-23 RX ADMIN — LIDOCAINE 1 PATCH: 50 PATCH CUTANEOUS at 10:05

## 2018-09-23 RX ADMIN — METHOCARBAMOL 500 MG: 500 TABLET ORAL at 18:23

## 2018-09-23 RX ADMIN — LORAZEPAM 2 MG: 1 TABLET ORAL at 18:24

## 2018-09-23 RX ADMIN — ACETAMINOPHEN 650 MG: 325 TABLET ORAL at 21:08

## 2018-09-23 RX ADMIN — NYSTATIN: 100000 POWDER TOPICAL at 10:05

## 2018-09-23 RX ADMIN — RISPERIDONE 2 MG: 1 TABLET, ORALLY DISINTEGRATING ORAL at 18:23

## 2018-09-23 RX ADMIN — FUROSEMIDE 40 MG: 40 TABLET ORAL at 09:05

## 2018-09-23 RX ADMIN — AMLODIPINE BESYLATE 10 MG: 10 TABLET ORAL at 09:05

## 2018-09-23 NOTE — PROGRESS NOTES
Patient has been awake this morning and lying in bed  Patient is confused  Patient was given a shower this morning which patient tolerated well  Provider notified of left ankle wound  Patient is compliant with medication however protonix was found on the floor

## 2018-09-23 NOTE — PLAN OF CARE
Alteration in Orientation     Treatment Goal: Demonstrate a reduction of confusion and improved orientation to person, place, time and/or situation upon discharge, according to optimum baseline/ability Not Progressing     Express concerns related to confused thinking related to: Not Progressing        Alteration in Thoughts and Perception     Treatment Goal: Gain control of psychotic behaviors/thinking, reduce/eliminate presenting symptoms and demonstrate improved reality functioning upon discharge Not Progressing          Alteration in Thoughts and Perception     Refrain from acting on delusional thinking/internal stimuli Progressing     Agree to be compliant with medication regime, as prescribed and report medication side effects Progressing        Anxiety     Anxiety is at manageable level Progressing        Ineffective Coping     Participates in unit activities Progressing        Prexisting or High Potential for Compromised Skin Integrity     Skin integrity is maintained or improved Progressing        Risk for Violence/Aggression Toward Others     Control angry outbursts Progressing

## 2018-09-23 NOTE — PROGRESS NOTES
Progress Note [facility] [address] 70 [address]  female MRN: [id number]  Unit/Bed#: PZ7 561-01 Encounter: [id number]    Patient seen, chart reviewed, discussed with staff  Nursing staff notes the patient has a right lower extremity wound that was noted this morning to have purulent discharge  Behaviors have been slightly improved and no recent physical agitation and overall less yelling out  She has been compliant with medications that are crushed  The patient was seen in bed this morning  She does smile at times and appears to be comfortable  When asked about pain she complains of bilateral foot pain  Patient was noted to have of right lateral lower extremity wound with slight purulent drainage  Wound team will be consulted  The patient is unable to answer most questions appropriately due to significant decline in cognition  She is repetitive in her speech at times  She is able to tell me her 1st and last name but is uncertain of place or time  She did eat well for breakfast this morning  Behavior over the last 24 hours:  unchanged  Sleep: normal  Appetite: normal  Medication side effects: No  ROS: Complains of foot pain  /58 (BP Location: Left arm)   Pulse 58   Temp 97 7 °F (36 5 °C) (Tympanic)   Resp 17   Ht 5' 6 5" (1 689 m)   Wt 118 kg (261 lb 3 9 oz)   LMP  (LMP Unknown)   SpO2 96%   Breastfeeding?  No   BMI 41 53 kg/m²     Medications:   Current Facility-Administered Medications   Medication Dose Route Frequency    acetaminophen (TYLENOL) tablet 325 mg  325 mg Oral Q8H PRN    acetaminophen (TYLENOL) tablet 650 mg  650 mg Oral Q8H [facility]    aluminum-magnesium hydroxide-simethicone (MYLANTA) 200-200-20 mg/5 mL oral suspension 15 mL  15 mL Oral Q4H PRN    amLODIPine (NORVASC) tablet 10 mg  10 mg Oral Daily    benztropine (COGENTIN) injection 1 mg  1 mg Intramuscular Q8H PRN    benztropine (COGENTIN) tablet 1 mg  1 mg Oral Q8H PRN    cholecalciferol (VITAMIN D3) tablet 1,000 Units  1,000 Units Oral Daily    fentaNYL (DURAGESIC) 12 mcg/hr TD 72 hr patch 12 mcg  12 mcg Transdermal Q72H    fentaNYL (DURAGESIC) 25 mcg/hr TD 72 hr patch 25 mcg  25 mcg Transdermal Q72H    furosemide (LASIX) tablet 40 mg  40 mg Oral Daily    gabapentin (NEURONTIN) capsule 400 mg  400 mg Oral TID    haloperidol (HALDOL) tablet 10 mg  10 mg Oral Q8H PRN    haloperidol lactate (HALDOL) injection 5 mg  5 mg Intramuscular Q6H PRN    hydrOXYzine HCL (ATARAX) tablet 25 mg  25 mg Oral Q6H PRN    latanoprost (XALATAN) 0 005 % ophthalmic solution 1 drop  1 drop Both Eyes HS    lidocaine (LIDODERM) 5 % patch 1 patch  1 patch Transdermal Daily    LORazepam (ATIVAN) 2 mg/mL injection 1 mg  1 mg Intramuscular Q6H PRN    LORazepam (ATIVAN) tablet 2 mg  2 mg Oral Q6H PRN    LORazepam (ATIVAN) tablet 2 mg  2 mg Oral TID after meals    losartan (COZAAR) tablet 25 mg  25 mg Oral Daily    magnesium hydroxide (MILK OF MAGNESIA) 400 mg/5 mL oral suspension 30 mL  30 mL Oral Daily PRN    methocarbamol (ROBAXIN) tablet 500 mg  500 mg Oral Q6H [facility]    nystatin (MYCOSTATIN) powder   Topical BID    OLANZapine (ZyPREXA) IM injection 10 mg  10 mg Intramuscular Q8H PRN    oxyCODONE (ROXICODONE) IR tablet 5 mg  5 mg Oral Q4H PRN    pantoprazole (PROTONIX) EC tablet 40 mg  40 mg Oral Early Morning    phenol (CHLORASEPTIC) 1 4 % mucosal liquid 1 spray  1 spray Mouth/Throat Q2H PRN    risperiDONE (RisperDAL M-TABS) dispersible tablet 2 mg  2 mg Oral BID    rivaroxaban (XARELTO) tablet 20 mg  20 mg Oral Daily With Dinner    senna (SENOKOT) tablet 8 6 mg  1 tablet Oral HS    sterile water injection **AcuDose Override Pull**        sterile water injection **AcuDose Override Pull**        traZODone (DESYREL) tablet 200 mg  200 mg Oral HS       Labs:   Admission on 09/01/2018   No results displayed because visit has over 200 results            Mental Status Evaluation:  Appearance:  age appropriate and In hospital gown, good eye contact   Behavior:  Currently calm, cooperative   Speech:  Repetitive and nonsensical at times   Mood:  Unremarkable   Affect:  constricted   Thought Process: In coherent   Thought Content:  Unable to accurately assess due to patient factors   Perceptual Disturbances: Appears to respond to internal stimuli at times   Risk Potential: No recent aggression   Sensorium:  person   Cognition:  Poor short-term memory, poor long-term memory   Consciousness:  alert and awake    Attention: attention span appeared shorter than expected for age   Insight:  nil   Judgment: nil   Gait/Station: Not observed, patient lying in bed   Motor Activity: no abnormal movements     Progress Toward Goals:  Minimal change    Assessment/Plan   Principal Problem:    Bipolar affective disorder, currently manic, moderate ([facility])  Active Problems:    Dementia with behavioral disturbance    Continuous opioid dependence ([facility])    Abnormal EKG    Delirium due to another medical condition, acute, hyperactive      Recommended Treatment:   Consult wound management for low right lower extremity wound  Continue Ativan 2 mg t i d , Risperdal 2 mg b i d , trazodone 200 mg at [facility]  Family meeting is pending for this week  Discontinue Protonix due to inability to crush and changed to liquid Prilosec      Continue with group therapy, milieu therapy and occupational therapy  Risks, benefits and possible side effects of Medications:   Patient does not verbalize understanding at this time and will require further explanation  Counseling / Coordination of Care  Total floor / unit time spent today 25 minutes  Greater than 50% of total time was spent with the patient and / or family counseling and / or coordination of care  A description of the counseling / coordination of care:  Medication management, chart review, patient interview

## 2018-09-23 NOTE — PROGRESS NOTES
Pt remained in bed this shift calling out at times,but has not been agitated  Medication compliant with medications crushed in applesauce  Responding to internal stimuli at times  Cleaned for urinary incontinence and repositioned for comfort

## 2018-09-24 PROCEDURE — 99232 SBSQ HOSP IP/OBS MODERATE 35: CPT | Performed by: PSYCHIATRY & NEUROLOGY

## 2018-09-24 RX ADMIN — METHOCARBAMOL 500 MG: 500 TABLET ORAL at 13:21

## 2018-09-24 RX ADMIN — GABAPENTIN 400 MG: 100 CAPSULE ORAL at 08:36

## 2018-09-24 RX ADMIN — RISPERIDONE 2 MG: 1 TABLET, ORALLY DISINTEGRATING ORAL at 08:37

## 2018-09-24 RX ADMIN — LOSARTAN POTASSIUM 25 MG: 25 TABLET, FILM COATED ORAL at 08:37

## 2018-09-24 RX ADMIN — RISPERIDONE 2 MG: 1 TABLET, ORALLY DISINTEGRATING ORAL at 17:19

## 2018-09-24 RX ADMIN — LATANOPROST 1 DROP: 50 SOLUTION OPHTHALMIC at 21:24

## 2018-09-24 RX ADMIN — GABAPENTIN 400 MG: 100 CAPSULE ORAL at 17:19

## 2018-09-24 RX ADMIN — METHOCARBAMOL 500 MG: 500 TABLET ORAL at 23:22

## 2018-09-24 RX ADMIN — NYSTATIN 1 APPLICATION: 100000 POWDER TOPICAL at 17:20

## 2018-09-24 RX ADMIN — GABAPENTIN 400 MG: 100 CAPSULE ORAL at 21:24

## 2018-09-24 RX ADMIN — FUROSEMIDE 40 MG: 40 TABLET ORAL at 08:37

## 2018-09-24 RX ADMIN — TRAZODONE HYDROCHLORIDE 200 MG: 100 TABLET ORAL at 21:24

## 2018-09-24 RX ADMIN — METHOCARBAMOL 500 MG: 500 TABLET ORAL at 06:31

## 2018-09-24 RX ADMIN — VITAMIN D, TAB 1000IU (100/BT) 1000 UNITS: 25 TAB at 08:37

## 2018-09-24 RX ADMIN — ACETAMINOPHEN 650 MG: 325 TABLET ORAL at 13:21

## 2018-09-24 RX ADMIN — LORAZEPAM 2 MG: 1 TABLET ORAL at 17:20

## 2018-09-24 RX ADMIN — LORAZEPAM 2 MG: 1 TABLET ORAL at 09:28

## 2018-09-24 RX ADMIN — Medication 10 MG: at 09:30

## 2018-09-24 RX ADMIN — METHOCARBAMOL 500 MG: 500 TABLET ORAL at 17:19

## 2018-09-24 RX ADMIN — NYSTATIN 1 APPLICATION: 100000 POWDER TOPICAL at 08:41

## 2018-09-24 RX ADMIN — RIVAROXABAN 20 MG: 20 TABLET, FILM COATED ORAL at 17:20

## 2018-09-24 RX ADMIN — AMLODIPINE BESYLATE 10 MG: 10 TABLET ORAL at 08:37

## 2018-09-24 RX ADMIN — METHOCARBAMOL 500 MG: 500 TABLET ORAL at 00:01

## 2018-09-24 RX ADMIN — ACETAMINOPHEN 650 MG: 325 TABLET ORAL at 21:24

## 2018-09-24 RX ADMIN — LORAZEPAM 2 MG: 1 TABLET ORAL at 13:21

## 2018-09-24 RX ADMIN — ACETAMINOPHEN 650 MG: 325 TABLET ORAL at 06:31

## 2018-09-24 NOTE — CASE MANAGEMENT
Pt met with AttShelby De La Torre  prior to pt's own 304 hearing this AM  Pt is in agreement to stay for further Tx  Dr Costa Horner testified and Jose F Camacho was the Mental Health Review Officer  Pt is no here on 304 which will  18

## 2018-09-24 NOTE — PLAN OF CARE
Problem: Ineffective Coping  Goal: Participates in unit activities  Interventions:  - Provide therapeutic environment   - Provide required programming   - Redirect inappropriate behaviors    Outcome: Progressing  Increaseinly quiet

## 2018-09-24 NOTE — PROGRESS NOTES
Wound care performed to right ankle, Xeroform applied  Hydrogard applied to bilateral heels, legs elevated on pillow  Pt tolerated well

## 2018-09-24 NOTE — PROGRESS NOTES
Pt is medication compliant (crushed in applesauce)  Pt is cooperative with care  No aggression or agitation noted  Pt continually disrobes  Pt is currently quiet in bed  Will monitor

## 2018-09-24 NOTE — PROGRESS NOTES
Pt resting quietly in her room  Medication compliant with meds crushed and in applesauce  Pt had multiple loose BMs  Cleaned and changed after both urinary and bowel incontinence  Pt also repositioned from Rt  To Lt  Side lying positions to prevent pressure problems  Positioned with wedge for comfort  Barrier cream applied to buttocks sacrum and heels to prevent skin breakdown  Pt calm and did not call out this evening

## 2018-09-24 NOTE — CASE MANAGEMENT
Have a call out to IT tyo get assist and directions to arrange for 9/25 family meeting to allow for two of the family members to dial in by conference call  This request was placed by pt's son, Piotr Cantu as he nor his brother will be available to drive here tomorrow

## 2018-09-24 NOTE — PHYSICAL THERAPY NOTE
Physical Therapy Cancellation Note- pt refused pt at this time, states she is crusted and wants to talk to dr Sun Katz  Asked me to come back in 2 days  "i'm not going to do anything with you"  Unable to redirect pt    Tai Hart, PT

## 2018-09-24 NOTE — PLAN OF CARE
Alteration in Orientation     Treatment Goal: Demonstrate a reduction of confusion and improved orientation to person, place, time and/or situation upon discharge, according to optimum baseline/ability Not Progressing     Express concerns related to confused thinking related to: Not Progressing          Alteration in Thoughts and Perception     Treatment Goal: Gain control of psychotic behaviors/thinking, reduce/eliminate presenting symptoms and demonstrate improved reality functioning upon discharge Progressing     Refrain from acting on delusional thinking/internal stimuli Progressing     Agree to be compliant with medication regime, as prescribed and report medication side effects Progressing        Anxiety     Anxiety is at manageable level Progressing        Prexisting or High Potential for Compromised Skin Integrity     Skin integrity is maintained or improved Progressing        Risk for Violence/Aggression Toward Others     Control angry outbursts Progressing

## 2018-09-24 NOTE — DISCHARGE INSTR - OTHER ORDERS
Skin care/ wound care Plan:   Xeroform gauze dressing q day to right ankle wound until healed     Calazime to buttocks and sacrum BID and prn- pt  incontinent   Hydraguard to heels BID  Moisturize skin q day  Elevate heels when in bed  Softchair cushion to chair when out of bed  Assist with frequent turning and repositioning

## 2018-09-24 NOTE — CONSULTS
Consult Note - Wound   Reyna Hunger 70 y o  female MRN: [id number]  Unit/Bed#: IF8 561-01 Encounter: [id number]      Assessment:   Injury from trauma on right lateral ankle area  According to staff, pt  Hit ankle on the [facility] chair last week some time- occurred before last Friday  Wound dessicated, scabbed inferior aspect of wound; otherwise, wound bed pale pink  No slough or eschar in wound  Is partial thickness  Periwound is bright pink  Buttocks blanchable redness  Pt  Is incontinent  Heels unremarkable  Plan:   Notify MD of right ankle wound  Xeroform gauze dressing q day to right ankle wound  Calazime to buttocks and sacrum BID and prn- pt  incontinent   Hydraguard to heels BID  Moisturize skin q day  Elevate heels when in bed  Softchair cushion to chair when out of bed  Assist with frequent turning and repositioning  Wound care to follow q week and prn    Spoke with Juaquin Norris RN and nursing assistants  Vitals: Blood pressure 127/66, pulse 88, temperature 97 6 °F (36 4 °C), temperature source Tympanic, resp  rate 16, height 5' 6 5" (1 689 m), weight 118 kg (261 lb 3 9 oz), SpO2 96 %, not currently breastfeeding  ,Body mass index is 41 53 kg/m²  Wound 06/13/17 Abrasion(s) Toe (Comment  which one) Distal (Active)       Wound 09/24/18 Skin tear Ankle Right; Outer trauma injury- partial thickness (Active)   Wound Description Dry;Pink; Other (Comment) 9/24/2018  9:00 AM   Maggie-wound Assessment Erythema 9/24/2018  9:00 AM   Wound Length (cm) 5 cm 9/24/2018  9:00 AM   Wound Width (cm) 1 cm 9/24/2018  9:00 AM   Wound Depth (cm) 0 1 9/24/2018  9:00 AM   Calculated Wound Volume (cm^3) 0 5 cm^3 9/24/2018  9:00 AM   Tunneling 0 cm 9/24/2018  9:00 AM   Undermining 0 9/24/2018  9:00 AM   Drainage Amount None 9/24/2018  9:00 AM   Non-staged Wound Description Partial thickness 9/24/2018  9:00 AM

## 2018-09-24 NOTE — PROGRESS NOTES
Progress Note - [address] 70 y o  female MRN: [id number]  Unit/Bed#: BQ2 561-01 Encounter: [id number]    The patient was seen for continuing care and reviewed with treatment team   The patient was able to sleep through the night  Although her oral intake has been generally poor, she ate well this morning  She is not nearly as dysarthric and incoherent as she had been in the past   She cannot maintain a coherent conversation although she is disoriented and confused  Continues to disrobe  Not much calling out and screaming  304 hearing attended  Mental Status Evaluation:  Appearance:  Good eye contact and disheveled   Behavior:  calm and cooperative   Mood:  Uncertain   Thought Content:  Does not verbalize delusional material   Perceptual Disturbances: Denies hallucinations and does not appear to be responding to internal stimuli   Risk Potential: No suicidal or homicidal ideation   Orientation:   Oriented to self only         Assessment/Plan    Principal Problem:    Bipolar affective disorder, currently manic, moderate ([facility])  Active Problems:    Dementia with behavioral disturbance    Continuous opioid dependence ([facility])    Abnormal EKG    Delirium due to another medical condition, acute, hyperactive      Recommended Treatment:  The improvement coincides with reducing fentanyl dose, increasing gabapentin and switching to risperidone as well as standing dose of lorazepam   I will further reduce fentanyl dose  The patient does not appear to be delirious anymore  A family meeting is scheduled for tomorrow to discuss prognosis and our recommendations  Continue with pharmacotherapy, group therapy, milieu therapy and occupational therapy    The patient will be maintained on the following medications:    Current Facility-Administered Medications:  acetaminophen 325 mg Oral Q8H PRN LUCY Haskins   acetaminophen 650 mg Oral [facility] LUCY Barr   aluminum-magnesium hydroxide-simethicone 15 mL Oral Q4H PRN Sherie Hughes MD   amLODIPine 10 mg Oral Daily Yamel DENYS Holderin, CRNP   benztropine 1 mg Intramuscular Q8H PRN Sherie Hughes MD   benztropine 1 mg Oral Q8H PRN Sherie Hughes MD   cholecalciferol 1,000 Units Oral Daily Yamel DENYS Holderin, CRNP   fentaNYL 25 mcg Transdermal Q72H Mehdi Hamilton MD   furosemide 40 mg Oral Daily Yamel DENYS Luther, CRNP   gabapentin 400 mg Oral TID Mehdi Hamilton MD   haloperidol 10 mg Oral Q8H PRN Mehdi Hamilton MD   haloperidol lactate 5 mg Intramuscular Q6H PRN Mehdi Hamilton MD   hydrOXYzine HCL 25 mg Oral Q6H PRN Sherie Hughes MD   latanoprost 1 drop Both Eyes HS Yamel DENYS Holderin, CRNP   lidocaine 1 patch Transdermal Daily Yamel DENYS Luther, CRNP   LORazepam 1 mg Intramuscular Q6H PRN Arben Conte, LUCY   LORazepam 2 mg Oral Q6H PRN Mehdi Hamilton MD   LORazepam 2 mg Oral TID after meals Mehdi Hamilton MD   losartan 25 mg Oral Daily Yamel DENYS Luther, CRNP   magnesium hydroxide 30 mL Oral Daily PRN Sherie Hughes MD   methocarbamol 500 mg Oral Q6H [facility] Yamel Luther, CRAMARILYS   nystatin  Topical BID Carmella Kline PA-C   OLANZapine 10 mg Intramuscular Q8H PRN Mehdi Hamilton MD   omeprazole (PRILOSEC) suspension 2 mg/mL 10 mg Oral Daily Ernesto Lamb PA-C   oxyCODONE 5 mg Oral Q4H PRN LUCY Haskins   phenol 1 spray Mouth/Throat Q2H PRN Yamel DENYS Homa, CRNP   risperiDONE 2 mg Oral BID Mehdi Hamilton MD   rivaroxaban 20 mg Oral Daily With [employer], CRNP   senna 1 tablet Oral HS Ernesto Lamb PA-C   sterile water       sterile water       traZODone 200 mg Oral HS Mehdi Hamilton MD

## 2018-09-25 PROCEDURE — G8978 MOBILITY CURRENT STATUS: HCPCS

## 2018-09-25 PROCEDURE — 97110 THERAPEUTIC EXERCISES: CPT

## 2018-09-25 PROCEDURE — 99232 SBSQ HOSP IP/OBS MODERATE 35: CPT | Performed by: PSYCHIATRY & NEUROLOGY

## 2018-09-25 PROCEDURE — 97164 PT RE-EVAL EST PLAN CARE: CPT

## 2018-09-25 PROCEDURE — G8979 MOBILITY GOAL STATUS: HCPCS

## 2018-09-25 RX ORDER — LORAZEPAM 1 MG/1
2 TABLET ORAL
Status: DISCONTINUED | OUTPATIENT
Start: 2018-09-25 | End: 2018-09-28 | Stop reason: HOSPADM

## 2018-09-25 RX ORDER — LORAZEPAM 1 MG/1
1 TABLET ORAL
Status: DISCONTINUED | OUTPATIENT
Start: 2018-09-26 | End: 2018-09-28

## 2018-09-25 RX ORDER — LORAZEPAM 1 MG/1
2 TABLET ORAL
Status: DISCONTINUED | OUTPATIENT
Start: 2018-09-25 | End: 2018-09-28

## 2018-09-25 RX ADMIN — NYSTATIN: 100000 POWDER TOPICAL at 17:17

## 2018-09-25 RX ADMIN — TRAZODONE HYDROCHLORIDE 200 MG: 100 TABLET ORAL at 21:20

## 2018-09-25 RX ADMIN — NYSTATIN: 100000 POWDER TOPICAL at 08:13

## 2018-09-25 RX ADMIN — LORAZEPAM 2 MG: 1 TABLET ORAL at 08:14

## 2018-09-25 RX ADMIN — RISPERIDONE 2 MG: 1 TABLET, ORALLY DISINTEGRATING ORAL at 08:14

## 2018-09-25 RX ADMIN — METHOCARBAMOL 500 MG: 500 TABLET ORAL at 17:18

## 2018-09-25 RX ADMIN — RIVAROXABAN 20 MG: 20 TABLET, FILM COATED ORAL at 17:18

## 2018-09-25 RX ADMIN — LIDOCAINE 1 PATCH: 50 PATCH CUTANEOUS at 08:12

## 2018-09-25 RX ADMIN — METHOCARBAMOL 500 MG: 500 TABLET ORAL at 23:15

## 2018-09-25 RX ADMIN — ACETAMINOPHEN 650 MG: 325 TABLET ORAL at 06:19

## 2018-09-25 RX ADMIN — LORAZEPAM 2 MG: 1 TABLET ORAL at 21:20

## 2018-09-25 RX ADMIN — GABAPENTIN 400 MG: 100 CAPSULE ORAL at 21:20

## 2018-09-25 RX ADMIN — METHOCARBAMOL 500 MG: 500 TABLET ORAL at 13:49

## 2018-09-25 RX ADMIN — METHOCARBAMOL 500 MG: 500 TABLET ORAL at 06:17

## 2018-09-25 RX ADMIN — RISPERIDONE 2 MG: 1 TABLET, ORALLY DISINTEGRATING ORAL at 17:18

## 2018-09-25 RX ADMIN — LORAZEPAM 2 MG: 1 TABLET ORAL at 13:49

## 2018-09-25 RX ADMIN — LATANOPROST 1 DROP: 50 SOLUTION OPHTHALMIC at 21:19

## 2018-09-25 RX ADMIN — ACETAMINOPHEN 650 MG: 325 TABLET ORAL at 21:21

## 2018-09-25 RX ADMIN — Medication 10 MG: at 08:31

## 2018-09-25 RX ADMIN — VITAMIN D, TAB 1000IU (100/BT) 1000 UNITS: 25 TAB at 08:14

## 2018-09-25 RX ADMIN — ACETAMINOPHEN 650 MG: 325 TABLET ORAL at 13:49

## 2018-09-25 RX ADMIN — GABAPENTIN 400 MG: 100 CAPSULE ORAL at 08:14

## 2018-09-25 RX ADMIN — GABAPENTIN 400 MG: 100 CAPSULE ORAL at 17:18

## 2018-09-25 NOTE — PROGRESS NOTES
Pt calm and cooperative upon entry of room  Compliant with meds but became agitated and began yelling upon application of lidocaine patch and administration of prilosec  Pt noted to repetitively disrobe  Pt stated she "found randall this morning " Pt calm in bed at this time  Will continue to monitor

## 2018-09-25 NOTE — PLAN OF CARE
Alteration in Orientation     Treatment Goal: Demonstrate a reduction of confusion and improved orientation to person, place, time and/or situation upon discharge, according to optimum baseline/ability Not Progressing     Express concerns related to confused thinking related to: Not Progressing        Risk for Violence/Aggression Toward Others     Control angry outbursts Not Progressing          Alteration in Thoughts and Perception     Treatment Goal: Gain control of psychotic behaviors/thinking, reduce/eliminate presenting symptoms and demonstrate improved reality functioning upon discharge Progressing     Refrain from acting on delusional thinking/internal stimuli Progressing     Agree to be compliant with medication regime, as prescribed and report medication side effects Progressing        Anxiety     Anxiety is at manageable level Progressing        Prexisting or High Potential for Compromised Skin Integrity     Skin integrity is maintained or improved Progressing

## 2018-09-25 NOTE — PLAN OF CARE
Problem: PHYSICAL THERAPY ADULT  Goal: Performs mobility at highest level of function for planned discharge setting  See evaluation for individualized goals  Treatment/Interventions: LE strengthening/ROM, Therapeutic exercise, Endurance training, Cognitive reorientation, Patient/family training, Equipment eval/education, Bed mobility, Continued evaluation, Spoke to nursing          See flowsheet documentation for full assessment, interventions and recommendations  Prognosis: Guarded  Problem List: Decreased strength, Decreased range of motion, Decreased endurance, Impaired balance, Decreased mobility, Decreased cognition, Decreased safety awareness, Obesity, Decreased skin integrity  Assessment: PT re-eval complete  pt demonstrates dependent deficits during functional activity and mobility including dec endurance,dec static sit balance,dec engagement during therapy session,dec cognition,mumbled and garbled unclear speech during conversation and needs depAx1 for BM and B rolling  Per NSG, pt waxes and weans during mobility and activity level throughout the day  Per NSG, unsure of what times pt has inc activity level  Currently pt is sanjuanita/mechanical lift for OOB and nonambulatory at this time  Pt would cont to benefit from skilled inpt PT services during higher activity level to participate in therapy interventions  Barriers to Discharge: None     Recommendation:  (return to previous environment)     PT - OK to Discharge: Yes (snf)    See flowsheet documentation for full assessment

## 2018-09-25 NOTE — PHYSICAL THERAPY NOTE
Physical Therapy Re-Evaluation:       09/25/18 1230   Pain Assessment   Pain Assessment FLACC   Pain Rating: FLACC (Rest) - Face 0   Pain Rating: FLACC (Rest) - Legs 0   Pain Rating: FLACC (Rest) - Activity 0   Pain Rating: FLACC (Rest) - Cry 0   Pain Rating: FLACC (Rest) - Consolability 0   Score: FLACC (Rest) 0   Pain Rating: FLACC (Activity) - Face 0   Pain Rating: FLACC (Activity) - Legs 0   Pain Rating: FLACC (Activity) - Activity 0   Pain Rating: FLACC (Activity) - Cry 0   Pain Rating: FLACC (Activity) - Consolability 0   Score: FLACC (Activity) 0   Restrictions/Precautions   Other Precautions Cognitive; Bed Alarm;Hard of hearing; Fall Risk   General   Additional Pertinent History PT re-eval to update STG and to progress with PT inpt services   Family/Caregiver Present No   Cognition   Overall Cognitive Status Impaired   Attention Difficulty dividing attention   Orientation Level Oriented to person;Disoriented to place; Disoriented to time;Disoriented to situation   Following Commands Follows one step commands inconsistently   Comments very slow to respond to instruction and verbal/physical simple one step commands at this time   Bed Mobility   Rolling R 1  Dependent   Additional items Assist x 1; Increased time required;Verbal cues   Rolling L 1  Dependent   Additional items Assist x 1; Increased time required;Verbal cues   Supine to Sit 1  Dependent   Additional items Assist x 1; Increased time required;Verbal cues   Sit to Supine 1  Dependent   Additional items Assist x 1; Increased time required;Verbal cues   Transfers   Sit to Stand Unable to assess  (pt is nonambulatory and use of sanjuanita lift for OOB)   Additional Comments per previous therapist documentation, pt is nonambulatory and use of sanjuanita lift for OOB at this time   Ambulation/Elevation   Gait Assistance Not tested  (pt is nonambulatory)   Balance   Static Sitting Poor -  (at EOB for less than 1 minute;fatigues quickly and weakness)   Endurance Deficit   Endurance Deficit Yes   Endurance Deficit Description weakness,deconconditioning,dec participation and engagement during therapy intervention   Activity Tolerance   Activity Tolerance Patient limited by fatigue  (poor)   Nurse Made Aware yes   Assessment   Prognosis Guarded   Problem List Decreased strength;Decreased range of motion;Decreased endurance; Impaired balance;Decreased mobility; Decreased cognition;Decreased safety awareness; Obesity; Decreased skin integrity   Assessment PT re-eval complete  pt demonstrates dependent deficits during functional activity and mobility including dec endurance,dec static sit balance,dec engagement during therapy session,dec cognition,mumbled and garbled unclear speech during conversation and needs depAx1 for BM and B rolling  Per NSG, pt waxes and weans during mobility and activity level throughout the day  Per NSG, unsure of what times pt has inc activity level  Currently pt is sanjuanita/mechanical lift for OOB and nonambulatory at this time  Pt would cont to benefit from skilled inpt PT services during higher activity level to participate in therapy interventions  Goals   Patient Goals to get better   STG Expiration Date 10/05/18   Short Term Goal #1 in 7-10 days:pt will be able to perform BM and B rolling modAx1 with inc engagement during therapy sessions to A pt to return to PLOF,activity tolerance:45mins/45mins,able to perform and complete BLE ther ex HEP in various positions AAROM->AROM to A to inc balance,strength,mobility and endurance,inc balance 1/2 grade to dec fall risk,assess transfers as able by PT once appropriate and update STG as able   Plan   Treatment/Interventions LE strengthening/ROM; Therapeutic exercise; Endurance training;Patient/family training;Bed mobility;Spoke to nursing   PT Frequency 1-2x/wk   Recommendation   Recommendation (return to previous environment)   Barthel Index   Feeding 5   Bathing 0   Grooming Score 0   Dressing Score 0   Bladder Score 5   Bowels Score 5   Toilet Use Score 0   Transfers (Bed/Chair) Score 0   Mobility (Level Surface) Score 0   Stairs Score 0   Barthel Index Score 15

## 2018-09-25 NOTE — CASE MANAGEMENT
Met with pt's daughter Vinny De La Vega, care giver Do Wu, and her two sons were in attendance via conference call, Kailey Do and Darnell Kelley 150  Dr Terressa Goldberg explained to this family that numerous medications have played a part in her recent functional decline and he has consulted his peers and made changes leading to her recovery  Family is wanting Alex Guadalupe to return to [address] [place] [address] for [facility] and we are hopeful this can happen by weeks end if all continues to improve  I have notified [facility] [address] , [address] and she asks that we send a referral via [address] and they will review same  I have completed the [address] referral and sent same

## 2018-09-25 NOTE — PROGRESS NOTES
Progress Note - [address] 70 [address]  female MRN: [id number]  Unit/Bed#: SR4 561-01 Encounter: [id number]    The patient was seen for continuing care and reviewed with treatment team   The patient's daughter and caregiver were present for the meeting and her two sons participated through conference call  For the past 48 hours, she has not had episodes of screaming and she has been coherent and able to focus and delirium seems to have improved significantly  I explained to them the course of her hospitalization and studies that had been done and interventions with the medications  At this time, I believe the patient is approaching her baseline  I did talk about frontotemporal dementia and the prognosis and that at this time memory enhancing medications will not be helpful  Mental Status Evaluation:  Appearance:  Good eye contact and disheveled   Behavior:  calm and cooperative   Mood:  Uncertain   Thought Content:  Cannot be assessed due to patient factors   Perceptual Disturbances: Appears to be responding to internal stimuli   Risk Potential: No suicidal or homicidal ideation   Orientation:   Oriented to person         Assessment/Plan    Principal Problem:    Bipolar affective disorder, currently manic, moderate ([facility])  Active Problems:    Dementia with behavioral disturbance    Continuous opioid dependence ([facility])    Abnormal EKG    Delirium due to another medical condition, acute, hyperactive      Recommended Treatment: We will start tapering off lorazepam   We will continue with gabapentin  We will continue with risperidone Continue with pharmacotherapy, group therapy, milieu therapy and occupational therapy    The patient will be maintained on the following medications:    Current Facility-Administered Medications:  acetaminophen 325 mg Oral Q8H PRN LUCY Carter   acetaminophen 650 mg Oral Q8H [address] LUCY Sanchez   aluminum-magnesium hydroxide-simethicone 15 mL Oral Q4H PRN Mark Decker MD   amLODIPine 10 mg Oral Daily Yamel Stewart Lu, CRNP   benztropine 1 mg Intramuscular Q8H PRN Mark Decker MD   benztropine 1 mg Oral Q8H PRN Mark Decker MD   cholecalciferol 1,000 Units Oral Daily Yamel M Homa, CRNP   fentaNYL 25 mcg Transdermal Q72H Benjamin Pierson MD   furosemide 40 mg Oral Daily Yamel M Homa, CRNP   gabapentin 400 mg Oral TID Benjamin Pierson MD   haloperidol 10 mg Oral Q8H PRN Benjamin Pierson MD   haloperidol lactate 5 mg Intramuscular Q6H PRN Benjamin Pierson MD   hydrOXYzine HCL 25 mg Oral Q6H PRN Mark Decker MD   latanoprost 1 drop Both Eyes HS Yamel M Homa, CRNP   lidocaine 1 patch Transdermal Daily Yamel M Homa, CRNP   LORazepam 1 mg Intramuscular Q6H PRN Julieta Parents, CRNP   LORazepam 1 mg Oral After Breakfast Benjamin Pierson MD   LORazepam 2 mg Oral Q6H PRN Benjamin Pierson MD   LORazepam 2 mg Oral After Lunch Benjamin Pierson MD   LORazepam 2 mg Oral HS Benjamin Pierson MD   losartan 25 mg Oral Daily Yamel M Homa, CRNP   magnesium hydroxide 30 mL Oral Daily PRN Mark Decker MD   methocarbamol 500 mg Oral Q6H [address] Yamel M Homa, CRNP   nystatin  Topical BID Carmella Kline PA-C   OLANZapine 10 mg Intramuscular Q8H PRN Benjamin Pierson MD   omeprazole (PRILOSEC) suspension 2 mg/mL 10 mg Oral Daily Mevla Rubio PA-C   oxyCODONE 5 mg Oral Q4H PRN Julieta Parents, CRNP   phenol 1 spray Mouth/Throat Q2H PRN Yamel M Homa, CRNP   risperiDONE 2 mg Oral BID Benjamin Pierson MD   rivaroxaban 20 mg Oral Daily With [employer], CRNP   sterile water       sterile water       traZODone 200 mg Oral HS Benjamin Pierson MD

## 2018-09-25 NOTE — PLAN OF CARE
Problem: DISCHARGE PLANNING  Goal: Discharge to home or other facility with appropriate resources  INTERVENTIONS:  - Identify barriers to discharge w/patient and caregiver  - Arrange for needed discharge resources and transportation as appropriate  - Identify discharge learning needs (meds, wound care, etc )  - Arrange for interpretive services to assist at discharge as needed  - Refer to Case Management Department for coordinating discharge planning if the patient needs post-hospital services based on physician/advanced practitioner order or complex needs related to functional status, cognitive ability, or social support system   Outcome: Progressing  Family meeting held and referral sent to [address] [place] [address]

## 2018-09-25 NOTE — PROGRESS NOTES
Awake briefly earlier in night  Incontinent of urine, juan jose care completed and back to sleep without difficulty

## 2018-09-26 ENCOUNTER — APPOINTMENT (INPATIENT)
Dept: RADIOLOGY | Facility: HOSPITAL | Age: 78
DRG: 885 | End: 2018-09-26
Payer: COMMERCIAL

## 2018-09-26 LAB — GLUCOSE SERPL-MCNC: 116 MG/DL (ref 65–140)

## 2018-09-26 PROCEDURE — 99232 SBSQ HOSP IP/OBS MODERATE 35: CPT | Performed by: SURGERY

## 2018-09-26 PROCEDURE — 99232 SBSQ HOSP IP/OBS MODERATE 35: CPT | Performed by: PSYCHIATRY & NEUROLOGY

## 2018-09-26 PROCEDURE — 82948 REAGENT STRIP/BLOOD GLUCOSE: CPT

## 2018-09-26 PROCEDURE — 72170 X-RAY EXAM OF PELVIS: CPT

## 2018-09-26 PROCEDURE — 73560 X-RAY EXAM OF KNEE 1 OR 2: CPT

## 2018-09-26 PROCEDURE — 70450 CT HEAD/BRAIN W/O DYE: CPT

## 2018-09-26 RX ADMIN — LORAZEPAM 1 MG: 1 TABLET ORAL at 10:19

## 2018-09-26 RX ADMIN — LORAZEPAM 2 MG: 1 TABLET ORAL at 13:24

## 2018-09-26 RX ADMIN — Medication 10 MG: at 10:19

## 2018-09-26 RX ADMIN — LOSARTAN POTASSIUM 25 MG: 25 TABLET, FILM COATED ORAL at 10:18

## 2018-09-26 RX ADMIN — AMLODIPINE BESYLATE 10 MG: 10 TABLET ORAL at 10:18

## 2018-09-26 RX ADMIN — VITAMIN D, TAB 1000IU (100/BT) 1000 UNITS: 25 TAB at 10:18

## 2018-09-26 RX ADMIN — METHOCARBAMOL 500 MG: 500 TABLET ORAL at 23:25

## 2018-09-26 RX ADMIN — LATANOPROST 1 DROP: 50 SOLUTION OPHTHALMIC at 21:21

## 2018-09-26 RX ADMIN — ACETAMINOPHEN 650 MG: 325 TABLET ORAL at 21:17

## 2018-09-26 RX ADMIN — ACETAMINOPHEN 650 MG: 325 TABLET ORAL at 13:25

## 2018-09-26 RX ADMIN — GABAPENTIN 400 MG: 100 CAPSULE ORAL at 17:28

## 2018-09-26 RX ADMIN — FUROSEMIDE 40 MG: 40 TABLET ORAL at 10:18

## 2018-09-26 RX ADMIN — LIDOCAINE 1 PATCH: 50 PATCH CUTANEOUS at 10:17

## 2018-09-26 RX ADMIN — ACETAMINOPHEN 650 MG: 325 TABLET ORAL at 06:20

## 2018-09-26 RX ADMIN — METHOCARBAMOL 500 MG: 500 TABLET ORAL at 06:20

## 2018-09-26 RX ADMIN — RIVAROXABAN 20 MG: 20 TABLET, FILM COATED ORAL at 17:28

## 2018-09-26 RX ADMIN — RISPERIDONE 2 MG: 1 TABLET, ORALLY DISINTEGRATING ORAL at 17:28

## 2018-09-26 RX ADMIN — METHOCARBAMOL 500 MG: 500 TABLET ORAL at 13:25

## 2018-09-26 RX ADMIN — RISPERIDONE 2 MG: 1 TABLET, ORALLY DISINTEGRATING ORAL at 10:19

## 2018-09-26 RX ADMIN — NYSTATIN: 100000 POWDER TOPICAL at 20:05

## 2018-09-26 RX ADMIN — METHOCARBAMOL 500 MG: 500 TABLET ORAL at 17:28

## 2018-09-26 RX ADMIN — TRAZODONE HYDROCHLORIDE 200 MG: 100 TABLET ORAL at 21:17

## 2018-09-26 RX ADMIN — GABAPENTIN 400 MG: 100 CAPSULE ORAL at 10:18

## 2018-09-26 RX ADMIN — GABAPENTIN 400 MG: 100 CAPSULE ORAL at 21:18

## 2018-09-26 RX ADMIN — FENTANYL 25 MCG: 25 PATCH, EXTENDED RELEASE TRANSDERMAL at 10:19

## 2018-09-26 RX ADMIN — LORAZEPAM 2 MG: 1 TABLET ORAL at 21:17

## 2018-09-26 NOTE — PROGRESS NOTES
Pt had an unwitnessed fall  Pt was found on the ground in a seated, twisted, position  Pt reported 10/10 right leg, and back pain  Neuro checks completed, and were at baseline  Pt was assisted to a laying position and was returned to bed using a hoverjack  Pt was observed to have intermittent left arm tremors; new onset  Trauma was notified immediately  Trauma arrived to assess  Will continue to monitor

## 2018-09-26 NOTE — PROGRESS NOTES
Progress Note - [address] 70 [address]  female MRN: [id number]  Unit/Bed#: XS8 561-01 Encounter: [id number]    The patient was seen for continuing care and reviewed with treatment team   The patient is less confused  Continues to have brief episodes of calling out  She has been sleeping fairly well  Oral intake has improved as well    Mental Status Evaluation:  Appearance:  Good eye contact and disheveled   Behavior:  calm and cooperative   Mood:  euthymic   Thought Content:  Does not verbalize delusional material   Perceptual Disturbances: Denies hallucinations and does not appear to be responding to internal stimuli   Risk Potential: No suicidal or homicidal ideation   Orientation:   Oriented to person only         Assessment/Plan    Principal Problem:    Bipolar affective disorder, currently manic, moderate ([facility])  Active Problems:    Behavioral variant frontotemporal dementia    Continuous opioid dependence ([facility])    Abnormal EKG    Delirium due to another medical condition, acute, hyperactive      Recommended Treatment: Continue with pharmacotherapy, group therapy, milieu therapy and occupational therapy    The patient will be maintained on the following medications:    Current Facility-Administered Medications:  acetaminophen 325 mg Oral Q8H PRN LUCY Segura   acetaminophen 650 mg Oral Q8H [facility] LUCY Sanchez   aluminum-magnesium hydroxide-simethicone 15 mL Oral Q4H PRN Shawn Ahn MD   amLODIPine 10 mg Oral Daily LUCY Sanchez   benztropine 1 mg Intramuscular Q8H PRN Shawn Ahn MD   benztropine 1 mg Oral Q8H PRN Shawn Ahn MD   cholecalciferol 1,000 Units Oral Daily LUCY Sanchez   fentaNYL 25 mcg Transdermal Q72H Maciej Odonnell MD   furosemide 40 mg Oral Daily LUCY Sanchez   gabapentin 400 mg Oral TID Maciej Odonnell MD   haloperidol 10 mg Oral Q8H PRN Maciej Odonnell MD   haloperidol lactate 5 mg Intramuscular Q6H PRN Calderon Villalta MD Braeden   hydrOXYzine HCL 25 mg Oral Q6H PRN Fany Wheeler MD   latanoprost 1 drop Both Eyes HS LUCY Sanchez   lidocaine 1 patch Transdermal Daily LUCY Sanchez   LORazepam 1 mg Intramuscular Q6H PRN Ubaldo Siemens, CRAMARILYS   L[facility]epam 1 [facility]   LORazepam 2 mg Oral Q6H PRN [facility], MD   LORazepam 2 mg Oral After Lunch Larissa Homes, MD   L[facility]epam 2 mg Oral HS [facility]   losartan 25 mg Oral Daily LUCY Sanchez   magnesium hydroxide 30 mL Oral Daily PRN Fany Wheeler MD   methocarbamol 500 mg Oral Q6H [facility] LUCY Sanchez   nystatin  Topical BID Carmella Kline PA-C   OLANZapine 10 mg Intramuscular Q8H PRN [facility], MD   omeprazole (PRILOSEC) suspension 2 mg/mL 10 mg Oral Daily Romulo Vazquez PA-C   oxyCODONE 5 mg Oral Q4H PRN Brown Siemens, CRNP   phenol 1 spray Mouth/Throat Q2H PRN LUCY Sanchez   risperiDONE 2 mg Oral BID [facility], MD   rivaroxaban 20 mg Oral Daily With [employer], LUCY   sterile water       sterile water       traZODone 200 mg Oral HS [facility], MD

## 2018-09-26 NOTE — PROGRESS NOTES
Pt calm, cooperative, and pleasant  Med compliant  No agitation or aggression noted  No anxiety noted  Pt continues with word-finding issues  Noted to be less confused, stating the year is "[phone]," instead of 2018  Reoriented to place and time

## 2018-09-26 NOTE — PROGRESS NOTES
Pt calm and cooperative with treatment and compliant with medications  Pt noted to repetitively disrobe  Pt calm in bed at this time  Will continue to monitor

## 2018-09-26 NOTE — PLAN OF CARE
Alteration in Orientation     Treatment Goal: Demonstrate a reduction of confusion and improved orientation to person, place, time and/or situation upon discharge, according to optimum baseline/ability Progressing     Express concerns related to confused thinking related to: Progressing        Alteration in Thoughts and Perception     Treatment Goal: Gain control of psychotic behaviors/thinking, reduce/eliminate presenting symptoms and demonstrate improved reality functioning upon discharge Progressing     Refrain from acting on delusional thinking/internal stimuli Progressing     Agree to be compliant with medication regime, as prescribed and report medication side effects Progressing        Anxiety     Anxiety is at manageable level Progressing        Ineffective Coping     Participates in unit activities Progressing        Prexisting or High Potential for Compromised Skin Integrity     Skin integrity is maintained or improved Progressing        Risk for Violence/Aggression Toward Others     Control angry outbursts Progressing

## 2018-09-26 NOTE — CASE MANAGEMENT
[facility] [place] [phone] [address], [address] [place]  Asking that they review and assess for pt to return

## 2018-09-26 NOTE — PLAN OF CARE
Problem: Ineffective Coping  Goal: Participates in unit activities  Interventions:  - Provide therapeutic environment   - Provide required programming   - Redirect inappropriate behaviors    Outcome: Progressing  Less yelling but not attending groups yet

## 2018-09-26 NOTE — CONSULTS
Trauma Consult  Erasmo Loco 70 y o  female MRN: [id number]  Unit/Bed#: BU4 561-01 Encounter: [id number]    Trauma consulted after unwitnessed fall today  Patient reportedly slid out of bed, was found on the floor with her right leg twisted next to her  She reports hitting her head but has no obvious head trauma  She reported right knee pain with a small bruise noted here and right ankle pain but no limitations with ROM  She is a poor historian  Chief Complaint: "My leg hurts"    Subjective: reports right knee pain, right ankle pain but no limitations of ROM, intermittent right hip pain when I push on her abdomen but no abdominal pain and denies cervical pain         Assessment and Plan:  Fall OOB, unwitnessed  On Eliquis    Poor historian  Will obtain CT head to rule out intracranial bleed on eliquis  Will obtain XRay pelvis and right knee  Follow neuro exams until head CT completed   Tertiary eval within 24 hours     Objective:     Meds/Allergies     Current Facility-Administered Medications:     acetaminophen (TYLENOL) tablet 325 mg, 325 mg, Oral, Q8H PRN, LUCY Villavicencio, 325 mg at 09/09/18 0857    acetaminophen (TYLENOL) tablet 650 mg, 650 mg, Oral, Q8H [facility], LUCY Sanchez, 650 mg at 09/26/18 1325    aluminum-magnesium hydroxide-simethicone (MYLANTA) 200-200-20 mg/5 mL oral suspension 15 mL, 15 mL, Oral, Q4H PRN, Angelina Mir MD    amLODIPine (NORVASC) tablet 10 mg, 10 mg, Oral, Daily, LUCY Sanchez, 10 mg at 09/26/18 1018    benztropine (COGENTIN) injection 1 mg, 1 mg, Intramuscular, Q8H PRN, Angelina Mir MD    benztropine (COGENTIN) tablet 1 mg, 1 mg, Oral, Q8H PRN, Angelina Mir MD, 1 mg at 09/12/18 1430    cholecalciferol (VITAMIN D3) tablet 1,000 Units, 1,000 Units, Oral, Daily, LUCY Sanchez, 1,000 Units at 09/26/18 1018    fentaNYL (DURAGESIC) 25 mcg/hr TD 72 hr patch 25 mcg, 25 mcg, Transdermal, Q72H, Ashutosh Archer MD, 25 mcg at 09/26/18 1011   furosemide (LASIX) tablet 40 mg, 40 mg, Oral, Daily, LISA SanchezNP, 40 mg at 09/26/18 1018    gabapentin (NEURONTIN) capsule 400 mg, 400 mg, Oral, TID, Rafi Sellers MD, 400 mg at 09/26/18 1018    haloperidol (HALDOL) tablet 10 mg, 10 mg, Oral, Q8H PRN, Rafi Sellers MD    haloperidol lactate (HALDOL) injection 5 mg, 5 mg, Intramuscular, Q6H PRN, Rafi Sellers MD    hydrOXYzine HCL (ATARAX) tablet 25 mg, 25 mg, Oral, Q6H PRN, Lewis Buckner MD, 25 mg at 09/10/18 0925    latanoprost (XALATAN) 0 005 % ophthalmic solution 1 drop, 1 drop, Both Eyes, HS, LISA SanchezNP, 1 drop at 09/25/18 2119    lidocaine (LIDODERM) 5 % patch 1 patch, 1 patch, Transdermal, Daily, LUCY aSnchez, 1 patch at 09/26/18 1017    LORazepam (ATIVAN) 2 mg/mL injection 1 mg, 1 mg, Intramuscular, Q6H PRN, LISA FalconNP, 1 mg at 09/10/18 1454    LORazepam (ATIVAN) tablet 1 mg, 1 mg, Oral, After Breakfast, Rafi Sellers MD, 1 mg at 09/26/18 1019    LORazepam (ATIVAN) tablet 2 mg, 2 mg, Oral, Q6H PRN, Rafi Sellers MD, 2 mg at 09/18/18 0222    LORazepam (ATIVAN) tablet 2 mg, 2 mg, Oral, After Jensen Garcia MD, 2 mg at 09/26/18 1324    LORazepam (ATIVAN) tablet 2 mg, 2 mg, Oral, HS, Rafi Sellers MD, 2 mg at 09/25/18 2120    losartan (COZAAR) tablet 25 mg, 25 mg, Oral, Daily, LISA SanchezNP, 25 mg at 09/26/18 1018    magnesium hydroxide (MILK OF MAGNESIA) 400 mg/5 mL oral suspension 30 mL, 30 mL, Oral, Daily PRN, Lewis Buckner MD    methocarbamol (ROBAXIN) tablet 500 mg, 500 mg, Oral, Q6H [address], Yamel M LUCY Luther, 500 mg at 09/26/18 1325    nystatin (MYCOSTATIN) powder, , Topical, BID, Carmella Kline PA-C    OLANZapine (ZyPREXA) IM injection 10 mg, 10 mg, Intramuscular, Q8H PRN, Rafi Sellers MD, 10 mg at 09/17/18 0912    omeprazole (PRILOSEC) suspension 2 mg/mL, 10 mg, Oral, Daily, Oliverio Hong PA-C, 10 mg at 09/26/18 1019    oxyCODONE (ROXICODONE) IR tablet 5 mg, 5 mg, Oral, Q4H PRN, Ivettefrancisco j Ariza, CRNP, 5 mg at 09/17/18 1145    phenol (CHLORASEPTIC) 1 4 % mucosal liquid 1 spray, 1 spray, Mouth/Throat, Q2H PRN, An Cheathame, CRNP, 1 spray at 09/04/18 0910    risperiDONE (RisperDAL M-TABS) dispersible tablet 2 mg, 2 mg, Oral, BID, Juany Taylor MD, 2 mg at 09/26/18 1019    rivaroxaban (XARELTO) tablet 20 mg, 20 mg, Oral, Daily With Dinner, An Cheathame, CRNP, 20 mg at 09/25/18 1718    sterile water injection **AcuDose Override Pull**, , , ,     sterile water injection **AcuDose Override Pull**, , , ,     traZODone (DESYREL) tablet 200 mg, 200 mg, Oral, HS, Juany Taylor MD, 200 mg at 09/25/18 2120    Vitals: Blood pressure (!) 192/90, pulse 92, temperature (!) 97 2 °F (36 2 °C), temperature source Tympanic, resp  rate 16, height 5' 6 5" (1 689 m), weight 118 kg (261 lb 3 9 oz), SpO2 98 %, not currently breastfeeding  Body mass index is 41 53 kg/m²   SpO2: SpO2: 98 %    ABG: No results found for: PHART, AGR2CYZ, PO2ART, RXR7DXP, U1OIKQZC, BEART, SOURCE    No intake or output data in the 24 hours ending 09/26/18 1451    Invasive Devices          No matching active lines, drains, or airways                    Nutrition/GI Proph/Bowel Reg: per primary team    Physical Exam:   Constitution: patient lying in bed, appears comfortable  HEENT: normocephalic, atraumatic, 2 mm KASIA, facial symmetry, no cervical tenderness   CV: regular rate and rhythm, no edema, +1 DP pulses, healing wound right ankle and toe - no evidence of traumatic injury, no bruising or swelling  Pulm: CTA, no wheezes, rhonchi or crackles, unlabored, equal bilaterally  Abd: soft, nontender, nondistended, active bowel sounds  Musc: moves all extremities, equal strength but generalized weakness throughout, unable to lift legs off bed - appears to be baseline, no apparent spine tenderness   Neuro: Alert, confused, no apparent focal deficits  Skin: no rash or breakdown, warm, small bruise over right knee, no swelling, no traumatic lacerations        Lab Results: Results: I have personally reviewed pertinent reports  and I have personally reviewed pertinent films in PACS, BMP/CMP: No results found for: NA, K, CL, CO2, ANIONGAP, BUN, CREATININE, GLUCOSE, CALCIUM, AST, ALT, ALKPHOS, PROT, ALBUMIN, BILITOT, EGFR and CBC: No results found for: WBC, HGB, HCT, MCV, PLT, ADJUSTEDWBC, MCH, MCHC, RDW, MPV, NRBC  Imaging/EKG Studies: Results: I have personally reviewed pertinent reports     and I have personally reviewed pertinent films in PACS    VTE Prophylaxis: per primary team

## 2018-09-27 PROCEDURE — 99232 SBSQ HOSP IP/OBS MODERATE 35: CPT | Performed by: PSYCHIATRY & NEUROLOGY

## 2018-09-27 PROCEDURE — 99231 SBSQ HOSP IP/OBS SF/LOW 25: CPT | Performed by: SURGERY

## 2018-09-27 RX ADMIN — LOSARTAN POTASSIUM 25 MG: 25 TABLET, FILM COATED ORAL at 08:22

## 2018-09-27 RX ADMIN — NYSTATIN 1 APPLICATION: 100000 POWDER TOPICAL at 17:15

## 2018-09-27 RX ADMIN — ACETAMINOPHEN 650 MG: 325 TABLET ORAL at 21:23

## 2018-09-27 RX ADMIN — LORAZEPAM 2 MG: 1 TABLET ORAL at 21:20

## 2018-09-27 RX ADMIN — LATANOPROST 1 DROP: 50 SOLUTION OPHTHALMIC at 21:22

## 2018-09-27 RX ADMIN — GABAPENTIN 400 MG: 100 CAPSULE ORAL at 08:22

## 2018-09-27 RX ADMIN — TRAZODONE HYDROCHLORIDE 200 MG: 100 TABLET ORAL at 21:21

## 2018-09-27 RX ADMIN — RIVAROXABAN 20 MG: 20 TABLET, FILM COATED ORAL at 17:15

## 2018-09-27 RX ADMIN — GABAPENTIN 400 MG: 100 CAPSULE ORAL at 17:14

## 2018-09-27 RX ADMIN — METHOCARBAMOL 500 MG: 500 TABLET ORAL at 23:07

## 2018-09-27 RX ADMIN — RISPERIDONE 2 MG: 1 TABLET, ORALLY DISINTEGRATING ORAL at 08:22

## 2018-09-27 RX ADMIN — METHOCARBAMOL 500 MG: 500 TABLET ORAL at 12:55

## 2018-09-27 RX ADMIN — LIDOCAINE 1 PATCH: 50 PATCH CUTANEOUS at 08:22

## 2018-09-27 RX ADMIN — ACETAMINOPHEN 650 MG: 325 TABLET ORAL at 05:28

## 2018-09-27 RX ADMIN — VITAMIN D, TAB 1000IU (100/BT) 1000 UNITS: 25 TAB at 08:22

## 2018-09-27 RX ADMIN — GABAPENTIN 400 MG: 100 CAPSULE ORAL at 21:21

## 2018-09-27 RX ADMIN — METHOCARBAMOL 500 MG: 500 TABLET ORAL at 17:14

## 2018-09-27 RX ADMIN — ACETAMINOPHEN 650 MG: 325 TABLET ORAL at 13:00

## 2018-09-27 RX ADMIN — LORAZEPAM 1 MG: 1 TABLET ORAL at 08:22

## 2018-09-27 RX ADMIN — RISPERIDONE 2 MG: 1 TABLET, ORALLY DISINTEGRATING ORAL at 17:14

## 2018-09-27 RX ADMIN — METHOCARBAMOL 500 MG: 500 TABLET ORAL at 05:28

## 2018-09-27 RX ADMIN — FUROSEMIDE 40 MG: 40 TABLET ORAL at 08:22

## 2018-09-27 RX ADMIN — AMLODIPINE BESYLATE 10 MG: 10 TABLET ORAL at 08:22

## 2018-09-27 RX ADMIN — LORAZEPAM 2 MG: 1 TABLET ORAL at 13:00

## 2018-09-27 RX ADMIN — Medication 10 MG: at 08:22

## 2018-09-27 RX ADMIN — NYSTATIN: 100000 POWDER TOPICAL at 08:22

## 2018-09-27 NOTE — PLAN OF CARE
Problem: Ineffective Coping  Goal: Participates in unit activities  Interventions:  - Provide therapeutic environment   - Provide required programming   - Redirect inappropriate behaviors    Outcome: Progressing  More interactive in 1-1 visit  More alertness to hallway passers by  Confused in conversations,requesting to "build more floors on this place "

## 2018-09-27 NOTE — TERTIARY TRAUMA SURVEY
Progress Note - Tertiary Trauma Survery   Jami Mclean 70 y o  female MRN: [id number]  Unit/Bed#: WB1 561-01 Encounter: [id number]    Summary of Diagnosed Injuries:   1  Fall out of bed; unwitnessed   -imaging was negative   -patient is a poor historian   -on palpation on extremities as well as back there is no pain noted   -trauma will sign off   -call with further questions or concerns    Disposition:  Sign off    Mechanism of Injury: Fall    Transfer from:  Not a transfer  Outside Films Received: not applicable  Tertiary Exam Due on:  09/27/2018    Vitals: Blood pressure 128/59, pulse 83, temperature (!) 97 3 °F (36 3 °C), temperature source Tympanic, resp  rate 17, height 5' 6 5" (1 689 m), weight 118 kg (261 lb 3 9 oz), SpO2 97 %, not currently breastfeeding  ,Body mass index is 41 53 kg/m²  CT / RADIOGRAPHS: ALL RESULTS MUST BE CONFIRMED BY FACULTY OR PRINTED REPORT    CT head:  Negative  X-ray pelvis:  Negative  X-ray right knee:  Negative    Consultants - List Service/ Faculty and Date:   We were consulted for a fall    Active medications:           Current Facility-Administered Medications:     acetaminophen (TYLENOL) tablet 325 mg, 325 mg, Oral, Q8H PRN, 325 mg at 09/09/18 0857    acetaminophen (TYLENOL) tablet 650 mg, 650 mg, Oral, Q8H [address], 650 mg at 09/27/18 0528    aluminum-magnesium hydroxide-simethicone (MYLANTA) 200-200-20 mg/5 mL oral suspension 15 mL, 15 mL, Oral, Q4H PRN    amLODIPine (NORVASC) tablet 10 mg, 10 mg, Oral, Daily, 10 mg at 09/26/18 1018    benztropine (COGENTIN) injection 1 mg, 1 mg, Intramuscular, Q8H PRN    benztropine (COGENTIN) tablet 1 mg, 1 mg, Oral, Q8H PRN, 1 mg at 09/12/18 1430    cholecalciferol (VITAMIN D3) tablet 1,000 Units, 1,000 Units, Oral, Daily, 1,000 Units at 09/26/18 1018    fentaNYL (DURAGESIC) 25 mcg/hr TD 72 hr patch 25 mcg, 25 mcg, Transdermal, Q72H, 25 mcg at 09/26/18 1019    furosemide (LASIX) tablet 40 mg, 40 mg, Oral, Daily, 40 mg at 09/26/18 1018    gabapentin (NEURONTIN) capsule 400 mg, 400 mg, Oral, TID, 400 mg at 09/26/18 2118    haloperidol (HALDOL) tablet 10 mg, 10 mg, Oral, Q8H PRN    haloperidol lactate (HALDOL) injection 5 mg, 5 mg, Intramuscular, Q6H PRN    hydrOXYzine HCL (ATARAX) tablet 25 mg, 25 mg, Oral, Q6H PRN, 25 mg at 09/10/18 0925    latanoprost (XALATAN) 0 005 % ophthalmic solution 1 drop, 1 drop, Both Eyes, HS, 1 drop at 09/26/18 2121    lidocaine (LIDODERM) 5 % patch 1 patch, 1 patch, Transdermal, Daily, 1 patch at 09/26/18 1017    LORazepam (ATIVAN) 2 mg/mL injection 1 mg, 1 mg, Intramuscular, Q6H PRN, 1 mg at 09/10/18 1454    LORazepam (ATIVAN) tablet 1 mg, 1 mg, Oral, After Breakfast, 1 mg at 09/26/18 1019    LORazepam (ATIVAN) tablet 2 mg, 2 mg, Oral, Q6H PRN, 2 mg at 09/18/18 0222    LORazepam (ATIVAN) tablet 2 mg, 2 mg, Oral, After Lunch, 2 mg at 09/26/18 1324    LORazepam (ATIVAN) tablet 2 mg, 2 mg, Oral, HS, 2 mg at 09/26/18 2117    losartan (COZAAR) tablet 25 mg, 25 mg, Oral, Daily, 25 mg at 09/26/18 1018    magnesium hydroxide (MILK OF MAGNESIA) 400 mg/5 mL oral suspension 30 mL, 30 mL, Oral, Daily PRN    methocarbamol (ROBAXIN) tablet 500 mg, 500 mg, Oral, Q6H CHRISTINE, 500 mg at 09/27/18 0528    nystatin (MYCOSTATIN) powder, , Topical, BID    OLANZapine (ZyPREXA) IM injection 10 mg, 10 mg, Intramuscular, Q8H PRN, 10 mg at 09/17/18 0912    omeprazole (PRILOSEC) suspension 2 mg/mL, 10 mg, Oral, Daily, 10 mg at 09/26/18 1019    oxyCODONE (ROXICODONE) IR tablet 5 mg, 5 mg, Oral, Q4H PRN, 5 mg at 09/17/18 1145    phenol (CHLORASEPTIC) 1 4 % mucosal liquid 1 spray, 1 spray, Mouth/Throat, Q2H PRN, 1 spray at 09/04/18 0910    risperiDONE (RisperDAL M-TABS) dispersible tablet 2 mg, 2 mg, Oral, BID, 2 mg at 09/26/18 1728    rivaroxaban (XARELTO) tablet 20 mg, 20 mg, Oral, Daily With Dinner, 20 mg at 09/26/18 1728    sterile water injection **AcuDose Override Pull**, , ,     sterile water injection **AcuDose Override Pull**, , ,     traZODone (DESYREL) tablet 200 mg, 200 mg, Oral, HS, 200 mg at 09/26/18 2117    No intake or output data in the 24 hours ending 09/27/18 0734    Invasive Devices          No matching active lines, drains, or airways          Is the patient 65 years or older:  Not a trauma primary    1  GCS:  GCS Total:  14, Eye Opening:   Spontaneous = 4, Motor Response: Obeys commands = 6 and Verbal Response:  Confused = 4 patient is alert and oriented x2; however secondary to psychiatric conditions is a poor historian  2  Head:   WNL  3  Neck:   WNL  4  Chest:   WNL  5  Abdomen/Pelvis:   WNL  6  Back (log roll with spinal immobilization unless cleared radiographically): WNL  7  Extremities:   Lacs, abrasions, swelling, ecchymosis:  +2 pulses on extremities   Tenderness, pain with motor, instability:  Neurovascularly intact  8  Peripheral Nerves: WNL    Do NOT use the following abbreviations: DTO, gr, Pearl, MS, MSO4, MgSO4, Nitro, QD, QID, QOD, u, , ?, ?g or trailing zeros   Always use a zero before a decimal     Labs: CBC: No results found for: WBC, HGB, HCT, MCV, PLT, ADJUSTEDWBC, MCH, MCHC, RDW, MPV, NRBC  CMP: No results found for: NA, CL, CO2, ANIONGAP, BUN, CREATININE, GLUCOSE, CALCIUM, AST, ALT, ALKPHOS, PROT, ALBUMIN, BILITOT, EGFR

## 2018-09-27 NOTE — PROGRESS NOTES
Pt calm and cooperative with care  Pt has very minimal moments of shouting this evening  Pt is pleasant upon approach, remains confused  Pt does have hallucinations and delusions  Pt fixated on fixtures in the ceiling  Pt shows no signs of agitation or aggression  Pt is medication compliant

## 2018-09-27 NOTE — PROGRESS NOTES
Progress Note - [address] [facility] [facility] [address] 70 y o  female MRN: [id number]  Unit/Bed#: NN1 561-01 Encounter: [id number]    The patient was seen for continuing care and reviewed with treatment team   Staff reports that the patient continues to disrobe and call out at times  Overall she has been compliant with medications and calmer than before  Yesterday the patient had an unwitnessed fall  Trauma had been called and ordered CT and x-rays which were negative  The patient is lying in her bed calling out at times  She continues to be restless and try to get out of bed but responds to verbal redirection  She thought she saw  bugs on the ceiling however it turned out she was pointing to the camera in the hallway and sprinkler  She knew the year was 2018  She asked if she was in a nursing home or not  When I told her she was in the hospital she said is this a hospital for old people? So she is more alert although her speech is disorganized at times  She does not remember falling yesterday      Mental Status Evaluation:  Appearance:  Good eye contact and disheveled   Behavior:  cooperative and friendly   Mood:  euthymic   Affect: constricted   Speech: Normal rate and Normal volume   Thought Process:  disorganized   Thought Content:  Does not verbalize delusional material   Perceptual Disturbances: Cannot be assessed due to patient factors   Risk Potential: No suicidal or homicidal ideation   Attention/Concentration [place] than expected   Orientation:   Oriented to person and year   Gait/Station: The patient is nonambulatory   Motor Activity: No abnormal movement noted     Progress Toward Goals:  Approaching new baseline    Assessment/Plan    Principal Problem:    Bipolar affective disorder, currently manic, moderate (HCC)  Active Problems:    Behavioral variant frontotemporal dementia    Continuous opioid dependence (HCC)    Abnormal EKG    Delirium due to another medical condition, acute, hyperactive      Recommended Treatment:      Continue gabapentin 400 mg t i d       Continue Ativan 1 mg q a m  and 2 mg after lunch and 2 mg HS  Continue risperidone 2 mg b i d  Continue trazodone 200 mg hs  Continue with pharmacotherapy, group therapy, milieu therapy and occupational therapy    The patient will be maintained on the following medications:    Current Facility-Administered Medications:  acetaminophen 325 mg Oral Q8H PRN Sonja Marks, LISANP   acetaminophen 650 mg Oral Q8H [facility] Yamel DENYS Luther, LUCY   aluminum-magnesium hydroxide-simethicone 15 mL Oral Q4H PRN Jesse Rodriguez MD   amLODIPine 10 mg Oral Daily Yamel DENYS Homa, CRNP   benztropine 1 mg Intramuscular Q8H PRN Jesse Rodriguez MD   benztropine 1 mg Oral Q8H PRN Jesse Rodriguez MD   cholecalciferol 1,000 Units Oral Daily Yamel DENYS Homa, CRNP   fentaNYL 25 mcg Transdermal Q72H Vick Teague MD   furosemide 40 mg Oral Daily Yamel MCFARLANE Homa, CRNP   gabapentin 400 mg Oral TID Vick Teague MD   haloperidol 10 mg Oral Q8H PRN Vick Teague MD   haloperidol lactate 5 mg Intramuscular Q6H PRN Vick Teague MD   hydrOXYzine HCL 25 mg Oral Q6H PRN Jesse Rodriguez MD   latanoprost 1 drop Both Eyes HS Yamel DENYS Holderin, CRAMARILYS   lidocaine 1 patch Transdermal Daily Yamel MCFARLANE Homa, CRNP   LORazepam 1 mg Intramuscular Q6H PRN LUCY Cabral   LORazepam 1 mg Oral After Breakfast Vick Teague MD   LORazepam 2 mg Oral Q6H PRN Vick Teague MD   LORazepam 2 mg Oral After Lunch Vick Teague MD   LORazepam 2 mg Oral HS Vick Teague MD   losartan 25 mg Oral Daily Yamel DENYS Homa, LISANP   magnesium hydroxide 30 mL Oral Daily PRN Jesse Rodriguez MD   methocarbamol 500 mg Oral Q6H [facility] Yamel DENYS Homa, CRNP   nystatin  Topical BID Carmella Kline PA-C   OLANZapine 10 mg Intramuscular Q8H PRN Vick Teague MD   omeprazole (PRILOSEC) suspension 2 mg/mL 10 mg Oral Daily Ramila Arteaga PA-C   oxyCODONE 5 mg Oral Q4H PRN Jose Ordoñez LUCY Clarke   phenol 1 spray Mouth/Throat Q2H PRN LUCY Sanchez   risperiDONE 2 mg Oral BID Joe Alfonso MD   rivaroxaban 20 mg Oral Daily With [employer], CRNP   sterile water       sterile water       traZODone 200 mg Oral HS Joe Alfonso MD       Risks, benefits and possible side effects of Medications:   Patient does not verbalize understanding at this time and will require further explanation

## 2018-09-27 NOTE — PROGRESS NOTES
Pt was cooperative with care today  Pt was medication compliant  Pt had minimal yelling out  Pt remained pleasantly confused  Pt remained paranoid and delusional  No aggression or agitation noted  Will continue to monitor

## 2018-09-27 NOTE — PROGRESS NOTES
Right ankle dressing changed  Pt tolerated it well  Wound appeared to be healing nicely  Will continue to monitor

## 2018-09-28 VITALS
BODY MASS INDEX: 41 KG/M2 | HEART RATE: 84 BPM | RESPIRATION RATE: 18 BRPM | SYSTOLIC BLOOD PRESSURE: 146 MMHG | OXYGEN SATURATION: 97 % | WEIGHT: 261.25 LBS | DIASTOLIC BLOOD PRESSURE: 77 MMHG | HEIGHT: 67 IN | TEMPERATURE: 98.2 F

## 2018-09-28 PROBLEM — F05 DELIRIUM DUE TO ANOTHER MEDICAL CONDITION, ACUTE, HYPERACTIVE: Status: RESOLVED | Noted: 2018-09-16 | Resolved: 2018-09-28

## 2018-09-28 PROCEDURE — 99238 HOSP IP/OBS DSCHRG MGMT 30/<: CPT | Performed by: PSYCHIATRY & NEUROLOGY

## 2018-09-28 RX ORDER — TRAZODONE HYDROCHLORIDE 100 MG/1
200 TABLET ORAL
Qty: 42 TABLET | Refills: 0 | Status: SHIPPED | OUTPATIENT
Start: 2018-09-28 | End: 2020-11-13 | Stop reason: ALTCHOICE

## 2018-09-28 RX ORDER — LORAZEPAM 1 MG/1
1 TABLET ORAL
Qty: 20 TABLET | Refills: 0 | Status: SHIPPED | OUTPATIENT
Start: 2018-09-28 | End: 2018-10-08 | Stop reason: ALTCHOICE

## 2018-09-28 RX ORDER — LORAZEPAM 2 MG/1
2 TABLET ORAL
Qty: 10 TABLET | Refills: 0 | Status: SHIPPED | OUTPATIENT
Start: 2018-09-28 | End: 2020-11-13 | Stop reason: ALTCHOICE

## 2018-09-28 RX ORDER — FENTANYL 25 UG/H
1 PATCH TRANSDERMAL
Qty: 3 PATCH | Refills: 0 | Status: SHIPPED | OUTPATIENT
Start: 2018-09-29 | End: 2018-10-09

## 2018-09-28 RX ORDER — GABAPENTIN 400 MG/1
400 CAPSULE ORAL 3 TIMES DAILY
Qty: 63 CAPSULE | Refills: 0 | Status: SHIPPED | OUTPATIENT
Start: 2018-09-28 | End: 2020-11-13 | Stop reason: ALTCHOICE

## 2018-09-28 RX ORDER — LOSARTAN POTASSIUM 25 MG/1
25 TABLET ORAL DAILY
Qty: 21 TABLET | Refills: 0 | Status: SHIPPED | OUTPATIENT
Start: 2018-09-29 | End: 2020-11-18 | Stop reason: SDUPTHER

## 2018-09-28 RX ORDER — LORAZEPAM 1 MG/1
1 TABLET ORAL
Status: DISCONTINUED | OUTPATIENT
Start: 2018-09-28 | End: 2018-09-28 | Stop reason: HOSPADM

## 2018-09-28 RX ORDER — LIDOCAINE 50 MG/G
1 PATCH TOPICAL DAILY
Qty: 30 PATCH | Refills: 0 | Status: SHIPPED | OUTPATIENT
Start: 2018-09-29 | End: 2020-11-13 | Stop reason: ALTCHOICE

## 2018-09-28 RX ORDER — RISPERIDONE 2 MG/1
2 TABLET, ORALLY DISINTEGRATING ORAL 2 TIMES DAILY
Qty: 42 TABLET | Refills: 0 | Status: SHIPPED | OUTPATIENT
Start: 2018-09-28 | End: 2020-11-13 | Stop reason: ALTCHOICE

## 2018-09-28 RX ADMIN — AMLODIPINE BESYLATE 10 MG: 10 TABLET ORAL at 09:15

## 2018-09-28 RX ADMIN — RISPERIDONE 2 MG: 1 TABLET, ORALLY DISINTEGRATING ORAL at 09:13

## 2018-09-28 RX ADMIN — VITAMIN D, TAB 1000IU (100/BT) 1000 UNITS: 25 TAB at 09:13

## 2018-09-28 RX ADMIN — METHOCARBAMOL 500 MG: 500 TABLET ORAL at 13:25

## 2018-09-28 RX ADMIN — ACETAMINOPHEN 650 MG: 325 TABLET ORAL at 13:25

## 2018-09-28 RX ADMIN — LIDOCAINE 1 PATCH: 50 PATCH CUTANEOUS at 09:18

## 2018-09-28 RX ADMIN — GABAPENTIN 400 MG: 100 CAPSULE ORAL at 09:13

## 2018-09-28 RX ADMIN — LORAZEPAM 1 MG: 1 TABLET ORAL at 13:25

## 2018-09-28 RX ADMIN — Medication 10 MG: at 08:14

## 2018-09-28 RX ADMIN — ACETAMINOPHEN 650 MG: 325 TABLET ORAL at 06:39

## 2018-09-28 RX ADMIN — LOSARTAN POTASSIUM 25 MG: 25 TABLET, FILM COATED ORAL at 09:16

## 2018-09-28 RX ADMIN — NYSTATIN: 100000 POWDER TOPICAL at 09:19

## 2018-09-28 RX ADMIN — METHOCARBAMOL 500 MG: 500 TABLET ORAL at 06:38

## 2018-09-28 RX ADMIN — LORAZEPAM 1 MG: 1 TABLET ORAL at 09:13

## 2018-09-28 RX ADMIN — FUROSEMIDE 40 MG: 40 TABLET ORAL at 09:16

## 2018-09-28 NOTE — PROGRESS NOTES
Progress Note - [address] 70 [address]  female MRN: [id number]  Unit/Bed#: MT0 561-01 Encounter: [id number]    The patient was seen for continuing care and reviewed with treatment team   The patient has shown improvement of her disruptive behavior  She is no longer delirious and periods of calling out and shouting have diminished in frequency  She is still delusional and paranoid believing that somebody is trying to harm her but again, she is not aggressive  Sleep and appetite have improved  Mental Status Evaluation:  Appearance:  Adequate hygiene and grooming and Good eye contact   Behavior:  calm and cooperative   Mood:  euthymic   Thought Content:  Paranoid and mistrustful and Delusions of persecution   Perceptual Disturbances: Appears to be responding to internal stimuli   Risk Potential: No suicidal or homicidal ideation   Orientation:   Oriented to self only         Assessment/Plan    Principal Problem:    Bipolar affective disorder, currently manic, moderate (formerly [facility])  Active Problems:    Behavioral variant frontotemporal dementia    Continuous opioid dependence (HCC)    Abnormal EKG    Delirium due to another medical condition, acute, hyperactive      Recommended Treatment:  I will continue with lorazepam taper to minimize polypharmacy  Continue with pharmacotherapy, group therapy, milieu therapy and occupational therapy    The patient will be maintained on the following medications:    Current Facility-Administered Medications:  acetaminophen 325 mg Oral Q8H PRN LUCY Kellogg   acetaminophen 650 mg Oral Q8H [facility] LUCY Sanchez   aluminum-magnesium hydroxide-simethicone 15 mL Oral Q4H PRN Manuel Palomares MD   amLODIPine 10 mg Oral Daily LUCY Sanchez   benztropine 1 mg Intramuscular Q8H PRN Manuel Palomares MD   benztropine 1 mg Oral Q8H PRN Manuel Palomares MD   cholecalciferol 1,000 Units Oral Daily LUCY Cardenas   fentaNYL 25 mcg Transdermal Q72H Elenora Bernheim MD Braeden   furosemide 40 mg Oral Daily Yamel Luther, LUCY   gabapentin 400 mg Oral TID Dragan Trevino MD   haloperidol 10 mg Oral Q8H PRN Dragan Trevino MD   haloperidol lactate 5 mg Intramuscular Q6H PRN Dragan Trevino MD   hydrOXYzine HCL 25 mg Oral Q6H PRN Hussain Roman MD   latanoprost 1 drop Both Eyes HS Yamel Luther, CRNP   lidocaine 1 patch Transdermal Daily Yamel DENYS Luther, CRNP   LORazepam 1 mg Intramuscular Q6H PRN LUCY Winters   LORazepam 1 mg Oral After Breakfast Dragan Trevino MD   LORazepam 2 mg Oral Q6H PRN Dragan Trevino MD   LORazepam 2 mg Oral After Lunch Dragan Trevino MD   LORazepam 2 mg Oral HS Dragan Trevino MD   losartan 25 mg Oral Daily Yamel DENYS Luther, CRAMARILYS   magnesium hydroxide 30 mL Oral Daily PRN Hussain Roman MD   methocarbamol 500 mg Oral Q6H [facility] Yamel Luther, LUCY   nystatin  Topical BID Carmella Kline PA-C   OLANZapine 10 mg Intramuscular Q8H PRN Dragan Trevino MD   omeprazole (PRILOSEC) suspension 2 mg/mL 10 mg Oral Daily Yesica Hollins PA-C   oxyCODONE 5 mg Oral Q4H PRN LUCY Winters   phenol 1 spray Mouth/Throat Q2H PRN LISA SanchezNP   risperiDONE 2 mg Oral BID Dragan Trevino MD   rivaroxaban 20 mg Oral Daily With [employer], CRNP   sterile water       sterile water       traZODone 200 mg Oral HS Dragan Trevino MD

## 2018-09-28 NOTE — CASE MANAGEMENT
Received a call from Yonas at the [address] [place] [address] Drive and this pt is welcome to return today since her daughter Renée Fountain has toured and supports this plan  I have reviewed the IMM with Henrietta Barnhart via [facility] and she is supportive of dc

## 2018-09-28 NOTE — NURSING NOTE
Prescriptions, medications list, treatment information, and other discharge instructions faxed to receiving facility and given to [facility] to deliver to receiving facility  Report called to receiving facility  Patient left facility on stretcher accompanied by [facility]

## 2018-09-28 NOTE — PROGRESS NOTES
Patient is laying in bed awake  She ate breakfast and was compliant with medication  Patient has been moving around in bed, patient is not agitated or aggressive  Some irritable and labile behavior  Patient is confused, she makes bizarre statements, such as, "I need you to take all the magnesium out of the world " Patient continues to be cooperative with hygiene

## 2018-09-28 NOTE — PLAN OF CARE
Alteration in Orientation     Treatment Goal: Demonstrate a reduction of confusion and improved orientation to person, place, time and/or situation upon discharge, according to optimum baseline/ability Adequate for Discharge     Express concerns related to confused thinking related to:  Adequate for Discharge        Alteration in Thoughts and Perception     Treatment Goal: Gain control of psychotic behaviors/thinking, reduce/eliminate presenting symptoms and demonstrate improved reality functioning upon discharge Adequate for Discharge     Refrain from acting on delusional thinking/internal stimuli Adequate for Discharge     Agree to be compliant with medication regime, as prescribed and report medication side effects Adequate for Discharge        Anxiety     Anxiety is at manageable level Adequate for Discharge        DISCHARGE PLANNING     Discharge to home or other facility with appropriate resources Adequate for Discharge        Ineffective Coping     Participates in unit activities Adequate for Discharge        Nutrition/Hydration-ADULT     Nutrient/Hydration intake appropriate for improving, restoring or maintaining nutritional needs Adequate for Discharge        Prexisting or High Potential for Compromised Skin Integrity     Skin integrity is maintained or improved Adequate for Discharge        Risk for Violence/Aggression Toward Others     Control angry outbursts Adequate for Discharge

## 2018-09-28 NOTE — PLAN OF CARE
Problem: Ineffective Coping  Goal: Participates in unit activities  Interventions:  - Provide therapeutic environment   - Provide required programming   - Redirect inappropriate behaviors    Outcome: Progressing  [place]eter while in the milieu today

## 2018-09-28 NOTE — PLAN OF CARE
Alteration in Orientation     Treatment Goal: Demonstrate a reduction of confusion and improved orientation to person, place, time and/or situation upon discharge, according to optimum baseline/ability Not Progressing     Express concerns related to confused thinking related to: Not Progressing        Patient is confused, not aware of confused state    Alteration in Thoughts and Perception     Treatment Goal: Gain control of psychotic behaviors/thinking, reduce/eliminate presenting symptoms and demonstrate improved reality functioning upon discharge Progressing     Refrain from acting on delusional thinking/internal stimuli Progressing     Agree to be compliant with medication regime, as prescribed and report medication side effects Progressing        Anxiety     Anxiety is at manageable level Progressing        Nutrition/Hydration-ADULT     Nutrient/Hydration intake appropriate for improving, restoring or maintaining nutritional needs Progressing        Prexisting or High Potential for Compromised Skin Integrity     Skin integrity is maintained or improved Progressing        Risk for Violence/Aggression Toward Others     Control angry outbursts Progressing

## 2018-09-28 NOTE — DISCHARGE INSTR - LAB
Contact Information: If you have any questions, concerns, pended studies, tests and/or procedures, or emergencies regarding your inpatient behavioral health visit  Please contact [facility] [phone] and ask to speak to a , nurse or physician  A contact is available 24 hours/ 7 days a week at this number  Summary of Procedures Performed During your Stay:  Below is a list of major procedures performed during your hospital stay and a summary of results:  - No major procedures performed  Pending Studies     None        If studies are pending at discharge, follow up with your PCP and/or referring provider

## 2020-07-08 NOTE — DISCHARGE SUMMARY
Discharge Summary - [address] [facility] 70 [facility]  [facility] MRN: [id number]  Unit/Bed#: TS2 561-01 Encounter: [id number]     Admission Date: 9/1/2018         Discharge Date: 09/28/2018    Attending Psychiatrist: Joe Alfonso MD    Reason for Admission/HPI:  The patient is a 26-year-old  female who was admitted to the Older Adult [address] [place] [address] unit on an involuntary 302 commitment basis due to increased depression, anxiety, acute psychosis, aggression, agitation, increased confusion and suicidal ideation without a plan  Several weeks prior to admission the patient reported increased depressive symptoms, hopelessness and helplessness, poor concentration, insomnia, disorganized thinking, increased anxiety, poor self-care, and change in mental status        Meds/Allergies     current meds:   Current Facility-Administered Medications   Medication Dose Route Frequency    acetaminophen (TYLENOL) tablet 325 mg  325 mg Oral Q8H PRN    acetaminophen (TYLENOL) tablet 650 mg  650 mg Oral Q8H [address]    aluminum-magnesium hydroxide-simethicone (MYLANTA) 200-200-20 mg/5 mL oral suspension 15 mL  15 mL Oral Q4H PRN    amLODIPine (NORVASC) tablet 10 mg  10 mg Oral Daily    benztropine (COGENTIN) injection 1 mg  1 mg Intramuscular Q8H PRN    benztropine (COGENTIN) tablet 1 mg  1 mg Oral Q8H PRN    cholecalciferol (VITAMIN D3) tablet 1,000 Units  1,000 Units Oral Daily    fentaNYL (DURAGESIC) 25 mcg/hr TD 72 hr patch 25 mcg  25 mcg Transdermal Q72H    furosemide (LASIX) tablet 40 mg  40 mg Oral Daily    gabapentin (NEURONTIN) capsule 400 mg  400 mg Oral TID    haloperidol (HALDOL) tablet 10 mg  10 mg Oral Q8H PRN    haloperidol lactate (HALDOL) injection 5 mg  5 mg Intramuscular Q6H PRN    hydrOXYzine HCL (ATARAX) tablet 25 mg  25 mg Oral Q6H PRN    latanoprost (XALATAN) 0 005 % ophthalmic solution 1 drop  1 drop Both Eyes HS    lidocaine (LIDODERM) 5 % patch 1 patch  1 patch Transdermal Daily  LORazepam (ATIVAN) 2 mg/mL injection 1 mg  1 mg Intramuscular Q6H PRN    LORazepam (ATIVAN) tablet 1 mg  1 mg Oral BID (AM & Afternoon)    LORazepam (ATIVAN) tablet 2 mg  2 mg Oral Q6H PRN    LORazepam (ATIVAN) tablet 2 mg  2 mg Oral HS    losartan (COZAAR) tablet 25 mg  25 mg Oral Daily    magnesium hydroxide (MILK OF MAGNESIA) 400 mg/5 mL oral suspension 30 mL  30 mL Oral Daily PRN    methocarbamol (ROBAXIN) tablet 500 mg  500 mg Oral Q6H [facility]    nystatin (MYCOSTATIN) powder   Topical BID    OLANZapine (ZyPREXA) IM injection 10 mg  10 mg Intramuscular Q8H PRN    omeprazole (PRILOSEC) suspension 2 mg/mL  10 mg Oral Daily    oxyCODONE (ROXICODONE) IR tablet 5 mg  5 mg Oral Q4H PRN    phenol (CHLORASEPTIC) 1 4 % mucosal liquid 1 spray  1 spray Mouth/Throat Q2H PRN    risperiDONE (RisperDAL M-TABS) dispersible tablet 2 mg  2 mg Oral BID    rivaroxaban (XARELTO) tablet 20 mg  20 mg Oral Daily With [employer] sterile water injection **AcuDose Override Pull**        sterile water injection **AcuDose Override Pull**        traZODone (DESYREL) tablet 200 mg  200 mg Oral HS       Allergies   Allergen Reactions    Aspirin     Iodinated Diagnostic Agents Throat Swelling    Iodine     Nsaids        Objective     Vital signs in last 24 hours:  Temp:  [98 2 °F (36 8 °C)] 98 2 °F (36 8 °C)  HR:  [84] 84  Resp:  [18] 18  BP: (128-146)/(63-77) 146/77    No intake or output data in the 24 hours ending 09/28/18 [employer] Course: The patient was admitted to the inpatient psychiatric unit and started on every 15 minutes precautions  During the hospitalization the patient was attending individual therapy, group therapy, milieu therapy and occupational therapy  Upon admission the patient was constantly yelling out, was aggressive and agitated as well as irritable and disorganized  She was very focused on pain medications and difficult to redirect    The patient was initially started on Depakote, gabapentin, Seroquel and melatonin in order to stabilize mood, decrease aggression and agitation, address insomnia and other behaviors  She was receiving multiple p r n  doses of Ativan and Zyprexa  Eventually the patient began to display signs of delirium  Medical reasons/testing to explore the source of the patient's increased behavioral disturbance and confusion were explored including UTI, ammonia level, valproic acid level, CBC and CMP as well as imaging testing  No organic causes were found  The patient's delirium was treated with Haldol and lorazepam and her Depakote was tapered off  We eventually managed the patient's condition on gabapentin, risperidone, and lorazepam as well as with a reduction of Fentanyl  Prior to beginning of treatment medications risks and benefits and possible side effects including risk of liver impairment related to treatment with Depakote, risk of parkinsonian symptoms, Tardive Dyskinesia and metabolic syndrome related to treatment with antipsychotic medications, risk of cardiovascular events in elderly related to treatment with antipsychotic medications and risks of dependence, sedation and respiratory depression related to treatment with benzodiazepine medications were reviewed with the patient's family  The patient's family verbalized understanding and agreement for treatment  Patient's symptoms improved gradually over the hospital course  At the end of treatment the patient was doing well  The patient was no longer agitated or aggressive  She was more easily redirectable  She was sleeping and eating well  She had less episodes of calling out  She was more coherent  Mood was stable at the time of discharge  The patient still has delusional thinking and paranoia but is no longer aggressive and her periods of calling out and shouting have diminished in frequency   The patient denied suicidal ideation, intent or plan at the time of discharge and denied homicidal ideation, intent or plan at the time of discharge  There was no overt psychosis at the time of discharge  The patient was tolerating medications and was not reporting any significant side effects at the time of discharge  Since the patient was doing well at the end of the hospitalization, treatment team felt that the patient could be safely discharged back to [facility]  The outpatient follow up was arranged by the unit  upon discharge  Mental Status at Time of Discharge:   Appearance:  Good eye contact and disheveled   Behavior:  calm, cooperative and friendly   Speech:   Language: Normal rate and Normal volume  No overt abnormality   Mood:  euthymic   Affect:   Associations: constricted  Loose at times   Thought Process:  disorganized at times, more coherent   Thought Content:  Paranoid and mistrustful and Delusions of persecution   Perceptual Disturbances: Uncertain     Risk Potential: No suicidal or homicidal ideation   Orientation   Language Oriented to self only  No overt abnormality   Memory  Fund of knowledge Not tested  Not assessed   Attention/Concentration [place] than expected   Insight:  No insight   Judgment: Poor judgment   Gait/Station: The patient is nonambulatory   Motor Activity: No abnormal movement noted       Admission Diagnosis:  Principal Problem:    Bipolar affective disorder, currently manic, moderate ([facility] [address] )  Active Problems:    Behavioral variant frontotemporal dementia    Continuous opioid dependence ([address] )      Discharge Diagnosis:     Principal Problem:    Bipolar affective disorder, currently manic, moderate ([facility])  Active Problems:    Behavioral variant frontotemporal dementia    Continuous opioid dependence ([facility] [address] )  Resolved Problems:    Delirium due to another medical condition, acute, hyperactive      Lab results:    Admission on 09/01/2018   No results displayed because visit has over 200 results            Discharge Medications:    See after visit summary for reconciled discharge medications provided to patient and family  Discharge instructions/Information to patient and family:     See after visit summary for information provided to patient and family  Provisions for Follow-Up Care:    See after visit summary for information related to follow-up care and any pertinent home health orders  Discharge Statement     I spent 20 minutes discharging the patient  This time was spent on the day of discharge  I had direct contact with the patient on the day of discharge  pain, lower leg

## 2020-11-02 ENCOUNTER — TELEPHONE (OUTPATIENT)
Dept: FAMILY MEDICINE CLINIC | Facility: CLINIC | Age: 80
End: 2020-11-02

## 2020-11-13 ENCOUNTER — TELEPHONE (OUTPATIENT)
Dept: FAMILY MEDICINE CLINIC | Facility: CLINIC | Age: 80
End: 2020-11-13

## 2020-11-13 ENCOUNTER — TELEMEDICINE (OUTPATIENT)
Dept: FAMILY MEDICINE CLINIC | Facility: CLINIC | Age: 80
End: 2020-11-13
Payer: COMMERCIAL

## 2020-11-13 VITALS
HEIGHT: 66 IN | WEIGHT: 250 LBS | HEART RATE: 60 BPM | TEMPERATURE: 98 F | OXYGEN SATURATION: 99 % | SYSTOLIC BLOOD PRESSURE: 136 MMHG | BODY MASS INDEX: 40.18 KG/M2 | DIASTOLIC BLOOD PRESSURE: 74 MMHG

## 2020-11-13 DIAGNOSIS — G31.09 BEHAVIORAL VARIANT FRONTOTEMPORAL DEMENTIA (HCC): Chronic | ICD-10-CM

## 2020-11-13 DIAGNOSIS — Z00.00 HEALTHCARE MAINTENANCE: ICD-10-CM

## 2020-11-13 DIAGNOSIS — F33.1 MODERATE EPISODE OF RECURRENT MAJOR DEPRESSIVE DISORDER (HCC): ICD-10-CM

## 2020-11-13 DIAGNOSIS — Z76.89 ENCOUNTER TO ESTABLISH CARE: Primary | ICD-10-CM

## 2020-11-13 DIAGNOSIS — I48.0 PAROXYSMAL ATRIAL FIBRILLATION (HCC): Chronic | ICD-10-CM

## 2020-11-13 DIAGNOSIS — F51.01 PRIMARY INSOMNIA: ICD-10-CM

## 2020-11-13 DIAGNOSIS — I50.9 CONGESTIVE HEART FAILURE, UNSPECIFIED HF CHRONICITY, UNSPECIFIED HEART FAILURE TYPE (HCC): ICD-10-CM

## 2020-11-13 DIAGNOSIS — E66.01 MORBID OBESITY DUE TO EXCESS CALORIES (HCC): Chronic | ICD-10-CM

## 2020-11-13 DIAGNOSIS — N39.0 FREQUENT UTI: ICD-10-CM

## 2020-11-13 DIAGNOSIS — I10 HYPERTENSION, UNSPECIFIED TYPE: Chronic | ICD-10-CM

## 2020-11-13 DIAGNOSIS — G89.29 OTHER CHRONIC PAIN: ICD-10-CM

## 2020-11-13 DIAGNOSIS — F31.12 BIPOLAR AFFECTIVE DISORDER, CURRENTLY MANIC, MODERATE (HCC): ICD-10-CM

## 2020-11-13 DIAGNOSIS — Z95.0 PACEMAKER: ICD-10-CM

## 2020-11-13 DIAGNOSIS — R53.81 PHYSICAL DECONDITIONING: ICD-10-CM

## 2020-11-13 DIAGNOSIS — J44.9 CHRONIC OBSTRUCTIVE PULMONARY DISEASE, UNSPECIFIED COPD TYPE (HCC): ICD-10-CM

## 2020-11-13 DIAGNOSIS — F41.9 ANXIETY: ICD-10-CM

## 2020-11-13 DIAGNOSIS — I73.9 PAD (PERIPHERAL ARTERY DISEASE) (HCC): Chronic | ICD-10-CM

## 2020-11-13 DIAGNOSIS — Z01.00 EYE EXAM, ROUTINE: ICD-10-CM

## 2020-11-13 DIAGNOSIS — F02.81 BEHAVIORAL VARIANT FRONTOTEMPORAL DEMENTIA (HCC): Chronic | ICD-10-CM

## 2020-11-13 PROBLEM — M54.9 BACK PAIN: Status: RESOLVED | Noted: 2018-07-23 | Resolved: 2020-11-13

## 2020-11-13 PROBLEM — E87.6 HYPOKALEMIA: Status: RESOLVED | Noted: 2017-06-14 | Resolved: 2020-11-13

## 2020-11-13 PROBLEM — R09.02 HYPOXIA: Status: RESOLVED | Noted: 2018-07-30 | Resolved: 2020-11-13

## 2020-11-13 PROBLEM — G93.41 ACUTE METABOLIC ENCEPHALOPATHY: Status: RESOLVED | Noted: 2017-06-14 | Resolved: 2020-11-13

## 2020-11-13 PROBLEM — E87.2 LACTIC ACIDOSIS: Status: RESOLVED | Noted: 2018-08-27 | Resolved: 2020-11-13

## 2020-11-13 PROBLEM — J18.9 HCAP (HEALTHCARE-ASSOCIATED PNEUMONIA): Status: RESOLVED | Noted: 2017-06-14 | Resolved: 2020-11-13

## 2020-11-13 PROBLEM — J15.6 PNEUMONIA DUE TO GRAM-NEGATIVE BACTERIA (HCC): Status: RESOLVED | Noted: 2017-06-14 | Resolved: 2020-11-13

## 2020-11-13 PROBLEM — E87.20 LACTIC ACIDOSIS: Status: RESOLVED | Noted: 2018-08-27 | Resolved: 2020-11-13

## 2020-11-13 PROBLEM — Z79.899 POLYPHARMACY: Status: RESOLVED | Noted: 2018-08-28 | Resolved: 2020-11-13

## 2020-11-13 PROBLEM — R94.31 ABNORMAL EKG: Status: RESOLVED | Noted: 2018-09-13 | Resolved: 2020-11-13

## 2020-11-13 PROBLEM — K62.5 RECTAL BLEEDING: Status: RESOLVED | Noted: 2017-04-20 | Resolved: 2020-11-13

## 2020-11-13 PROBLEM — J96.01 ACUTE RESPIRATORY FAILURE WITH HYPOXIA (HCC): Status: RESOLVED | Noted: 2017-06-14 | Resolved: 2020-11-13

## 2020-11-13 PROCEDURE — 3075F SYST BP GE 130 - 139MM HG: CPT | Performed by: NURSE PRACTITIONER

## 2020-11-13 PROCEDURE — 1036F TOBACCO NON-USER: CPT | Performed by: NURSE PRACTITIONER

## 2020-11-13 PROCEDURE — 3078F DIAST BP <80 MM HG: CPT | Performed by: NURSE PRACTITIONER

## 2020-11-13 PROCEDURE — 99205 OFFICE O/P NEW HI 60 MIN: CPT | Performed by: NURSE PRACTITIONER

## 2020-11-13 PROCEDURE — 81003 URINALYSIS AUTO W/O SCOPE: CPT | Performed by: NURSE PRACTITIONER

## 2020-11-13 RX ORDER — TRAZODONE HYDROCHLORIDE 100 MG/1
100 TABLET ORAL
Qty: 90 TABLET | Refills: 0 | Status: SHIPPED | OUTPATIENT
Start: 2020-11-13 | End: 2021-02-10

## 2020-11-13 RX ORDER — DOCUSATE SODIUM 100 MG/1
100 CAPSULE, LIQUID FILLED ORAL 2 TIMES DAILY
COMMUNITY
End: 2021-09-29

## 2020-11-13 RX ORDER — B-COMPLEX WITH VITAMIN C
1 TABLET ORAL 2 TIMES DAILY WITH MEALS
COMMUNITY

## 2020-11-13 RX ORDER — OXYCODONE HCL 10 MG/1
10 TABLET, FILM COATED, EXTENDED RELEASE ORAL EVERY 12 HOURS SCHEDULED
COMMUNITY
End: 2020-11-20 | Stop reason: SDUPTHER

## 2020-11-13 RX ORDER — LORATADINE 10 MG/1
10 TABLET ORAL DAILY
COMMUNITY
End: 2020-11-18 | Stop reason: SDUPTHER

## 2020-11-13 RX ORDER — TAMSULOSIN HYDROCHLORIDE 0.4 MG/1
0.4 CAPSULE ORAL
COMMUNITY
End: 2020-11-18 | Stop reason: SDUPTHER

## 2020-11-13 RX ORDER — APIXABAN 5 MG/1
TABLET, FILM COATED ORAL
COMMUNITY
Start: 2020-10-28 | End: 2020-11-18 | Stop reason: SDUPTHER

## 2020-11-13 RX ORDER — ACETAMINOPHEN 325 MG/1
650 TABLET ORAL EVERY 6 HOURS PRN
COMMUNITY
End: 2021-10-19

## 2020-11-13 RX ORDER — GABAPENTIN 600 MG/1
600 TABLET ORAL 3 TIMES DAILY
COMMUNITY
End: 2020-11-18 | Stop reason: SDUPTHER

## 2020-11-13 RX ORDER — MICONAZOLE NITRATE 20.6 MG/G
POWDER TOPICAL AS NEEDED
COMMUNITY
End: 2021-03-19 | Stop reason: SDUPTHER

## 2020-11-13 RX ORDER — BACLOFEN 5 MG/1
TABLET ORAL
COMMUNITY
End: 2020-11-18 | Stop reason: SDUPTHER

## 2020-11-13 RX ORDER — MONTELUKAST SODIUM 10 MG/1
10 TABLET ORAL
COMMUNITY
End: 2020-11-13 | Stop reason: ALTCHOICE

## 2020-11-13 RX ORDER — LATANOPROST 50 UG/ML
1 SOLUTION/ DROPS OPHTHALMIC
COMMUNITY
End: 2020-11-30 | Stop reason: SDUPTHER

## 2020-11-13 RX ORDER — TRAZODONE HYDROCHLORIDE 50 MG/1
50 TABLET ORAL
COMMUNITY
End: 2020-11-13 | Stop reason: SDUPTHER

## 2020-11-13 RX ORDER — AMLODIPINE BESYLATE 10 MG/1
10 TABLET ORAL DAILY
COMMUNITY
End: 2020-11-18 | Stop reason: SDUPTHER

## 2020-11-13 RX ORDER — SERTRALINE HYDROCHLORIDE 100 MG/1
100 TABLET, FILM COATED ORAL DAILY
Qty: 90 TABLET | Refills: 0 | Status: SHIPPED | OUTPATIENT
Start: 2020-11-13 | End: 2021-02-10

## 2020-11-13 RX ORDER — FERROUS SULFATE 325(65) MG
325 TABLET ORAL
COMMUNITY
End: 2020-11-18 | Stop reason: SDUPTHER

## 2020-11-16 ENCOUNTER — LAB REQUISITION (OUTPATIENT)
Dept: LAB | Facility: HOSPITAL | Age: 80
End: 2020-11-16
Payer: COMMERCIAL

## 2020-11-16 DIAGNOSIS — N39.0 URINARY TRACT INFECTION, SITE NOT SPECIFIED: ICD-10-CM

## 2020-11-16 LAB
BILIRUB UR QL STRIP: NEGATIVE
CLARITY UR: CLEAR
COLOR UR: YELLOW
GLUCOSE UR STRIP-MCNC: NEGATIVE MG/DL
HGB UR QL STRIP.AUTO: NEGATIVE
KETONES UR STRIP-MCNC: NEGATIVE MG/DL
LEUKOCYTE ESTERASE UR QL STRIP: NEGATIVE
NITRITE UR QL STRIP: NEGATIVE
PH UR STRIP.AUTO: 7 [PH]
PROT UR STRIP-MCNC: NEGATIVE MG/DL
SP GR UR STRIP.AUTO: 1.02 (ref 1–1.03)
UROBILINOGEN UR QL STRIP.AUTO: 0.2 E.U./DL

## 2020-11-17 ENCOUNTER — TELEPHONE (OUTPATIENT)
Dept: FAMILY MEDICINE CLINIC | Facility: CLINIC | Age: 80
End: 2020-11-17

## 2020-11-17 DIAGNOSIS — J44.9 CHRONIC OBSTRUCTIVE PULMONARY DISEASE, UNSPECIFIED COPD TYPE (HCC): ICD-10-CM

## 2020-11-17 DIAGNOSIS — I50.9 CONGESTIVE HEART FAILURE, UNSPECIFIED HF CHRONICITY, UNSPECIFIED HEART FAILURE TYPE (HCC): ICD-10-CM

## 2020-11-17 DIAGNOSIS — G89.29 OTHER CHRONIC PAIN: ICD-10-CM

## 2020-11-17 DIAGNOSIS — G31.09 BEHAVIORAL VARIANT FRONTOTEMPORAL DEMENTIA (HCC): Primary | ICD-10-CM

## 2020-11-17 DIAGNOSIS — F02.81 BEHAVIORAL VARIANT FRONTOTEMPORAL DEMENTIA (HCC): Primary | ICD-10-CM

## 2020-11-18 DIAGNOSIS — I48.0 PAROXYSMAL ATRIAL FIBRILLATION (HCC): Chronic | ICD-10-CM

## 2020-11-18 DIAGNOSIS — Z91.09 ENVIRONMENTAL ALLERGIES: Primary | ICD-10-CM

## 2020-11-18 DIAGNOSIS — D50.9 IRON DEFICIENCY ANEMIA, UNSPECIFIED IRON DEFICIENCY ANEMIA TYPE: ICD-10-CM

## 2020-11-18 DIAGNOSIS — I10 HYPERTENSION: Chronic | ICD-10-CM

## 2020-11-18 DIAGNOSIS — G89.29 OTHER CHRONIC PAIN: ICD-10-CM

## 2020-11-18 RX ORDER — FERROUS SULFATE 325(65) MG
325 TABLET ORAL
Qty: 90 TABLET | Refills: 1 | Status: SHIPPED | OUTPATIENT
Start: 2020-11-18 | End: 2021-02-15

## 2020-11-18 RX ORDER — TAMSULOSIN HYDROCHLORIDE 0.4 MG/1
0.4 CAPSULE ORAL
Qty: 90 CAPSULE | Refills: 1 | Status: SHIPPED | OUTPATIENT
Start: 2020-11-18 | End: 2021-11-16

## 2020-11-18 RX ORDER — GABAPENTIN 600 MG/1
600 TABLET ORAL 3 TIMES DAILY
Qty: 270 TABLET | Refills: 1 | Status: SHIPPED | OUTPATIENT
Start: 2020-11-18 | End: 2021-02-19

## 2020-11-18 RX ORDER — APIXABAN 5 MG/1
5 TABLET, FILM COATED ORAL 2 TIMES DAILY
Qty: 180 TABLET | Refills: 1 | Status: SHIPPED | OUTPATIENT
Start: 2020-11-18 | End: 2021-02-19

## 2020-11-18 RX ORDER — BACLOFEN 5 MG/1
TABLET ORAL
Qty: 90 TABLET | Refills: 1 | Status: SHIPPED | OUTPATIENT
Start: 2020-11-18 | End: 2021-05-18 | Stop reason: SDUPTHER

## 2020-11-18 RX ORDER — AMLODIPINE BESYLATE 10 MG/1
10 TABLET ORAL DAILY
Qty: 90 TABLET | Refills: 1 | Status: SHIPPED | OUTPATIENT
Start: 2020-11-18 | End: 2021-04-22

## 2020-11-18 RX ORDER — LORATADINE 10 MG/1
10 TABLET ORAL DAILY
Qty: 90 TABLET | Refills: 1 | Status: SHIPPED | OUTPATIENT
Start: 2020-11-18 | End: 2021-02-19

## 2020-11-18 RX ORDER — LOSARTAN POTASSIUM 25 MG/1
25 TABLET ORAL DAILY
Qty: 90 TABLET | Refills: 1 | Status: SHIPPED | OUTPATIENT
Start: 2020-11-18 | End: 2021-04-22

## 2020-11-20 DIAGNOSIS — G89.29 OTHER CHRONIC PAIN: Primary | ICD-10-CM

## 2020-11-20 RX ORDER — OXYCODONE HCL 10 MG/1
10 TABLET, FILM COATED, EXTENDED RELEASE ORAL EVERY 12 HOURS SCHEDULED
Qty: 60 TABLET | Refills: 0 | Status: SHIPPED | OUTPATIENT
Start: 2020-11-20 | End: 2021-01-13 | Stop reason: SDUPTHER

## 2020-11-23 DIAGNOSIS — K21.9 GASTROESOPHAGEAL REFLUX DISEASE, UNSPECIFIED WHETHER ESOPHAGITIS PRESENT: ICD-10-CM

## 2020-11-23 DIAGNOSIS — E55.9 VITAMIN D DEFICIENCY: Primary | ICD-10-CM

## 2020-11-23 RX ORDER — FAMOTIDINE 20 MG/1
TABLET, FILM COATED ORAL
Qty: 180 TABLET | Refills: 1 | Status: SHIPPED | OUTPATIENT
Start: 2020-11-23 | End: 2021-01-15 | Stop reason: ALTCHOICE

## 2020-11-23 RX ORDER — ACETAMINOPHEN 160 MG
TABLET,DISINTEGRATING ORAL
Qty: 90 CAPSULE | Refills: 1 | Status: SHIPPED | OUTPATIENT
Start: 2020-11-23 | End: 2021-03-18 | Stop reason: HOSPADM

## 2020-11-30 DIAGNOSIS — H40.9 GLAUCOMA, UNSPECIFIED GLAUCOMA TYPE, UNSPECIFIED LATERALITY: Primary | ICD-10-CM

## 2020-11-30 RX ORDER — LATANOPROST 50 UG/ML
1 SOLUTION/ DROPS OPHTHALMIC
Qty: 7.5 ML | Refills: 1 | Status: SHIPPED | OUTPATIENT
Start: 2020-11-30 | End: 2021-01-13 | Stop reason: SDUPTHER

## 2020-12-01 ENCOUNTER — TELEPHONE (OUTPATIENT)
Dept: FAMILY MEDICINE CLINIC | Facility: CLINIC | Age: 80
End: 2020-12-01

## 2020-12-01 NOTE — TELEPHONE ENCOUNTER
Sterling Nichols, from PT is requesting updated order once a week  This will help patient having back to back services pt and ot  Patient is doing much better  She can roll herself over and push herself up in the bed  Sterling Nichols can be reached at [phone] for any questions     Please advise, Thank you

## 2020-12-09 ENCOUNTER — TELEPHONE (OUTPATIENT)
Dept: FAMILY MEDICINE CLINIC | Facility: CLINIC | Age: 80
End: 2020-12-09

## 2020-12-09 DIAGNOSIS — R32 URINARY INCONTINENCE, UNSPECIFIED TYPE: Primary | ICD-10-CM

## 2020-12-09 RX ORDER — DIAPER,BRIEF,ADULT, DISPOSABLE
EACH MISCELLANEOUS EVERY 2 HOUR PRN
Qty: 120 EACH | Refills: 1 | Status: SHIPPED | OUTPATIENT
Start: 2020-12-09

## 2020-12-09 RX ORDER — DIAPER,BRIEF,ADULT, DISPOSABLE
EACH MISCELLANEOUS EVERY 2 HOUR PRN
Qty: 120 EACH | Refills: 1 | Status: SHIPPED | OUTPATIENT
Start: 2020-12-09 | End: 2020-12-09 | Stop reason: SDUPTHER

## 2020-12-22 ENCOUNTER — TELEPHONE (OUTPATIENT)
Dept: FAMILY MEDICINE CLINIC | Facility: CLINIC | Age: 80
End: 2020-12-22

## 2020-12-30 ENCOUNTER — TELEPHONE (OUTPATIENT)
Dept: FAMILY MEDICINE CLINIC | Facility: CLINIC | Age: 80
End: 2020-12-30

## 2021-01-13 DIAGNOSIS — G89.29 OTHER CHRONIC PAIN: ICD-10-CM

## 2021-01-13 DIAGNOSIS — H40.9 GLAUCOMA, UNSPECIFIED GLAUCOMA TYPE, UNSPECIFIED LATERALITY: ICD-10-CM

## 2021-01-13 RX ORDER — OXYCODONE HCL 10 MG/1
10 TABLET, FILM COATED, EXTENDED RELEASE ORAL EVERY 12 HOURS SCHEDULED
Qty: 60 TABLET | Refills: 0 | Status: SHIPPED | OUTPATIENT
Start: 2021-01-13 | End: 2021-03-10 | Stop reason: SDUPTHER

## 2021-01-13 RX ORDER — LATANOPROST 50 UG/ML
1 SOLUTION/ DROPS OPHTHALMIC
Qty: 7.5 ML | Refills: 0 | Status: SHIPPED | OUTPATIENT
Start: 2021-01-13 | End: 2021-04-21

## 2021-01-13 NOTE — TELEPHONE ENCOUNTER
Can you please confirm with Joann Santoro if they were able to make an appointment with pain management? I typically do not prescribe oxycontin

## 2021-01-13 NOTE — TELEPHONE ENCOUNTER
Yes, we will do so safely  I will refill the script  I would like her to try to take one daily and see how she does with pain  If she needs to 2 daily, then that's fine  Please have Tj Falling call with how she is doing with that

## 2021-01-13 NOTE — TELEPHONE ENCOUNTER
Spoke with Jennifer Maynard and was told that they were not able to make the appointment with pain management due to the lack of transportation and no ramp  She stated that she doesn't mind if mom starts the process of be weaned off the Oxy, she just doesn't want it to be cold turkey

## 2021-01-15 DIAGNOSIS — K21.9 GASTROESOPHAGEAL REFLUX DISEASE, UNSPECIFIED WHETHER ESOPHAGITIS PRESENT: Primary | ICD-10-CM

## 2021-01-15 RX ORDER — PANTOPRAZOLE SODIUM 40 MG/1
40 TABLET, DELAYED RELEASE ORAL
Qty: 90 TABLET | Refills: 3 | Status: SHIPPED | OUTPATIENT
Start: 2021-01-15 | End: 2021-03-30 | Stop reason: SDUPTHER

## 2021-01-19 ENCOUNTER — TELEPHONE (OUTPATIENT)
Dept: UROLOGY | Facility: CLINIC | Age: 81
End: 2021-01-19

## 2021-01-20 DIAGNOSIS — Z23 ENCOUNTER FOR IMMUNIZATION: ICD-10-CM

## 2021-02-10 DIAGNOSIS — F41.9 ANXIETY: ICD-10-CM

## 2021-02-10 DIAGNOSIS — F51.01 PRIMARY INSOMNIA: ICD-10-CM

## 2021-02-10 RX ORDER — TRAZODONE HYDROCHLORIDE 100 MG/1
TABLET ORAL
Qty: 90 TABLET | Refills: 0 | Status: SHIPPED | OUTPATIENT
Start: 2021-02-10 | End: 2021-04-15 | Stop reason: SDUPTHER

## 2021-02-10 RX ORDER — SERTRALINE HYDROCHLORIDE 100 MG/1
TABLET, FILM COATED ORAL
Qty: 90 TABLET | Refills: 0 | Status: SHIPPED | OUTPATIENT
Start: 2021-02-10 | End: 2021-04-15 | Stop reason: SDUPTHER

## 2021-02-11 ENCOUNTER — TELEPHONE (OUTPATIENT)
Dept: OTHER | Facility: OTHER | Age: 81
End: 2021-02-11

## 2021-02-12 ENCOUNTER — TELEPHONE (OUTPATIENT)
Dept: FAMILY MEDICINE CLINIC | Facility: CLINIC | Age: 81
End: 2021-02-12

## 2021-02-14 DIAGNOSIS — D50.9 IRON DEFICIENCY ANEMIA, UNSPECIFIED IRON DEFICIENCY ANEMIA TYPE: ICD-10-CM

## 2021-02-15 RX ORDER — FERROUS SULFATE 325(65) MG
TABLET ORAL
Qty: 90 TABLET | Refills: 1 | Status: SHIPPED | OUTPATIENT
Start: 2021-02-15 | End: 2021-08-23

## 2021-02-17 ENCOUNTER — TELEPHONE (OUTPATIENT)
Dept: FAMILY MEDICINE CLINIC | Facility: CLINIC | Age: 81
End: 2021-02-17

## 2021-02-17 DIAGNOSIS — I50.9 CONGESTIVE HEART FAILURE, UNSPECIFIED HF CHRONICITY, UNSPECIFIED HEART FAILURE TYPE (HCC): Primary | ICD-10-CM

## 2021-02-17 DIAGNOSIS — R53.81 PHYSICAL DECONDITIONING: ICD-10-CM

## 2021-02-17 NOTE — TELEPHONE ENCOUNTER
----- Message from [facility] Jimmy, Fredy Brower sent at 2/17/2021  2:09 PM EST -----  Regarding: FW: Non-Urgent Medical Question  Contact: [phone]  Can you please fax new script to residential?  ----- Message -----  From: Agustin Khan MA  Sent: 2/17/2021   1:58 PM EST  To: LUCY Mckay  Subject: FW: Non-Urgent Medical Question                    ----- Message -----  From: Chely Ardon  Sent: 2/17/2021   1:42 PM EST  To: , #  Subject: Non-Urgent Medical Question                      This message is being sent by Malou Ray on behalf of Rachael Said   it's Classie Quincy  I hope you are doing well! [facility] asked me to contact you and request another script(or orders or something )to have my moms PT and OT continue  Apparently it expires next week and they are suggesting that it should continue  I have no clue as you can tell  I am happy to do whatever you need me to do so her therapy can continue  Stay safe!

## 2021-02-19 DIAGNOSIS — Z91.09 ENVIRONMENTAL ALLERGIES: ICD-10-CM

## 2021-02-19 DIAGNOSIS — I48.0 PAROXYSMAL ATRIAL FIBRILLATION (HCC): Chronic | ICD-10-CM

## 2021-02-19 DIAGNOSIS — G89.29 OTHER CHRONIC PAIN: ICD-10-CM

## 2021-02-19 RX ORDER — LORATADINE 10 MG/1
TABLET ORAL
Qty: 90 TABLET | Refills: 1 | Status: SHIPPED | OUTPATIENT
Start: 2021-02-19 | End: 2021-08-16

## 2021-02-19 RX ORDER — APIXABAN 5 MG/1
TABLET, FILM COATED ORAL
Qty: 180 TABLET | Refills: 1 | Status: SHIPPED | OUTPATIENT
Start: 2021-02-19 | End: 2021-08-16

## 2021-02-19 RX ORDER — GABAPENTIN 600 MG/1
TABLET ORAL
Qty: 270 TABLET | Refills: 1 | Status: SHIPPED | OUTPATIENT
Start: 2021-02-19 | End: 2021-08-24

## 2021-03-10 ENCOUNTER — HOSPITAL ENCOUNTER (INPATIENT)
Facility: HOSPITAL | Age: 81
LOS: 7 days | Discharge: HOME HEALTH | DRG: 690 | End: 2021-03-18
Attending: EMERGENCY MEDICINE | Admitting: FAMILY MEDICINE
Payer: COMMERCIAL

## 2021-03-10 ENCOUNTER — APPOINTMENT (EMERGENCY)
Dept: CT IMAGING | Facility: HOSPITAL | Age: 81
DRG: 690 | End: 2021-03-10
Payer: COMMERCIAL

## 2021-03-10 ENCOUNTER — APPOINTMENT (EMERGENCY)
Dept: RADIOLOGY | Facility: HOSPITAL | Age: 81
DRG: 690 | End: 2021-03-10
Payer: COMMERCIAL

## 2021-03-10 DIAGNOSIS — R31.9 HEMATURIA: ICD-10-CM

## 2021-03-10 DIAGNOSIS — B35.6 FUNGAL INFECTION OF THE GROIN: ICD-10-CM

## 2021-03-10 DIAGNOSIS — F33.1 MODERATE EPISODE OF RECURRENT MAJOR DEPRESSIVE DISORDER (HCC): ICD-10-CM

## 2021-03-10 DIAGNOSIS — N39.0 UTI (URINARY TRACT INFECTION): Primary | ICD-10-CM

## 2021-03-10 DIAGNOSIS — N30.90 CYSTITIS: ICD-10-CM

## 2021-03-10 DIAGNOSIS — R53.1 GENERALIZED WEAKNESS: ICD-10-CM

## 2021-03-10 DIAGNOSIS — F02.81 BEHAVIORAL VARIANT FRONTOTEMPORAL DEMENTIA (HCC): Chronic | ICD-10-CM

## 2021-03-10 DIAGNOSIS — G89.29 OTHER CHRONIC PAIN: ICD-10-CM

## 2021-03-10 DIAGNOSIS — F03.90 DEMENTIA (HCC): ICD-10-CM

## 2021-03-10 DIAGNOSIS — G31.09 BEHAVIORAL VARIANT FRONTOTEMPORAL DEMENTIA (HCC): Chronic | ICD-10-CM

## 2021-03-10 PROBLEM — R82.71 ASYMPTOMATIC BACTERIURIA: Status: ACTIVE | Noted: 2021-03-10

## 2021-03-10 LAB
ALBUMIN SERPL BCP-MCNC: 2.9 G/DL (ref 3.5–5)
ALP SERPL-CCNC: 68 U/L (ref 46–116)
ALT SERPL W P-5'-P-CCNC: 15 U/L (ref 12–78)
ANION GAP SERPL CALCULATED.3IONS-SCNC: 11 MMOL/L (ref 4–13)
AST SERPL W P-5'-P-CCNC: 14 U/L (ref 5–45)
ATRIAL RATE: 59 BPM
BACTERIA UR QL AUTO: ABNORMAL /HPF
BASOPHILS # BLD AUTO: 0.04 THOUSANDS/ΜL (ref 0–0.1)
BASOPHILS NFR BLD AUTO: 1 % (ref 0–1)
BILIRUB SERPL-MCNC: 0.31 MG/DL (ref 0.2–1)
BILIRUB UR QL STRIP: NEGATIVE
BUN SERPL-MCNC: 17 MG/DL (ref 5–25)
CALCIUM ALBUM COR SERPL-MCNC: 10.4 MG/DL (ref 8.3–10.1)
CALCIUM SERPL-MCNC: 9.5 MG/DL (ref 8.3–10.1)
CHLORIDE SERPL-SCNC: 105 MMOL/L (ref 100–108)
CLARITY UR: ABNORMAL
CO2 SERPL-SCNC: 27 MMOL/L (ref 21–32)
COLOR UR: ABNORMAL
CREAT SERPL-MCNC: 0.81 MG/DL (ref 0.6–1.3)
EOSINOPHIL # BLD AUTO: 0.22 THOUSAND/ΜL (ref 0–0.61)
EOSINOPHIL NFR BLD AUTO: 4 % (ref 0–6)
ERYTHROCYTE [DISTWIDTH] IN BLOOD BY AUTOMATED COUNT: 14.8 % (ref 11.6–15.1)
GFR SERPL CREATININE-BSD FRML MDRD: 69 ML/MIN/1.73SQ M
GLUCOSE SERPL-MCNC: 99 MG/DL (ref 65–140)
GLUCOSE UR STRIP-MCNC: NEGATIVE MG/DL
HCT VFR BLD AUTO: 38.7 % (ref 34.8–46.1)
HGB BLD-MCNC: 11.9 G/DL (ref 11.5–15.4)
HGB UR QL STRIP.AUTO: ABNORMAL
IMM GRANULOCYTES # BLD AUTO: 0.03 THOUSAND/UL (ref 0–0.2)
IMM GRANULOCYTES NFR BLD AUTO: 1 % (ref 0–2)
KETONES UR STRIP-MCNC: NEGATIVE MG/DL
LACTATE SERPL-SCNC: 0.9 MMOL/L (ref 0.5–2)
LEUKOCYTE ESTERASE UR QL STRIP: ABNORMAL
LYMPHOCYTES # BLD AUTO: 1.73 THOUSANDS/ΜL (ref 0.6–4.47)
LYMPHOCYTES NFR BLD AUTO: 29 % (ref 14–44)
MAGNESIUM SERPL-MCNC: 1.9 MG/DL (ref 1.6–2.6)
MCH RBC QN AUTO: 27.1 PG (ref 26.8–34.3)
MCHC RBC AUTO-ENTMCNC: 30.7 G/DL (ref 31.4–37.4)
MCV RBC AUTO: 88 FL (ref 82–98)
MONOCYTES # BLD AUTO: 0.53 THOUSAND/ΜL (ref 0.17–1.22)
MONOCYTES NFR BLD AUTO: 9 % (ref 4–12)
NEUTROPHILS # BLD AUTO: 3.45 THOUSANDS/ΜL (ref 1.85–7.62)
NEUTS SEG NFR BLD AUTO: 56 % (ref 43–75)
NITRITE UR QL STRIP: POSITIVE
NON-SQ EPI CELLS URNS QL MICRO: ABNORMAL /HPF
NRBC BLD AUTO-RTO: 0 /100 WBCS
NT-PROBNP SERPL-MCNC: 867 PG/ML
PH UR STRIP.AUTO: 6 [PH]
PLATELET # BLD AUTO: 185 THOUSANDS/UL (ref 149–390)
PMV BLD AUTO: 9.3 FL (ref 8.9–12.7)
POTASSIUM SERPL-SCNC: 4.1 MMOL/L (ref 3.5–5.3)
PROT SERPL-MCNC: 7.7 G/DL (ref 6.4–8.2)
PROT UR STRIP-MCNC: ABNORMAL MG/DL
QRS AXIS: 231 DEGREES
QRSD INTERVAL: 182 MS
QT INTERVAL: 494 MS
QTC INTERVAL: 489 MS
RBC # BLD AUTO: 4.39 MILLION/UL (ref 3.81–5.12)
RBC #/AREA URNS AUTO: ABNORMAL /HPF
SODIUM SERPL-SCNC: 143 MMOL/L (ref 136–145)
SP GR UR STRIP.AUTO: 1.01 (ref 1–1.03)
T WAVE AXIS: 65 DEGREES
TROPONIN I SERPL-MCNC: <0.02 NG/ML
TSH SERPL DL<=0.05 MIU/L-ACNC: 1.84 UIU/ML (ref 0.36–3.74)
UROBILINOGEN UR QL STRIP.AUTO: 0.2 E.U./DL
VENTRICULAR RATE: 59 BPM
WBC # BLD AUTO: 6 THOUSAND/UL (ref 4.31–10.16)
WBC #/AREA URNS AUTO: ABNORMAL /HPF

## 2021-03-10 PROCEDURE — 81001 URINALYSIS AUTO W/SCOPE: CPT | Performed by: EMERGENCY MEDICINE

## 2021-03-10 PROCEDURE — 83880 ASSAY OF NATRIURETIC PEPTIDE: CPT | Performed by: EMERGENCY MEDICINE

## 2021-03-10 PROCEDURE — 87086 URINE CULTURE/COLONY COUNT: CPT | Performed by: EMERGENCY MEDICINE

## 2021-03-10 PROCEDURE — 93010 ELECTROCARDIOGRAM REPORT: CPT | Performed by: INTERNAL MEDICINE

## 2021-03-10 PROCEDURE — 87077 CULTURE AEROBIC IDENTIFY: CPT | Performed by: EMERGENCY MEDICINE

## 2021-03-10 PROCEDURE — 80053 COMPREHEN METABOLIC PANEL: CPT | Performed by: EMERGENCY MEDICINE

## 2021-03-10 PROCEDURE — 99220 PR INITIAL OBSERVATION CARE/DAY 70 MINUTES: CPT | Performed by: INTERNAL MEDICINE

## 2021-03-10 PROCEDURE — 93005 ELECTROCARDIOGRAM TRACING: CPT

## 2021-03-10 PROCEDURE — 87040 BLOOD CULTURE FOR BACTERIA: CPT | Performed by: EMERGENCY MEDICINE

## 2021-03-10 PROCEDURE — 85025 COMPLETE CBC W/AUTO DIFF WBC: CPT | Performed by: EMERGENCY MEDICINE

## 2021-03-10 PROCEDURE — 36415 COLL VENOUS BLD VENIPUNCTURE: CPT | Performed by: EMERGENCY MEDICINE

## 2021-03-10 PROCEDURE — 84443 ASSAY THYROID STIM HORMONE: CPT | Performed by: EMERGENCY MEDICINE

## 2021-03-10 PROCEDURE — 99285 EMERGENCY DEPT VISIT HI MDM: CPT | Performed by: EMERGENCY MEDICINE

## 2021-03-10 PROCEDURE — 99285 EMERGENCY DEPT VISIT HI MDM: CPT

## 2021-03-10 PROCEDURE — 83735 ASSAY OF MAGNESIUM: CPT | Performed by: EMERGENCY MEDICINE

## 2021-03-10 PROCEDURE — 71046 X-RAY EXAM CHEST 2 VIEWS: CPT

## 2021-03-10 PROCEDURE — 84484 ASSAY OF TROPONIN QUANT: CPT | Performed by: EMERGENCY MEDICINE

## 2021-03-10 PROCEDURE — 83605 ASSAY OF LACTIC ACID: CPT | Performed by: EMERGENCY MEDICINE

## 2021-03-10 PROCEDURE — 87186 SC STD MICRODIL/AGAR DIL: CPT | Performed by: EMERGENCY MEDICINE

## 2021-03-10 PROCEDURE — 74176 CT ABD & PELVIS W/O CONTRAST: CPT

## 2021-03-10 RX ORDER — TAMSULOSIN HYDROCHLORIDE 0.4 MG/1
0.4 CAPSULE ORAL
Status: DISCONTINUED | OUTPATIENT
Start: 2021-03-10 | End: 2021-03-18 | Stop reason: HOSPADM

## 2021-03-10 RX ORDER — DOCUSATE SODIUM 100 MG/1
100 CAPSULE, LIQUID FILLED ORAL 2 TIMES DAILY
Status: DISCONTINUED | OUTPATIENT
Start: 2021-03-10 | End: 2021-03-18 | Stop reason: HOSPADM

## 2021-03-10 RX ORDER — ACETAMINOPHEN 325 MG/1
650 TABLET ORAL EVERY 6 HOURS PRN
Status: DISCONTINUED | OUTPATIENT
Start: 2021-03-10 | End: 2021-03-18 | Stop reason: HOSPADM

## 2021-03-10 RX ORDER — TRAZODONE HYDROCHLORIDE 100 MG/1
100 TABLET ORAL
Status: DISCONTINUED | OUTPATIENT
Start: 2021-03-10 | End: 2021-03-18 | Stop reason: HOSPADM

## 2021-03-10 RX ORDER — PANTOPRAZOLE SODIUM 40 MG/1
40 TABLET, DELAYED RELEASE ORAL
Status: DISCONTINUED | OUTPATIENT
Start: 2021-03-11 | End: 2021-03-18 | Stop reason: HOSPADM

## 2021-03-10 RX ORDER — AMLODIPINE BESYLATE 10 MG/1
10 TABLET ORAL DAILY
Status: DISCONTINUED | OUTPATIENT
Start: 2021-03-11 | End: 2021-03-18 | Stop reason: HOSPADM

## 2021-03-10 RX ORDER — FLUTICASONE PROPIONATE 50 MCG
1 SPRAY, SUSPENSION (ML) NASAL DAILY
Status: DISCONTINUED | OUTPATIENT
Start: 2021-03-11 | End: 2021-03-18 | Stop reason: HOSPADM

## 2021-03-10 RX ORDER — OXYCODONE HCL 10 MG/1
10 TABLET, FILM COATED, EXTENDED RELEASE ORAL 2 TIMES DAILY PRN
Status: DISCONTINUED | OUTPATIENT
Start: 2021-03-10 | End: 2021-03-11

## 2021-03-10 RX ORDER — OXYCODONE HCL 10 MG/1
10 TABLET, FILM COATED, EXTENDED RELEASE ORAL EVERY 12 HOURS SCHEDULED
Qty: 60 TABLET | Refills: 0 | Status: SHIPPED | OUTPATIENT
Start: 2021-03-10 | End: 2021-05-18 | Stop reason: SDUPTHER

## 2021-03-10 RX ORDER — LATANOPROST 50 UG/ML
1 SOLUTION/ DROPS OPHTHALMIC
Status: DISCONTINUED | OUTPATIENT
Start: 2021-03-10 | End: 2021-03-18 | Stop reason: HOSPADM

## 2021-03-10 RX ORDER — GABAPENTIN 300 MG/1
600 CAPSULE ORAL 3 TIMES DAILY
Status: DISCONTINUED | OUTPATIENT
Start: 2021-03-10 | End: 2021-03-18 | Stop reason: HOSPADM

## 2021-03-10 RX ORDER — LOSARTAN POTASSIUM 25 MG/1
25 TABLET ORAL DAILY
Status: DISCONTINUED | OUTPATIENT
Start: 2021-03-11 | End: 2021-03-18 | Stop reason: HOSPADM

## 2021-03-10 RX ADMIN — OXYCODONE HYDROCHLORIDE 10 MG: 10 TABLET, FILM COATED, EXTENDED RELEASE ORAL at 22:32

## 2021-03-10 RX ADMIN — TAMSULOSIN HYDROCHLORIDE 0.4 MG: 0.4 CAPSULE ORAL at 18:58

## 2021-03-10 RX ADMIN — ACETAMINOPHEN 650 MG: 325 TABLET ORAL at 23:42

## 2021-03-10 RX ADMIN — APIXABAN 5 MG: 5 TABLET, FILM COATED ORAL at 18:58

## 2021-03-10 RX ADMIN — GABAPENTIN 600 MG: 300 CAPSULE ORAL at 22:17

## 2021-03-10 RX ADMIN — TRAZODONE HYDROCHLORIDE 100 MG: 100 TABLET ORAL at 22:17

## 2021-03-10 RX ADMIN — CEFEPIME HYDROCHLORIDE 2000 MG: 2 INJECTION, POWDER, FOR SOLUTION INTRAVENOUS at 15:16

## 2021-03-10 RX ADMIN — LATANOPROST 1 DROP: 50 SOLUTION OPHTHALMIC at 22:17

## 2021-03-10 NOTE — PLAN OF CARE
Problem: INFECTION - ADULT  Goal: Absence or prevention of progression during hospitalization  Description: INTERVENTIONS:  - Assess and monitor for signs and symptoms of infection  - Monitor lab/diagnostic results  - Monitor all insertion sites, i e  indwelling lines, tubes, and drains  - Monitor endotracheal if appropriate and nasal secretions for changes in amount and color  - [place] appropriate cooling/warming therapies per order  - Administer medications as ordered  - Instruct and encourage patient and family to use good hand hygiene technique  - Identify and instruct in appropriate isolation precautions for identified infection/condition  3/10/2021 1839 by Nadege Cox RN  Outcome: Progressing  3/10/2021 [address] by Nadege Cox RN  Outcome: Progressing    Problem: SAFETY ADULT  Goal: Patient will remain free of falls  Description: INTERVENTIONS:  - Assess patient frequently for physical needs  -  Identify cognitive and physical deficits and behaviors that affect risk of falls    -  [place] fall precautions as indicated by assessment   - Educate patient/family on patient safety including physical limitations  - Instruct patient to call for assistance with activity based on assessment  - Modify environment to reduce risk of injury  - Consider OT/PT consult to assist with strengthening/mobility  3/10/2021 1839 by Nadege Cox RN  Outcome: Progressing  3/10/2021 [address] by Nadege Cox RN  Outcome: Progressing  Goal: Maintain or return to baseline ADL function  Description: INTERVENTIONS:  -  Assess patient's ability to carry out ADLs; assess patient's baseline for ADL function and identify physical deficits which impact ability to perform ADLs (bathing, care of mouth/teeth, toileting, grooming, dressing, etc )  - Assess/evaluate cause of self-care deficits   - Assess range of motion  - Assess patient's mobility; develop plan if impaired  - Assess patient's need for assistive devices and provide as appropriate  - Encourage maximum independence but intervene and supervise when necessary  - Involve family in performance of ADLs  - Assess for home care needs following discharge   - Consider OT consult to assist with ADL evaluation and planning for discharge  - Provide patient education as appropriate  3/10/2021 1839 by Dwayne Bernard RN  Outcome: Progressing  3/10/2021 [address] by Dwayne Bernard RN  Outcome: Progressing  Goal: Maintain or return mobility status to optimal level  Description: INTERVENTIONS:  - Assess patient's baseline mobility status (ambulation, transfers, stairs, etc )    - Identify cognitive and physical deficits and behaviors that affect mobility  - Identify mobility aids required to assist with transfers and/or ambulation (gait belt, sit-to-stand, lift, walker, cane, etc )  - [place] fall precautions as indicated by assessment  - Record patient progress and toleration of activity level on Mobility SBAR; progress patient to next Phase/Stage  - Instruct patient to call for assistance with activity based on assessment  - Consider rehabilitation consult to assist with strengthening/weightbearing, etc   3/10/2021 1839 by Dwayne Bernard RN  Outcome: Progressing  3/10/2021 [address] by Dwayne Bernard RN  Outcome: Progressing     Problem: CARDIOVASCULAR - ADULT  Goal: Maintains optimal cardiac output and hemodynamic stability  Description: INTERVENTIONS:  - Monitor I/O, vital signs and rhythm  - Monitor for S/S and trends of decreased cardiac output  - Administer and titrate ordered vasoactive medications to optimize hemodynamic stability  - Assess quality of pulses, skin color and temperature  - Assess for signs of decreased coronary artery perfusion  - Instruct patient to report change in severity of symptoms  Outcome: Progressing  Goal: Absence of cardiac dysrhythmias or at baseline rhythm  Description: INTERVENTIONS:  - Continuous cardiac monitoring, vital signs, obtain 12 lead EKG if ordered  - Administer antiarrhythmic and heart rate control medications as ordered  - Monitor electrolytes and administer replacement therapy as ordered  Outcome: Progressing     Problem: RESPIRATORY - ADULT  Goal: Achieves optimal ventilation and oxygenation  Description: INTERVENTIONS:  - Assess for changes in respiratory status  - Assess for changes in mentation and behavior  - Position to facilitate oxygenation and minimize respiratory effort  - Oxygen administered by appropriate delivery if ordered  - Initiate smoking cessation education as indicated  - Encourage broncho-pulmonary hygiene including cough, deep breathe, Incentive Spirometry  - Assess the need for suctioning and aspirate as needed  - Assess and instruct to report SOB or any respiratory difficulty  - Respiratory Therapy support as indicated  Outcome: Progressing     Problem: GENITOURINARY - ADULT  Goal: Maintains or returns to baseline urinary function  Description: INTERVENTIONS:  - Assess urinary function  - Encourage oral fluids to ensure adequate hydration if ordered  - Administer IV fluids as ordered to ensure adequate hydration  - Administer ordered medications as needed  - Offer frequent toileting  - Follow urinary retention protocol if ordered  Outcome: Progressing  Goal: Absence of urinary retention  Description: INTERVENTIONS:  - Assess patients ability to void and empty bladder  - Monitor I/O  - Bladder scan as needed  - Discuss with physician/AP medications to alleviate retention as needed  - Discuss catheterization for long term situations as appropriate  Outcome: Progressing  Goal: Urinary catheter remains patent  Description: INTERVENTIONS:  - Assess patency of urinary catheter  - If patient has a chronic nelson, consider changing catheter if non-functioning  - Follow guidelines for intermittent irrigation of non-functioning urinary catheter  Outcome: Progressing     Problem: METABOLIC, FLUID AND ELECTROLYTES - ADULT  Goal: Electrolytes maintained within normal limits  Description: INTERVENTIONS:  - Monitor labs and assess patient for signs and symptoms of electrolyte imbalances  - Administer electrolyte replacement as ordered  - Monitor response to electrolyte replacements, including repeat lab results as appropriate  - Instruct patient on fluid and nutrition as appropriate  Outcome: Progressing  Goal: Fluid balance maintained  Description: INTERVENTIONS:  - Monitor labs   - Monitor I/O and WT  - Instruct patient on fluid and nutrition as appropriate  - Assess for signs & symptoms of volume excess or deficit  Outcome: Progressing  Goal: Glucose maintained within target range  Description: INTERVENTIONS:  - Monitor Blood Glucose as ordered  - Assess for signs and symptoms of hyperglycemia and hypoglycemia  - Administer ordered medications to maintain glucose within target range  - Assess nutritional intake and initiate nutrition service referral as needed  Outcome: Progressing     Problem: MUSCULOSKELETAL - ADULT  Goal: Maintain or return mobility to safest level of function  Description: INTERVENTIONS:  - Assess patient's ability to carry out ADLs; assess patient's baseline for ADL function and identify physical deficits which impact ability to perform ADLs (bathing, care of mouth/teeth, toileting, grooming, dressing, etc )  - Assess/evaluate cause of self-care deficits   - Assess range of motion  - Assess patient's mobility  - Assess patient's need for assistive devices and provide as appropriate  - Encourage maximum independence but intervene and supervise when necessary  - Involve family in performance of ADLs  - Assess for home care needs following discharge   - Consider OT consult to assist with ADL evaluation and planning for discharge  - Provide patient education as appropriate  Outcome: Progressing  Goal: Maintain proper alignment of affected body part  Description: INTERVENTIONS:  - Support, maintain and protect limb and body alignment  - Provide patient/ family with appropriate education  Outcome: Progressing

## 2021-03-10 NOTE — ASSESSMENT & PLAN NOTE
/61   Pulse 60   Temp 98 °F (36 7 °C)   Resp 17   Ht 5' 6" (1 676 m)   Wt 110 kg (241 lb 10 oz)   LMP  (LMP Unknown)   SpO2 97%   BMI 39 00 kg/m²   Continue home losartan and amlodipine  BP controlled

## 2021-03-10 NOTE — PLAN OF CARE
Problem: Potential for Falls  Goal: Patient will remain free of falls  Description: INTERVENTIONS:  - Assess patient frequently for physical needs  -  Identify cognitive and physical deficits and behaviors that affect risk of falls    -  [place] fall precautions as indicated by assessment   - Educate patient/family on patient safety including physical limitations  - Instruct patient to call for assistance with activity based on assessment  - Modify environment to reduce risk of injury  - Consider OT/PT consult to assist with strengthening/mobility  Outcome: Progressing     Problem: Prexisting or High Potential for Compromised Skin Integrity  Goal: Skin integrity is maintained or improved  Description: INTERVENTIONS:  - Identify patients at risk for skin breakdown  - Assess and monitor skin integrity  - Assess and monitor nutrition and hydration status  - Monitor labs   - Assess for incontinence   - Turn and reposition patient  - Assist with mobility/ambulation  - Relieve pressure over bony prominences  - Avoid friction and shearing  - Provide appropriate hygiene as needed including keeping skin clean and dry  - Evaluate need for skin moisturizer/barrier cream  - Collaborate with interdisciplinary team   - Patient/family teaching  - Consider wound care consult   Outcome: Progressing     Problem: PAIN - ADULT  Goal: Verbalizes/displays adequate comfort level or baseline comfort level  Description: Interventions:  - Encourage patient to monitor pain and request assistance  - Assess pain using appropriate pain scale  - Administer analgesics based on type and severity of pain and evaluate response  - Implement non-pharmacological measures as appropriate and evaluate response  - Consider cultural and social influences on pain and pain management  - Notify physician/advanced practitioner if interventions unsuccessful or patient reports new pain  Outcome: Progressing     Problem: INFECTION - ADULT  Goal: Absence or prevention of progression during hospitalization  Description: INTERVENTIONS:  - Assess and monitor for signs and symptoms of infection  - Monitor lab/diagnostic results  - Monitor all insertion sites, i e  indwelling lines, tubes, and drains  - Monitor endotracheal if appropriate and nasal secretions for changes in amount and color  - [place] appropriate cooling/warming therapies per order  - Administer medications as ordered  - Instruct and encourage patient and family to use good hand hygiene technique  - Identify and instruct in appropriate isolation precautions for identified infection/condition  Outcome: Progressing     Problem: SAFETY ADULT  Goal: Patient will remain free of falls  Description: INTERVENTIONS:  - Assess patient frequently for physical needs  -  Identify cognitive and physical deficits and behaviors that affect risk of falls    -  [place] fall precautions as indicated by assessment   - Educate patient/family on patient safety including physical limitations  - Instruct patient to call for assistance with activity based on assessment  - Modify environment to reduce risk of injury  - Consider OT/PT consult to assist with strengthening/mobility  Outcome: Progressing  Goal: Maintain or return to baseline ADL function  Description: INTERVENTIONS:  -  Assess patient's ability to carry out ADLs; assess patient's baseline for ADL function and identify physical deficits which impact ability to perform ADLs (bathing, care of mouth/teeth, toileting, grooming, dressing, etc )  - Assess/evaluate cause of self-care deficits   - Assess range of motion  - Assess patient's mobility; develop plan if impaired  - Assess patient's need for assistive devices and provide as appropriate  - Encourage maximum independence but intervene and supervise when necessary  - Involve family in performance of ADLs  - Assess for home care needs following discharge   - Consider OT consult to assist with ADL evaluation and planning for discharge  - Provide patient education as appropriate  Outcome: Progressing  Goal: Maintain or return mobility status to optimal level  Description: INTERVENTIONS:  - Assess patient's baseline mobility status (ambulation, transfers, stairs, etc )    - Identify cognitive and physical deficits and behaviors that affect mobility  - Identify mobility aids required to assist with transfers and/or ambulation (gait belt, sit-to-stand, lift, walker, cane, etc )  - [place] fall precautions as indicated by assessment  - Record patient progress and toleration of activity level on Mobility SBAR; progress patient to next Phase/Stage  - Instruct patient to call for assistance with activity based on assessment  - Consider rehabilitation consult to assist with strengthening/weightbearing, etc   Outcome: Progressing     Problem: DISCHARGE PLANNING  Goal: Discharge to home or other facility with appropriate resources  Description: INTERVENTIONS:  - Identify barriers to discharge w/patient and caregiver  - Arrange for needed discharge resources and transportation as appropriate  - Identify discharge learning needs (meds, wound care, etc )  - Arrange for interpretive services to assist at discharge as needed  - Refer to Case Management Department for coordinating discharge planning if the patient needs post-hospital services based on physician/advanced practitioner order or complex needs related to functional status, cognitive ability, or social support system  Outcome: Progressing     Problem: Knowledge Deficit  Goal: Patient/family/caregiver demonstrates understanding of disease process, treatment plan, medications, and discharge instructions  Description: Complete learning assessment and assess knowledge base    Interventions:  - Provide teaching at level of understanding  - Provide teaching via preferred learning methods  Outcome: Progressing

## 2021-03-10 NOTE — ASSESSMENT & PLAN NOTE
- h o ESBL and enterococcus in 2018- colonies <100,000 but pt symptomatic  - [facility] this admission is also (+) for UTI  - waiting for UC and BC  - cont ceftriaxone for now day #2  - this could be contributing to her amb dysfunction

## 2021-03-10 NOTE — ASSESSMENT & PLAN NOTE
Has a history of chronic colonization notable history of ESBL  Currently without dysuria, afebrile and WBC count within normal limits  Will observe off antibiotics for now  Monitor WBC count fever curve

## 2021-03-10 NOTE — ED PROVIDER NOTES
History  Chief Complaint   Patient presents with    Possible UTI     pt c/o blood in her urine and back pain  also c/o diarrhea     72-year-old female is brought in for complaints of urinary tract infection symptoms  She states she is having blood in her urine, vaginal pain with urination, and low back pain  She has also been complaining of diarrhea  The patient has a history of having multiple urinary tract infections in the past   She often fails outpatient antibiotics and has been on multiple different antibiotics in the past over the most recent urinary tract infection that she had was in September  She denies fevers or chills, denies nausea or vomiting  She does admit to suprapubic abdominal tenderness  She admits to vaginal pain without discharge  Also admitting to diarrhea, nonbloody, no melena  Socially this patient has been having difficulty getting to her doctor's appointments because she needs a transportation van to do so  She has missed many doctors appointments because of lack of transportation and I am concerned about her ability to follow-up  Prior to Admission Medications   Prescriptions Last Dose Informant Patient Reported? Taking?    Baclofen 5 MG TABS   No No   Sig: oe po qd in the am   Cholecalciferol (Vitamin D3) 50 MCG (2000 UT) capsule   No No   Sig: TAKE 1 CAPSULE BY MOUTH EVERY DAY   EQL FLUTICASONE PROPIONATE NA   Yes No   Sig: into each nostril   Eliquis 5 MG   No No   Sig: TAKE 1 TABLET BY MOUTH TWICE A DAY   Incontinence Supply Disposable (PROCare Bariatric Briefs) MISC   No No   Sig: Use every 2 (two) hours as needed (incontinence)   OXYGEN-HELIUM IN   Yes No   Sig: Inhale 3 L as needed 3 L NC O2 as needed for SOB for SpO2 less than 90   acetaminophen (TYLENOL) 325 mg tablet   Yes No   Sig: Take 650 mg by mouth every 6 (six) hours as needed for mild pain   amLODIPine (NORVASC) 10 mg tablet   No No   Sig: Take 1 tablet (10 mg total) by mouth daily   calcium carbonate (TUMS) 500 mg chewable tablet   No No   Sig: Chew 2 tablets daily as needed for indigestion or heartburn   calcium carbonate-vitamin D (OSCAL-D) 500 mg-200 units per tablet   Yes No   Sig: Take 1 tablet by mouth daily with breakfast   docusate sodium (COLACE) 100 mg capsule   Yes No   Sig: Take 100 mg by mouth 2 (two) times a day   ferrous sulfate 325 (65 Fe) mg tablet   No No   Sig: TAKE 1 TABLET BY MOUTH EVERY DAY WITH BREAKFAST   gabapentin (NEURONTIN) 600 MG tablet   No No   Sig: TAKE 1 TABLET BY MOUTH THREE TIMES A DAY   latanoprost (XALATAN) 0 005 % ophthalmic solution   No No   Sig: Administer 1 drop to both eyes daily at bedtime   loratadine (CLARITIN) 10 mg tablet   No No   Sig: TAKE 1 TABLET BY MOUTH EVERY DAY   losartan (COZAAR) 25 mg tablet   No No   Sig: Take 1 tablet (25 mg total) by mouth daily   miconazole (Zeasorb-AF) 2 % powder   Yes No   Sig: Apply topically as needed for itching   oxyCODONE (OxyCONTIN) 10 mg 12 hr tablet   No No   Sig: Take 1 tablet (10 mg total) by mouth every 12 (twelve) hoursMax Daily Amount: 20 mg   pantoprazole (PROTONIX) 40 mg tablet   No No   Sig: Take 1 tablet (40 mg total) by mouth daily before breakfast   sertraline (ZOLOFT) 100 mg tablet   No No   Sig: TAKE 1 TABLET BY MOUTH EVERY DAY   tamsulosin (FLOMAX) 0 4 mg   No No   Sig: Take 1 capsule (0 4 mg total) by mouth daily with dinner 1 po HS   traZODone (DESYREL) 100 mg tablet   No No   Sig: TAKE 1 TABLET BY MOUTH DAILY AT BEDTIME      Facility-Administered Medications: None       Past Medical History:   Diagnosis Date    A-fib (HCC)     Anxiety     Chronic pain     Chronic respiratory failure with hypoxia (HCC)     COPD (chronic obstructive pulmonary disease) (HCC)     Dementia (HCC)     Dorsalgia, unspecified     Edema     Encephalopathy     GERD (gastroesophageal reflux disease)     Hypertension     Morbid obesity (HCC)     Muscle weakness (generalized)     Opioid dependence (HCC)     Overactive bladder     Pneumonia     Psychiatric disorder     anxiety, bipolar    Radiculopathy of thoracolumbar region     Sciatica     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 ([facility] [address] )     Urinary incontinence     UTI (urinary tract infection)        Past Surgical History:   Procedure Laterality Date    APPENDECTOMY      BACK SURGERY      CHOLECYSTECTOMY      KNEE SURGERY         History reviewed  No pertinent family history  I have reviewed and agree with the history as documented  E-Cigarette/Vaping    E-Cigarette Use Never User      E-Cigarette/Vaping Substances     Social History     Tobacco Use    Smoking status: Former Smoker     Types: Cigarettes     Quit date: 1995     Years since quittin 3    Smokeless tobacco: Never Used   Substance Use Topics    Alcohol use: No    Drug use: No       Review of Systems   Constitutional: Negative for chills, fatigue and fever  HENT: Negative for congestion and sore throat  Respiratory: Negative for apnea, cough, chest tightness and shortness of breath  Cardiovascular: Negative for chest pain and palpitations  Gastrointestinal: Positive for abdominal pain and diarrhea  Negative for constipation, nausea and vomiting  Genitourinary: Positive for difficulty urinating (Secondary to pain), dysuria and vaginal pain  Negative for flank pain, genital sores and vaginal discharge  Musculoskeletal: Negative for back pain and neck pain  Skin: Negative for color change and rash  Allergic/Immunologic: Negative for immunocompromised state  Neurological: Negative for dizziness, syncope and headaches  Physical Exam  Physical Exam  Vitals signs reviewed  Constitutional:       General: She is not in acute distress  Appearance: She is well-developed  She is not ill-appearing, toxic-appearing or diaphoretic  Comments: Overweight   HENT:      Head: Normocephalic and atraumatic     Eyes:      General: No scleral icterus  Right eye: No discharge  Left eye: No discharge  Conjunctiva/sclera: Conjunctivae normal       Pupils: Pupils are equal, round, and reactive to light  Neck:      Musculoskeletal: Normal range of motion and neck supple  Vascular: No JVD  Cardiovascular:      Rate and Rhythm: Normal rate and regular rhythm  Heart sounds: Normal heart sounds  No murmur  No friction rub  No gallop  Pulmonary:      Effort: Pulmonary effort is normal  No respiratory distress  Breath sounds: Normal breath sounds  No wheezing, rhonchi or rales  Chest:      Chest wall: No tenderness  Abdominal:      General: Bowel sounds are normal  There is no distension  Palpations: Abdomen is soft  Tenderness: There is abdominal tenderness (Suprapubic)  There is no right CVA tenderness, left CVA tenderness, guarding or rebound  Musculoskeletal: Normal range of motion  General: No tenderness or deformity  Skin:     General: Skin is warm and dry  Coloration: Skin is not pale  Findings: No erythema or rash  Neurological:      Mental Status: She is alert and oriented to person, place, and time  Cranial Nerves: No cranial nerve deficit     Psychiatric:         Behavior: Behavior normal          Vital Signs  ED Triage Vitals [03/10/21 1114]   Temperature Pulse Respirations Blood Pressure SpO2   97 9 °F (36 6 °C) 60 19 146/62 96 %      Temp Source Heart Rate Source Patient Position - Orthostatic VS BP Location FiO2 (%)   Oral Monitor Sitting Right arm --      Pain Score       7           Vitals:    03/10/21 1114 03/10/21 1230 03/10/21 1315 03/10/21 1324   BP: 146/62 158/87 130/61 138/74   Pulse: 60 63 60 60   Patient Position - Orthostatic VS: Sitting Lying Lying Sitting         Visual Acuity      ED Medications  Medications   cefepime (MAXIPIME) 2,000 mg in dextrose 5 % 50 mL IVPB (2,000 mg Intravenous New Bag 3/10/21 1516)   acetaminophen (TYLENOL) tablet 650 mg (has no administration in time range)       Diagnostic Studies  Results Reviewed     Procedure Component Value Units Date/Time    Lactic acid, plasma [[id number]]  (Normal) Collected: 03/10/21 1450    Lab Status: Final result Specimen: Blood from Arm, Right Updated: 03/10/21 1524     LACTIC ACID 0 9 mmol/L     Narrative:      Result may be elevated if tourniquet was used during collection  UA w Reflex to Microscopic w Reflex to Culture [[id number]] Collected: 03/10/21 1515    Lab Status: In process Specimen: Urine, Straight Cath Updated: 03/10/21 1520    Blood culture [[id number]] Collected: 03/10/21 1450    Lab Status: In process Specimen: Blood from Arm, Right Updated: 03/10/21 1505    Blood culture [[id number]] Collected: 03/10/21 1502    Lab Status: In process Specimen: Blood from Arm, Left Updated: 03/10/21 1505    NT-BNP PRO [[id number]]  (Abnormal) Collected: 03/10/21 1212    Lab Status: Final result Specimen: Blood from Arm, Right Updated: 03/10/21 1416     NT-proBNP 867 pg/mL     TSH [[id number]]  (Normal) Collected: 03/10/21 1212    Lab Status: Final result Specimen: Blood from Arm, Right Updated: 03/10/21 1255     TSH 3RD GENERATON 1 845 uIU/mL     Narrative:      Patients undergoing fluorescein dye angiography may retain small amounts of fluorescein in the body for 48-72 hours post procedure  Samples containing fluorescein can produce falsely depressed TSH values  If the patient had this procedure,a specimen should be resubmitted post fluorescein clearance        Magnesium [[id number]]  (Normal) Collected: 03/10/21 1212    Lab Status: Final result Specimen: Blood from Arm, Right Updated: 03/10/21 1255     Magnesium 1 9 mg/dL     Comprehensive metabolic panel [[id number]]  (Abnormal) Collected: 03/10/21 1212    Lab Status: Final result Specimen: Blood from Arm, Right Updated: 03/10/21 1249     Sodium 143 mmol/L      Potassium 4 1 mmol/L      Chloride 105 mmol/L      CO2 27 mmol/L      ANION GAP 11 mmol/L BUN 17 mg/dL      Creatinine 0 81 mg/dL      Glucose 99 mg/dL      Calcium 9 5 mg/dL      Corrected Calcium 10 4 mg/dL      AST 14 U/L      ALT 15 U/L      Alkaline Phosphatase 68 U/L      Total Protein 7 7 g/dL      Albumin 2 9 g/dL      Total Bilirubin 0 31 mg/dL      eGFR 69 ml/min/1 73sq m     Narrative:      [place] guidelines for Chronic Kidney Disease (CKD):     Stage 1 with normal or high GFR (GFR > 90 mL/min/1 73 square meters)    Stage 2 Mild CKD (GFR = 60-89 mL/min/1 73 square meters)    Stage 3A Moderate CKD (GFR = 45-59 mL/min/1 73 square meters)    Stage 3B Moderate CKD (GFR = 30-44 mL/min/1 73 square meters)    Stage 4 Severe CKD (GFR = 15-29 mL/min/1 73 square meters)    Stage 5 End Stage CKD (GFR <15 mL/min/1 73 square meters)  Note: GFR calculation is accurate only with a steady state creatinine    Troponin I [[id number]]  (Normal) Collected: 03/10/21 1212    Lab Status: Final result Specimen: Blood from Arm, Right Updated: 03/10/21 1248     Troponin I <0 02 ng/mL     CBC and differential [[id number]]  (Abnormal) Collected: 03/10/21 1212    Lab Status: Final result Specimen: Blood from Arm, Right Updated: 03/10/21 1221     WBC 6 00 Thousand/uL      RBC 4 39 Million/uL      Hemoglobin 11 9 g/dL      Hematocrit 38 7 %      MCV 88 fL      MCH 27 1 pg      MCHC 30 7 g/dL      RDW 14 8 %      MPV 9 3 fL      Platelets 930 Thousands/uL      nRBC 0 /100 WBCs      Neutrophils Relative 56 %      Immat GRANS % 1 %      Lymphocytes Relative 29 %      Monocytes Relative 9 %      Eosinophils Relative 4 %      Basophils Relative 1 %      Neutrophils Absolute 3 45 Thousands/µL      Immature Grans Absolute 0 03 Thousand/uL      Lymphocytes Absolute 1 73 Thousands/µL      Monocytes Absolute 0 53 Thousand/µL      Eosinophils Absolute 0 22 Thousand/µL      Basophils Absolute 0 04 Thousands/µL                  CT abdomen pelvis wo contrast   ED Interpretation by Carole Thomas,  (03/10 1433)     Circumferential bladder wall thickening may be related to underdistention versus cystitis  Correlate with urinalysis  No intraluminal calculi  Limited evaluation for intraluminal lesions without IV contrast   No dominant polypoid mass seen on limited   noncontrast study      Nonobstructing left renal calculi      Colonic diverticulosis without evidence of diverticulitis      Grossly stable postoperative and degenerative changes lumbar spine  Final Result by Marcia Sim DO (03/10 1403)      Circumferential bladder wall thickening may be related to underdistention versus cystitis  Correlate with urinalysis  No intraluminal calculi  Limited evaluation for intraluminal lesions without IV contrast   No dominant polypoid mass seen on limited    noncontrast study  Nonobstructing left renal calculi  Colonic diverticulosis without evidence of diverticulitis  Grossly stable postoperative and degenerative changes lumbar spine  Workstation performed: [id number]         XR chest 2 views   ED Interpretation by Sonali Montemayor DO (03/10 1401)   Cardiomegaly w CHF, mild      Final Result by Christofer Henderson MD (03/10 5883)      Poor aspiration  Cardiomegaly  Mild congestive changes                    Workstation performed: [id number]                    Procedures  ECG 12 Lead Documentation Only    Date/Time: 3/10/2021 3:28 PM  Performed by: Sonali Montemayor DO  Authorized by: Sonali Montemayor DO     Indications / Diagnosis:  Multiple complaints, unknown pacer  ECG reviewed by me, the ED Provider: yes    Patient location:  ED  Previous ECG:     Previous ECG:  Compared to current    Similarity:  No change    Comparison to cardiac monitor: Yes    Interpretation:     Interpretation: non-specific    Rate:     ECG rate:  59    ECG rate assessment: normal    Rhythm:     Rhythm: paced    Pacing:     Capture:  Complete    Type of pacing:  Atrial  Ectopy: Ectopy: none    QRS:     QRS axis:  Normal    QRS intervals:  Normal  Conduction:     Conduction: normal    ST segments:     ST segments:  Normal  T waves:     T waves: normal               ED Course  ED Course as of Mar 10 1529   Wed Mar 10, 2021   1211 Will scan dry - anaphylaxis to IV contrast      1419 NT-proBNP(!): 867               Identification of Seniors at Risk      Most Recent Value   (ISAR) Identification of Seniors at Risk   Before the illness or injury that brought you to the Emergency, did you need someone to help you on a regular basis? 1 Filed at: 03/10/2021 1119   In the last 24 hours, have you needed more help than usual?  0 Filed at: 03/10/2021 1119   Have you been hospitalized for one or more nights during the past 6 months? 0 Filed at: 03/10/2021 1119   In general, do you see well?  0 Filed at: 03/10/2021 1119   In general, do you have serious problems with your memory? 0 Filed at: 03/10/2021 1119   Do you take more than three different medications every day? 1 Filed at: 03/10/2021 1119   ISAR Score  2 Filed at: 03/10/2021 1119                    SBIRT 22yo+      Most Recent Value   SBIRT (23 yo +)   In order to provide better care to our patients, we are screening all of our patients for alcohol and drug use  Would it be okay to ask you these screening questions? Yes Filed at: 03/10/2021 1120   Initial Alcohol Screen: US AUDIT-C    1  How often do you have a drink containing alcohol?  0 Filed at: 03/10/2021 1120   2  How many drinks containing alcohol do you have on a typical day you are drinking? 0 Filed at: 03/10/2021 1120   3b  FEMALE Any Age, or MALE 65+: How often do you have 4 or more drinks on one occassion? 0 Filed at: 03/10/2021 1120   Audit-C Score  0 Filed at: 03/10/2021 1120   GINGER: How many times in the past year have you    Used an illegal drug or used a prescription medication for non-medical reasons?   Never Filed at: 03/10/2021 1120                    MDM  Number of Diagnoses or Management Options  Cystitis:   Hematuria:   UTI (urinary tract infection):   Diagnosis management comments: Complaints of urinary tract infection symptoms  CT scan is indicative of cystitis  Family is concerned that patient will not be appropriate for outpatient care as patient will need case management to help her get her appointments  Patient will be admitted for IV antibiotics for urinary tract infection  Amount and/or Complexity of Data Reviewed  Clinical lab tests: ordered and reviewed  Tests in the radiology section of CPT®: ordered and reviewed        Disposition  Final diagnoses:   UTI (urinary tract infection)   Hematuria   Cystitis     Time reflects when diagnosis was documented in both MDM as applicable and the Disposition within this note     Time User Action Codes Description Comment    3/10/2021  3:06 PM Coppersmith Keila L Add [N39 0] UTI (urinary tract infection)     3/10/2021  3:06 PM Coppersmith, Frankoford Button Add [R31 9] Hematuria     3/10/2021  3:06 PM Coppersmith, Winford Button Add [N30 90] Cystitis       ED Disposition     ED Disposition Condition Date/Time Comment    Admit Stable Wed Mar 10, 2021  3:06 PM Case was discussed with Kinza (SLIM) and the patient's admission status was agreed to be Admission Status: observation status to the service of Dr Yessica Celestin   Follow-up Information    None         Patient's Medications   Discharge Prescriptions    No medications on file     No discharge procedures on file      PDMP Review       Value Time User    PDMP Reviewed  Yes 3/10/2021  3:05 PM LUCY Keith          ED Provider  Electronically Signed by           Lary Figueroa DO  03/10/21 7126

## 2021-03-10 NOTE — ASSESSMENT & PLAN NOTE
Presented with generalized weakness  She has been struggling to get sore outpatient appointments due to difficulty ambulating and poor transportation  PT/OT  Case management follow-up

## 2021-03-10 NOTE — TELEPHONE ENCOUNTER
[facility] refill request for oxyCODONE (OxyCONTIN) 10 mg 12 hr tablet   PDMP web site checked last known refill on 1/13/2021 dispense QTY 60 for 30 days

## 2021-03-10 NOTE — H&P
[address]  H&P- Lacho Roth 1940, 80 y o  female MRN: [id number]  Unit/Bed#: -01 Encounter: [id number]  Primary Care Provider: LUCY Mera   Date and time admitted to hospital: 3/10/2021 11:12 AM    * Generalized weakness  Assessment & Plan  Presented with generalized weakness  She has been struggling to get sore outpatient appointments due to difficulty ambulating and poor transportation  PT/OT  Case management follow-up    UTI (urinary tract infection)  Assessment & Plan  Presented with significant dysuria, urinalysis revealed leukocytes and nitrates  Patient with history of frequent UTIs and ESBL previously  However has had organisms sensitive to ceftriaxone so will continue ceftriaxone for now given nontoxic appearing  WBC count within normal limits, afebrile  Follow-up urine culture  Consider ID consult    Chronic pain  Assessment & Plan  Chronic pain  Continue outpatient pain medications    Moderate episode of recurrent major depressive disorder ([facility] [address] )  Assessment & Plan  Continue home Zoloft and trazodone    COPD (chronic obstructive pulmonary disease) ([facility] )  Assessment & Plan  Not in acute exacerbation  Continue albuterol p r n  Hypertension  Assessment & Plan  Continue home losartan and amlodipine  BP controlled    Atrial fibrillation (HCC)  Assessment & Plan  Currently rate controlled  Continue home Eliquis    VTE Prophylaxis: Apixaban (Eliquis)  / sequential compression device   Code Status: full code  POLST: There is no POLST form on file for this patient (pre-hospital)  Discussion with family: pt    Anticipated Length of Stay:  Patient will be admitted on an Observation basis with an anticipated length of stay of  < 2 midnights  Justification for Hospital Stay:  Ambulatory dysfunction    Total Time for Visit, including Counseling / Coordination of Care: 1 hour    Greater than 50% of this total time spent on direct patient counseling and coordination of care     Chief Complaint:   Generalized weakness    History of Present Illness:    Stefania Clancy is a 80 y o  female with past medical history significant atrial fibrillation on Xarelto, COPD, hypertension initially presented with generalized weakness  Patient reports she has been having difficulty walking  Also notes some back pain and nonbloody diarrhea/loose bowel movements  Denies any fevers, chills, dysuria, abdominal pain, nausea, vomiting, headaches, numbness, weakness or any other symptoms  Review of Systems:    Review of Systems   Constitutional: Negative for activity change, appetite change, chills, diaphoresis, fever and unexpected weight change  HENT: Negative for congestion, facial swelling and rhinorrhea  Eyes: Negative for photophobia and visual disturbance  Respiratory: Negative for cough, shortness of breath and wheezing  Cardiovascular: Negative for chest pain and palpitations  Gastrointestinal: Negative for abdominal pain, blood in stool, constipation, diarrhea, nausea and vomiting  Genitourinary: Positive for dysuria  Negative for decreased urine volume, difficulty urinating, flank pain, frequency, hematuria and urgency  Musculoskeletal: Positive for back pain  Negative for arthralgias, joint swelling and myalgias  Neurological: Positive for weakness  Negative for dizziness, syncope, facial asymmetry, light-headedness, numbness and headaches  Psychiatric/Behavioral: Negative for confusion and decreased concentration  The patient is not nervous/anxious          Past Medical and Surgical History:     Past Medical History:   Diagnosis Date    A-fib ([facility] )     Anxiety     Chronic pain     Chronic respiratory failure with hypoxia ([facility])     COPD (chronic obstructive pulmonary disease) ([facility])     Dementia ([facility])     Dorsalgia, unspecified     Edema     Encephalopathy     GERD (gastroesophageal reflux disease)     Hypertension     Morbid obesity ([facility] )     Muscle weakness (generalized)     Opioid dependence (HCC)     Overactive bladder     Pneumonia     Psychiatric disorder     anxiety, bipolar    Radiculopathy of thoracolumbar region     Sciatica     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 ([facility] [address] )     Urinary incontinence     UTI (urinary tract infection)        Past Surgical History:   Procedure Laterality Date    APPENDECTOMY      BACK SURGERY      CHOLECYSTECTOMY      KNEE SURGERY         Meds/Allergies:    Prior to Admission medications    Medication Sig Start Date End Date Taking?  Authorizing Provider   acetaminophen (TYLENOL) 325 mg tablet Take 650 mg by mouth every 6 (six) hours as needed for mild pain    Historical Provider, MD   amLODIPine (NORVASC) 10 mg tablet Take 1 tablet (10 mg total) by mouth daily 11/18/20   LUCY Shultz   Baclofen 5 MG TABS oe po qd in the am 11/18/20   LUCY Shultz   calcium carbonate (TUMS) 500 mg chewable tablet Chew 2 tablets daily as needed for indigestion or heartburn 6/14/17   Devaughn Olivares MD   calcium carbonate-vitamin D (OSCAL-D) 500 mg-200 units per tablet Take 1 tablet by mouth daily with breakfast    Historical Provider, MD   Cholecalciferol (Vitamin D3) 50 MCG (2000 UT) capsule TAKE 1 CAPSULE BY MOUTH EVERY DAY 11/23/20   LUCY Shultz   docusate sodium (COLACE) 100 mg capsule Take 100 mg by mouth 2 (two) times a day    Historical Provider, MD   Eliquis 5 MG TAKE 1 TABLET BY MOUTH TWICE A DAY 2/19/21   LUCY Shultz   EQL FLUTICASONE PROPIONATE NA into each nostril    Historical Provider, MD   ferrous sulfate 325 (65 Fe) mg tablet TAKE 1 TABLET BY MOUTH EVERY DAY WITH BREAKFAST 2/15/21   LUCY Shultz   gabapentin (NEURONTIN) 600 MG tablet TAKE 1 TABLET BY MOUTH THREE TIMES A DAY 2/19/21   LUCY Shultz   Incontinence Supply Disposable (PROCare Bariatric Briefs) MISC Use every 2 (two) hours as needed (incontinence) 12/9/20   LUCY Shultz latanoprost (XALATAN) 0 005 % ophthalmic solution Administer 1 drop to both eyes daily at bedtime 21   LUCY Shultz   loratadine (CLARITIN) 10 mg tablet TAKE 1 TABLET BY MOUTH EVERY DAY 21   LUCY Shultz   losartan (COZAAR) 25 mg tablet Take 1 tablet (25 mg total) by mouth daily 20   LUCY Shultz   miconazole (Zeasorb-AF) 2 % powder Apply topically as needed for itching    Historical Provider, MD   oxyCODONE (OxyCONTIN) 10 mg 12 hr tablet Take 1 tablet (10 mg total) by mouth every 12 (twelve) hoursMax Daily Amount: 20 mg 3/10/21   LUCY Shultz   OXYGEN-HELIUM IN Inhale 3 L as needed 3 L NC O2 as needed for SOB for SpO2 less than 90    Historical Provider, MD   pantoprazole (PROTONIX) 40 mg tablet Take 1 tablet (40 mg total) by mouth daily before breakfast 1/15/21   LUCY Shultz   sertraline (ZOLOFT) 100 mg tablet TAKE 1 TABLET BY MOUTH EVERY DAY 2/10/21   Casie Mario, LUCY   tamsulosin (FLOMAX) 0 4 mg Take 1 capsule (0 4 mg total) by mouth daily with dinner 1 po HS 20   LUCY Shultz   traZODone (DESYREL) 100 mg tablet TAKE 1 TABLET BY MOUTH DAILY AT BEDTIME 2/10/21   LUCY Shultz   oxyCODONE (OxyCONTIN) 10 mg 12 hr tablet Take 1 tablet (10 mg total) by mouth every 12 (twelve) hoursMax Daily Amount: 20 mg 1/13/21 3/10/21  LUCY Shultz     I have reviewed home medications with patient personally  Allergies: Allergies   Allergen Reactions    Aspirin     Iodinated Diagnostic Agents Throat Swelling    Iodine     Nsaids        Social History:     Marital Status:       Patient Pre-hospital Living Situation: home  Patient Pre-hospital Level of Mobility: independent  Patient Pre-hospital Diet Restrictions: none  Substance Use History:   Social History     Substance and Sexual Activity   Alcohol Use No     Social History     Tobacco Use   Smoking Status Former Smoker    Types: Cigarettes    Quit date: 1995    Years since quittin 3 Smokeless Tobacco Never Used     Social History     Substance and Sexual Activity   Drug Use No       Family History:    History reviewed  No pertinent family history  Physical Exam:     Vitals:   Blood Pressure: 150/61 (03/10/21 1709)  Pulse: 62 (03/10/21 1709)  Temperature: 99 6 °F (37 6 °C) (03/10/21 1709)  Temp Source: Oral (03/10/21 1114)  Respirations: 18 (03/10/21 1709)  Height: 5' 6" (167 6 cm) (03/10/21 1709)  Weight - Scale: 109 kg (240 lb 11 9 oz) (03/10/21 1709)  SpO2: 93 % (03/10/21 1709)    Physical Exam  Constitutional:       General: She is not in acute distress  Appearance: She is well-developed  She is not diaphoretic  HENT:      Head: Normocephalic and atraumatic  Nose: Nose normal       Mouth/Throat:      Pharynx: No oropharyngeal exudate  Eyes:      General: No scleral icterus  Right eye: No discharge  Left eye: No discharge  Conjunctiva/sclera: Conjunctivae normal    Neck:      Musculoskeletal: Normal range of motion and neck supple  Thyroid: No thyromegaly  Vascular: No JVD  Cardiovascular:      Rate and Rhythm: Normal rate and regular rhythm  Heart sounds: Normal heart sounds  No murmur  No friction rub  No gallop  Pulmonary:      Effort: Pulmonary effort is normal  No respiratory distress  Breath sounds: Normal breath sounds  No wheezing or rales  Chest:      Chest wall: No tenderness  Abdominal:      General: Bowel sounds are normal  There is no distension  Palpations: Abdomen is soft  Tenderness: There is no abdominal tenderness  There is no guarding or rebound  Musculoskeletal: Normal range of motion  General: No tenderness or deformity  Skin:     General: Skin is warm and dry  Findings: No erythema or rash  Neurological:      Mental Status: She is alert  Mental status is at baseline  Cranial Nerves: No cranial nerve deficit  Sensory: No sensory deficit        Motor: No abnormal muscle tone       Coordination: Coordination normal              Additional Data:     Lab Results: I have personally reviewed pertinent reports  Results from last 7 days   Lab Units 03/10/21  1212   WBC Thousand/uL 6 00   HEMOGLOBIN g/dL 11 9   HEMATOCRIT % 38 7   PLATELETS Thousands/uL 185   NEUTROS PCT % 56   LYMPHS PCT % 29   MONOS PCT % 9   EOS PCT % 4     Results from last 7 days   Lab Units 03/10/21  1212   SODIUM mmol/L 143   POTASSIUM mmol/L 4 1   CHLORIDE mmol/L 105   CO2 mmol/L 27   BUN mg/dL 17   CREATININE mg/dL 0 81   ANION GAP mmol/L 11   CALCIUM mg/dL 9 5   ALBUMIN g/dL 2 9*   TOTAL BILIRUBIN mg/dL 0 31   ALK PHOS U/L 68   ALT U/L 15   AST U/L 14   GLUCOSE RANDOM mg/dL 99                 Results from last 7 days   Lab Units 03/10/21  1450   LACTIC ACID mmol/L 0 9       Imaging: I have personally reviewed pertinent reports  CT abdomen pelvis wo contrast   ED Interpretation by Shasha Reynoso DO (03/10 9253)     Circumferential bladder wall thickening may be related to underdistention versus cystitis  Correlate with urinalysis  No intraluminal calculi  Limited evaluation for intraluminal lesions without IV contrast   No dominant polypoid mass seen on limited   noncontrast study      Nonobstructing left renal calculi      Colonic diverticulosis without evidence of diverticulitis      Grossly stable postoperative and degenerative changes lumbar spine  Final Result by Eugenia Collado DO (03/10 1403)      Circumferential bladder wall thickening may be related to underdistention versus cystitis  Correlate with urinalysis  No intraluminal calculi  Limited evaluation for intraluminal lesions without IV contrast   No dominant polypoid mass seen on limited    noncontrast study  Nonobstructing left renal calculi  Colonic diverticulosis without evidence of diverticulitis  Grossly stable postoperative and degenerative changes lumbar spine           Workstation performed: [id number]         XR chest 2 views   ED Interpretation by Sherley Perla DO (03/10 1401)   Cardiomegaly w CHF, mild      Final Result by Otto Martin MD (03/10 7484)      Poor aspiration  Cardiomegaly  Mild congestive changes  Workstation performed: [id number]             EKG, Pathology, and Other Studies Reviewed on Admission:   · EKG: reviewed    Allscripts / Epic Records Reviewed: Yes     ** Please Note: This note has been constructed using a voice recognition system   **

## 2021-03-11 PROBLEM — B35.6 FUNGAL INFECTION OF THE GROIN: Status: ACTIVE | Noted: 2021-03-11

## 2021-03-11 LAB
ANION GAP SERPL CALCULATED.3IONS-SCNC: 8 MMOL/L (ref 4–13)
BASOPHILS # BLD AUTO: 0.04 THOUSANDS/ΜL (ref 0–0.1)
BASOPHILS NFR BLD AUTO: 1 % (ref 0–1)
BUN SERPL-MCNC: 18 MG/DL (ref 5–25)
CALCIUM SERPL-MCNC: 9.4 MG/DL (ref 8.3–10.1)
CHLORIDE SERPL-SCNC: 107 MMOL/L (ref 100–108)
CO2 SERPL-SCNC: 26 MMOL/L (ref 21–32)
CREAT SERPL-MCNC: 0.88 MG/DL (ref 0.6–1.3)
EOSINOPHIL # BLD AUTO: 0.26 THOUSAND/ΜL (ref 0–0.61)
EOSINOPHIL NFR BLD AUTO: 4 % (ref 0–6)
ERYTHROCYTE [DISTWIDTH] IN BLOOD BY AUTOMATED COUNT: 14.9 % (ref 11.6–15.1)
GFR SERPL CREATININE-BSD FRML MDRD: 63 ML/MIN/1.73SQ M
GLUCOSE P FAST SERPL-MCNC: 103 MG/DL (ref 65–99)
GLUCOSE SERPL-MCNC: 103 MG/DL (ref 65–140)
HCT VFR BLD AUTO: 37.6 % (ref 34.8–46.1)
HGB BLD-MCNC: 11.7 G/DL (ref 11.5–15.4)
IMM GRANULOCYTES # BLD AUTO: 0.03 THOUSAND/UL (ref 0–0.2)
IMM GRANULOCYTES NFR BLD AUTO: 1 % (ref 0–2)
LYMPHOCYTES # BLD AUTO: 1.94 THOUSANDS/ΜL (ref 0.6–4.47)
LYMPHOCYTES NFR BLD AUTO: 30 % (ref 14–44)
MCH RBC QN AUTO: 27.5 PG (ref 26.8–34.3)
MCHC RBC AUTO-ENTMCNC: 31.1 G/DL (ref 31.4–37.4)
MCV RBC AUTO: 89 FL (ref 82–98)
MONOCYTES # BLD AUTO: 0.54 THOUSAND/ΜL (ref 0.17–1.22)
MONOCYTES NFR BLD AUTO: 8 % (ref 4–12)
NEUTROPHILS # BLD AUTO: 3.59 THOUSANDS/ΜL (ref 1.85–7.62)
NEUTS SEG NFR BLD AUTO: 56 % (ref 43–75)
NRBC BLD AUTO-RTO: 0 /100 WBCS
PLATELET # BLD AUTO: 185 THOUSANDS/UL (ref 149–390)
PMV BLD AUTO: 9.6 FL (ref 8.9–12.7)
POTASSIUM SERPL-SCNC: 3.8 MMOL/L (ref 3.5–5.3)
RBC # BLD AUTO: 4.25 MILLION/UL (ref 3.81–5.12)
SODIUM SERPL-SCNC: 141 MMOL/L (ref 136–145)
WBC # BLD AUTO: 6.4 THOUSAND/UL (ref 4.31–10.16)

## 2021-03-11 PROCEDURE — 80048 BASIC METABOLIC PNL TOTAL CA: CPT | Performed by: INTERNAL MEDICINE

## 2021-03-11 PROCEDURE — 97163 PT EVAL HIGH COMPLEX 45 MIN: CPT

## 2021-03-11 PROCEDURE — 99233 SBSQ HOSP IP/OBS HIGH 50: CPT | Performed by: FAMILY MEDICINE

## 2021-03-11 PROCEDURE — 85025 COMPLETE CBC W/AUTO DIFF WBC: CPT | Performed by: INTERNAL MEDICINE

## 2021-03-11 PROCEDURE — 97167 OT EVAL HIGH COMPLEX 60 MIN: CPT

## 2021-03-11 RX ORDER — OXYCODONE HYDROCHLORIDE 5 MG/1
5 TABLET ORAL EVERY 6 HOURS PRN
Status: DISCONTINUED | OUTPATIENT
Start: 2021-03-11 | End: 2021-03-18 | Stop reason: HOSPADM

## 2021-03-11 RX ORDER — NYSTATIN 100000 [USP'U]/G
POWDER TOPICAL 2 TIMES DAILY
Status: DISCONTINUED | OUTPATIENT
Start: 2021-03-11 | End: 2021-03-18 | Stop reason: HOSPADM

## 2021-03-11 RX ORDER — NYSTATIN 100000 U/G
CREAM TOPICAL 2 TIMES DAILY
Status: DISCONTINUED | OUTPATIENT
Start: 2021-03-11 | End: 2021-03-18 | Stop reason: HOSPADM

## 2021-03-11 RX ADMIN — PANTOPRAZOLE SODIUM 40 MG: 40 TABLET, DELAYED RELEASE ORAL at 06:27

## 2021-03-11 RX ADMIN — OXYCODONE HYDROCHLORIDE 10 MG: 10 TABLET, FILM COATED, EXTENDED RELEASE ORAL at 10:52

## 2021-03-11 RX ADMIN — DOCUSATE SODIUM 100 MG: 100 CAPSULE, LIQUID FILLED ORAL at 09:23

## 2021-03-11 RX ADMIN — CEFTRIAXONE SODIUM 1000 MG: 10 INJECTION, POWDER, FOR SOLUTION INTRAVENOUS at 06:27

## 2021-03-11 RX ADMIN — FLUTICASONE PROPIONATE 1 SPRAY: 50 SPRAY, METERED NASAL at 09:23

## 2021-03-11 RX ADMIN — GABAPENTIN 600 MG: 300 CAPSULE ORAL at 09:21

## 2021-03-11 RX ADMIN — AMLODIPINE BESYLATE 10 MG: 10 TABLET ORAL at 09:22

## 2021-03-11 RX ADMIN — TAMSULOSIN HYDROCHLORIDE 0.4 MG: 0.4 CAPSULE ORAL at 17:57

## 2021-03-11 RX ADMIN — APIXABAN 5 MG: 5 TABLET, FILM COATED ORAL at 09:22

## 2021-03-11 RX ADMIN — NYSTATIN: 100000 POWDER TOPICAL at 18:43

## 2021-03-11 RX ADMIN — TRAZODONE HYDROCHLORIDE 100 MG: 100 TABLET ORAL at 21:12

## 2021-03-11 RX ADMIN — GABAPENTIN 600 MG: 300 CAPSULE ORAL at 21:12

## 2021-03-11 RX ADMIN — ACETAMINOPHEN 650 MG: 325 TABLET ORAL at 22:08

## 2021-03-11 RX ADMIN — LATANOPROST 1 DROP: 50 SOLUTION OPHTHALMIC at 21:13

## 2021-03-11 RX ADMIN — APIXABAN 5 MG: 5 TABLET, FILM COATED ORAL at 17:56

## 2021-03-11 RX ADMIN — LOSARTAN POTASSIUM 25 MG: 25 TABLET, FILM COATED ORAL at 09:21

## 2021-03-11 RX ADMIN — NYSTATIN: 100000 CREAM TOPICAL at 18:43

## 2021-03-11 RX ADMIN — GABAPENTIN 600 MG: 300 CAPSULE ORAL at 17:56

## 2021-03-11 RX ADMIN — OXYCODONE HYDROCHLORIDE 5 MG: 5 TABLET ORAL at 22:09

## 2021-03-11 RX ADMIN — SERTRALINE HYDROCHLORIDE 100 MG: 50 TABLET ORAL at 09:21

## 2021-03-11 NOTE — PROGRESS NOTES
[address]  Progress Note Laura Alcantara 1940, 80 y o  female MRN: [id number]  Unit/Bed#: -01 Encounter: [id number]  Primary Care Provider: LUCY Mera   Date and time admitted to hospital: 3/10/2021 11:12 AM    UTI (urinary tract infection)  Assessment & Plan  - h o ESBL and enterococcus in 2018- colonies <100,000 but pt symptomatic  - UA this admission is also (+) for UTI  - waiting for UC and BC  - cont ceftriaxone for now day #2  - this could be contributing to her amb dysfunction    Fungal infection of the groin  Assessment & Plan  - start nystatin cream in the groin  - nystatin powder in the abd skin folds    Dementia (HCC)  Assessment & Plan  Frontal lobe dementia  Supportive care  Cont zolfot and trazodone    Chronic pain  Assessment & Plan  Chronic pain  Continue outpatient pain medications    Moderate episode of recurrent major depressive disorder ([facility] )  Assessment & Plan  Continue home Zoloft and trazodone    COPD (chronic obstructive pulmonary disease) ([facility] [address] )  Assessment & Plan  Not in acute exacerbation  Continue albuterol p r n  Hypertension  Assessment & Plan  /61   Pulse 60   Temp 98 °F (36 7 °C)   Resp 17   Ht 5' 6" (1 676 m)   Wt 110 kg (241 lb 10 oz)   LMP  (LMP Unknown)   SpO2 97%   BMI 39 00 kg/m²   Continue home losartan and amlodipine  BP controlled    Atrial fibrillation (HCC)  Assessment & Plan  Currently rate controlled  Continue home Eliquis    * Generalized weakness  Assessment & Plan  Presented with generalized weakness  She has been struggling to get sore outpatient appointments due to difficulty ambulating and poor transportation  PT/OT  Case management follow-up        VTE Pharmacologic Prophylaxis:   Pharmacologic: Apixaban (Eliquis)  Mechanical VTE Prophylaxis in Place: Yes    Patient Centered Rounds: I have performed bedside rounds with nursing staff today      Discussions with Specialists or Other Care Team Provider: yes    Education and Discussions with Family / Patient: yes, spoke with dtr in detail  All questions answered to her satisfaction  Discussed each diagnosis with the planning in detaiil  No concerns raised at the end of our discussion    Time Spent for Care: 30 minutes  More than 50% of total time spent on counseling and coordination of care as described above  Current Length of Stay: 0 day(s)    Current Patient Status: Inpatient   Certification Statement: The patient will continue to require additional inpatient hospital stay due to ongoing treatment for UTI and PT OT eval for further recs    Discharge Plan: 24-48h    Code Status: Level 1 - Full Code      Subjective:   Seen and examined at bedside  C o burning and painful urine  H o stool incontinence  C o increasing weakness and difficulty ambulating    Objective:     Vitals:   Temp (24hrs), Av 7 °F (37 1 °C), Min:98 °F (36 7 °C), Max:99 6 °F (37 6 °C)    Temp:  [98 °F (36 7 °C)-99 6 °F (37 6 °C)] 98 °F (36 7 °C)  HR:  [57-63] 60  Resp:  [17-20] 17  BP: (121-150)/() 138/61  SpO2:  [93 %-97 %] 97 %  Body mass index is 39 kg/m²  Input and Output Summary (last 24 hours):        Intake/Output Summary (Last 24 hours) at 3/11/2021 1504  Last data filed at 3/11/2021 1416  Gross per 24 hour   Intake 1440 ml   Output 1730 ml   Net -290 ml       Physical Exam:     Physical Exam  S1s2(+) r  Lungs diminished at bases  abd nt ndbsn(+) 4 quads  No new focal neuro deficits  Perineal skin appears red and there is a skin excoriation visible on the labia  No open wounds visible    Additional Data:     Labs:    Results from last 7 days   Lab Units 21  0530   WBC Thousand/uL 6 40   HEMOGLOBIN g/dL 11 7   HEMATOCRIT % 37 6   PLATELETS Thousands/uL 185   NEUTROS PCT % 56   LYMPHS PCT % 30   MONOS PCT % 8   EOS PCT % 4     Results from last 7 days   Lab Units 21  0530 03/10/21  1212   SODIUM mmol/L 141 143   POTASSIUM mmol/L 3 8 4 1   CHLORIDE mmol/L 107 105 CO2 mmol/L 26 27   BUN mg/dL 18 17   CREATININE mg/dL 0 88 0 81   ANION GAP mmol/L 8 11   CALCIUM mg/dL 9 4 9 5   ALBUMIN g/dL  --  2 9*   TOTAL BILIRUBIN mg/dL  --  0 31   ALK PHOS U/L  --  68   ALT U/L  --  15   AST U/L  --  14   GLUCOSE RANDOM mg/dL 103 99                 Results from last 7 days   Lab Units 03/10/21  1450   LACTIC ACID mmol/L 0 9           * I Have Reviewed All Lab Data Listed Above  * Additional Pertinent Lab Tests Reviewed: All Labs Within Last 24 Hours Reviewed    Imaging:    Imaging Reports Reviewed Today Include: CT   Imaging Personally Reviewed by Myself Includes:  na    Recent Cultures (last 7 days):     Results from last 7 days   Lab Units 03/10/21  1502 03/10/21  1450   BLOOD CULTURE  Received in Microbiology Lab  Culture in Progress  Received in Microbiology Lab  Culture in Progress         Last 24 Hours Medication List:   Current Facility-Administered Medications   Medication Dose Route Frequency Provider Last Rate    acetaminophen  650 mg Oral Q6H PRN Cristhian Echols MD      amLODIPine  10 mg Oral Daily Cristhian Echols MD      apixaban  5 mg Oral BID Cristhian Echols MD      cefTRIAXone  1,000 mg Intravenous Q24H Cristhian Echols MD 1,000 mg (03/11/21 6010)    docusate sodium  100 mg Oral BID Cristhian Echols MD      fluticasone  1 spray Nasal Daily Cristhian Echols MD      gabapentin  600 mg Oral TID Cristhian Echols MD      latanoprost  1 drop Both Eyes HS Cristhian Echols MD      losartan  25 mg Oral Daily Cee Yanes MD      nystatin   Topical BID Ashli Guadalupe MD      nystatin   Topical BID Ashli Guadalupe MD      oxyCODONE  5 mg Oral Q6H PRN Ashli Guadalupe MD      pantoprazole  40 mg Oral Daily Before Breakfast Cee Yanes MD      sertraline  100 mg Oral Daily Cristhian Echols MD      tamsulosin  0 4 mg Oral Daily With Sue Silverio MD      traZODone  100 mg Oral HS Cee Yanes MD          Today, Patient Was Seen By: Ashli Guadalupe MD    ** Please Note: Dictation voice to text software may have been used in the creation of this document   **

## 2021-03-11 NOTE — CASE MANAGEMENT
LOS: 1 DAY  PATIENT IS NOT A BUNDLE  PATIENT IS NOT A 30 DAY READMISSION  UNPLANNED READMISSION RISK SCORE IS N/A  Cm met with patient at bedside to complete general assessment and to discuss discharge planning  Patient reported that she lives with her daughter in a ranch home with 8 or 9 [address] in [facility]  Patient has a walker and a wheelchair and needs assistance with ADLs  Patient reported Hx at [facility] and [address] in [facility]  Patient also reported that she is [place] with [facility] [place] [address] for PT/OT and has waiver services in place through [address] [place] [address]  Patient fills her prescriptions at [facility] in [facility]  Her PCP is Casie Mario  Patient denied MH and SA Hx  Her daughter Pricila Horn is her POA and has the documentation for this  Patient is retired and unable to drive  She relies on family or public transportation  CM reviewed discharge planning process including the following: identifying help at home, patient preference for discharge planning needs, pharmacy preference, and availability of treatment team to discuss questions or concerns patient and/or family may have regarding understanding medications and recognizing signs and symptoms once discharged  CM also encouraged patient to follow up with all recommended appointments after discharge  Patient advised of importance for patient and family to participate in managing patients medical well being  Patient asked if Cm could call her dtr to confirm this information and to discuss STR recommendation  Dtr reported that patient has [address] already and she would prefer if patient comes home to resume these services  Both patient and dtr refusing STR at this time  However, dtr is going to call [facility] to see if they can increase her hours if they private pay  Then, she will call Cm back with more information  Cm sent referral via [employer] to [facility] for YASSINE  CM department will continue to follow patient through discharge

## 2021-03-11 NOTE — UTILIZATION REVIEW
Initial Clinical Review    OBS order 3/10 1507 converted to IP on 3/11 @ 1429  For continued treatment of UTI requiring IV abx     03/11/21 1429  Inpatient Admission Once      03/11/21 1429   03/10/21 1508  Place in Observation Once      03/10/21 1507     Level of Care Med Surg    Estimated length of stay More than 2 Midnights    Certification I certify that inpatient services are medically necessary for this patient for a duration of greater than two midnights  See H&P and MD Progress Notes for additional information about the patient's course of treatment  ED Arrival Information     Expected Arrival Acuity Means of Arrival Escorted By Service Admission Type    - 3/10/2021 11:12 Urgent Ambulance [facility]) General Medicine Urgent    Arrival Complaint    Poss UTI        Chief Complaint   Patient presents with    Possible UTI     pt c/o blood in her urine and back pain  also c/o diarrhea     Assessment/Plan: 81 yo female to ED from home w/ generalized weakness   Difficulty walking   + back pain and nonbloody diarrhea Binu Limon [facility]ms   Admitted OBS status w/ UTI cont ceftriaxone , f/u ua cx and consider ID consult   PT OT CM eval for weakness and struggling to get to OP appt bc of difficulty walking and poor transportation   Cont meds for COPD , HTN and afib and depression  3/11 PT eval   Rehab on DC     3/11 IM Note   UTI waiting on ua and BC , cont day 2 of ceftriaxone   UTI may be contributing to her amb dysfunction   Start nystatin cream to groin for fungal infection   Cont meds for HTN COPD , sfib          ED Triage Vitals [03/10/21 1114]   Temperature Pulse Respirations Blood Pressure SpO2   97 9 °F (36 6 °C) 60 19 146/62 96 %      Temp Source Heart Rate Source Patient Position - Orthostatic VS BP Location FiO2 (%)   Oral Monitor Sitting Right arm --      Pain Score       7          Wt Readings from Last 1 Encounters:   03/11/21 110 kg (241 lb 10 oz)     Additional Vital Signs:   03/12/21 10:28:23 --  60  18  143/67  92  99 %  --  --   03/12/21 0745  --  --  --  --  --  95 %  --  --   03/12/21 07:15:13  --  60  15  141/68  92  96 %  --  --   03/11/21 15:23:57  98 4 °F (36 9 °C)  63  18  147/63  91  95 %  --  --   03/11/21 09:26:04  --  --  --  138/61  87  --  --  --   03/11/21 07:19:33  98 °F (36 7 °C)  60  17  142/118Abnormal   126  97 %           03/10/21 22:33:39  98 4 °F (36 9 °C)  63  18  140/117Abnormal   125  96 %  --  --   03/10/21 2000  --  --  --  --  --  94 %  None (Room air)  --   03/10/21 17:09:54  99 6 °F (37 6 °C)  62  18  150/61  91  93 %  --  --   03/10/21 1545  --  57  20  121/59  --  97 %  None (Room air)  Lying   03/10/21 1324  --  60  18  138/74  --  96 %  None (Room air)  Sitting   03/10/21 1315  --  60  20  130/61  88  95 %  None (Room air)  Lying   03/10/21 1230  --  63  17  158/87  113  97 %  None (Room air         Pertinent Labs/Diagnostic Test Results:   3/10 CT abd Circumferential bladder wall thickening may be related to underdistention versus cystitis  Correlate with urinalysis  No intraluminal calculi  Limited evaluation for intraluminal lesions without IV contrast   No dominant polypoid mass seen on limited   noncontrast study    Nonobstructing left renal calculi    Colonic diverticulosis without evidence of diverticulitis    Grossly stable postoperative and degenerative changes lumbar spine    3/10 CXR Poor aspiration  Cardiomegaly    Mild congestive changes    3/10 EKG Electronic ventricular pacemaker    Results from last 7 days   Lab Units 03/11/21  0530 03/10/21  1212   WBC Thousand/uL 6 40 6 00   HEMOGLOBIN g/dL 11 7 11 9   HEMATOCRIT % 37 6 38 7   PLATELETS Thousands/uL 185 185   NEUTROS ABS Thousands/µL 3 59 3 45     Results from last 7 days   Lab Units 03/11/21  0530 03/10/21  1212   SODIUM mmol/L 141 143   POTASSIUM mmol/L 3 8 4 1   CHLORIDE mmol/L 107 105   CO2 mmol/L 26 27   ANION GAP mmol/L 8 11   BUN mg/dL 18 17   CREATININE mg/dL 0 88 0 81   EGFR ml/min/1 73sq m 63 69   CALCIUM mg/dL 9 4 9 5   MAGNESIUM mg/dL  --  1 9     Results from last 7 days   Lab Units 03/10/21  1212   AST U/L 14   ALT U/L 15   ALK PHOS U/L 68   TOTAL PROTEIN g/dL 7 7   ALBUMIN g/dL 2 9*   TOTAL BILIRUBIN mg/dL 0 31     Results from last 7 days   Lab Units 03/11/21  0530 03/10/21  1212   GLUCOSE RANDOM mg/dL 103 99     Results from last 7 days   Lab Units 03/10/21  1212   TROPONIN I ng/mL <0 02     Results from last 7 days   Lab Units 03/10/21  1212   TSH 3RD GENERATON uIU/mL 1 845     Results from last 7 days   Lab Units 03/10/21  1450   LACTIC ACID mmol/L 0 9     Results from last 7 days   Lab Units 03/10/21  1212   NT-PRO BNP pg/mL 867*     Results from last 7 days   Lab Units 03/10/21  1515   CLARITY UA  Cloudy   COLOR UA  Brown   SPEC GRAV UA  1 010   PH UA  6 0   GLUCOSE UA mg/dl Negative   KETONES UA mg/dl Negative   BLOOD UA  Large*   PROTEIN UA mg/dl 30 (1+)*   NITRITE UA  Positive*   BILIRUBIN UA  Negative   UROBILINOGEN UA E U /dl 0 2   LEUKOCYTES UA  Moderate*   WBC UA /hpf Innumerable*   RBC UA /hpf Innumerable*   BACTERIA UA /hpf Occasional   EPITHELIAL CELLS WET PREP /hpf Occasional     Results from last 7 days   Lab Units 03/10/21  1502 03/10/21  1450   BLOOD CULTURE  Received in Microbiology Lab  Culture in Progress  Received in Microbiology Lab  Culture in Progress       ED Treatment:   Medication Administration from 03/10/2021 1112 to 03/10/2021 1703       Date/Time Order Dose Route Action Action by Comments     03/10/2021 1553 cefepime (MAXIPIME) 2,000 mg in dextrose 5 % 50 mL IVPB 0 mg Intravenous Stopped Hernando Cash RN      03/10/2021 1516 cefepime (MAXIPIME) 2,000 mg in dextrose 5 % 50 mL IVPB 2,000 mg Intravenous New Talita Sanford RN         Past Medical History:   Diagnosis Date    [facility]-fib [facility])     Anxiety     Chronic pain     [facility] respiratory failure with hypoxia [facility] [address] [facility]     COPD (chronic obstructive pulmonary disease) [facility] [address] [facility]     Dementia 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 ([facility] [address] )     Urinary incontinence     UTI (urinary tract infection)      Present on Admission:   COPD (chronic obstructive pulmonary disease) ([facility])   Atrial fibrillation ([facility])   Chronic pain   Hypertension   Moderate episode of recurrent major depressive disorder ([facility])      Admitting Diagnosis: UTI (urinary tract infection) [N39 0]  Cystitis [N30 90]  Hematuria [R31 9]  UTI symptoms [R39 9]  Age/Sex: 80 y o  female  Admission Orders:  Scheduled Medications:  amLODIPine, 10 mg, Oral, Daily  apixaban, 5 mg, Oral, BID  cefTRIAXone, 1,000 mg, Intravenous, Q24H  docusate sodium, 100 mg, Oral, BID  fluticasone, 1 spray, Nasal, Daily  gabapentin, 600 mg, Oral, TID  latanoprost, 1 drop, Both Eyes, HS  losartan, 25 mg, Oral, Daily  pantoprazole, 40 mg, Oral, Daily Before Breakfast  sertraline, 100 mg, Oral, Daily  tamsulosin, 0 4 mg, Oral, Daily With Dinner  traZODone, 100 mg, Oral, HS      Continuous IV Infusions:     PRN Meds:  acetaminophen, 650 mg, Oral, Q6H PRN  oxyCODONE, 10 mg, Oral, BID PRN        IP CONSULT TO CASE MANAGEMENT    Network Utilization Review Department  ATTENTION: Please call with any questions or concerns to [phone] and carefully listen to the prompts so that you are directed to the right person  All voicemails are confidential   Alexei Grace all requests for admission clinical reviews, approved or denied determinations and any other requests to dedicated fax number below belonging to the campus where the patient is receiving treatment   List of dedicated fax numbers for the Facilities:  FACILITY NAME UR FAX NUMBER   ADMISSION DENIALS (Administrative/Medical Necessity) [phone]   [address] (Maternity/NICU/Pediatrics) 261 [facility],[address] [facility] [address] [place] [address]  [phone]   Roberta Mcghee 5267 (  Yamileth Gilliland "Jovita" 103) [address] [address] [place] [address] [phone]   [place] [address]661 [phone]

## 2021-03-11 NOTE — OCCUPATIONAL THERAPY NOTE
Occupational Therapy Evaluation Note        Patient Name: Chely BRADLEY'S Date: 3/11/2021        03/11/21 1015   OT Last Visit   OT Visit Date 03/11/21   Note Type   Note type Evaluation   Restrictions/Precautions   Weight Bearing Precautions Per Order No   Braces or Orthoses Other (Comment)  (none reported)   Other Precautions Contact/isolation; Chair Alarm; Bed Alarm; Fall Risk;Multiple lines;Pain   Pain Assessment   Pain Assessment Tool FLACC   Pain Location/Orientation Location: Back   Pain Rating: FLACC (Rest) - Face 0   Pain Rating: FLACC (Rest) - Legs 0   Pain Rating: FLACC (Rest) - Activity 0   Pain Rating: FLACC (Rest) - Cry 0   Pain Rating: FLACC (Rest) - Consolability 0   Score: FLACC (Rest) 0   Pain Rating: FLACC (Activity) - Face 1   Pain Rating: FLACC (Activity) - Legs 0   Pain Rating: FLACC (Activity) - Activity 0   Pain Rating: FLACC (Activity) - Cry 1   Pain Rating: FLACC (Activity) - Consolability 1   Score: FLACC (Activity) 3   Home Living   Type of Home House   Home Layout One level; Able to live on main level with bedroom/bathroom; Performs ADLs on one level; Other (Comment)  (8 ASHOK)   Bathroom Shower/Tub   (pt sponge bathes in bed at baseline)   Bathroom Toilet   (Pt uses bedpan at home & does not access toilet)   [address]; Wheelchair-manual   Additional Comments Patient reports that her home environment is not wheelchair accessible, and that she has difficulty entering/exiting her home secondary to decreased functional mobility, inaccessible environment and steps on outside  Prior Function   Level of [place] Needs assistance with IADLs; Needs assistance with ADLs and functional mobility   Lives With Daughter   Receives Help From Family;Home health  (Home health aides M, W, and F from 8 AM-4 PM)   ADL Assistance Needs assistance   IADLs Needs assistance   Falls in the last 6 months 0   Vocational Retired   Comments Pt  notes with PT she is able to stand and march in place, ambulate short distances  She has PT /OT 1x/wk  She states otherwise she stays in bed and uses bedpan and sits up EOB for meals  Lifestyle   Autonomy Per patient report, she lives with her daughter in a one-story home with 8 ASHOK  At baseline, patient requires assistance with both ADLs and IADLs from family and home health aides  Patient receives assistance from aides M, W, and F for about 8 hours per day  Patient reports that aides give her a sponge bath in bed and that she primarily uses the bedpan when having to go to the bathroom  Patient reports that her home is not wheelchair accessible, as it does not fit through the door frames  Reciprocal Relationships Supportive aides/family   ADL   Eating Assistance 5  Supervision/Setup   Grooming Assistance 5  Supervision/Setup   UB Bathing Assistance 4  Minimal Assistance   LB Bathing Assistance 2  Maximal Assistance   UB Dressing Assistance 3  Moderate Assistance   LB Dressing Assistance 2  Maximal Assistance   Toileting Assistance  2  Maximal Assistance   Functional Assistance 3  Moderate Assistance   Additional Comments ADL assist levels based on pt's functional performance during OT evaluation   Bed Mobility   Rolling R 3  Moderate assistance  (Log roll technique for back safety)   Additional items Assist x 1;Bedrails; Increased time required;Verbal cues;LE management   Rolling L 4  Minimal assistance  (Log roll technique for back safety)   Additional items Assist x 1;Bedrails; Increased time required;Verbal cues;LE management   Supine to Sit 3  Moderate assistance   Additional items Assist x 2;HOB elevated; Increased time required;Verbal cues;LE management   Sit to Supine 3  Moderate assistance   Additional items Assist x 2; Increased time required;Verbal cues;LE management   Additional Comments Patient received lying supine in bed upon OT arrival; at end of session: pt repositioned lying supine in bed w/ all needs within reach and bed alarm activated  Transfers   Sit to Stand 4  Minimal assistance   Additional items Assist x 2; Increased time required;Verbal cues   Stand to Sit 4  Minimal assistance   Additional items Assist x 2; Increased time required;Verbal cues   Additional Comments Performed functional transfers with RW  Functional Mobility   Functional Mobility 4  Minimal assistance   Additional Comments x2; 2-3 steps forward and back with no noted LOB   Additional items Rolling walker   Balance   Static Sitting Good   Dynamic Sitting Fair   Static Standing Poor +   Dynamic Standing Poor   Ambulatory Poor   Activity Tolerance   Activity Tolerance Patient limited by fatigue;Patient limited by pain   Medical Staff Made Aware PT Mariana Kuhn; Dr Jessa Ewing contacted for activity order   Nurse [address] confirmed pt appropriate for therapy and made aware of session outcomes   RUE Assessment   RUE Assessment WFL  (AROM WFL; strength noted to be grossly 3+/5)   LUE Assessment   LUE Assessment X  (Decreased proximal AROM and strength; distal strength: 3+/5)   LUE Overall AROM   L Shoulder Flexion <10 degrees shoulder flexion AROM   Hand Function   Gross Motor Coordination Impaired   Fine Motor Coordination Functional   Sensation   Light Touch No apparent deficits  (BUEs)   Vision - Complex Assessment   Ocular Range of Motion WFL   Cognition   Overall Cognitive Status   (Questionable impairment- to be assessed in future sessions)   Arousal/Participation Alert; Responsive   Attention Attends with cues to redirect   Orientation Level Oriented X4   Memory Within functional limits   Following Commands Follows one step commands with increased time or repetition   Comments Patient agreeable to OT evaluation   Assessment   Limitation Decreased ADL status; Decreased UE ROM; Decreased UE strength;Decreased endurance;Decreased self-care trans;Decreased high-level ADLs   Prognosis Good   Assessment Patient is a 80 y o  female seen for OT evaluation s/p admit to [address] [facility] on 3/10/2021 w/Generalized weakness  Commorbidities affecting patient's functional performance at time of assessment include: UTI, chronic pain, moderate episode of recurrent major depressive disorder, COPD, HTN, and atrial fibrillation  Patient  has a past medical history of A-fib ([facility] [address] ), Anxiety, Chronic pain, Chronic respiratory failure with hypoxia ([facility] [address] ), COPD (chronic obstructive pulmonary disease) ([facility] [address] ), Dementia ([facility] [address] ), Dorsalgia, unspecified, Edema, Encephalopathy, GERD (gastroesophageal reflux disease), Hypertension, Morbid obesity ([facility] [address] ), Muscle weakness (generalized), Opioid dependence ([facility] [address] ), Overactive bladder, Pneumonia, Psychiatric disorder, Radiculopathy of thoracolumbar region, Sciatica, 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 ([facility] [address] ), Urinary incontinence, and UTI (urinary tract infection)  Orders placed for OT evaluation and treatment  Performed at least two patient identifiers during session including name and wristband  Prior to admission, patient was living with her daughter in a one-story home with 8 ASHOK  At baseline, patient requires assistance with both ADLs and IADLs  Patient reports that she has had difficulty getting to doctor appointments recently secondary to difficulties accessing transportation and decreased functional mobility  Personal factors affecting patient at time of initial evaluation include: inaccesible home environment, difficulty performing ADLs and difficulty performing IADLs  Upon evaluation, patient requires minimal  and moderate assist for UB ADLs, maximal assist for LB ADLs, transfers and functional ambulation in room and bathroom with minimal  assist x2, with the use of Rolling Walker  Patient is alert and oriented x 4   Occupational performance is affected by the following deficits: attention span, decreased functional use of BUEs, limited active ROM, decreased muscle strength, impaired gross motor coordination, dynamic sit/ stand balance deficit with poor standing tolerance time for self care and functional mobility, decreased activity tolerance, increased pain and postural control and postural alignment deficit, requiring external assistance to complete transitional movements, and decreased functional mobility  Therapist completed  extensive additional review of medical records and additional review of physical, cognitive or psychosocial history, clinical examination identifying 5 or more performance deficits, clinical decision making of a high complexity , consistent with a high complexity level evaluation  Patient to benefit from continued Occupational Therapy treatment while in the hospital to address deficits as defined above and maximize level of functional independence with ADLs and functional mobility  Occupational Performance areas to address include: grooming , bathing/ shower, dressing, toilet hygiene, transfer to all surfaces, functional mobility, emergency response, health maintenance, IADLs: safety procedures, Leisure Participation and Social participation  From OT standpoint, recommendation at time of d/c would be post-acute rehabilitation services  Goals   Patient Goals to be able to walk   Plan   Treatment Interventions ADL retraining;Functional transfer training;UE strengthening/ROM; Endurance training;Patient/family training;Equipment evaluation/education; Compensatory technique education; Energy conservation; Activityengagement;Continued evaluation   Goal Expiration Date 03/25/21   OT Treatment Day 0   OT Frequency 3-5x/wk   Recommendation   OT Discharge Recommendation Post-Acute Rehabilitation Services   Additional Comments  The patient's raw score on the AM-PAC Daily Activity inpatient short form is 13, standardized score is 32 03, greater than 39 4  Patients at this level are likely to benefit from discharge to post-acute rehabilitation services   Please refer to the recommendation of the Occupational Therapist for safe discharge planning  AM-PAC Daily Activity Inpatient   Lower Body Dressing 2   Bathing 2   Toileting 1   Upper Body Dressing 2   Grooming 3   Eating 3   Daily Activity Raw Score 13   Daily Activity Standardized Score (Calc for Raw Score >=11) 32 03   AM-PAC Applied Cognition Inpatient   Following a Speech/Presentation 4   Understanding Ordinary Conversation 4   Taking Medications 3   Remembering Where Things Are Placed or Put Away 3   Remembering List of 4-5 Errands 3   Taking Care of Complicated Tasks 3   Applied Cognition Raw Score 20   Applied Cognition Standardized Score 41 76   Barthel Index   Feeding 10   Bathing 0   Grooming Score 0   Dressing Score 5   Bladder Score 5   Bowels Score 5   Toilet Use Score 5   Transfers (Bed/Chair) Score 5   Mobility (Level Surface) Score 0   Stairs Score 0   Barthel Index Score 35   Modified [place] Scale   Modified [place] Scale 4     Occupational Therapy Goals to be completed in 7-14 Days:    1 - Patient will verbalize and demonstrate use of energy conservation/ deep breathing technique and work simplification skills during functional activity with no verbal cues  2 - Patient will verbalize and demonstrate good body mechanics and joint protection techniques during  ADLs/ IADLs with no verbal cues  3 - Patient will increase OOB/ sitting tolerance to 2-4 hours per day for increased participation in self care and leisure tasks with no s/s of exertion  4 - Patient will increase standing tolerance time to 5 minutes with unilateral UE support to complete sink level ADLs@ supervision/setup level  5 - Patient will increase sitting tolerance at edge of bed to 20 minutes to complete UB ADLs @ set up assist level  6 - Patient will transfer bed to Chair / toilet at Set up assist level with AD as indicated       7 - Patient will complete UB ADLs with set up assist with use of adaptive/compensatory techniques as indicated  8 - Patient will complete LB ADLs with min assist with the use of adaptive equipment  9 - Patient will complete toileting hygiene with set up assist/ supervision for thoroughness      10 - Patient/ Family will demonstrate competency with [address], OTR/L

## 2021-03-11 NOTE — PLAN OF CARE
Problem: PHYSICAL THERAPY ADULT  Goal: Performs mobility at highest level of function for planned discharge setting  See evaluation for individualized goals  Description: Treatment/Interventions: Functional transfer training, LE strengthening/ROM, Therapeutic exercise, Endurance training, Bed mobility, Gait training, Equipment eval/education, Patient/family training, Spoke to nursing, Spoke to case management  Equipment Recommended: Wheelchair, Jose Martin Benoit       See flowsheet documentation for full assessment, interventions and recommendations  Outcome: Progressing  Note: Prognosis: Good  Problem List: Decreased strength, Decreased endurance, Decreased mobility, Impaired balance, Pain, Obesity  Assessment: Pt is 80 y o  female seen for PT evaluation s/p admit to [facility] on 3/10/2021 w/ Generalized weakness  PT consulted to assess pt's functional mobility and d/c needs  Order placed for PT eval and tx, w/ activity order  Comorbidities affecting pt's physical performance at time of assessment include: Atrial fibrillation, HTN, COPD, Chronic pain, Dementia, UTI/generalized weakness, Pacemaker, behavioral variant frontotemportal dementia  PTA, pt was requiring assist for all mobility, spent most days in bed but able to sit EOB and walk short distances with therapy/aides  Pt  notes she had difficulty getting out of her home due to W/C not fitting through door/steps to enter    Personal factors affecting pt at time of IE include: stairs to enter home, decreased cognition, inability to perform IADLs, inability to perform ADLs, inability to live alone and requires assist with all mobility  Please find objective findings from PT assessment regarding body systems outlined above with impairments and limitations including weakness, impaired balance, decreased endurance, gait deviations, pain, decreased functional mobility tolerance, fall risk and decreased cognition   The following objective measures performed on IE also reveal limitations: AM-PAC 6-Clicks: 6/28  Pt's clinical presentation is currently unstable/unpredictable seen in pt's presentation   Pt to benefit from continued PT tx to address deficits as defined above and maximize level of functional independent mobility and consistency  From PT/mobility standpoint, recommendation at time of d/c would be post acute rehabilitation pending progress in order to facilitate return to PLOF  Barriers to Discharge: Inaccessible home environment, Decreased caregiver support     PT Discharge Recommendation: [address]     PT - OK to Discharge: Yes(recommend rehab when medically stable)    See flowsheet documentation for full assessment

## 2021-03-11 NOTE — PLAN OF CARE
Problem: OCCUPATIONAL THERAPY ADULT  Goal: Performs self-care activities at highest level of function for planned discharge setting  See evaluation for individualized goals  Description: Treatment Interventions: ADL retraining, Functional transfer training, UE strengthening/ROM, Endurance training, Patient/family training, Equipment evaluation/education, Compensatory technique education, Energy conservation, Activityengagement, Continued evaluation          See flowsheet documentation for full assessment, interventions and recommendations  Note: Limitation: Decreased ADL status, Decreased UE ROM, Decreased UE strength, Decreased endurance, Decreased self-care trans, Decreased high-level ADLs  Prognosis: Good  Assessment: Patient is a 80 y o  female seen for OT evaluation s/p admit to [address] [facility] on 3/10/2021 w/Generalized weakness  Commorbidities affecting patient's functional performance at time of assessment include: UTI, chronic pain, moderate episode of recurrent major depressive disorder, COPD, HTN, and atrial fibrillation  Patient  has a past medical history of A-fib ([address] ), Anxiety, Chronic pain, Chronic respiratory failure with hypoxia ([address] ), COPD (chronic obstructive pulmonary disease) ([address] ), Dementia ([address] ), Dorsalgia, unspecified, Edema, Encephalopathy, GERD (gastroesophageal reflux disease), Hypertension, Morbid obesity ([address] ), Muscle weakness (generalized), Opioid dependence ([address] ), Overactive bladder, Pneumonia, Psychiatric disorder, Radiculopathy of thoracolumbar region, Sciatica, 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 ([address] ), Urinary incontinence, and UTI (urinary tract infection)  Orders placed for OT evaluation and treatment  Performed at least two patient identifiers during session including name and wristband  Prior to admission, patient was living with her daughter in a one-story home with 8 [address]SHOK   At baseline, patient requires assistance with both ADLs and IADLs  Patient reports that she has had difficulty getting to doctor appointments recently secondary to difficulties accessing transportation and decreased functional mobility  Personal factors affecting patient at time of initial evaluation include: inaccesible home environment, difficulty performing ADLs and difficulty performing IADLs  Upon evaluation, patient requires minimal  and moderate assist for UB ADLs, maximal assist for LB ADLs, transfers and functional ambulation in room and bathroom with minimal  assist x2, with the use of Rolling Walker  Patient is alert and oriented x 4  Occupational performance is affected by the following deficits: attention span, decreased functional use of BUEs, limited active ROM, decreased muscle strength, impaired gross motor coordination, dynamic sit/ stand balance deficit with poor standing tolerance time for self care and functional mobility, decreased activity tolerance, increased pain and postural control and postural alignment deficit, requiring external assistance to complete transitional movements, and decreased functional mobility  Therapist completed  extensive additional review of medical records and additional review of physical, cognitive or psychosocial history, clinical examination identifying 5 or more performance deficits, clinical decision making of a high complexity , consistent with a high complexity level evaluation  Patient to benefit from continued Occupational Therapy treatment while in the hospital to address deficits as defined above and maximize level of functional independence with ADLs and functional mobility  Occupational Performance areas to address include: grooming , bathing/ shower, dressing, toilet hygiene, transfer to all surfaces, functional mobility, emergency response, health maintenance, IADLs: safety procedures, Leisure Participation and Social participation   From OT standpoint, recommendation at time of d/c would be post-acute rehabilitation services         OT Discharge Recommendation: Post-Acute Rehabilitation Services

## 2021-03-11 NOTE — CASE MANAGEMENT
Cm attempted to speak with patient via bedside phone  Patient was in the middle of something and needed a call back in about 15 minutes  Cm to follow up shortly  Cm department will continue to follow patient through discharge

## 2021-03-11 NOTE — PHYSICAL THERAPY NOTE
Physical Therapy Evaluation     Patient's Name: Azar Conley    Admitting Diagnosis  UTI (urinary tract infection) [N39 0]  Cystitis [N30 90]  Hematuria [R31 9]  UTI symptoms [R39 9]    Problem List  Patient Active Problem List   Diagnosis    Atrial fibrillation ([facility] )    Hypertension    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[facility] )    PAD (peripheral artery disease) ([facility] )    Morbid obesity due to excess calories ([facility] )    COPD (chronic obstructive pulmonary disease) ([place] [address] )    Overactive bladder    Moderate episode of recurrent major depressive disorder (HCC)    Chronic pain    Physical deconditioning    Bipolar affective disorder, currently manic, moderate ([place] [address] )    CHF (congestive heart failure) ([facility] )    Pacemaker    Frequent UTI    Dementia ([place] [address] )    Generalized weakness    UTI (urinary tract infection)       Past Medical History  Past Medical History:   Diagnosis Date    A-fib ([place] [address] )     Anxiety     Chronic pain     Chronic respiratory failure with hypoxia (HCC)     [facility]) [facility]     Dementia ([facility])     Dorsalgia, unspecified     Edema     Encephalopathy     GERD (gastroesophageal reflux disease)     Hypertension     Morbid obesity (HCC)     Muscle weakness (generalized)     Opioid dependence ([facility])     Overactive bladder     Pneumonia     Psychiatric disorder     anxiety, bipolar    Radiculopathy of thoracolumbar region     Sciatica     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 ([place] [address] )     Urinary incontinence     UTI (urinary tract infection)        Past Surgical History  Past Surgical History:   Procedure Laterality Date    APPENDECTOMY      BACK SURGERY      CHOLECYSTECTOMY      KNEE SURGERY            03/11/21 0942   Note Type   Note type Evaluation   Pain Assessment   Pain Assessment Tool 0-10   Home Living   Type of Home House   Home Layout One level; Able to live on main level with bedroom/bathroom; Performs ADLs on one level   Bathroom Accessibility Accessible   Home Equipment Wheelchair-manual;Walker   Additional Comments W/C (difficulty fitting through door frames), Pt  is in a bedpan most of the time, sponge bathes in bed   Prior Function   Level of [place] Needs assistance with ADLs and functional mobility   Lives With Daughter   Receives Help From Family   ADL Assistance Needs assistance   IADLs Needs assistance   Falls in the last 6 months 0   Comments MWF 8-4 aides; Per pt  she requires increased support  Pt  notes with PT she is able to stand and march in place, ambulate short distances  She has PT /OT 1x/wk  She states otherwise she stays in bed and uses bedpan and sits up EOB for meals  Restrictions/Precautions   Other Precautions Contact/isolation; Bed Alarm; Chair Alarm; Fall Risk;Pain;Spinal precautions  (gill, purewik)   Cognition   Overall Cognitive Status   (pt at times requires redirection to obtain answer to [facility])   Arousal/Participation Alert   Orientation Level Oriented X4   Following Commands Follows one step commands without difficulty   RLE Assessment   RLE Assessment X   Strength RLE   RLE Overall Strength 3+/5   LLE Assessment   LLE Assessment X   Strength LLE   LLE Overall Strength 3+/5; exception left foot drop hx brace several years ago but hasn't used 2-/5   Coordination   Sensation   (grossly intact but diminished)   Bed Mobility   Rolling R 4  Minimal assistance   Additional items Assist x 1; Increased time required; Bedrails   Rolling L 5  Supervision   Additional items Assist x 1; Increased time required; Bedrails   Supine to Sit 3  Moderate assistance   Additional items Assist x 2; Increased time required;Verbal cues   Sit to Supine 3  Moderate assistance   Additional items Assist x 2; Increased time required; Impulsive;LE management;Verbal cues   Additional Comments Pt  able to complete log roll technique getting OOB; however due to impulsive behavior initiated log roll technique but rolled on back early prior to leg ascent despite VC  Pt  with LBP during mobility  Educated pt  on recommendation to sit up EOB for meals as able with staff assist  Good sitting balance during sitting  Deferred sitting OOB at this time as pt  frequently uses bedpan for BM and reported she cannot use commode  Transfers   Sit to Stand 4  Minimal assistance   Additional items Assist x 2; Increased time required;Verbal cues   Stand to Sit 4  Minimal assistance   Additional items Assist x 2; Increased time required;Verbal cues   Ambulation/Elevation   Gait pattern Decreased foot clearance; Short stride; Step to; Inconsistent tabitha  (LE shakey, reccommend chair follow on initial ambulation ) L foot drop   Gait Assistance 4  Minimal assist   Additional items Assist x 2   Assistive Device Rolling walker   Distance 2' forward, 2' backwards   Stair Management Assistance Not tested   Balance   Static Sitting Good   Dynamic Sitting Fair   Static Standing Poor +   Dynamic Standing Poor   Ambulatory Poor   Endurance Deficit   Endurance Deficit Yes   Endurance Deficit Description fatigue, weakness   Activity Tolerance   Activity Tolerance Patient limited by pain; Patient limited by fatigue   Nurse Made Aware RN ok to see   Assessment   Prognosis Good   Problem List Decreased strength;Decreased endurance;Decreased mobility; Impaired balance;Pain;Obesity   Assessment Pt is 80 y o  female seen for PT evaluation s/p admit to [facility] on 3/10/2021 w/ Generalized weakness  PT consulted to assess pt's functional mobility and d/c needs  Order placed for PT eval and tx, w/ activity order  Comorbidities affecting pt's physical performance at time of assessment include: Atrial fibrillation, HTN, COPD, Chronic pain, Dementia, UTI/generalized weakness, Pacemaker, behavioral variant frontotemportal dementia     PTA, pt was requiring assist for all mobility, spent most days in bed but able to sit EOB and walk short distances with therapy/aides  Pt  notes she had difficulty getting out of her home due to W/C not fitting through door/steps to enter    Personal factors affecting pt at time of IE include: stairs to enter home, decreased cognition, inability to perform IADLs, inability to perform ADLs, inability to live alone and requires assist with all mobility  Please find objective findings from PT assessment regarding body systems outlined above with impairments and limitations including weakness, impaired balance, decreased endurance, gait deviations, pain, decreased functional mobility tolerance, fall risk and decreased cognition  The following objective measures performed on IE also reveal limitations: AM-PAC 6-Clicks: 0/90  Pt's clinical presentation is currently unstable/unpredictable seen in pt's presentation   Pt to benefit from continued PT tx to address deficits as defined above and maximize level of functional independent mobility and consistency  From PT/mobility standpoint, recommendation at time of d/c would be post acute rehabilitation pending progress in order to facilitate return to PLOF  Barriers to Discharge Inaccessible home environment;Decreased caregiver support   Goals   Patient Goals to go home   STG Expiration Date 03/25/21   Short Term Goal #1 1  Pt will complete bed mobility with Min A x1 to increase functional mobility  2  Pt will complete sit to stand transfers with Supervised A x1 to increase functional mobility  3  Pt will ambulate 10-20 ft with RW with Supervised A x1 without LOB or LE buckling  4  Pt will increase B/L LE strength by 1 grade to facilitate improved functional mobility with decreased risk of falls  5  Pt will increase standing balance to fair- in order to decrease risk of falls  1  Pt will complete bed mobility with Min A x1 to increase functional mobility  2  Pt will complete sit to stand transfers with Supervised A x1 to increase functional mobility   3  Pt will ambulate 10-20 ft with RW with Supervised A x1 without LOB or LE buckling  4  Pt will increase B/L LE strength by 1 grade to facilitate improved functional mobility with decreased risk of falls  5  Pt will increase standing balance to fair- in order to decrease risk of falls  PT Treatment Day 0   Plan   Treatment/Interventions Functional transfer training;LE strengthening/ROM; Therapeutic exercise; Endurance training;Bed mobility;Gait training;Equipment eval/education;Patient/family training;Spoke to nursing;Spoke to case management   PT Frequency   (4-5x/wk)   Recommendation   PT Discharge Recommendation Post-Acute Rehabilitation Services   Equipment Recommended Wheelchair;Walker   PT - OK to Discharge Yes  (recommend rehab when medically stable)   [facility] [address] Mobility Inpatient   Turning in Bed Without Bedrails 3   Lying on Back to Sitting on Edge of Flat Bed 2   Moving Bed to Chair 1   Standing Up From Chair 1   Walk in Room 1   Climb 3-5 Stairs 1   Basic Mobility Inpatient Raw Score 9         Jose Martin Running, PT

## 2021-03-12 LAB — BACTERIA UR CULT: ABNORMAL

## 2021-03-12 PROCEDURE — 99233 SBSQ HOSP IP/OBS HIGH 50: CPT | Performed by: FAMILY MEDICINE

## 2021-03-12 RX ADMIN — ACETAMINOPHEN 650 MG: 325 TABLET ORAL at 14:27

## 2021-03-12 RX ADMIN — NYSTATIN: 100000 POWDER TOPICAL at 10:31

## 2021-03-12 RX ADMIN — SERTRALINE HYDROCHLORIDE 100 MG: 50 TABLET ORAL at 09:45

## 2021-03-12 RX ADMIN — LATANOPROST 1 DROP: 50 SOLUTION OPHTHALMIC at 21:52

## 2021-03-12 RX ADMIN — CEFTRIAXONE SODIUM 1000 MG: 10 INJECTION, POWDER, FOR SOLUTION INTRAVENOUS at 06:20

## 2021-03-12 RX ADMIN — NYSTATIN: 100000 CREAM TOPICAL at 10:30

## 2021-03-12 RX ADMIN — OXYCODONE HYDROCHLORIDE 5 MG: 5 TABLET ORAL at 22:52

## 2021-03-12 RX ADMIN — GABAPENTIN 600 MG: 300 CAPSULE ORAL at 21:53

## 2021-03-12 RX ADMIN — APIXABAN 5 MG: 5 TABLET, FILM COATED ORAL at 18:44

## 2021-03-12 RX ADMIN — OXYCODONE HYDROCHLORIDE 5 MG: 5 TABLET ORAL at 16:32

## 2021-03-12 RX ADMIN — NYSTATIN: 100000 CREAM TOPICAL at 18:44

## 2021-03-12 RX ADMIN — DOCUSATE SODIUM 100 MG: 100 CAPSULE, LIQUID FILLED ORAL at 09:45

## 2021-03-12 RX ADMIN — TAMSULOSIN HYDROCHLORIDE 0.4 MG: 0.4 CAPSULE ORAL at 16:32

## 2021-03-12 RX ADMIN — TRAZODONE HYDROCHLORIDE 100 MG: 100 TABLET ORAL at 21:53

## 2021-03-12 RX ADMIN — APIXABAN 5 MG: 5 TABLET, FILM COATED ORAL at 10:29

## 2021-03-12 RX ADMIN — OXYCODONE HYDROCHLORIDE 5 MG: 5 TABLET ORAL at 06:49

## 2021-03-12 RX ADMIN — LOSARTAN POTASSIUM 25 MG: 25 TABLET, FILM COATED ORAL at 10:29

## 2021-03-12 RX ADMIN — GABAPENTIN 600 MG: 300 CAPSULE ORAL at 16:31

## 2021-03-12 RX ADMIN — FLUTICASONE PROPIONATE 1 SPRAY: 50 SPRAY, METERED NASAL at 10:30

## 2021-03-12 RX ADMIN — NYSTATIN: 100000 POWDER TOPICAL at 18:44

## 2021-03-12 RX ADMIN — GABAPENTIN 600 MG: 300 CAPSULE ORAL at 09:45

## 2021-03-12 RX ADMIN — PANTOPRAZOLE SODIUM 40 MG: 40 TABLET, DELAYED RELEASE ORAL at 06:16

## 2021-03-12 RX ADMIN — AMLODIPINE BESYLATE 10 MG: 10 TABLET ORAL at 10:29

## 2021-03-12 RX ADMIN — DOCUSATE SODIUM 100 MG: 100 CAPSULE, LIQUID FILLED ORAL at 18:44

## 2021-03-12 NOTE — CASE MANAGEMENT
Cm received phone call from patient's dtr who reported that she has been in touch with [facility] who is willing to do a re-evaluation to determine if she needs more hours of PT and OT every week  However, dtr prefers that patient goes to [facility] in order to regain some strength  Dtr reported that distance is not an issue, and she is willing to travel to the [facility] for better facilities  Cm reviewed possible facilities  Dtr prefers [facility] if they have a bed available, but she is agreeable to review others if [facility] is unable to accept  Cm sent referrals to determine who can accept  Cm reviewed patient's IMM with dtr due to patient having dementia  Dtr reported that she is agreeable with possible discharge tomorrow to an accepting SNF vs home with increased hours through [facility]  Cm placed the IMM in medical records  Cm called Jana Peterson at [address] [place] [address] at [phone]  Jana Peterson reported that she has beds available at [facility] and will review patient's referral shortly  Jana Peterson will follow up with Cm with a determination  Cm department will continue to follow patient through discharge

## 2021-03-12 NOTE — ASSESSMENT & PLAN NOTE
- h o ESBL and enterococcus in 2018- colonies <100,000 but pt symptomatic  - UA this admission is also (+) for UTI    - UC: Ecoli sensitive to ceftriaxone  - cont ceftriaxone day #3  - BC no growth  - this could be contributing to her amb dysfunction

## 2021-03-12 NOTE — ASSESSMENT & PLAN NOTE
Frontal lobe dementia  Supportive care  Cont zolfot and trazodone  Pt found to be screaming in her room- cont current meds  May need 1:1 if behavior worsens

## 2021-03-12 NOTE — ASSESSMENT & PLAN NOTE
/67   Pulse 60   Temp 98 4 °F (36 9 °C)   Resp 18   Ht 5' 6" (1 676 m)   Wt 112 kg (246 lb 14 6 oz)   LMP  (LMP Unknown)   SpO2 99%   BMI 39 85 kg/m²   Continue home losartan and amlodipine  BP controlled

## 2021-03-12 NOTE — PROGRESS NOTES
[address]  Progress Note Ulis Bence 1940, 80 y o  female MRN: [id number]  Unit/Bed#: -[place] Encounter: [id number]  Primary Care Provider: LUCY Keating   Date and time admitted to hospital: 3/10/2021 11:12 AM    UTI (urinary tract infection)  Assessment & Plan  - h o ESBL and enterococcus in 2018- colonies <100,000 but pt symptomatic  - UA this admission is also (+) for UTI    - UC: Ecoli sensitive to ceftriaxone  - cont ceftriaxone day #3  - BC no growth  - this could be contributing to her amb dysfunction    Fungal infection of the groin  Assessment & Plan  - start nystatin cream in the groin  - nystatin powder in the abd skin folds    Dementia (HCC)  Assessment & Plan  Frontal lobe dementia  Supportive care  Cont zolfot and trazodone  Pt found to be screaming in her room- cont current meds  May need 1:1 if behavior worsens    Chronic pain  Assessment & Plan  Chronic pain  Continue outpatient pain medications    Moderate episode of recurrent major depressive disorder ([facility] )  Assessment & Plan  Continue home Zoloft and trazodone    COPD (chronic obstructive pulmonary disease) ([facility] )  Assessment & Plan  Not in acute exacerbation  Continue albuterol p r n  Hypertension  Assessment & Plan  /67   Pulse 60   Temp 98 4 °F (36 9 °C)   Resp 18   Ht 5' 6" (1 676 m)   Wt 112 kg (246 lb 14 6 oz)   LMP  (LMP Unknown)   SpO2 99%   BMI 39 85 kg/m²   Continue home losartan and amlodipine  BP controlled    Atrial fibrillation (HCC)  Assessment & Plan  Currently rate controlled  Continue home Eliquis        VTE Pharmacologic Prophylaxis:   Pharmacologic: Apixaban (Eliquis)  Mechanical VTE Prophylaxis in Place: Yes    Patient Centered Rounds: I have performed bedside rounds with nursing staff today      Discussions with Specialists or Other Care Team Provider: yes    Education and Discussions with Family / Patient: yes, family has been updated to satisfaction    Time Spent for Care: 30 minutes  More than 50% of total time spent on counseling and coordination of care as described above  Current Length of Stay: 1 day(s)    Current Patient Status: Inpatient   Certification Statement: The patient will continue to require additional inpatient hospital stay due to ongoing placement search and UTI Rx with IV antibiotics    Discharge Plan: 24 h pending placement    Code Status: Level 1 - Full Code      Subjective:   Seen and examined at bedside  C o buring around the vulva when she voids  Has frontal lobe dementia and has been found to be yelling for nurses  No falls or seizures noted  No cp or sob or fever    Objective:     Vitals:   Temp (24hrs), Av 4 °F (36 9 °C), Min:98 4 °F (36 9 °C), Max:98 4 °F (36 9 °C)    Temp:  [98 4 °F (36 9 °C)] 98 4 °F (36 9 °C)  HR:  [60-63] 60  Resp:  [15-18] 18  BP: (141-147)/(63-68) 143/67  SpO2:  [95 %-99 %] 99 %  Body mass index is 39 85 kg/m²  Input and Output Summary (last 24 hours):        Intake/Output Summary (Last 24 hours) at 3/12/2021 1510  Last data filed at 3/12/2021 1428  Gross per 24 hour   Intake 1600 ml   Output 2385 ml   Net -785 ml       Physical Exam:     Physical Exam  S1,2 (+) R  Lungs CTA  abd skin folds erythematous- nystatin powder (+)  No new focal neuro deficits  Cooperative   Alert and Ox3    Additional Data:     Labs:    Results from last 7 days   Lab Units 21  0530   WBC Thousand/uL 6 40   HEMOGLOBIN g/dL 11 7   HEMATOCRIT % 37 6   PLATELETS Thousands/uL 185   NEUTROS PCT % 56   LYMPHS PCT % 30   MONOS PCT % 8   EOS PCT % 4     Results from last 7 days   Lab Units 21  0530 03/10/21  1212   SODIUM mmol/L 141 143   POTASSIUM mmol/L 3 8 4 1   CHLORIDE mmol/L 107 105   CO2 mmol/L 26 27   BUN mg/dL 18 17   CREATININE mg/dL 0 88 0 81   ANION GAP mmol/L 8 11   CALCIUM mg/dL 9 4 9 5   ALBUMIN g/dL  --  2 9*   TOTAL BILIRUBIN mg/dL  --  0 31   ALK PHOS U/L  --  68   ALT U/L  --  15   AST U/L  --  14   GLUCOSE RANDOM mg/dL 103 99                 Results from last 7 days   Lab Units 03/10/21  1450   LACTIC ACID mmol/L 0 9           * I Have Reviewed All Lab Data Listed Above  * Additional Pertinent Lab Tests Reviewed: All Labs Within Last 24 Hours Reviewed    Imaging:    Imaging Reports Reviewed Today Include: na  Imaging Personally Reviewed by Myself Includes:  na    Recent Cultures (last 7 days):     Results from last 7 days   Lab Units 03/10/21  1515 03/10/21  1502 03/10/21  1450   BLOOD CULTURE   --  No Growth at 24 hrs  No Growth at 24 hrs  URINE CULTURE  >100,000 cfu/ml Escherichia coli*  --   --        Last 24 Hours Medication List:   Current Facility-Administered Medications   Medication Dose Route Frequency Provider Last Rate    acetaminophen  650 mg Oral Q6H PRN Cristhian Echols MD      amLODIPine  10 mg Oral Daily Cristhian Echols MD      apixaban  5 mg Oral BID Cristhian Echols MD      cefTRIAXone  1,000 mg Intravenous Q24H Cristhian Echols MD 1,000 mg (03/12/21 6892)    docusate sodium  100 mg Oral BID Cristhian Echols MD      fluticasone  1 spray Nasal Daily Cristhian Echols MD      gabapentin  600 mg Oral TID Cristhian Echols MD      latanoprost  1 drop Both Eyes HS Cristhian Echols MD      losartan  25 mg Oral Daily Jolynn Guerrero MD      nystatin   Topical BID Chris Valladares MD      nystatin   Topical BID Chris Valladares MD      oxyCODONE  5 mg Oral Q6H PRN Chris Valladares MD      pantoprazole  40 mg Oral Daily Before Breakfast Jolynn Guerrero MD      sertraline  100 mg Oral Daily Cristhian Echols MD      tamsulosin  0 4 mg Oral Daily With Marco Pena MD      traZODone  100 mg Oral HS Jolynn Guerrero MD          Today, Patient Was Seen By: Chris Valladares MD    ** Please Note: Dictation voice to text software may have been used in the creation of this document   **

## 2021-03-13 ENCOUNTER — TELEPHONE (OUTPATIENT)
Dept: NEUROLOGY | Facility: CLINIC | Age: 81
End: 2021-03-13

## 2021-03-13 PROCEDURE — 99233 SBSQ HOSP IP/OBS HIGH 50: CPT | Performed by: FAMILY MEDICINE

## 2021-03-13 RX ADMIN — NYSTATIN: 100000 POWDER TOPICAL at 17:04

## 2021-03-13 RX ADMIN — NYSTATIN: 100000 POWDER TOPICAL at 08:37

## 2021-03-13 RX ADMIN — TAMSULOSIN HYDROCHLORIDE 0.4 MG: 0.4 CAPSULE ORAL at 17:04

## 2021-03-13 RX ADMIN — ACETAMINOPHEN 650 MG: 325 TABLET ORAL at 00:56

## 2021-03-13 RX ADMIN — CEFTRIAXONE SODIUM 1000 MG: 10 INJECTION, POWDER, FOR SOLUTION INTRAVENOUS at 06:39

## 2021-03-13 RX ADMIN — DOCUSATE SODIUM 100 MG: 100 CAPSULE, LIQUID FILLED ORAL at 08:36

## 2021-03-13 RX ADMIN — LOSARTAN POTASSIUM 25 MG: 25 TABLET, FILM COATED ORAL at 08:36

## 2021-03-13 RX ADMIN — PANTOPRAZOLE SODIUM 40 MG: 40 TABLET, DELAYED RELEASE ORAL at 06:38

## 2021-03-13 RX ADMIN — APIXABAN 5 MG: 5 TABLET, FILM COATED ORAL at 08:35

## 2021-03-13 RX ADMIN — NYSTATIN: 100000 CREAM TOPICAL at 08:37

## 2021-03-13 RX ADMIN — GABAPENTIN 600 MG: 300 CAPSULE ORAL at 08:36

## 2021-03-13 RX ADMIN — GABAPENTIN 600 MG: 300 CAPSULE ORAL at 16:59

## 2021-03-13 RX ADMIN — APIXABAN 5 MG: 5 TABLET, FILM COATED ORAL at 17:04

## 2021-03-13 RX ADMIN — AMLODIPINE BESYLATE 10 MG: 10 TABLET ORAL at 08:36

## 2021-03-13 RX ADMIN — OXYCODONE HYDROCHLORIDE 5 MG: 5 TABLET ORAL at 07:35

## 2021-03-13 RX ADMIN — SERTRALINE HYDROCHLORIDE 100 MG: 50 TABLET ORAL at 08:36

## 2021-03-13 RX ADMIN — ACETAMINOPHEN 650 MG: 325 TABLET ORAL at 22:21

## 2021-03-13 RX ADMIN — TRAZODONE HYDROCHLORIDE 100 MG: 100 TABLET ORAL at 22:21

## 2021-03-13 RX ADMIN — LATANOPROST 1 DROP: 50 SOLUTION OPHTHALMIC at 22:19

## 2021-03-13 RX ADMIN — OXYCODONE HYDROCHLORIDE 5 MG: 5 TABLET ORAL at 13:36

## 2021-03-13 RX ADMIN — NYSTATIN: 100000 CREAM TOPICAL at 17:04

## 2021-03-13 RX ADMIN — GABAPENTIN 600 MG: 300 CAPSULE ORAL at 22:21

## 2021-03-13 RX ADMIN — FLUTICASONE PROPIONATE 1 SPRAY: 50 SPRAY, METERED NASAL at 08:37

## 2021-03-13 RX ADMIN — DOCUSATE SODIUM 100 MG: 100 CAPSULE, LIQUID FILLED ORAL at 17:04

## 2021-03-13 NOTE — ASSESSMENT & PLAN NOTE
- h o ESBL and enterococcus in 2018- colonies <100,000 but pt symptomatic  - UA this admission is also (+) for UTI    - UC: Ecoli sensitive to ceftriaxone  - cont ceftriaxone day #4/5  - BC no growth  - this could be contributing to her amb dysfunction

## 2021-03-13 NOTE — PROGRESS NOTES
[facility]  Progress Note - Palmer Hamilton 1940, 80 y o  female MRN: [id number]  Unit/Bed#: -01 Encounter: [id number]  Primary Care Provider: LUCY Brown   Date and time admitted to hospital: 3/10/2021 11:12 AM    UTI (urinary tract infection)  Assessment & Plan  - h o ESBL and enterococcus in 2018- colonies <100,000 but pt symptomatic  - UA this admission is also (+) for UTI    - UC: Ecoli sensitive to ceftriaxone  - cont ceftriaxone day #4/5  - BC no growth  - this could be contributing to her amb dysfunction    Fungal infection of the groin  Assessment & Plan  - start nystatin cream in the groin  - some scratch marks vs funcal lesions around labia which may be contributing to dysuria  - nystatin powder in the abd skin folds    Dementia (HCC)  Assessment & Plan  Frontal lobe dementia  Supportive care  Cont zolfot and trazodone  Pt found to be screaming in her room- cont current meds  May need 1:1 if behavior worsens    Chronic pain  Assessment & Plan  Chronic pain  Continue outpatient pain medications    Moderate episode of recurrent major depressive disorder ([facility] [address] )  Assessment & Plan  Continue home Zoloft and trazodone    COPD (chronic obstructive pulmonary disease) ([facility] [address] )  Assessment & Plan  Not in acute exacerbation  Continue albuterol p r n  Hypertension  Assessment & Plan  /71   Pulse 60   Temp 98 1 °F (36 7 °C)   Resp 17   Ht 5' 6" (1 676 m)   Wt 112 kg (246 lb 14 6 oz)   LMP  (LMP Unknown)   SpO2 96%   BMI 39 85 kg/m²   Continue home losartan and amlodipine  BP controlled    Atrial fibrillation (HCC)  Assessment & Plan  Currently rate controlled  Continue home Eliquis        VTE Pharmacologic Prophylaxis:   Pharmacologic: Apixaban (Eliquis)  Mechanical VTE Prophylaxis in Place: Yes    Patient Centered Rounds: I have performed bedside rounds with nursing staff today      Discussions with Specialists or Other Care Team Provider: yes,  CM    Education and Discussions with Family / Patient: called pts daughter Sean Gil and updated her about her mothers progress and the further management plan  She is in agreement with the plan and has no concerns     Time Spent for Care: 20 minutes  More than 50% of total time spent on counseling and coordination of care as described above  Current Length of Stay: 2 day(s)    Current Patient Status: Inpatient   Certification Statement: The patient will continue to require additional inpatient hospital stay due to ongoing treatment for UTI and need for placement    Discharge Plan: 24-48h  Pending placement    Code Status: Level 1 - Full Code      Subjective:   Seen and examined at bedside  Feels better  Continues to have pain around labia when she urinates  No bladder pain or CVA pain  No fever  Chronic back pain    Objective:     Vitals:   Temp (24hrs), Av 2 °F (36 8 °C), Min:97 9 °F (36 6 °C), Max:98 6 °F (37 °C)    Temp:  [97 9 °F (36 6 °C)-98 6 °F (37 °C)] 98 1 °F (36 7 °C)  HR:  [59-64] 60  Resp:  [17] 17  BP: (120-141)/() 126/71  SpO2:  [95 %-96 %] 96 %  Body mass index is 39 85 kg/m²  Input and Output Summary (last 24 hours):        Intake/Output Summary (Last 24 hours) at 3/13/2021 1152  Last data filed at 3/13/2021 0900  Gross per 24 hour   Intake 840 ml   Output 2780 ml   Net -1940 ml       Physical Exam:     Physical Exam  S1,2  (+) r  Lungs CTA  abd nt nd bs (+) 4 quads  Appears deconditioned  Redness in the groin area  Redness in the perineal area  No new focal neuro deficits    Additional Data:     Labs:    Results from last 7 days   Lab Units 21  0530   WBC Thousand/uL 6 40   HEMOGLOBIN g/dL 11 7   HEMATOCRIT % 37 6   PLATELETS Thousands/uL 185   NEUTROS PCT % 56   LYMPHS PCT % 30   MONOS PCT % 8   EOS PCT % 4     Results from last 7 days   Lab Units 21  0530 03/10/21  1212   SODIUM mmol/L 141 143   POTASSIUM mmol/L 3 8 4 1   CHLORIDE mmol/L 107 105   CO2 mmol/L 26 27   BUN mg/dL 18 17 CREATININE mg/dL 0 88 0 81   ANION GAP mmol/L 8 11   CALCIUM mg/dL 9 4 9 5   ALBUMIN g/dL  --  2 9*   TOTAL BILIRUBIN mg/dL  --  0 31   ALK PHOS U/L  --  68   ALT U/L  --  15   AST U/L  --  14   GLUCOSE RANDOM mg/dL 103 99                 Results from last 7 days   Lab Units 03/10/21  1450   LACTIC ACID mmol/L 0 9           * I Have Reviewed All Lab Data Listed Above  * Additional Pertinent Lab Tests Reviewed: All Labs Within Last 24 Hours Reviewed    Imaging:    Imaging Reports Reviewed Today Include: na  Imaging Personally Reviewed by Myself Includes:  na    Recent Cultures (last 7 days):     Results from last 7 days   Lab Units 03/10/21  1515 03/10/21  1502 03/10/21  1450   BLOOD CULTURE   --  No Growth at 48 hrs  No Growth at 48 hrs     URINE CULTURE  >100,000 cfu/ml Escherichia coli*  --   --        Last 24 Hours Medication List:   Current Facility-Administered Medications   Medication Dose Route Frequency Provider Last Rate    acetaminophen  650 mg Oral Q6H PRN Cristhian Echols MD      amLODIPine  10 mg Oral Daily Cristhian Echols MD      apixaban  5 mg Oral BID Cristhian Echols MD      cefTRIAXone  1,000 mg Intravenous Q24H Cristhian Echols MD 1,000 mg (03/13/21 9830)    docusate sodium  100 mg Oral BID Cristhian Echols MD      fluticasone  1 spray Nasal Daily Cristhian Echols MD      gabapentin  600 mg Oral TID Cristhian Echols MD      latanoprost  1 drop Both Eyes HS Cristhian Echols MD      losartan  25 mg Oral Daily Santo Ochoa MD      nystatin   Topical BID Consuelo Friedman MD      nystatin   Topical BID Consuelo Friedman MD      oxyCODONE  5 mg Oral Q6H PRN Consuelo Friedman MD      pantoprazole  40 mg Oral Daily Before Breakfast Santo Ochoa MD      sertraline  100 mg Oral Daily Cristhian Echols MD      tamsulosin  0 4 mg Oral Daily With Aaron Navarro MD      traZODone  100 mg Oral HS Santo Ochoa MD          Today, Patient Was Seen By: Consuelo Friedman MD    ** Please Note: Dictation voice to text software may have been used in the creation of this document   **

## 2021-03-13 NOTE — ASSESSMENT & PLAN NOTE
/71   Pulse 60   Temp 98 1 °F (36 7 °C)   Resp 17   Ht 5' 6" (1 676 m)   Wt 112 kg (246 lb 14 6 oz)   LMP  (LMP Unknown)   SpO2 96%   BMI 39 85 kg/m²   Continue home losartan and amlodipine  BP controlled

## 2021-03-13 NOTE — PLAN OF CARE
Problem: Potential for Falls  Goal: Patient will remain free of falls  Description: INTERVENTIONS:  - Assess patient frequently for physical needs  -  Identify cognitive and physical deficits and behaviors that affect risk of falls    -  [place] fall precautions as indicated by assessment   - Educate patient/family on patient safety including physical limitations  - Instruct patient to call for assistance with activity based on assessment  - Modify environment to reduce risk of injury  - Consider OT/PT consult to assist with strengthening/mobility  Outcome: Progressing     Problem: Prexisting or High Potential for Compromised Skin Integrity  Goal: Skin integrity is maintained or improved  Description: INTERVENTIONS:  - Identify patients at risk for skin breakdown  - Assess and monitor skin integrity  - Assess and monitor nutrition and hydration status  - Monitor labs   - Assess for incontinence   - Turn and reposition patient  - Assist with mobility/ambulation  - Relieve pressure over bony prominences  - Avoid friction and shearing  - Provide appropriate hygiene as needed including keeping skin clean and dry  - Evaluate need for skin moisturizer/barrier cream  - Collaborate with interdisciplinary team   - Patient/family teaching  - Consider wound care consult   Outcome: Progressing     Problem: PAIN - ADULT  Goal: Verbalizes/displays adequate comfort level or baseline comfort level  Description: Interventions:  - Encourage patient to monitor pain and request assistance  - Assess pain using appropriate pain scale  - Administer analgesics based on type and severity of pain and evaluate response  - Implement non-pharmacological measures as appropriate and evaluate response  - Consider cultural and social influences on pain and pain management  - Notify physician/advanced practitioner if interventions unsuccessful or patient reports new pain  Outcome: Progressing     Problem: INFECTION - ADULT  Goal: Absence or prevention of progression during hospitalization  Description: INTERVENTIONS:  - Assess and monitor for signs and symptoms of infection  - Monitor lab/diagnostic results  - Monitor all insertion sites, i e  indwelling lines, tubes, and drains  - Monitor endotracheal if appropriate and nasal secretions for changes in amount and color  - [place] appropriate cooling/warming therapies per order  - Administer medications as ordered  - Instruct and encourage patient and family to use good hand hygiene technique  - Identify and instruct in appropriate isolation precautions for identified infection/condition  Outcome: Progressing     Problem: SAFETY ADULT  Goal: Patient will remain free of falls  Description: INTERVENTIONS:  - Assess patient frequently for physical needs  -  Identify cognitive and physical deficits and behaviors that affect risk of falls    -  [place] fall precautions as indicated by assessment   - Educate patient/family on patient safety including physical limitations  - Instruct patient to call for assistance with activity based on assessment  - Modify environment to reduce risk of injury  - Consider OT/PT consult to assist with strengthening/mobility  Outcome: Progressing  Goal: Maintain or return to baseline ADL function  Description: INTERVENTIONS:  -  Assess patient's ability to carry out ADLs; assess patient's baseline for ADL function and identify physical deficits which impact ability to perform ADLs (bathing, care of mouth/teeth, toileting, grooming, dressing, etc )  - Assess/evaluate cause of self-care deficits   - Assess range of motion  - Assess patient's mobility; develop plan if impaired  - Assess patient's need for assistive devices and provide as appropriate  - Encourage maximum independence but intervene and supervise when necessary  - Involve family in performance of ADLs  - Assess for home care needs following discharge   - Consider OT consult to assist with ADL evaluation and planning for discharge  - Provide patient education as appropriate  Outcome: Progressing  Goal: Maintain or return mobility status to optimal level  Description: INTERVENTIONS:  - Assess patient's baseline mobility status (ambulation, transfers, stairs, etc )    - Identify cognitive and physical deficits and behaviors that affect mobility  - Identify mobility aids required to assist with transfers and/or ambulation (gait belt, sit-to-stand, lift, walker, cane, etc )  - [place] fall precautions as indicated by assessment  - Record patient progress and toleration of activity level on Mobility SBAR; progress patient to next Phase/Stage  - Instruct patient to call for assistance with activity based on assessment  - Consider rehabilitation consult to assist with strengthening/weightbearing, etc   Outcome: Progressing     Problem: DISCHARGE PLANNING  Goal: Discharge to home or other facility with appropriate resources  Description: INTERVENTIONS:  - Identify barriers to discharge w/patient and caregiver  - Arrange for needed discharge resources and transportation as appropriate  - Identify discharge learning needs (meds, wound care, etc )  - Arrange for interpretive services to assist at discharge as needed  - Refer to Case Management Department for coordinating discharge planning if the patient needs post-hospital services based on physician/advanced practitioner order or complex needs related to functional status, cognitive ability, or social support system  Outcome: Progressing     Problem: Knowledge Deficit  Goal: Patient/family/caregiver demonstrates understanding of disease process, treatment plan, medications, and discharge instructions  Description: Complete learning assessment and assess knowledge base    Interventions:  - Provide teaching at level of understanding  - Provide teaching via preferred learning methods  Outcome: Progressing     Problem: CARDIOVASCULAR - ADULT  Goal: Maintains optimal cardiac output and hemodynamic stability  Description: INTERVENTIONS:  - Monitor I/O, vital signs and rhythm  - Monitor for S/S and trends of decreased cardiac output  - Administer and titrate ordered vasoactive medications to optimize hemodynamic stability  - Assess quality of pulses, skin color and temperature  - Assess for signs of decreased coronary artery perfusion  - Instruct patient to report change in severity of symptoms  Outcome: Progressing  Goal: Absence of cardiac dysrhythmias or at baseline rhythm  Description: INTERVENTIONS:  - Continuous cardiac monitoring, vital signs, obtain 12 lead EKG if ordered  - Administer antiarrhythmic and heart rate control medications as ordered  - Monitor electrolytes and administer replacement therapy as ordered  Outcome: Progressing     Problem: RESPIRATORY - ADULT  Goal: Achieves optimal ventilation and oxygenation  Description: INTERVENTIONS:  - Assess for changes in respiratory status  - Assess for changes in mentation and behavior  - Position to facilitate oxygenation and minimize respiratory effort  - Oxygen administered by appropriate delivery if ordered  - Initiate smoking cessation education as indicated  - Encourage broncho-pulmonary hygiene including cough, deep breathe, Incentive Spirometry  - Assess the need for suctioning and aspirate as needed  - Assess and instruct to report SOB or any respiratory difficulty  - Respiratory Therapy support as indicated  Outcome: Progressing     Problem: GENITOURINARY - ADULT  Goal: Maintains or returns to baseline urinary function  Description: INTERVENTIONS:  - Assess urinary function  - Encourage oral fluids to ensure adequate hydration if ordered  - Administer IV fluids as ordered to ensure adequate hydration  - Administer ordered medications as needed  - Offer frequent toileting  - Follow urinary retention protocol if ordered  Outcome: Progressing  Goal: Absence of urinary retention  Description: INTERVENTIONS:  - Assess patients ability to void and empty bladder  - Monitor I/O  - Bladder scan as needed  - Discuss with physician/AP medications to alleviate retention as needed  - Discuss catheterization for long term situations as appropriate  Outcome: Progressing  Goal: Urinary catheter remains patent  Description: INTERVENTIONS:  - Assess patency of urinary catheter  - If patient has a chronic nelson, consider changing catheter if non-functioning  - Follow guidelines for intermittent irrigation of non-functioning urinary catheter  Outcome: Progressing     Problem: METABOLIC, FLUID AND ELECTROLYTES - ADULT  Goal: Electrolytes maintained within normal limits  Description: INTERVENTIONS:  - Monitor labs and assess patient for signs and symptoms of electrolyte imbalances  - Administer electrolyte replacement as ordered  - Monitor response to electrolyte replacements, including repeat lab results as appropriate  - Instruct patient on fluid and nutrition as appropriate  Outcome: Progressing  Goal: Fluid balance maintained  Description: INTERVENTIONS:  - Monitor labs   - Monitor I/O and WT  - Instruct patient on fluid and nutrition as appropriate  - Assess for signs & symptoms of volume excess or deficit  Outcome: Progressing  Goal: Glucose maintained within target range  Description: INTERVENTIONS:  - Monitor Blood Glucose as ordered  - Assess for signs and symptoms of hyperglycemia and hypoglycemia  - Administer ordered medications to maintain glucose within target range  - Assess nutritional intake and initiate nutrition service referral as needed  Outcome: Progressing     Problem: SKIN/TISSUE INTEGRITY - ADULT  Goal: Skin integrity remains intact  Description: INTERVENTIONS  - Identify patients at risk for skin breakdown  - Assess and monitor skin integrity  - Assess and monitor nutrition and hydration status  - Monitor labs (i e  albumin)  - Assess for incontinence   - Turn and reposition patient  - Assist with mobility/ambulation  - Relieve pressure over bony prominences  - Avoid friction and shearing  - Provide appropriate hygiene as needed including keeping skin clean and dry  - Evaluate need for skin moisturizer/barrier cream  - Collaborate with interdisciplinary team (i e  Nutrition, Rehabilitation, etc )   - Patient/family teaching  Outcome: Progressing  Goal: Incision(s), wounds(s) or drain site(s) healing without S/S of infection  Description: INTERVENTIONS  - Assess and document risk factors for skin impairment   - Assess and document dressing, incision, wound bed, drain sites and surrounding tissue  - Consider nutrition services referral as needed  - Oral mucous membranes remain intact  - Provide patient/ family education  Outcome: Progressing  Goal: Oral mucous membranes remain intact  Description: INTERVENTIONS  - Assess oral mucosa and hygiene practices  - Implement preventative oral hygiene regimen  - Implement oral medicated treatments as ordered  - Initiate Nutrition services referral as needed  Outcome: Progressing     Problem: MUSCULOSKELETAL - ADULT  Goal: Maintain or return mobility to safest level of function  Description: INTERVENTIONS:  - Assess patient's ability to carry out ADLs; assess patient's baseline for ADL function and identify physical deficits which impact ability to perform ADLs (bathing, care of mouth/teeth, toileting, grooming, dressing, etc )  - Assess/evaluate cause of self-care deficits   - Assess range of motion  - Assess patient's mobility  - Assess patient's need for assistive devices and provide as appropriate  - Encourage maximum independence but intervene and supervise when necessary  - Involve family in performance of ADLs  - Assess for home care needs following discharge   - Consider OT consult to assist with ADL evaluation and planning for discharge  - Provide patient education as appropriate  Outcome: Progressing  Goal: Maintain proper alignment of affected body part  Description: INTERVENTIONS:  - Support, maintain and protect limb and body alignment  - Provide patient/ family with appropriate education  Outcome: Progressing

## 2021-03-13 NOTE — ASSESSMENT & PLAN NOTE
- start nystatin cream in the groin  - some scratch marks vs funcal lesions around labia which may be contributing to dysuria  - nystatin powder in the abd skin folds

## 2021-03-14 LAB
ANION GAP SERPL CALCULATED.3IONS-SCNC: 7 MMOL/L (ref 4–13)
BUN SERPL-MCNC: 18 MG/DL (ref 5–25)
CALCIUM SERPL-MCNC: 9.3 MG/DL (ref 8.3–10.1)
CHLORIDE SERPL-SCNC: 106 MMOL/L (ref 100–108)
CO2 SERPL-SCNC: 28 MMOL/L (ref 21–32)
CREAT SERPL-MCNC: 0.83 MG/DL (ref 0.6–1.3)
ERYTHROCYTE [DISTWIDTH] IN BLOOD BY AUTOMATED COUNT: 14.8 % (ref 11.6–15.1)
GFR SERPL CREATININE-BSD FRML MDRD: 67 ML/MIN/1.73SQ M
GLUCOSE SERPL-MCNC: 108 MG/DL (ref 65–140)
HCT VFR BLD AUTO: 38.1 % (ref 34.8–46.1)
HGB BLD-MCNC: 11.7 G/DL (ref 11.5–15.4)
MCH RBC QN AUTO: 27.2 PG (ref 26.8–34.3)
MCHC RBC AUTO-ENTMCNC: 30.7 G/DL (ref 31.4–37.4)
MCV RBC AUTO: 89 FL (ref 82–98)
PLATELET # BLD AUTO: 184 THOUSANDS/UL (ref 149–390)
PMV BLD AUTO: 10.1 FL (ref 8.9–12.7)
POTASSIUM SERPL-SCNC: 3.9 MMOL/L (ref 3.5–5.3)
RBC # BLD AUTO: 4.3 MILLION/UL (ref 3.81–5.12)
SODIUM SERPL-SCNC: 141 MMOL/L (ref 136–145)
WBC # BLD AUTO: 5.78 THOUSAND/UL (ref 4.31–10.16)

## 2021-03-14 PROCEDURE — 85027 COMPLETE CBC AUTOMATED: CPT | Performed by: FAMILY MEDICINE

## 2021-03-14 PROCEDURE — 99233 SBSQ HOSP IP/OBS HIGH 50: CPT | Performed by: FAMILY MEDICINE

## 2021-03-14 PROCEDURE — 80048 BASIC METABOLIC PNL TOTAL CA: CPT | Performed by: FAMILY MEDICINE

## 2021-03-14 PROCEDURE — 97116 GAIT TRAINING THERAPY: CPT

## 2021-03-14 PROCEDURE — 97535 SELF CARE MNGMENT TRAINING: CPT

## 2021-03-14 PROCEDURE — 97530 THERAPEUTIC ACTIVITIES: CPT

## 2021-03-14 RX ADMIN — TAMSULOSIN HYDROCHLORIDE 0.4 MG: 0.4 CAPSULE ORAL at 17:13

## 2021-03-14 RX ADMIN — CEFTRIAXONE SODIUM 1000 MG: 10 INJECTION, POWDER, FOR SOLUTION INTRAVENOUS at 06:41

## 2021-03-14 RX ADMIN — ACETAMINOPHEN 650 MG: 325 TABLET ORAL at 11:36

## 2021-03-14 RX ADMIN — DOCUSATE SODIUM 100 MG: 100 CAPSULE, LIQUID FILLED ORAL at 08:32

## 2021-03-14 RX ADMIN — NYSTATIN: 100000 CREAM TOPICAL at 08:32

## 2021-03-14 RX ADMIN — GABAPENTIN 600 MG: 300 CAPSULE ORAL at 16:59

## 2021-03-14 RX ADMIN — AMLODIPINE BESYLATE 10 MG: 10 TABLET ORAL at 08:31

## 2021-03-14 RX ADMIN — OXYCODONE HYDROCHLORIDE 5 MG: 5 TABLET ORAL at 06:41

## 2021-03-14 RX ADMIN — OXYCODONE HYDROCHLORIDE 5 MG: 5 TABLET ORAL at 16:02

## 2021-03-14 RX ADMIN — NYSTATIN: 100000 CREAM TOPICAL at 17:13

## 2021-03-14 RX ADMIN — OXYCODONE HYDROCHLORIDE 5 MG: 5 TABLET ORAL at 22:48

## 2021-03-14 RX ADMIN — APIXABAN 5 MG: 5 TABLET, FILM COATED ORAL at 08:31

## 2021-03-14 RX ADMIN — NYSTATIN: 100000 POWDER TOPICAL at 08:32

## 2021-03-14 RX ADMIN — NYSTATIN: 100000 POWDER TOPICAL at 17:18

## 2021-03-14 RX ADMIN — GABAPENTIN 600 MG: 300 CAPSULE ORAL at 08:32

## 2021-03-14 RX ADMIN — SERTRALINE HYDROCHLORIDE 100 MG: 50 TABLET ORAL at 08:32

## 2021-03-14 RX ADMIN — LATANOPROST 1 DROP: 50 SOLUTION OPHTHALMIC at 22:48

## 2021-03-14 RX ADMIN — LOSARTAN POTASSIUM 25 MG: 25 TABLET, FILM COATED ORAL at 08:32

## 2021-03-14 RX ADMIN — GABAPENTIN 600 MG: 300 CAPSULE ORAL at 22:48

## 2021-03-14 RX ADMIN — PANTOPRAZOLE SODIUM 40 MG: 40 TABLET, DELAYED RELEASE ORAL at 06:41

## 2021-03-14 RX ADMIN — FLUTICASONE PROPIONATE 1 SPRAY: 50 SPRAY, METERED NASAL at 08:32

## 2021-03-14 RX ADMIN — TRAZODONE HYDROCHLORIDE 100 MG: 100 TABLET ORAL at 22:48

## 2021-03-14 RX ADMIN — APIXABAN 5 MG: 5 TABLET, FILM COATED ORAL at 17:13

## 2021-03-14 RX ADMIN — DOCUSATE SODIUM 100 MG: 100 CAPSULE, LIQUID FILLED ORAL at 17:13

## 2021-03-14 NOTE — PROGRESS NOTES
[address] [facility]  Progress Note - Chely Ardon 1940, 80 y o  female MRN: [id number]  Unit/Bed#: -01 Encounter: [id number]  Primary Care Provider: LUCY Bacon   Date and time admitted to hospital: 3/10/2021 11:12 AM    UTI (urinary tract infection)  Assessment & Plan  - h o ESBL and enterococcus in 2018- colonies <100,000 but pt symptomatic  - UA this admission is also (+) for UTI    - UC: Ecoli sensitive to ceftriaxone  - cont ceftriaxone day #5/5  - BC no growth  - this could be contributing to her amb dysfunction    Fungal infection of the groin  Assessment & Plan  - start nystatin cream in the groin  - some scratch marks vs funcal lesions around labia which may be contributing to dysuria  - nystatin powder in the abd skin folds    Dementia ([facility] [address] )  Assessment & Plan  Frontal lobe dementia  Supportive care  Cont zolfot and trazodone  Pt found to be screaming in her room- cont current meds  May need 1:1 if behavior worsens    Moderate episode of recurrent major depressive disorder ([address] )  Assessment & Plan  Continue home Zoloft and trazodone    COPD (chronic obstructive pulmonary disease) ([address] )  Assessment & Plan  Not in acute exacerbation  Continue albuterol p r n  Atrial fibrillation ([facility] [address] )  Assessment & Plan  Currently rate controlled  Continue home Eliquis    * Generalized weakness  Assessment & Plan  Presented with generalized weakness  She has been struggling to get sore outpatient appointments due to difficulty ambulating and poor transportation  PT/OT  Case management follow-up        VTE Pharmacologic Prophylaxis:   Pharmacologic: Apixaban (Eliquis)  Mechanical VTE Prophylaxis in Place: Yes    Patient Centered Rounds: I have performed bedside rounds with nursing staff today  Discussions with Specialists or Other Care Team Provider:  n/a    Education and Discussions with Family / Patient:  Yes, family has been kept up-to-date  No concerns so far    All questions have been answered to their satisfaction    Time Spent for Care: 20 minutes  More than 50% of total time spent on counseling and coordination of care as described above  Current Length of Stay: 3 day(s)    Current Patient Status: Inpatient   Certification Statement: The patient will continue to require additional inpatient hospital stay due to Pending placement    Discharge Plan:  Pending placement    Code Status: Level 1 - Full Code      Subjective:   Seen and examined at bedside  Says that her pain in the groin is getting better  Dysuria is also getting better  In a very pleasant mood    Objective:     Vitals:   Temp (24hrs), Av 5 °F (36 9 °C), Min:98 3 °F (36 8 °C), Max:98 7 °F (37 1 °C)    Temp:  [98 3 °F (36 8 °C)-98 7 °F (37 1 °C)] 98 3 °F (36 8 °C)  HR:  [59] 59  Resp:  [17] 17  BP: (135-138)/(68-70) 138/70  SpO2:  [96 %] 96 %  Body mass index is 40 21 kg/m²  Input and Output Summary (last 24 hours):        Intake/Output Summary (Last 24 hours) at 3/14/2021 1125  Last data filed at 3/14/2021 0849  Gross per 24 hour   Intake 480 ml   Output 1000 ml   Net -520 ml       Physical Exam:     Physical Exam  General- Awake, alert and oriented x 3, looks comfortable  HEENT- Normocephalic, atraumatic, oral mucosa- moist  Neck- Supple, No carotid bruit, no JVD  CVS- Normal S1/ S2, Regular rate and rhythm, No murmur, No edema  Respiratory system- B/L clear breath sounds, no wheezing  Abdomen- Soft, Non distended, no tenderness, Bowel sound- present 4 quads  Genitourinary- No suprapubic tenderness, No CVA tenderness  Skin-redness in the groin slightly improved  Musculoskeletal- No gross deformity  Psych- No acute psychosis  CNS- CN II- XII grossly intact, No acute focal neurologic deficit noted      Additional Data:     Labs:    Results from last 7 days   Lab Units 21  0528 21  0530   WBC Thousand/uL 5 78 6 40   HEMOGLOBIN g/dL 11 7 11 7   HEMATOCRIT % 38 1 37 6   PLATELETS Thousands/uL 184 185   NEUTROS PCT %  --  56   LYMPHS PCT %  --  30   MONOS PCT %  --  8   EOS PCT %  --  4     Results from last 7 days   Lab Units 03/14/21  0528  03/10/21  1212   SODIUM mmol/L 141   < > 143   POTASSIUM mmol/L 3 9   < > 4 1   CHLORIDE mmol/L 106   < > 105   CO2 mmol/L 28   < > 27   BUN mg/dL 18   < > 17   CREATININE mg/dL 0 83   < > 0 81   ANION GAP mmol/L 7   < > 11   CALCIUM mg/dL 9 3   < > 9 5   ALBUMIN g/dL  --   --  2 9*   TOTAL BILIRUBIN mg/dL  --   --  0 31   ALK PHOS U/L  --   --  68   ALT U/L  --   --  15   AST U/L  --   --  14   GLUCOSE RANDOM mg/dL 108   < > 99    < > = values in this interval not displayed  Results from last 7 days   Lab Units 03/10/21  1450   LACTIC ACID mmol/L 0 9           * I Have Reviewed All Lab Data Listed Above  * Additional Pertinent Lab Tests Reviewed: All Labs Within Last 24 Hours Reviewed    Imaging:    Imaging Reports Reviewed Today Include:  N/a  Imaging Personally Reviewed by Myself Includes:  n/a    Recent Cultures (last 7 days):     Results from last 7 days   Lab Units 03/10/21  1515 03/10/21  1502 03/10/21  1450   BLOOD CULTURE   --  No Growth at 72 hrs  No Growth at 72 hrs     URINE CULTURE  >100,000 cfu/ml Escherichia coli*  --   --        Last 24 Hours Medication List:   Current Facility-Administered Medications   Medication Dose Route Frequency Provider Last Rate    acetaminophen  650 mg Oral Q6H PRN Cristhian Echols MD      amLODIPine  10 mg Oral Daily Cristhian Echols MD      apixaban  5 mg Oral BID Cristhian Echols MD      cefTRIAXone  1,000 mg Intravenous Q24H Cristhian Echols MD 1,000 mg (03/14/21 0641)    docusate sodium  100 mg Oral BID Cristhian Echols MD      fluticasone  1 spray Nasal Daily Cristhian Echols MD      gabapentin  600 mg Oral TID Cristhian Echols MD      latanoprost  1 drop Both Eyes HS Cristhian Echols MD      losartan  25 mg Oral Daily David Roldan MD      nystatin   Topical BID Jay Holguin MD      nystatin   Topical BID MD Olvin Bertrand oxyCODONE  5 mg Oral Q6H PRN Darlene Plummer MD      pantoprazole  40 mg Oral Daily Before Breakfast Deonte Monroe MD      sertraline  100 mg Oral Daily Cristhian Echols MD      tamsulosin  0 4 mg Oral Daily With Radha Lala MD      traZODone  100 mg Oral HS Deonte Monroe MD          Today, Patient Was Seen By: Darlene Plummer MD    ** Please Note: Dictation voice to text software may have been used in the creation of this document   **

## 2021-03-14 NOTE — CASE MANAGEMENT
Daughter alice agreeable to [facility]  Message to [facility] and PT/OT alerted that updates will be needed   Dorian Pickard requesting to speak w CB liaison directly regarding dementia and medications--message to CB to contact daughter prior to admission there

## 2021-03-14 NOTE — ASSESSMENT & PLAN NOTE
- h o ESBL and enterococcus in 2018- colonies <100,000 but pt symptomatic  - UA this admission is also (+) for UTI    - UC: Ecoli sensitive to ceftriaxone  - cont ceftriaxone day #5/5  - BC no growth  - this could be contributing to her amb dysfunction

## 2021-03-14 NOTE — PLAN OF CARE
Problem: OCCUPATIONAL THERAPY ADULT  Goal: Performs self-care activities at highest level of function for planned discharge setting  See evaluation for individualized goals  Description: Treatment Interventions: ADL retraining, Functional transfer training, UE strengthening/ROM, Endurance training, Patient/family training, Equipment evaluation/education, Compensatory technique education, Energy conservation, Activityengagement, Continued evaluation          See flowsheet documentation for full assessment, interventions and recommendations  Note: Limitation: Decreased ADL status, Decreased UE ROM, Decreased UE strength, Decreased endurance, Decreased self-care trans, Decreased high-level ADLs  Prognosis: Good  Assessment: Patient participated in Skilled OT session this date with interventions consisting of ADL re training with the use of correct body mechnaics, Energy Conservation techniques, Work simplification skills , therapeutic exercise to: increase functional use of BUEs, increase BUE muscle strength , increase cardiovascular endurance , increase dynamic sit/ stand balance during functional activity , increase postural control and increase OOB/ sitting tolerance   Patient agreeable to OT treatment session, upon arrival patient was found supine in bed, alert, responsive  and in no apparent distress  Patient completed UE therapeutic exercises and self care tasks with good tolerance, Patient requires min assist of 1 for UB ADLs, max assist for LB ADLs  Please refer to flow sheet for detailed performance  Patient continues to be functioning below baseline level, occupational performance remains limited secondary to factors listed above and increased risk for falls and injury  From OT standpoint, recommendation at time of d/c would be Short Term Rehab     Patient to benefit from continued Occupational Therapy treatment while in the hospital to address deficits as defined above and maximize level of functional independence with ADLs and functional mobility        OT Discharge Recommendation: Post-Acute Rehabilitation Services

## 2021-03-14 NOTE — CASE MANAGEMENT
OO pt choice  Cm spoke w Janice Barnett at direct intake who is reviewing to determine if they can accept  Cm awaiting answer  PT/OT to meet w pt today   Cm to follow

## 2021-03-14 NOTE — PHYSICAL THERAPY NOTE
Physical Therapy Treatment Note     03/14/21 1055   PT Last Visit   PT Visit Date 03/14/21   Note Type   Note Type Treatment   Pain Assessment   Pain Assessment Tool 0-10   Pain Score No Pain   Restrictions/Precautions   Weight Bearing Precautions Per Order No   Other Precautions Contact/isolation; Chair Alarm; Bed Alarm;Telemetry; Fall Risk   General   Chart Reviewed Yes   Response to Previous Treatment Patient with no complaints from previous session  Family/Caregiver Present No   Cognition   Overall Cognitive Status Impaired   Arousal/Participation Alert; Responsive; Cooperative   Attention Attends with cues to redirect   Orientation Level Oriented X4   Following Commands Follows one step commands with increased time or repetition   Subjective   Subjective "I haven't been able to get out of bed "   Bed Mobility   Rolling R 3  Moderate assistance   Additional items Assist x 1;HOB elevated; Bedrails; Increased time required;Verbal cues;LE management  (log roll for back safety)   Supine to Sit 3  Moderate assistance   Additional items Assist x 2;HOB elevated; Bedrails; Increased time required;Verbal cues;LE management   Transfers   Sit to Stand 3  Moderate assistance   Additional items Assist x 2; Increased time required;Verbal cues   Stand to Sit 3  Moderate assistance   Additional items Assist x 2; Increased time required;Verbal cues   Ambulation/Elevation   Gait pattern Excessively slow; Step to;Short stride; Shuffling;Decreased foot clearance  (pt with left foot drop)   Gait Assistance 3  Moderate assist   Additional items Assist x 2;Verbal cues   Assistive Device Rolling walker   Distance 5 feet, bed to chair   Stair Management Assistance Not tested   Balance   Static Sitting Fair   Dynamic Sitting Fair -   Static Standing Poor +   Dynamic Standing Poor   Ambulatory Poor   Endurance Deficit   Endurance Deficit Yes   Activity Tolerance   Activity Tolerance Patient limited by fatigue   Medical Staff Made Aware OT Roverto Letters Nurse Made Aware Discussed case with CEE Awad; post session pt was left seated in recliner in NAD, all belongings within reach, +chair alarm   Exercises   Hip Flexion Sitting;10 reps;AROM; Bilateral   Knee AROM Long Arc Quad Sitting;10 reps;AROM; Bilateral   Ankle Pumps Sitting;10 reps;AROM; Right   Assessment   Prognosis Good   Problem List Decreased strength;Decreased endurance; Impaired balance;Decreased mobility; Decreased safety awareness; Obesity   Assessment Chart reviewed  Pt was received supine in bed in NAD and agreeable to PT session  Pt was seen for physical therapy on this date with interventions consisting of therapeutic activity including bed mobility and sit to stand transfers, therapeutic exercises consisting of AROM, 10 reps, bilateral lower extremities in the sitting position, and gait training with emphasis on improving pt's ability to ambulate level surfaces 5 feet with RW and moderate assist x 2 provided by therapists  Co-treatment was performed with OT secondary to complex medical condition of pt  Pt requires assist of two to achieve and maintain transitional movements; requiring the need of skilled therapeutic intervention of two therapists to achieve delivery of services  In comparison to previous sessions pt required slightly increased assistance with sit to stand transfers and ambulation  Pt able to ambulate an increased distance with use of RW on level surfaces  Post session pt was left seated in recliner in NAD, +chair alarm  Pt continues to be functioning below baseline level and remains limited secondary to decreased lower extremity strength, impaired balance, decreased endurance, gait deviations, and decreased functional mobility  Continue to recommend STR at time of discharge to maximize pt's functional independence and safety with mobility   PT will continue to see pt while here in order to address the deficits listed above and provide interventions consistent with the POC in effort to achieve short term goals  Barriers to Discharge Inaccessible home environment;Decreased caregiver support   Goals   STG Expiration Date 03/25/21   Plan   Treatment/Interventions Functional transfer training;LE strengthening/ROM; Therapeutic exercise; Endurance training;Patient/family training;Bed mobility;Gait training;Spoke to nursing;Spoke to case management;OT   Progress Slow progress, decreased activity tolerance   PT Frequency Other (Comment)  (3-5x/wk)   Recommendation   PT Discharge Recommendation Post-Acute Rehabilitation Services   PT - OK to Discharge Yes  (when medically cleared, if to STR)   AM-PAC Basic Mobility Inpatient   Turning in Bed Without Bedrails 2   Lying on Back to Sitting on Edge of Flat Bed 1   Moving Bed to Chair 1   Standing Up From Chair 1   Walk in Room 1   Climb 3-5 Stairs 1   Basic Mobility Inpatient Raw Score 7   Turning Head Towards Sound 4   Follow Simple Instructions 3   Low Function Basic Mobility Raw Score 14   Low Function Basic Mobility Standardized Score 22 01     Hernán Matthews, PT, DPT    Time of PT treatment session: 3586-7270  25 minutes

## 2021-03-14 NOTE — CASE MANAGEMENT
OO/[facility] cannot accept  Cm spoke to daughter about [facility]   Daughter is researching this placement and will contact back

## 2021-03-14 NOTE — OCCUPATIONAL THERAPY NOTE
Occupational Therapy Treatment Note        Patient Name: Jules Cockayne REGAC'H Date: 3/14/2021       03/14/21 1118   OT Last Visit   OT Visit Date 03/14/21   Note Type   Note Type Treatment   Restrictions/Precautions   Weight Bearing Precautions Per Order No   Other Precautions Contact/isolation; Chair Alarm; Bed Alarm;Telemetry; Fall Risk   Pain Assessment   Pain Assessment Tool Pain Assessment not indicated - pt denies pain   Pain Score No Pain   ADL   Eating Assistance 5  Supervision/Setup   Eating Deficit Setup;Verbal cueing; Increased time to complete   Grooming Assistance 4  Minimal Assistance   Grooming Deficit Setup;Steadying; Increased time to complete;Supervision/safety   UB Bathing Assistance 3  Moderate Assistance   UB Bathing Deficit Setup;Verbal cueing;Supervision/safety; Increased time to complete   LB Bathing Assistance 2  Maximal Assistance   LB Bathing Deficit Verbal cueing;Supervision/safety; Increased time to complete   UB Dressing Assistance 4  Minimal Assistance   UB Dressing Deficit Setup;Verbal cueing;Supervision/safety   LB Dressing Assistance 2  Maximal Assistance   LB Dressing Deficit Verbal cueing;Supervision/safety; Increased time to complete   Toileting Assistance  2  Maximal Assistance   Toileting Deficit Increased time to complete;Use of bedpan/urinal setup; Bedside commode   Bed Mobility   Rolling R 3  Moderate assistance   Additional items Assist x 1;HOB elevated; Bedrails; Increased time required;Verbal cues;LE management  (log roll for back safety)   Supine to Sit 3  Moderate assistance   Additional items Assist x 2;HOB elevated; Bedrails; Increased time required;Verbal cues;LE management   Transfers   Sit to Stand 3  Moderate assistance   Additional items Assist x 2; Increased time required   Stand to Sit 3  Moderate assistance   Additional items Assist x 2; Increased time required;Verbal cues   Cognition   Overall Cognitive Status Impaired   Arousal/Participation Alert; Responsive; Cooperative   Attention Attends with cues to redirect   Orientation Level Oriented X4   Memory Decreased short term memory;Decreased recall of precautions;Decreased recall of recent events   Following Commands Follows one step commands with increased time or repetition   Activity Tolerance   Activity Tolerance Patient tolerated treatment well   Medical Staff Made Aware CEE Agarwal made aware of therapy outcome, transfer tecniqie and patient's ability to complete stand   Assessment   Assessment Patient participated in Skilled OT session this date with interventions consisting of ADL re training with the use of correct body mechnaics, Energy Conservation techniques, Work simplification skills , therapeutic exercise to: increase functional use of BUEs, increase BUE muscle strength , increase cardiovascular endurance , increase dynamic sit/ stand balance during functional activity , increase postural control and increase OOB/ sitting tolerance   Patient agreeable to OT treatment session, upon arrival patient was found supine in bed, alert, responsive  and in no apparent distress  Patient completed UE therapeutic exercises and self care tasks with good tolerance, Patient requires min assist of 1 for UB ADLs, max assist for LB ADLs  Please refer to flow sheet for detailed performance  Patient continues to be functioning below baseline level, occupational performance remains limited secondary to factors listed above and increased risk for falls and injury  From OT standpoint, recommendation at time of d/c would be Short Term Rehab  Patient to benefit from continued Occupational Therapy treatment while in the hospital to address deficits as defined above and maximize level of functional independence with ADLs and functional mobility  Plan   Treatment Interventions ADL retraining;Functional transfer training;UE strengthening/ROM; Endurance training;Cognitive reorientation;Patient/family training;Neuromuscular reeducation; Compensatory technique education;Continued evaluation; Energy conservation; Activityengagement   Goal Expiration Date 03/25/21   OT Treatment Day 2   OT Frequency 3-5x/wk   Recommendation   OT Discharge Recommendation Post-Acute Rehabilitation Services   AM-PAC Daily Activity Inpatient   Lower Body Dressing 2   Bathing 2   Toileting 1   Upper Body Dressing 2   Grooming 3   Eating 4   Daily Activity Raw Score 14   Daily Activity Standardized Score (Calc for Raw Score >=11) 33 39   AM-PAC Applied Cognition Inpatient   Following a Speech/Presentation 4   Understanding Ordinary Conversation 4   Taking Medications 3   Remembering Where Things Are Placed or Put Away 3   Remembering List of 4-5 Errands 3   Taking Care of Complicated Tasks 1   Applied Cognition Raw Score 18   Applied Cognition Standardized Score 38 07

## 2021-03-14 NOTE — PLAN OF CARE
Problem: PHYSICAL THERAPY ADULT  Goal: Performs mobility at highest level of function for planned discharge setting  See evaluation for individualized goals  Description: Treatment/Interventions: Functional transfer training, LE strengthening/ROM, Therapeutic exercise, Endurance training, Bed mobility, Gait training, Equipment eval/education, Patient/family training, Spoke to nursing, Spoke to case management  Equipment Recommended: Wheelchair, Brenita Laity       See flowsheet documentation for full assessment, interventions and recommendations  Note: Prognosis: Good  Problem List: Decreased strength, Decreased endurance, Impaired balance, Decreased mobility, Decreased safety awareness, Obesity  Assessment: Chart reviewed  Pt was received supine in bed in NAD and agreeable to PT session  Pt was seen for physical therapy on this date with interventions consisting of therapeutic activity including bed mobility and sit to stand transfers, therapeutic exercises consisting of AROM, 10 reps, bilateral lower extremities in the sitting position, and gait training with emphasis on improving pt's ability to ambulate level surfaces 5 feet with RW and moderate assist x 2 provided by therapists  Co-treatment was performed with OT secondary to complex medical condition of pt  Pt requires assist of two to achieve and maintain transitional movements; requiring the need of skilled therapeutic intervention of two therapists to achieve delivery of services  In comparison to previous sessions pt required slightly increased assistance with sit to stand transfers and ambulation  Pt able to ambulate an increased distance with use of RW on level surfaces  Post session pt was left seated in recliner in NAD, +chair alarm  Pt continues to be functioning below baseline level and remains limited secondary to decreased lower extremity strength, impaired balance, decreased endurance, gait deviations, and decreased functional mobility   Continue to recommend STR at time of discharge to maximize pt's functional independence and safety with mobility  PT will continue to see pt while here in order to address the deficits listed above and provide interventions consistent with the POC in effort to achieve short term goals  Barriers to Discharge: Inaccessible home environment, Decreased caregiver support     PT Discharge Recommendation: [address]     PT - OK to Discharge: Yes(when medically cleared, if to STR)    See flowsheet documentation for full assessment

## 2021-03-14 NOTE — CASE MANAGEMENT
[facility] accept  Requesting our team obtains ins auth   Cm to follow when all notes are available

## 2021-03-14 NOTE — TELEPHONE ENCOUNTER
Please re-schedule the patient - I do not accept general neurology patients in [facility]

## 2021-03-14 NOTE — PLAN OF CARE
Problem: Potential for Falls  Goal: Patient will remain free of falls  Description: INTERVENTIONS:  - Assess patient frequently for physical needs  -  Identify cognitive and physical deficits and behaviors that affect risk of falls    -  [place] fall precautions as indicated by assessment   - Educate patient/family on patient safety including physical limitations  - Instruct patient to call for assistance with activity based on assessment  - Modify environment to reduce risk of injury  - Consider OT/PT consult to assist with strengthening/mobility  Outcome: Progressing     Problem: Prexisting or High Potential for Compromised Skin Integrity  Goal: Skin integrity is maintained or improved  Description: INTERVENTIONS:  - Identify patients at risk for skin breakdown  - Assess and monitor skin integrity  - Assess and monitor nutrition and hydration status  - Monitor labs   - Assess for incontinence   - Turn and reposition patient  - Assist with mobility/ambulation  - Relieve pressure over bony prominences  - Avoid friction and shearing  - Provide appropriate hygiene as needed including keeping skin clean and dry  - Evaluate need for skin moisturizer/barrier cream  - Collaborate with interdisciplinary team   - Patient/family teaching  - Consider wound care consult   Outcome: Progressing     Problem: PAIN - ADULT  Goal: Verbalizes/displays adequate comfort level or baseline comfort level  Description: Interventions:  - Encourage patient to monitor pain and request assistance  - Assess pain using appropriate pain scale  - Administer analgesics based on type and severity of pain and evaluate response  - Implement non-pharmacological measures as appropriate and evaluate response  - Consider cultural and social influences on pain and pain management  - Notify physician/advanced practitioner if interventions unsuccessful or patient reports new pain  Outcome: Progressing     Problem: INFECTION - ADULT  Goal: Absence or prevention of progression during hospitalization  Description: INTERVENTIONS:  - Assess and monitor for signs and symptoms of infection  - Monitor lab/diagnostic results  - Monitor all insertion sites, i e  indwelling lines, tubes, and drains  - Monitor endotracheal if appropriate and nasal secretions for changes in amount and color  - [place] appropriate cooling/warming therapies per order  - Administer medications as ordered  - Instruct and encourage patient and family to use good hand hygiene technique  - Identify and instruct in appropriate isolation precautions for identified infection/condition  Outcome: Progressing     Problem: SAFETY ADULT  Goal: Patient will remain free of falls  Description: INTERVENTIONS:  - Assess patient frequently for physical needs  -  Identify cognitive and physical deficits and behaviors that affect risk of falls    -  [place] fall precautions as indicated by assessment   - Educate patient/family on patient safety including physical limitations  - Instruct patient to call for assistance with activity based on assessment  - Modify environment to reduce risk of injury  - Consider OT/PT consult to assist with strengthening/mobility  Outcome: Progressing  Goal: Maintain or return to baseline ADL function  Description: INTERVENTIONS:  -  Assess patient's ability to carry out ADLs; assess patient's baseline for ADL function and identify physical deficits which impact ability to perform ADLs (bathing, care of mouth/teeth, toileting, grooming, dressing, etc )  - Assess/evaluate cause of self-care deficits   - Assess range of motion  - Assess patient's mobility; develop plan if impaired  - Assess patient's need for assistive devices and provide as appropriate  - Encourage maximum independence but intervene and supervise when necessary  - Involve family in performance of ADLs  - Assess for home care needs following discharge   - Consider OT consult to assist with ADL evaluation and planning for discharge  - Provide patient education as appropriate  Outcome: Progressing  Goal: Maintain or return mobility status to optimal level  Description: INTERVENTIONS:  - Assess patient's baseline mobility status (ambulation, transfers, stairs, etc )    - Identify cognitive and physical deficits and behaviors that affect mobility  - Identify mobility aids required to assist with transfers and/or ambulation (gait belt, sit-to-stand, lift, walker, cane, etc )  - [place] fall precautions as indicated by assessment  - Record patient progress and toleration of activity level on Mobility SBAR; progress patient to next Phase/Stage  - Instruct patient to call for assistance with activity based on assessment  - Consider rehabilitation consult to assist with strengthening/weightbearing, etc   Outcome: Progressing     Problem: DISCHARGE PLANNING  Goal: Discharge to home or other facility with appropriate resources  Description: INTERVENTIONS:  - Identify barriers to discharge w/patient and caregiver  - Arrange for needed discharge resources and transportation as appropriate  - Identify discharge learning needs (meds, wound care, etc )  - Arrange for interpretive services to assist at discharge as needed  - Refer to Case Management Department for coordinating discharge planning if the patient needs post-hospital services based on physician/advanced practitioner order or complex needs related to functional status, cognitive ability, or social support system  Outcome: Progressing     Problem: Knowledge Deficit  Goal: Patient/family/caregiver demonstrates understanding of disease process, treatment plan, medications, and discharge instructions  Description: Complete learning assessment and assess knowledge base    Interventions:  - Provide teaching at level of understanding  - Provide teaching via preferred learning methods  Outcome: Progressing     Problem: CARDIOVASCULAR - ADULT  Goal: Maintains optimal cardiac output and hemodynamic stability  Description: INTERVENTIONS:  - Monitor I/O, vital signs and rhythm  - Monitor for S/S and trends of decreased cardiac output  - Administer and titrate ordered vasoactive medications to optimize hemodynamic stability  - Assess quality of pulses, skin color and temperature  - Assess for signs of decreased coronary artery perfusion  - Instruct patient to report change in severity of symptoms  Outcome: Progressing  Goal: Absence of cardiac dysrhythmias or at baseline rhythm  Description: INTERVENTIONS:  - Continuous cardiac monitoring, vital signs, obtain 12 lead EKG if ordered  - Administer antiarrhythmic and heart rate control medications as ordered  - Monitor electrolytes and administer replacement therapy as ordered  Outcome: Progressing     Problem: GENITOURINARY - ADULT  Goal: Maintains or returns to baseline urinary function  Description: INTERVENTIONS:  - Assess urinary function  - Encourage oral fluids to ensure adequate hydration if ordered  - Administer IV fluids as ordered to ensure adequate hydration  - Administer ordered medications as needed  - Offer frequent toileting  - Follow urinary retention protocol if ordered  Outcome: Progressing  Goal: Absence of urinary retention  Description: INTERVENTIONS:  - Assess patients ability to void and empty bladder  - Monitor I/O  - Bladder scan as needed  - Discuss with physician/AP medications to alleviate retention as needed  - Discuss catheterization for long term situations as appropriate  Outcome: Progressing  Goal: Urinary catheter remains patent  Description: INTERVENTIONS:  - Assess patency of urinary catheter  - If patient has a chronic nelson, consider changing catheter if non-functioning  - Follow guidelines for intermittent irrigation of non-functioning urinary catheter  Outcome: Progressing     Problem: METABOLIC, FLUID AND ELECTROLYTES - ADULT  Goal: Electrolytes maintained within normal limits  Description: INTERVENTIONS:  - Monitor labs and assess patient for signs and symptoms of electrolyte imbalances  - Administer electrolyte replacement as ordered  - Monitor response to electrolyte replacements, including repeat lab results as appropriate  - Instruct patient on fluid and nutrition as appropriate  Outcome: Progressing  Goal: Fluid balance maintained  Description: INTERVENTIONS:  - Monitor labs   - Monitor I/O and WT  - Instruct patient on fluid and nutrition as appropriate  - Assess for signs & symptoms of volume excess or deficit  Outcome: Progressing  Goal: Glucose maintained within target range  Description: INTERVENTIONS:  - Monitor Blood Glucose as ordered  - Assess for signs and symptoms of hyperglycemia and hypoglycemia  - Administer ordered medications to maintain glucose within target range  - Assess nutritional intake and initiate nutrition service referral as needed  Outcome: Progressing     Problem: METABOLIC, FLUID AND ELECTROLYTES - ADULT  Goal: Electrolytes maintained within normal limits  Description: INTERVENTIONS:  - Monitor labs and assess patient for signs and symptoms of electrolyte imbalances  - Administer electrolyte replacement as ordered  - Monitor response to electrolyte replacements, including repeat lab results as appropriate  - Instruct patient on fluid and nutrition as appropriate  Outcome: Progressing  Goal: Fluid balance maintained  Description: INTERVENTIONS:  - Monitor labs   - Monitor I/O and WT  - Instruct patient on fluid and nutrition as appropriate  - Assess for signs & symptoms of volume excess or deficit  Outcome: Progressing  Goal: Glucose maintained within target range  Description: INTERVENTIONS:  - Monitor Blood Glucose as ordered  - Assess for signs and symptoms of hyperglycemia and hypoglycemia  - Administer ordered medications to maintain glucose within target range  - Assess nutritional intake and initiate nutrition service referral as needed  Outcome: Progressing     Problem: SKIN/TISSUE INTEGRITY - ADULT  Goal: Skin integrity remains intact  Description: INTERVENTIONS  - Identify patients at risk for skin breakdown  - Assess and monitor skin integrity  - Assess and monitor nutrition and hydration status  - Monitor labs (i e  albumin)  - Assess for incontinence   - Turn and reposition patient  - Assist with mobility/ambulation  - Relieve pressure over bony prominences  - Avoid friction and shearing  - Provide appropriate hygiene as needed including keeping skin clean and dry  - Evaluate need for skin moisturizer/barrier cream  - Collaborate with interdisciplinary team (i e  Nutrition, Rehabilitation, etc )   - Patient/family teaching  Outcome: Progressing  Goal: Incision(s), wounds(s) or drain site(s) healing without S/S of infection  Description: INTERVENTIONS  - Assess and document risk factors for skin impairment   - Assess and document dressing, incision, wound bed, drain sites and surrounding tissue  - Consider nutrition services referral as needed  - Oral mucous membranes remain intact  - Provide patient/ family education  Outcome: Progressing  Goal: Oral mucous membranes remain intact  Description: INTERVENTIONS  - Assess oral mucosa and hygiene practices  - Implement preventative oral hygiene regimen  - Implement oral medicated treatments as ordered  - Initiate Nutrition services referral as needed  Outcome: Progressing     Problem: MUSCULOSKELETAL - ADULT  Goal: Maintain or return mobility to safest level of function  Description: INTERVENTIONS:  - Assess patient's ability to carry out ADLs; assess patient's baseline for ADL function and identify physical deficits which impact ability to perform ADLs (bathing, care of mouth/teeth, toileting, grooming, dressing, etc )  - Assess/evaluate cause of self-care deficits   - Assess range of motion  - Assess patient's mobility  - Assess patient's need for assistive devices and provide as appropriate  - Encourage maximum independence but intervene and supervise when necessary  - Involve family in performance of ADLs  - Assess for home care needs following discharge   - Consider OT consult to assist with ADL evaluation and planning for discharge  - Provide patient education as appropriate  Outcome: Progressing  Goal: Maintain proper alignment of affected body part  Description: INTERVENTIONS:  - Support, maintain and protect limb and body alignment  - Provide patient/ family with appropriate education  Outcome: Progressing

## 2021-03-15 ENCOUNTER — TELEPHONE (OUTPATIENT)
Dept: NEUROLOGY | Facility: CLINIC | Age: 81
End: 2021-03-15

## 2021-03-15 LAB
BACTERIA BLD CULT: NORMAL
FLUAV RNA RESP QL NAA+PROBE: NEGATIVE
FLUBV RNA RESP QL NAA+PROBE: NEGATIVE
RSV RNA RESP QL NAA+PROBE: NEGATIVE
SARS-COV-2 RNA RESP QL NAA+PROBE: NEGATIVE

## 2021-03-15 PROCEDURE — 0241U HB NFCT DS VIR RESP RNA 4 TRGT: CPT | Performed by: FAMILY MEDICINE

## 2021-03-15 PROCEDURE — 99233 SBSQ HOSP IP/OBS HIGH 50: CPT | Performed by: FAMILY MEDICINE

## 2021-03-15 RX ADMIN — NYSTATIN: 100000 POWDER TOPICAL at 09:41

## 2021-03-15 RX ADMIN — SERTRALINE HYDROCHLORIDE 150 MG: 50 TABLET ORAL at 09:40

## 2021-03-15 RX ADMIN — MICONAZOLE NITRATE 200 MG: 200 SUPPOSITORY VAGINAL at 21:41

## 2021-03-15 RX ADMIN — OXYCODONE HYDROCHLORIDE 5 MG: 5 TABLET ORAL at 18:02

## 2021-03-15 RX ADMIN — TRAZODONE HYDROCHLORIDE 100 MG: 100 TABLET ORAL at 21:33

## 2021-03-15 RX ADMIN — NYSTATIN: 100000 POWDER TOPICAL at 18:04

## 2021-03-15 RX ADMIN — MICONAZOLE NITRATE: KIT VAGINAL at 21:37

## 2021-03-15 RX ADMIN — APIXABAN 5 MG: 5 TABLET, FILM COATED ORAL at 17:59

## 2021-03-15 RX ADMIN — FLUTICASONE PROPIONATE 1 SPRAY: 50 SPRAY, METERED NASAL at 09:42

## 2021-03-15 RX ADMIN — AMLODIPINE BESYLATE 10 MG: 10 TABLET ORAL at 09:39

## 2021-03-15 RX ADMIN — LOSARTAN POTASSIUM 25 MG: 25 TABLET, FILM COATED ORAL at 09:40

## 2021-03-15 RX ADMIN — GABAPENTIN 600 MG: 300 CAPSULE ORAL at 21:33

## 2021-03-15 RX ADMIN — GABAPENTIN 600 MG: 300 CAPSULE ORAL at 09:39

## 2021-03-15 RX ADMIN — DOCUSATE SODIUM 100 MG: 100 CAPSULE, LIQUID FILLED ORAL at 09:39

## 2021-03-15 RX ADMIN — ACETAMINOPHEN 650 MG: 325 TABLET ORAL at 06:41

## 2021-03-15 RX ADMIN — NYSTATIN: 100000 CREAM TOPICAL at 18:03

## 2021-03-15 RX ADMIN — NYSTATIN: 100000 CREAM TOPICAL at 09:41

## 2021-03-15 RX ADMIN — PANTOPRAZOLE SODIUM 40 MG: 40 TABLET, DELAYED RELEASE ORAL at 06:41

## 2021-03-15 RX ADMIN — OXYCODONE HYDROCHLORIDE 5 MG: 5 TABLET ORAL at 09:56

## 2021-03-15 RX ADMIN — APIXABAN 5 MG: 5 TABLET, FILM COATED ORAL at 09:39

## 2021-03-15 RX ADMIN — TAMSULOSIN HYDROCHLORIDE 0.4 MG: 0.4 CAPSULE ORAL at 17:58

## 2021-03-15 RX ADMIN — CEFTRIAXONE SODIUM 1000 MG: 10 INJECTION, POWDER, FOR SOLUTION INTRAVENOUS at 06:41

## 2021-03-15 RX ADMIN — DOCUSATE SODIUM 100 MG: 100 CAPSULE, LIQUID FILLED ORAL at 17:58

## 2021-03-15 RX ADMIN — LATANOPROST 1 DROP: 50 SOLUTION OPHTHALMIC at 21:33

## 2021-03-15 RX ADMIN — GABAPENTIN 600 MG: 300 CAPSULE ORAL at 17:59

## 2021-03-15 NOTE — ASSESSMENT & PLAN NOTE
Frontal lobe dementia  Supportive care  Cont zolfot (increased dose to 150mg )and trazodone  Pt found to be screaming in her room- cont current meds  May need 1:1 if behavior worsens

## 2021-03-15 NOTE — CASE MANAGEMENT
Cm called [facility] at [phone] and spoke to Per Zambrano  Cm requested authorization for inpatient STR  Cm provided facility's NPI [id number], their MD Dr Rubina Gates, and his NPI [id number]  Per Zambrano reported that the [facility] number is [id number] and is pending  Anticipated wait time is 24-72 hrs  They will call if clinicals are needed  If they are, they will need H&P, PT/OT notes, and the most recent SLIM note  Antwon TT SLIM for covid test  Cm department will continue to follow patient through discharge

## 2021-03-15 NOTE — CASE MANAGEMENT
Cm attempted to call patient's dtr Haven Epley to complete PASRR  However, Haven Epley did not answer, so Cm left a message requesting a call back  Per [employer], patient will need to be optioned due to having an inpatient [facility] stay within the last two years  Cm department will continue to follow patient through discharge

## 2021-03-15 NOTE — ASSESSMENT & PLAN NOTE
- start nystatin cream in the groin  - some scratch marks vs fungal lesions around labia which may be contributing to dysuria  - nystatin powder in the abd skin folds  - also started on monistat

## 2021-03-15 NOTE — ASSESSMENT & PLAN NOTE
/73   Pulse 60   Temp 98 2 °F (36 8 °C)   Resp 19   Ht 5' 6" (1 676 m)   Wt 115 kg (253 lb 8 5 oz)   LMP  (LMP Unknown)   SpO2 98%   BMI 40 92 kg/m²   Continue home losartan and amlodipine  BP controlled

## 2021-03-15 NOTE — PLAN OF CARE
Problem: Potential for Falls  Goal: Patient will remain free of falls  Description: INTERVENTIONS:  - Assess patient frequently for physical needs  -  Identify cognitive and physical deficits and behaviors that affect risk of falls    -  [place] fall precautions as indicated by assessment   - Educate patient/family on patient safety including physical limitations  - Instruct patient to call for assistance with activity based on assessment  - Modify environment to reduce risk of injury  - Consider OT/PT consult to assist with strengthening/mobility  Outcome: Progressing     Problem: Prexisting or High Potential for Compromised Skin Integrity  Goal: Skin integrity is maintained or improved  Description: INTERVENTIONS:  - Identify patients at risk for skin breakdown  - Assess and monitor skin integrity  - Assess and monitor nutrition and hydration status  - Monitor labs   - Assess for incontinence   - Turn and reposition patient  - Assist with mobility/ambulation  - Relieve pressure over bony prominences  - Avoid friction and shearing  - Provide appropriate hygiene as needed including keeping skin clean and dry  - Evaluate need for skin moisturizer/barrier cream  - Collaborate with interdisciplinary team   - Patient/family teaching  - Consider wound care consult   Outcome: Progressing     Problem: PAIN - ADULT  Goal: Verbalizes/displays adequate comfort level or baseline comfort level  Description: Interventions:  - Encourage patient to monitor pain and request assistance  - Assess pain using appropriate pain scale  - Administer analgesics based on type and severity of pain and evaluate response  - Implement non-pharmacological measures as appropriate and evaluate response  - Consider cultural and social influences on pain and pain management  - Notify physician/advanced practitioner if interventions unsuccessful or patient reports new pain  Outcome: Progressing     Problem: INFECTION - ADULT  Goal: Absence or prevention of progression during hospitalization  Description: INTERVENTIONS:  - Assess and monitor for signs and symptoms of infection  - Monitor lab/diagnostic results  - Monitor all insertion sites, i e  indwelling lines, tubes, and drains  - Monitor endotracheal if appropriate and nasal secretions for changes in amount and color  - [place] appropriate cooling/warming therapies per order  - Administer medications as ordered  - Instruct and encourage patient and family to use good hand hygiene technique  - Identify and instruct in appropriate isolation precautions for identified infection/condition  Outcome: Progressing     Problem: SAFETY ADULT  Goal: Patient will remain free of falls  Description: INTERVENTIONS:  - Assess patient frequently for physical needs  -  Identify cognitive and physical deficits and behaviors that affect risk of falls    -  [place] fall precautions as indicated by assessment   - Educate patient/family on patient safety including physical limitations  - Instruct patient to call for assistance with activity based on assessment  - Modify environment to reduce risk of injury  - Consider OT/PT consult to assist with strengthening/mobility  Outcome: Progressing  Goal: Maintain or return to baseline ADL function  Description: INTERVENTIONS:  -  Assess patient's ability to carry out ADLs; assess patient's baseline for ADL function and identify physical deficits which impact ability to perform ADLs (bathing, care of mouth/teeth, toileting, grooming, dressing, etc )  - Assess/evaluate cause of self-care deficits   - Assess range of motion  - Assess patient's mobility; develop plan if impaired  - Assess patient's need for assistive devices and provide as appropriate  - Encourage maximum independence but intervene and supervise when necessary  - Involve family in performance of ADLs  - Assess for home care needs following discharge   - Consider OT consult to assist with ADL evaluation and planning for discharge  - Provide patient education as appropriate  Outcome: Progressing  Goal: Maintain or return mobility status to optimal level  Description: INTERVENTIONS:  - Assess patient's baseline mobility status (ambulation, transfers, stairs, etc )    - Identify cognitive and physical deficits and behaviors that affect mobility  - Identify mobility aids required to assist with transfers and/or ambulation (gait belt, sit-to-stand, lift, walker, cane, etc )  - [place] fall precautions as indicated by assessment  - Record patient progress and toleration of activity level on Mobility SBAR; progress patient to next Phase/Stage  - Instruct patient to call for assistance with activity based on assessment  - Consider rehabilitation consult to assist with strengthening/weightbearing, etc   Outcome: Progressing     Problem: DISCHARGE PLANNING  Goal: Discharge to home or other facility with appropriate resources  Description: INTERVENTIONS:  - Identify barriers to discharge w/patient and caregiver  - Arrange for needed discharge resources and transportation as appropriate  - Identify discharge learning needs (meds, wound care, etc )  - Arrange for interpretive services to assist at discharge as needed  - Refer to Case Management Department for coordinating discharge planning if the patient needs post-hospital services based on physician/advanced practitioner order or complex needs related to functional status, cognitive ability, or social support system  Outcome: Progressing     Problem: Knowledge Deficit  Goal: Patient/family/caregiver demonstrates understanding of disease process, treatment plan, medications, and discharge instructions  Description: Complete learning assessment and assess knowledge base    Interventions:  - Provide teaching at level of understanding  - Provide teaching via preferred learning methods  Outcome: Progressing     Problem: CARDIOVASCULAR - ADULT  Goal: Maintains optimal cardiac output and hemodynamic stability  Description: INTERVENTIONS:  - Monitor I/O, vital signs and rhythm  - Monitor for S/S and trends of decreased cardiac output  - Administer and titrate ordered vasoactive medications to optimize hemodynamic stability  - Assess quality of pulses, skin color and temperature  - Assess for signs of decreased coronary artery perfusion  - Instruct patient to report change in severity of symptoms  Outcome: Progressing  Goal: Absence of cardiac dysrhythmias or at baseline rhythm  Description: INTERVENTIONS:  - Continuous cardiac monitoring, vital signs, obtain 12 lead EKG if ordered  - Administer antiarrhythmic and heart rate control medications as ordered  - Monitor electrolytes and administer replacement therapy as ordered  Outcome: Progressing     Problem: RESPIRATORY - ADULT  Goal: Achieves optimal ventilation and oxygenation  Description: INTERVENTIONS:  - Assess for changes in respiratory status  - Assess for changes in mentation and behavior  - Position to facilitate oxygenation and minimize respiratory effort  - Oxygen administered by appropriate delivery if ordered  - Initiate smoking cessation education as indicated  - Encourage broncho-pulmonary hygiene including cough, deep breathe, Incentive Spirometry  - Assess the need for suctioning and aspirate as needed  - Assess and instruct to report SOB or any respiratory difficulty  - Respiratory Therapy support as indicated  Outcome: Progressing     Problem: GENITOURINARY - ADULT  Goal: Maintains or returns to baseline urinary function  Description: INTERVENTIONS:  - Assess urinary function  - Encourage oral fluids to ensure adequate hydration if ordered  - Administer IV fluids as ordered to ensure adequate hydration  - Administer ordered medications as needed  - Offer frequent toileting  - Follow urinary retention protocol if ordered  Outcome: Progressing  Goal: Absence of urinary retention  Description: INTERVENTIONS:  - Assess patients ability to void and empty bladder  - Monitor I/O  - Bladder scan as needed  - Discuss with physician/AP medications to alleviate retention as needed  - Discuss catheterization for long term situations as appropriate  Outcome: Progressing  Goal: Urinary catheter remains patent  Description: INTERVENTIONS:  - Assess patency of urinary catheter  - If patient has a chronic nelson, consider changing catheter if non-functioning  - Follow guidelines for intermittent irrigation of non-functioning urinary catheter  Outcome: Progressing     Problem: METABOLIC, FLUID AND ELECTROLYTES - ADULT  Goal: Electrolytes maintained within normal limits  Description: INTERVENTIONS:  - Monitor labs and assess patient for signs and symptoms of electrolyte imbalances  - Administer electrolyte replacement as ordered  - Monitor response to electrolyte replacements, including repeat lab results as appropriate  - Instruct patient on fluid and nutrition as appropriate  Outcome: Progressing  Goal: Fluid balance maintained  Description: INTERVENTIONS:  - Monitor labs   - Monitor I/O and WT  - Instruct patient on fluid and nutrition as appropriate  - Assess for signs & symptoms of volume excess or deficit  Outcome: Progressing  Goal: Glucose maintained within target range  Description: INTERVENTIONS:  - Monitor Blood Glucose as ordered  - Assess for signs and symptoms of hyperglycemia and hypoglycemia  - Administer ordered medications to maintain glucose within target range  - Assess nutritional intake and initiate nutrition service referral as needed  Outcome: Progressing     Problem: SKIN/TISSUE INTEGRITY - ADULT  Goal: Skin integrity remains intact  Description: INTERVENTIONS  - Identify patients at risk for skin breakdown  - Assess and monitor skin integrity  - Assess and monitor nutrition and hydration status  - Monitor labs (i e  albumin)  - Assess for incontinence   - Turn and reposition patient  - Assist with mobility/ambulation  - Relieve pressure over bony prominences  - Avoid friction and shearing  - Provide appropriate hygiene as needed including keeping skin clean and dry  - Evaluate need for skin moisturizer/barrier cream  - Collaborate with interdisciplinary team (i e  Nutrition, Rehabilitation, etc )   - Patient/family teaching  Outcome: Progressing  Goal: Incision(s), wounds(s) or drain site(s) healing without S/S of infection  Description: INTERVENTIONS  - Assess and document risk factors for skin impairment   - Assess and document dressing, incision, wound bed, drain sites and surrounding tissue  - Consider nutrition services referral as needed  - Oral mucous membranes remain intact  - Provide patient/ family education  Outcome: Progressing  Goal: Oral mucous membranes remain intact  Description: INTERVENTIONS  - Assess oral mucosa and hygiene practices  - Implement preventative oral hygiene regimen  - Implement oral medicated treatments as ordered  - Initiate Nutrition services referral as needed  Outcome: Progressing     Problem: MUSCULOSKELETAL - ADULT  Goal: Maintain or return mobility to safest level of function  Description: INTERVENTIONS:  - Assess patient's ability to carry out ADLs; assess patient's baseline for ADL function and identify physical deficits which impact ability to perform ADLs (bathing, care of mouth/teeth, toileting, grooming, dressing, etc )  - Assess/evaluate cause of self-care deficits   - Assess range of motion  - Assess patient's mobility  - Assess patient's need for assistive devices and provide as appropriate  - Encourage maximum independence but intervene and supervise when necessary  - Involve family in performance of ADLs  - Assess for home care needs following discharge   - Consider OT consult to assist with ADL evaluation and planning for discharge  - Provide patient education as appropriate  Outcome: Progressing  Goal: Maintain proper alignment of affected body part  Description: INTERVENTIONS:  - Support, maintain and protect limb and body alignment  - Provide patient/ family with appropriate education  Outcome: Progressing

## 2021-03-15 NOTE — TELEPHONE ENCOUNTER
LMOM in reference to pt scheduled appointment with Dr Benji Werner on 3/18/21 at 8 am  Dr Benji Werner has reviewed pt records and feels pt will benefit more from our [facility] evaluation  An additional my chart message has been sent to pt with recommendation and contact information

## 2021-03-15 NOTE — CASE MANAGEMENT
Cm received call back from Dana who assisted with the PASRR  Patient had an inpatient [facility] stay in May of 2020  Dtr reported that she did not feel as though this information was important during the initial assessment, because the patient should not have been hospitalized  Dtr reported that she was there for med adjustment  Patient will need to be optioned  Cm department will continue to follow patient through discharge

## 2021-03-15 NOTE — PROGRESS NOTES
[facility]  Progress Note - Chely Ardon 1940, 80 y o  female MRN: [id number]  Unit/Bed#: -01 Encounter: [id number]  Primary Care Provider: LUCY Bacon   Date and time admitted to hospital: 3/10/2021 11:12 AM    UTI (urinary tract infection)  Assessment & Plan  - h o ESBL and enterococcus in 2018- colonies <100,000 but pt symptomatic  - UA this admission is also (+) for UTI    - UC: Ecoli sensitive to ceftriaxone  - cont ceftriaxone day #5/5  - BC no growth  - this could be contributing to her amb dysfunction    Fungal infection of the groin  Assessment & Plan  - start nystatin cream in the groin  - some scratch marks vs fungal lesions around labia which may be contributing to dysuria  - nystatin powder in the abd skin folds  - also started on monistat    Dementia ([facility] [address] )  Assessment & Plan  Frontal lobe dementia  Supportive care  Cont zolfot (increased dose to 150mg )and trazodone  Pt found to be screaming in her room- cont current meds  May need 1:1 if behavior worsens    Chronic pain  Assessment & Plan  Chronic pain  Continue outpatient pain medications    Moderate episode of recurrent major depressive disorder ([facility] [address] )  Assessment & Plan  Continue home Zoloft and trazodone    COPD (chronic obstructive pulmonary disease) ([facility] [address] )  Assessment & Plan  Not in acute exacerbation  Continue albuterol p r n      Hypertension  Assessment & Plan  /73   Pulse 60   Temp 98 2 °F (36 8 °C)   Resp 19   Ht 5' 6" (1 676 m)   Wt 115 kg (253 lb 8 5 oz)   LMP  (LMP Unknown)   SpO2 98%   BMI 40 92 kg/m²   Continue home losartan and amlodipine  BP controlled    Atrial fibrillation (HCC)  Assessment & Plan  Currently rate controlled  Continue home Eliquis    * Generalized weakness  Assessment & Plan  Presented with generalized weakness  She has been struggling to get sore outpatient appointments due to difficulty ambulating and poor transportation  PT/OT  Case management follow-up        VTE Pharmacologic Prophylaxis:   Pharmacologic: Apixaban (Eliquis)  Mechanical VTE Prophylaxis in Place: Yes    Patient Centered Rounds: I have performed bedside rounds with nursing staff today  Discussions with Specialists or Other Care Team Provider: n/a    Education and Discussions with Family / Patient: yes, family (dtr) has been kept uptodate about the care and the management plans  All questions have been answered daily and to satisfaction  Time Spent for Care: 20 minutes  More than 50% of total time spent on counseling and coordination of care as described above  Current Length of Stay: 4 day(s)    Current Patient Status: Inpatient   Certification Statement: The patient will continue to require additional inpatient hospital stay due to placement pending    Discharge Plan: pending placement    Code Status: Level 1 - Full Code      Subjective:   Seen and examined at bedside  No new complaints  No chest pain or sob      Objective:     Vitals:   Temp (24hrs), Av 2 °F (36 8 °C), Min:98 2 °F (36 8 °C), Max:98 2 °F (36 8 °C)    Temp:  [98 2 °F (36 8 °C)] 98 2 °F (36 8 °C)  HR:  [60-64] 60  Resp:  [18-19] 19  BP: (135-144)/(73-86) 144/73  SpO2:  [96 %-98 %] 98 %  Body mass index is 40 92 kg/m²  Input and Output Summary (last 24 hours):        Intake/Output Summary (Last 24 hours) at 3/15/2021 1259  Last data filed at 3/15/2021 0926  Gross per 24 hour   Intake 488 ml   Output 1300 ml   Net -812 ml       Physical Exam:     Physical Exam  S1,2 (+) R  Lungs CTA  Morbid obesity  NC AT  A&Ox3, coperative  Oral mucosa moist  Redness in the groin around the thighs improved  Continues to have redness and pain around the labia- started on monistat    Additional Data:     Labs:    Results from last 7 days   Lab Units 21  0528 21  0530   WBC Thousand/uL 5 78 6 40   HEMOGLOBIN g/dL 11 7 11 7   HEMATOCRIT % 38 1 37 6   PLATELETS Thousands/uL 184 185   NEUTROS PCT %  --  56   LYMPHS PCT %  --  30   MONOS PCT %  --  8   EOS PCT %  --  4     Results from last 7 days   Lab Units 03/14/21  0528  03/10/21  1212   SODIUM mmol/L 141   < > 143   POTASSIUM mmol/L 3 9   < > 4 1   CHLORIDE mmol/L 106   < > 105   CO2 mmol/L 28   < > 27   BUN mg/dL 18   < > 17   CREATININE mg/dL 0 83   < > 0 81   ANION GAP mmol/L 7   < > 11   CALCIUM mg/dL 9 3   < > 9 5   ALBUMIN g/dL  --   --  2 9*   TOTAL BILIRUBIN mg/dL  --   --  0 31   ALK PHOS U/L  --   --  68   ALT U/L  --   --  15   AST U/L  --   --  14   GLUCOSE RANDOM mg/dL 108   < > 99    < > = values in this interval not displayed  Results from last 7 days   Lab Units 03/10/21  1450   LACTIC ACID mmol/L 0 9           * I Have Reviewed All Lab Data Listed Above  * Additional Pertinent Lab Tests Reviewed: All Labs Within Last 24 Hours Reviewed    Imaging:    Imaging Reports Reviewed Today Include: na  Imaging Personally Reviewed by Myself Includes:  na    Recent Cultures (last 7 days):     Results from last 7 days   Lab Units 03/10/21  1515 03/10/21  1502 03/10/21  1450   BLOOD CULTURE   --  No Growth After 4 Days  No Growth After 4 Days     URINE CULTURE  >100,000 cfu/ml Escherichia coli*  --   --        Last 24 Hours Medication List:   Current Facility-Administered Medications   Medication Dose Route Frequency Provider Last Rate    acetaminophen  650 mg Oral Q6H PRN Cristhian Echols MD      amLODIPine  10 mg Oral Daily Cristhian Echols MD      apixaban  5 mg Oral BID Cristhian Echols MD      docusate sodium  100 mg Oral BID Cristhian Echols MD      fluticasone  1 spray Nasal Daily Cristhian Echols MD      gabapentin  600 mg Oral TID Cristhian Echols MD      latanoprost  1 drop Both Eyes HS Cristhian Echols MD      losartan  25 mg Oral Daily Cristhian Echols MD      miconazole   Topical BID PRN Naomi Boudreaux MD      And    miconazole  200 mg Vaginal HS Naomi Boudreaux MD      nystatin   Topical BID Naomi Boudreaux MD      nystatin   Topical BID MD Ralph Noyola oxyCODONE  5 mg Oral Q6H PRN Stacy Moser MD      pantoprazole  40 mg Oral Daily Before Breakfast Gatito Brooks MD      sertraline  150 mg Oral Daily Stacy Moser MD      tamsulosin  0 4 mg Oral Daily With Cheyenne Monge MD      traZODone  100 mg Oral HS Gatito Brooks MD          Today, Patient Was Seen By: Stacy Moser MD    ** Please Note: Dictation voice to text software may have been used in the creation of this document   **

## 2021-03-16 LAB — BACTERIA BLD CULT: NORMAL

## 2021-03-16 PROCEDURE — 99232 SBSQ HOSP IP/OBS MODERATE 35: CPT | Performed by: INTERNAL MEDICINE

## 2021-03-16 PROCEDURE — 97530 THERAPEUTIC ACTIVITIES: CPT

## 2021-03-16 PROCEDURE — 97110 THERAPEUTIC EXERCISES: CPT

## 2021-03-16 PROCEDURE — 97535 SELF CARE MNGMENT TRAINING: CPT

## 2021-03-16 RX ADMIN — APIXABAN 5 MG: 5 TABLET, FILM COATED ORAL at 17:03

## 2021-03-16 RX ADMIN — ACETAMINOPHEN 650 MG: 325 TABLET ORAL at 13:35

## 2021-03-16 RX ADMIN — APIXABAN 5 MG: 5 TABLET, FILM COATED ORAL at 08:04

## 2021-03-16 RX ADMIN — TRAZODONE HYDROCHLORIDE 100 MG: 100 TABLET ORAL at 21:51

## 2021-03-16 RX ADMIN — NYSTATIN: 100000 POWDER TOPICAL at 17:04

## 2021-03-16 RX ADMIN — AMLODIPINE BESYLATE 10 MG: 10 TABLET ORAL at 08:04

## 2021-03-16 RX ADMIN — GABAPENTIN 600 MG: 300 CAPSULE ORAL at 21:51

## 2021-03-16 RX ADMIN — FLUTICASONE PROPIONATE 1 SPRAY: 50 SPRAY, METERED NASAL at 08:05

## 2021-03-16 RX ADMIN — NYSTATIN: 100000 POWDER TOPICAL at 08:09

## 2021-03-16 RX ADMIN — MICONAZOLE NITRATE 200 MG: 200 SUPPOSITORY VAGINAL at 21:51

## 2021-03-16 RX ADMIN — LOSARTAN POTASSIUM 25 MG: 25 TABLET, FILM COATED ORAL at 08:04

## 2021-03-16 RX ADMIN — GABAPENTIN 600 MG: 300 CAPSULE ORAL at 17:03

## 2021-03-16 RX ADMIN — DOCUSATE SODIUM 100 MG: 100 CAPSULE, LIQUID FILLED ORAL at 08:04

## 2021-03-16 RX ADMIN — SERTRALINE HYDROCHLORIDE 150 MG: 50 TABLET ORAL at 08:04

## 2021-03-16 RX ADMIN — MICONAZOLE NITRATE: KIT VAGINAL at 21:52

## 2021-03-16 RX ADMIN — OXYCODONE HYDROCHLORIDE 5 MG: 5 TABLET ORAL at 06:58

## 2021-03-16 RX ADMIN — GABAPENTIN 600 MG: 300 CAPSULE ORAL at 08:04

## 2021-03-16 RX ADMIN — NYSTATIN: 100000 CREAM TOPICAL at 08:08

## 2021-03-16 RX ADMIN — NYSTATIN: 100000 CREAM TOPICAL at 17:04

## 2021-03-16 RX ADMIN — OXYCODONE HYDROCHLORIDE 5 MG: 5 TABLET ORAL at 13:20

## 2021-03-16 RX ADMIN — TAMSULOSIN HYDROCHLORIDE 0.4 MG: 0.4 CAPSULE ORAL at 17:03

## 2021-03-16 RX ADMIN — OXYCODONE HYDROCHLORIDE 5 MG: 5 TABLET ORAL at 21:51

## 2021-03-16 RX ADMIN — LATANOPROST 1 DROP: 50 SOLUTION OPHTHALMIC at 22:06

## 2021-03-16 RX ADMIN — MICONAZOLE NITRATE: KIT VAGINAL at 08:08

## 2021-03-16 RX ADMIN — OXYCODONE HYDROCHLORIDE 5 MG: 5 TABLET ORAL at 00:10

## 2021-03-16 RX ADMIN — DOCUSATE SODIUM 100 MG: 100 CAPSULE, LIQUID FILLED ORAL at 17:03

## 2021-03-16 RX ADMIN — PANTOPRAZOLE SODIUM 40 MG: 40 TABLET, DELAYED RELEASE ORAL at 06:58

## 2021-03-16 NOTE — CASE MANAGEMENT
Cm met with SLIM  SLIM signed the MA-51  Cm faxed optioning paperwork to [employer] at [phone]  Cm department will continue to follow patient through discharge

## 2021-03-16 NOTE — CASE MANAGEMENT
Cm completed optioning paperwork, including the PASRR, MA-51, and printing all required documents  Cm TT SLIM and asked if he could sign the MA-51  SLIM agreeable and will sign shortly  Cm to fax to [employer] after obtaining SLLINA's signature  Cm department will continue to follow patient through discharge

## 2021-03-16 NOTE — PROGRESS NOTES
[address]  Progress Note Rita Holiday 1940, 80 y o  female MRN: [id number]  Unit/Bed#: -eDlmi Encounter: [id number]  Primary Care Provider: LUCY Manjarrez   Date and time admitted to hospital: 3/10/2021 11:12 AM    * Generalized weakness  Assessment & Plan  Presented with generalized weakness  She has been struggling to get sore outpatient appointments due to difficulty ambulating and poor transportation  PT/OT recommending rehab  Case management follow-up    Fungal infection of the groin  Assessment & Plan  - start nystatin cream in the groin  - some scratch marks vs fungal lesions around labia which may be contributing to dysuria  - nystatin powder in the abd skin folds  - also started on monistat    UTI (urinary tract infection)  Assessment & Plan  - h o ESBL and enterococcus in 2018- colonies <100,000 but pt symptomatic  - UA this admission is also (+) for UTI    - UC: Ecoli sensitive to ceftriaxone  Completed antibiotics    Dementia ([facility] [address] )  Assessment & Plan  Frontal lobe dementia  Supportive care  Cont zolfot (increased dose to 150mg )and trazodone      Chronic pain  Assessment & Plan  Chronic pain  Continue outpatient pain medications    Moderate episode of recurrent major depressive disorder ([facility] [address] )  Assessment & Plan  Continue home Zoloft and trazodone    COPD (chronic obstructive pulmonary disease) ([facility] [address] )  Assessment & Plan  Not in acute exacerbation  Continue albuterol p r n  Hypertension  Assessment & Plan  /71   Pulse 60   Temp (!) 97 4 °F (36 3 °C)   Resp 18   Ht 5' 6" (1 676 m)   Wt 116 kg (256 lb 9 9 oz)   LMP  (LMP Unknown)   SpO2 98%   BMI 41 42 kg/m²   Continue home losartan and amlodipine  BP controlled    Atrial fibrillation (HCC)  Assessment & Plan  Currently rate controlled  Continue home Eliquis        VTE Pharmacologic Prophylaxis:   Pharmacologic: Apixaban (Eliquis)  Mechanical VTE Prophylaxis in Place: Yes    Patient Centered Rounds:  I have performed bedside rounds with nursing staff today  Discussions with Specialists or Other Care Team Provider: cm, nursing    Education and Discussions with Family / Patient: pt    Time Spent for Care: 30 minutes  More than 50% of total time spent on counseling and coordination of care as described above  Current Length of Stay: 5 day(s)    Current Patient Status: Inpatient   Certification Statement: The patient will continue to require additional inpatient hospital stay due to Medically stable for discharge awaiting placement    Discharge Plan:  See above    Code Status: Level 1 - Full Code      Subjective:   No acute overnight events  No acute complaints    Objective:     Vitals:   Temp (24hrs), Av 1 °F (36 7 °C), Min:97 4 °F (36 3 °C), Max:99 3 °F (37 4 °C)    Temp:  [97 4 °F (36 3 °C)-99 3 °F (37 4 °C)] 97 4 °F (36 3 °C)  HR:  [60-70] 60  Resp:  [17-18] 18  BP: (141-154)/(57-76) 141/71  SpO2:  [97 %-98 %] 98 %  Body mass index is 41 42 kg/m²  Input and Output Summary (last 24 hours): Intake/Output Summary (Last 24 hours) at 3/16/2021 0930  Last data filed at 3/16/2021 0818  Gross per 24 hour   Intake 540 ml   Output 2300 ml   Net -1760 ml       Physical Exam:     Physical Exam  Constitutional:       General: She is not in acute distress  Appearance: She is well-developed  She is not diaphoretic  HENT:      Head: Normocephalic and atraumatic  Nose: Nose normal       Mouth/Throat:      Pharynx: No oropharyngeal exudate  Eyes:      General: No scleral icterus  Right eye: No discharge  Left eye: No discharge  Conjunctiva/sclera: Conjunctivae normal    Neck:      Musculoskeletal: Normal range of motion and neck supple  Thyroid: No thyromegaly  Vascular: No JVD  Cardiovascular:      Rate and Rhythm: Normal rate and regular rhythm  Heart sounds: Normal heart sounds  No murmur  No friction rub  No gallop      Pulmonary:      Effort: Pulmonary effort is normal  No respiratory distress  Breath sounds: Normal breath sounds  No wheezing or rales  Chest:      Chest wall: No tenderness  Abdominal:      General: Bowel sounds are normal  There is no distension  Palpations: Abdomen is soft  Tenderness: There is no abdominal tenderness  There is no guarding or rebound  Musculoskeletal: Normal range of motion  General: No tenderness or deformity  Skin:     General: Skin is warm and dry  Findings: No erythema or rash  Neurological:      Mental Status: She is alert  Mental status is at baseline  Cranial Nerves: No cranial nerve deficit  Sensory: No sensory deficit  Motor: No abnormal muscle tone  Coordination: Coordination normal            Additional Data:     Labs:    Results from last 7 days   Lab Units 03/14/21  0528 03/11/21  0530   WBC Thousand/uL 5 78 6 40   HEMOGLOBIN g/dL 11 7 11 7   HEMATOCRIT % 38 1 37 6   PLATELETS Thousands/uL 184 185   NEUTROS PCT %  --  56   LYMPHS PCT %  --  30   MONOS PCT %  --  8   EOS PCT %  --  4     Results from last 7 days   Lab Units 03/14/21  0528  03/10/21  1212   SODIUM mmol/L 141   < > 143   POTASSIUM mmol/L 3 9   < > 4 1   CHLORIDE mmol/L 106   < > 105   CO2 mmol/L 28   < > 27   BUN mg/dL 18   < > 17   CREATININE mg/dL 0 83   < > 0 81   ANION GAP mmol/L 7   < > 11   CALCIUM mg/dL 9 3   < > 9 5   ALBUMIN g/dL  --   --  2 9*   TOTAL BILIRUBIN mg/dL  --   --  0 31   ALK PHOS U/L  --   --  68   ALT U/L  --   --  15   AST U/L  --   --  14   GLUCOSE RANDOM mg/dL 108   < > 99    < > = values in this interval not displayed  Results from last 7 days   Lab Units 03/10/21  1450   LACTIC ACID mmol/L 0 9           * I Have Reviewed All Lab Data Listed Above  * Additional Pertinent Lab Tests Reviewed:  All Labs Within Last 24 Hours Reviewed    Imaging:    Imaging Reports Reviewed Today Include: na  Imaging Personally Reviewed by Myself Includes:  na    Recent Cultures (last 7 days):     Results from last 7 days   Lab Units 03/10/21  1515 03/10/21  1502 03/10/21  1450   BLOOD CULTURE   --  No Growth After 5 Days  No Growth After 5 Days  URINE CULTURE  >100,000 cfu/ml Escherichia coli*  --   --        Last 24 Hours Medication List:   Current Facility-Administered Medications   Medication Dose Route Frequency Provider Last Rate    acetaminophen  650 mg Oral Q6H PRN Cristhian Echols MD      amLODIPine  10 mg Oral Daily Cristhian Echols MD      apixaban  5 mg Oral BID Cristhian Echols MD      docusate sodium  100 mg Oral BID Cristhian Echols MD      fluticasone  1 spray Nasal Daily Cristhian Echols MD      gabapentin  600 mg Oral TID Cristhian Echols MD      latanoprost  1 drop Both Eyes HS Cristhian Echols MD      losartan  25 mg Oral Daily Cristhian Echols MD      miconazole   Topical BID PRN Willian Pressley MD      And    miconazole  200 mg Vaginal HS Willian Pressley MD      nystatin   Topical BID Willian Pressley MD      nystatin   Topical BID Willian Pressley MD      oxyCODONE  5 mg Oral Q6H PRN Willian Pressley MD      pantoprazole  40 mg Oral Daily Before Breakfast Gus Hollis MD      sertraline  150 mg Oral Daily Willian Pressley MD      tamsulosin  0 4 mg Oral Daily With Brianne Dow MD      traZODone  100 mg Oral HS Gus Hollis MD          Today, Patient Was Seen By: Gus Hollis MD    ** Please Note: Dictation voice to text software may have been used in the creation of this document   **

## 2021-03-16 NOTE — PLAN OF CARE
Problem: OCCUPATIONAL THERAPY ADULT  Goal: Performs self-care activities at highest level of function for planned discharge setting  See evaluation for individualized goals  Description: Treatment Interventions: ADL retraining, Functional transfer training, UE strengthening/ROM, Endurance training, Patient/family training, Equipment evaluation/education, Compensatory technique education, Energy conservation, Activityengagement, Continued evaluation          See flowsheet documentation for full assessment, interventions and recommendations  Note: Limitation: Decreased ADL status, Decreased UE ROM, Decreased UE strength, Decreased endurance, Decreased self-care trans, Decreased high-level ADLs  Prognosis: Good  Assessment: Patient participated in Skilled OT session this date with interventions consisting of ADL re training with the use of correct body mechnaics, Energy Conservation techniques, Work simplification skills , safety awareness and fall prevention techniques,  therapeutic activities to: increase activity tolerance, increase dynamic sit/ stand balance during functional activity , increase postural control and increase OOB/ sitting tolerance   Patient agreeable to OT treatment session, upon arrival patient was found supine in bed, alert, responsive  and in no apparent distress  Patient requires moderate assist for UB ADLs, max assist for LB ADLs, please refer to flow sheet for detailed performance  Patient is very motivated to participate in therapy "I want to walk again"  Patient requiring verbal cues for safety, verbal cues for correct technique, one step directives and frequent rest periods  Presents with decreased activity tolerance, dynamic sitting balance deficit, poor sitting and standing tolerance time to complete functional activities  Overall endurance deficit and limited UE muscle strength   Patient continues to be functioning below baseline level, occupational performance remains limited secondary to factors listed above and increased risk for falls and injury  From OT standpoint, recommendation at time of d/c would be Short Term Rehab  Patient to benefit from continued Occupational Therapy treatment while in the hospital to address deficits as defined above and maximize level of functional independence with ADLs and functional mobility        OT Discharge Recommendation: Post-Acute Rehabilitation Services

## 2021-03-16 NOTE — TELEPHONE ENCOUNTER
Per nurse at [facility] at [place] pt is no longer there

## 2021-03-16 NOTE — PLAN OF CARE
Problem: PHYSICAL THERAPY ADULT  Goal: Performs mobility at highest level of function for planned discharge setting  See evaluation for individualized goals  Description: Treatment/Interventions: Functional transfer training, LE strengthening/ROM, Therapeutic exercise, Endurance training, Bed mobility, Gait training, Equipment eval/education, Patient/family training, Spoke to nursing, Spoke to case management  Equipment Recommended: Wheelchair, Tanner Cheung       See flowsheet documentation for full assessment, interventions and recommendations  Outcome: Progressing  Note: Prognosis: Good  Problem List: Decreased strength, Decreased endurance, Impaired balance, Decreased mobility, Decreased safety awareness, Obesity  Assessment: Pt seen for PT treatment session this date with interventions consisting of gait training w/ emphasis on improving pt's ability to ambulate level surfaces x 3-4 lateral steps to R side + 2-3 lateral steps to L side with min A of 2 provided by therapist with RW, Therapeutic exercise consisting of: AROM 10-25 reps B LE in sitting and standing with B UE support position and therapeutic activity consisting of training: supine<>sit transfers and sit<>stand transfers  Pt agreeable to PT treatment session upon arrival with encouragement, pt found supine in bed w/ HOB elevated, in no apparent distress, A&O x 3 and responsive  In comparison to previous session, pt with improvements in bed mobility transitions and functional transfers with improving independence; however, continue to recommend A of 2 for enhanced pt safety and fall prevention  Post session: pt returned BTB, bed alarm engaged and all needs in reach  Continue to recommend STR at time of d/c in order to maximize pt's functional independence and safety w/ mobility  Pt continues to be functioning below baseline level, and remains limited 2* factors listed above   PT will continue to see pt while here in order to address the deficits listed above and provide interventions consistent w/ POC in effort to achieve STGs  Barriers to Discharge: Inaccessible home environment, Decreased caregiver support     PT Discharge Recommendation: [id number]     PT - OK to Discharge: Yes(when medically cleared; if to STR)    See flowsheet documentation for full assessment

## 2021-03-16 NOTE — PLAN OF CARE
Problem: Potential for Falls  Goal: Patient will remain free of falls  Description: INTERVENTIONS:  - Assess patient frequently for physical needs  -  Identify cognitive and physical deficits and behaviors that affect risk of falls    -  [place] fall precautions as indicated by assessment   - Educate patient/family on patient safety including physical limitations  - Instruct patient to call for assistance with activity based on assessment  - Modify environment to reduce risk of injury  - Consider OT/PT consult to assist with strengthening/mobility  Outcome: Progressing     Problem: Prexisting or High Potential for Compromised Skin Integrity  Goal: Skin integrity is maintained or improved  Description: INTERVENTIONS:  - Identify patients at risk for skin breakdown  - Assess and monitor skin integrity  - Assess and monitor nutrition and hydration status  - Monitor labs   - Assess for incontinence   - Turn and reposition patient  - Assist with mobility/ambulation  - Relieve pressure over bony prominences  - Avoid friction and shearing  - Provide appropriate hygiene as needed including keeping skin clean and dry  - Evaluate need for skin moisturizer/barrier cream  - Collaborate with interdisciplinary team   - Patient/family teaching  - Consider wound care consult   Outcome: Progressing     Problem: PAIN - ADULT  Goal: Verbalizes/displays adequate comfort level or baseline comfort level  Description: Interventions:  - Encourage patient to monitor pain and request assistance  - Assess pain using appropriate pain scale  - Administer analgesics based on type and severity of pain and evaluate response  - Implement non-pharmacological measures as appropriate and evaluate response  - Consider cultural and social influences on pain and pain management  - Notify physician/advanced practitioner if interventions unsuccessful or patient reports new pain  Outcome: Progressing     Problem: INFECTION - ADULT  Goal: Absence or prevention of progression during hospitalization  Description: INTERVENTIONS:  - Assess and monitor for signs and symptoms of infection  - Monitor lab/diagnostic results  - Monitor all insertion sites, i e  indwelling lines, tubes, and drains  - Monitor endotracheal if appropriate and nasal secretions for changes in amount and color  - [place] appropriate cooling/warming therapies per order  - Administer medications as ordered  - Instruct and encourage patient and family to use good hand hygiene technique  - Identify and instruct in appropriate isolation precautions for identified infection/condition  Outcome: Progressing     Problem: SAFETY ADULT  Goal: Patient will remain free of falls  Description: INTERVENTIONS:  - Assess patient frequently for physical needs  -  Identify cognitive and physical deficits and behaviors that affect risk of falls    -  [place] fall precautions as indicated by assessment   - Educate patient/family on patient safety including physical limitations  - Instruct patient to call for assistance with activity based on assessment  - Modify environment to reduce risk of injury  - Consider OT/PT consult to assist with strengthening/mobility  Outcome: Progressing  Goal: Maintain or return to baseline ADL function  Description: INTERVENTIONS:  -  Assess patient's ability to carry out ADLs; assess patient's baseline for ADL function and identify physical deficits which impact ability to perform ADLs (bathing, care of mouth/teeth, toileting, grooming, dressing, etc )  - Assess/evaluate cause of self-care deficits   - Assess range of motion  - Assess patient's mobility; develop plan if impaired  - Assess patient's need for assistive devices and provide as appropriate  - Encourage maximum independence but intervene and supervise when necessary  - Involve family in performance of ADLs  - Assess for home care needs following discharge   - Consider OT consult to assist with ADL evaluation and planning for discharge  - Provide patient education as appropriate  Outcome: Progressing  Goal: Maintain or return mobility status to optimal level  Description: INTERVENTIONS:  - Assess patient's baseline mobility status (ambulation, transfers, stairs, etc )    - Identify cognitive and physical deficits and behaviors that affect mobility  - Identify mobility aids required to assist with transfers and/or ambulation (gait belt, sit-to-stand, lift, walker, cane, etc )  - [place] fall precautions as indicated by assessment  - Record patient progress and toleration of activity level on Mobility SBAR; progress patient to next Phase/Stage  - Instruct patient to call for assistance with activity based on assessment  - Consider rehabilitation consult to assist with strengthening/weightbearing, etc   Outcome: Progressing     Problem: DISCHARGE PLANNING  Goal: Discharge to home or other facility with appropriate resources  Description: INTERVENTIONS:  - Identify barriers to discharge w/patient and caregiver  - Arrange for needed discharge resources and transportation as appropriate  - Identify discharge learning needs (meds, wound care, etc )  - Arrange for interpretive services to assist at discharge as needed  - Refer to Case Management Department for coordinating discharge planning if the patient needs post-hospital services based on physician/advanced practitioner order or complex needs related to functional status, cognitive ability, or social support system  Outcome: Progressing     Problem: Knowledge Deficit  Goal: Patient/family/caregiver demonstrates understanding of disease process, treatment plan, medications, and discharge instructions  Description: Complete learning assessment and assess knowledge base    Interventions:  - Provide teaching at level of understanding  - Provide teaching via preferred learning methods  Outcome: Progressing     Problem: CARDIOVASCULAR - ADULT  Goal: Maintains optimal cardiac output and hemodynamic stability  Description: INTERVENTIONS:  - Monitor I/O, vital signs and rhythm  - Monitor for S/S and trends of decreased cardiac output  - Administer and titrate ordered vasoactive medications to optimize hemodynamic stability  - Assess quality of pulses, skin color and temperature  - Assess for signs of decreased coronary artery perfusion  - Instruct patient to report change in severity of symptoms  Outcome: Progressing  Goal: Absence of cardiac dysrhythmias or at baseline rhythm  Description: INTERVENTIONS:  - Continuous cardiac monitoring, vital signs, obtain 12 lead EKG if ordered  - Administer antiarrhythmic and heart rate control medications as ordered  - Monitor electrolytes and administer replacement therapy as ordered  Outcome: Progressing     Problem: RESPIRATORY - ADULT  Goal: Achieves optimal ventilation and oxygenation  Description: INTERVENTIONS:  - Assess for changes in respiratory status  - Assess for changes in mentation and behavior  - Position to facilitate oxygenation and minimize respiratory effort  - Oxygen administered by appropriate delivery if ordered  - Initiate smoking cessation education as indicated  - Encourage broncho-pulmonary hygiene including cough, deep breathe, Incentive Spirometry  - Assess the need for suctioning and aspirate as needed  - Assess and instruct to report SOB or any respiratory difficulty  - Respiratory Therapy support as indicated  Outcome: Progressing     Problem: GENITOURINARY - ADULT  Goal: Maintains or returns to baseline urinary function  Description: INTERVENTIONS:  - Assess urinary function  - Encourage oral fluids to ensure adequate hydration if ordered  - Administer IV fluids as ordered to ensure adequate hydration  - Administer ordered medications as needed  - Offer frequent toileting  - Follow urinary retention protocol if ordered  Outcome: Progressing  Goal: Absence of urinary retention  Description: INTERVENTIONS:  - Assess patients ability to void and empty bladder  - Monitor I/O  - Bladder scan as needed  - Discuss with physician/AP medications to alleviate retention as needed  - Discuss catheterization for long term situations as appropriate  Outcome: Progressing  Goal: Urinary catheter remains patent  Description: INTERVENTIONS:  - Assess patency of urinary catheter  - If patient has a chronic nelson, consider changing catheter if non-functioning  - Follow guidelines for intermittent irrigation of non-functioning urinary catheter  Outcome: Progressing     Problem: METABOLIC, FLUID AND ELECTROLYTES - ADULT  Goal: Electrolytes maintained within normal limits  Description: INTERVENTIONS:  - Monitor labs and assess patient for signs and symptoms of electrolyte imbalances  - Administer electrolyte replacement as ordered  - Monitor response to electrolyte replacements, including repeat lab results as appropriate  - Instruct patient on fluid and nutrition as appropriate  Outcome: Progressing  Goal: Fluid balance maintained  Description: INTERVENTIONS:  - Monitor labs   - Monitor I/O and WT  - Instruct patient on fluid and nutrition as appropriate  - Assess for signs & symptoms of volume excess or deficit  Outcome: Progressing  Goal: Glucose maintained within target range  Description: INTERVENTIONS:  - Monitor Blood Glucose as ordered  - Assess for signs and symptoms of hyperglycemia and hypoglycemia  - Administer ordered medications to maintain glucose within target range  - Assess nutritional intake and initiate nutrition service referral as needed  Outcome: Progressing     Problem: SKIN/TISSUE INTEGRITY - ADULT  Goal: Skin integrity remains intact  Description: INTERVENTIONS  - Identify patients at risk for skin breakdown  - Assess and monitor skin integrity  - Assess and monitor nutrition and hydration status  - Monitor labs (i e  albumin)  - Assess for incontinence   - Turn and reposition patient  - Assist with mobility/ambulation  - Relieve pressure over bony prominences  - Avoid friction and shearing  - Provide appropriate hygiene as needed including keeping skin clean and dry  - Evaluate need for skin moisturizer/barrier cream  - Collaborate with interdisciplinary team (i e  Nutrition, Rehabilitation, etc )   - Patient/family teaching  Outcome: Progressing  Goal: Incision(s), wounds(s) or drain site(s) healing without S/S of infection  Description: INTERVENTIONS  - Assess and document risk factors for skin impairment   - Assess and document dressing, incision, wound bed, drain sites and surrounding tissue  - Consider nutrition services referral as needed  - Oral mucous membranes remain intact  - Provide patient/ family education  Outcome: Progressing  Goal: Oral mucous membranes remain intact  Description: INTERVENTIONS  - Assess oral mucosa and hygiene practices  - Implement preventative oral hygiene regimen  - Implement oral medicated treatments as ordered  - Initiate Nutrition services referral as needed  Outcome: Progressing     Problem: MUSCULOSKELETAL - ADULT  Goal: Maintain or return mobility to safest level of function  Description: INTERVENTIONS:  - Assess patient's ability to carry out ADLs; assess patient's baseline for ADL function and identify physical deficits which impact ability to perform ADLs (bathing, care of mouth/teeth, toileting, grooming, dressing, etc )  - Assess/evaluate cause of self-care deficits   - Assess range of motion  - Assess patient's mobility  - Assess patient's need for assistive devices and provide as appropriate  - Encourage maximum independence but intervene and supervise when necessary  - Involve family in performance of ADLs  - Assess for home care needs following discharge   - Consider OT consult to assist with ADL evaluation and planning for discharge  - Provide patient education as appropriate  Outcome: Progressing  Goal: Maintain proper alignment of affected body part  Description: INTERVENTIONS:  - Support, maintain and protect limb and body alignment  - Provide patient/ family with appropriate education  Outcome: Progressing

## 2021-03-16 NOTE — PHYSICAL THERAPY NOTE
Physical Therapy Treatment Note       03/16/21 1309   PT Last Visit   PT Visit Date 03/16/21   Note Type   Note Type Treatment   Pain Assessment   Pain Assessment Tool 0-10   Pain Score 5   Pain Location/Orientation Orientation: Bilateral;Location: Back   Pain Radiating Towards Orientation: Bilateral;Orientation: Upper; Location: Leg   Pain Onset/Description Onset: Ongoing;Frequency: Constant/Continuous   Hospital Pain Intervention(s) Repositioned; Ambulation/increased activity   Multiple Pain Sites No   Restrictions/Precautions   Weight Bearing Precautions Per Order No   Braces or Orthoses Other (Comment)  (none at baseline)   Other Precautions Chair Alarm; Bed Alarm;Contact/isolation;Cognitive; Fall Risk;Pain;Multiple lines  (ESBL)   General   Chart Reviewed Yes   Response to Previous Treatment Other (Comment)  (patient agreeable to participate in PT session with encouragement; reports "I will not sit in the chair")   Family/Caregiver Present No   Cognition   Overall Cognitive Status Impaired   Arousal/Participation Alert; Responsive; Cooperative   Attention Attends with cues to redirect   Orientation Level Oriented to person;Oriented to place;Oriented to situation   Memory Decreased recall of recent events;Decreased short term memory   Following Commands Follows one step commands with increased time or repetition   Comments patient agreeable to participate in PT session with encouragement; reports "I will not sit in the chair"   Subjective   Subjective "I am motivated!"   Bed Mobility   Rolling R 3  Moderate assistance   Additional items Assist x 1; Increased time required;Verbal cues;LE management;HOB elevated; Bedrails   Supine to Sit 4  Minimal assistance   Additional items Assist x 2; Increased time required;Verbal cues;LE management;HOB elevated   Sit to Supine 4  Minimal assistance   Additional items Assist x 2; Increased time required;Verbal cues;LE management; Bedrails   Transfers   Sit to Stand 4  Minimal assistance   Additional items Assist x 2; Increased time required;Verbal cues   Stand to Sit 4  Minimal assistance   Additional items Assist x 2; Increased time required;Verbal cues   Ambulation/Elevation   Gait pattern Excessively slow; Step to;Short stride;Decreased foot clearance   Gait Assistance 4  Minimal assist   Additional items Assist x 2;Verbal cues   Assistive Device Rolling walker   Distance 3-4 lateral steps to R side + 2-3 lateral steps to L side  (at bedside; limited by ongoing back pain; patient requesting pain medication at end of session- RN notified)   Stair Management Assistance Not tested   Balance   Static Sitting Fair   Dynamic Sitting Fair -   Static Standing Fair -   Dynamic Standing Poor +   Ambulatory Poor   Endurance Deficit   Endurance Deficit Yes   Activity Tolerance   Activity Tolerance Patient limited by fatigue;Patient limited by pain   Medical Staff Made Aware OT Keisha Basilio    Co-treatment with OT secondary to complex medical condition of pt, A of 2 required to achieve and transitional movements (supine <> sit, sit <> stand, level surface ambulation), requiring the need of skilled therapeutic intervention of 2 therapists to achieve delivery of services  Nurse Made Aware CEE Mercado confirmed patient appropriate for therapy   Exercises   Knee AROM Long Arc Quad Sitting;25 reps;AROM; Bilateral   Marching Standing;10 reps;AROM; Bilateral   Assessment   Prognosis Good   Problem List Decreased strength;Decreased endurance; Impaired balance;Decreased mobility; Decreased safety awareness; Obesity   Assessment Pt seen for PT treatment session this date with interventions consisting of gait training w/ emphasis on improving pt's ability to ambulate level surfaces x 3-4 lateral steps to R side + 2-3 lateral steps to L side with min A of 2 provided by therapist with RW, Therapeutic exercise consisting of: AROM 10-25 reps B LE in sitting and standing with B UE support position and therapeutic activity consisting of training: supine<>sit transfers and sit<>stand transfers  Pt agreeable to PT treatment session upon arrival with encouragement, pt found supine in bed w/ HOB elevated, in no apparent distress, A&O x 3 and responsive  In comparison to previous session, pt with improvements in bed mobility transitions and functional transfers with improving independence; however, continue to recommend A of 2 for enhanced pt safety and fall prevention  Post session: pt returned BTB, bed alarm engaged and all needs in reach  Continue to recommend STR at time of d/c in order to maximize pt's functional independence and safety w/ mobility  Pt continues to be functioning below baseline level, and remains limited 2* factors listed above  PT will continue to see pt while here in order to address the deficits listed above and provide interventions consistent w/ POC in effort to achieve STGs  Barriers to Discharge Inaccessible home environment;Decreased caregiver support   Goals   Patient Goals to continue with therapy at rehab; "I want to walk"   STG Expiration Date 03/25/21   PT Treatment Day 2   Plan   Treatment/Interventions Functional transfer training;LE strengthening/ROM; Therapeutic exercise; Endurance training;Patient/family training;Equipment eval/education; Bed mobility;Gait training;Spoke to nursing;OT   Progress Slow progress, decreased activity tolerance   PT Frequency Other (Comment)  (3-5x/wk)   Recommendation   PT Discharge Recommendation Post-Acute Rehabilitation Services   Equipment Recommended [facility] Recommended Wheeled walker   Change/add to [employer]?  No   PT - OK to Discharge Yes  (when medically cleared; if to STR)   [place] in Bed Without Bedrails 2   Lying on Back to Sitting on Edge of Flat Bed 2   Moving Bed to Chair 2   Standing Up From Chair 2   Walk in Room 2   Climb 3-5 Stairs 1   Basic Mobility Inpatient Raw Score 11   Basic Mobility Standardized Score 30 25       Shantell Guzman, PT, DPT    Time of PT treatment session: 12:39-13:09  30 minutes

## 2021-03-16 NOTE — CASE MANAGEMENT
Antwon called [facility] at [phone] to confirm they received the fax  Cm spoke to [facility] reported that she did not received the fax  Antwon attempted to fax again in 2 smaller portions  Cm department will continue to follow patient through discharge

## 2021-03-16 NOTE — CASE MANAGEMENT
Antwon called [facility] again at [phone] and spoke to [facility] reported that she received all 80 pages together and will pass the packet along to the individual who is going to review  She is unsure how long this process takes   department will continue to follow patient through discharge

## 2021-03-16 NOTE — CASE MANAGEMENT
Cm received phone call from [facility] at [phone]  [facility]UM[facility]KO reported that if patient's behaviors were due to dementia, she will not need to be optioned  YUMIKO asked for dtr's number to further discuss  Cm department will continue to follow patient through discharge

## 2021-03-16 NOTE — ASSESSMENT & PLAN NOTE
/71   Pulse 60   Temp (!) 97 4 °F (36 3 °C)   Resp 18   Ht 5' 6" (1 676 m)   Wt 116 kg (256 lb 9 9 oz)   LMP  (LMP Unknown)   SpO2 98%   BMI 41 42 kg/m²   Continue home losartan and amlodipine  BP controlled

## 2021-03-16 NOTE — OCCUPATIONAL THERAPY NOTE
Occupational Therapy treatment Note        Patient Name: Chely Ardon  CRWVK'L Date: 3/16/2021       03/16/21 1310   OT Last Visit   OT Visit Date 03/16/21   Note Type   Note Type Treatment   Restrictions/Precautions   Weight Bearing Precautions Per Order No   Braces or Orthoses Other (Comment)  (none at baseline)   Other Precautions Chair Alarm; Bed Alarm;Contact/isolation;Cognitive; Fall Risk;Pain;Multiple lines   Pain Assessment   Pain Assessment Tool 0-10   Pain Score 5   Pain Location/Orientation Orientation: Bilateral;Location: Back   Pain Radiating Towards Orientation: Bilateral;Orientation: Upper; Location: Leg   Pain Onset/Description Onset: Ongoing;Frequency: Constant/Continuous   Hospital Pain Intervention(s) Repositioned   Multiple Pain Sites No   ADL   Eating Assistance 5  Supervision/Setup   Eating Deficit Setup;Verbal cueing; Increased time to complete   Grooming Assistance 4  Minimal Assistance   Grooming Deficit Setup;Steadying;Supervision/safety; Increased time to complete   UB Bathing Assistance 3  Moderate Assistance   UB Bathing Deficit Setup;Supervision/safety; Increased time to complete   LB Bathing Assistance 2  Maximal Assistance   LB Bathing Deficit Increased time to complete;Setup   UB Dressing Assistance 4  Minimal Assistance   UB Dressing Deficit Setup;Steadying;Verbal cueing   LB Dressing Assistance 2  Maximal Assistance   LB Dressing Deficit Verbal cueing;Supervision/safety; Increased time to complete  (dependnet for lower leg/ foot)   Toileting Assistance  2  Maximal Assistance   Toileting Deficit Increased time to complete;Use of bedpan/urinal setup   Bed Mobility   Rolling R 3  Moderate assistance   Additional items Assist x 1; Increased time required;Verbal cues;LE management;HOB elevated; Bedrails   Supine to Sit 4  Minimal assistance   Additional items Assist x 2; Increased time required;Verbal cues;LE management;HOB elevated   Sit to Supine 4  Minimal assistance   Additional items Assist x 2; Increased time required;Verbal cues;LE management; Bedrails   Transfers   Sit to Stand 4  Minimal assistance   Additional items Assist x 2; Increased time required;Verbal cues   Stand to Sit 4  Minimal assistance   Additional items Assist x 2; Increased time required;Verbal cues   Functional Mobility   Functional Mobility 3  Moderate assistance   Additional Comments lateral stepping   Additional items Rolling walker   Cognition   Overall Cognitive Status Impaired   Arousal/Participation Alert; Responsive; Cooperative   Attention Attends with cues to redirect   Orientation Level Oriented to person;Oriented to place;Oriented to situation   Memory Decreased recall of recent events;Decreased short term memory   Following Commands Follows one step commands with increased time or repetition   Activity Tolerance   Activity Tolerance Patient tolerated treatment well   Assessment   Assessment Patient participated in Skilled OT session this date with interventions consisting of ADL re training with the use of correct body mechnaics, Energy Conservation techniques, Work simplification skills , safety awareness and fall prevention techniques,  therapeutic activities to: increase activity tolerance, increase dynamic sit/ stand balance during functional activity , increase postural control and increase OOB/ sitting tolerance   Patient agreeable to OT treatment session, upon arrival patient was found supine in bed, alert, responsive  and in no apparent distress  Patient requires moderate assist for UB ADLs, max assist for LB ADLs, please refer to flow sheet for detailed performance  Patient is very motivated to participate in therapy "I want to walk again"  Patient requiring verbal cues for safety, verbal cues for correct technique, one step directives and frequent rest periods   Presents with decreased activity tolerance, dynamic sitting balance deficit, poor sitting and standing tolerance time to complete functional activities  Overall endurance deficit and limited UE muscle strength  Patient continues to be functioning below baseline level, occupational performance remains limited secondary to factors listed above and increased risk for falls and injury  From OT standpoint, recommendation at time of d/c would be Short Term Rehab  Patient to benefit from continued Occupational Therapy treatment while in the hospital to address deficits as defined above and maximize level of functional independence with ADLs and functional mobility  Plan   Treatment Interventions ADL retraining;Functional transfer training;UE strengthening/ROM; Endurance training;Patient/family training;Cognitive reorientation; Compensatory technique education;Continued evaluation;Cardiac education; Energy conservation   Recommendation   OT Discharge Recommendation Post-Acute Rehabilitation Services   AM-PAC Daily Activity Inpatient   Lower Body Dressing 2   Bathing 2   Toileting 1   Upper Body Dressing 2   Grooming 3   Eating 4   Daily Activity Raw Score 14   Daily Activity Standardized Score (Calc for Raw Score >=11) 33 39   AM-PAC Applied Cognition Inpatient   Following a Speech/Presentation 4   Understanding Ordinary Conversation 4   Taking Medications 2   Remembering Where Things Are Placed or Put Away 2   Remembering List of 4-5 Errands 2   Taking Care of Complicated Tasks 1   Applied Cognition Raw Score 15   Applied Cognition Standardized Score 33 54

## 2021-03-16 NOTE — ASSESSMENT & PLAN NOTE
- h o ESBL and enterococcus in 2018- colonies <100,000 but pt symptomatic  - UA this admission is also (+) for UTI    - UC: Ecoli sensitive to ceftriaxone  Completed antibiotics

## 2021-03-16 NOTE — TELEPHONE ENCOUNTER
Please notify referring provider as to Dr Delfino Mo recommendations  Please reach out to patient again to make sure your message was received

## 2021-03-16 NOTE — CASE MANAGEMENT
Cm spoke with patient on bedside phone  Patient asked several questions regarding STR, including if nursing staff will be available, can they deal with patient's screaming behaviors, what clothing she will need, BLS transport, and if she will be able to use a diaper versus bed pan at facility  Patient reported that she is very worried about new places due to her dementia, but she appreciated the phone call from [place]  Cm to speak with patient again closer to discharge  Cm department will continue to follow patient through discharge

## 2021-03-16 NOTE — ASSESSMENT & PLAN NOTE
Presented with generalized weakness  She has been struggling to get sore outpatient appointments due to difficulty ambulating and poor transportation  PT/OT recommending rehab  Case management follow-up

## 2021-03-17 PROCEDURE — 99232 SBSQ HOSP IP/OBS MODERATE 35: CPT | Performed by: INTERNAL MEDICINE

## 2021-03-17 RX ADMIN — LATANOPROST 1 DROP: 50 SOLUTION OPHTHALMIC at 21:51

## 2021-03-17 RX ADMIN — TAMSULOSIN HYDROCHLORIDE 0.4 MG: 0.4 CAPSULE ORAL at 15:38

## 2021-03-17 RX ADMIN — SERTRALINE HYDROCHLORIDE 150 MG: 50 TABLET ORAL at 09:24

## 2021-03-17 RX ADMIN — NYSTATIN: 100000 POWDER TOPICAL at 17:47

## 2021-03-17 RX ADMIN — OXYCODONE HYDROCHLORIDE 5 MG: 5 TABLET ORAL at 06:31

## 2021-03-17 RX ADMIN — APIXABAN 5 MG: 5 TABLET, FILM COATED ORAL at 17:46

## 2021-03-17 RX ADMIN — NYSTATIN: 100000 POWDER TOPICAL at 09:26

## 2021-03-17 RX ADMIN — NYSTATIN: 100000 CREAM TOPICAL at 09:25

## 2021-03-17 RX ADMIN — NYSTATIN: 100000 CREAM TOPICAL at 17:47

## 2021-03-17 RX ADMIN — PANTOPRAZOLE SODIUM 40 MG: 40 TABLET, DELAYED RELEASE ORAL at 06:15

## 2021-03-17 RX ADMIN — ACETAMINOPHEN 650 MG: 325 TABLET ORAL at 17:46

## 2021-03-17 RX ADMIN — OXYCODONE HYDROCHLORIDE 5 MG: 5 TABLET ORAL at 23:32

## 2021-03-17 RX ADMIN — APIXABAN 5 MG: 5 TABLET, FILM COATED ORAL at 09:24

## 2021-03-17 RX ADMIN — GABAPENTIN 600 MG: 300 CAPSULE ORAL at 09:24

## 2021-03-17 RX ADMIN — MICONAZOLE NITRATE 1 APPLICATION: KIT VAGINAL at 23:31

## 2021-03-17 RX ADMIN — GABAPENTIN 600 MG: 300 CAPSULE ORAL at 21:50

## 2021-03-17 RX ADMIN — DOCUSATE SODIUM 100 MG: 100 CAPSULE, LIQUID FILLED ORAL at 09:24

## 2021-03-17 RX ADMIN — LOSARTAN POTASSIUM 25 MG: 25 TABLET, FILM COATED ORAL at 09:25

## 2021-03-17 RX ADMIN — TRAZODONE HYDROCHLORIDE 100 MG: 100 TABLET ORAL at 21:51

## 2021-03-17 RX ADMIN — GABAPENTIN 600 MG: 300 CAPSULE ORAL at 15:38

## 2021-03-17 RX ADMIN — AMLODIPINE BESYLATE 10 MG: 10 TABLET ORAL at 09:24

## 2021-03-17 RX ADMIN — FLUTICASONE PROPIONATE 1 SPRAY: 50 SPRAY, METERED NASAL at 09:25

## 2021-03-17 RX ADMIN — DOCUSATE SODIUM 100 MG: 100 CAPSULE, LIQUID FILLED ORAL at 17:46

## 2021-03-17 NOTE — ASSESSMENT & PLAN NOTE
/63   Pulse 60   Temp 97 8 °F (36 6 °C)   Resp 17   Ht 5' 6" (1 676 m)   Wt 115 kg (253 lb 8 5 oz)   LMP  (LMP Unknown)   SpO2 97%   BMI 40 92 kg/m²   Continue home losartan and amlodipine  BP controlled

## 2021-03-17 NOTE — ASSESSMENT & PLAN NOTE
nystatin cream in the groin  - some scratch marks vs fungal lesions around labia which may be contributing to dysuria  - nystatin powder in the abd skin folds  - also started on monistat

## 2021-03-17 NOTE — CASE MANAGEMENT
Call from [place] from [facility], and she stated that patient is not an option but has dementia

## 2021-03-17 NOTE — CASE MANAGEMENT
Cm called [facility] at [phone] and spoke to Ashley Ramos  Cm asked to check the status of the prior auth, reference number: [id number]  Ashley Ramos reported that the prior auth for this reference number had been cancelled and no reason was documented  Ashley Ramos encouraged Cm to follow up with the [facility] department and an appeal by calling [phone]  Ashley Ramos transferred cm to this department  Cm then spoke to Francisco Thomas who reviewed the information  Francisco Thomas reported that patient's auth needs to go through [facility] directly and not [facility]  Radha placed cm on hold while she called [facility] at [phone]  Radha asked for approximately 5-15 minutes on hold  Francisco Thomas returned after 8 minutes and reported that Edmond Saenz will be able to assist at [facility]  Cm spoke to Edmond Saenz who reported that the prior auth was cancelled on 3/15 and 3/17 due to being submitted incorrectly  Edmond Saenz will start a new prior auth and confirmed the information given originally  Flores placed cm back on hold  After a few minutes, Edmond Saenz reported that the prior Kikea is being processed now and will take 24-72 hrs  The new reference number is [id number]  If clinicals are needed, [facility] will fax the request to [phone] (MS3 printer)  Cm department will continue to follow patient through discharge

## 2021-03-17 NOTE — PROGRESS NOTES
[facility]  Progress Note Aleyda Flores 1940, 80 y o  female MRN: [id number]  Unit/Bed#: -01 Encounter: [id number]  Primary Care Provider: LUCY Oliveira   Date and time admitted to hospital: 3/10/2021 11:12 AM    * Generalized weakness  Assessment & Plan  Presented with generalized weakness  She has been struggling to get sore outpatient appointments due to difficulty ambulating and poor transportation  PT/OT recommending rehab  Case management follow-up    Fungal infection of the groin  Assessment & Plan   nystatin cream in the groin  - some scratch marks vs fungal lesions around labia which may be contributing to dysuria  - nystatin powder in the abd skin folds  - also started on monistat    UTI (urinary tract infection)  Assessment & Plan  - h o ESBL and enterococcus in 2018- colonies <100,000 but pt symptomatic  - UA this admission is also (+) for UTI    - UC: Ecoli sensitive to ceftriaxone  Completed antibiotics    Dementia ([facility] [address] )  Assessment & Plan  Frontal lobe dementia  Supportive care  Cont zolfot (increased dose to 150mg )and trazodone      Chronic pain  Assessment & Plan  Chronic pain  Continue outpatient pain medications    Moderate episode of recurrent major depressive disorder ([facility] [address] )  Assessment & Plan  Continue home Zoloft and trazodone    COPD (chronic obstructive pulmonary disease) ([facility] [address] )  Assessment & Plan  Not in acute exacerbation  Continue albuterol p r n      Hypertension  Assessment & Plan  /63   Pulse 60   Temp 97 8 °F (36 6 °C)   Resp 17   Ht 5' 6" (1 676 m)   Wt 115 kg (253 lb 8 5 oz)   LMP  (LMP Unknown)   SpO2 97%   BMI 40 92 kg/m²   Continue home losartan and amlodipine  BP controlled    Atrial fibrillation (HCC)  Assessment & Plan  Currently rate controlled  Continue home Eliquis        VTE Pharmacologic Prophylaxis:   Pharmacologic: Apixaban (Eliquis)  Mechanical VTE Prophylaxis in Place: Yes    Patient Centered Rounds: I have performed bedside rounds with nursing staff today  Discussions with Specialists or Other Care Team Provider: cm, nursing    Education and Discussions with Family / Patient: pt    Time Spent for Care: 30 minutes  More than 50% of total time spent on counseling and coordination of care as described above  Current Length of Stay: 6 day(s)    Current Patient Status: Inpatient   Certification Statement: The patient will continue to require additional inpatient hospital stay due to Medically stable for discharge awaiting placement    Discharge Plan:  Awaiting placement short-term rehab    Code Status: Level 1 - Full Code      Subjective:   No Acute complaints    Objective:     Vitals:   Temp (24hrs), Av 3 °F (36 8 °C), Min:97 8 °F (36 6 °C), Max:98 7 °F (37 1 °C)    Temp:  [97 8 °F (36 6 °C)-98 7 °F (37 1 °C)] 97 8 °F (36 6 °C)  HR:  [60-65] 60  Resp:  [17-18] 17  BP: (130-141)/(61-64) 131/63  SpO2:  [94 %-97 %] 97 %  Body mass index is 40 92 kg/m²  Input and Output Summary (last 24 hours): Intake/Output Summary (Last 24 hours) at 3/17/2021 0807  Last data filed at 3/16/2021 1222  Gross per 24 hour   Intake 360 ml   Output --   Net 360 ml       Physical Exam:     Physical Exam  Constitutional:       General: She is not in acute distress  Appearance: She is well-developed  She is not diaphoretic  HENT:      Head: Normocephalic and atraumatic  Nose: Nose normal       Mouth/Throat:      Pharynx: No oropharyngeal exudate  Eyes:      General: No scleral icterus  Right eye: No discharge  Left eye: No discharge  Conjunctiva/sclera: Conjunctivae normal    Neck:      Musculoskeletal: Normal range of motion and neck supple  Thyroid: No thyromegaly  Vascular: No JVD  Cardiovascular:      Rate and Rhythm: Normal rate and regular rhythm  Heart sounds: Normal heart sounds  No murmur  No friction rub  No gallop      Pulmonary:      Effort: Pulmonary effort is normal  No respiratory distress  Breath sounds: Normal breath sounds  No wheezing or rales  Chest:      Chest wall: No tenderness  Abdominal:      General: Bowel sounds are normal  There is no distension  Palpations: Abdomen is soft  Tenderness: There is no abdominal tenderness  There is no guarding or rebound  Musculoskeletal: Normal range of motion  General: No tenderness or deformity  Skin:     General: Skin is warm and dry  Findings: No erythema or rash  Neurological:      Mental Status: She is alert  Mental status is at baseline  Cranial Nerves: No cranial nerve deficit  Sensory: No sensory deficit  Motor: No abnormal muscle tone  Coordination: Coordination normal        (     Additional Data:     Labs:    Results from last 7 days   Lab Units 03/14/21  0528 03/11/21  0530   WBC Thousand/uL 5 78 6 40   HEMOGLOBIN g/dL 11 7 11 7   HEMATOCRIT % 38 1 37 6   PLATELETS Thousands/uL 184 185   NEUTROS PCT %  --  56   LYMPHS PCT %  --  30   MONOS PCT %  --  8   EOS PCT %  --  4     Results from last 7 days   Lab Units 03/14/21  0528  03/10/21  1212   SODIUM mmol/L 141   < > 143   POTASSIUM mmol/L 3 9   < > 4 1   CHLORIDE mmol/L 106   < > 105   CO2 mmol/L 28   < > 27   BUN mg/dL 18   < > 17   CREATININE mg/dL 0 83   < > 0 81   ANION GAP mmol/L 7   < > 11   CALCIUM mg/dL 9 3   < > 9 5   ALBUMIN g/dL  --   --  2 9*   TOTAL BILIRUBIN mg/dL  --   --  0 31   ALK PHOS U/L  --   --  68   ALT U/L  --   --  15   AST U/L  --   --  14   GLUCOSE RANDOM mg/dL 108   < > 99    < > = values in this interval not displayed  Results from last 7 days   Lab Units 03/10/21  1450   LACTIC ACID mmol/L 0 9           * I Have Reviewed All Lab Data Listed Above  * Additional Pertinent Lab Tests Reviewed:  All Labs Within Last 24 Hours Reviewed    Imaging:    Imaging Reports Reviewed Today Include: na  Imaging Personally Reviewed by Myself Includes:  na    Recent Cultures (last 7 days):     Results from last 7 days   Lab Units 03/10/21  1515 03/10/21  1502 03/10/21  1450   BLOOD CULTURE   --  No Growth After 5 Days  No Growth After 5 Days  URINE CULTURE  >100,000 cfu/ml Escherichia coli*  --   --        Last 24 Hours Medication List:   Current Facility-Administered Medications   Medication Dose Route Frequency Provider Last Rate    acetaminophen  650 mg Oral Q6H PRN Cristhian Echols MD      amLODIPine  10 mg Oral Daily Cristhian Echols MD      apixaban  5 mg Oral BID Cristhian Echols MD      docusate sodium  100 mg Oral BID Cristhian Echols MD      fluticasone  1 spray Nasal Daily Cristhian Echols MD      gabapentin  600 mg Oral TID Cristhian Echols MD      latanoprost  1 drop Both Eyes HS Cristhian Echols MD      losartan  25 mg Oral Daily Cristhian Echols MD      miconazole   Topical BID PRN April Yoo MD      And    miconazole  200 mg Vaginal HS April Yoo MD      nystatin   Topical BID April Yoo MD      nystatin   Topical BID April Yoo MD      oxyCODONE  5 mg Oral Q6H PRN April Yoo MD      pantoprazole  40 mg Oral Daily Before Breakfast Lam Bowers MD      sertraline  150 mg Oral Daily April Yoo MD      tamsulosin  0 4 mg Oral Daily With Kali Terry MD      traZODone  100 mg Oral HS Lam Bowers MD          Today, Patient Was Seen By: Lam Bowers MD    ** Please Note: Dictation voice to text software may have been used in the creation of this document   **

## 2021-03-18 VITALS
SYSTOLIC BLOOD PRESSURE: 122 MMHG | RESPIRATION RATE: 18 BRPM | OXYGEN SATURATION: 96 % | BODY MASS INDEX: 40.75 KG/M2 | DIASTOLIC BLOOD PRESSURE: 86 MMHG | HEART RATE: 72 BPM | HEIGHT: 66 IN | TEMPERATURE: 98.7 F | WEIGHT: 253.53 LBS

## 2021-03-18 PROCEDURE — 99232 SBSQ HOSP IP/OBS MODERATE 35: CPT | Performed by: INTERNAL MEDICINE

## 2021-03-18 RX ORDER — NYSTATIN 100000 U/G
CREAM TOPICAL 2 TIMES DAILY
Qty: 30 G | Refills: 0 | Status: SHIPPED | OUTPATIENT
Start: 2021-03-18 | End: 2022-03-09 | Stop reason: HOSPADM

## 2021-03-18 RX ORDER — CEPHALEXIN 500 MG/1
500 CAPSULE ORAL EVERY 6 HOURS SCHEDULED
Status: DISCONTINUED | OUTPATIENT
Start: 2021-03-18 | End: 2021-03-18 | Stop reason: HOSPADM

## 2021-03-18 RX ORDER — CEPHALEXIN 500 MG/1
500 CAPSULE ORAL EVERY 6 HOURS SCHEDULED
Qty: 16 CAPSULE | Refills: 0 | Status: SHIPPED | OUTPATIENT
Start: 2021-03-18 | End: 2021-03-22

## 2021-03-18 RX ADMIN — OXYCODONE HYDROCHLORIDE 5 MG: 5 TABLET ORAL at 10:20

## 2021-03-18 RX ADMIN — LOSARTAN POTASSIUM 25 MG: 25 TABLET, FILM COATED ORAL at 10:12

## 2021-03-18 RX ADMIN — AMLODIPINE BESYLATE 10 MG: 10 TABLET ORAL at 10:12

## 2021-03-18 RX ADMIN — CEPHALEXIN 500 MG: 500 CAPSULE ORAL at 12:11

## 2021-03-18 RX ADMIN — NYSTATIN: 100000 CREAM TOPICAL at 10:15

## 2021-03-18 RX ADMIN — SERTRALINE HYDROCHLORIDE 150 MG: 50 TABLET ORAL at 10:12

## 2021-03-18 RX ADMIN — DOCUSATE SODIUM 100 MG: 100 CAPSULE, LIQUID FILLED ORAL at 10:12

## 2021-03-18 RX ADMIN — FLUTICASONE PROPIONATE 1 SPRAY: 50 SPRAY, METERED NASAL at 10:15

## 2021-03-18 RX ADMIN — APIXABAN 5 MG: 5 TABLET, FILM COATED ORAL at 10:12

## 2021-03-18 RX ADMIN — NYSTATIN: 100000 POWDER TOPICAL at 10:15

## 2021-03-18 RX ADMIN — GABAPENTIN 600 MG: 300 CAPSULE ORAL at 10:12

## 2021-03-18 NOTE — CASE MANAGEMENT
Pt for d/c to home today, to be transported by [address] at 3pm via bls  CM completed cmn, notified Pt, daughter Marva Pryor, and RN Pollie Meigs of d/c arrangements

## 2021-03-18 NOTE — ASSESSMENT & PLAN NOTE
/86   Pulse 72   Temp 98 7 °F (37 1 °C)   Resp 18   Ht 5' 6" (1 676 m)   Wt 115 kg (253 lb 8 5 oz)   LMP  (LMP Unknown)   SpO2 96%   BMI 40 92 kg/m²   Continue home losartan and amlodipine  BP controlled

## 2021-03-18 NOTE — ASSESSMENT & PLAN NOTE
Presented with generalized weakness  She has been struggling to get sore outpatient appointments due to difficulty ambulating and poor transportation  PT/OT recommending rehab  However patient refused like home physical therapy  Set up for home physical therapy  Risks were discussed

## 2021-03-18 NOTE — ASSESSMENT & PLAN NOTE
- h o ESBL and enterococcus in 2018- colonies <100,000 but pt symptomatic  - UA this admission is also (+) for UTI    - UC: Ecoli sensitive to ceftriaxone  Will treat for 4 more days of oral antibiotic

## 2021-03-18 NOTE — CASE MANAGEMENT
JO ANN received a follow up communication from JETHRO reporting the Pt will now be  at 4:45pm by JETHRO CHERRY made the Pt's RN [place] aware of the change in transportation

## 2021-03-18 NOTE — PROGRESS NOTES
[facility]  Progress Note Maxi Flores 1940, 80 y o  female MRN: [id number]  Unit/Bed#: -01 Encounter: [id number]  Primary Care Provider: LUCY Bacon   Date and time admitted to hospital: 3/10/2021 11:12 AM    * Generalized weakness  Assessment & Plan  Presented with generalized weakness  She has been struggling to get sore outpatient appointments due to difficulty ambulating and poor transportation  PT/OT recommending rehab  Case management follow-up    Fungal infection of the groin  Assessment & Plan   nystatin cream in the groin  - some scratch marks vs fungal lesions around labia which may be contributing to dysuria  - nystatin powder in the abd skin folds  - also started on monistat    UTI (urinary tract infection)  Assessment & Plan  - h o ESBL and enterococcus in 2018- colonies <100,000 but pt symptomatic  - UA this admission is also (+) for UTI    - UC: Ecoli sensitive to ceftriaxone  Completed antibiotics    Dementia ([facility] [address] )  Assessment & Plan  Frontal lobe dementia  Supportive care  Cont zolfot (increased dose to 150mg )and trazodone      Chronic pain  Assessment & Plan  Chronic pain  Continue outpatient pain medications    Moderate episode of recurrent major depressive disorder ([facility] [address] )  Assessment & Plan  Continue home Zoloft and trazodone    COPD (chronic obstructive pulmonary disease) ([facility] [address] )  Assessment & Plan  Not in acute exacerbation  Continue albuterol p r n      Hypertension  Assessment & Plan  /86   Pulse 72   Temp 98 7 °F (37 1 °C)   Resp 18   Ht 5' 6" (1 676 m)   Wt 115 kg (253 lb 8 5 oz)   LMP  (LMP Unknown)   SpO2 96%   BMI 40 92 kg/m²   Continue home losartan and amlodipine  BP controlled    Atrial fibrillation (HCC)  Assessment & Plan  Currently rate controlled  Continue home Eliquis        VTE Pharmacologic Prophylaxis:   Pharmacologic: Apixaban (Eliquis)  Mechanical VTE Prophylaxis in Place: Yes    Patient Centered Rounds: I have performed bedside rounds with nursing staff today  Discussions with Specialists or Other Care Team Provider: cm, nursing    Education and Discussions with Family / Patient: pt    Time Spent for Care: 30 minutes  More than 50% of total time spent on counseling and coordination of care as described above  Current Length of Stay: 7 day(s)    Current Patient Status: Inpatient   Certification Statement: The patient will continue to require additional inpatient hospital stay due to Medically stable for discharge awaiting placement    Discharge Plan:  See above    Code Status: Level 1 - Full Code      Subjective:   No acute complaints    Objective:     Vitals:   Temp (24hrs), Av 3 °F (36 8 °C), Min:98 1 °F (36 7 °C), Max:98 7 °F (37 1 °C)    Temp:  [98 1 °F (36 7 °C)-98 7 °F (37 1 °C)] 98 7 °F (37 1 °C)  HR:  [60-72] 72  Resp:  [17-18] 18  BP: (122-130)/(50-86) 122/86  SpO2:  [94 %-96 %] 96 %  Body mass index is 40 92 kg/m²  Input and Output Summary (last 24 hours): Intake/Output Summary (Last 24 hours) at 3/18/2021 0907  Last data filed at 3/18/2021 0816  Gross per 24 hour   Intake 720 ml   Output 200 ml   Net 520 ml       Physical Exam:     Physical Exam  Constitutional:       General: She is not in acute distress  Appearance: She is well-developed  She is not diaphoretic  HENT:      Head: Normocephalic and atraumatic  Nose: Nose normal       Mouth/Throat:      Pharynx: No oropharyngeal exudate  Eyes:      General: No scleral icterus  Right eye: No discharge  Left eye: No discharge  Conjunctiva/sclera: Conjunctivae normal    Neck:      Musculoskeletal: Normal range of motion and neck supple  Thyroid: No thyromegaly  Vascular: No JVD  Cardiovascular:      Rate and Rhythm: Normal rate and regular rhythm  Heart sounds: Normal heart sounds  No murmur  No friction rub  No gallop      Pulmonary:      Effort: Pulmonary effort is normal  No respiratory distress  Breath sounds: Normal breath sounds  No wheezing or rales  Chest:      Chest wall: No tenderness  Abdominal:      General: Bowel sounds are normal  There is no distension  Palpations: Abdomen is soft  Tenderness: There is no abdominal tenderness  There is no guarding or rebound  Musculoskeletal: Normal range of motion  General: No tenderness or deformity  Skin:     General: Skin is warm and dry  Findings: No erythema or rash  Neurological:      Mental Status: She is alert  Mental status is at baseline  Cranial Nerves: No cranial nerve deficit  Sensory: No sensory deficit  Motor: No abnormal muscle tone  Coordination: Coordination normal        (  Additional Data:     Labs:    Results from last 7 days   Lab Units 03/14/21  0528   WBC Thousand/uL 5 78   HEMOGLOBIN g/dL 11 7   HEMATOCRIT % 38 1   PLATELETS Thousands/uL 184     Results from last 7 days   Lab Units 03/14/21  0528   SODIUM mmol/L 141   POTASSIUM mmol/L 3 9   CHLORIDE mmol/L 106   CO2 mmol/L 28   BUN mg/dL 18   CREATININE mg/dL 0 83   ANION GAP mmol/L 7   CALCIUM mg/dL 9 3   GLUCOSE RANDOM mg/dL 108                           * I Have Reviewed All Lab Data Listed Above  * Additional Pertinent Lab Tests Reviewed:  All Labs Within Last 24 Hours Reviewed    Imaging:    Imaging Reports Reviewed Today Include: na  Imaging Personally Reviewed by Myself Includes:  na    Recent Cultures (last 7 days):           Last 24 Hours Medication List:   Current Facility-Administered Medications   Medication Dose Route Frequency Provider Last Rate    acetaminophen  650 mg Oral Q6H PRN Cristhian Echols MD      amLODIPine  10 mg Oral Daily Cristhian Echols MD      apixaban  5 mg Oral BID Cristhian Echols MD      cephalexin  500 mg Oral Q6H [address] Cristhian Echols MD      docusate sodium  100 mg Oral BID Cristhian Echols MD      fluticasone  1 spray Nasal Daily Cristhian Echols MD      gabapentin  600 mg Oral TID Cristhian MD Kinza      latanoprost  1 drop Both Eyes HS Cristhian Echols MD      losartan  25 mg Oral Daily Cristhian Echols MD      miconazole   Topical BID PRN Ayde Gonsales MD      And    miconazole  200 mg Vaginal HS Ayde Gonsales MD      nystatin   Topical BID Ayde Gonsales MD      nystatin   Topical BID Ayde Gonsales MD      oxyCODONE  5 mg Oral Q6H PRN Ayde Gonsales MD      pantoprazole  40 mg Oral Daily Before Breakfast Hal Garay MD      sertraline  150 mg Oral Daily Ayde Gonsales MD      tamsulosin  0 4 mg Oral Daily With Charissa Correa MD      traZODone  100 mg Oral HS Hal Garay MD          Today, Patient Was Seen By: Hal Garay MD    ** Please Note: Dictation voice to text software may have been used in the creation of this document   **

## 2021-03-18 NOTE — CASE MANAGEMENT
CM was in contact with Brandon MCCANN at [facility] today [phone], Ref# [id number], to follow up on the previously initiated auth process for the Pt's admission to [address] [place] [address]  Brandon MCCANN confirmed the initiation of the auth and requested the Pt's clinical be faxed to [facility]  [phone]  JO ANN faxed the requested clinical and is currently awaiting a response  JO ANN will continue to follow

## 2021-03-18 NOTE — CASE MANAGEMENT
JO ANN received a call from [facility] reporting the Pt was approved for admission to SNF  CM made Lila aware the Pt has decided to go home instead of SNF

## 2021-03-18 NOTE — DISCHARGE SUMMARY
[address]  Discharge- Yevgeniy Cuello 1940, 80 y o  female MRN: [id number]  Unit/Bed#: -[place]mi Encounter: [id number]  Primary Care Provider: LUCY Sylvester   Date and time admitted to hospital: 3/10/2021 11:12 AM    * Generalized weakness  Assessment & Plan  Presented with generalized weakness  She has been struggling to get sore outpatient appointments due to difficulty ambulating and poor transportation  PT/OT recommending rehab  However patient refused like home physical therapy  Set up for home physical therapy  Risks were discussed    Fungal infection of the groin  Assessment & Plan   nystatin cream in the groin  - some scratch marks vs fungal lesions around labia which may be contributing to dysuria  - nystatin powder in the abd skin folds  - also started on monistat    UTI (urinary tract infection)  Assessment & Plan  - h o ESBL and enterococcus in 2018- colonies <100,000 but pt symptomatic  - UA this admission is also (+) for UTI    - UC: Ecoli sensitive to ceftriaxone  Will treat for 4 more days of oral antibiotic    Dementia ([facility] )  Assessment & Plan  Frontal lobe dementia  Supportive care  Cont zolfot (increased dose to 150mg )and trazodone      Chronic pain  Assessment & Plan  Chronic pain  Continue outpatient pain medications    Moderate episode of recurrent major depressive disorder ([facility] )  Assessment & Plan  Continue home Zoloft and trazodone    COPD (chronic obstructive pulmonary disease) ([facility] )  Assessment & Plan  Not in acute exacerbation  Continue albuterol p r n      Hypertension  Assessment & Plan  /86   Pulse 72   Temp 98 7 °F (37 1 °C)   Resp 18   Ht 5' 6" (1 676 m)   Wt 115 kg (253 lb 8 5 oz)   LMP  (LMP Unknown)   SpO2 96%   BMI 40 92 kg/m²   Continue home losartan and amlodipine  BP controlled    Atrial fibrillation (HCC)  Assessment & Plan  Currently rate controlled  Continue home Eliquis      Discharging Physician / Practitioner: Abeba Barrow MD  PCP: LUCY Sylvester  Admission Date:   Admission Orders (From admission, onward)     Ordered        03/11/21 1429  Inpatient Admission  Once         03/10/21 1507  Place in Observation  Once                   Discharge Date: 03/18/21    Resolved Problems  Date Reviewed: 3/18/2021    None          Consultations During Hospital Stay:  · none    Procedures Performed:   · none    Significant Findings / Test Results:   Ct Abdomen Pelvis Wo Contrast    Result Date: 3/10/2021  Impression: Circumferential bladder wall thickening may be related to underdistention versus cystitis  Correlate with urinalysis  No intraluminal calculi  Limited evaluation for intraluminal lesions without IV contrast   No dominant polypoid mass seen on limited noncontrast study  Nonobstructing left renal calculi  Colonic diverticulosis without evidence of diverticulitis  Grossly stable postoperative and degenerative changes lumbar spine  Workstation performed: [id number]     Xr Chest 2 Views    Result Date: 3/10/2021  · Impression: Poor aspiration  Cardiomegaly  Mild congestive changes  Workstation performed: [id number]     Incidental Findings:   · none     Test Results Pending at Discharge (will require follow up):   · none     Outpatient Tests Requested:  · none    Complications:  none    Reason for Admission:  Ambulatory dysfunction    Hospital Course:     Yevgeniy Cuello is a 80 y o  female patient who originally presented to the hospital on 3/10/2021 due to generalized weakness  She has been struggling outpatient with ambulation she was evaluated by PT/OT recommended rehab however patient reports she really does not want rehab and thus be discharged home with physical therapy  Risks were discussed and still decided she would rather go home    She noted to have a UTI treated with IV antibiotics initially in transition oral antibiotics  She will follow-up with her PCP in 1 week  Please see chart for further details      Please see above list of diagnoses and related plan for additional information  Condition at Discharge: stable     Discharge Day Visit / Exam:     Subjective:  No acute complaints  Vitals: Blood Pressure: 122/86 (03/18/21 0750)  Pulse: 72 (03/18/21 0750)  Temperature: 98 7 °F (37 1 °C) (03/18/21 0750)  Temp Source: Oral (03/10/21 1114)  Respirations: 18 (03/18/21 0750)  Height: 5' 6" (167 6 cm) (03/10/21 1709)  Weight - Scale: 115 kg (253 lb 8 5 oz) (03/17/21 0600)  SpO2: 96 % (03/18/21 0750)  Exam:   Physical Exam  Constitutional:       General: She is not in acute distress  Appearance: She is well-developed  She is not diaphoretic  HENT:      Head: Normocephalic and atraumatic  Nose: Nose normal       Mouth/Throat:      Pharynx: No oropharyngeal exudate  Eyes:      General: No scleral icterus  Right eye: No discharge  Left eye: No discharge  Conjunctiva/sclera: Conjunctivae normal    Neck:      Musculoskeletal: Normal range of motion and neck supple  Thyroid: No thyromegaly  Vascular: No JVD  Cardiovascular:      Rate and Rhythm: Normal rate and regular rhythm  Heart sounds: Normal heart sounds  No murmur  No friction rub  No gallop  Pulmonary:      Effort: Pulmonary effort is normal  No respiratory distress  Breath sounds: Normal breath sounds  No wheezing or rales  Chest:      Chest wall: No tenderness  Abdominal:      General: Bowel sounds are normal  There is no distension  Palpations: Abdomen is soft  Tenderness: There is no abdominal tenderness  There is no guarding or rebound  Musculoskeletal: Normal range of motion  General: No tenderness or deformity  Skin:     General: Skin is warm and dry  Findings: No erythema or rash  Neurological:      Mental Status: She is alert  Mental status is at baseline  Cranial Nerves: No cranial nerve deficit  Sensory: No sensory deficit  Motor: No abnormal muscle tone  Coordination: Coordination normal          Discussion with Family: pt    Discharge instructions/Information to patient and family:   See after visit summary for information provided to patient and family  Provisions for Follow-Up Care:  See after visit summary for information related to follow-up care and any pertinent home health orders  Disposition:     Home with VNA Services (Reminder: Complete face to face encounter)    For Discharges to [facility] SNF:   · Not Applicable to this Patient - Not Applicable to this Patient    Planned Readmission: none     Discharge Statement:  I spent 60 minutes discharging the patient  This time was spent on the day of discharge  I had direct contact with the patient on the day of discharge  Greater than 50% of the total time was spent examining patient, answering all patient questions, arranging and discussing plan of care with patient as well as directly providing post-discharge instructions  Additional time then spent on discharge activities  Discharge Medications:  See after visit summary for reconciled discharge medications provided to patient and family        ** Please Note: This note has been constructed using a voice recognition system **

## 2021-03-18 NOTE — CASE MANAGEMENT
Pt now requesting to be d/c home, instead of SNF  CM was in contact with Pt's daughter Carlitos Madrigal [phone] regarding the Pt's request to be d/c back to the home environment  Carlitos Madrigal reported she and the Pt had discussed the d/c plan to home and they are both in agreement with the d/c to home at this time, not SNF  CM is currently awaiting a call back from [facility] with an assigned bls transport time for the Pt to be d/c today to home

## 2021-03-19 ENCOUNTER — TRANSITIONAL CARE MANAGEMENT (OUTPATIENT)
Dept: FAMILY MEDICINE CLINIC | Facility: CLINIC | Age: 81
End: 2021-03-19

## 2021-03-19 DIAGNOSIS — B37.2 YEAST DERMATITIS: Primary | ICD-10-CM

## 2021-03-19 RX ORDER — MICONAZOLE NITRATE 20.6 MG/G
POWDER TOPICAL AS NEEDED
Qty: 70 G | Refills: 1 | Status: SHIPPED | OUTPATIENT
Start: 2021-03-19 | End: 2021-07-20 | Stop reason: SDUPTHER

## 2021-03-19 NOTE — UTILIZATION REVIEW
Notification of Discharge  This is a Notification of Discharge from our facility [address]  Please be advised that this patient has been discharge from our facility  Below you will find the admission and discharge date and time including the patients disposition  PRESENTATION DATE: 3/10/2021 11:12 AM  OBS ADMISSION DATE: 03/10/2021  IP ADMISSION DATE: 3/11/21 1429   DISCHARGE DATE: 3/18/2021  5:54 PM  DISPOSITION: Home with [facility] with [facility]   Admission Orders listed below:  Admission Orders (From admission, onward)     Ordered        03/11/21 1429  Inpatient Admission  Once         03/10/21 1507  Place in Observation  Once                   Please contact the UR Department if additional information is required to close this patient's authorization/case  [facility] Utilization Review Department  Main: [phone] x carefully listen to the prompts  All voicemails are confidential   [email or web address]  Send all requests for admission clinical reviews, approved or denied determinations and any other requests to dedicated fax number below belonging to the campus where the patient is receiving treatment   List of dedicated fax numbers:  [address] [place] [address] DENIALS (Administrative/Medical Necessity) [phone]   [address]  (Maternity/NICU/Pediatrics) [phone]   Stefan Spivey [phone]   Aubrey Dawn [phone]   Phong Suarez [phone]   [facility] [address] [place] [address] [phone]   [facility]  [phone]   [address] [facility]  [address] [facility] [place] [address] [facility] [phone]

## 2021-03-19 NOTE — TELEPHONE ENCOUNTER
Jaylin called, pt was d/c from the hospital with a rash and was told she need to call the office here for refill

## 2021-03-20 ENCOUNTER — TELEPHONE (OUTPATIENT)
Dept: OTHER | Facility: OTHER | Age: 81
End: 2021-03-20

## 2021-03-21 NOTE — TELEPHONE ENCOUNTER
[phone]/ Marta/ [facility] home care/ Pt was discharged from the hospital with a UTI, Pt was put on an antibiotic but continues to have UTI symptoms such as blood in urine

## 2021-03-22 DIAGNOSIS — N30.01 ACUTE CYSTITIS WITH HEMATURIA: Primary | ICD-10-CM

## 2021-03-22 DIAGNOSIS — R31.9 HEMATURIA, UNSPECIFIED TYPE: ICD-10-CM

## 2021-03-22 NOTE — TELEPHONE ENCOUNTER
Neeru Mercado I was not sure if you were aware of this   Did you want to add her on or just call in another antibiotic

## 2021-03-22 NOTE — TELEPHONE ENCOUNTER
Called Marta back at [facility]; and spoke with her  She said there was a little bit of blood on the pad when they went to change her and the pad but it has been like this since she came back home  I asked if they can just do the ua and culture  She asked if I can fax it to [facility] so they can assign the order to a nurse since she was off until Thursday   [phone] is the fax number

## 2021-03-23 ENCOUNTER — TELEPHONE (OUTPATIENT)
Dept: FAMILY MEDICINE CLINIC | Facility: CLINIC | Age: 81
End: 2021-03-23

## 2021-03-23 NOTE — TELEPHONE ENCOUNTER
Reyna Fearing from residential physical therapy called to let you know that the patient asked to delay her physical therapy evaluation to Thursday

## 2021-03-27 ENCOUNTER — LAB REQUISITION (OUTPATIENT)
Dept: LAB | Facility: HOSPITAL | Age: 81
End: 2021-03-27
Payer: COMMERCIAL

## 2021-03-27 DIAGNOSIS — R31.9 HEMATURIA, UNSPECIFIED: ICD-10-CM

## 2021-03-27 LAB
AMORPH URATE CRY URNS QL MICRO: ABNORMAL /HPF
BACTERIA UR QL AUTO: ABNORMAL /HPF
BILIRUB UR QL STRIP: NEGATIVE
CLARITY UR: ABNORMAL
COLOR UR: YELLOW
GLUCOSE UR STRIP-MCNC: NEGATIVE MG/DL
HGB UR QL STRIP.AUTO: ABNORMAL
KETONES UR STRIP-MCNC: NEGATIVE MG/DL
LEUKOCYTE ESTERASE UR QL STRIP: NEGATIVE
NITRITE UR QL STRIP: NEGATIVE
NON-SQ EPI CELLS URNS QL MICRO: ABNORMAL /HPF
PH UR STRIP.AUTO: 5 [PH]
PROT UR STRIP-MCNC: NEGATIVE MG/DL
RBC #/AREA URNS AUTO: ABNORMAL /HPF
SP GR UR STRIP.AUTO: <=1.005 (ref 1–1.03)
UROBILINOGEN UR QL STRIP.AUTO: 0.2 E.U./DL
WBC #/AREA URNS AUTO: ABNORMAL /HPF

## 2021-03-27 PROCEDURE — 81001 URINALYSIS AUTO W/SCOPE: CPT | Performed by: NURSE PRACTITIONER

## 2021-03-27 PROCEDURE — 87086 URINE CULTURE/COLONY COUNT: CPT | Performed by: NURSE PRACTITIONER

## 2021-03-28 LAB — BACTERIA UR CULT: NORMAL

## 2021-03-30 ENCOUNTER — TELEMEDICINE (OUTPATIENT)
Dept: FAMILY MEDICINE CLINIC | Facility: CLINIC | Age: 81
End: 2021-03-30
Payer: COMMERCIAL

## 2021-03-30 DIAGNOSIS — N39.0 FREQUENT UTI: Primary | ICD-10-CM

## 2021-03-30 DIAGNOSIS — I10 ESSENTIAL HYPERTENSION: ICD-10-CM

## 2021-03-30 DIAGNOSIS — R53.81 PHYSICAL DECONDITIONING: ICD-10-CM

## 2021-03-30 DIAGNOSIS — K21.9 GASTROESOPHAGEAL REFLUX DISEASE, UNSPECIFIED WHETHER ESOPHAGITIS PRESENT: ICD-10-CM

## 2021-03-30 PROCEDURE — 99495 TRANSJ CARE MGMT MOD F2F 14D: CPT | Performed by: NURSE PRACTITIONER

## 2021-03-30 RX ORDER — PHENAZOPYRIDINE HYDROCHLORIDE 100 MG/1
100 TABLET, FILM COATED ORAL 3 TIMES DAILY PRN
Qty: 10 TABLET | Refills: 0 | Status: SHIPPED | OUTPATIENT
Start: 2021-03-30 | End: 2021-09-29 | Stop reason: ALTCHOICE

## 2021-03-30 RX ORDER — PANTOPRAZOLE SODIUM 40 MG/1
40 TABLET, DELAYED RELEASE ORAL
Qty: 90 TABLET | Refills: 3 | Status: ON HOLD | OUTPATIENT
Start: 2021-03-30 | End: 2022-04-09

## 2021-03-30 NOTE — PROGRESS NOTES
Virtual Regular Visit      Assessment/Plan:    Problem List Items Addressed This Visit        Cardiovascular and Mediastinum    Hypertension     PT does check BP and states it has been stable at home  Genitourinary    Frequent UTI - Primary     Patient recently admitted for UTI--treated with IV ABX and PO upon discharge  Recent urine culture after hospital stay is negative  Relevant Medications    phenazopyridine (Pyridium) 100 mg tablet       Other    Physical deconditioning     Continue with home PT/OT and home health  Other Visit Diagnoses     Gastroesophageal reflux disease, unspecified whether esophagitis present        Relevant Medications    pantoprazole (PROTONIX) 40 mg tablet               Reason for visit is   Chief Complaint   Patient presents with    Virtual Regular Visit        Encounter provider LUCY Tucker    Provider located at [facility] [address] [facility]  [address] [place] [id number] [phone]      Recent Visits  Date Type Provider Dept   03/23/21 Telephone Casie Mario, [address] recent visits within past 7 days and meeting all other requirements     Today's Visits  Date Type Provider Dept   03/30/21 Telemedicine LUCY Shultz  [facility] [address] [facility]   Showing today's visits and meeting all other requirements     Future Appointments  No visits were found meeting these conditions  Showing future appointments within next 150 days and meeting all other requirements        The patient was identified by name and date of birth  James Ladonnaashia was informed that this is a telemedicine visit and that the visit is being conducted through [employer] and patient was informed that this is not a secure, HIPAA-compliant platform  She agrees to proceed     My office door was closed  No one else was in the room    She acknowledged consent and understanding of privacy and security of the video platform  The patient has agreed to participate and understands they can discontinue the visit at any time  Patient is aware this is a billable service  Subjective  Mark Glass is a 80 y o  female   Presenting via   [employer] for hospital follow-up  Patient is home bound and requires ambulance transport to leave her house  Patient was admitted from 3-10 to 3-18 for generalized weakness and urinary tract infection  Patient was given IV antibiotics and 4 days of oral  Antibiotics upon discharge  The patient did have repeat culture which was negative after hospital stay  Patient does have home health aides, physical therapy and occupational therapy come to her home  Patient states she is feeling well since the hospital, but continues with dysuria  Patient states she has had dysuria for a number of years and continues with frequent urinary tract infections  She is walking a little bit and is trying to get to the commode as much as possible        HPI     Past Medical History:   Diagnosis Date    [facility]fib [facility]     Anxiety     Chronic pain     Chronic respiratory failure with hypoxia (HCC)     COPD (chronic obstructive pulmonary disease) (HCC)     Dementia ([facility]     Dorsalgia, unspecified     Edema     Encephalopathy     GERD (gastroesophageal reflux disease)     Hypertension     Morbid obesity ([facility])     Muscle weakness (generalized)     Opioid dependence ([facility])     Overactive bladder     Pneumonia     Psychiatric disorder     anxiety, bipolar    Radiculopathy of thoracolumbar region     Sciatica     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 ([facility] )     Urinary incontinence     UTI (urinary tract infection)        Past Surgical History:   Procedure Laterality Date    APPENDECTOMY      BACK SURGERY      CHOLECYSTECTOMY      KNEE SURGERY         Current Outpatient Medications   Medication Sig Dispense Refill    acetaminophen (TYLENOL) 325 mg tablet Take 650 mg by mouth every 6 (six) hours as needed for mild pain      amLODIPine (NORVASC) 10 mg tablet Take 1 tablet (10 mg total) by mouth daily 90 tablet 1    Baclofen 5 MG TABS oe po qd in the am 90 tablet 1    calcium carbonate-vitamin D (OSCAL-D) 500 mg-200 units per tablet Take 1 tablet by mouth daily with breakfast      docusate sodium (COLACE) 100 mg capsule Take 100 mg by mouth 2 (two) times a day      Eliquis 5 MG TAKE 1 TABLET BY MOUTH TWICE A  tablet 1    EQL FLUTICASONE PROPIONATE NA into each nostril      ferrous sulfate 325 (65 Fe) mg tablet TAKE 1 TABLET BY MOUTH EVERY DAY WITH BREAKFAST 90 tablet 1    gabapentin (NEURONTIN) 600 MG tablet TAKE 1 TABLET BY MOUTH THREE TIMES A  tablet 1    Incontinence Supply Disposable (PROCare Bariatric Briefs) MISC Use every 2 (two) hours as needed (incontinence) 120 each 1    latanoprost (XALATAN) 0 005 % ophthalmic solution Administer 1 drop to both eyes daily at bedtime 7 5 mL 0    loratadine (CLARITIN) 10 mg tablet TAKE 1 TABLET BY MOUTH EVERY DAY 90 tablet 1    losartan (COZAAR) 25 mg tablet Take 1 tablet (25 mg total) by mouth daily 90 tablet 1    miconazole (Zeasorb-AF) 2 % powder Apply topically as needed for itching 70 g 1    nystatin (MYCOSTATIN) cream Apply topically 2 (two) times a day 30 g 0    oxyCODONE (OxyCONTIN) 10 mg 12 hr tablet Take 1 tablet (10 mg total) by mouth every 12 (twelve) hoursMax Daily Amount: 20 mg 60 tablet 0    OXYGEN-HELIUM IN Inhale 3 L as needed 3 L NC O2 as needed for SOB for SpO2 less than 90      pantoprazole (PROTONIX) 40 mg tablet Take 1 tablet (40 mg total) by mouth daily before breakfast 90 tablet 3    phenazopyridine (Pyridium) 100 mg tablet Take 1 tablet (100 mg total) by mouth 3 (three) times a day as needed for bladder spasms 10 tablet 0    sertraline (ZOLOFT) 100 mg tablet TAKE 1 TABLET BY MOUTH EVERY DAY 90 tablet 0    tamsulosin (FLOMAX) 0 4 mg Take 1 capsule (0 4 mg total) by mouth daily with dinner 1 po HS 90 capsule 1    traZODone (DESYREL) 100 mg tablet TAKE 1 TABLET BY MOUTH DAILY AT BEDTIME 90 tablet 0     No current facility-administered medications for this visit  Allergies   Allergen Reactions    Aspirin     Iodinated Diagnostic Agents Throat Swelling    Iodine     Nsaids        Review of Systems   Constitutional: Negative for chills and fever  HENT: Negative for ear pain and sore throat  Eyes: Negative for pain and visual disturbance  Respiratory: Negative for cough and shortness of breath  Cardiovascular: Negative for chest pain and palpitations  Gastrointestinal: Negative for abdominal pain and vomiting  Genitourinary: Positive for dysuria  Negative for hematuria  Musculoskeletal: Positive for back pain (chronic)  Negative for arthralgias  Skin: Negative for color change and rash  Neurological: Negative for seizures and syncope  All other systems reviewed and are negative  Video Exam    There were no vitals filed for this visit  Physical Exam  Constitutional:       General: She is not in acute distress  Comments: Patient is laying in bed   Pulmonary:      Effort: Pulmonary effort is normal    Neurological:      Mental Status: She is alert and oriented to person, place, and time  Mental status is at baseline  I spent 30 minutes directly with the patient during this visit      VIRTUAL VISIT DISCLAIMER    Deanne Rivers acknowledges that she has consented to an online visit or consultation  She understands that the online visit is based solely on information provided by her, and that, in the absence of a face-to-face physical evaluation by the physician, the diagnosis she receives is both limited and provisional in terms of accuracy and completeness  This is not intended to replace a full medical face-to-face evaluation by the physician   Deanne Rivers understands and accepts these terms

## 2021-03-30 NOTE — ASSESSMENT & PLAN NOTE
Patient recently admitted for UTI--treated with IV ABX and PO upon discharge  Recent urine culture after hospital stay is negative

## 2021-03-31 ENCOUNTER — TELEPHONE (OUTPATIENT)
Dept: FAMILY MEDICINE CLINIC | Facility: CLINIC | Age: 81
End: 2021-03-31

## 2021-03-31 DIAGNOSIS — R26.2 AMBULATORY DYSFUNCTION: Primary | ICD-10-CM

## 2021-03-31 NOTE — TELEPHONE ENCOUNTER
[address]   [phone]  Requesting rolling wheel chair her size for the pt  Stated the one that was used in the hospital was perfect fit for her   Please advise, Thank you

## 2021-04-02 DIAGNOSIS — R31.9 HEMATURIA, UNSPECIFIED TYPE: ICD-10-CM

## 2021-04-02 DIAGNOSIS — N39.0 FREQUENT UTI: Primary | ICD-10-CM

## 2021-04-02 DIAGNOSIS — B37.2 YEAST DERMATITIS: ICD-10-CM

## 2021-04-05 ENCOUNTER — TELEPHONE (OUTPATIENT)
Dept: FAMILY MEDICINE CLINIC | Facility: CLINIC | Age: 81
End: 2021-04-05

## 2021-04-05 NOTE — TELEPHONE ENCOUNTER
----- Message from LUCY Trevizo sent at 4/2/2021  9:02 AM EDT -----  Regarding: FW: Non-Urgent Medical Question  Contact: [phone]  Please let Neel Resi know that referral is placed    ----- Message -----  From: Sonia He MA  Sent: 4/1/2021   1:28 PM EDT  To: LUCY Alcala  Subject: FW: Non-Urgent Medical Question                    ----- Message -----  From: Lj Cruz  Sent: 4/1/2021   1:27 PM EDT  To: , #  Subject: Non-Urgent Medical Question                      This message is being sent by Ruth Ann Webster on behalf of Stacy Louise  Today Lupillo Walker from [facility] suggested Mom see a Gyno/Uro specialist  I know [facility] has one close by  I called their office and they asked that you send them a referral for mom  She still has blood in her urine   the fungal infection   incontinence and pain  If you are in agreement with the referral can you please make sure one is sent to the attached  Then they will make an appt for mom  Thank you soooo sara Reis

## 2021-04-08 ENCOUNTER — TELEPHONE (OUTPATIENT)
Dept: FAMILY MEDICINE CLINIC | Facility: CLINIC | Age: 81
End: 2021-04-08

## 2021-04-08 NOTE — TELEPHONE ENCOUNTER
[address]  is requesting for the patient 12 hours patches for her back pain   Please advise, Thank you

## 2021-04-09 DIAGNOSIS — G89.29 OTHER CHRONIC PAIN: Primary | ICD-10-CM

## 2021-04-09 RX ORDER — LIDOCAINE 50 MG/G
1 PATCH TOPICAL DAILY
Qty: 30 PATCH | Refills: 0 | Status: SHIPPED | OUTPATIENT
Start: 2021-04-09 | End: 2021-08-24

## 2021-04-14 ENCOUNTER — TELEPHONE (OUTPATIENT)
Dept: FAMILY MEDICINE CLINIC | Facility: CLINIC | Age: 81
End: 2021-04-14

## 2021-04-14 DIAGNOSIS — R26.2 AMBULATORY DYSFUNCTION: Primary | ICD-10-CM

## 2021-04-14 NOTE — TELEPHONE ENCOUNTER
Martita Garcia from [facility] called requesting a script for a rolling walker to be faxed to [facility] at [phone]  I can fax the order once it is placed

## 2021-04-15 DIAGNOSIS — F41.9 ANXIETY: ICD-10-CM

## 2021-04-15 DIAGNOSIS — F51.01 PRIMARY INSOMNIA: ICD-10-CM

## 2021-04-16 RX ORDER — SERTRALINE HYDROCHLORIDE 100 MG/1
100 TABLET, FILM COATED ORAL DAILY
Qty: 90 TABLET | Refills: 1 | Status: SHIPPED | OUTPATIENT
Start: 2021-04-16 | End: 2021-09-24 | Stop reason: SDUPTHER

## 2021-04-16 RX ORDER — TRAZODONE HYDROCHLORIDE 100 MG/1
100 TABLET ORAL
Qty: 90 TABLET | Refills: 1 | Status: SHIPPED | OUTPATIENT
Start: 2021-04-16 | End: 2021-09-24 | Stop reason: SDUPTHER

## 2021-04-20 ENCOUNTER — TELEPHONE (OUTPATIENT)
Dept: FAMILY MEDICINE CLINIC | Facility: CLINIC | Age: 81
End: 2021-04-20

## 2021-04-21 ENCOUNTER — TELEPHONE (OUTPATIENT)
Dept: FAMILY MEDICINE CLINIC | Facility: CLINIC | Age: 81
End: 2021-04-21

## 2021-04-21 DIAGNOSIS — H40.9 GLAUCOMA, UNSPECIFIED GLAUCOMA TYPE, UNSPECIFIED LATERALITY: ICD-10-CM

## 2021-04-21 RX ORDER — LATANOPROST 50 UG/ML
1 SOLUTION/ DROPS OPHTHALMIC
Qty: 7.5 ML | Refills: 0 | Status: SHIPPED | OUTPATIENT
Start: 2021-04-21 | End: 2021-08-02

## 2021-04-21 NOTE — TELEPHONE ENCOUNTER
Syeda Morris from [facility] called to verify we faxed the order for the rolling walker  I verified the date and time it was faxed  She also is sending information over to re-certify her for physical therapy

## 2021-04-22 ENCOUNTER — TELEPHONE (OUTPATIENT)
Dept: FAMILY MEDICINE CLINIC | Facility: CLINIC | Age: 81
End: 2021-04-22

## 2021-04-22 DIAGNOSIS — I10 HYPERTENSION: Chronic | ICD-10-CM

## 2021-04-22 RX ORDER — AMLODIPINE BESYLATE 10 MG/1
TABLET ORAL
Qty: 90 TABLET | Refills: 1 | Status: SHIPPED | OUTPATIENT
Start: 2021-04-22 | End: 2021-10-19

## 2021-04-22 RX ORDER — LOSARTAN POTASSIUM 25 MG/1
TABLET ORAL
Qty: 90 TABLET | Refills: 1 | Status: SHIPPED | OUTPATIENT
Start: 2021-04-22 | End: 2021-10-11

## 2021-04-22 NOTE — TELEPHONE ENCOUNTER
[facility] called, declined order  Patient is currently renting a wheelchair  Medicaid is 2nd  No self pay can be offered   Thank you

## 2021-05-18 DIAGNOSIS — G89.29 OTHER CHRONIC PAIN: ICD-10-CM

## 2021-05-18 RX ORDER — BACLOFEN 5 MG/1
TABLET ORAL
Qty: 90 TABLET | Refills: 1 | Status: SHIPPED | OUTPATIENT
Start: 2021-05-18 | End: 2021-11-08

## 2021-05-18 RX ORDER — OXYCODONE HCL 10 MG/1
10 TABLET, FILM COATED, EXTENDED RELEASE ORAL EVERY 12 HOURS SCHEDULED
Qty: 60 TABLET | Refills: 0 | Status: SHIPPED | OUTPATIENT
Start: 2021-05-18 | End: 2021-06-10 | Stop reason: SDUPTHER

## 2021-05-18 NOTE — TELEPHONE ENCOUNTER
Medline refill request for Oxycotin 10 mg tablet PDMP web site checked last known refill occurred on 3/10/2021 dispense QTY 60 for 30 days

## 2021-06-10 ENCOUNTER — TELEPHONE (OUTPATIENT)
Dept: FAMILY MEDICINE CLINIC | Facility: CLINIC | Age: 81
End: 2021-06-10

## 2021-06-10 DIAGNOSIS — G89.29 OTHER CHRONIC PAIN: ICD-10-CM

## 2021-06-10 NOTE — TELEPHONE ENCOUNTER
Scar Hutchison from [facility] called stating patient needs a script for ankle foot orthosis and also needs the order for home physical therapy to be extended  Please advise, this is a patient of [facility]    Fax #[phone]

## 2021-06-15 RX ORDER — OXYCODONE HCL 10 MG/1
10 TABLET, FILM COATED, EXTENDED RELEASE ORAL EVERY 12 HOURS SCHEDULED
Qty: 60 TABLET | Refills: 0 | Status: SHIPPED | OUTPATIENT
Start: 2021-06-15 | End: 2021-07-30 | Stop reason: SDUPTHER

## 2021-07-01 ENCOUNTER — TELEPHONE (OUTPATIENT)
Dept: FAMILY MEDICINE CLINIC | Facility: CLINIC | Age: 81
End: 2021-07-01

## 2021-07-01 NOTE — TELEPHONE ENCOUNTER
Claudeen Ebbs from [facility] called stating patient needs a script for ankle foot orthosis and also needs the order for home physical therapy to be extended  Please advise, this is a patient of [email or web address]    Phone #[phone]   Fax # [phone]

## 2021-07-08 ENCOUNTER — TELEPHONE (OUTPATIENT)
Dept: FAMILY MEDICINE CLINIC | Facility: CLINIC | Age: 81
End: 2021-07-08

## 2021-07-08 NOTE — TELEPHONE ENCOUNTER
Patient called stating she is homebound and is requesting someone come to her to vaccinate her for covid

## 2021-07-08 NOTE — TELEPHONE ENCOUNTER
Unfortunately, there is nothing in place now for home bound patients  They are working on this matter  Patient can wait until we hear an update and We can call office of the Aging and see if they have any updated information      Thank you,

## 2021-07-08 NOTE — TELEPHONE ENCOUNTER
Patient wants to know if you can also send in the Fluticasone Propionate 50 mcg  This is not on the medication list for us to choose from

## 2021-07-20 DIAGNOSIS — B37.2 YEAST DERMATITIS: ICD-10-CM

## 2021-07-20 RX ORDER — MICONAZOLE NITRATE 20.6 MG/G
POWDER TOPICAL AS NEEDED
Qty: 70 G | Refills: 0 | Status: SHIPPED | OUTPATIENT
Start: 2021-07-20 | End: 2021-09-24 | Stop reason: SDUPTHER

## 2021-07-21 ENCOUNTER — TELEMEDICINE (OUTPATIENT)
Dept: FAMILY MEDICINE CLINIC | Facility: CLINIC | Age: 81
End: 2021-07-21
Payer: COMMERCIAL

## 2021-07-21 DIAGNOSIS — R41.82 ALTERED MENTAL STATUS, UNSPECIFIED ALTERED MENTAL STATUS TYPE: Primary | ICD-10-CM

## 2021-07-21 PROCEDURE — 99214 OFFICE O/P EST MOD 30 MIN: CPT | Performed by: FAMILY MEDICINE

## 2021-07-21 NOTE — PROGRESS NOTES
Virtual Regular Visit    Verification of patient location:    Patient is currently located in the state of [place]  Patient is currently located in a state in which I am licensed    Assessment/Plan:    Problem List Items Addressed This Visit     None      Visit Diagnoses     Altered mental status, unspecified altered mental status type    -  Primary       discussed with family, advise patient will need ER evaluation today, expressed same to patient to urgency of evaluation/ treatment  family members agree will proceed with plan         Reason for visit is   Chief Complaint   Patient presents with    Virtual Regular Visit        Encounter provider LUCY Wood    Provider located at [facility] [id number] [address] [phone]      Recent Visits  No visits were found meeting these conditions  Showing recent visits within past 7 days and meeting all other requirements  Today's Visits  Date Type Provider Dept   07/21/21 Telemedicine LUCY Wood [address] today's visits and meeting all other requirements  Future Appointments  No visits were found meeting these conditions  Showing future appointments within next 150 days and meeting all other requirements       The patient was identified by name and date of birth  Jami Mclean was informed that this is a telemedicine visit and that the visit is being conducted through [address] [place] [address] Now and patient was informed that this is a secure, HIPAA-compliant platform  She agrees to proceed     My office door was closed  No one else was in the room  She acknowledged consent and understanding of privacy and security of the video platform  The patient has agreed to participate and understands they can discontinue the visit at any time  Patient is aware this is a billable service       Subjective  Jami Mclean is a 80 y o  female          Family, Louann Golden    concerned about altered mental status , temps of 106, 103   History of uti although unsure presently , family feels the needs for pt to go to the [facility] but refusing , wanted to have pt hear from you   Prior to getting the moderna (Monday 7/19)  vac , feeling ill        Past Medical History:   Diagnosis Date    [facility]fib ([facility] )     Anxiety     Chronic pain     [facility]     COPD (chronic obstructive pulmonary disease) [facility]     Dementia ([facility] )     Dorsalgia, unspecified     Edema     Encephalopathy     GERD (gastroesophageal reflux disease)     Hypertension     Morbid obesity [facility]     Muscle weakness (generalized)     Opioid dependence ([place] [address] )     Overactive bladder     Pneumonia     Psychiatric disorder     anxiety, bipolar    Radiculopathy of thoracolumbar region     Sciatica     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 ([place] [address] )     Urinary incontinence     UTI (urinary tract infection)        Past Surgical History:   Procedure Laterality Date    APPENDECTOMY      BACK SURGERY      CHOLECYSTECTOMY      KNEE SURGERY         Current Outpatient Medications   Medication Sig Dispense Refill    acetaminophen (TYLENOL) 325 mg tablet Take 650 mg by mouth every 6 (six) hours as needed for mild pain      amLODIPine (NORVASC) 10 mg tablet TAKE 1 TABLET BY MOUTH EVERY DAY 90 tablet 1    Baclofen 5 MG TABS Take 1 po qd in the am 90 tablet 1    calcium carbonate-vitamin D (OSCAL-D) 500 mg-200 units per tablet Take 1 tablet by mouth daily with breakfast      docusate sodium (COLACE) 100 mg capsule Take 100 mg by mouth 2 (two) times a day      Eliquis 5 MG TAKE 1 TABLET BY MOUTH TWICE A  tablet 1    ferrous sulfate 325 (65 Fe) mg tablet TAKE 1 TABLET BY MOUTH EVERY DAY WITH BREAKFAST 90 tablet 1    fluticasone (EQL Fluticasone Propionate) 50 mcg/act nasal spray 1 spray into each nostril daily 16 g 3    gabapentin (NEURONTIN) 600 MG tablet TAKE 1 TABLET BY MOUTH THREE TIMES A  tablet 1    Incontinence Supply Disposable (PROCare Bariatric Briefs) MISC Use every 2 (two) hours as needed (incontinence) 120 each 1    latanoprost (XALATAN) 0 005 % ophthalmic solution ADMINISTER 1 DROP TO BOTH EYES DAILY AT BEDTIME 7 5 mL 0    lidocaine (LIDODERM) 5 % Apply 1 patch topically daily Remove & Discard patch within 12 hours or as directed by MD 30 patch 0    loratadine (CLARITIN) 10 mg tablet TAKE 1 TABLET BY MOUTH EVERY DAY 90 tablet 1    losartan (COZAAR) 25 mg tablet TAKE 1 TABLET BY MOUTH EVERY DAY 90 tablet 1    miconazole (Zeasorb-AF) 2 % powder Apply topically as needed for itching 70 g 0    nystatin (MYCOSTATIN) cream Apply topically 2 (two) times a day 30 g 0    oxyCODONE (OxyCONTIN) 10 mg 12 hr tablet Take 1 tablet (10 mg total) by mouth every 12 (twelve) hoursMax Daily Amount: 20 mg 60 tablet 0    OXYGEN-HELIUM IN Inhale 3 L as needed 3 L NC O2 as needed for SOB for SpO2 less than 90      pantoprazole (PROTONIX) 40 mg tablet Take 1 tablet (40 mg total) by mouth daily before breakfast 90 tablet 3    phenazopyridine (Pyridium) 100 mg tablet Take 1 tablet (100 mg total) by mouth 3 (three) times a day as needed for bladder spasms 10 tablet 0    sertraline (ZOLOFT) 100 mg tablet Take 1 tablet (100 mg total) by mouth daily 90 tablet 1    tamsulosin (FLOMAX) 0 4 mg Take 1 capsule (0 4 mg total) by mouth daily with dinner 1 po HS 90 capsule 1    traZODone (DESYREL) 100 mg tablet Take 1 tablet (100 mg total) by mouth daily at bedtime 90 tablet 1     No current facility-administered medications for this visit  Allergies   Allergen Reactions    Aspirin     Iodinated Diagnostic Agents Throat Swelling    Iodine - Food Allergy     Nsaids        Review of Systems   Constitutional: Positive for activity change, fatigue and fever         Video Exam    There were no vitals filed for this visit     Physical Exam  Constitutional:       Appearance: She is ill-appearing  Pulmonary:      Effort: Pulmonary effort is normal           I spent 20 minutes directly with the patient during this visit    VIRTUAL VISIT DISCLAIMER      Nataliya Mclain verbally agrees to participate in [employer]  Pt is aware that [employer] could be limited without vital signs or the ability to perform a full hands-on physical Margy Taj understands she or the provider may request at any time to terminate the video visit and request the patient to seek care or treatment in person

## 2021-07-30 DIAGNOSIS — G89.29 OTHER CHRONIC PAIN: ICD-10-CM

## 2021-07-30 RX ORDER — OXYCODONE HCL 10 MG/1
10 TABLET, FILM COATED, EXTENDED RELEASE ORAL EVERY 12 HOURS SCHEDULED
Qty: 60 TABLET | Refills: 0 | Status: SHIPPED | OUTPATIENT
Start: 2021-07-30 | End: 2021-08-30 | Stop reason: SDUPTHER

## 2021-07-30 NOTE — TELEPHONE ENCOUNTER
Medline refill request for Oxycotin 10 mg   PDMP website checked last known refill on 6/15/2021 dispense QTY 60 for 30 days

## 2021-08-01 DIAGNOSIS — H40.9 GLAUCOMA, UNSPECIFIED GLAUCOMA TYPE, UNSPECIFIED LATERALITY: ICD-10-CM

## 2021-08-02 RX ORDER — LATANOPROST 50 UG/ML
1 SOLUTION/ DROPS OPHTHALMIC
Qty: 7.5 ML | Refills: 0 | Status: SHIPPED | OUTPATIENT
Start: 2021-08-02 | End: 2021-10-29 | Stop reason: SDUPTHER

## 2021-08-03 ENCOUNTER — TELEPHONE (OUTPATIENT)
Dept: FAMILY MEDICINE CLINIC | Facility: CLINIC | Age: 81
End: 2021-08-03

## 2021-08-03 NOTE — TELEPHONE ENCOUNTER
Pt called because she wanted to let you know that she is dealing with a transportation company to get her to and from appointments but would like a call from you to talk a bit further please advise

## 2021-08-04 NOTE — TELEPHONE ENCOUNTER
Per pt  the transportation company asked her to call them with the office fax number  Pt given the fax number and I will look out any forms

## 2021-08-11 ENCOUNTER — TELEPHONE (OUTPATIENT)
Dept: FAMILY MEDICINE CLINIC | Facility: CLINIC | Age: 81
End: 2021-08-11

## 2021-08-11 NOTE — TELEPHONE ENCOUNTER
Izzy Carlton from [facility] called again advising she needs another approval for care plan for patient due to it ending in 2 weeks and patient hasnt reached goal yet  Izzy Carlton is requesting a call back   [phone]

## 2021-08-11 NOTE — TELEPHONE ENCOUNTER
Danii from [employer]  [facility]  called because pt needs a script for a transition shower bench     Can fax to [phone]  Attention to [facility]

## 2021-08-12 ENCOUNTER — TELEPHONE (OUTPATIENT)
Dept: FAMILY MEDICINE CLINIC | Facility: CLINIC | Age: 81
End: 2021-08-12

## 2021-08-12 NOTE — TELEPHONE ENCOUNTER
Patient is requesting a call from you  She wants to discuss pain meds for her back because her PT exercises are starting to hurt her back and she cannot sleep  Would you like me to schedule her for a virtual appointment, she states she cannot do in office because she has no transportation

## 2021-08-12 NOTE — TELEPHONE ENCOUNTER
Marsha from [address] [place] [address] called stating they have tried to fax the form over four times and we still are not receiving it  She told me the website to go to to print out the form  The form is printed and in your folder to be filled out  For any questions you can call Marsha at [phone]  Please fax the completed form to [phone]

## 2021-08-16 DIAGNOSIS — I48.0 PAROXYSMAL ATRIAL FIBRILLATION (HCC): Chronic | ICD-10-CM

## 2021-08-16 DIAGNOSIS — Z91.09 ENVIRONMENTAL ALLERGIES: ICD-10-CM

## 2021-08-16 RX ORDER — LORATADINE 10 MG/1
TABLET ORAL
Qty: 90 TABLET | Refills: 1 | Status: SHIPPED | OUTPATIENT
Start: 2021-08-16

## 2021-08-16 RX ORDER — APIXABAN 5 MG/1
TABLET, FILM COATED ORAL
Qty: 180 TABLET | Refills: 1 | Status: SHIPPED | OUTPATIENT
Start: 2021-08-16 | End: 2021-12-29

## 2021-08-18 ENCOUNTER — TELEPHONE (OUTPATIENT)
Dept: FAMILY MEDICINE CLINIC | Facility: CLINIC | Age: 81
End: 2021-08-18

## 2021-08-18 NOTE — TELEPHONE ENCOUNTER
Patient called in she is requesting an changed in her script  She do not need a Power Lift wheel chair  She is requesting for stretcher  She needs a letter of needs  This is only way she can be transported to her appointments     Please advise, Thank you

## 2021-08-21 DIAGNOSIS — D50.9 IRON DEFICIENCY ANEMIA, UNSPECIFIED IRON DEFICIENCY ANEMIA TYPE: ICD-10-CM

## 2021-08-23 DIAGNOSIS — G89.29 OTHER CHRONIC PAIN: ICD-10-CM

## 2021-08-23 RX ORDER — FERROUS SULFATE 325(65) MG
TABLET ORAL
Qty: 90 TABLET | Refills: 1 | Status: SHIPPED | OUTPATIENT
Start: 2021-08-23

## 2021-08-24 ENCOUNTER — TELEPHONE (OUTPATIENT)
Dept: FAMILY MEDICINE CLINIC | Facility: CLINIC | Age: 81
End: 2021-08-24

## 2021-08-24 RX ORDER — LIDOCAINE 50 MG/G
1 PATCH TOPICAL DAILY
Qty: 30 PATCH | Refills: 0 | Status: SHIPPED | OUTPATIENT
Start: 2021-08-24 | End: 2021-12-10 | Stop reason: ALTCHOICE

## 2021-08-24 RX ORDER — GABAPENTIN 600 MG/1
TABLET ORAL
Qty: 270 TABLET | Refills: 1 | Status: SHIPPED | OUTPATIENT
Start: 2021-08-24 | End: 2021-12-29

## 2021-08-24 NOTE — TELEPHONE ENCOUNTER
I called the pt to inform her we have completed the MTM forms  There isn't a fax number on the forms  Pt gave a fax number but wasn't sure if it was correct  I looked up MTM  for the fax number  I also noticed we may have the wrong form for the stretcher  I printed the correct form and will have Destinee Delgadoing take a look

## 2021-08-26 ENCOUNTER — TELEPHONE (OUTPATIENT)
Dept: FAMILY MEDICINE CLINIC | Facility: CLINIC | Age: 81
End: 2021-08-26

## 2021-08-30 DIAGNOSIS — G89.29 OTHER CHRONIC PAIN: ICD-10-CM

## 2021-08-30 RX ORDER — OXYCODONE HCL 10 MG/1
10 TABLET, FILM COATED, EXTENDED RELEASE ORAL EVERY 12 HOURS SCHEDULED
Qty: 60 TABLET | Refills: 0 | Status: SHIPPED | OUTPATIENT
Start: 2021-08-30 | End: 2021-09-29 | Stop reason: SDUPTHER

## 2021-09-07 ENCOUNTER — TELEPHONE (OUTPATIENT)
Dept: FAMILY MEDICINE CLINIC | Facility: CLINIC | Age: 81
End: 2021-09-07

## 2021-09-07 NOTE — TELEPHONE ENCOUNTER
Patient has been having black stool that she passes 5-6 times a day  This has been going on for 3-4 days  She states it is not diarrhea  She said no pain or blood  Is there anything she can take to get rid of this?

## 2021-09-08 NOTE — TELEPHONE ENCOUNTER
Patient is going to call her daughter to see if she can  the containers to collect the samples at home  She is going to call back

## 2021-09-14 NOTE — PROGRESS NOTES
9/15/2021      Chief Complaint   Patient presents with    Follow-up         Assessment and Plan    80 y o  female -- New patient    1  Dysuria  2  Gross hematuria  3  Frequent UTI    - UA today unable to be obtained  - PVR today 7 mL  - Discussed conservative management including adequate hydration, avoidance of bladder irritants, daily cranberry, daily probiotics, avoidance of constipation   - Discussed gross hematuria in the absence of infections and the need for workup for this  - Urine cytology, CMP and CT renal protocol ordered  Patient will return for cystoscopy for completion of workup  - Patient is incontinent and may not be able to give urine sample for cytology  May need to collect sample during cystoscopy  - Will call in the meantime with any questions or concerns  - All questions answered; patient understands and agrees with plan        History of Present Illness  Ole Albarado is a 80 y o  female new patient here for evaluation of dysuria, gross hematuria, frequent UTI  Patient previously seen by Dr Shaq Barker in 2017 for overactive bladder and frequent UTI  At that time, she was started on oxybutynin 15 mg daily and was to follow up in 6 months  Unfortunately, patient was lost to follow up until now  She presents with the same symptoms as well as gross hematuria  Patient has had one positive urine culture in the past year  In March, she was having gross hematuria and culture at that time was negative for urinary infection  States she is taking probiotics daily  Notes she has burning with urination  Denies fevers, chills, nausea, vomiting, weight loss, bone pain  She is incontinent at baseline  States she smoked tobacco many years ago  Denies exposures to chemicals on a daily basis  Denies family history of  malignancies  Review of Systems   Constitutional: Negative for activity change, appetite change, chills and fever  HENT: Negative for congestion and trouble swallowing  Respiratory: Negative for cough and shortness of breath  Cardiovascular: Negative for chest pain, palpitations and leg swelling  Gastrointestinal: Negative for abdominal pain, constipation, diarrhea, nausea and vomiting  Genitourinary: Positive for hematuria  Negative for difficulty urinating, dysuria, flank pain, frequency and urgency  Musculoskeletal: Negative for back pain and gait problem  Skin: Negative for wound  Allergic/Immunologic: Negative for immunocompromised state  Neurological: Negative for dizziness and syncope  Hematological: Does not bruise/bleed easily  Psychiatric/Behavioral: Negative for confusion  All other systems reviewed and are negative  Vitals  Vitals:    09/15/21 0910   BP: 122/74   Pulse: 74   Weight: 122 kg (268 lb 6 4 oz)   Height: 5' 6" (1 676 m)       Physical Exam  Constitutional:       Appearance: Normal appearance  HENT:      Head: Normocephalic  Pulmonary:      Effort: Pulmonary effort is normal    Musculoskeletal:      Cervical back: Normal range of motion  Skin:     General: Skin is warm and dry  Neurological:      General: No focal deficit present  Mental Status: She is alert and oriented to person, place, and time  Psychiatric:         Mood and Affect: Mood normal          Behavior: Behavior normal          Thought Content:  Thought content normal          Judgment: Judgment normal            Past History  Past Medical History:   Diagnosis Date    [facility]     Anxiety     Chronic pain     Chronic respiratory failure with hypoxia (HCC)     COPD (chronic obstructive pulmonary disease) (HCC)     Dementia (HCC)     Dorsalgia, unspecified     Edema     Encephalopathy     GERD (gastroesophageal reflux disease)     Hypertension     [facility]bid [facility] ([facility])     Muscle weakness (generalized)     Opioid dependence ([facility])     Overactive bladder     Pneumonia     Psychiatric disorder     anxiety, bipolar    Radiculopathy of thoracolumbar region     Sciatica     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 ([facility] )     Urinary incontinence     UTI (urinary tract infection)      Social History     Socioeconomic History    Marital status:      Spouse name: None    Number of children: None    Years of education: None    Highest education level: None   Occupational History    None   Tobacco Use    Smoking status: Former Smoker     Types: Cigarettes     Quit date: 1995     Years since quittin 8    Smokeless tobacco: Never Used   Vaping Use    Vaping Use: Never used   Substance and Sexual Activity    Alcohol use: Never    Drug use: No    Sexual activity: Not Currently   Other Topics Concern    None   Social History Narrative    None     Social Determinants of Health     Financial Resource Strain:     Difficulty of Paying Living Expenses:    Food Insecurity:     Worried About Running Out of Food in the Last Year:     Ran Out of Food in the Last Year:    Transportation Needs:     Lack of Transportation (Medical):  Lack of Transportation (Non-Medical):    Physical Activity:     Days of Exercise per Week:     Minutes of Exercise per Session:    Stress:     Feeling of Stress :    Social Connections:     Frequency of Communication with Friends and Family:     Frequency of Social Gatherings with Friends and Family:     Attends Jain Services:     Active Member of Clubs or Organizations:     Attends Club or Organization Meetings:     Marital Status:    Intimate Partner Violence:     Fear of Current or Ex-Partner:     Emotionally Abused:     Physically Abused:     Sexually Abused:      Social History     Tobacco Use   Smoking Status Former Smoker    Types: Cigarettes    Quit date: 1995    Years since quittin 8   Smokeless Tobacco Never Used     History reviewed  No pertinent family history      The following portions of the patient's history were reviewed and updated as appropriate: allergies, current medications, past medical history, past social history, past surgical history and problem list     Results  Recent Results (from the past 1 hour(s))   POCT Measure PVR    Collection Time: 09/15/21  9:25 AM   Result Value Ref Range    POST-VOID RESIDUAL VOLUME, ML POC 7 mL   ]  No results found for: PSA  Lab Results   Component Value Date    GLUCOSE 103 06/10/2017    CALCIUM 9 3 03/14/2021    K 3 9 03/14/2021    CO2 28 03/14/2021     03/14/2021    BUN 18 03/14/2021    CREATININE 0 83 03/14/2021     Lab Results   Component Value Date    WBC 5 78 03/14/2021    HGB 11 7 03/14/2021    HCT 38 1 03/14/2021    MCV 89 03/14/2021     03/14/2021       Yvon Valero PA-C

## 2021-09-15 ENCOUNTER — OFFICE VISIT (OUTPATIENT)
Dept: UROLOGY | Facility: CLINIC | Age: 81
End: 2021-09-15
Payer: COMMERCIAL

## 2021-09-15 ENCOUNTER — TELEPHONE (OUTPATIENT)
Dept: OTHER | Facility: OTHER | Age: 81
End: 2021-09-15

## 2021-09-15 ENCOUNTER — APPOINTMENT (OUTPATIENT)
Dept: LAB | Facility: HOSPITAL | Age: 81
End: 2021-09-15
Payer: COMMERCIAL

## 2021-09-15 VITALS
BODY MASS INDEX: 43.13 KG/M2 | HEART RATE: 74 BPM | HEIGHT: 66 IN | WEIGHT: 268.4 LBS | SYSTOLIC BLOOD PRESSURE: 122 MMHG | DIASTOLIC BLOOD PRESSURE: 74 MMHG

## 2021-09-15 DIAGNOSIS — N39.0 FREQUENT UTI: ICD-10-CM

## 2021-09-15 DIAGNOSIS — R31.0 GROSS HEMATURIA: ICD-10-CM

## 2021-09-15 DIAGNOSIS — N32.81 OVERACTIVE BLADDER: Primary | ICD-10-CM

## 2021-09-15 DIAGNOSIS — E66.01 MORBID OBESITY (HCC): ICD-10-CM

## 2021-09-15 LAB
ALBUMIN SERPL BCP-MCNC: 3.3 G/DL (ref 3.5–5)
ALP SERPL-CCNC: 71 U/L (ref 46–116)
ALT SERPL W P-5'-P-CCNC: 17 U/L (ref 12–78)
ANION GAP SERPL CALCULATED.3IONS-SCNC: 7 MMOL/L (ref 4–13)
AST SERPL W P-5'-P-CCNC: 12 U/L (ref 5–45)
BILIRUB SERPL-MCNC: 0.48 MG/DL (ref 0.2–1)
BUN SERPL-MCNC: 12 MG/DL (ref 5–25)
CALCIUM ALBUM COR SERPL-MCNC: 10 MG/DL (ref 8.3–10.1)
CALCIUM SERPL-MCNC: 9.4 MG/DL (ref 8.3–10.1)
CHLORIDE SERPL-SCNC: 103 MMOL/L (ref 100–108)
CO2 SERPL-SCNC: 30 MMOL/L (ref 21–32)
CREAT SERPL-MCNC: 0.94 MG/DL (ref 0.6–1.3)
GFR SERPL CREATININE-BSD FRML MDRD: 58 ML/MIN/1.73SQ M
GLUCOSE SERPL-MCNC: 102 MG/DL (ref 65–140)
POST-VOID RESIDUAL VOLUME, ML POC: 7 ML
POTASSIUM SERPL-SCNC: 4 MMOL/L (ref 3.5–5.3)
PROT SERPL-MCNC: 8 G/DL (ref 6.4–8.2)
SODIUM SERPL-SCNC: 140 MMOL/L (ref 136–145)

## 2021-09-15 PROCEDURE — 99204 OFFICE O/P NEW MOD 45 MIN: CPT | Performed by: PHYSICIAN ASSISTANT

## 2021-09-15 PROCEDURE — 51798 US URINE CAPACITY MEASURE: CPT | Performed by: PHYSICIAN ASSISTANT

## 2021-09-15 PROCEDURE — 80053 COMPREHEN METABOLIC PANEL: CPT

## 2021-09-15 PROCEDURE — 36415 COLL VENOUS BLD VENIPUNCTURE: CPT

## 2021-09-15 NOTE — TELEPHONE ENCOUNTER
Evelyn Rossi is calling on behalf of the patient stated the patient has canceled the visit for this week due to doctors appointments, requesting to reschedule next week

## 2021-09-20 NOTE — TELEPHONE ENCOUNTER
Patient states that the other phone can not take messages  Please call [phone] to leave a message

## 2021-09-21 NOTE — TELEPHONE ENCOUNTER
Patient wants to know if this appointment can be virtual? She states she has no way of getting here

## 2021-09-24 ENCOUNTER — APPOINTMENT (OUTPATIENT)
Dept: LAB | Facility: HOSPITAL | Age: 81
End: 2021-09-24
Payer: COMMERCIAL

## 2021-09-24 DIAGNOSIS — F51.01 PRIMARY INSOMNIA: ICD-10-CM

## 2021-09-24 DIAGNOSIS — F41.9 ANXIETY: ICD-10-CM

## 2021-09-24 DIAGNOSIS — B37.2 YEAST DERMATITIS: ICD-10-CM

## 2021-09-24 DIAGNOSIS — G89.29 OTHER CHRONIC PAIN: ICD-10-CM

## 2021-09-24 LAB
BACTERIA UR QL AUTO: ABNORMAL /HPF
BILIRUB UR QL STRIP: NEGATIVE
CLARITY UR: ABNORMAL
COLOR UR: ABNORMAL
GLUCOSE UR STRIP-MCNC: NEGATIVE MG/DL
HGB UR QL STRIP.AUTO: ABNORMAL
KETONES UR STRIP-MCNC: NEGATIVE MG/DL
LEUKOCYTE ESTERASE UR QL STRIP: ABNORMAL
NITRITE UR QL STRIP: NEGATIVE
NON-SQ EPI CELLS URNS QL MICRO: ABNORMAL /HPF
PH UR STRIP.AUTO: 6 [PH]
PROT UR STRIP-MCNC: ABNORMAL MG/DL
RBC #/AREA URNS AUTO: ABNORMAL /HPF
SP GR UR STRIP.AUTO: 1.02 (ref 1–1.03)
UROBILINOGEN UR QL STRIP.AUTO: 0.2 E.U./DL
WBC #/AREA URNS AUTO: ABNORMAL /HPF

## 2021-09-24 PROCEDURE — 87186 SC STD MICRODIL/AGAR DIL: CPT | Performed by: NURSE PRACTITIONER

## 2021-09-24 PROCEDURE — 88112 CYTOPATH CELL ENHANCE TECH: CPT | Performed by: PATHOLOGY

## 2021-09-24 PROCEDURE — 81001 URINALYSIS AUTO W/SCOPE: CPT | Performed by: NURSE PRACTITIONER

## 2021-09-24 PROCEDURE — 87086 URINE CULTURE/COLONY COUNT: CPT | Performed by: NURSE PRACTITIONER

## 2021-09-24 PROCEDURE — 87077 CULTURE AEROBIC IDENTIFY: CPT | Performed by: NURSE PRACTITIONER

## 2021-09-24 RX ORDER — OXYCODONE HCL 10 MG/1
10 TABLET, FILM COATED, EXTENDED RELEASE ORAL EVERY 12 HOURS SCHEDULED
Qty: 60 TABLET | Refills: 0 | OUTPATIENT
Start: 2021-09-24

## 2021-09-24 RX ORDER — SERTRALINE HYDROCHLORIDE 100 MG/1
100 TABLET, FILM COATED ORAL DAILY
Qty: 90 TABLET | Refills: 0 | Status: SHIPPED | OUTPATIENT
Start: 2021-09-24 | End: 2021-09-29 | Stop reason: SDUPTHER

## 2021-09-24 RX ORDER — TRAZODONE HYDROCHLORIDE 100 MG/1
100 TABLET ORAL
Qty: 90 TABLET | Refills: 0 | Status: SHIPPED | OUTPATIENT
Start: 2021-09-24 | End: 2021-09-29 | Stop reason: SDUPTHER

## 2021-09-24 NOTE — TELEPHONE ENCOUNTER
Patient is requesting lab results  She states her daughter told her that we told her she needs to go to the ER  I did not see anything in her chart indicating this   Did you need her to go to the ER?

## 2021-09-26 LAB
BACTERIA UR CULT: ABNORMAL
BACTERIA UR CULT: ABNORMAL

## 2021-09-28 RX ORDER — MICONAZOLE NITRATE 20.6 MG/G
POWDER TOPICAL AS NEEDED
Qty: 70 G | Refills: 0 | Status: SHIPPED | OUTPATIENT
Start: 2021-09-28 | End: 2021-10-19

## 2021-09-29 ENCOUNTER — OFFICE VISIT (OUTPATIENT)
Dept: FAMILY MEDICINE CLINIC | Facility: CLINIC | Age: 81
End: 2021-09-29
Payer: COMMERCIAL

## 2021-09-29 ENCOUNTER — TELEPHONE (OUTPATIENT)
Dept: LAB | Facility: HOSPITAL | Age: 81
End: 2021-09-29

## 2021-09-29 ENCOUNTER — TELEPHONE (OUTPATIENT)
Dept: FAMILY MEDICINE CLINIC | Facility: CLINIC | Age: 81
End: 2021-09-29

## 2021-09-29 VITALS
HEIGHT: 66 IN | OXYGEN SATURATION: 93 % | DIASTOLIC BLOOD PRESSURE: 74 MMHG | RESPIRATION RATE: 18 BRPM | TEMPERATURE: 97.7 F | HEART RATE: 65 BPM | WEIGHT: 268 LBS | SYSTOLIC BLOOD PRESSURE: 120 MMHG | BODY MASS INDEX: 43.07 KG/M2

## 2021-09-29 DIAGNOSIS — Z23 NEED FOR INFLUENZA VACCINATION: ICD-10-CM

## 2021-09-29 DIAGNOSIS — R19.7 DIARRHEA, UNSPECIFIED TYPE: ICD-10-CM

## 2021-09-29 DIAGNOSIS — F41.9 ANXIETY: ICD-10-CM

## 2021-09-29 DIAGNOSIS — I48.0 PAROXYSMAL ATRIAL FIBRILLATION (HCC): ICD-10-CM

## 2021-09-29 DIAGNOSIS — Z11.59 NEED FOR HEPATITIS C SCREENING TEST: ICD-10-CM

## 2021-09-29 DIAGNOSIS — Z00.00 HEALTHCARE MAINTENANCE: ICD-10-CM

## 2021-09-29 DIAGNOSIS — I50.9 CONGESTIVE HEART FAILURE, UNSPECIFIED HF CHRONICITY, UNSPECIFIED HEART FAILURE TYPE (HCC): ICD-10-CM

## 2021-09-29 DIAGNOSIS — J44.9 CHRONIC OBSTRUCTIVE PULMONARY DISEASE, UNSPECIFIED COPD TYPE (HCC): ICD-10-CM

## 2021-09-29 DIAGNOSIS — I10 ESSENTIAL HYPERTENSION: ICD-10-CM

## 2021-09-29 DIAGNOSIS — G89.29 OTHER CHRONIC PAIN: ICD-10-CM

## 2021-09-29 DIAGNOSIS — E66.01 MORBID OBESITY DUE TO EXCESS CALORIES (HCC): Chronic | ICD-10-CM

## 2021-09-29 DIAGNOSIS — F51.01 PRIMARY INSOMNIA: ICD-10-CM

## 2021-09-29 DIAGNOSIS — G89.29 CHRONIC MIDLINE LOW BACK PAIN, UNSPECIFIED WHETHER SCIATICA PRESENT: ICD-10-CM

## 2021-09-29 DIAGNOSIS — M54.50 CHRONIC MIDLINE LOW BACK PAIN, UNSPECIFIED WHETHER SCIATICA PRESENT: ICD-10-CM

## 2021-09-29 DIAGNOSIS — Z00.00 MEDICARE ANNUAL WELLNESS VISIT, SUBSEQUENT: Primary | ICD-10-CM

## 2021-09-29 PROCEDURE — 1160F RVW MEDS BY RX/DR IN RCRD: CPT | Performed by: NURSE PRACTITIONER

## 2021-09-29 PROCEDURE — G0008 ADMIN INFLUENZA VIRUS VAC: HCPCS | Performed by: NURSE PRACTITIONER

## 2021-09-29 PROCEDURE — 3725F SCREEN DEPRESSION PERFORMED: CPT | Performed by: NURSE PRACTITIONER

## 2021-09-29 PROCEDURE — 3074F SYST BP LT 130 MM HG: CPT | Performed by: NURSE PRACTITIONER

## 2021-09-29 PROCEDURE — 90662 IIV NO PRSV INCREASED AG IM: CPT | Performed by: NURSE PRACTITIONER

## 2021-09-29 PROCEDURE — G0439 PPPS, SUBSEQ VISIT: HCPCS | Performed by: NURSE PRACTITIONER

## 2021-09-29 PROCEDURE — 99215 OFFICE O/P EST HI 40 MIN: CPT | Performed by: NURSE PRACTITIONER

## 2021-09-29 PROCEDURE — 3078F DIAST BP <80 MM HG: CPT | Performed by: NURSE PRACTITIONER

## 2021-09-29 PROCEDURE — 1125F AMNT PAIN NOTED PAIN PRSNT: CPT | Performed by: NURSE PRACTITIONER

## 2021-09-29 PROCEDURE — 1170F FXNL STATUS ASSESSED: CPT | Performed by: NURSE PRACTITIONER

## 2021-09-29 PROCEDURE — 3288F FALL RISK ASSESSMENT DOCD: CPT | Performed by: NURSE PRACTITIONER

## 2021-09-29 RX ORDER — OXYCODONE HYDROCHLORIDE 15 MG/1
15 TABLET, FILM COATED, EXTENDED RELEASE ORAL EVERY 12 HOURS SCHEDULED
Qty: 60 TABLET | Refills: 0 | Status: SHIPPED | OUTPATIENT
Start: 2021-09-29 | End: 2021-10-28 | Stop reason: SDUPTHER

## 2021-09-29 RX ORDER — TRAZODONE HYDROCHLORIDE 100 MG/1
150 TABLET ORAL
Qty: 135 TABLET | Refills: 0 | Status: SHIPPED | OUTPATIENT
Start: 2021-09-29 | End: 2021-12-15 | Stop reason: SDUPTHER

## 2021-09-29 RX ORDER — SERTRALINE HYDROCHLORIDE 100 MG/1
150 TABLET, FILM COATED ORAL DAILY
Qty: 135 TABLET | Refills: 1 | Status: SHIPPED | OUTPATIENT
Start: 2021-09-29 | End: 2021-12-18

## 2021-09-29 RX ORDER — OXYCODONE HCL 10 MG/1
10 TABLET, FILM COATED, EXTENDED RELEASE ORAL EVERY 12 HOURS SCHEDULED
Qty: 60 TABLET | Refills: 0 | Status: SHIPPED | OUTPATIENT
Start: 2021-09-29 | End: 2021-09-29 | Stop reason: SDUPTHER

## 2021-09-29 NOTE — ASSESSMENT & PLAN NOTE
Wt Readings from Last 3 Encounters:   09/29/21 122 kg (268 lb)   09/15/21 122 kg (268 lb 6 4 oz)   03/17/21 115 kg (253 lb 8 5 oz)       Appears euvolemic on exam  Denies CP, SOB, leg swelling

## 2021-09-29 NOTE — TELEPHONE ENCOUNTER
----- Message from [address] [facility] sent at 9/29/2021  3:25 PM EDT -----  Can we please set up mobile labs for patient?

## 2021-09-29 NOTE — PROGRESS NOTES
Assessment and Plan:     Problem List Items Addressed This Visit        Respiratory    COPD (chronic obstructive pulmonary disease) ([facility] [address] )     Stable currently  Cardiovascular and Mediastinum    Atrial fibrillation ([facility] [address] )     Continue eliquis  Hypertension     Well controlled  Continue amlodipine and losartan  CHF (congestive heart failure) (HCC)     Wt Readings from Last 3 Encounters:   09/29/21 122 kg (268 lb)   09/15/21 122 kg (268 lb 6 4 oz)   03/17/21 115 kg (253 lb 8 5 oz)       Appears euvolemic on exam  Denies CP, SOB, leg swelling  Other    Morbid obesity due to excess calories (HCC) (Chronic)    Chronic pain    Relevant Medications    oxyCODONE (OxyCONTIN) 15 mg 12 hr tablet    Anxiety     Will increase sertraline daily  Relevant Medications    sertraline (ZOLOFT) 100 mg tablet      Other Visit Diagnoses     Medicare annual wellness visit, subsequent    -  Primary    Healthcare maintenance        Relevant Orders    CBC and differential    Comprehensive metabolic panel    Hemoglobin A1C    TSH, 3rd generation with Free T4 reflex    Lipid panel    Need for hepatitis C screening test        Relevant Orders    Hepatitis C antibody    Primary insomnia        Relevant Medications    traZODone (DESYREL) 100 mg tablet    Diarrhea, unspecified type        Relevant Orders    Clostridium difficile toxin by PCR    Need for influenza vaccination        Relevant Orders    influenza vaccine, high-dose, PF 0 7 mL (FLUZONE HIGH-DOSE) (Completed)        BMI Counseling: Body mass index is 43 26 kg/m²  The BMI is above normal  Nutrition recommendations include decreasing portion sizes, encouraging healthy choices of fruits and vegetables, decreasing fast food intake, consuming healthier snacks and limiting drinks that contain sugar  Exercise recommendations include moderate physical activity 150 minutes/week and exercising 3-5 times per week  No pharmacotherapy was ordered  Rationale for BMI follow-up plan is due to patient being overweight or obese  Preventive health issues were discussed with patient, and age appropriate screening tests were ordered as noted in patient's After Visit Summary  Personalized health advice and appropriate referrals for health education or preventive services given if needed, as noted in patient's After Visit Summary       History of Present Illness:     Patient presents for Medicare Annual Wellness visit    Patient Care Team:  Sheree Mae as PCP - General (Family Medicine)  MD Akira Buckley MD Eugune Gander, MD     Problem List:     Patient Active Problem List   Diagnosis    Atrial fibrillation ([facility] [address] )    Hypertension    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[facility] )    PAD (peripheral artery disease) ([facility] [facility] )    Morbid obesity due to excess calories ([facility] )    COPD (chronic obstructive pulmonary disease) ([facility] )    Overactive bladder    Moderate episode of recurrent major depressive disorder ([facility] [facility] )    Chronic pain    Physical deconditioning    Bipolar affective disorder, currently manic, moderate ([facility] )    CHF (congestive heart failure) ([facility] )    Pacemaker    Frequent UTI    Dementia ([facility] [facility] )    Generalized weakness    Fungal infection of the groin    Anxiety      Past Medical and Surgical History:     Past Medical History:   Diagnosis Date    A-fib ([facility] [facility] )     Anxiety     Chronic pain     Chronic respiratory failure with hypoxia ([facility] [facility] )     COPD (chronic obstructive pulmonary disease) ([facility] )     Dementia ([facility] )     Dorsalgia, unspecified     Edema     Encephalopathy     GERD (gastroesophageal reflux disease)     Hypertension     Morbid obesity ([facility] [facility] )     Muscle weakness (generalized)     Opioid dependence ([facility] [facility] )     Overactive bladder     Pneumonia     Psychiatric disorder     anxiety, bipolar    Radiculopathy of thoracolumbar region     Sciatica     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 ([facility] [address] )     Urinary incontinence     UTI (urinary tract infection)      Past Surgical History:   Procedure Laterality Date    APPENDECTOMY      BACK SURGERY      CHOLECYSTECTOMY      KNEE SURGERY        Family History:     History reviewed  No pertinent family history  Social History:     Social History     Socioeconomic History    Marital status:      Spouse name: None    Number of children: None    Years of education: None    Highest education level: None   Occupational History    None   Tobacco Use    Smoking status: Former Smoker     Types: Cigarettes     Quit date: 1995     Years since quittin 8    Smokeless tobacco: Never Used   Vaping Use    Vaping Use: Never used   Substance and Sexual Activity    Alcohol use: Never    Drug use: No    Sexual activity: Not Currently   Other Topics Concern    None   Social History Narrative    None     Social Determinants of Health     Financial Resource Strain:     Difficulty of Paying Living Expenses:    Food Insecurity:     Worried About Running Out of Food in the Last Year:     Ran Out of Food in the Last Year:    Transportation Needs:     Lack of Transportation (Medical):      Lack of Transportation (Non-Medical):    Physical Activity:     Days of Exercise per Week:     Minutes of Exercise per Session:    Stress:     Feeling of Stress :    Social Connections:     Frequency of Communication with Friends and Family:     Frequency of Social Gatherings with Friends and Family:     Attends [employer] Services:     Active Member of Clubs or Organizations:     Attends Club or Organization Meetings:     Marital Status:    Intimate Partner Violence:     Fear of Current or Ex-Partner:     Emotionally Abused:     Physically Abused:     Sexually Abused:       Medications and Allergies:     Current Outpatient Medications   Medication Sig Dispense Refill    acetaminophen (TYLENOL) 325 mg tablet Take 650 mg by mouth every 6 (six) hours as needed for mild pain      amLODIPine (NORVASC) 10 mg tablet TAKE 1 TABLET BY MOUTH EVERY DAY 90 tablet 1    Baclofen 5 MG TABS Take 1 po qd in the am 90 tablet 1    calcium carbonate-vitamin D (OSCAL-D) 500 mg-200 units per tablet Take 1 tablet by mouth daily with breakfast      Eliquis 5 MG TAKE 1 TABLET BY MOUTH TWICE A  tablet 1    ferrous sulfate 325 (65 Fe) mg tablet TAKE 1 TABLET BY MOUTH EVERY DAY WITH BREAKFAST 90 tablet 1    fluticasone (EQL Fluticasone Propionate) 50 mcg/act nasal spray 1 spray into each nostril daily 16 g 3    gabapentin (NEURONTIN) 600 MG tablet TAKE 1 TABLET BY MOUTH THREE TIMES A  tablet 1    Incontinence Supply Disposable (PROCare Bariatric Briefs) MISC Use every 2 (two) hours as needed (incontinence) 120 each 1    latanoprost (XALATAN) 0 005 % ophthalmic solution ADMINISTER 1 DROP TO BOTH EYES DAILY AT BEDTIME 7 5 mL 0    lidocaine (LIDODERM) 5 % APPLY 1 PATCH TOPICALLY DAILY REMOVE & DISCARD PATCH WITHIN 12 HOURS OR AS DIRECTED BY MD 30 patch 0    loratadine (CLARITIN) 10 mg tablet TAKE 1 TABLET BY MOUTH EVERY DAY 90 tablet 1    losartan (COZAAR) 25 mg tablet TAKE 1 TABLET BY MOUTH EVERY DAY 90 tablet 1    miconazole (Zeasorb-AF) 2 % powder Apply topically as needed for itching 70 g 0    Misc  Devices (Raised Toilet Seat/Lock & Arms) MISC Use lifetime   1 each 0    nitrofurantoin (MACROBID) 100 mg capsule Take 1 capsule (100 mg total) by mouth 2 (two) times a day for 5 days 10 capsule 0    nystatin (MYCOSTATIN) cream Apply topically 2 (two) times a day 30 g 0    oxyCODONE (OxyCONTIN) 15 mg 12 hr tablet Take 1 tablet (15 mg total) by mouth every 12 (twelve) hoursMax Daily Amount: 30 mg 60 tablet 0    OXYGEN-HELIUM IN Inhale 3 L as needed 3 L NC O2 as needed for SOB for SpO2 less than 90      pantoprazole (PROTONIX) 40 mg tablet Take 1 tablet (40 mg total) by mouth daily before breakfast 90 tablet 3    sertraline (ZOLOFT) 100 mg tablet Take 1 5 tablets (150 mg total) by mouth daily 135 tablet 1    tamsulosin (FLOMAX) 0 4 mg Take 1 capsule (0 4 mg total) by mouth daily with dinner 1 po HS 90 capsule 1    traZODone (DESYREL) 100 mg tablet Take 1 5 tablets (150 mg total) by mouth daily at bedtime 135 tablet 0     No current facility-administered medications for this visit  Allergies   Allergen Reactions    Aspirin     Iodinated Diagnostic Agents Throat Swelling    Iodine - Food Allergy     Nsaids     Other Other (See Comments)     difficulty swallowing for short period      Immunizations:     Immunization History   Administered Date(s) Administered    Influenza, high dose seasonal 0 7 mL 09/29/2021    Pneumococcal Polysaccharide PPV23 07/24/2018      Health Maintenance:         Topic Date Due    Hepatitis C Screening  Never done         Topic Date Due    COVID-19 Vaccine (2 - Moderna 2-dose series) 08/16/2021      Medicare Health Risk Assessment:     /74   Pulse 65   Temp 97 7 °F (36 5 °C) (Tympanic)   Resp 18   Ht 5' 6" (1 676 m)   Wt 122 kg (268 lb)   LMP  (LMP Unknown)   SpO2 93%   BMI 43 26 kg/m²      Nayeli Callejas is here for her Subsequent Wellness visit  Last Medicare Wellness visit information reviewed, patient interviewed and updates made to the record today  Health Risk Assessment:   Patient rates overall health as fair  Patient feels that their physical health rating is much better  Patient is very satisfied with their life  Eyesight was rated as slightly worse  Hearing was rated as slightly worse  Patient feels that their emotional and mental health rating is slightly better  Patients states they are often angry  Patient states they are often unusually tired/fatigued  Pain experienced in the last 7 days has been a lot  Patient's pain rating has been 6/10  Patient states that she has experienced no weight loss or gain in last 6 months       Depression Screening:   PHQ-2 Score: 1  PHQ-9 Score: 12 Fall Risk Screening: In the past year, patient has experienced: no history of falling in past year      Urinary Incontinence Screening:   Patient has leaked urine accidently in the last six months  Home Safety:  Patient has trouble with stairs inside or outside of their home  Patient has working smoke alarms and has working carbon monoxide detector  Home safety hazards include: none  Nutrition:   Current diet is Regular  Medications:   Patient is currently taking over-the-counter supplements  OTC medications include: see medication list  Patient is able to manage medications  Activities of Daily Living (ADLs)/Instrumental Activities of Daily Living (IADLs):   Walk and transfer into and out of bed and chair?: Yes  Dress and groom yourself?: No    Bathe or shower yourself?: No    Feed yourself?  Yes  Do your laundry/housekeeping?: No  Manage your money, pay your bills and track your expenses?: No  Make your own meals?: No    Do your own shopping?: No    Previous Hospitalizations:   Any hospitalizations or ED visits within the last 12 months?: Yes    How many hospitalizations have you had in the last year?: 1-2    Advance Care Planning:   Living will: Yes    Advanced directive: Yes      PREVENTIVE SCREENINGS      Cardiovascular Screening:    General: Screening Current      Diabetes Screening:     General: Screening Current      Colorectal Cancer Screening:     General: Screening Not Indicated      Breast Cancer Screening:     General: Screening Not Indicated      Cervical Cancer Screening:    General: Screening Not Indicated      Osteoporosis Screening:    General: Screening Not Indicated      Abdominal Aortic Aneurysm (AAA) Screening:        General: Screening Not Indicated      Lung Cancer Screening:     General: Screening Not Indicated      Hepatitis C Screening:    General: Screening Not Indicated    Screening, Brief Intervention, and Referral to Treatment (SBIRT)    Screening  Typical number of drinks in a day: 1  Typical number of drinks in a week: 2  Interpretation: Low risk drinking behavior      Single Item Drug Screening:  How often have you used an illegal drug (including marijuana) or a prescription medication for non-medical reasons in the past year? never    Single Item Drug Screen Score: 0  Interpretation: Negative screen for possible drug use disorder      LUCY Shultz

## 2021-09-29 NOTE — PROGRESS NOTES
Assessment/Plan:     Chronic Problems:  COPD (chronic obstructive pulmonary disease) ([facility])  Stable currently  Atrial fibrillation ([facility] )  Continue eliquis  Hypertension  Well controlled  Continue amlodipine and losartan  CHF (congestive heart failure) ([facility])  Wt Readings from Last 3 Encounters:   09/29/21 122 kg (268 lb)   09/15/21 122 kg (268 lb 6 4 oz)   03/17/21 115 kg (253 lb 8 5 oz)       Appears euvolemic on exam  Denies CP, SOB, leg swelling  Anxiety  Will increase sertraline daily  Visit Diagnosis:  Diagnoses and all orders for this visit:    Medicare annual wellness visit, subsequent    Healthcare maintenance  -     CBC and differential; Future  -     Comprehensive metabolic panel; Future  -     Hemoglobin A1C; Future  -     TSH, 3rd generation with Free T4 reflex; Future  -     Lipid panel; Future    Need for hepatitis C screening test  -     Hepatitis C antibody; Future    Primary insomnia  -     Discontinue: traZODone (DESYREL) 100 mg tablet; Take 1 5 tablets (150 mg total) by mouth daily at bedtime    Anxiety  -     Discontinue: sertraline (ZOLOFT) 100 mg tablet; Take 1 5 tablets (150 mg total) by mouth daily    Other chronic pain  -     Discontinue: oxyCODONE (OxyCONTIN) 15 mg 12 hr tablet; Take 1 tablet (15 mg total) by mouth every 12 (twelve) hoursMax Daily Amount: 30 mg    Diarrhea, unspecified type  -     Cancel: Clostridium difficile toxin by PCR; Future    Need for influenza vaccination  -     influenza vaccine, high-dose, PF 0 7 mL (FLUZONE HIGH-DOSE)    Chronic obstructive pulmonary disease, unspecified COPD type ([place] [address] )    Paroxysmal atrial fibrillation ([place] [address] )    Essential hypertension    Congestive heart failure, unspecified HF chronicity, unspecified heart failure type ([place] [address] )    Morbid obesity due to excess calories ([facility])    Chronic midline low back pain, unspecified whether sciatica present          Subjective:    Patient ID: Asia Umaznor is a 80 y o  female  Patient presents for follow up with her caregiver, Caity Galaviz  Concerns:   left elbow- skin is very dry, cracked for 1 year while patient was bed/wheelchair bound  Patient would like to increase oxy as pain is very severe  Patient is requesting xanax for anxiety  Patient is having diarrhea 5-6 times a day  Patient is concerned with her sleep  Caity Galaviz states she does not have a good sleep schedule and sleeps throughout the day  She does use trazodone with some relief  She did have COVID-19 vaccine  Flu vaccine today  The following portions of the patient's history were reviewed and updated as appropriate: allergies, current medications, past family history, past medical history, past social history, past surgical history and problem list     Review of Systems   Constitutional: Negative for chills and fever  HENT: Positive for rhinorrhea  Negative for ear pain and sore throat  Eyes: Negative for pain and visual disturbance  Respiratory: Positive for shortness of breath (with exertion)  Negative for cough  Cardiovascular: Negative for chest pain, palpitations and leg swelling  Gastrointestinal: Positive for diarrhea  Negative for abdominal pain, nausea and vomiting  Genitourinary: Positive for dysuria  Negative for hematuria  Seeing urology   Musculoskeletal: Positive for arthralgias and back pain  Skin: Negative for color change and rash  Neurological: Negative for seizures and syncope  Psychiatric/Behavioral: Positive for sleep disturbance  Negative for dysphoric mood  The patient is nervous/anxious  All other systems reviewed and are negative  /74   Pulse 65   Temp 97 7 °F (36 5 °C) (Tympanic)   Resp 18   Ht 5' 6" (1 676 m)   Wt 122 kg (268 lb)   LMP  (LMP Unknown)   SpO2 93%   BMI 43 26 kg/m²   Social History     Socioeconomic History    Marital status:       Spouse name: Not on file    Number of children: Not on file    Years of education: Not on file    Highest education level: Not on file   Occupational History    Not on file   Tobacco Use    Smoking status: Former Smoker     Types: Cigarettes     Quit date: 1995     Years since quittin 2    Smokeless tobacco: Never Used   Vaping Use    Vaping Use: Never used   Substance and Sexual Activity    Alcohol use: Never    Drug use: No    Sexual activity: Not Currently   Other Topics Concern    Not on file   Social History Narrative    Not on file     Social Determinants of Health     Financial Resource Strain: Not on file   Food Insecurity: Not on file   Transportation Needs: Not on file   Physical Activity: Not on file   Stress: Not on file   Social Connections: Not on file   Intimate Partner Violence: Not on file   Housing Stability: Not on file     Past Medical History:   Diagnosis Date    A-fib ([facility] )     Anxiety     Chronic pain     Chronic respiratory failure with hypoxia ([facility] )     COPD (chronic obstructive pulmonary disease) ([place] [address] )     Dementia ([facility] )     Dorsalgia, unspecified     Edema     Encephalopathy     GERD (gastroesophageal reflux disease)     Hypertension     Morbid obesity ([facility] )     Muscle weakness (generalized)     Opioid dependence ([place] [address] )     Overactive bladder     Pneumonia     Psychiatric disorder     anxiety, bipolar    Radiculopathy of thoracolumbar region     Sciatica     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 ([facility] )     Urinary incontinence     UTI (urinary tract infection)      History reviewed  No pertinent family history    Past Surgical History:   Procedure Laterality Date    APPENDECTOMY      BACK SURGERY      CHOLECYSTECTOMY      CYSTOSCOPY  2021    Dr Kimberly Herrera    [facility]         Current Outpatient Medications:     amLODIPine (NORVASC) 10 mg tablet, Take 1 tablet (10 mg total) by mouth daily, Disp: 90 tablet, Rfl: 1    amoxicillin-clavulanate (AUGMENTIN) 250-125 mg per tablet, , Disp: , Rfl:     Baclofen 5 MG TABS, TAKE 1 TABLET EVERY MORNING, Disp: 90 tablet, Rfl: 1    calcium carbonate-vitamin D (OSCAL-D) 500 mg-200 units per tablet, Take 1 tablet by mouth 2 (two) times a day with meals  , Disp: , Rfl:     cholestyramine (QUESTRAN) 4 g packet, 1 packet mixed with 8 oz liquids and drink 4 times daily  Must be dosed 1 hour apart from other medications, Disp: 90 packet, Rfl: 3    diphenoxylate-atropine (LOMOTIL) 2 5-0 025 mg per tablet, , Disp: , Rfl:     Disposable Gloves (Latex Gloves) MISC, Use 4 (four) times a day as needed (cleaning) XLARGE, Disp: 100 each, Rfl: 3    Eliquis 5 MG, TAKE 1 TABLET BY MOUTH TWICE A DAY, Disp: 180 tablet, Rfl: 1    ferrous sulfate 325 (65 Fe) mg tablet, TAKE 1 TABLET BY MOUTH EVERY DAY WITH BREAKFAST, Disp: 90 tablet, Rfl: 1    fluconazole (DIFLUCAN) 150 mg tablet, , Disp: , Rfl:     fluticasone (EQL Fluticasone Propionate) 50 mcg/act nasal spray, 1 spray into each nostril daily, Disp: 16 g, Rfl: 3    gabapentin (NEURONTIN) 600 MG tablet, TAKE 1 TABLET BY MOUTH THREE TIMES A DAY, Disp: 270 tablet, Rfl: 1    Incontinence Supply Disposable (PROCare Bariatric Briefs) MISC, Use every 2 (two) hours as needed (incontinence), Disp: 120 each, Rfl: 1    Incontinence Supply Disposable ([facility]contin[facility] Pads) MISC, Use 1 each every 2 (two) hours, Disp: 150 each, Rfl: 1    Incontinence Supply Disposable ([employer] Adult Briefs XXL) MISC, Use every 2 (two) hours, Disp: 100 each, Rfl: 3    Infant Care Products (Baby Wipes) MISC, Use every 2 (two) hours, Disp: 100 each, Rfl: 3    latanoprost (XALATAN) 0 005 % ophthalmic solution, Administer 1 drop to both eyes daily at bedtime, Disp: 7 5 mL, Rfl: 0    loratadine (CLARITIN) 10 mg tablet, TAKE 1 TABLET BY MOUTH EVERY DAY, Disp: 90 tablet, Rfl: 1    losartan (COZAAR) 25 mg tablet, Take 1 tablet (25 mg total) by mouth daily, Disp: 90 tablet, Rfl: 0    nystatin (MYCOSTATIN) cream, Apply topically 2 (two) times a day, Disp: 30 g, Rfl: 0    nystatin (MYCOSTATIN) powder, Apply topically 3 (three) times a day, Disp: 15 g, Rfl: 0    oxyCODONE (OxyCONTIN) 15 mg 12 hr tablet, Take 1 tablet (15 mg total) by mouth every 12 (twelve) hours Max Daily Amount: 30 mg, Disp: 60 tablet, Rfl: 0    pantoprazole (PROTONIX) 40 mg tablet, Take 1 tablet (40 mg total) by mouth daily before breakfast, Disp: 90 tablet, Rfl: 3    sertraline (ZOLOFT) 100 mg tablet, TAKE 1 TABLET BY MOUTH EVERY DAY, Disp: 90 tablet, Rfl: 0    traZODone (DESYREL) 100 mg tablet, Take 1 5 tablets (150 mg total) by mouth daily at bedtime, Disp: 135 tablet, Rfl: 0    Allergies   Allergen Reactions    Aspirin     Iodinated Diagnostic Agents Throat Swelling     Pt states "when I was 14years old driving home from the hospital I felt like I couldn't think and it went away after 10 minutes"     Iodine - Food Allergy     Nsaids     Other Other (See Comments)     difficulty swallowing for short period          Lab Review   Appointment on 09/15/2021   Component Date Value    Case Report 09/24/2021                      Value:Non-gynecologic Cytology                          Case: [id number]                                  Authorizing Provider:  Monica Copeland PA-C          Collected:           09/24/2021 1310              Ordering Location:     [facility]        Received:            09/24/2021 1310                                     [facility]rology [facility]                                                         Pathologist:           Luci Rubio MD                                                   Specimen:    Urine, Other                                                                               Final Diagnosis 09/24/2021                      Value: This result contains rich text formatting which cannot be displayed here  Janny Padron Gross Description 09/24/2021                      Value: This result contains rich text formatting which cannot be displayed here   Additional Information 09/24/2021                      Value: This result contains rich text formatting which cannot be displayed here  Appointment on 09/15/2021   Component Date Value    Sodium 09/15/2021 140     Potassium 09/15/2021 4 0     Chloride 09/15/2021 103     CO2 09/15/2021 30     ANION GAP 09/15/2021 7     BUN 09/15/2021 12     Creatinine 09/15/2021 0 94     Glucose 09/15/2021 102     Calcium 09/15/2021 9 4     Corrected Calcium 09/15/2021 10 0     AST 09/15/2021 12     ALT 09/15/2021 17     Alkaline Phosphatase 09/15/2021 71     Total Protein 09/15/2021 8 0     Albumin 09/15/2021 3 3*    Total Bilirubin 09/15/2021 0 48     eGFR 09/15/2021 58    Office Visit on 09/15/2021   Component Date Value    POST-VOID RESIDUAL VOLUM* 09/15/2021 7         Imaging: No results found  Objective:     Physical Exam  Constitutional:       Appearance: She is well-developed  She is obese  Cardiovascular:      Rate and Rhythm: Normal rate and regular rhythm  Heart sounds: Normal heart sounds  No murmur heard  Pulmonary:      Effort: Pulmonary effort is normal  No respiratory distress  Breath sounds: Normal breath sounds  Abdominal:      Tenderness: There is no abdominal tenderness  Skin:     General: Skin is warm and dry  Neurological:      Mental Status: She is alert and oriented to person, place, and time  Psychiatric:         Mood and Affect: Mood normal            Patient Instructions       Medicare Preventive Visit Patient Instructions  Thank you for completing your Welcome to Medicare Visit or Medicare Annual Wellness Visit today  Your next wellness visit will be due in one year (9/30/2022)  The screening/preventive services that you may require over the next 5-10 years are detailed below  Some tests may not apply to you based off risk factors and/or age   Screening tests ordered at today's visit but not completed yet may show as past due  Also, please note that scanned in results may not display below  Preventive Screenings:  Service Recommendations Previous Testing/Comments   Colorectal Cancer Screening  * Colonoscopy    * Fecal Occult Blood Test (FOBT)/Fecal Immunochemical Test (FIT)  * Fecal DNA/Cologuard Test  * Flexible Sigmoidoscopy Age: 54-65 years old   Colonoscopy: every 10 years (may be performed more frequently if at higher risk)  OR  FOBT/FIT: every 1 year  OR  Cologuard: every 3 years  OR  Sigmoidoscopy: every 5 years  Screening may be recommended earlier than age 48 if at higher risk for colorectal cancer  Also, an individualized decision between you and your healthcare provider will decide whether screening between the ages of 74-80 would be appropriate  Colonoscopy: Not on file  FOBT/FIT: Not on file  Cologuard: Not on file  Sigmoidoscopy: Not on file          Breast Cancer Screening Age: 36 years old  Frequency: every 1-2 years  Not required if history of left and right mastectomy Mammogram: Not on file        Cervical Cancer Screening Between the ages of 21-29, pap smear recommended once every 3 years  Between the ages of 33-67, can perform pap smear with HPV co-testing every 5 years  Recommendations may differ for women with a history of total hysterectomy, cervical cancer, or abnormal pap smears in past  Pap Smear: Not on file    Screening Not Indicated   Hepatitis C Screening Once for adults born between 1945 and 1965  More frequently in patients at high risk for Hepatitis C Hep C Antibody: Not on file        Diabetes Screening 1-2 times per year if you're at risk for diabetes or have pre-diabetes Fasting glucose: 103 mg/dL   A1C: 5 3 %    Screening Current   Cholesterol Screening Once every 5 years if you don't have a lipid disorder  May order more often based on risk factors  Lipid panel: 04/21/2017    Screening Current     Other Preventive Screenings Covered by Medicare:  1   Abdominal Aortic Aneurysm (AAA) Screening: covered once if your at risk  You're considered to be at risk if you have a family history of AAA  2  Lung Cancer Screening: covers low dose CT scan once per year if you meet all of the following conditions: (1) Age 50-69; (2) No signs or symptoms of lung cancer; (3) Current smoker or have quit smoking within the last 15 years; (4) You have a tobacco smoking history of at least 30 pack years (packs per day multiplied by number of years you smoked); (5) You get a written order from a healthcare provider  3  Glaucoma Screening: covered annually if you're considered high risk: (1) You have diabetes OR (2) Family history of glaucoma OR (3)  aged 48 and older OR (3)  American aged 72 and older  3  Osteoporosis Screening: covered every 2 years if you meet one of the following conditions: (1) You're estrogen deficient and at risk for osteoporosis based off medical history and other findings; (2) Have a vertebral abnormality; (3) On glucocorticoid therapy for more than 3 months; (4) Have primary hyperparathyroidism; (5) On osteoporosis medications and need to assess response to drug therapy  · Last bone density test (DXA Scan): 05/13/2016   5  HIV Screening: covered annually if you're between the age of 15-65  Also covered annually if you are younger than 13 and older than 72 with risk factors for HIV infection  For pregnant patients, it is covered up to 3 times per pregnancy  Immunizations:  Immunization Recommendations   Influenza Vaccine Annual influenza vaccination during flu season is recommended for all persons aged >= 6 months who do not have contraindications   Pneumococcal Vaccine (Prevnar and Pneumovax)  * Prevnar = PCV13  * Pneumovax = PPSV23   Adults 25-60 years old: 1-3 doses may be recommended based on certain risk factors  Adults 72 years old: Prevnar (PCV13) vaccine recommended followed by Pneumovax (PPSV23) vaccine   If already received PPSV23 since turning 65, then PCV13 recommended at least one year after PPSV23 dose  Hepatitis B Vaccine 3 dose series if at intermediate or high risk (ex: diabetes, end stage renal disease, liver disease)   Tetanus (Td) Vaccine - COST NOT COVERED BY MEDICARE PART B Following completion of primary series, a booster dose should be given every 10 years to maintain immunity against tetanus  Td may also be given as tetanus wound prophylaxis  Tdap Vaccine - COST NOT COVERED BY MEDICARE PART B Recommended at least once for all adults  For pregnant patients, recommended with each pregnancy  Shingles Vaccine (Shingrix) - COST NOT COVERED BY MEDICARE PART B  2 shot series recommended in those aged 48 and above     Health Maintenance Due:      Topic Date Due    Hepatitis C Screening  Never done     Immunizations Due:      Topic Date Due    COVID-19 Vaccine (2 - Moderna 2-dose series) 08/16/2021    Influenza Vaccine (1) 09/01/2021     Advance Directives   What are advance directives? Advance directives are legal documents that state your wishes and plans for medical care  These plans are made ahead of time in case you lose your ability to make decisions for yourself  Advance directives can apply to any medical decision, such as the treatments you want, and if you want to donate organs  What are the types of advance directives? There are many types of advance directives, and each state has rules about how to use them  You may choose a combination of any of the following:  · Living will: This is a written record of the treatment you want  You can also choose which treatments you do not want, which to limit, and which to stop at a certain time  This includes surgery, medicine, IV fluid, and tube feedings  · Durable power of  for healthcare [facility]): This is a written record that states who you want to make healthcare choices for you when you are unable to make them for yourself   This person, called a proxy, is usually a family member or a friend  You may choose more than 1 proxy  · Do not resuscitate (DNR) order:  A DNR order is used in case your heart stops beating or you stop breathing  It is a request not to have certain forms of treatment, such as CPR  A DNR order may be included in other types of advance directives  · Medical directive: This covers the care that you want if you are in a coma, near death, or unable to make decisions for yourself  You can list the treatments you want for each condition  Treatment may include pain medicine, surgery, blood transfusions, dialysis, IV or tube feedings, and a ventilator (breathing machine)  · Values history: This document has questions about your views, beliefs, and how you feel and think about life  This information can help others choose the care that you would choose  Why are advance directives important? An advance directive helps you control your care  Although spoken wishes may be used, it is better to have your wishes written down  Spoken wishes can be misunderstood, or not followed  Treatments may be given even if you do not want them  An advance directive may make it easier for your family to make difficult choices about your care  Urinary Incontinence   Urinary incontinence (UI)  is when you lose control of your bladder  UI develops because your bladder cannot store or empty urine properly  The 3 most common types of UI are stress incontinence, urge incontinence, or both  Medicines:   · May be given to help strengthen your bladder control  Report any side effects of medication to your healthcare provider  Do pelvic muscle exercises often:  Your pelvic muscles help you stop urinating  Squeeze these muscles tight for 5 seconds, then relax for 5 seconds  Gradually work up to squeezing for 10 seconds  Do 3 sets of 15 repetitions a day, or as directed  This will help strengthen your pelvic muscles and improve bladder control    Train your bladder:  Go to the bathroom at set times, such as every 2 hours, even if you do not feel the urge to go  You can also try to hold your urine when you feel the urge to go  For example, hold your urine for 5 minutes when you feel the urge to go  As that becomes easier, hold your urine for 10 minutes  Self-care:   · Keep a UI record  Write down how often you leak urine and how much you leak  Make a note of what you were doing when you leaked urine  · Drink liquids as directed  You may need to limit the amount of liquid you drink to help control your urine leakage  Do not drink any liquid right before you go to bed  Limit or do not have drinks that contain caffeine or alcohol  · Prevent constipation  Eat a variety of high-fiber foods  Good examples are high-fiber cereals, beans, vegetables, and whole-grain breads  Walking is the best way to trigger your intestines to have a bowel movement  · Exercise regularly and maintain a healthy weight  Weight loss and exercise will decrease pressure on your bladder and help you control your leakage  · Use a catheter as directed  to help empty your bladder  A catheter is a tiny, plastic tube that is put into your bladder to drain your urine  · Go to behavior therapy as directed  Behavior therapy may be used to help you learn to control your urge to urinate  Weight Management   Why it is important to manage your weight:  Being overweight increases your risk of health conditions such as heart disease, high blood pressure, type 2 diabetes, and certain types of cancer  It can also increase your risk for osteoarthritis, sleep apnea, and other respiratory problems  Aim for a slow, steady weight loss  Even a small amount of weight loss can lower your risk of health problems  How to lose weight safely:  A safe and healthy way to lose weight is to eat fewer calories and get regular exercise  You can lose up about 1 pound a week by decreasing the number of calories you eat by 500 calories each day     Healthy meal plan for weight management:  A healthy meal plan includes a variety of foods, contains fewer calories, and helps you stay healthy  A healthy meal plan includes the following:  · Eat whole-grain foods more often  A healthy meal plan should contain fiber  Fiber is the part of grains, fruits, and vegetables that is not broken down by your body  Whole-grain foods are healthy and provide extra fiber in your diet  Some examples of whole-grain foods are whole-wheat breads and pastas, oatmeal, brown rice, and bulgur  · Eat a variety of vegetables every day  Include dark, leafy greens such as spinach, kale, syed greens, and mustard greens  Eat yellow and orange vegetables such as carrots, sweet potatoes, and winter squash  · Eat a variety of fruits every day  Choose fresh or canned fruit (canned in its own juice or light syrup) instead of juice  Fruit juice has very little or no fiber  · Eat low-fat dairy foods  Drink fat-free (skim) milk or 1% milk  Eat fat-free yogurt and low-fat cottage cheese  Try low-fat cheeses such as mozzarella and other reduced-fat cheeses  · Choose meat and other protein foods that are low in fat  Choose beans or other legumes such as split peas or lentils  Choose fish, skinless poultry (chicken or turkey), or lean cuts of red meat (beef or pork)  Before you cook meat or poultry, cut off any visible fat  · Use less fat and oil  Try baking foods instead of frying them  Add less fat, such as margarine, sour cream, regular salad dressing and mayonnaise to foods  Eat fewer high-fat foods  Some examples of high-fat foods include french fries, doughnuts, ice cream, and cakes  · Eat fewer sweets  Limit foods and drinks that are high in sugar  This includes candy, cookies, regular soda, and sweetened drinks  Exercise:  Exercise at least 30 minutes per day on most days of the week  Some examples of exercise include walking, biking, dancing, and swimming   You can also fit in more physical activity by taking the stairs instead of the elevator or parking farther away from stores  Ask your healthcare provider about the best exercise plan for you  © Copyright [employer] 2018 Information is for End User's use only and may not be sold, redistributed or otherwise used for commercial purposes  All illustrations and images included in CareNotes® are the copyrighted property of [employer]  or [facility] White Júnior, CRNP    Portions of the record may have been created with voice recognition software  Occasional wrong word or "sound a like" substitutions may have occurred due to the inherent limitations of voice recognition software  Read the chart carefully and recognize, using context, where substitutions have occurred

## 2021-09-29 NOTE — PATIENT INSTRUCTIONS
Medicare Preventive Visit Patient Instructions  Thank you for completing your Welcome to Medicare Visit or Medicare Annual Wellness Visit today  Your next wellness visit will be due in one year (9/30/2022)  The screening/preventive services that you may require over the next 5-10 years are detailed below  Some tests may not apply to you based off risk factors and/or age  Screening tests ordered at today's visit but not completed yet may show as past due  Also, please note that scanned in results may not display below  Preventive Screenings:  Service Recommendations Previous Testing/Comments   Colorectal Cancer Screening  * Colonoscopy    * Fecal Occult Blood Test (FOBT)/Fecal Immunochemical Test (FIT)  * Fecal DNA/Cologuard Test  * Flexible Sigmoidoscopy Age: 54-65 years old   Colonoscopy: every 10 years (may be performed more frequently if at higher risk)  OR  FOBT/FIT: every 1 year  OR  Cologuard: every 3 years  OR  Sigmoidoscopy: every 5 years  Screening may be recommended earlier than age 48 if at higher risk for colorectal cancer  Also, an individualized decision between you and your healthcare provider will decide whether screening between the ages of 74-80 would be appropriate  Colonoscopy: Not on file  FOBT/FIT: Not on file  Cologuard: Not on file  Sigmoidoscopy: Not on file          Breast Cancer Screening Age: 36 years old  Frequency: every 1-2 years  Not required if history of left and right mastectomy Mammogram: Not on file        Cervical Cancer Screening Between the ages of 21-29, pap smear recommended once every 3 years  Between the ages of 33-67, can perform pap smear with HPV co-testing every 5 years     Recommendations may differ for women with a history of total hysterectomy, cervical cancer, or abnormal pap smears in past  Pap Smear: Not on file    Screening Not Indicated   Hepatitis C Screening Once for adults born between [facility]  More frequently in patients at high risk for Hepatitis C Hep C Antibody: Not on file        Diabetes Screening 1-2 times per year if you're at risk for diabetes or have pre-diabetes Fasting glucose: 103 mg/dL   A1C: 5 3 %    Screening Current   Cholesterol Screening Once every 5 years if you don't have a lipid disorder  May order more often based on risk factors  Lipid panel: 04/21/2017    Screening Current     Other Preventive Screenings Covered by Medicare:  1  Abdominal Aortic Aneurysm (AAA) Screening: covered once if your at risk  You're considered to be at risk if you have a family history of AAA  2  Lung Cancer Screening: covers low dose CT scan once per year if you meet all of the following conditions: (1) Age 50-69; (2) No signs or symptoms of lung cancer; (3) Current smoker or have quit smoking within the last 15 years; (4) You have a tobacco smoking history of at least 30 pack years (packs per day multiplied by number of years you smoked); (5) You get a written order from a healthcare provider  3  Glaucoma Screening: covered annually if you're considered high risk: (1) You have diabetes OR (2) Family history of glaucoma OR (3)  aged 48 and older OR (3)  American aged 72 and older  3  Osteoporosis Screening: covered every 2 years if you meet one of the following conditions: (1) You're estrogen deficient and at risk for osteoporosis based off medical history and other findings; (2) Have a vertebral abnormality; (3) On glucocorticoid therapy for more than 3 months; (4) Have primary hyperparathyroidism; (5) On osteoporosis medications and need to assess response to drug therapy  · Last bone density test (DXA Scan): 05/13/2016   5  HIV Screening: covered annually if you're between the age of 15-65  Also covered annually if you are younger than 13 and older than 72 with risk factors for HIV infection  For pregnant patients, it is covered up to 3 times per pregnancy      Immunizations:  Immunization Recommendations   Influenza Vaccine Annual influenza vaccination during flu season is recommended for all persons aged >= 6 months who do not have contraindications   Pneumococcal Vaccine (Prevnar and Pneumovax)  * Prevnar = PCV13  * Pneumovax = PPSV23   Adults 25-60 years old: 1-3 doses may be recommended based on certain risk factors  Adults 72 years old: Prevnar (PCV13) vaccine recommended followed by Pneumovax (PPSV23) vaccine  If already received PPSV23 since turning 65, then PCV13 recommended at least one year after PPSV23 dose  Hepatitis B Vaccine 3 dose series if at intermediate or high risk (ex: diabetes, end stage renal disease, liver disease)   Tetanus (Td) Vaccine - COST NOT COVERED BY MEDICARE PART B Following completion of primary series, a booster dose should be given every 10 years to maintain immunity against tetanus  Td may also be given as tetanus wound prophylaxis  Tdap Vaccine - COST NOT COVERED BY MEDICARE PART B Recommended at least once for all adults  For pregnant patients, recommended with each pregnancy  Shingles Vaccine (Shingrix) - COST NOT COVERED BY MEDICARE PART B  2 shot series recommended in those aged 48 and above     Health Maintenance Due:      Topic Date Due    Hepatitis C Screening  Never done     Immunizations Due:      Topic Date Due    COVID-19 Vaccine (2 - Moderna 2-dose series) 08/16/2021    Influenza Vaccine (1) 09/01/2021     Advance Directives   What are advance directives? Advance directives are legal documents that state your wishes and plans for medical care  These plans are made ahead of time in case you lose your ability to make decisions for yourself  Advance directives can apply to any medical decision, such as the treatments you want, and if you want to donate organs  What are the types of advance directives? There are many types of advance directives, and each state has rules about how to use them  You may choose a combination of any of the following:  · Living will:   This is a written record of the treatment you want  You can also choose which treatments you do not want, which to limit, and which to stop at a certain time  This includes surgery, medicine, IV fluid, and tube feedings  · Durable power of  for healthcare [facility]): This is a written record that states who you want to make healthcare choices for you when you are unable to make them for yourself  This person, called a proxy, is usually a family member or a friend  You may choose more than 1 proxy  · Do not resuscitate (DNR) order:  A DNR order is used in case your heart stops beating or you stop breathing  It is a request not to have certain forms of treatment, such as CPR  A DNR order may be included in other types of advance directives  · Medical directive: This covers the care that you want if you are in a coma, near death, or unable to make decisions for yourself  You can list the treatments you want for each condition  Treatment may include pain medicine, surgery, blood transfusions, dialysis, IV or tube feedings, and a ventilator (breathing machine)  · Values history: This document has questions about your views, beliefs, and how you feel and think about life  This information can help others choose the care that you would choose  Why are advance directives important? An advance directive helps you control your care  Although spoken wishes may be used, it is better to have your wishes written down  Spoken wishes can be misunderstood, or not followed  Treatments may be given even if you do not want them  An advance directive may make it easier for your family to make difficult choices about your care  Urinary Incontinence   Urinary incontinence (UI)  is when you lose control of your bladder  UI develops because your bladder cannot store or empty urine properly  The 3 most common types of UI are stress incontinence, urge incontinence, or both    Medicines:   · May be given to help strengthen your bladder control  Report any side effects of medication to your healthcare provider  Do pelvic muscle exercises often:  Your pelvic muscles help you stop urinating  Squeeze these muscles tight for 5 seconds, then relax for 5 seconds  Gradually work up to squeezing for 10 seconds  Do 3 sets of 15 repetitions a day, or as directed  This will help strengthen your pelvic muscles and improve bladder control  Train your bladder:  Go to the bathroom at set times, such as every 2 hours, even if you do not feel the urge to go  You can also try to hold your urine when you feel the urge to go  For example, hold your urine for 5 minutes when you feel the urge to go  As that becomes easier, hold your urine for 10 minutes  Self-care:   · Keep a UI record  Write down how often you leak urine and how much you leak  Make a note of what you were doing when you leaked urine  · Drink liquids as directed  You may need to limit the amount of liquid you drink to help control your urine leakage  Do not drink any liquid right before you go to bed  Limit or do not have drinks that contain caffeine or alcohol  · Prevent constipation  Eat a variety of high-fiber foods  Good examples are high-fiber cereals, beans, vegetables, and whole-grain breads  Walking is the best way to trigger your intestines to have a bowel movement  · Exercise regularly and maintain a healthy weight  Weight loss and exercise will decrease pressure on your bladder and help you control your leakage  · Use a catheter as directed  to help empty your bladder  A catheter is a tiny, plastic tube that is put into your bladder to drain your urine  · Go to behavior therapy as directed  Behavior therapy may be used to help you learn to control your urge to urinate      Weight Management   Why it is important to manage your weight:  Being overweight increases your risk of health conditions such as heart disease, high blood pressure, type 2 diabetes, and certain types of cancer  It can also increase your risk for osteoarthritis, sleep apnea, and other respiratory problems  Aim for a slow, steady weight loss  Even a small amount of weight loss can lower your risk of health problems  How to lose weight safely:  A safe and healthy way to lose weight is to eat fewer calories and get regular exercise  You can lose up about 1 pound a week by decreasing the number of calories you eat by 500 calories each day  Healthy meal plan for weight management:  A healthy meal plan includes a variety of foods, contains fewer calories, and helps you stay healthy  A healthy meal plan includes the following:  · Eat whole-grain foods more often  A healthy meal plan should contain fiber  Fiber is the part of grains, fruits, and vegetables that is not broken down by your body  Whole-grain foods are healthy and provide extra fiber in your diet  Some examples of whole-grain foods are whole-wheat breads and pastas, oatmeal, brown rice, and bulgur  · Eat a variety of vegetables every day  Include dark, leafy greens such as spinach, kale, syed greens, and mustard greens  Eat yellow and orange vegetables such as carrots, sweet potatoes, and winter squash  · Eat a variety of fruits every day  Choose fresh or canned fruit (canned in its own juice or light syrup) instead of juice  Fruit juice has very little or no fiber  · Eat low-fat dairy foods  Drink fat-free (skim) milk or 1% milk  Eat fat-free yogurt and low-fat cottage cheese  Try low-fat cheeses such as mozzarella and other reduced-fat cheeses  · Choose meat and other protein foods that are low in fat  Choose beans or other legumes such as split peas or lentils  Choose fish, skinless poultry (chicken or turkey), or lean cuts of red meat (beef or pork)  Before you cook meat or poultry, cut off any visible fat  · Use less fat and oil  Try baking foods instead of frying them   Add less fat, such as margarine, sour cream, regular salad dressing and mayonnaise to foods  Eat fewer high-fat foods  Some examples of high-fat foods include french fries, doughnuts, ice cream, and cakes  · Eat fewer sweets  Limit foods and drinks that are high in sugar  This includes candy, cookies, regular soda, and sweetened drinks  Exercise:  Exercise at least 30 minutes per day on most days of the week  Some examples of exercise include walking, biking, dancing, and swimming  You can also fit in more physical activity by taking the stairs instead of the elevator or parking farther away from stores  Ask your healthcare provider about the best exercise plan for you  © Copyright [employer] 2018 Information is for End User's use only and may not be sold, redistributed or otherwise used for commercial purposes   All illustrations and images included in CareNotes® are the copyrighted property of A D A M , Inc  or [address] [place] [address] [employer] [facility]

## 2021-10-10 DIAGNOSIS — I10 HYPERTENSION: Chronic | ICD-10-CM

## 2021-10-11 ENCOUNTER — TELEPHONE (OUTPATIENT)
Dept: FAMILY MEDICINE CLINIC | Facility: CLINIC | Age: 81
End: 2021-10-11

## 2021-10-11 RX ORDER — LOSARTAN POTASSIUM 25 MG/1
TABLET ORAL
Qty: 90 TABLET | Refills: 1 | Status: SHIPPED | OUTPATIENT
Start: 2021-10-11 | End: 2021-10-15 | Stop reason: SDUPTHER

## 2021-10-14 ENCOUNTER — TELEPHONE (OUTPATIENT)
Dept: INTERVENTIONAL RADIOLOGY/VASCULAR | Facility: HOSPITAL | Age: 81
End: 2021-10-14

## 2021-10-14 ENCOUNTER — TELEPHONE (OUTPATIENT)
Dept: UROLOGY | Facility: MEDICAL CENTER | Age: 81
End: 2021-10-14

## 2021-10-14 DIAGNOSIS — Z91.041 ALLERGY TO CONTRAST MEDIA (USED FOR DIAGNOSTIC X-RAYS): Primary | ICD-10-CM

## 2021-10-14 RX ORDER — DIPHENHYDRAMINE HCL 25 MG
TABLET ORAL
Qty: 1 TABLET | Refills: 0 | Status: SHIPPED | OUTPATIENT
Start: 2021-10-14 | End: 2021-10-19

## 2021-10-14 RX ORDER — METHYLPREDNISOLONE 32 MG/1
TABLET ORAL
Qty: 2 TABLET | Refills: 0 | Status: SHIPPED | OUTPATIENT
Start: 2021-10-14 | End: 2021-12-10 | Stop reason: ALTCHOICE

## 2021-10-15 DIAGNOSIS — I10 HYPERTENSION: Chronic | ICD-10-CM

## 2021-10-15 RX ORDER — LOSARTAN POTASSIUM 25 MG/1
25 TABLET ORAL DAILY
Qty: 90 TABLET | Refills: 1 | Status: SHIPPED | OUTPATIENT
Start: 2021-10-15 | End: 2021-12-15 | Stop reason: SDUPTHER

## 2021-10-18 ENCOUNTER — TELEPHONE (OUTPATIENT)
Dept: INTERVENTIONAL RADIOLOGY/VASCULAR | Facility: HOSPITAL | Age: 81
End: 2021-10-18

## 2021-10-19 ENCOUNTER — CONSULT (OUTPATIENT)
Dept: CARDIOLOGY CLINIC | Facility: CLINIC | Age: 81
End: 2021-10-19
Payer: COMMERCIAL

## 2021-10-19 VITALS
RESPIRATION RATE: 16 BRPM | OXYGEN SATURATION: 94 % | BODY MASS INDEX: 43.07 KG/M2 | HEART RATE: 84 BPM | SYSTOLIC BLOOD PRESSURE: 130 MMHG | WEIGHT: 268 LBS | DIASTOLIC BLOOD PRESSURE: 66 MMHG | HEIGHT: 66 IN

## 2021-10-19 DIAGNOSIS — E66.01 MORBID OBESITY DUE TO EXCESS CALORIES (HCC): ICD-10-CM

## 2021-10-19 DIAGNOSIS — I10 HYPERTENSION: Chronic | ICD-10-CM

## 2021-10-19 DIAGNOSIS — I10 ESSENTIAL HYPERTENSION: ICD-10-CM

## 2021-10-19 DIAGNOSIS — I50.32 CHRONIC DIASTOLIC HEART FAILURE (HCC): ICD-10-CM

## 2021-10-19 DIAGNOSIS — Z95.0 PACEMAKER: ICD-10-CM

## 2021-10-19 DIAGNOSIS — I48.0 PAROXYSMAL ATRIAL FIBRILLATION (HCC): Primary | Chronic | ICD-10-CM

## 2021-10-19 DIAGNOSIS — G47.33 OSA (OBSTRUCTIVE SLEEP APNEA): ICD-10-CM

## 2021-10-19 DIAGNOSIS — R00.1 SYMPTOMATIC BRADYCARDIA: ICD-10-CM

## 2021-10-19 DIAGNOSIS — Z79.01 ANTICOAGULANT LONG-TERM USE: ICD-10-CM

## 2021-10-19 PROCEDURE — 93000 ELECTROCARDIOGRAM COMPLETE: CPT | Performed by: INTERNAL MEDICINE

## 2021-10-19 PROCEDURE — 1036F TOBACCO NON-USER: CPT | Performed by: INTERNAL MEDICINE

## 2021-10-19 PROCEDURE — 3078F DIAST BP <80 MM HG: CPT | Performed by: INTERNAL MEDICINE

## 2021-10-19 PROCEDURE — 99204 OFFICE O/P NEW MOD 45 MIN: CPT | Performed by: INTERNAL MEDICINE

## 2021-10-19 PROCEDURE — 3075F SYST BP GE 130 - 139MM HG: CPT | Performed by: INTERNAL MEDICINE

## 2021-10-19 RX ORDER — AMLODIPINE BESYLATE 10 MG/1
TABLET ORAL
Qty: 90 TABLET | Refills: 1 | Status: SHIPPED | OUTPATIENT
Start: 2021-10-19 | End: 2021-12-22 | Stop reason: SDUPTHER

## 2021-10-27 ENCOUNTER — REMOTE DEVICE CLINIC VISIT (OUTPATIENT)
Dept: CARDIOLOGY CLINIC | Facility: CLINIC | Age: 81
End: 2021-10-27
Payer: COMMERCIAL

## 2021-10-27 DIAGNOSIS — Z95.0 CARDIAC PACEMAKER IN SITU: Primary | ICD-10-CM

## 2021-10-27 PROCEDURE — 93296 REM INTERROG EVL PM/IDS: CPT | Performed by: INTERNAL MEDICINE

## 2021-10-27 PROCEDURE — 93294 REM INTERROG EVL PM/LDLS PM: CPT | Performed by: INTERNAL MEDICINE

## 2021-10-28 DIAGNOSIS — G89.29 OTHER CHRONIC PAIN: ICD-10-CM

## 2021-10-29 DIAGNOSIS — H40.9 GLAUCOMA, UNSPECIFIED GLAUCOMA TYPE, UNSPECIFIED LATERALITY: ICD-10-CM

## 2021-10-29 RX ORDER — LATANOPROST 50 UG/ML
1 SOLUTION/ DROPS OPHTHALMIC
Qty: 7.5 ML | Refills: 0 | Status: SHIPPED | OUTPATIENT
Start: 2021-10-29

## 2021-10-29 RX ORDER — OXYCODONE HYDROCHLORIDE 15 MG/1
15 TABLET, FILM COATED, EXTENDED RELEASE ORAL EVERY 12 HOURS SCHEDULED
Qty: 60 TABLET | Refills: 0 | Status: SHIPPED | OUTPATIENT
Start: 2021-10-29 | End: 2021-11-18 | Stop reason: SDUPTHER

## 2021-11-04 ENCOUNTER — TELEPHONE (OUTPATIENT)
Dept: OTHER | Facility: OTHER | Age: 81
End: 2021-11-04

## 2021-11-04 ENCOUNTER — TELEPHONE (OUTPATIENT)
Dept: FAMILY MEDICINE CLINIC | Facility: CLINIC | Age: 81
End: 2021-11-04

## 2021-11-08 DIAGNOSIS — G89.29 OTHER CHRONIC PAIN: ICD-10-CM

## 2021-11-08 RX ORDER — BACLOFEN 5 MG/1
TABLET ORAL
Qty: 90 TABLET | Refills: 1 | Status: SHIPPED | OUTPATIENT
Start: 2021-11-08

## 2021-11-10 ENCOUNTER — TELEPHONE (OUTPATIENT)
Dept: FAMILY MEDICINE CLINIC | Facility: CLINIC | Age: 81
End: 2021-11-10

## 2021-11-11 ENCOUNTER — TELEPHONE (OUTPATIENT)
Dept: FAMILY MEDICINE CLINIC | Facility: CLINIC | Age: 81
End: 2021-11-11

## 2021-11-15 ENCOUNTER — TELEPHONE (OUTPATIENT)
Dept: UROLOGY | Facility: CLINIC | Age: 81
End: 2021-11-15

## 2021-11-16 ENCOUNTER — PROCEDURE VISIT (OUTPATIENT)
Dept: UROLOGY | Facility: CLINIC | Age: 81
End: 2021-11-16
Payer: COMMERCIAL

## 2021-11-16 VITALS
DIASTOLIC BLOOD PRESSURE: 80 MMHG | HEART RATE: 62 BPM | HEIGHT: 66 IN | BODY MASS INDEX: 43.26 KG/M2 | SYSTOLIC BLOOD PRESSURE: 130 MMHG

## 2021-11-16 DIAGNOSIS — N32.89 MASS OF URINARY BLADDER DETERMINED BY ULTRASOUND: ICD-10-CM

## 2021-11-16 DIAGNOSIS — R31.0 GROSS HEMATURIA: Primary | ICD-10-CM

## 2021-11-16 DIAGNOSIS — N39.0 FREQUENT UTI: ICD-10-CM

## 2021-11-16 PROCEDURE — 1160F RVW MEDS BY RX/DR IN RCRD: CPT | Performed by: UROLOGY

## 2021-11-16 PROCEDURE — 3079F DIAST BP 80-89 MM HG: CPT | Performed by: UROLOGY

## 2021-11-16 PROCEDURE — 52000 CYSTOURETHROSCOPY: CPT | Performed by: UROLOGY

## 2021-11-16 PROCEDURE — 1036F TOBACCO NON-USER: CPT | Performed by: UROLOGY

## 2021-11-16 PROCEDURE — 3075F SYST BP GE 130 - 139MM HG: CPT | Performed by: UROLOGY

## 2021-11-16 PROCEDURE — 99214 OFFICE O/P EST MOD 30 MIN: CPT | Performed by: UROLOGY

## 2021-11-17 ENCOUNTER — TELEPHONE (OUTPATIENT)
Dept: OTHER | Facility: OTHER | Age: 81
End: 2021-11-17

## 2021-11-17 ENCOUNTER — TELEPHONE (OUTPATIENT)
Dept: FAMILY MEDICINE CLINIC | Facility: CLINIC | Age: 81
End: 2021-11-17

## 2021-11-18 DIAGNOSIS — G89.29 OTHER CHRONIC PAIN: ICD-10-CM

## 2021-11-18 RX ORDER — OXYCODONE HYDROCHLORIDE 15 MG/1
15 TABLET, FILM COATED, EXTENDED RELEASE ORAL EVERY 12 HOURS SCHEDULED
Qty: 60 TABLET | Refills: 0 | Status: SHIPPED | OUTPATIENT
Start: 2021-11-18 | End: 2021-12-15 | Stop reason: SDUPTHER

## 2021-11-19 ENCOUNTER — APPOINTMENT (OUTPATIENT)
Dept: LAB | Facility: HOSPITAL | Age: 81
End: 2021-11-19
Payer: COMMERCIAL

## 2021-11-19 LAB
BACTERIA UR QL AUTO: ABNORMAL /HPF
BILIRUB UR QL STRIP: NEGATIVE
CLARITY UR: ABNORMAL
COLOR UR: YELLOW
GLUCOSE UR STRIP-MCNC: NEGATIVE MG/DL
HGB UR QL STRIP.AUTO: ABNORMAL
KETONES UR STRIP-MCNC: NEGATIVE MG/DL
LEUKOCYTE ESTERASE UR QL STRIP: ABNORMAL
NITRITE UR QL STRIP: POSITIVE
NON-SQ EPI CELLS URNS QL MICRO: ABNORMAL /HPF
PH UR STRIP.AUTO: 6 [PH]
PROT UR STRIP-MCNC: ABNORMAL MG/DL
RBC #/AREA URNS AUTO: ABNORMAL /HPF
SP GR UR STRIP.AUTO: 1.02 (ref 1–1.03)
UROBILINOGEN UR QL STRIP.AUTO: 0.2 E.U./DL
WBC #/AREA URNS AUTO: ABNORMAL /HPF

## 2021-11-19 PROCEDURE — 87077 CULTURE AEROBIC IDENTIFY: CPT | Performed by: NURSE PRACTITIONER

## 2021-11-19 PROCEDURE — 81001 URINALYSIS AUTO W/SCOPE: CPT | Performed by: NURSE PRACTITIONER

## 2021-11-19 PROCEDURE — 87086 URINE CULTURE/COLONY COUNT: CPT | Performed by: NURSE PRACTITIONER

## 2021-11-19 PROCEDURE — 87186 SC STD MICRODIL/AGAR DIL: CPT | Performed by: NURSE PRACTITIONER

## 2021-11-22 LAB
BACTERIA UR CULT: ABNORMAL

## 2021-11-24 ENCOUNTER — NURSE TRIAGE (OUTPATIENT)
Dept: OTHER | Facility: OTHER | Age: 81
End: 2021-11-24

## 2021-11-29 ENCOUNTER — TELEPHONE (OUTPATIENT)
Dept: FAMILY MEDICINE CLINIC | Facility: CLINIC | Age: 81
End: 2021-11-29

## 2021-11-29 ENCOUNTER — TELEPHONE (OUTPATIENT)
Dept: UROLOGY | Facility: MEDICAL CENTER | Age: 81
End: 2021-11-29

## 2021-11-29 NOTE — TELEPHONE ENCOUNTER
Patient seen by Dr Aston Juárez at [facility]    Patient daughter called

## 2021-11-30 NOTE — TELEPHONE ENCOUNTER
Patient is calling to say she is having worsening bladder pain when she urinates  She stated when she starts urinating she has terrible pain

## 2021-11-30 NOTE — TELEPHONE ENCOUNTER
Returned call to patient   Patient state she wants to see Dr Gregory Barrera with [facility]  Advised patient that we are Rocío Nettles and she is not part of our practice  Number given for [facility]

## 2021-12-06 ENCOUNTER — TELEPHONE (OUTPATIENT)
Dept: FAMILY MEDICINE CLINIC | Facility: CLINIC | Age: 81
End: 2021-12-06

## 2021-12-07 ENCOUNTER — HOSPITAL ENCOUNTER (EMERGENCY)
Facility: HOSPITAL | Age: 81
Discharge: HOME | End: 2021-12-07
Attending: EMERGENCY MEDICINE | Admitting: EMERGENCY MEDICINE
Payer: COMMERCIAL

## 2021-12-07 ENCOUNTER — APPOINTMENT (EMERGENCY)
Dept: CT IMAGING | Facility: HOSPITAL | Age: 81
End: 2021-12-07
Payer: COMMERCIAL

## 2021-12-07 VITALS
SYSTOLIC BLOOD PRESSURE: 132 MMHG | TEMPERATURE: 97.6 F | HEART RATE: 62 BPM | OXYGEN SATURATION: 93 % | RESPIRATION RATE: 18 BRPM | DIASTOLIC BLOOD PRESSURE: 60 MMHG

## 2021-12-07 DIAGNOSIS — R19.7 DIARRHEA, UNSPECIFIED TYPE: ICD-10-CM

## 2021-12-07 DIAGNOSIS — R31.9 HEMATURIA, UNSPECIFIED TYPE: Primary | ICD-10-CM

## 2021-12-07 LAB
ALBUMIN SERPL BCP-MCNC: 3 G/DL (ref 3.5–5)
ALP SERPL-CCNC: 58 U/L (ref 46–116)
ALT SERPL W P-5'-P-CCNC: 16 U/L (ref 12–78)
ANION GAP SERPL CALCULATED.3IONS-SCNC: 6 MMOL/L (ref 4–13)
APTT PPP: 36 SECONDS (ref 23–37)
AST SERPL W P-5'-P-CCNC: 14 U/L (ref 5–45)
BACTERIA UR QL AUTO: ABNORMAL /HPF
BASOPHILS # BLD AUTO: 0.04 THOUSANDS/ΜL (ref 0–0.1)
BASOPHILS NFR BLD AUTO: 1 % (ref 0–1)
BILIRUB SERPL-MCNC: 0.23 MG/DL (ref 0.2–1)
BILIRUB UR QL STRIP: NEGATIVE
BUN SERPL-MCNC: 13 MG/DL (ref 5–25)
CALCIUM ALBUM COR SERPL-MCNC: 9.7 MG/DL (ref 8.3–10.1)
CALCIUM SERPL-MCNC: 8.9 MG/DL (ref 8.3–10.1)
CHLORIDE SERPL-SCNC: 107 MMOL/L (ref 100–108)
CLARITY UR: ABNORMAL
CO2 SERPL-SCNC: 30 MMOL/L (ref 21–32)
COLOR UR: YELLOW
CREAT SERPL-MCNC: 0.98 MG/DL (ref 0.6–1.3)
EOSINOPHIL # BLD AUTO: 0.22 THOUSAND/ΜL (ref 0–0.61)
EOSINOPHIL NFR BLD AUTO: 4 % (ref 0–6)
ERYTHROCYTE [DISTWIDTH] IN BLOOD BY AUTOMATED COUNT: 14.6 % (ref 11.6–15.1)
GFR SERPL CREATININE-BSD FRML MDRD: 55 ML/MIN/1.73SQ M
GLUCOSE SERPL-MCNC: 119 MG/DL (ref 65–140)
GLUCOSE UR STRIP-MCNC: NEGATIVE MG/DL
HCT VFR BLD AUTO: 42.8 % (ref 34.8–46.1)
HGB BLD-MCNC: 13 G/DL (ref 11.5–15.4)
HGB UR QL STRIP.AUTO: ABNORMAL
IMM GRANULOCYTES # BLD AUTO: 0.05 THOUSAND/UL (ref 0–0.2)
IMM GRANULOCYTES NFR BLD AUTO: 1 % (ref 0–2)
INR PPP: 1.36 (ref 0.84–1.19)
KETONES UR STRIP-MCNC: NEGATIVE MG/DL
LEUKOCYTE ESTERASE UR QL STRIP: NEGATIVE
LIPASE SERPL-CCNC: 23 U/L (ref 73–393)
LYMPHOCYTES # BLD AUTO: 1.74 THOUSANDS/ΜL (ref 0.6–4.47)
LYMPHOCYTES NFR BLD AUTO: 28 % (ref 14–44)
MCH RBC QN AUTO: 27.3 PG (ref 26.8–34.3)
MCHC RBC AUTO-ENTMCNC: 30.4 G/DL (ref 31.4–37.4)
MCV RBC AUTO: 90 FL (ref 82–98)
MONOCYTES # BLD AUTO: 0.5 THOUSAND/ΜL (ref 0.17–1.22)
MONOCYTES NFR BLD AUTO: 8 % (ref 4–12)
NEUTROPHILS # BLD AUTO: 3.58 THOUSANDS/ΜL (ref 1.85–7.62)
NEUTS SEG NFR BLD AUTO: 58 % (ref 43–75)
NITRITE UR QL STRIP: NEGATIVE
NON-SQ EPI CELLS URNS QL MICRO: ABNORMAL /HPF
NRBC BLD AUTO-RTO: 0 /100 WBCS
PH UR STRIP.AUTO: 5.5 [PH]
PLATELET # BLD AUTO: 185 THOUSANDS/UL (ref 149–390)
PMV BLD AUTO: 9.6 FL (ref 8.9–12.7)
POTASSIUM SERPL-SCNC: 4.3 MMOL/L (ref 3.5–5.3)
PROT SERPL-MCNC: 7.4 G/DL (ref 6.4–8.2)
PROT UR STRIP-MCNC: ABNORMAL MG/DL
PROTHROMBIN TIME: 16.2 SECONDS (ref 11.6–14.5)
RBC # BLD AUTO: 4.77 MILLION/UL (ref 3.81–5.12)
RBC #/AREA URNS AUTO: ABNORMAL /HPF
SODIUM SERPL-SCNC: 143 MMOL/L (ref 136–145)
SP GR UR STRIP.AUTO: 1.01 (ref 1–1.03)
UROBILINOGEN UR QL STRIP.AUTO: 0.2 E.U./DL
WBC # BLD AUTO: 6.13 THOUSAND/UL (ref 4.31–10.16)
WBC #/AREA URNS AUTO: ABNORMAL /HPF

## 2021-12-07 PROCEDURE — 83690 ASSAY OF LIPASE: CPT | Performed by: EMERGENCY MEDICINE

## 2021-12-07 PROCEDURE — 85730 THROMBOPLASTIN TIME PARTIAL: CPT | Performed by: EMERGENCY MEDICINE

## 2021-12-07 PROCEDURE — 74176 CT ABD & PELVIS W/O CONTRAST: CPT

## 2021-12-07 PROCEDURE — 80053 COMPREHEN METABOLIC PANEL: CPT | Performed by: EMERGENCY MEDICINE

## 2021-12-07 PROCEDURE — 36415 COLL VENOUS BLD VENIPUNCTURE: CPT | Performed by: EMERGENCY MEDICINE

## 2021-12-07 PROCEDURE — 85610 PROTHROMBIN TIME: CPT | Performed by: EMERGENCY MEDICINE

## 2021-12-07 PROCEDURE — 99284 EMERGENCY DEPT VISIT MOD MDM: CPT

## 2021-12-07 PROCEDURE — 99284 EMERGENCY DEPT VISIT MOD MDM: CPT | Performed by: EMERGENCY MEDICINE

## 2021-12-07 PROCEDURE — 81001 URINALYSIS AUTO W/SCOPE: CPT | Performed by: EMERGENCY MEDICINE

## 2021-12-07 PROCEDURE — 85025 COMPLETE CBC W/AUTO DIFF WBC: CPT | Performed by: EMERGENCY MEDICINE

## 2021-12-07 RX ORDER — ACETAMINOPHEN 325 MG/1
650 TABLET ORAL ONCE
Status: COMPLETED | OUTPATIENT
Start: 2021-12-07 | End: 2021-12-07

## 2021-12-07 RX ORDER — GABAPENTIN 300 MG/1
600 CAPSULE ORAL ONCE
Status: COMPLETED | OUTPATIENT
Start: 2021-12-07 | End: 2021-12-07

## 2021-12-07 RX ADMIN — GABAPENTIN 600 MG: 300 CAPSULE ORAL at 15:18

## 2021-12-07 RX ADMIN — ACETAMINOPHEN 650 MG: 325 TABLET, FILM COATED ORAL at 15:18

## 2021-12-07 RX ADMIN — OXYCODONE HYDROCHLORIDE 15 MG: 10 TABLET ORAL at 15:18

## 2021-12-08 ENCOUNTER — PATIENT OUTREACH (OUTPATIENT)
Dept: FAMILY MEDICINE CLINIC | Facility: CLINIC | Age: 81
End: 2021-12-08

## 2021-12-08 ENCOUNTER — TELEPHONE (OUTPATIENT)
Dept: FAMILY MEDICINE CLINIC | Facility: CLINIC | Age: 81
End: 2021-12-08

## 2021-12-08 DIAGNOSIS — Z78.9 NEED FOR FOLLOW-UP BY SOCIAL WORKER: Primary | ICD-10-CM

## 2021-12-10 ENCOUNTER — TELEMEDICINE (OUTPATIENT)
Dept: FAMILY MEDICINE CLINIC | Facility: CLINIC | Age: 81
End: 2021-12-10
Payer: COMMERCIAL

## 2021-12-10 DIAGNOSIS — R31.9 HEMATURIA, UNSPECIFIED TYPE: ICD-10-CM

## 2021-12-10 DIAGNOSIS — R19.7 DIARRHEA, UNSPECIFIED TYPE: ICD-10-CM

## 2021-12-10 DIAGNOSIS — N39.0 FREQUENT UTI: ICD-10-CM

## 2021-12-10 DIAGNOSIS — R53.81 PHYSICAL DECONDITIONING: ICD-10-CM

## 2021-12-10 DIAGNOSIS — R53.1 GENERALIZED WEAKNESS: ICD-10-CM

## 2021-12-10 DIAGNOSIS — F03.91 DEMENTIA WITH BEHAVIORAL DISTURBANCE, UNSPECIFIED DEMENTIA TYPE (HCC): Primary | ICD-10-CM

## 2021-12-10 PROCEDURE — 1036F TOBACCO NON-USER: CPT | Performed by: NURSE PRACTITIONER

## 2021-12-10 PROCEDURE — 99214 OFFICE O/P EST MOD 30 MIN: CPT | Performed by: NURSE PRACTITIONER

## 2021-12-13 ENCOUNTER — APPOINTMENT (OUTPATIENT)
Dept: LAB | Facility: HOSPITAL | Age: 81
End: 2021-12-13
Payer: COMMERCIAL

## 2021-12-13 DIAGNOSIS — R31.9 HEMATURIA, UNSPECIFIED TYPE: ICD-10-CM

## 2021-12-13 DIAGNOSIS — R19.7 DIARRHEA, UNSPECIFIED TYPE: ICD-10-CM

## 2021-12-13 LAB
BACTERIA UR QL AUTO: ABNORMAL /HPF
BILIRUB UR QL STRIP: NEGATIVE
CLARITY UR: ABNORMAL
COLOR UR: YELLOW
GLUCOSE UR STRIP-MCNC: NEGATIVE MG/DL
HGB UR QL STRIP.AUTO: ABNORMAL
KETONES UR STRIP-MCNC: NEGATIVE MG/DL
LEUKOCYTE ESTERASE UR QL STRIP: ABNORMAL
NITRITE UR QL STRIP: NEGATIVE
NON-SQ EPI CELLS URNS QL MICRO: ABNORMAL /HPF
PH UR STRIP.AUTO: 5.5 [PH]
PROT UR STRIP-MCNC: ABNORMAL MG/DL
RBC #/AREA URNS AUTO: ABNORMAL /HPF
SP GR UR STRIP.AUTO: 1.02 (ref 1–1.03)
UROBILINOGEN UR QL STRIP.AUTO: 0.2 E.U./DL
WBC #/AREA URNS AUTO: ABNORMAL /HPF

## 2021-12-13 PROCEDURE — 87077 CULTURE AEROBIC IDENTIFY: CPT

## 2021-12-13 PROCEDURE — 87086 URINE CULTURE/COLONY COUNT: CPT

## 2021-12-13 PROCEDURE — 87493 C DIFF AMPLIFIED PROBE: CPT

## 2021-12-13 PROCEDURE — 87181 SC STD AGAR DILUTION PER AGT: CPT

## 2021-12-13 PROCEDURE — 81001 URINALYSIS AUTO W/SCOPE: CPT

## 2021-12-13 PROCEDURE — 87186 SC STD MICRODIL/AGAR DIL: CPT

## 2021-12-14 LAB
C DIFF TOX A+B STL QL IA: NEGATIVE
C DIFF TOX GENS STL QL NAA+PROBE: POSITIVE

## 2021-12-15 ENCOUNTER — TELEPHONE (OUTPATIENT)
Dept: FAMILY MEDICINE CLINIC | Facility: CLINIC | Age: 81
End: 2021-12-15

## 2021-12-15 DIAGNOSIS — F51.01 PRIMARY INSOMNIA: ICD-10-CM

## 2021-12-15 DIAGNOSIS — I10 HYPERTENSION: Chronic | ICD-10-CM

## 2021-12-15 DIAGNOSIS — G89.29 OTHER CHRONIC PAIN: ICD-10-CM

## 2021-12-15 RX ORDER — TRAZODONE HYDROCHLORIDE 100 MG/1
150 TABLET ORAL
Qty: 135 TABLET | Refills: 0 | Status: SHIPPED | OUTPATIENT
Start: 2021-12-15 | End: 2021-12-22 | Stop reason: SDUPTHER

## 2021-12-15 RX ORDER — LOSARTAN POTASSIUM 25 MG/1
25 TABLET ORAL DAILY
Qty: 90 TABLET | Refills: 0 | Status: SHIPPED | OUTPATIENT
Start: 2021-12-15 | End: 2022-03-29

## 2021-12-16 LAB — BACTERIA UR CULT: ABNORMAL

## 2021-12-16 RX ORDER — OXYCODONE HYDROCHLORIDE 15 MG/1
15 TABLET, FILM COATED, EXTENDED RELEASE ORAL EVERY 12 HOURS SCHEDULED
Qty: 60 TABLET | Refills: 0 | Status: SHIPPED | OUTPATIENT
Start: 2021-12-16 | End: 2022-01-12 | Stop reason: SDUPTHER

## 2021-12-17 ENCOUNTER — TELEPHONE (OUTPATIENT)
Dept: FAMILY MEDICINE CLINIC | Facility: CLINIC | Age: 81
End: 2021-12-17

## 2021-12-18 DIAGNOSIS — F41.9 ANXIETY: ICD-10-CM

## 2021-12-18 RX ORDER — SERTRALINE HYDROCHLORIDE 100 MG/1
TABLET, FILM COATED ORAL
Qty: 90 TABLET | Refills: 0 | Status: SHIPPED | OUTPATIENT
Start: 2021-12-18 | End: 2022-06-16

## 2021-12-20 ENCOUNTER — TELEPHONE (OUTPATIENT)
Dept: FAMILY MEDICINE CLINIC | Facility: CLINIC | Age: 81
End: 2021-12-20

## 2021-12-20 NOTE — TELEPHONE ENCOUNTER
Radha Vuong is asking for [facility] order for PT for patient  She is now experiencing  diarrhea and pain  She is looking for call back   please advise, Thank you

## 2021-12-22 DIAGNOSIS — F51.01 PRIMARY INSOMNIA: ICD-10-CM

## 2021-12-22 DIAGNOSIS — R09.81 NASAL CONGESTION: ICD-10-CM

## 2021-12-22 DIAGNOSIS — I10 HYPERTENSION: Chronic | ICD-10-CM

## 2021-12-22 RX ORDER — FLUTICASONE PROPIONATE 50 MCG
1 SPRAY, SUSPENSION (ML) NASAL DAILY
Qty: 16 G | Refills: 3 | Status: SHIPPED | OUTPATIENT
Start: 2021-12-22

## 2021-12-22 RX ORDER — AMLODIPINE BESYLATE 10 MG/1
10 TABLET ORAL DAILY
Qty: 90 TABLET | Refills: 1 | Status: SHIPPED | OUTPATIENT
Start: 2021-12-22

## 2021-12-22 RX ORDER — TRAZODONE HYDROCHLORIDE 100 MG/1
150 TABLET ORAL
Qty: 135 TABLET | Refills: 0 | Status: SHIPPED | OUTPATIENT
Start: 2021-12-22 | End: 2022-02-07

## 2021-12-29 ENCOUNTER — PATIENT OUTREACH (OUTPATIENT)
Dept: FAMILY MEDICINE CLINIC | Facility: CLINIC | Age: 81
End: 2021-12-29

## 2021-12-29 ENCOUNTER — TELEPHONE (OUTPATIENT)
Dept: FAMILY MEDICINE CLINIC | Facility: CLINIC | Age: 81
End: 2021-12-29

## 2021-12-30 ENCOUNTER — PATIENT OUTREACH (OUTPATIENT)
Dept: FAMILY MEDICINE CLINIC | Facility: CLINIC | Age: 81
End: 2021-12-30

## 2022-01-03 ENCOUNTER — PATIENT OUTREACH (OUTPATIENT)
Dept: FAMILY MEDICINE CLINIC | Facility: CLINIC | Age: 82
End: 2022-01-03

## 2022-01-03 NOTE — PROGRESS NOTES
I received an in basket message from Everett Schumachre and she informed me that the [place] was completed and sent to the Aging Office as requested  Telephone call placed to [facility], Director at the [place] regarding this referral   [facility] informed me that she did not receive the records from the home health agency  I will follow up tomorrow

## 2022-01-03 NOTE — PROGRESS NOTES
Telephone call placed to [facility] and I spoke to Brendan Barroso  She confirms that the PASRR ID form was received via fax

## 2022-01-04 ENCOUNTER — PATIENT OUTREACH (OUTPATIENT)
Dept: FAMILY MEDICINE CLINIC | Facility: CLINIC | Age: 82
End: 2022-01-04

## 2022-01-04 NOTE — PROGRESS NOTES
Telephone call placed to Al[facility] Clarke and I spoke to Supervisor, Ryan Thurston regarding faxing records to [employer] at [facility]  I confirmed the fax number with her and she informed me that she fax the requested records on 12/29/21  Ryan Thurston agrees to fax the records again  Telephone call placed to Dorina [employer] [place] at [facility] and I left a message informing her of above  Telephone call placed to Chaya Espinal RN, [address] [place] [address] on Aging and I informed the that the Doon and II90 were sent to the Aging so that patient can receive a new level of care evaluation

## 2022-01-05 ENCOUNTER — PATIENT OUTREACH (OUTPATIENT)
Dept: FAMILY MEDICINE CLINIC | Facility: CLINIC | Age: 82
End: 2022-01-05

## 2022-01-05 NOTE — PROGRESS NOTES
Telephone call received from Miguel Woods at [facility] and he informed me that patient was denied by their facility  He reports that it was an administrative denial due to patient having behavior issues  Telephone call placed to patient's daughter Kim Ji and I informed her of above  She provided authorization to me to make a referral to [facility]  I informed Kim Margy that it may be difficult to find a bed for patient at this time due to staffing issues and limited bed availability due to covid  Telephone call placed to [address] [place] [address] and I spoke to Concepción Hunt regarding a possible bed for patient  Concepción Hunt informed me that their beds are tight at the moment and requests that information is faxed for review and consideration once a bed becomes available  Telephone call placed to [facility] and I spoke to [place]  I requested that patient's records are faxed to [facility] for review and I provided her with their fax number  [place] agrees to speak to [facility] and fax the requested records to [facility]  Telephone call received [facility]  She informed me that she spoke to her Supervisor and she evaluated patient last March and she is not a [employer] Admission for SNF  Salma Randolph informed me that she tried to reach patient's daughter, Kim Ji today and she left a message on her VM  Salma Randolph informed me that she will send me the Functional Eligibility Determination (FEB) to my e-mail address once completed  I will continue to follow and assist with placement of patient

## 2022-01-06 ENCOUNTER — PATIENT OUTREACH (OUTPATIENT)
Dept: FAMILY MEDICINE CLINIC | Facility: CLINIC | Age: 82
End: 2022-01-06

## 2022-01-06 NOTE — PROGRESS NOTES
Telephone call received from [facility], [employer] on Aging  She informed me that the FED was completed  ΦIMELDA sent a copy of the MA51 and FED to me via my e-mail so that I can forward to the accepting SNF

## 2022-01-07 ENCOUNTER — TELEPHONE (OUTPATIENT)
Dept: FAMILY MEDICINE CLINIC | Facility: CLINIC | Age: 82
End: 2022-01-07

## 2022-01-07 ENCOUNTER — PATIENT OUTREACH (OUTPATIENT)
Dept: FAMILY MEDICINE CLINIC | Facility: CLINIC | Age: 82
End: 2022-01-07

## 2022-01-07 NOTE — PROGRESS NOTES
In [employer] received from [facility] Ritter [facility] an [facility] on placement of patient  I provided an update and also spoke to Claudia Gamez via telephone  Claudia Gamez informed me that patient is having a lot of medical issues and her daughter is looking for advice  Claudia Gamez agrees to contact patient's daughter and may need to direct her to the ER again for evaluation as daughter reports that patient is having continued bleeding  Telephone call placed to [facility] and I spoke to Claudia Braswell again in [place]  She confirms that she received patient's records on 1/5 but still does not have a bed available at this time  I informed Claudia Braswell that the level of care evaluation was completed by [facility] and she requests that it if faxed to them  MA-51 and Level of Care determination was faxed to [facility] as requested  I will continue to follow and assist with placement of patient

## 2022-01-07 NOTE — TELEPHONE ENCOUNTER
Spoke to daughter, Christ Dobbs and daughter-in-law, Hossein Pena continues with hematuria and diarrhea  Patient was recently seen in the ER on 01/01 and sent home with anti diarrheal   Patient was recently treated for C diff with 10 days of vancomycin  Patient was also treated for ESBL  Case management is working on finding placement for Deric Pena  Patient does have an appointment with GI coming up at the end of January  I did discuss with Rick Lam that I would order occult stool, c diff, urine culture, ua   Jaylin states she cut down patient's eliquis to 5mg in the AM and 2 5mg in the PM and hematuria seemed to improve  Recent labs from ER reviewed and appear stable  If patient worsens over the weekend, she is to proceed to ER

## 2022-01-07 NOTE — TELEPHONE ENCOUNTER
Eloise Barth called requesting you look at the message she sent you for [facility]  She wants to know what else they should do  Kris Paniagua, her other daughter should be called back with an answer at [phone]

## 2022-01-10 ENCOUNTER — TELEPHONE (OUTPATIENT)
Dept: FAMILY MEDICINE CLINIC | Facility: CLINIC | Age: 82
End: 2022-01-10

## 2022-01-10 ENCOUNTER — PATIENT OUTREACH (OUTPATIENT)
Dept: FAMILY MEDICINE CLINIC | Facility: CLINIC | Age: 82
End: 2022-01-10

## 2022-01-10 NOTE — PROGRESS NOTES
Telephone call placed to [address] [place] [address] and I spoke to Marvin Hodgson informed me that she received the level of care evaluation on Friday  She reports that they are very tight on beds/    Marvin Hodgson informed me that she is going to send a message to Aisha Nunez, Director of [place] [address] her that patient is still in need of a bed  I will continue to follow up with [facility] and assist with placement

## 2022-01-10 NOTE — TELEPHONE ENCOUNTER
Yousuf Mir / with [facility]   We have an order that was submitted by Jett Mejia   For a urine culture / or clean catheter  She needs a straight catheter order put in  because Connie Whittington could not go   If you can have this in the system by Wednesday   She will have that urine culture to you be Wednesday

## 2022-01-10 NOTE — TELEPHONE ENCOUNTER
Pt Spoke Jaylin,  She was told by the nurse from residential she didn't the orders  I informed Ariana Lowe the orders were faxed on Friday and I spoke to Anita  I tried calling Kary Cruz again but no answer  I informed Kymberly Yao re faxed the orders and I wasn't able to get a in touch with Anita again  Lets see what happens when the nurse comes today  If doesn't have the orders Ariana Lowe will call the office here

## 2022-01-11 ENCOUNTER — LAB REQUISITION (OUTPATIENT)
Dept: LAB | Facility: HOSPITAL | Age: 82
End: 2022-01-11
Payer: COMMERCIAL

## 2022-01-11 DIAGNOSIS — Z87.440 PERSONAL HISTORY OF URINARY (TRACT) INFECTIONS: ICD-10-CM

## 2022-01-11 LAB
BACTERIA UR QL AUTO: ABNORMAL /HPF
BILIRUB UR QL STRIP: NEGATIVE
CLARITY UR: ABNORMAL
COLOR UR: ABNORMAL
GLUCOSE UR STRIP-MCNC: NEGATIVE MG/DL
HGB UR QL STRIP.AUTO: ABNORMAL
KETONES UR STRIP-MCNC: ABNORMAL MG/DL
LEUKOCYTE ESTERASE UR QL STRIP: ABNORMAL
NITRITE UR QL STRIP: NEGATIVE
NON-SQ EPI CELLS URNS QL MICRO: ABNORMAL /HPF
PH UR STRIP.AUTO: 5.5 [PH]
PROT UR STRIP-MCNC: ABNORMAL MG/DL
RBC #/AREA URNS AUTO: ABNORMAL /HPF
SP GR UR STRIP.AUTO: >=1.03 (ref 1–1.03)
UROBILINOGEN UR QL STRIP.AUTO: 0.2 E.U./DL
WBC #/AREA URNS AUTO: ABNORMAL /HPF

## 2022-01-11 PROCEDURE — 87077 CULTURE AEROBIC IDENTIFY: CPT | Performed by: NURSE PRACTITIONER

## 2022-01-11 PROCEDURE — 87086 URINE CULTURE/COLONY COUNT: CPT | Performed by: NURSE PRACTITIONER

## 2022-01-11 PROCEDURE — 87181 SC STD AGAR DILUTION PER AGT: CPT | Performed by: NURSE PRACTITIONER

## 2022-01-11 PROCEDURE — 81001 URINALYSIS AUTO W/SCOPE: CPT | Performed by: NURSE PRACTITIONER

## 2022-01-11 PROCEDURE — 87186 SC STD MICRODIL/AGAR DIL: CPT | Performed by: NURSE PRACTITIONER

## 2022-01-11 NOTE — TELEPHONE ENCOUNTER
Spoke with [employer] our [facility] Consultant  She will be contacting the Nurse Supervisor and have them call us  [employer] said she is on there roster and will make sure a nurse gets out to her today  I asked for for a call back when this is completed due to the urgency       Thank you,

## 2022-01-12 ENCOUNTER — PATIENT OUTREACH (OUTPATIENT)
Dept: FAMILY MEDICINE CLINIC | Facility: CLINIC | Age: 82
End: 2022-01-12

## 2022-01-12 DIAGNOSIS — G89.29 OTHER CHRONIC PAIN: ICD-10-CM

## 2022-01-12 NOTE — PROGRESS NOTES
Telephone call placed [facility] [place] [facility] and I spoke to [address] regarding bed availability  Radha informed me that they do not have any beds at this time due to Covid and staffing issues  I asked [address] if patient's information was reviewed for a bed in the future and she did not know  [address] agrees to contact me with an update  Telephone call placed to patient's daughter, Ariana Maki and I left a message for her on her VM  I provided her with the telephone number for [facility] and also requested that she contact them

## 2022-01-13 ENCOUNTER — PATIENT OUTREACH (OUTPATIENT)
Dept: FAMILY MEDICINE CLINIC | Facility: CLINIC | Age: 82
End: 2022-01-13

## 2022-01-13 ENCOUNTER — TELEPHONE (OUTPATIENT)
Dept: FAMILY MEDICINE CLINIC | Facility: CLINIC | Age: 82
End: 2022-01-13

## 2022-01-13 RX ORDER — OXYCODONE HYDROCHLORIDE 15 MG/1
15 TABLET, FILM COATED, EXTENDED RELEASE ORAL EVERY 12 HOURS SCHEDULED
Qty: 60 TABLET | Refills: 0 | Status: SHIPPED | OUTPATIENT
Start: 2022-01-13 | End: 2022-02-08 | Stop reason: SDUPTHER

## 2022-01-13 NOTE — PROGRESS NOTES
Telephone call placed to [facility] and I spoke to Guinea  She informed me that they still do not have any beds available and she does not know when a bed will open up

## 2022-01-13 NOTE — TELEPHONE ENCOUNTER
Left message for Pee Gibson ( patient's daughter) and Darci Danielson ( Patient's daughter in law) to inform them that augmentin is what the patient needs to be on because it is susceptible to this type of infection  The patient may experience some diarrhea due to the e coli infection

## 2022-01-14 LAB — BACTERIA UR CULT: ABNORMAL

## 2022-01-17 ENCOUNTER — PATIENT OUTREACH (OUTPATIENT)
Dept: FAMILY MEDICINE CLINIC | Facility: CLINIC | Age: 82
End: 2022-01-17

## 2022-01-17 NOTE — PROGRESS NOTES
Telephone call placed to [address] [place] [address] and I spoke to Bob, Director of Intake Dept regarding bed availability  Teri informed me that she has all of patient's information and patient is on a list  She reports not having any beds at this time  Bob was not able to provide any information as to when a bed will become available  Bob agrees to call me once a bed opens up in one of her buildings

## 2022-01-19 ENCOUNTER — TELEPHONE (OUTPATIENT)
Dept: OTHER | Facility: OTHER | Age: 82
End: 2022-01-19

## 2022-01-19 NOTE — TELEPHONE ENCOUNTER
Justyna Daly from [facility] calling regarding issues with pts BP and would like to speak to on call provider  TC sent to on call provider  Confirmed read receipt

## 2022-01-20 ENCOUNTER — TELEPHONE (OUTPATIENT)
Dept: FAMILY MEDICINE CLINIC | Facility: CLINIC | Age: 82
End: 2022-01-20

## 2022-01-21 ENCOUNTER — LAB REQUISITION (OUTPATIENT)
Dept: LAB | Facility: HOSPITAL | Age: 82
End: 2022-01-21
Payer: COMMERCIAL

## 2022-01-21 ENCOUNTER — TELEPHONE (OUTPATIENT)
Dept: FAMILY MEDICINE CLINIC | Facility: CLINIC | Age: 82
End: 2022-01-21

## 2022-01-21 ENCOUNTER — PATIENT OUTREACH (OUTPATIENT)
Dept: FAMILY MEDICINE CLINIC | Facility: CLINIC | Age: 82
End: 2022-01-21

## 2022-01-21 DIAGNOSIS — A04.71 ENTEROCOLITIS DUE TO CLOSTRIDIUM DIFFICILE, RECURRENT: ICD-10-CM

## 2022-01-21 PROCEDURE — 87493 C DIFF AMPLIFIED PROBE: CPT | Performed by: NURSE PRACTITIONER

## 2022-01-21 NOTE — PROGRESS NOTES
Telephone call placed to [facility] and I spoke to Blayne Andre regarding bed availability  She informed me that they still do not have a bed for patient at this time  She agrees to call me if a bed becomes available for her

## 2022-01-21 NOTE — TELEPHONE ENCOUNTER
therapeutic care called asking for papers that was faxed for patient we have yet to receive Nataliia Gudino was aware and will resend fax to us  per last phone call

## 2022-01-22 LAB
C DIFF TOX A+B STL QL IA: NEGATIVE
C DIFF TOX GENS STL QL NAA+PROBE: POSITIVE

## 2022-01-24 ENCOUNTER — TELEPHONE (OUTPATIENT)
Dept: FAMILY MEDICINE CLINIC | Facility: CLINIC | Age: 82
End: 2022-01-24

## 2022-01-24 ENCOUNTER — PATIENT OUTREACH (OUTPATIENT)
Dept: FAMILY MEDICINE CLINIC | Facility: CLINIC | Age: 82
End: 2022-01-24

## 2022-01-24 NOTE — PROGRESS NOTES
Telephone call received from patient's daughter in [place]  She was requesting an update on placement for patient  She reports that she is overwhelmed with patient's care and reports that patient is still having diarrhea  [place] informed me that they do not have any help on the weekend and she reports having to change patient 15 times yesterday  I explained to her that I call [facility] regularly and  Marilia Mckenzie is on a wait list   I also provided her with the telephone number for [facility] and advised her to call as well  Telephone call placed to [facility] and I spoke to Bob, Director of Intake  Teri informed me that she still has patient on their pending list     Bob is hopeful that a bed will open up in one or two weeks  She is also considering patient for their [place] if a bed should open up

## 2022-01-24 NOTE — TELEPHONE ENCOUNTER
Tried to call patient to verify and she did not understand what I was talking about  I gave her the message about being positive still for C  Diff  She is going to have her daughter  the medication  She then put her care taker Aida Kyle on the phone  He was very upset stating that they all really need help with Margarito Rodrigezon  She is having outbursts  And the daughters are having a tough time caring for her  He feels she needs to be on a low dose of Xanax  He wants her to try   25 Xanax, or wants to know if there is any medication to help calm her down?

## 2022-01-24 NOTE — TELEPHONE ENCOUNTER
Ant Mondragon from [facility] called to let you know she is leaving a sterile cup from the hospital for Wilhelminia Person so she can leave a stool sample so it can be tested  She gave her the proper instructions for the sample  She will have that tested

## 2022-01-25 ENCOUNTER — LAB REQUISITION (OUTPATIENT)
Dept: LAB | Facility: HOSPITAL | Age: 82
End: 2022-01-25
Payer: COMMERCIAL

## 2022-01-25 DIAGNOSIS — A04.71 ENTEROCOLITIS DUE TO CLOSTRIDIUM DIFFICILE, RECURRENT: ICD-10-CM

## 2022-01-25 PROCEDURE — 87493 C DIFF AMPLIFIED PROBE: CPT | Performed by: NURSE PRACTITIONER

## 2022-01-26 LAB
C DIFF TOX A+B STL QL IA: NEGATIVE
C DIFF TOX GENS STL QL NAA+PROBE: POSITIVE

## 2022-01-27 ENCOUNTER — OFFICE VISIT (OUTPATIENT)
Dept: GASTROENTEROLOGY | Facility: CLINIC | Age: 82
End: 2022-01-27
Payer: COMMERCIAL

## 2022-01-27 VITALS
OXYGEN SATURATION: 93 % | DIASTOLIC BLOOD PRESSURE: 84 MMHG | HEIGHT: 66 IN | BODY MASS INDEX: 43.26 KG/M2 | SYSTOLIC BLOOD PRESSURE: 138 MMHG | HEART RATE: 63 BPM

## 2022-01-27 DIAGNOSIS — A09 DIARRHEA OF INFECTIOUS ORIGIN: Primary | ICD-10-CM

## 2022-01-27 DIAGNOSIS — A04.72 C. DIFFICILE DIARRHEA: ICD-10-CM

## 2022-01-27 PROCEDURE — 3079F DIAST BP 80-89 MM HG: CPT | Performed by: PHYSICIAN ASSISTANT

## 2022-01-27 PROCEDURE — 99203 OFFICE O/P NEW LOW 30 MIN: CPT | Performed by: PHYSICIAN ASSISTANT

## 2022-01-27 PROCEDURE — 3075F SYST BP GE 130 - 139MM HG: CPT | Performed by: PHYSICIAN ASSISTANT

## 2022-01-27 PROCEDURE — 1036F TOBACCO NON-USER: CPT | Performed by: PHYSICIAN ASSISTANT

## 2022-01-27 PROCEDURE — 1160F RVW MEDS BY RX/DR IN RCRD: CPT | Performed by: PHYSICIAN ASSISTANT

## 2022-01-27 RX ORDER — AMOXICILLIN AND CLAVULANATE POTASSIUM 250; 125 MG/1; MG/1
TABLET, FILM COATED ORAL
COMMUNITY
Start: 2022-01-21 | End: 2022-03-01

## 2022-01-27 RX ORDER — CHOLESTYRAMINE 4 G/9G
POWDER, FOR SUSPENSION ORAL
Qty: 90 PACKET | Refills: 3 | Status: SHIPPED | OUTPATIENT
Start: 2022-01-27 | End: 2022-07-19 | Stop reason: ALTCHOICE

## 2022-01-27 RX ORDER — DIPHENOXYLATE HYDROCHLORIDE AND ATROPINE SULFATE 2.5; .025 MG/1; MG/1
TABLET ORAL
COMMUNITY
Start: 2022-01-01 | End: 2022-03-09 | Stop reason: HOSPADM

## 2022-01-27 RX ORDER — FLUCONAZOLE 150 MG/1
TABLET ORAL
COMMUNITY
Start: 2022-01-20 | End: 2022-03-09 | Stop reason: HOSPADM

## 2022-01-27 NOTE — PROGRESS NOTES
[facility] - Outpatient Consultation  Jesusita Mercado 80 y o  female MRN: [id number]  Encounter: [id number]          ASSESSMENT AND PLAN:      1  C  difficile diarrhea  Diagnosed initially 12/13 and treated with 10 days of Vancomycin  The diarrhea persisted and she was retested 1/21 and 1/25 with a positive PCR  She was most recently prescribed Dificid 200mg BID x 10 days - her caretaker is uncertain if she got this prescription - he will confirm with her daughter who manages her medications    She needs to be on a probiotic - her daughter had bought one recently but her caretaker is not sure if she is taking it - he will confirm    Use Questran packets up to 4 times daily for diarrhea - reviewed that this needs to be dosed 1 hour away from other medicaitons    Limit dairy, fried, spicy foods, caffeine, red meat, pork    F/U in 1 month  Consider need for vancomycin taper if ongoing infection  ______________________________________________________________________    HPI:  51-year-old female presents for evaluation of C diff colitis  She reports she has been having diarrhea off and on over the past year  Over the past few months that has been persistent and severe  She was initially diagnosed with C diff on December 13th  She was treated with 10 days of vancomycin  Unfortunately, her symptoms persisted and she was retested this month  Her PCR is positive but toxins are negative  This does suggest active infection giving ongoing diarrhea  She has been prescribed most recently Dificid to take for 10 days  Her caretaker who is with her at the appointment today is unsure if she has received this or not  Her daughter has bought her a probiotic however the caretakers unsure if she is taking this or not  She is currently having 8 watery sometimes incontinent stools a day  There is no rectal bleeding  She does have abdominal cramping    She is also suffering from recurrent urinary tract infections requiring multiple antibiotic courses over the past year  This is actually her biggest complaint today  She reports that her urination is terribly painful  She has been following with Urology and had a recent appointment with [facility] by his  She is also struggling with progressive dementia as well as anxiety  REVIEW OF SYSTEMS:    CONSTITUTIONAL: Denies any fever, chills, rigors, and weight loss  HEENT: No earache or tinnitus  Denies hearing loss or visual disturbances  CARDIOVASCULAR: No chest pain or palpitations  RESPIRATORY: Denies any cough, hemoptysis, shortness of breath or dyspnea on exertion  GASTROINTESTINAL: As noted in the History of Present Illness  GENITOURINARY: No problems with urination  Denies any hematuria or dysuria  NEUROLOGIC: No dizziness or vertigo, denies headaches  MUSCULOSKELETAL: Denies any muscle or joint pain  SKIN: Denies skin rashes or itching  ENDOCRINE: Denies excessive thirst  Denies intolerance to heat or cold  PSYCHOSOCIAL: Denies depression or anxiety  Denies any recent memory loss         Historical Information   Past Medical History:   Diagnosis Date    [facility]     Anxiety     Chronic pain     Chronic respiratory failure with hypoxia (HCC)     COPD (chronic obstructive pulmonary disease) (HCC)     Dementia ([facility])     Dorsalgia, unspecified     Edema     Encephalopathy     GERD (gastroesophageal reflux disease)     Hypertension     Morbid obesity ([facility])     Muscle weakness (generalized)     Opioid dependence ([facility])     Overactive bladder     Pneumonia     Psychiatric disorder     anxiety, bipolar    Radiculopathy of thoracolumbar region     Sciatica     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 ([facility]     Urinary incontinence     UTI (urinary tract infection)      Past Surgical History:   Procedure Laterality Date    APPENDECTOMY      BACK SURGERY      CHOLECYSTECTOMY      CYSTOSCOPY  2021    Dr Lilliam Culver History   Social History     Substance and Sexual Activity   Alcohol Use Never     Social History     Substance and Sexual Activity   Drug Use No     Social History     Tobacco Use   Smoking Status Former Smoker    Types: Cigarettes    Quit date: 1995    Years since quittin 2   Smokeless Tobacco Never Used     History reviewed  No pertinent family history  Meds/Allergies       Current Outpatient Medications:     amLODIPine (NORVASC) 10 mg tablet    amoxicillin-clavulanate (AUGMENTIN) 250-125 mg per tablet    Baclofen 5 MG TABS    calcium carbonate-vitamin D (OSCAL-D) 500 mg-200 units per tablet    diphenoxylate-atropine (LOMOTIL) 2 5-0 025 mg per tablet    Eliquis 5 MG    ferrous sulfate 325 (65 Fe) mg tablet    fidaxomicin (DIFICID) tablet    fluconazole (DIFLUCAN) 150 mg tablet    fluticasone (EQL Fluticasone Propionate) 50 mcg/act nasal spray    gabapentin (NEURONTIN) 600 MG tablet    Incontinence Supply Disposable (PROCare Bariatric Briefs) MISC    latanoprost (XALATAN) 0 005 % ophthalmic solution    loratadine (CLARITIN) 10 mg tablet    losartan (COZAAR) 25 mg tablet    nystatin (MYCOSTATIN) cream    oxyCODONE (OxyCONTIN) 15 mg 12 hr tablet    pantoprazole (PROTONIX) 40 mg tablet    sertraline (ZOLOFT) 100 mg tablet    traZODone (DESYREL) 100 mg tablet    cholestyramine (QUESTRAN) 4 g packet    Allergies   Allergen Reactions    Aspirin     Iodinated Diagnostic Agents Throat Swelling     Pt states "when I was 14years old driving home from the hospital I felt like I couldn't think and it went away after 10 minutes"     Iodine - Food Allergy     Nsaids     Other Other (See Comments)     difficulty swallowing for short period           Objective     Blood pressure 138/84, pulse 63, height 5' 6" (1 676 m), SpO2 93 %, not currently breastfeeding  Body mass index is 43 26 kg/m²          PHYSICAL EXAM: General Appearance:   Alert, cooperative, no distress   HEENT:   Normocephalic, atraumatic, anicteric      Neck:  Supple, symmetrical, trachea midline   Lungs:   Clear to auscultation bilaterally; no rales, rhonchi or wheezing; respirations unlabored    Heart[de-identified]   Regular rate and rhythm; no murmur, rub, or gallop  Abdomen:   Soft, non-tender, non-distended; normal bowel sounds; no masses, no organomegaly    Genitalia:   Deferred    Rectal:   Deferred    Extremities:  No cyanosis, clubbing or edema    Pulses:  2+ and symmetric    Skin:  No jaundice, rashes, or lesions    Lymph nodes:  No palpable cervical lymphadenopathy        Lab Results:   No visits with results within 1 Day(s) from this visit  Latest known visit with results is:   Lab Requisition on 01/25/2022   Component Date Value    C difficile toxin by PCR 01/25/2022 Positive*    C difficile Toxins A+B, * 01/25/2022 Negative          Radiology Results:   No results found

## 2022-01-28 ENCOUNTER — TELEPHONE (OUTPATIENT)
Dept: FAMILY MEDICINE CLINIC | Facility: CLINIC | Age: 82
End: 2022-01-28

## 2022-01-31 ENCOUNTER — TELEPHONE (OUTPATIENT)
Dept: OTHER | Facility: OTHER | Age: 82
End: 2022-01-31

## 2022-01-31 NOTE — TELEPHONE ENCOUNTER
She is requesting for copies of her recent lab results on her 2 stool test done    Fax# [phone]

## 2022-02-02 ENCOUNTER — REMOTE DEVICE CLINIC VISIT (OUTPATIENT)
Dept: CARDIOLOGY CLINIC | Facility: CLINIC | Age: 82
End: 2022-02-02
Payer: COMMERCIAL

## 2022-02-02 DIAGNOSIS — Z95.0 CARDIAC PACEMAKER IN SITU: Primary | ICD-10-CM

## 2022-02-02 PROCEDURE — 93296 REM INTERROG EVL PM/IDS: CPT | Performed by: INTERNAL MEDICINE

## 2022-02-02 PROCEDURE — 93294 REM INTERROG EVL PM/LDLS PM: CPT | Performed by: INTERNAL MEDICINE

## 2022-02-02 NOTE — PROGRESS NOTES
Results for orders placed or performed in visit on 02/02/22   Cardiac EP device report    Narrative    MDT SINGLE PM / [facility] TRANSMISSION: BATTERY STATUS "11 YRS "   56%  ALL AVAILABLE LEAD PARAMETERS WITHIN NORMAL LIMITS  NO SIGNIFICANT HIGH RATE EPISODES  NORMAL DEVICE FUNCTION   NC         Current Outpatient Medications:     amLODIPine (NORVASC) 10 mg tablet, Take 1 tablet (10 mg total) by mouth daily, Disp: 90 tablet, Rfl: 1    amoxicillin-clavulanate (AUGMENTIN) 250-125 mg per tablet, , Disp: , Rfl:     Baclofen 5 MG TABS, TAKE 1 TABLET EVERY MORNING, Disp: 90 tablet, Rfl: 1    calcium carbonate-vitamin D (OSCAL-D) 500 mg-200 units per tablet, Take 1 tablet by mouth 2 (two) times a day with meals  , Disp: , Rfl:     cholestyramine (QUESTRAN) 4 g packet, 1 packet mixed with 8 oz liquids and drink 4 times daily  Must be dosed 1 hour apart from other medications, Disp: 90 packet, Rfl: 3    diphenoxylate-atropine (LOMOTIL) 2 5-0 025 mg per tablet, , Disp: , Rfl:     Eliquis 5 MG, TAKE 1 TABLET BY MOUTH TWICE A DAY, Disp: 180 tablet, Rfl: 1    ferrous sulfate 325 (65 Fe) mg tablet, TAKE 1 TABLET BY MOUTH EVERY DAY WITH BREAKFAST, Disp: 90 tablet, Rfl: 1    fidaxomicin (DIFICID) tablet, Take 1 tablet (200 mg total) by mouth 2 (two) times a day for 10 days, Disp: 20 tablet, Rfl: 0    fluconazole (DIFLUCAN) 150 mg tablet, , Disp: , Rfl:     fluticasone (EQL Fluticasone Propionate) 50 mcg/act nasal spray, 1 spray into each nostril daily, Disp: 16 g, Rfl: 3    gabapentin (NEURONTIN) 600 MG tablet, TAKE 1 TABLET BY MOUTH THREE TIMES A DAY, Disp: 270 tablet, Rfl: 1    Incontinence Supply Disposable (PROCare Bariatric Briefs) MISC, Use every 2 (two) hours as needed (incontinence), Disp: 120 each, Rfl: 1    latanoprost (XALATAN) 0 005 % ophthalmic solution, Administer 1 drop to both eyes daily at bedtime, Disp: 7 5 mL, Rfl: 0    loratadine (CLARITIN) 10 mg tablet, TAKE 1 TABLET BY MOUTH EVERY DAY, Disp: 90 tablet, Rfl: 1    losartan (COZAAR) 25 mg tablet, Take 1 tablet (25 mg total) by mouth daily, Disp: 90 tablet, Rfl: 0    nystatin (MYCOSTATIN) cream, Apply topically 2 (two) times a day, Disp: 30 g, Rfl: 0    oxyCODONE (OxyCONTIN) 15 mg 12 hr tablet, Take 1 tablet (15 mg total) by mouth every 12 (twelve) hours Max Daily Amount: 30 mg, Disp: 60 tablet, Rfl: 0    pantoprazole (PROTONIX) 40 mg tablet, Take 1 tablet (40 mg total) by mouth daily before breakfast, Disp: 90 tablet, Rfl: 3    sertraline (ZOLOFT) 100 mg tablet, TAKE 1 TABLET BY MOUTH EVERY DAY, Disp: 90 tablet, Rfl: 0    traZODone (DESYREL) 100 mg tablet, Take 1 5 tablets (150 mg total) by mouth daily at bedtime, Disp: 135 tablet, Rfl: 0

## 2022-02-07 DIAGNOSIS — F51.01 PRIMARY INSOMNIA: ICD-10-CM

## 2022-02-07 RX ORDER — TRAZODONE HYDROCHLORIDE 100 MG/1
150 TABLET ORAL
Qty: 135 TABLET | Refills: 0 | Status: SHIPPED | OUTPATIENT
Start: 2022-02-07 | End: 2022-02-23 | Stop reason: SDUPTHER

## 2022-02-08 ENCOUNTER — PATIENT OUTREACH (OUTPATIENT)
Dept: FAMILY MEDICINE CLINIC | Facility: CLINIC | Age: 82
End: 2022-02-08

## 2022-02-08 DIAGNOSIS — G89.29 OTHER CHRONIC PAIN: ICD-10-CM

## 2022-02-08 NOTE — PROGRESS NOTES
Telephone call received from patient's daughter in law, [place]  She informed me that Felicita Doe asked her to call me back  I provided her with the telephone number for Teri from [facility] and she agrees to contact her to discuss placement of patient  [place] informed me that they continue to wish to place patient and she agrees to contact me with an update

## 2022-02-08 NOTE — PROGRESS NOTES
Telephone call received from Rik Samuel, Intake Director at [facility] [place]  She informed me that she is expecting a bed for patient in their [facility] facility and wanted to know if patient/family were still interested  Teri requests that family contact her and also provide proof of patient's covid vaccinations  Telephone call placed to patient's daughter, Ethan Velázquez and I left a message on her VM requesting that she return my call  Telephone call also placed to patient's home and I spoke to Margarito Marks who informed me that Ethan Velázquez is not home at the moment  I will await a return call from Ethan Velázquez to discuss

## 2022-02-09 ENCOUNTER — PATIENT OUTREACH (OUTPATIENT)
Dept: FAMILY MEDICINE CLINIC | Facility: CLINIC | Age: 82
End: 2022-02-09

## 2022-02-09 RX ORDER — OXYCODONE HYDROCHLORIDE 15 MG/1
15 TABLET, FILM COATED, EXTENDED RELEASE ORAL EVERY 12 HOURS SCHEDULED
Qty: 60 TABLET | Refills: 0 | Status: SHIPPED | OUTPATIENT
Start: 2022-02-09 | End: 2022-03-09 | Stop reason: HOSPADM

## 2022-02-09 NOTE — PROGRESS NOTES
Telephone call received from patient's daughter, Ivette Coates yesterday and she left a message on my VM  She wanted to know if [facility] had a memory care unit and if patient was being placed there  I returned Stephan's call and I got her VM and I left a message informing her that I was under the impression that they do  I advised her to call me back if she has further questions or concerns

## 2022-02-14 ENCOUNTER — TELEPHONE (OUTPATIENT)
Dept: FAMILY MEDICINE CLINIC | Facility: CLINIC | Age: 82
End: 2022-02-14

## 2022-02-14 NOTE — TELEPHONE ENCOUNTER
Jay Gandhi from [facility] was discharged today from physical therapy and meet her goals for home health     She needs to be referred [facility]   Once she is done with home health nurses    Sending order for discharge / please sign and return back / fax number is on paper

## 2022-02-15 ENCOUNTER — TELEPHONE (OUTPATIENT)
Dept: FAMILY MEDICINE CLINIC | Facility: CLINIC | Age: 82
End: 2022-02-15

## 2022-02-15 NOTE — TELEPHONE ENCOUNTER
Juliet Organ / a / from [facility] has Osman Emanuel as a mutual patient   Would like [facility] care/ for nursing home / temporary stay   She is requesting to speak with you   Her contact information is below   [phone] ext   [address]

## 2022-02-17 ENCOUNTER — PATIENT OUTREACH (OUTPATIENT)
Dept: FAMILY MEDICINE CLINIC | Facility: CLINIC | Age: 82
End: 2022-02-17

## 2022-02-17 ENCOUNTER — TELEPHONE (OUTPATIENT)
Dept: FAMILY MEDICINE CLINIC | Facility: CLINIC | Age: 82
End: 2022-02-17

## 2022-02-17 NOTE — TELEPHONE ENCOUNTER
She said to send clinical information to [facility] in [place] for Respit     Fax  [phone]    Phone [phone]  Ext [phone]

## 2022-02-17 NOTE — PROGRESS NOTES
Telephone call received from patient's daughter Rad Katz  She informed me that she spoke to Bob from [address] [place] [address] and was informed that she will be contact by Geri Joyas in billing in 7-10 days  Kyeadolfo Katz also informed me that patient wishes to stay at home with 24hr care if it can be arranged  Kyeadolfo Katz reports that patient is being followed by Aniceto Martin from the Aging Office due to making false allegations about one of her caregivers  Kyeadolfo Katz also acknowledges that patient does not have care on the weekends now and she may not receive the services even if she is approved for 24hr care  Kyeadolfo Katz requests that I reach out to [address] [place] [address] for an update  Telephone call placed to Nicole Michel, Director of Intake at [address] [place] [address] and I left a message on her VM requesting that she return my call

## 2022-02-18 ENCOUNTER — PATIENT OUTREACH (OUTPATIENT)
Dept: FAMILY MEDICINE CLINIC | Facility: CLINIC | Age: 82
End: 2022-02-18

## 2022-02-18 ENCOUNTER — TELEPHONE (OUTPATIENT)
Dept: FAMILY MEDICINE CLINIC | Facility: CLINIC | Age: 82
End: 2022-02-18

## 2022-02-18 NOTE — PROGRESS NOTES
I received a return call yesterday from Saran Arana, Director of Intake at [facility] and she informed me that she had a bed at [facility] but she was unable to reach patient's daughter, Marianna Richardson  She informed me that patient is accepted and will be offered a bed when available  Marianna Richardson should be receiving a call from the business office in the next week  Telephone call placed to Marianna Richardson and I informed her of above  Marianna Richardson informed me that patient's waiver  is also trying to assist with respite placement  Marianna Richardson informed me that [place] is going away to [place] and will need placement secured for that time  I will continue to follow and assist with placement of patient

## 2022-02-21 ENCOUNTER — LAB (OUTPATIENT)
Dept: LAB | Facility: HOSPITAL | Age: 82
End: 2022-02-21
Payer: COMMERCIAL

## 2022-02-21 DIAGNOSIS — R31.9 HEMATURIA, UNSPECIFIED TYPE: ICD-10-CM

## 2022-02-21 PROCEDURE — 87086 URINE CULTURE/COLONY COUNT: CPT

## 2022-02-21 PROCEDURE — 87077 CULTURE AEROBIC IDENTIFY: CPT

## 2022-02-21 PROCEDURE — 87186 SC STD MICRODIL/AGAR DIL: CPT

## 2022-02-22 ENCOUNTER — TELEPHONE (OUTPATIENT)
Dept: FAMILY MEDICINE CLINIC | Facility: CLINIC | Age: 82
End: 2022-02-22

## 2022-02-22 NOTE — TELEPHONE ENCOUNTER
Tricia from [facility] called requesting Mariama's urine results to be faxed over to them at [phone]  She also wants to verify what dosage of Eliquis you want Larisa Lagos to be on  Her daughter is giving her one 5mg pill and then cutting the second one in half  Please verify how you want her taking this medication

## 2022-02-23 ENCOUNTER — TELEPHONE (OUTPATIENT)
Dept: FAMILY MEDICINE CLINIC | Facility: CLINIC | Age: 82
End: 2022-02-23

## 2022-02-23 DIAGNOSIS — N30.01 ACUTE CYSTITIS WITH HEMATURIA: ICD-10-CM

## 2022-02-23 DIAGNOSIS — F51.01 PRIMARY INSOMNIA: ICD-10-CM

## 2022-02-23 RX ORDER — TRAZODONE HYDROCHLORIDE 100 MG/1
150 TABLET ORAL
Qty: 135 TABLET | Refills: 0 | Status: SHIPPED | OUTPATIENT
Start: 2022-02-23

## 2022-02-23 NOTE — TELEPHONE ENCOUNTER
She has a UTI and the [facility] nurse wants to know if there is going to be an antibiotic called in to [facility]

## 2022-02-24 ENCOUNTER — TELEPHONE (OUTPATIENT)
Dept: FAMILY MEDICINE CLINIC | Facility: CLINIC | Age: 82
End: 2022-02-24

## 2022-02-24 LAB
BACTERIA UR CULT: ABNORMAL

## 2022-02-24 RX ORDER — OFLOXACIN 400 MG
TABLET ORAL
Qty: 1 TABLET | Refills: 0 | Status: SHIPPED | OUTPATIENT
Start: 2022-02-24 | End: 2022-03-01

## 2022-02-24 NOTE — TELEPHONE ENCOUNTER
Daughter Ethan Velázquez would like someone to contact her regarding her mother's bacteria type for her UTI

## 2022-03-01 ENCOUNTER — OFFICE VISIT (OUTPATIENT)
Dept: GASTROENTEROLOGY | Facility: CLINIC | Age: 82
End: 2022-03-01
Payer: COMMERCIAL

## 2022-03-01 ENCOUNTER — TELEPHONE (OUTPATIENT)
Dept: UROLOGY | Facility: CLINIC | Age: 82
End: 2022-03-01

## 2022-03-01 VITALS
BODY MASS INDEX: 43.26 KG/M2 | HEART RATE: 64 BPM | DIASTOLIC BLOOD PRESSURE: 82 MMHG | HEIGHT: 66 IN | SYSTOLIC BLOOD PRESSURE: 140 MMHG

## 2022-03-01 DIAGNOSIS — A09 DIARRHEA OF INFECTIOUS ORIGIN: Primary | ICD-10-CM

## 2022-03-01 PROCEDURE — 3077F SYST BP >= 140 MM HG: CPT | Performed by: PHYSICIAN ASSISTANT

## 2022-03-01 PROCEDURE — 1036F TOBACCO NON-USER: CPT | Performed by: PHYSICIAN ASSISTANT

## 2022-03-01 PROCEDURE — 1160F RVW MEDS BY RX/DR IN RCRD: CPT | Performed by: PHYSICIAN ASSISTANT

## 2022-03-01 PROCEDURE — 99213 OFFICE O/P EST LOW 20 MIN: CPT | Performed by: PHYSICIAN ASSISTANT

## 2022-03-01 PROCEDURE — 3079F DIAST BP 80-89 MM HG: CPT | Performed by: PHYSICIAN ASSISTANT

## 2022-03-01 RX ORDER — NITROFURANTOIN 25; 75 MG/1; MG/1
100 CAPSULE ORAL 2 TIMES DAILY
COMMUNITY
Start: 2022-02-25 | End: 2022-03-09 | Stop reason: HOSPADM

## 2022-03-01 RX ORDER — ALPRAZOLAM 1 MG/1
0.5 TABLET ORAL 3 TIMES DAILY
COMMUNITY
Start: 2022-02-25 | End: 2022-07-19 | Stop reason: ALTCHOICE

## 2022-03-01 RX ORDER — VANCOMYCIN HYDROCHLORIDE 250 MG/1
250 CAPSULE ORAL 4 TIMES DAILY
Qty: 28 CAPSULE | Refills: 0 | Status: SHIPPED | OUTPATIENT
Start: 2022-03-01 | End: 2022-03-09 | Stop reason: HOSPADM

## 2022-03-01 RX ORDER — PHENAZOPYRIDINE HYDROCHLORIDE 100 MG/1
100 TABLET, FILM COATED ORAL 3 TIMES DAILY PRN
COMMUNITY
Start: 2022-02-24 | End: 2022-03-09 | Stop reason: HOSPADM

## 2022-03-01 NOTE — PROGRESS NOTES
[facility] - Outpatient Follow-up Note  Hector Gibbs [de-identified] y o  female MRN: [id number]  Encounter: [id number]          ASSESSMENT AND PLAN:      1  Diarrhea of infectious origin  Diagnosed with C Diff 12/13 and treated with 10 days of Vancomycin  The diarrhea persisted and she was retested 1/21 and 1/25 with a positive PCR  She was most recently prescribed Dificid 200mg BID x 10 days which she completed    She is being given 2 packets of Questran per day which is controlling her diarrhea  It is still unclear if she is on a probiotic - I wrote a letter to her daughter discussing this    She continues to struggle with recurrent UTIs  She was most recently rxed Edrie Sera but has been afraid to start it as she does not want diarrhea again  Will cover with 7 days of Vancomycin to prevent C Diff recurrence    ______________________________________________________________________    SUBJECTIVE:  [de-identified]year old female with recurrent C  Diff colitis presents for follow up  She is actually doing very well at present  She is having 2-3 formed stools a day  She is being given 2 Questran packets a day  Her caretaker reports that he is dosing with an hour apart from other medications  Unfortunately, she is still struggling with recurrent urinary tract infections  It seems she was most recently prescribed Macrobid but has not started it yet due to fears of her diarrhea  It is also still unclear if she is taking a probiotic or not  Unfortunately, the patient is struggling with advancing dementia  Her caretakers only able to be present 10 hours a day  There has been of recent discussion about placing a lean in a nursing facility where she can receive better care  Kevin Huerta is not receptive to this currently  REVIEW OF SYSTEMS IS OTHERWISE NEGATIVE        Historical Information   Past Medical History:   Diagnosis Date    [facility]-fib [facility])     Anxiety     Chronic pain     [facility]     COPD (chronic obstructive pulmonary disease) (HCC)     Dementia (HCC)     Dorsalgia, unspecified     Edema     Encephalopathy     GERD (gastroesophageal reflux disease)     Hypertension     Morbid obesity (HCC)     Muscle weakness (generalized)     [facility]     Overactive bladder     Pneumonia     Psychiatric disorder     anxiety, bipolar    Radiculopathy of thoracolumbar region     Sciatica     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 ([facility]     Urinary incontinence     UTI (urinary tract infection)      Past Surgical History:   Procedure Laterality Date    APPENDECTOMY      BACK SURGERY      CHOLECYSTECTOMY      CYSTOSCOPY  2021    Dr Buster Pike History   Social History     Substance and Sexual Activity   Alcohol Use Never     Social History     Substance and Sexual Activity   Drug Use No     Social History     Tobacco Use   Smoking Status Former Smoker    Types: Cigarettes    Quit date: 1995    Years since quittin 3   Smokeless Tobacco Never Used     No family history on file      Meds/Allergies       Current Outpatient Medications:     ALPRAZolam (XANAX) 1 mg tablet    amLODIPine (NORVASC) 10 mg tablet    amoxicillin-clavulanate (AUGMENTIN) 250-125 mg per tablet    Baclofen 5 MG TABS    calcium carbonate-vitamin D (OSCAL-D) 500 mg-200 units per tablet    cholestyramine (QUESTRAN) 4 g packet    diphenoxylate-atropine (LOMOTIL) 2 5-0 025 mg per tablet    Disposable Gloves (Latex Gloves) MISC    Eliquis 5 MG    ferrous sulfate 325 (65 Fe) mg tablet    fluconazole (DIFLUCAN) 150 mg tablet    fluticasone (EQL Fluticasone Propionate) 50 mcg/act nasal spray    gabapentin (NEURONTIN) 600 MG tablet    Incontinence Supply Disposable (PROCare Bariatric Briefs) MISC    Incontinence Supply Disposable ([facility]) MISC    Incontinence Supply Disposable (Wings Adult Briefs XXL) MISC    Infant Care Products (Baby Wipes) MISC    latanoprost (XALATAN) 0 005 % ophthalmic solution    loratadine (CLARITIN) 10 mg tablet    losartan (COZAAR) 25 mg tablet    nitrofurantoin (MACROBID) 100 mg capsule    nystatin (MYCOSTATIN) cream    nystatin (MYCOSTATIN) powder    ofloxacin (FLOXIN) 400 MG tablet    oxyCODONE (OxyCONTIN) 15 mg 12 hr tablet    pantoprazole (PROTONIX) 40 mg tablet    phenazopyridine (PYRIDIUM) 100 mg tablet    sertraline (ZOLOFT) 100 mg tablet    traZODone (DESYREL) 100 mg tablet    Allergies   Allergen Reactions    Aspirin     Iodinated Diagnostic Agents Throat Swelling     Pt states "when I was 14years old driving home from the hospital I felt like I couldn't think and it went away after 10 minutes"     Iodine - Food Allergy     Nsaids     Other Other (See Comments)     difficulty swallowing for short period           Objective     not currently breastfeeding  There is no height or weight on file to calculate BMI  PHYSICAL EXAM:      General Appearance:   Alert, cooperative, no distress   HEENT:   Normocephalic, atraumatic, anicteric      Neck:  Supple, symmetrical, trachea midline   Lungs:   Clear to auscultation bilaterally; no rales, rhonchi or wheezing; respirations unlabored    Heart[de-identified]   Regular rate and rhythm; no murmur, rub, or gallop  Abdomen:   Soft, non-tender, non-distended; normal bowel sounds; no masses, no organomegaly    Genitalia:   Deferred    Rectal:   Deferred    Extremities:  No cyanosis, clubbing or edema    Pulses:  2+ and symmetric    Skin:  No jaundice, rashes, or lesions    Lymph nodes:  No palpable cervical lymphadenopathy        Lab Results:   No visits with results within 1 Day(s) from this visit     Latest known visit with results is:   Lab on 02/21/2022   Component Date Value    Urine Culture 02/21/2022 >100,000 cfu/ml Escherichia coli*    Urine Culture 02/21/2022 10,000-19,000 cfu/ml Enterococcus faecalis*    Urine Culture 02/21/2022 <10,000 cfu/ml Proteus mirabilis*         Radiology Results:   Cardiac EP device report    Result Date: 2/2/2022  Narrative: MDT SINGLE PM / ACTIVE SYSTEM IS MRI CONDITIONAL [facility]ELINK TRANSMISSION: BATTERY STATUS "11 YRS "   56%  ALL AVAILABLE LEAD PARAMETERS WITHIN NORMAL LIMITS  NO SIGNIFICANT HIGH RATE EPISODES  NORMAL DEVICE FUNCTION   NC

## 2022-03-01 NOTE — TELEPHONE ENCOUNTER
I spoke to patient's daughter and she states pt had urine tests done recently and she has infections and bacteria  She is currently on antibiotics  Please triage and schedule pt

## 2022-03-01 NOTE — TELEPHONE ENCOUNTER
Patient known to our [address] office seen by Dr Monica Lopez for OAB, frequent UTI,  geriatric incontinence  Patient last seen on 11/16/21 for cysto  Patient had last infection on 2/21/22  Does patient need follow up with [facility]?

## 2022-03-01 NOTE — TELEPHONE ENCOUNTER
Sure, can schedule for next available follow-up visit with [facility] for treatment for recurrent UTIs  Recommend cranberry supplementation probiotics, avoidance of constipation and proper hydration for UTI prevention

## 2022-03-02 NOTE — TELEPHONE ENCOUNTER
Patient called schedule appointment  Wanted to see Dr Ramon Wasserman but let her know office model  Per patient she wanted to see different AP  Scheduled with Kathya Lamb on 3/15/22   fyi

## 2022-03-06 ENCOUNTER — HOSPITAL ENCOUNTER (INPATIENT)
Facility: HOSPITAL | Age: 82
LOS: 2 days | Discharge: NURSING, REHAB OR LONG-TERM CARE | DRG: 291 | End: 2022-03-09
Attending: EMERGENCY MEDICINE | Admitting: FAMILY MEDICINE
Payer: COMMERCIAL

## 2022-03-06 ENCOUNTER — APPOINTMENT (EMERGENCY)
Dept: CT IMAGING | Facility: HOSPITAL | Age: 82
DRG: 291 | End: 2022-03-06
Payer: COMMERCIAL

## 2022-03-06 ENCOUNTER — APPOINTMENT (OUTPATIENT)
Dept: RADIOLOGY | Facility: HOSPITAL | Age: 82
DRG: 291 | End: 2022-03-06
Payer: COMMERCIAL

## 2022-03-06 DIAGNOSIS — R41.82 ALTERED MENTAL STATUS: ICD-10-CM

## 2022-03-06 DIAGNOSIS — A04.72 C. DIFFICILE DIARRHEA: ICD-10-CM

## 2022-03-06 DIAGNOSIS — I50.33 ACUTE ON CHRONIC DIASTOLIC HEART FAILURE (HCC): ICD-10-CM

## 2022-03-06 DIAGNOSIS — N39.0 UTI (URINARY TRACT INFECTION): Primary | ICD-10-CM

## 2022-03-06 PROBLEM — I50.9 CHF EXACERBATION (HCC): Status: ACTIVE | Noted: 2022-03-06

## 2022-03-06 LAB
2HR DELTA HS TROPONIN: -1 NG/L
4HR DELTA HS TROPONIN: -1 NG/L
ALBUMIN SERPL BCP-MCNC: 3 G/DL (ref 3.5–5)
ALP SERPL-CCNC: 62 U/L (ref 46–116)
ALT SERPL W P-5'-P-CCNC: 19 U/L (ref 12–78)
ANION GAP SERPL CALCULATED.3IONS-SCNC: 6 MMOL/L (ref 4–13)
APTT PPP: 38 SECONDS (ref 23–37)
AST SERPL W P-5'-P-CCNC: 19 U/L (ref 5–45)
ATRIAL RATE: 31 BPM
BACTERIA UR QL AUTO: ABNORMAL /HPF
BASOPHILS # BLD AUTO: 0.02 THOUSANDS/ΜL (ref 0–0.1)
BASOPHILS NFR BLD AUTO: 0 % (ref 0–1)
BILIRUB DIRECT SERPL-MCNC: 0.09 MG/DL (ref 0–0.2)
BILIRUB SERPL-MCNC: 0.33 MG/DL (ref 0.2–1)
BILIRUB UR QL STRIP: NEGATIVE
BUN SERPL-MCNC: 10 MG/DL (ref 5–25)
CALCIUM SERPL-MCNC: 9.1 MG/DL (ref 8.3–10.1)
CARDIAC TROPONIN I PNL SERPL HS: 10 NG/L
CARDIAC TROPONIN I PNL SERPL HS: 10 NG/L
CARDIAC TROPONIN I PNL SERPL HS: 11 NG/L
CHLORIDE SERPL-SCNC: 106 MMOL/L (ref 100–108)
CLARITY UR: ABNORMAL
CO2 SERPL-SCNC: 30 MMOL/L (ref 21–32)
COLOR UR: YELLOW
CREAT SERPL-MCNC: 0.91 MG/DL (ref 0.6–1.3)
EOSINOPHIL # BLD AUTO: 0.2 THOUSAND/ΜL (ref 0–0.61)
EOSINOPHIL NFR BLD AUTO: 4 % (ref 0–6)
ERYTHROCYTE [DISTWIDTH] IN BLOOD BY AUTOMATED COUNT: 14 % (ref 11.6–15.1)
FLUAV RNA RESP QL NAA+PROBE: NEGATIVE
FLUBV RNA RESP QL NAA+PROBE: NEGATIVE
GFR SERPL CREATININE-BSD FRML MDRD: 59 ML/MIN/1.73SQ M
GLUCOSE SERPL-MCNC: 115 MG/DL (ref 65–140)
GLUCOSE UR STRIP-MCNC: ABNORMAL MG/DL
HCT VFR BLD AUTO: 39.4 % (ref 34.8–46.1)
HGB BLD-MCNC: 12.5 G/DL (ref 11.5–15.4)
HGB UR QL STRIP.AUTO: ABNORMAL
IMM GRANULOCYTES # BLD AUTO: 0.03 THOUSAND/UL (ref 0–0.2)
IMM GRANULOCYTES NFR BLD AUTO: 1 % (ref 0–2)
INR PPP: 1.42 (ref 0.84–1.19)
KETONES UR STRIP-MCNC: NEGATIVE MG/DL
LACTATE SERPL-SCNC: 1.6 MMOL/L (ref 0.5–2)
LEUKOCYTE ESTERASE UR QL STRIP: ABNORMAL
LYMPHOCYTES # BLD AUTO: 1.63 THOUSANDS/ΜL (ref 0.6–4.47)
LYMPHOCYTES NFR BLD AUTO: 28 % (ref 14–44)
MCH RBC QN AUTO: 27.4 PG (ref 26.8–34.3)
MCHC RBC AUTO-ENTMCNC: 31.7 G/DL (ref 31.4–37.4)
MCV RBC AUTO: 86 FL (ref 82–98)
MONOCYTES # BLD AUTO: 0.44 THOUSAND/ΜL (ref 0.17–1.22)
MONOCYTES NFR BLD AUTO: 8 % (ref 4–12)
NEUTROPHILS # BLD AUTO: 3.45 THOUSANDS/ΜL (ref 1.85–7.62)
NEUTS SEG NFR BLD AUTO: 59 % (ref 43–75)
NITRITE UR QL STRIP: POSITIVE
NON-SQ EPI CELLS URNS QL MICRO: ABNORMAL /HPF
NRBC BLD AUTO-RTO: 0 /100 WBCS
NT-PROBNP SERPL-MCNC: 1452 PG/ML
PH UR STRIP.AUTO: 6 [PH]
PLATELET # BLD AUTO: 186 THOUSANDS/UL (ref 149–390)
PMV BLD AUTO: 9.9 FL (ref 8.9–12.7)
POTASSIUM SERPL-SCNC: 3.9 MMOL/L (ref 3.5–5.3)
PROT SERPL-MCNC: 7.3 G/DL (ref 6.4–8.2)
PROT UR STRIP-MCNC: ABNORMAL MG/DL
PROTHROMBIN TIME: 16.7 SECONDS (ref 11.6–14.5)
QRS AXIS: 269 DEGREES
QRSD INTERVAL: 186 MS
QT INTERVAL: 526 MS
QTC INTERVAL: 526 MS
RBC # BLD AUTO: 4.56 MILLION/UL (ref 3.81–5.12)
RBC #/AREA URNS AUTO: ABNORMAL /HPF
RSV RNA RESP QL NAA+PROBE: NEGATIVE
SARS-COV-2 RNA RESP QL NAA+PROBE: NEGATIVE
SODIUM SERPL-SCNC: 142 MMOL/L (ref 136–145)
SP GR UR STRIP.AUTO: 1.02 (ref 1–1.03)
T WAVE AXIS: 83 DEGREES
UROBILINOGEN UR QL STRIP.AUTO: 1 E.U./DL
VENTRICULAR RATE: 60 BPM
WBC # BLD AUTO: 5.77 THOUSAND/UL (ref 4.31–10.16)
WBC #/AREA URNS AUTO: ABNORMAL /HPF

## 2022-03-06 PROCEDURE — 70450 CT HEAD/BRAIN W/O DYE: CPT

## 2022-03-06 PROCEDURE — 83880 ASSAY OF NATRIURETIC PEPTIDE: CPT | Performed by: EMERGENCY MEDICINE

## 2022-03-06 PROCEDURE — 85025 COMPLETE CBC W/AUTO DIFF WBC: CPT | Performed by: EMERGENCY MEDICINE

## 2022-03-06 PROCEDURE — 83605 ASSAY OF LACTIC ACID: CPT | Performed by: EMERGENCY MEDICINE

## 2022-03-06 PROCEDURE — 93005 ELECTROCARDIOGRAM TRACING: CPT

## 2022-03-06 PROCEDURE — 81001 URINALYSIS AUTO W/SCOPE: CPT | Performed by: EMERGENCY MEDICINE

## 2022-03-06 PROCEDURE — 87086 URINE CULTURE/COLONY COUNT: CPT | Performed by: EMERGENCY MEDICINE

## 2022-03-06 PROCEDURE — 0241U HB NFCT DS VIR RESP RNA 4 TRGT: CPT | Performed by: EMERGENCY MEDICINE

## 2022-03-06 PROCEDURE — 80076 HEPATIC FUNCTION PANEL: CPT | Performed by: EMERGENCY MEDICINE

## 2022-03-06 PROCEDURE — 99285 EMERGENCY DEPT VISIT HI MDM: CPT

## 2022-03-06 PROCEDURE — 99285 EMERGENCY DEPT VISIT HI MDM: CPT | Performed by: EMERGENCY MEDICINE

## 2022-03-06 PROCEDURE — 36415 COLL VENOUS BLD VENIPUNCTURE: CPT | Performed by: EMERGENCY MEDICINE

## 2022-03-06 PROCEDURE — 85610 PROTHROMBIN TIME: CPT | Performed by: EMERGENCY MEDICINE

## 2022-03-06 PROCEDURE — 99220 PR INITIAL OBSERVATION CARE/DAY 70 MINUTES: CPT | Performed by: INTERNAL MEDICINE

## 2022-03-06 PROCEDURE — 93010 ELECTROCARDIOGRAM REPORT: CPT | Performed by: INTERNAL MEDICINE

## 2022-03-06 PROCEDURE — 96365 THER/PROPH/DIAG IV INF INIT: CPT

## 2022-03-06 PROCEDURE — 87040 BLOOD CULTURE FOR BACTERIA: CPT | Performed by: EMERGENCY MEDICINE

## 2022-03-06 PROCEDURE — 71045 X-RAY EXAM CHEST 1 VIEW: CPT

## 2022-03-06 PROCEDURE — 84484 ASSAY OF TROPONIN QUANT: CPT | Performed by: EMERGENCY MEDICINE

## 2022-03-06 PROCEDURE — 80048 BASIC METABOLIC PNL TOTAL CA: CPT | Performed by: EMERGENCY MEDICINE

## 2022-03-06 PROCEDURE — 85730 THROMBOPLASTIN TIME PARTIAL: CPT | Performed by: EMERGENCY MEDICINE

## 2022-03-06 RX ORDER — B-COMPLEX WITH VITAMIN C
1 TABLET ORAL 2 TIMES DAILY WITH MEALS
Status: DISCONTINUED | OUTPATIENT
Start: 2022-03-06 | End: 2022-03-09 | Stop reason: HOSPADM

## 2022-03-06 RX ORDER — SERTRALINE HYDROCHLORIDE 100 MG/1
100 TABLET, FILM COATED ORAL DAILY
Status: DISCONTINUED | OUTPATIENT
Start: 2022-03-07 | End: 2022-03-09 | Stop reason: HOSPADM

## 2022-03-06 RX ORDER — FUROSEMIDE 10 MG/ML
40 INJECTION INTRAMUSCULAR; INTRAVENOUS ONCE
Status: COMPLETED | OUTPATIENT
Start: 2022-03-06 | End: 2022-03-06

## 2022-03-06 RX ORDER — LORATADINE 10 MG/1
10 TABLET ORAL DAILY
Status: DISCONTINUED | OUTPATIENT
Start: 2022-03-07 | End: 2022-03-09 | Stop reason: HOSPADM

## 2022-03-06 RX ORDER — AMLODIPINE BESYLATE 10 MG/1
10 TABLET ORAL DAILY
Status: DISCONTINUED | OUTPATIENT
Start: 2022-03-07 | End: 2022-03-09 | Stop reason: HOSPADM

## 2022-03-06 RX ORDER — LOSARTAN POTASSIUM 25 MG/1
25 TABLET ORAL DAILY
Status: DISCONTINUED | OUTPATIENT
Start: 2022-03-07 | End: 2022-03-09 | Stop reason: HOSPADM

## 2022-03-06 RX ORDER — LATANOPROST 50 UG/ML
1 SOLUTION/ DROPS OPHTHALMIC
Status: DISCONTINUED | OUTPATIENT
Start: 2022-03-06 | End: 2022-03-09 | Stop reason: HOSPADM

## 2022-03-06 RX ORDER — GABAPENTIN 300 MG/1
600 CAPSULE ORAL 3 TIMES DAILY
Status: DISCONTINUED | OUTPATIENT
Start: 2022-03-06 | End: 2022-03-09 | Stop reason: HOSPADM

## 2022-03-06 RX ORDER — FUROSEMIDE 10 MG/ML
20 INJECTION INTRAMUSCULAR; INTRAVENOUS DAILY
Status: DISCONTINUED | OUTPATIENT
Start: 2022-03-07 | End: 2022-03-09

## 2022-03-06 RX ORDER — ALBUTEROL SULFATE 90 UG/1
2 AEROSOL, METERED RESPIRATORY (INHALATION) EVERY 4 HOURS PRN
Status: DISCONTINUED | OUTPATIENT
Start: 2022-03-06 | End: 2022-03-09 | Stop reason: HOSPADM

## 2022-03-06 RX ORDER — FERROUS SULFATE 325(65) MG
325 TABLET ORAL
Status: DISCONTINUED | OUTPATIENT
Start: 2022-03-07 | End: 2022-03-09 | Stop reason: HOSPADM

## 2022-03-06 RX ORDER — PANTOPRAZOLE SODIUM 40 MG/1
40 TABLET, DELAYED RELEASE ORAL
Status: DISCONTINUED | OUTPATIENT
Start: 2022-03-07 | End: 2022-03-09 | Stop reason: HOSPADM

## 2022-03-06 RX ADMIN — Medication 1 TABLET: at 17:22

## 2022-03-06 RX ADMIN — FUROSEMIDE 40 MG: 10 INJECTION, SOLUTION INTRAMUSCULAR; INTRAVENOUS at 15:33

## 2022-03-06 RX ADMIN — GABAPENTIN 600 MG: 300 CAPSULE ORAL at 17:22

## 2022-03-06 RX ADMIN — GABAPENTIN 600 MG: 300 CAPSULE ORAL at 22:19

## 2022-03-06 RX ADMIN — TRAZODONE HYDROCHLORIDE 150 MG: 100 TABLET ORAL at 22:19

## 2022-03-06 RX ADMIN — APIXABAN 5 MG: 5 TABLET, FILM COATED ORAL at 17:22

## 2022-03-06 RX ADMIN — CEFEPIME HYDROCHLORIDE 2000 MG: 2 INJECTION, POWDER, FOR SOLUTION INTRAVENOUS at 11:50

## 2022-03-06 RX ADMIN — SODIUM CHLORIDE 500 ML: 0.9 INJECTION, SOLUTION INTRAVENOUS at 12:26

## 2022-03-06 RX ADMIN — VANCOMYCIN HYDROCHLORIDE 250 MG: 5 INJECTION, POWDER, LYOPHILIZED, FOR SOLUTION INTRAVENOUS at 15:33

## 2022-03-06 RX ADMIN — VANCOMYCIN HYDROCHLORIDE 250 MG: 5 INJECTION, POWDER, LYOPHILIZED, FOR SOLUTION INTRAVENOUS at 23:00

## 2022-03-06 RX ADMIN — VANCOMYCIN HYDROCHLORIDE 250 MG: 5 INJECTION, POWDER, LYOPHILIZED, FOR SOLUTION INTRAVENOUS at 17:26

## 2022-03-06 RX ADMIN — CEFEPIME HYDROCHLORIDE 2000 MG: 2 INJECTION, POWDER, FOR SOLUTION INTRAVENOUS at 15:33

## 2022-03-06 RX ADMIN — LATANOPROST 1 DROP: 50 SOLUTION OPHTHALMIC at 22:19

## 2022-03-06 NOTE — Clinical Note
Case was discussed with Dr Yamileth Ruiz and the patient's admission status was agreed to be Admission Status: inpatient status to the service of Dr Yamileth Ruiz

## 2022-03-06 NOTE — ASSESSMENT & PLAN NOTE
Wt Readings from Last 3 Encounters:   10/19/21 122 kg (268 lb)   09/29/21 122 kg (268 lb)   09/15/21 122 kg (268 lb 6 4 oz)   Increased shortness of breath, elevated [id number], troponin 11>10, EKG nonischemic likely CHF exacerbation  6/2017 Echo ventricle was dilated with systolic function was low normal, EF 55%  -will check CXR  -give lasix 40mg, continue 20 mg daily  -strict I/Os-Daily weight  -will recheck echo to assess current EF  -consider cardiology consult in the morning

## 2022-03-06 NOTE — ED PROVIDER NOTES
History  Chief Complaint   Patient presents with    Possible UTI     [de-identified] yo female with h/o ESBL infection and chronic UTI by report of family who presents to ED for increased confusion relative to her baseline confusion and concern for UTI  EMS notes pt covered in urine and nonambulatory on scene  Pt is confused and therefore a complete history cannot be obtained  Prior to Admission Medications   Prescriptions Last Dose Informant Patient Reported? Taking?    ALPRAZolam (XANAX) 1 mg tablet  Self Yes No   Sig: Take 1 mg by mouth 2 (two) times a day as needed   Baclofen 5 MG TABS  Self No No   Sig: TAKE 1 TABLET EVERY MORNING   Disposable Gloves (Latex Gloves) MISC  Self No No   Sig: Use 4 (four) times a day as needed (cleaning) XLARGE   Eliquis 5 MG  Self No No   Sig: TAKE 1 TABLET BY MOUTH TWICE A DAY   Incontinence Supply Disposable (PROCare Bariatric Briefs) MISC  Self No No   Sig: Use every 2 (two) hours as needed (incontinence)   Incontinence Supply Disposable ([facility] Incontinence Pads) MISC  Self No No   Sig: Use 1 each every 2 (two) hours   Incontinence Supply Disposable (Wings Adult Briefs XXL) MISC  Self No No   Sig: Use every 2 (two) hours   Infant Care Products (Baby Wipes) MISC  Self No No   Sig: Use every 2 (two) hours   amLODIPine (NORVASC) 10 mg tablet  Self No No   Sig: Take 1 tablet (10 mg total) by mouth daily   calcium carbonate-vitamin D (OSCAL-D) 500 mg-200 units per tablet  Self Yes No   Sig: Take 1 tablet by mouth 2 (two) times a day with meals     cholestyramine (QUESTRAN) 4 g packet  Self No No   Si packet mixed with 8 oz liquids and drink 4 times daily  Must be dosed 1 hour apart from other medications   diphenoxylate-atropine (LOMOTIL) 2 5-0 025 mg per tablet  Self Yes No   ferrous sulfate 325 (65 Fe) mg tablet  Self No No   Sig: TAKE 1 TABLET BY MOUTH EVERY DAY WITH BREAKFAST   fluconazole (DIFLUCAN) 150 mg tablet  Self Yes No   fluticasone (EQL Fluticasone Propionate) 50 mcg/act nasal spray  Self No No   Si spray into each nostril daily   gabapentin (NEURONTIN) 600 MG tablet  Self No No   Sig: TAKE 1 TABLET BY MOUTH THREE TIMES A DAY   latanoprost (XALATAN) 0 005 % ophthalmic solution  Self No No   Sig: Administer 1 drop to both eyes daily at bedtime   loratadine (CLARITIN) 10 mg tablet  Self No No   Sig: TAKE 1 TABLET BY MOUTH EVERY DAY   losartan (COZAAR) 25 mg tablet  Self No No   Sig: Take 1 tablet (25 mg total) by mouth daily   nitrofurantoin (MACROBID) 100 mg capsule  Self Yes No   Sig: Take 100 mg by mouth 2 (two) times a day   nystatin (MYCOSTATIN) cream  Self No No   Sig: Apply topically 2 (two) times a day   nystatin (MYCOSTATIN) powder  Self No No   Sig: Apply topically 3 (three) times a day   oxyCODONE (OxyCONTIN) 15 mg 12 hr tablet  Self No No   Sig: Take 1 tablet (15 mg total) by mouth every 12 (twelve) hours Max Daily Amount: 30 mg   pantoprazole (PROTONIX) 40 mg tablet  Self No No   Sig: Take 1 tablet (40 mg total) by mouth daily before breakfast   phenazopyridine (PYRIDIUM) 100 mg tablet  Self Yes No   Sig: Take 100 mg by mouth 3 (three) times a day as needed   sertraline (ZOLOFT) 100 mg tablet  Self No No   Sig: TAKE 1 TABLET BY MOUTH EVERY DAY   traZODone (DESYREL) 100 mg tablet  Self No No   Sig: Take 1 5 tablets (150 mg total) by mouth daily at bedtime   vancomycin (VANCOCIN) 250 MG capsule   No No   Sig: Take 1 capsule (250 mg total) by mouth 4 (four) times a day for 7 days      Facility-Administered Medications: None       Past Medical History:   Diagnosis Date    A-fib ([facility])     Anxiety     Chronic pain     Chronic respiratory failure with hypoxia (HCC)     COPD (chronic obstructive pulmonary disease) (HCC)     Dementia (HCC)     Dorsalgia, unspecified     Edema     Encephalopathy     GERD (gastroesophageal reflux disease)     Hypertension     Morbid obesity ([facility])     Muscle weakness (generalized)     Opioid dependence ([facility])     Overactive bladder     Pneumonia     Psychiatric disorder     anxiety, bipolar    Radiculopathy of thoracolumbar region     Sciatica     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 ([facility] [address] )     Urinary incontinence     UTI (urinary tract infection)        Past Surgical History:   Procedure Laterality Date    APPENDECTOMY      BACK SURGERY      CHOLECYSTECTOMY      CYSTOSCOPY  2021    Dr Lucretia Chen         History reviewed  No pertinent family history  I have reviewed and agree with the history as documented  E-Cigarette/Vaping    E-Cigarette Use Never User      E-Cigarette/Vaping Substances     Social History     Tobacco Use    Smoking status: Former Smoker     Types: Cigarettes     Quit date: 1995     Years since quittin 3    Smokeless tobacco: Never Used   Vaping Use    Vaping Use: Never used   Substance Use Topics    Alcohol use: Never    Drug use: No       Review of Systems   Unable to perform ROS: Mental status change       Physical Exam  Physical Exam  Vitals and nursing note reviewed  Constitutional:       General: She is not in acute distress  Appearance: She is well-developed  She is not ill-appearing, toxic-appearing or diaphoretic  Comments: Covered in urine  HENT:      Head: Normocephalic and atraumatic  Mouth/Throat:      Mouth: Mucous membranes are moist       Pharynx: Oropharynx is clear  Eyes:      Conjunctiva/sclera: Conjunctivae normal       Pupils: Pupils are equal, round, and reactive to light  Neck:      Vascular: No JVD  Cardiovascular:      Rate and Rhythm: Normal rate and regular rhythm  Pulses: Normal pulses  Heart sounds: Normal heart sounds  Pulmonary:      Effort: Pulmonary effort is normal  No respiratory distress  Breath sounds: Normal breath sounds  No stridor  No wheezing or rhonchi  Abdominal:      General: There is no distension        Palpations: Abdomen is soft       Tenderness: There is no abdominal tenderness  There is no guarding or rebound  Musculoskeletal:         General: No tenderness or deformity  Normal range of motion  Cervical back: Normal range of motion and neck supple  No rigidity or tenderness  Skin:     General: Skin is warm and dry  Capillary Refill: Capillary refill takes less than 2 seconds  Coloration: Skin is not pale  Findings: No erythema or rash  Neurological:      General: No focal deficit present  Mental Status: She is alert  She is disoriented  Cranial Nerves: No cranial nerve deficit  Sensory: No sensory deficit  Motor: No abnormal muscle tone  Coordination: Coordination normal          Vital Signs  ED Triage Vitals [03/06/22 1033]   Temperature Pulse Respirations Blood Pressure SpO2   97 8 °F (36 6 °C) 60 18 137/66 99 %      Temp Source Heart Rate Source Patient Position - Orthostatic VS BP Location FiO2 (%)   Oral Monitor Lying Right arm --      Pain Score       --           Vitals:    03/06/22 1033   BP: 137/66   Pulse: 60   Patient Position - Orthostatic VS: Lying         Visual Acuity      ED Medications  Medications   sodium chloride 0 9 % bolus 500 mL (500 mL Intravenous New Bag 3/6/22 1226)   cefepime (MAXIPIME) 2 g/50 mL dextrose IVPB (2,000 mg Intravenous New Bag 3/6/22 1150)       Diagnostic Studies  Results Reviewed     Procedure Component Value Units Date/Time    Urine Microscopic [[id number]]  (Abnormal) Collected: 03/06/22 1149    Lab Status: Final result Specimen: Urine, Clean Catch Updated: 03/06/22 1210     RBC, UA 1-2 /hpf      WBC, UA 30-50 /hpf      Epithelial Cells Occasional /hpf      Bacteria, UA Moderate /hpf     Urine culture [[id number]] Collected: 03/06/22 1149    Lab Status:  In process Specimen: Urine, Clean Catch Updated: 03/06/22 1205    HS Troponin I 4hr [[id number]]     Lab Status: No result Specimen: Blood     COVID/FLU/RSV - 2 hour TAT [[id number]]  (Normal) Collected: 03/06/22 1042    Lab Status: Final result Specimen: Nares from Nasopharyngeal Swab Updated: 03/06/22 1201     SARS-CoV-2 Negative     INFLUENZA A PCR Negative     INFLUENZA B PCR Negative     RSV PCR Negative    Narrative:      FOR PEDIATRIC PATIENTS - copy/paste COVID Guidelines URL to browser: [email or web address]    SARS-CoV-2 assay is a Nucleic Acid Amplification assay intended for the  qualitative detection of nucleic acid from SARS-CoV-2 in nasopharyngeal  swabs  Results are for the presumptive identification of SARS-CoV-2 RNA  Positive results are indicative of infection with SARS-CoV-2, the virus  causing COVID-19, but do not rule out bacterial infection or co-infection  with other viruses  Laboratories within the [place] and its  territories are required to report all positive results to the appropriate  public health authorities  Negative results do not preclude SARS-CoV-2  infection and should not be used as the sole basis for treatment or other  patient management decisions  Negative results must be combined with  clinical observations, patient history, and epidemiological information  This test has not been FDA cleared or approved  This test has been authorized by FDA under an Emergency Use Authorization  (EUA)  This test is only authorized for the duration of time the  declaration that circumstances exist justifying the authorization of the  emergency use of an in vitro diagnostic tests for detection of SARS-CoV-2  virus and/or diagnosis of COVID-19 infection under section 564(b)(1) of  the Act, 21 U  S C  284QPV-7(Y)(2), unless the authorization is terminated  or revoked sooner  The test has been validated but independent review by FDA  and CLIA is pending  Test performed using [employer] GeneXpert: This RT-PCR assay targets N2,  a region unique to SARS-CoV-2   A conserved region in the E-gene was chosen  for pan-Sarbecovirus detection which includes SARS-CoV-2     UA (URINE) with reflex to Scope [[id number]]  (Abnormal) Collected: 03/06/22 1149    Lab Status: Final result Specimen: Urine, Clean Catch Updated: 03/06/22 1200     Color, UA Yellow     Clarity, UA Slightly Cloudy     Specific [place], UA 1 020     pH, UA 6 0     Leukocytes, UA Large     Nitrite, UA Positive     Protein, UA 30 (1+) mg/dl      Glucose,  (1/10%) mg/dl      Ketones, UA Negative mg/dl      Urobilinogen, UA 1 0 E U /dl      Bilirubin, UA Negative     Blood, UA Trace-Intact    Protime-INR [[id number]]  (Abnormal) Collected: 03/06/22 1042    Lab Status: Final result Specimen: Blood from Arm, Left Updated: 03/06/22 1132     Protime 16 7 seconds      INR 1 42    APTT [[id number]]  (Abnormal) Collected: 03/06/22 1042    Lab Status: Final result Specimen: Blood from Arm, Left Updated: 03/06/22 1132     PTT 38 seconds     Basic metabolic panel [[id number]] Collected: 03/06/22 1042    Lab Status: Final result Specimen: Blood from Arm, Left Updated: 03/06/22 1121     Sodium 142 mmol/L      Potassium 3 9 mmol/L      Chloride 106 mmol/L      CO2 30 mmol/L      ANION GAP 6 mmol/L      BUN 10 mg/dL      Creatinine 0 91 mg/dL      Glucose 115 mg/dL      Calcium 9 1 mg/dL      eGFR 59 ml/min/1 73sq m     Narrative:      [place] guidelines for Chronic Kidney Disease (CKD):     Stage 1 with normal or high GFR (GFR > 90 mL/min/1 73 square meters)    Stage 2 Mild CKD (GFR = 60-89 mL/min/1 73 square meters)    Stage 3A Moderate CKD (GFR = 45-59 mL/min/1 73 square meters)    Stage 3B Moderate CKD (GFR = 30-44 mL/min/1 73 square meters)    Stage 4 Severe CKD (GFR = 15-29 mL/min/1 73 square meters)    Stage 5 End Stage CKD (GFR <15 mL/min/1 73 square meters)  Note: GFR calculation is accurate only with a steady state creatinine    Hepatic function panel [[id number]]  (Abnormal) Collected: 03/06/22 1042    Lab Status: Final result Specimen: Blood from Arm, Left Updated: 03/06/22 1121     Total Bilirubin 0 33 mg/dL      Bilirubin, Direct 0 09 mg/dL      Alkaline Phosphatase 62 U/L      AST 19 U/L      ALT 19 U/L      Total Protein 7 3 g/dL      Albumin 3 0 g/dL     NT-BNP PRO [[id number]]  (Abnormal) Collected: 03/06/22 1042    Lab Status: Final result Specimen: Blood from Arm, Left Updated: 03/06/22 1119     NT-proBNP 1,452 pg/mL     HS Troponin 0hr (reflex protocol) [[id number]]  (Normal) Collected: 03/06/22 1042    Lab Status: Final result Specimen: Blood from Arm, Left Updated: 03/06/22 1118     hs TnI 0hr 11 ng/L     HS Troponin I 2hr [[id number]]     Lab Status: No result Specimen: Blood     Lactic acid [[id number]]  (Normal) Collected: 03/06/22 1042    Lab Status: Final result Specimen: Blood from Arm, Left Updated: 03/06/22 1114     LACTIC ACID 1 6 mmol/L     Narrative:      Result may be elevated if tourniquet was used during collection  CBC and differential [[id number]] Collected: 03/06/22 1042    Lab Status: Final result Specimen: Blood from Arm, Left Updated: 03/06/22 1056     WBC 5 77 Thousand/uL      RBC 4 56 Million/uL      Hemoglobin 12 5 g/dL      Hematocrit 39 4 %      MCV 86 fL      MCH 27 4 pg      MCHC 31 7 g/dL      RDW 14 0 %      MPV 9 9 fL      Platelets 048 Thousands/uL      nRBC 0 /100 WBCs      Neutrophils Relative 59 %      Immat GRANS % 1 %      Lymphocytes Relative 28 %      Monocytes Relative 8 %      Eosinophils Relative 4 %      Basophils Relative 0 %      Neutrophils Absolute 3 45 Thousands/µL      Immature Grans Absolute 0 03 Thousand/uL      Lymphocytes Absolute 1 63 Thousands/µL      Monocytes Absolute 0 44 Thousand/µL      Eosinophils Absolute 0 20 Thousand/µL      Basophils Absolute 0 02 Thousands/µL     Blood culture #1 [[id number]] Collected: 03/06/22 1042    Lab Status: In process Specimen: Blood from Arm, Left Updated: 03/06/22 1054    Blood culture #2 [[id number]] Collected: 03/06/22 1042    Lab Status:  In process Specimen: Blood from Arm, Left Updated: 03/06/22 1054                 CT head without contrast   Final Result by Arvind Rey MD (03/06 1116)      No acute intracranial abnormality  Workstation performed: [id number]                    Procedures  Procedures         ED Course  ED Course as of 03/06/22 1226   Sun Mar 06, 2022   1206 The 30ml/kg fluid bolus was not given to the patient despite hypotension and/or significantly elevated lactate of = 4 and/or presence of septic shock due to: Concern for fluid/volume overload  The patient will be administered 500 ml of crystalloid fluid instead  Orders for this have been placed in Epic  The patient may receive additional colloid or crystalloid fluids thereafter based on clinical condition  Ani Perez MD                                               MDM  Number of Diagnoses or Management Options     Amount and/or Complexity of Data Reviewed  Clinical lab tests: ordered and reviewed  Discuss the patient with other providers: yes        Disposition  Final diagnoses:   UTI (urinary tract infection)   Altered mental status     Time reflects when diagnosis was documented in both MDM as applicable and the Disposition within this note     Time User Action Codes Description Comment    3/6/2022 12:19 PM Clydia Ezequiel Add [N39 0] UTI (urinary tract infection)     3/6/2022 12:20 PM Clydia Ezequiel Add [R41 [id number]] Altered mental status       ED Disposition     ED Disposition Condition Date/Time Comment    Admit Stable Alvina Mar 6, 2022 12:20 PM Case was discussed with Dr Ciro Loco and the patient's admission status was agreed to be Admission Status: observation status to the service of Dr Ciro Loco   Follow-up Information    None         Patient's Medications   Discharge Prescriptions    No medications on file       No discharge procedures on file      PDMP Review       Value Time User    PDMP Reviewed  Yes 2/9/2022 12:05 PM Del Gum, [address]          ED Provider  Electronically Signed by           Marylou Gagnon MD  03/06/22 9704

## 2022-03-06 NOTE — ASSESSMENT & PLAN NOTE
H/o ESBL UTI here with confusion, dysuria, frequency, subjective fever and chills  UA shows UTI  Continue cefepime  Followup blood and urine culture  Consider ID consult, with recurrent UTIs

## 2022-03-06 NOTE — ASSESSMENT & PLAN NOTE
Patient with baseline mild dementia here with increased confusion with CT head no acute abnormality, electrolytes unremarkable, likely secondary to UTI  Check B12, TSH  On IV antibiotics  Minimize sedative use, close monitoring of mental status

## 2022-03-06 NOTE — H&P
[address]  H&P- [facility] 1940, [de-identified] y o  female MRN: [id number]  Unit/Bed#: [facility] [place] Encounter: [id number]  Primary Care Provider: LUCY Ty   Date and time admitted to hospital: 3/6/2022  9:59 AM    Frequent UTI  Assessment & Plan  H/o ESBL UTI here with confusion, dysuria, frequency, subjective fever and chills  UA shows UTI  Continue cefepime  Followup blood and urine culture  Consider ID consult, with recurrent UTIs    CHF exacerbation (HCC)  Assessment & Plan  Wt Readings from Last 3 Encounters:   10/19/21 122 kg (268 lb)   09/29/21 122 kg (268 lb)   09/15/21 122 kg (268 lb 6 4 oz)   Increased shortness of breath, elevated [id number], troponin 11>10, EKG nonischemic likely CHF exacerbation  6/2017 Echo ventricle was dilated with systolic function was low normal, EF 55%  -will check CXR  -give lasix 40mg, continue 20 mg daily  -strict I/Os-Daily weight  -will recheck echo to assess current EF  -consider cardiology consult in the morning               Acute encephalopathy  Assessment & Plan  Patient with baseline mild dementia here with increased confusion with CT head no acute abnormality, electrolytes unremarkable, likely secondary to UTI  Check B12, TSH  On IV antibiotics  Minimize sedative use, close monitoring of mental status    C  difficile diarrhea  Assessment & Plan  Continue vancomycin 250mg qid  Contact precaution    Anxiety  Assessment & Plan  Continue home Ativan p r n  Dementia ([address] )  Assessment & Plan  Mild, continue home regimen      COPD (chronic obstructive pulmonary disease) ([facility])  Assessment & Plan  Albuterol p r n  Hypertension  Assessment & Plan  Continue on Norvasc and losartan    Atrial fibrillation ([facility])  Assessment & Plan  Continue Eliquis     Dispo:  Case management to evaluate for placement    VTE Pharmacologic Prophylaxis: VTE Score: 8 High Risk (Score >/= 5) - Pharmacological DVT Prophylaxis Ordered: apixaban (Eliquis)   Sequential Compression Devices Ordered  Code Status: Level 3 - DNAR and DNI   Discussion with family: Updated  (daughter) at bedside  Anticipated Length of Stay: Patient will be admitted on an inpatient basis with an anticipated length of stay of greater than 2 midnights secondary to UTI  Total Time for Visit, including Counseling / Coordination of Care: 60 minutes Greater than 50% of this total time spent on direct patient counseling and coordination of care  Chief Complaint:     History of Present Illness:  Champ Cadet is a [de-identified] y o  female with a PMH of Afib on Eliquis, COPD, ESBL UTI, HTN who presents with increased confusion for the past few days with concerns for recurrent UTIs  At baseline, patient is not ambulatory, can use walker with support with urinary incontinence, daughter reports increased confusion, dysuria, urinary frequency, subjective fever and chills  Patient also taking vancomycin for C  Diff  Daughter reports increased shortness of breath, denies cough, nausea, vomiting, palpitation, LE edema,  diaphoresis or current chest pain  Patient is not on diuretic, but daughter reports history of CHF  Daughter has increase difficulty taking care of patient with only 10 hours of home care services and requesting placement for patient  Review of Systems:  Review of Systems  Comprehensive review of systems negative except in above HPI      Past Medical and Surgical History:   Past Medical History:   Diagnosis Date    A-fib ([address] )     Anxiety     Chronic pain     Chronic respiratory failure with hypoxia (HCC)     COPD (chronic obstructive pulmonary disease) (HCC)     Dementia (HCC)     Dorsalgia, unspecified     Edema     Encephalopathy     GERD (gastroesophageal reflux disease)     Hypertension     Morbid obesity (HCC)     Muscle weakness (generalized)     Opioid dependence (HCC)     Overactive bladder     Pneumonia     Psychiatric disorder     anxiety, bipolar    Radiculopathy of thoracolumbar region     Sciatica     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  Authorizing Provider   ALPRAZolam Jessica Buys) 1 mg tablet Take 1 mg by mouth 2 (two) times a day as needed 2/25/22   Historical Provider, MD   amLODIPine (NORVASC) 10 mg tablet Take 1 tablet (10 mg total) by mouth daily 12/22/21   LUCY Shultz   Baclofen 5 MG TABS TAKE 1 TABLET EVERY MORNING 11/8/21   LUCY Shultz   calcium carbonate-vitamin D (OSCAL-D) 500 mg-200 units per tablet Take 1 tablet by mouth 2 (two) times a day with meals      Historical Provider, MD   cholestyramine (QUESTRAN) 4 g packet 1 packet mixed with 8 oz liquids and drink 4 times daily  Must be dosed 1 hour apart from other medications 1/27/22   Rosemarie Carreno PA-C   diphenoxylate-atropine (LOMOTIL) 2 5-0 025 mg per tablet  1/1/22   Historical Provider, MD   Disposable Gloves (Latex Gloves) MISC Use 4 (four) times a day as needed (cleaning) Winifred Asa 2/3/22   LUCY Shultz   Eliquis 5 MG TAKE 1 TABLET BY MOUTH TWICE A DAY 12/29/21   LUCY Shultz   ferrous sulfate 325 (65 Fe) mg tablet TAKE 1 TABLET BY MOUTH EVERY DAY WITH BREAKFAST 8/23/21   LUCY Shultz   fluconazole (DIFLUCAN) 150 mg tablet  1/20/22   Historical Provider, MD   fluticasone (EQL Fluticasone Propionate) 50 mcg/act nasal spray 1 spray into each nostril daily 12/22/21   LUCY Shultz   gabapentin (NEURONTIN) 600 MG tablet TAKE 1 TABLET BY MOUTH THREE TIMES A DAY 12/29/21   LUCY Shultz   Incontinence Supply Disposable (PROCare Bariatric Briefs) MISC Use every 2 (two) hours as needed (incontinence) 12/9/20   LUCY Shultz   Incontinence Supply Disposable (S[employer] health Incontinence Pads) MISC Use 1 each every 2 (two) hours 2/3/22   LUCY Shultz   Incontinence Supply Disposable (Wings Adult Briefs XXL) MISC Use every 2 (two) hours 2/3/22   LUCY Lagunas   Infant Care Products (Baby Wipes) MISC Use every 2 (two) hours 2/3/22   LUCY Shultz   latanoprost (XALATAN) 0 005 % ophthalmic solution Administer 1 drop to both eyes daily at bedtime 10/29/21   LUCY Shultz   loratadine (CLARITIN) 10 mg tablet TAKE 1 TABLET BY MOUTH EVERY DAY 8/16/21   LUCY Shultz   losartan (COZAAR) 25 mg tablet Take 1 tablet (25 mg total) by mouth daily 12/15/21   LUCY Shultz   nitrofurantoin (MACROBID) 100 mg capsule Take 100 mg by mouth 2 (two) times a day 2/25/22   Historical Provider, MD   nystatin (MYCOSTATIN) cream Apply topically 2 (two) times a day 3/18/21   Susan Mancuso MD   nystatin (MYCOSTATIN) powder Apply topically 3 (three) times a day 2/3/22   LUCY Shultz   oxyCODONE (OxyCONTIN) 15 mg 12 hr tablet Take 1 tablet (15 mg total) by mouth every 12 (twelve) hours Max Daily Amount: 30 mg 2/9/22   LUCY Shultz   pantoprazole (PROTONIX) 40 mg tablet Take 1 tablet (40 mg total) by mouth daily before breakfast 3/30/21   LUCY Shultz   phenazopyridine (PYRIDIUM) 100 mg tablet Take 100 mg by mouth 3 (three) times a day as needed 2/24/22   Historical Provider, MD   sertraline (ZOLOFT) 100 mg tablet TAKE 1 TABLET BY MOUTH EVERY DAY 12/18/21   LUCY Shulzt   traZODone (DESYREL) 100 mg tablet Take 1 5 tablets (150 mg total) by mouth daily at bedtime 2/23/22   LUCY Gregg   vancomycin (VANCOCIN) 250 MG capsule Take 1 capsule (250 mg total) by mouth 4 (four) times a day for 7 days 3/1/22 3/8/22  Torri Meyers PA-C     I have reviewed home medications with patient personally  Allergies:    Allergies   Allergen Reactions    Aspirin     Iodinated Diagnostic Agents Throat Swelling     Pt states "when I was 14years old driving home from the hospital I felt like I couldn't think and it went away after 10 minutes"     Iodine - Food Allergy     Nsaids     Other Other (See Comments)     difficulty swallowing for short period       Social History:  Marital Status:    Occupation:  Retired  Patient Pre-hospital Living Situation: With other family member: Daughter  Patient Pre-hospital Level of Mobility: walks with walker  Patient Pre-hospital Diet Restrictions:   Substance Use History:   Social History     Substance and Sexual Activity   Alcohol Use Never     Social History     Tobacco Use   Smoking Status Former Smoker    Types: Cigarettes    Quit date: 1995    Years since quittin 3   Smokeless Tobacco Never Used     Social History     Substance and Sexual Activity   Drug Use No       Family History:  History reviewed  No pertinent family history  Physical Exam:     Vitals:   Blood Pressure: 126/64 (22 1338)  Pulse: 60 (22 1338)  Temperature: 97 6 °F (36 4 °C) (22 1338)  Temp Source: Oral (22 1033)  Respirations: 18 (22 1338)  SpO2: (!) 89 % (22 1338)    Physical Exam  Vitals and nursing note reviewed  Constitutional:       General: She is not in acute distress  Appearance: Normal appearance  She is well-developed  Comments: Daughter at the bedside   HENT:      Head: Normocephalic and atraumatic  Eyes:      Conjunctiva/sclera: Conjunctivae normal    Cardiovascular:      Rate and Rhythm: Normal rate and regular rhythm  Pulses: Normal pulses  Heart sounds: Normal heart sounds  No murmur heard  Pulmonary:      Effort: Pulmonary effort is normal  No respiratory distress  Breath sounds: Normal breath sounds  Abdominal:      General: Abdomen is flat  Bowel sounds are normal  There is no distension  Palpations: Abdomen is soft  Tenderness:  There is no abdominal tenderness  Musculoskeletal:      Cervical back: Neck supple  Right lower leg: No edema  Left lower leg: No edema  Skin:     General: Skin is warm and dry  Neurological:      General: No focal deficit present  Mental Status: She is alert  Comments: Oriented x2 to self   Psychiatric:         Mood and Affect: Mood normal          Behavior: Behavior normal           Additional Data:     Lab Results:  Results from last 7 days   Lab Units 03/06/22  1042   WBC Thousand/uL 5 77   HEMOGLOBIN g/dL 12 5   HEMATOCRIT % 39 4   PLATELETS Thousands/uL 186   NEUTROS PCT % 59   LYMPHS PCT % 28   MONOS PCT % 8   EOS PCT % 4     Results from last 7 days   Lab Units 03/06/22  1042   SODIUM mmol/L 142   POTASSIUM mmol/L 3 9   CHLORIDE mmol/L 106   CO2 mmol/L 30   BUN mg/dL 10   CREATININE mg/dL 0 91   ANION GAP mmol/L 6   CALCIUM mg/dL 9 1   ALBUMIN g/dL 3 0*   TOTAL BILIRUBIN mg/dL 0 33   ALK PHOS U/L 62   ALT U/L 19   AST U/L 19   GLUCOSE RANDOM mg/dL 115     Results from last 7 days   Lab Units 03/06/22  1042   INR  1 42*             Results from last 7 days   Lab Units 03/06/22  1042   LACTIC ACID mmol/L 1 6       Imaging: Reviewed radiology reports from this admission including: CT head  CT head without contrast   Final Result by Flaco Espinosa MD (03/06 1116)      No acute intracranial abnormality  Workstation performed: [id number]         XR chest 1 view    (Results Pending)       EKG and Other Studies Reviewed on Admission:   · EKG: WIDE COMPLEX,nsivcb  ** Please Note: This note has been constructed using a voice recognition system   **

## 2022-03-07 ENCOUNTER — APPOINTMENT (OUTPATIENT)
Dept: NON INVASIVE DIAGNOSTICS | Facility: HOSPITAL | Age: 82
DRG: 291 | End: 2022-03-07
Payer: COMMERCIAL

## 2022-03-07 PROBLEM — G47.30 SLEEP APNEA: Status: ACTIVE | Noted: 2022-03-07

## 2022-03-07 PROBLEM — I50.33 ACUTE ON CHRONIC DIASTOLIC HEART FAILURE (HCC): Status: ACTIVE | Noted: 2022-03-06

## 2022-03-07 LAB
ANION GAP SERPL CALCULATED.3IONS-SCNC: 7 MMOL/L (ref 4–13)
AORTIC ROOT: 3 CM
BACTERIA UR CULT: ABNORMAL
BASE EX.OXY STD BLDV CALC-SCNC: 79.4 % (ref 60–80)
BASE EXCESS BLDV CALC-SCNC: 6.2 MMOL/L
BUN SERPL-MCNC: 13 MG/DL (ref 5–25)
CALCIUM SERPL-MCNC: 8.9 MG/DL (ref 8.3–10.1)
CHLORIDE SERPL-SCNC: 104 MMOL/L (ref 100–108)
CO2 SERPL-SCNC: 30 MMOL/L (ref 21–32)
CREAT SERPL-MCNC: 1.04 MG/DL (ref 0.6–1.3)
FRACTIONAL SHORTENING: 29 % (ref 28–44)
GFR SERPL CREATININE-BSD FRML MDRD: 50 ML/MIN/1.73SQ M
GLUCOSE SERPL-MCNC: 109 MG/DL (ref 65–140)
HCO3 BLDV-SCNC: 31.7 MMOL/L (ref 24–30)
INTERVENTRICULAR SEPTUM IN DIASTOLE (PARASTERNAL SHORT AXIS VIEW): 1.6 CM
INTERVENTRICULAR SEPTUM: 1.6 CM (ref 0.58–1.08)
LA/AORTA RATIO 2D: 1.77
LAAS-AP4: 34.4 CM2
LEFT ATRIUM SIZE: 5.3 CM
LEFT INTERNAL DIMENSION IN SYSTOLE: 3.7 CM (ref 5.81–8.81)
LEFT VENTRICULAR INTERNAL DIMENSION IN DIASTOLE: 5.2 CM (ref 9.82–14.64)
LEFT VENTRICULAR POSTERIOR WALL IN END DIASTOLE: 1.3 CM (ref 0.56–1.07)
LEFT VENTRICULAR STROKE VOLUME: 73 ML
LVSV (TEICH): 73 ML
MV E'TISSUE VEL-SEP: 7 CM/S
O2 CT BLDV-SCNC: 15.4 ML/DL
PCO2 BLDV: 49 MM HG (ref 42–50)
PH BLDV: 7.43 [PH] (ref 7.3–7.4)
PO2 BLDV: 42.6 MM HG (ref 35–45)
POTASSIUM SERPL-SCNC: 4 MMOL/L (ref 3.5–5.3)
SL CV LV EF: 55
SL CV PED ECHO LEFT VENTRICLE DIASTOLIC VOLUME (MOD BIPLANE) 2D: 132 ML
SL CV PED ECHO LEFT VENTRICLE SYSTOLIC VOLUME (MOD BIPLANE) 2D: 59 ML
SODIUM SERPL-SCNC: 141 MMOL/L (ref 136–145)
TRICUSPID ANNULAR PLANE SYSTOLIC EXCURSION: 1.6 CM
TSH SERPL DL<=0.05 MIU/L-ACNC: 0.63 UIU/ML (ref 0.36–3.74)
VIT B12 SERPL-MCNC: 255 PG/ML (ref 100–900)
Z-SCORE OF INTERVENTRICULAR SEPTUM IN END DIASTOLE: 6
Z-SCORE OF LEFT VENTRICULAR DIMENSION IN END DIASTOLE: -8.35
Z-SCORE OF LEFT VENTRICULAR DIMENSION IN END SYSTOLE: -5.21
Z-SCORE OF LEFT VENTRICULAR POSTERIOR WALL IN END DIASTOLE: 3.78

## 2022-03-07 PROCEDURE — C8929 TTE W OR WO FOL WCON,DOPPLER: HCPCS

## 2022-03-07 PROCEDURE — 84443 ASSAY THYROID STIM HORMONE: CPT | Performed by: INTERNAL MEDICINE

## 2022-03-07 PROCEDURE — 82607 VITAMIN B-12: CPT | Performed by: INTERNAL MEDICINE

## 2022-03-07 PROCEDURE — 99232 SBSQ HOSP IP/OBS MODERATE 35: CPT | Performed by: PHYSICIAN ASSISTANT

## 2022-03-07 PROCEDURE — 80048 BASIC METABOLIC PNL TOTAL CA: CPT | Performed by: INTERNAL MEDICINE

## 2022-03-07 PROCEDURE — 93306 TTE W/DOPPLER COMPLETE: CPT | Performed by: INTERNAL MEDICINE

## 2022-03-07 PROCEDURE — 82805 BLOOD GASES W/O2 SATURATION: CPT | Performed by: PHYSICIAN ASSISTANT

## 2022-03-07 RX ORDER — SACCHAROMYCES BOULARDII 250 MG
250 CAPSULE ORAL 2 TIMES DAILY
Status: DISCONTINUED | OUTPATIENT
Start: 2022-03-07 | End: 2022-03-09 | Stop reason: HOSPADM

## 2022-03-07 RX ORDER — OLANZAPINE 10 MG/1
5 INJECTION, POWDER, LYOPHILIZED, FOR SOLUTION INTRAMUSCULAR ONCE
Status: COMPLETED | OUTPATIENT
Start: 2022-03-07 | End: 2022-03-07

## 2022-03-07 RX ADMIN — Medication 250 MG: at 12:11

## 2022-03-07 RX ADMIN — Medication 1 TABLET: at 09:17

## 2022-03-07 RX ADMIN — APIXABAN 5 MG: 5 TABLET, FILM COATED ORAL at 09:17

## 2022-03-07 RX ADMIN — PANTOPRAZOLE SODIUM 40 MG: 40 TABLET, DELAYED RELEASE ORAL at 09:25

## 2022-03-07 RX ADMIN — LORATADINE 10 MG: 10 TABLET ORAL at 09:16

## 2022-03-07 RX ADMIN — APIXABAN 5 MG: 5 TABLET, FILM COATED ORAL at 17:06

## 2022-03-07 RX ADMIN — OLANZAPINE 5 MG: 10 INJECTION, POWDER, LYOPHILIZED, FOR SOLUTION INTRAMUSCULAR at 04:57

## 2022-03-07 RX ADMIN — LOSARTAN POTASSIUM 25 MG: 25 TABLET, FILM COATED ORAL at 09:16

## 2022-03-07 RX ADMIN — CEFEPIME HYDROCHLORIDE 2000 MG: 2 INJECTION, POWDER, FOR SOLUTION INTRAVENOUS at 03:48

## 2022-03-07 RX ADMIN — VANCOMYCIN HYDROCHLORIDE 250 MG: 5 INJECTION, POWDER, LYOPHILIZED, FOR SOLUTION INTRAVENOUS at 06:00

## 2022-03-07 RX ADMIN — VANCOMYCIN HYDROCHLORIDE 250 MG: 5 INJECTION, POWDER, LYOPHILIZED, FOR SOLUTION INTRAVENOUS at 23:30

## 2022-03-07 RX ADMIN — FERROUS SULFATE TAB 325 MG (65 MG ELEMENTAL FE) 325 MG: 325 (65 FE) TAB at 09:17

## 2022-03-07 RX ADMIN — AMLODIPINE BESYLATE 10 MG: 10 TABLET ORAL at 09:17

## 2022-03-07 RX ADMIN — GABAPENTIN 600 MG: 300 CAPSULE ORAL at 21:51

## 2022-03-07 RX ADMIN — GABAPENTIN 600 MG: 300 CAPSULE ORAL at 17:06

## 2022-03-07 RX ADMIN — FUROSEMIDE 20 MG: 10 INJECTION, SOLUTION INTRAMUSCULAR; INTRAVENOUS at 09:16

## 2022-03-07 RX ADMIN — GABAPENTIN 600 MG: 300 CAPSULE ORAL at 09:17

## 2022-03-07 RX ADMIN — TRAZODONE HYDROCHLORIDE 150 MG: 100 TABLET ORAL at 21:51

## 2022-03-07 RX ADMIN — SERTRALINE HYDROCHLORIDE 100 MG: 100 TABLET, FILM COATED ORAL at 09:25

## 2022-03-07 RX ADMIN — PERFLUTREN 0.4 ML/MIN: 6.52 INJECTION, SUSPENSION INTRAVENOUS at 11:14

## 2022-03-07 RX ADMIN — ERTAPENEM SODIUM 1000 MG: 1 INJECTION, POWDER, LYOPHILIZED, FOR SOLUTION INTRAMUSCULAR; INTRAVENOUS at 12:12

## 2022-03-07 RX ADMIN — VANCOMYCIN HYDROCHLORIDE 250 MG: 5 INJECTION, POWDER, LYOPHILIZED, FOR SOLUTION INTRAVENOUS at 12:11

## 2022-03-07 RX ADMIN — ERTAPENEM SODIUM 1000 MG: 1 INJECTION, POWDER, LYOPHILIZED, FOR SOLUTION INTRAMUSCULAR; INTRAVENOUS at 12:11

## 2022-03-07 RX ADMIN — VANCOMYCIN HYDROCHLORIDE 250 MG: 5 INJECTION, POWDER, LYOPHILIZED, FOR SOLUTION INTRAVENOUS at 17:07

## 2022-03-07 RX ADMIN — LATANOPROST 1 DROP: 50 SOLUTION OPHTHALMIC at 21:54

## 2022-03-07 RX ADMIN — Medication 250 MG: at 17:07

## 2022-03-07 RX ADMIN — Medication 1 TABLET: at 17:06

## 2022-03-07 NOTE — PLAN OF CARE
Problem: PAIN - ADULT  Goal: Verbalizes/displays adequate comfort level or baseline comfort level  Description: Interventions:  - Encourage patient to monitor pain and request assistance  - Assess pain using appropriate pain scale  - Administer analgesics based on type and severity of pain and evaluate response  - Implement non-pharmacological measures as appropriate and evaluate response  - Consider cultural and social influences on pain and pain management  - Notify physician/advanced practitioner if interventions unsuccessful or patient reports new pain  Outcome: Progressing     Problem: INFECTION - ADULT  Goal: Absence or prevention of progression during hospitalization  Description: INTERVENTIONS:  - Assess and monitor for signs and symptoms of infection  - Monitor lab/diagnostic results  - Monitor all insertion sites, i e  indwelling lines, tubes, and drains  - Monitor endotracheal if appropriate and nasal secretions for changes in amount and color  - [place] appropriate cooling/warming therapies per order  - Administer medications as ordered  - Instruct and encourage patient and family to use good hand hygiene technique  - Identify and instruct in appropriate isolation precautions for identified infection/condition  Outcome: Progressing  Goal: Absence of fever/infection during neutropenic period  Description: INTERVENTIONS:  - Monitor WBC    Outcome: Progressing     Problem: SAFETY ADULT  Goal: Patient will remain free of falls  Description: INTERVENTIONS:  - Educate patient/family on patient safety including physical limitations  - Instruct patient to call for assistance with activity   - Consult OT/PT to assist with strengthening/mobility   - Keep Call bell within reach  - Keep bed low and locked with side rails adjusted as appropriate  - Keep care items and personal belongings within reach  - Initiate and maintain comfort rounds  - Make Fall Risk Sign visible to staff  - Offer Toileting every  Hours, in advance of need  - Initiate/Maintain alarm  - Obtain necessary fall risk management equipment:  - Apply yellow socks and bracelet for high fall risk patients  - Consider moving patient to room near nurses station  Outcome: Progressing  Goal: Maintain or return to baseline ADL function  Description: INTERVENTIONS:  -  Assess patient's ability to carry out ADLs; assess patient's baseline for ADL function and identify physical deficits which impact ability to perform ADLs (bathing, care of mouth/teeth, toileting, grooming, dressing, etc )  - Assess/evaluate cause of self-care deficits   - Assess range of motion  - Assess patient's mobility; develop plan if impaired  - Assess patient's need for assistive devices and provide as appropriate  - Encourage maximum independence but intervene and supervise when necessary  - Involve family in performance of ADLs  - Assess for home care needs following discharge   - Consider OT consult to assist with ADL evaluation and planning for discharge  - Provide patient education as appropriate  Outcome: Progressing  Goal: Maintains/Returns to pre admission functional level  Description: INTERVENTIONS:  - Perform BMAT or MOVE assessment daily    - Set and communicate daily mobility goal to care team and patient/family/caregiver  - Collaborate with rehabilitation services on mobility goals if consulted  - Perform Range of Motion  times a day  - Reposition patient every  hours    - Dangle patient  times a day  - Stand patient  times a day  - Ambulate patient  times a day  - Out of bed to chair  times a day   - Out of bed for meals  times a day  - Out of bed for toileting  - Record patient progress and toleration of activity level   Outcome: Progressing     Problem: DISCHARGE PLANNING  Goal: Discharge to home or other facility with appropriate resources  Description: INTERVENTIONS:  - Identify barriers to discharge w/patient and caregiver  - Arrange for needed discharge resources and transportation as appropriate  - Identify discharge learning needs (meds, wound care, etc )  - Arrange for interpretive services to assist at discharge as needed  - Refer to Case Management Department for coordinating discharge planning if the patient needs post-hospital services based on physician/advanced practitioner order or complex needs related to functional status, cognitive ability, or social support system  Outcome: Progressing     Problem: Knowledge Deficit  Goal: Patient/family/caregiver demonstrates understanding of disease process, treatment plan, medications, and discharge instructions  Description: Complete learning assessment and assess knowledge base    Interventions:  - Provide teaching at level of understanding  - Provide teaching via preferred learning methods  Outcome: Progressing

## 2022-03-07 NOTE — ASSESSMENT & PLAN NOTE
· H/o ESBL UTI here with confusion, dysuria, frequency, subjective fever and chills  · UA shows UTI  · Was on Cefepime, will switch to IV Ertapenem per prior C&S results   · Follow up blood and urine culture

## 2022-03-07 NOTE — ASSESSMENT & PLAN NOTE
· Saw GI as outpatient for recurrent diarrhea on 3/01   · On multiple courses of abx for recurrent UTI   · Hx of c diff 12/13 and positive PCR re-tests on 1/21 and 1/25  · Continue PTA vancomycin 250mg qid until outpatient follow up with GI to prevent C diff recurrence as patient was on IV abx while inpatient   · Will place patient on probiotic  · Contact precaution

## 2022-03-07 NOTE — CASE MANAGEMENT
Case Management Assessment & Discharge Planning Note    Patient name Sunil Mayers  Location [id number] MRN [id number]  : 1940 Date 3/7/2022       Current Admission Date: 3/6/2022  Current Admission Diagnosis:Acute encephalopathy   Patient Active Problem List    Diagnosis Date Noted    Sleep apnea 2022    CHF exacerbation ([facility] ) 2022    C  difficile diarrhea 2022    Anxiety 2021    Fungal infection of the groin 2021    Dementia ([facility] ) 03/10/2021    Generalized weakness 03/10/2021    CHF (congestive heart failure) ([place] [address] ) 2020    Pacemaker 2020    Frequent UTI 2020    Bipolar affective disorder, currently manic, moderate ([facility] ) 2018    Chronic pain 2018    Physical deconditioning 2018    Moderate episode of recurrent major depressive disorder ([facility] ) 2018    Overactive bladder 06/10/2017    COPD (chronic obstructive pulmonary disease) ([facility] ) 2016    Acute encephalopathy 2016    Morbid obesity due to excess calories ([facility] ) 2016    Atrial fibrillation ([place] [address] ) 2016    Hypertension 2016    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[facility] ) 2016    PAD (peripheral artery disease) ([facility] ) 2016      LOS (days): 0  Geometric Mean LOS (GMLOS) (days): 3 80  Days to GMLOS:3 5     OBJECTIVE:    Risk of Unplanned Readmission Score: 17         Current admission status: Inpatient       Preferred Pharmacy:   [address], [place] - [address] 1 ([phone] [id number])  [facility]  Phone: [phone] Fax: [phone]    [facility] - [place]  [place] - [address] [facility] [address]  [place]   [address] [place] [address]  Phone: [phone] Fax: [phone]    Primary Care Provider: LUCY Mustafa    Primary Insurance: [facility]  Secondary Insurance: Darnell Reyes 118:  [address]     [place]deb[place], [address] - Daughter   Primary Phone: [phone] (Mobile)               Advance Directives  Does patient have a [address]?: Yes  Does patient currently have a Health Care decision maker?: Yes, please see Health Care Proxy section  Does patient have Advance Directives?: Yes  Advance Directives: Power of  for health care  Primary Contact: dtr-alice         Readmission Root Cause  30 Day Readmission: No    Patient Information  Admitted from[de-identified] Home  Mental Status: Confused (Frontal Dementia)  During Assessment patient was accompanied by: Other-Comment (caregiver-Tolu )  Assessment information provided by[de-identified] Other - please comment (Caregiver- Tolu )  Primary Caregiver: Private caregiver  Caregiver's Name[de-identified] Emmett Allen Relationship to Patient[de-identified] Other (Specify) (comfort keepers)  Caregiver's Telephone Number[de-identified] [phone]  Support Systems: [address] of Residence: [place] [address] do you live in?: [address] 192 entry access options  Select all that apply : Stairs  Number of steps to enter home : 3  Do the steps have railings?: Yes  Type of Current Residence: [address] [place] home  Upon entering residence, is there a bedroom on the main floor (no further steps)?: Yes  Upon entering residence, is there a bathroom on the main floor (no further steps)?: Yes  In the last 12 months, was there a time when you were not able to pay the mortgage or rent on time?: No  In the last 12 months, how many places have you lived?: 1  In the last 12 months, was there a time when you did not have a steady place to sleep or slept in a shelter (including now)?: No  Homeless/housing insecurity resource given?: Refused  Living Arrangements: Lives w/ Daughter  Is patient a ?: No    Activities of Daily Living Prior to Admission  Functional Status: Total dependent  Completes ADLs independently?: No  Level of ADL dependence:  Total Dependent  Ambulates independently?: No  Level of ambulatory dependence: Total Dependent  Does patient use assisted devices?: Yes  Assisted Devices (DME) used: Hospital Bed  Does patient currently own DME?: Yes  What DME does the patient currently own?: Hospital Bed  Does patient have a history of Outpatient Therapy (PT/OT)?: Yes  Does the patient have a history of Short-Term Rehab?: Yes  Does patient have a history of HHC?: Yes  Does patient currently have [place] [address]?: No         Patient Information Continued  Income Source: Pension/alf  Does patient have prescription coverage?: Yes  Within the past 12 months, you worried that your food would run out before you got the money to buy more : Never true  Within the past 12 months, the food you bought just didnt last and you didnt have money to get more : Never true  Food insecurity resource given?: Refused  Does patient receive dialysis treatments?: No  Does patient have a history of substance abuse?: No  Does patient have a history of Mental Health Diagnosis?: No         Means of Transportation  [facility]] Family transport  In the past 12 months, has lack of transportation kept you from medical appointments or from getting medications?: No  In the past 12 months, has lack of transportation kept you from meetings, work, or from getting things needed for daily living?: No  Was application for public transport provided?: Refused        DISCHARGE DETAILS:    Discharge planning discussed with[de-identified] caregiver - ruslan  Freedom of Choice: Yes  Comments - Freedom of Choice: patient is covid vaccinated with first two vaccines and not boostered  Liz Chavarria states the plan is for patient to come home with [place] [address] services  Declining STR    CM contacted family/caregiver?: Yes  Were Treatment Team discharge recommendations reviewed with patient/caregiver?: Yes  Did patient/caregiver verbalize understanding of patient care needs?: Yes  Were patient/caregiver advised of the risks associated with not following Treatment Team discharge recommendations?: Yes    Contacts  Patient Contacts: caregiver from [place] jose  Relationship to Patient[de-identified] Other (Comment) (caregiver)  Contact Method: Phone  Phone Number: [phone]  Reason/Outcome: Continuity of [address]         Is the patient interested in [facility] at discharge?: Yes  [address] requested[de-identified] [address] Name[de-identified] [address] [place] [address] Provider[de-identified] PCP  Home Health Services Needed[de-identified] Strengthening/Theraputic Exercises to Improve Function  Homebound Criteria Met[de-identified] Requires the Assistance of Another Person for Safe Ambulation or to Leave the Home  Supporting Clincal Findings[de-identified] Limited Endurance,Bed Bound or Wheelchair Bound         Other Referral/Resources/Interventions Provided:  Interventions: [facility]  Referral Comments: referrals pended  Would you like to participate in our [address] service program?  : No - Declined    Treatment Team Recommendation[address]  Discharge Destination Plan[de-identified] Home with [place] at Discharge : [facility]  Dispatcher Contacted:  No

## 2022-03-07 NOTE — ASSESSMENT & PLAN NOTE
· Patient with baseline mild dementia here with increased confusion with CT head no acute abnormality, electrolytes unremarkable  · Baseline mentation per daughter (POA) A&Ox4 however does wax and wane, now improved, back to baseline mentation   · Initially suspected 2/2 recurrent UTI however urine cx <10,000 gram negative brayan-like, therefore IV abx discontinued after 2 days; unclear etiology possible in setting of acute CHF exacerbation  · CT head no acute findings  · Vit B12, TSH WNL  · Minimize sedative use, close monitoring of mental status  · PT/OT recommend STR- pending authorization at [employer]   · Patient currently lives with daughter Lita Andrews) eports difficulty caring for patient at home

## 2022-03-07 NOTE — ASSESSMENT & PLAN NOTE
Wt Readings from Last 3 Encounters:   10/19/21 122 kg (268 lb)   09/29/21 122 kg (268 lb)   09/15/21 122 kg (268 lb 6 4 oz)   · Increased shortness of breath, elevated [id number], troponin 11>10, EKG nonischemic likely CHF exacerbation  · 6/2017 Echo ventricle was dilated with systolic function was low normal, EF 55%  · CXR shows mild CHF  · Not on diuretic PTA   · Continue IV lasix 20mg daily   · Strict I/Os-Daily weight  · Repeat echo: pending   · Salt and fluid restricted diet

## 2022-03-07 NOTE — UTILIZATION REVIEW
Initial Clinical Review    WAS OBSERVATION 03/06/2022 @ 3291, CONVERTED TO INPATIENT ADMISSION 03/07/2022 @ 0732, DUE TO CONTINUED STAY REQUIRED TO CARE FOR PATIENT WITH  Altered Mental Status  Acute encephalopathy  UTI  Continue IV Abx  CHF - IV lasix  Admission: Date/Time/Statement:   Admission Orders (From admission, onward)     Ordered        03/07/22 0732  Inpatient Admission  Once            03/06/22 1221  Place in Observation  Once                      Orders Placed This Encounter   Procedures    Inpatient Admission     Encephalopathy 2/2 UTI     Standing Status:   Standing     Number of Occurrences:   1     Order Specific Question:   Level of Care     Answer:   Med Surg [16]     Order Specific Question:   Estimated length of stay     Answer:   More than 2 Midnights     Order Specific Question:   Certification     Answer:   I certify that inpatient services are medically necessary for this patient for a duration of greater than two midnights  See H&P and MD Progress Notes for additional information about the patient's course of treatment  ED Arrival Information     Expected Arrival Acuity    - 3/6/2022 09:58 Urgent         Means of arrival Escorted by Service Admission type    Ambulance [facility]) Hospitalist Urgent         Arrival complaint    AMS + UTI SYMPTOMS        Chief Complaint   Patient presents with    Possible UTI       Initial Presentation: [de-identified]year old female, presented to the ED @ [facility], from home via EMS  Admitted as Inpatient due toincreased confusion for the past few days with concerns for recurrent UTIs  PMH:  Afib on Eliquis, COPD, ESBL UTI, HTN   Date: 03/06/2022   Presents to ED for increased confusion relative to her baseline confusion and concern for UTI  EMS notes pt covered in urine and nonambulatory on scene  UA shows UTI  Continue Cefepime  Follow up on blood & urine cultures  I&O, daily weight  Recheck ECHO    Patient also taking vancomycin for C  Diff        VTE Pharmacologic Prophylaxis: VTE Score: 8 High Risk (Score >/= 5) - Pharmacological DVT Prophylaxis Ordered: apixaban (Eliquis)  Sequential Compression Devices Ordered  Day 1: 03/07/2022   CT head no acute findings  Chest X: mild CHF  Continue IV Abx  Check VBG  PT/OT  Continue IV lasix  Continue PTA vancomycin 250mg qid until 03/09 to prevent C diff recurrence    Day 2:  03/08/2022   Acute Encephalopathy  PT/OT  Continue IV Abx  Place on probiotic  Continue IV lasix, monitor & trend renal functions  ED Triage Vitals   Temperature Pulse Respirations Blood Pressure SpO2   03/06/22 1033 03/06/22 1033 03/06/22 1033 03/06/22 1033 03/06/22 1033   97 8 °F (36 6 °C) 60 18 137/66 99 %      Temp Source Heart Rate Source Patient Position - Orthostatic VS BP Location FiO2 (%)   03/06/22 1033 03/06/22 1033 03/06/22 1033 03/06/22 1033 --   Oral Monitor Lying Right arm       Pain Score       03/06/22 1450       5          Wt Readings from Last 1 Encounters:   03/07/22 122 kg (268 lb)     Additional Vital Signs:   Date/Time Temp Pulse Resp BP MAP (mmHg) SpO2 O2 Device Patient Position - Orthostatic VS   03/07/22 0830 -- 64 -- 139/58 -- -- -- --   03/07/22 07:38:32 98 1 °F (36 7 °C) 64 -- 141/64 90 91 % -- --   03/06/22 22:26:45 98 6 °F (37 °C) 68 18 139/58 85 93 % -- --   03/06/22 13:38:07 97 6 °F (36 4 °C) 60 18 126/64 85 89 % Abnormal  -- --     Pertinent Labs/Diagnostic Test Results:   XR chest 1 view   Final Result by Mery Goldstein MD (03/07 0606)   Mild CHF  CT head without contrast   Final Result by Christian Wallis MD (03/06 1116)   No acute intracranial abnormality          Results from last 7 days   Lab Units 03/06/22  1042   SARS-COV-2  Negative     Results from last 7 days   Lab Units 03/06/22  1042   WBC Thousand/uL 5 77   HEMOGLOBIN g/dL 12 5   HEMATOCRIT % 39 4   PLATELETS Thousands/uL 186   NEUTROS ABS Thousands/µL 3 45     Results from last 7 days   Lab Units 03/07/22  0355 03/06/22  1042   SODIUM mmol/L 141 142   POTASSIUM mmol/L 4 0 3 9   CHLORIDE mmol/L 104 106   CO2 mmol/L 30 30   ANION GAP mmol/L 7 6   BUN mg/dL 13 10   CREATININE mg/dL 1 04 0 91   EGFR ml/min/1 73sq m 50 59   CALCIUM mg/dL 8 9 9 1     Results from last 7 days   Lab Units 03/06/22  1042   AST U/L 19   ALT U/L 19   ALK PHOS U/L 62   TOTAL PROTEIN g/dL 7 3   ALBUMIN g/dL 3 0*   TOTAL BILIRUBIN mg/dL 0 33   BILIRUBIN DIRECT mg/dL 0 09     Results from last 7 days   Lab Units 03/07/22  0355 03/06/22  1042   GLUCOSE RANDOM mg/dL 109 115     Results from last 7 days   Lab Units 03/06/22  1611 03/06/22  1228 03/06/22  1042   HS TNI 0HR ng/L  --   --  11   HS TNI 2HR ng/L  --  10  --    HSTNI D2 ng/L  --  -1  --    HS TNI 4HR ng/L 10  --   --    HSTNI D4 ng/L -1  --   --      Results from last 7 days   Lab Units 03/06/22  1042   PROTIME seconds 16 7*   INR  1 42*   PTT seconds 38*     Results from last 7 days   Lab Units 03/07/22  0355   TSH 3RD GENERATON uIU/mL 0 634     Results from last 7 days   Lab Units 03/06/22  1042   LACTIC ACID mmol/L 1 6     Results from last 7 days   Lab Units 03/06/22  1042   NT-PRO BNP pg/mL 1,452*     Results from last 7 days   Lab Units 03/06/22  1149   CLARITY UA  Slightly Cloudy   COLOR UA  Yellow   SPEC GRAV UA  1 020   PH UA  6 0   GLUCOSE UA mg/dl 100 (1/10%)*   KETONES UA mg/dl Negative   BLOOD UA  Trace-Intact*   PROTEIN UA mg/dl 30 (1+)*   NITRITE UA  Positive*   BILIRUBIN UA  Negative   UROBILINOGEN UA E U /dl 1 0   LEUKOCYTES UA  Large*   WBC UA /hpf 30-50*   RBC UA /hpf 1-2   BACTERIA UA /hpf Moderate*   EPITHELIAL CELLS WET PREP /hpf Occasional     Results from last 7 days   Lab Units 03/06/22  1042   INFLUENZA A PCR  Negative   INFLUENZA B PCR  Negative   RSV PCR  Negative     Results from last 7 days   Lab Units 03/06/22  1042   BLOOD CULTURE  Received in Microbiology Lab  Culture in Progress  Received in Microbiology Lab  Culture in Progress       ED Treatment:   Medication Administration from 03/06/2022 0958 to 03/06/2022 1306       Date/Time Order Dose Route Action     03/06/2022 1150 cefepime (MAXIPIME) 2 g/50 mL dextrose IVPB 2,000 mg Intravenous New Bag     03/06/2022 1226 sodium chloride 0 9 % bolus 500 mL 500 mL Intravenous New Bag        Past Medical History:   Diagnosis Date    A-fib ([place] [address] )     Anxiety     Chronic pain     [facility] with [facility]     COPD (chronic obstructive pulmonary disease) ([facility]     Dementia ([facility])     Dorsalgia, unspecified     Edema     Encephalopathy     GERD (gastroesophageal reflux disease)     Hypertension     Morbid obesity ([facility])     Muscle weakness (generalized)     Opioid dependence ([facility])     Overactive bladder     Pneumonia     Psychiatric disorder     anxiety, bipolar    Radiculopathy of thoracolumbar region     Sciatica     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 ([place] [address] )     Urinary incontinence     UTI (urinary tract infection)      Present on Admission:   Atrial fibrillation ([place] [address] )   Hypertension   Anxiety   Dementia ([place] [address] )   Frequent UTI   COPD (chronic obstructive pulmonary disease) ([facility])      Admitting Diagnosis: UTI (urinary tract infection) [N39 0]  Altered mental status [R41 82]  UTI symptoms [R39 9]  Age/Sex: [de-identified] y o  female  Admission Orders:    CV diet with fld restriction 1500ml  ECHO  Consult OT/PT      Scheduled Medications:  amLODIPine, 10 mg, Oral, Daily  apixaban, 5 mg, Oral, BID  calcium carbonate-vitamin D, 1 tablet, Oral, BID With Meals  ertapenem, 1,000 mg, Intravenous, Q24H  ferrous sulfate, 325 mg, Oral, Daily With Breakfast  furosemide, 20 mg, Intravenous, Daily  gabapentin, 600 mg, Oral, TID  latanoprost, 1 drop, Both Eyes, HS  loratadine, 10 mg, Oral, Daily  losartan, 25 mg, Oral, Daily  pantoprazole, 40 mg, Oral, Daily Before Breakfast  sertraline, 100 mg, Oral, Daily  traZODone, 150 mg, Oral, HS  vancomycin, 250 mg, Oral, Q6H [facility]      Continuous IV Infusions:     PRN Meds:  albuterol, 2 puff, Inhalation, Q4H PRN        IP CONSULT TO CASE MANAGEMENT    Network Utilization Review Department  ATTENTION: Please call with any questions or concerns to [phone] and carefully listen to the prompts so that you are directed to the right person  All voicemails are confidential   Kain Álvarez all requests for admission clinical reviews, approved or denied determinations and any other requests to dedicated fax number below belonging to the campus where the patient is receiving treatment   List of dedicated fax numbers for the Facilities:  [address] DENIALS (Administrative/Medical Necessity) [phone]   [address] (Maternity/NICU/Pediatrics) [phone]   [address]  [address] [address] [place] [address] [address] [place] [address] [phone]

## 2022-03-07 NOTE — ASSESSMENT & PLAN NOTE
Wt Readings from Last 3 Encounters:   03/07/22 122 kg (268 lb)   10/19/21 122 kg (268 lb)   09/29/21 122 kg (268 lb)   · Increased shortness of breath, elevated [id number], troponin 11>10, EKG nonischemic likely CHF exacerbation  · 6/2017 Echo ventricle was dilated with systolic function was low normal, EF 55%  · Repeat Echo stable   · CXR shows mild CHF  · Was on PO lasix 20mg daily PTA   · Was on IV lasix 20mg daily, will transition to PO lasix 40mg qdaily tomorrow    · Renal function tolerating diuresis  · Strict I/Os-Daily weight  · Salt and fluid restricted diet

## 2022-03-07 NOTE — PROGRESS NOTES
[address] [facility]  Progress Note [place] 1940, [de-identified] y o  female MRN: [id number]  Unit/Bed#: -01 Encounter: [id number]  Primary Care Provider: LUCY Mustafa   Date and time admitted to hospital: 3/6/2022  9:59 AM    * Acute encephalopathy  Assessment & Plan  · Patient with baseline mild dementia here with increased confusion with CT head no acute abnormality, electrolytes unremarkable, likely secondary to UTI  · Baseline mentation per daughter A&Ox4, currently oriented to self only   · Suspected 2/2 recurrent UTI   · CT head no acute findings  · Vit B12, TSH WNL  · Continue IV antibiotics  · Check VBG   · Minimize sedative use, close monitoring of mental status  · Pending PT/OT evaluation, patient currently lives with daughter reports difficulty caring for patient at home     Frequent UTI  Assessment & Plan  · H/o ESBL UTI here with confusion, dysuria, frequency, subjective fever and chills  · UA shows UTI  · Was on Cefepime, will switch to IV Ertapenem per prior C&S results   · Follow up blood and urine culture    Acute on chronic diastolic heart failure (HCC)  Assessment & Plan  Wt Readings from Last 3 Encounters:   03/07/22 122 kg (268 lb)   10/19/21 122 kg (268 lb)   09/29/21 122 kg (268 lb)   · Increased shortness of breath, elevated [id number], troponin 11>10, EKG nonischemic likely CHF exacerbation  · 6/2017 Echo ventricle was dilated with systolic function was low normal, EF 55%  · CXR shows mild CHF  · Not on diuretic PTA   · Continue IV lasix 20mg daily   · Strict I/Os-Daily weight  · Repeat echo: pending   · Salt and fluid restricted diet       C  difficile diarrhea  Assessment & Plan  · Saw GI as outpatient for recurrent diarrhea on 3/01   · On multiple courses of abx for recurrent UTI   · Hx of c diff 12/13 and positive PCR re-tests on 1/21 and 1/25  · Continue PTA vancomycin 250mg qid until 03/09 to prevent C diff recurrence  · Will place patient on probiotic  · Contact precaution    Dementia ([facility] [address] )  Assessment & Plan  · Mild frontal lobe dementia  · Continue home regimen zoloft and trazodone     Sleep apnea  Assessment & Plan  · Refuses CPAP  · Not on oxygen at home     [address] home Ativan p r n  COPD (chronic obstructive pulmonary disease) (HCC)  Assessment & Plan  · Albuterol p r n  Hypertension  Assessment & Plan  · BP stable   · Continue on Norvasc and losartan    Atrial fibrillation (HCC)  Assessment & Plan  · Rate controlled   · Continue Eliquis           VTE Pharmacologic Prophylaxis: VTE Score: 8 High Risk (Score >/= 5) - Pharmacological DVT Prophylaxis Ordered: apixaban (Eliquis)  Sequential Compression Devices Ordered  Patient Centered Rounds: I performed bedside rounds with nursing staff today  Discussions with Specialists or Other Care Team Provider: JO ANNRN     Education and Discussions with Family / Patient: Updated  (daughter) via phone  Time Spent for Care: 30 minutes  More than 50% of total time spent on counseling and coordination of care as described above  Current Length of Stay: 0 day(s)  Current Patient Status: Inpatient   Certification Statement: The patient will continue to require additional inpatient hospital stay due to Acute metabolic encephalopathy secondary to recurrent urinary tract infection, acute CHF exacerbation  Discharge Plan: Anticipate discharge in 48-72 hrs to Pending PT/OT eval    Code Status: Level 3 - DNAR and DNI    Subjective:   Patient was sleeping in bed, appears lethargic  Arousable to voice  Alert and oriented to self only  Follows commands  Objective:     Vitals:   Temp (24hrs), Av 6 °F (37 °C), Min:98 1 °F (36 7 °C), Max:99 °F (37 2 °C)    Temp:  [98 1 °F (36 7 °C)-99 °F (37 2 °C)] 99 °F (37 2 °C)  HR:  [60-68] 60  Resp:  [18] 18  BP: (139-141)/(58-64) 139/60  SpO2:  [84 %-93 %] 84 %  Body mass index is 43 26 kg/m²       Input and Output Summary (last 24 hours): Intake/Output Summary (Last 24 hours) at 3/7/2022 1622  Last data filed at 3/7/2022 1501  Gross per 24 hour   Intake --   Output 2800 ml   Net -2800 ml       Physical Exam:   Physical Exam  Constitutional:       General: She is not in acute distress  Appearance: She is obese  HENT:      Mouth/Throat:      Mouth: Mucous membranes are moist    Eyes:      General: No scleral icterus  Cardiovascular:      Rate and Rhythm: Normal rate and regular rhythm  Heart sounds: Normal heart sounds  Pulmonary:      Breath sounds: Normal breath sounds  Abdominal:      General: Abdomen is flat  Bowel sounds are normal       Palpations: Abdomen is soft  Tenderness: There is no abdominal tenderness  Musculoskeletal:      Right lower leg: Edema present  Left lower leg: Edema present  Skin:     General: Skin is warm  Neurological:      Mental Status: She is alert  Motor: Weakness present  Comments: Oriented to self          Additional Data:     Labs:  Results from last 7 days   Lab Units 03/06/22  1042   WBC Thousand/uL 5 77   HEMOGLOBIN g/dL 12 5   HEMATOCRIT % 39 4   PLATELETS Thousands/uL 186   NEUTROS PCT % 59   LYMPHS PCT % 28   MONOS PCT % 8   EOS PCT % 4     Results from last 7 days   Lab Units 03/07/22  0355 03/06/22  1042 03/06/22  1042   SODIUM mmol/L 141   < > 142   POTASSIUM mmol/L 4 0   < > 3 9   CHLORIDE mmol/L 104   < > 106   CO2 mmol/L 30   < > 30   BUN mg/dL 13   < > 10   CREATININE mg/dL 1 04   < > 0 91   ANION GAP mmol/L 7   < > 6   CALCIUM mg/dL 8 9   < > 9 1   ALBUMIN g/dL  --   --  3 0*   TOTAL BILIRUBIN mg/dL  --   --  0 33   ALK PHOS U/L  --   --  62   ALT U/L  --   --  19   AST U/L  --   --  19   GLUCOSE RANDOM mg/dL 109   < > 115    < > = values in this interval not displayed       Results from last 7 days   Lab Units 03/06/22  1042   INR  1 42*             Results from last 7 days   Lab Units 03/06/22  1042   LACTIC ACID mmol/L 1 6 Lines/Drains:  Invasive Devices  Report    Peripheral Intravenous Line            Peripheral IV 03/06/22 Left Antecubital 1 day          Drain            External Urinary Catheter <1 day                      Imaging: Reviewed radiology reports from this admission including: chest xray and CT head    Recent Cultures (last 7 days):   Results from last 7 days   Lab Units 03/06/22  1042   BLOOD CULTURE  Received in Microbiology Lab  Culture in Progress  Received in Microbiology Lab  Culture in Progress  Last 24 Hours Medication List:   Current Facility-Administered Medications   Medication Dose Route Frequency Provider Last Rate    albuterol  2 puff Inhalation Q4H PRN Kiarra Cabral MD      amLODIPine  10 mg Oral Daily Kiarra Cabral MD      apixaban  5 mg Oral BID Kiarra Cabral MD      calcium carbonate-vitamin D  1 tablet Oral BID With Meals Kiarra Cabral MD      ertapenem  1,000 mg Intravenous Q24H Prudence Arenas PA-C 1,000 mg (03/07/22 1212)    ferrous sulfate  325 mg Oral Daily With Breakfast Kiarra Cabral MD      furosemide  20 mg Intravenous Daily Kiarra Cabral MD      gabapentin  600 mg Oral TID Kiarra Cabral MD      latanoprost  1 drop Both Eyes HS Kiarra Cabral MD      loratadine  10 mg Oral Daily Kiarra Cabral MD      losartan  25 mg Oral Daily Kiarra Cabral MD      pantoprazole  40 mg Oral Daily Before Breakfast Kiarra Cabral MD      saccharomyces boulardii  250 mg Oral BID Prudence Arenas PA-C      sertraline  100 mg Oral Daily Kiarra Cabral MD      traZODone  150 mg Oral HS Kiarra Cabral MD      vancomycin  250 mg Oral Q6H Citlali Obrien MD          Today, Patient Was Seen By: Prudence Arenas PA-C    **Please Note: This note may have been constructed using a voice recognition system  **

## 2022-03-07 NOTE — ASSESSMENT & PLAN NOTE
· H/o ESBL UTI here with confusion, dysuria, frequency, subjective fever and chills  · UA with leukocytes, nitrites, bacteria  · Was on cefepime and switched to Ertapenem per prior C&S results which were discontinued after 2 days based on urine cx   · Urine cx showed <10,000 gram negative rods   · Blood cultures negative

## 2022-03-07 NOTE — PLAN OF CARE
Problem: PAIN - ADULT  Goal: Verbalizes/displays adequate comfort level or baseline comfort level  Description: Interventions:  - Encourage patient to monitor pain and request assistance  - Assess pain using appropriate pain scale  - Administer analgesics based on type and severity of pain and evaluate response  - Implement non-pharmacological measures as appropriate and evaluate response  - Consider cultural and social influences on pain and pain management  - Notify physician/advanced practitioner if interventions unsuccessful or patient reports new pain  Outcome: Progressing     Problem: INFECTION - ADULT  Goal: Absence or prevention of progression during hospitalization  Description: INTERVENTIONS:  - Assess and monitor for signs and symptoms of infection  - Monitor lab/diagnostic results  - Monitor all insertion sites, i e  indwelling lines, tubes, and drains  - Monitor endotracheal if appropriate and nasal secretions for changes in amount and color  - [place] appropriate cooling/warming therapies per order  - Administer medications as ordered  - Instruct and encourage patient and family to use good hand hygiene technique  - Identify and instruct in appropriate isolation precautions for identified infection/condition  Outcome: Progressing  Goal: Absence of fever/infection during neutropenic period  Description: INTERVENTIONS:  - Monitor WBC    Outcome: Progressing     Problem: SAFETY ADULT  Goal: Patient will remain free of falls  Description: INTERVENTIONS:  - Educate patient/family on patient safety including physical limitations  - Instruct patient to call for assistance with activity   - Consult OT/PT to assist with strengthening/mobility   - Keep Call bell within reach  - Keep bed low and locked with side rails adjusted as appropriate  - Keep care items and personal belongings within reach  - Initiate and maintain comfort rounds  - Make Fall Risk Sign visible to staff  - Offer Toileting every  Hours, in advance of need  - Initiate/Maintain   alarm  - Obtain necessary fall risk management equipment:     - Apply yellow socks and bracelet for high fall risk patients  - Consider moving patient to room near nurses station  Outcome: Progressing  Goal: Maintain or return to baseline ADL function  Description: INTERVENTIONS:  -  Assess patient's ability to carry out ADLs; assess patient's baseline for ADL function and identify physical deficits which impact ability to perform ADLs (bathing, care of mouth/teeth, toileting, grooming, dressing, etc )  - Assess/evaluate cause of self-care deficits   - Assess range of motion  - Assess patient's mobility; develop plan if impaired  - Assess patient's need for assistive devices and provide as appropriate  - Encourage maximum independence but intervene and supervise when necessary  - Involve family in performance of ADLs  - Assess for home care needs following discharge   - Consider OT consult to assist with ADL evaluation and planning for discharge  - Provide patient education as appropriate  Outcome: Progressing  Goal: Maintains/Returns to pre admission functional level  Description: INTERVENTIONS:  - Perform BMAT or MOVE assessment daily    - Set and communicate daily mobility goal to care team and patient/family/caregiver  - Collaborate with rehabilitation services on mobility goals if consulted  - Perform Range of Motion    times a day  - Reposition patient every    hours    - Dangle patient    times a day  - Stand patient    times a day  - Ambulate patient    times a day  - Out of bed to chair    times a day   - Out of bed for meals    times a day  - Out of bed for toileting  - Record patient progress and toleration of activity level   Outcome: Progressing     Problem: DISCHARGE PLANNING  Goal: Discharge to home or other facility with appropriate resources  Description: INTERVENTIONS:  - Identify barriers to discharge w/patient and caregiver  - Arrange for needed discharge resources and transportation as appropriate  - Identify discharge learning needs (meds, wound care, etc )  - Arrange for interpretive services to assist at discharge as needed  - Refer to Case Management Department for coordinating discharge planning if the patient needs post-hospital services based on physician/advanced practitioner order or complex needs related to functional status, cognitive ability, or social support system  Outcome: Progressing     Problem: Knowledge Deficit  Goal: Patient/family/caregiver demonstrates understanding of disease process, treatment plan, medications, and discharge instructions  Description: Complete learning assessment and assess knowledge base    Interventions:  - Provide teaching at level of understanding  - Provide teaching via preferred learning methods  Outcome: Progressing     Problem: Potential for Falls  Goal: Patient will remain free of falls  Description: INTERVENTIONS:  - Educate patient/family on patient safety including physical limitations  - Instruct patient to call for assistance with activity   - Consult OT/PT to assist with strengthening/mobility   - Keep Call bell within reach  - Keep bed low and locked with side rails adjusted as appropriate  - Keep care items and personal belongings within reach  - Initiate and maintain comfort rounds  - Make Fall Risk Sign visible to staff  - Offer Toileting every    Hours, in advance of need  - Initiate/Maintain   alarm  - Obtain necessary fall risk management equipment:     - Apply yellow socks and bracelet for high fall risk patients  - Consider moving patient to room near nurses station  Outcome: Progressing     Problem: MOBILITY - ADULT  Goal: Maintain or return to baseline ADL function  Description: INTERVENTIONS:  -  Assess patient's ability to carry out ADLs; assess patient's baseline for ADL function and identify physical deficits which impact ability to perform ADLs (bathing, care of mouth/teeth, toileting, grooming, dressing, etc )  - Assess/evaluate cause of self-care deficits   - Assess range of motion  - Assess patient's mobility; develop plan if impaired  - Assess patient's need for assistive devices and provide as appropriate  - Encourage maximum independence but intervene and supervise when necessary  - Involve family in performance of ADLs  - Assess for home care needs following discharge   - Consider OT consult to assist with ADL evaluation and planning for discharge  - Provide patient education as appropriate  Outcome: Progressing  Goal: Maintains/Returns to pre admission functional level  Description: INTERVENTIONS:  - Perform BMAT or MOVE assessment daily    - Set and communicate daily mobility goal to care team and patient/family/caregiver  - Collaborate with rehabilitation services on mobility goals if consulted  - Perform Range of Motion    times a day  - Reposition patient every    hours    - Dangle patient    times a day  - Stand patient    times a day  - Ambulate patient    times a day  - Out of bed to chair    times a day   - Out of bed for meals  times a day  - Out of bed for toileting  - Record patient progress and toleration of activity level   Outcome: Progressing     Problem: Prexisting or High Potential for Compromised Skin Integrity  Goal: Skin integrity is maintained or improved  Description: INTERVENTIONS:  - Identify patients at risk for skin breakdown  - Assess and monitor skin integrity  - Assess and monitor nutrition and hydration status  - Monitor labs   - Assess for incontinence   - Turn and reposition patient  - Assist with mobility/ambulation  - Relieve pressure over bony prominences  - Avoid friction and shearing  - Provide appropriate hygiene as needed including keeping skin clean and dry  - Evaluate need for skin moisturizer/barrier cream  - Collaborate with interdisciplinary team   - Patient/family teaching  - Consider wound care consult   Outcome: Progressing

## 2022-03-07 NOTE — ASSESSMENT & PLAN NOTE
· Patient with baseline mild dementia here with increased confusion with CT head no acute abnormality, electrolytes unremarkable, likely secondary to UTI  · Baseline mentation per daughter A&Ox4, currently oriented to self only   · Suspected 2/2 recurrent UTI   · CT head no acute findings  · Vit B12, TSH WNL  · Continue IV antibiotics  · Check VBG   · Minimize sedative use, close monitoring of mental status  · Pending PT/OT evaluation, patient currently lives with daughter reports difficulty caring for patient at home

## 2022-03-08 ENCOUNTER — PATIENT OUTREACH (OUTPATIENT)
Dept: FAMILY MEDICINE CLINIC | Facility: CLINIC | Age: 82
End: 2022-03-08

## 2022-03-08 LAB
ANION GAP SERPL CALCULATED.3IONS-SCNC: 4 MMOL/L (ref 4–13)
BASOPHILS # BLD AUTO: 0.05 THOUSANDS/ΜL (ref 0–0.1)
BASOPHILS NFR BLD AUTO: 1 % (ref 0–1)
BUN SERPL-MCNC: 12 MG/DL (ref 5–25)
CALCIUM SERPL-MCNC: 9.1 MG/DL (ref 8.3–10.1)
CHLORIDE SERPL-SCNC: 103 MMOL/L (ref 100–108)
CO2 SERPL-SCNC: 32 MMOL/L (ref 21–32)
CREAT SERPL-MCNC: 1.01 MG/DL (ref 0.6–1.3)
EOSINOPHIL # BLD AUTO: 0.31 THOUSAND/ΜL (ref 0–0.61)
EOSINOPHIL NFR BLD AUTO: 5 % (ref 0–6)
ERYTHROCYTE [DISTWIDTH] IN BLOOD BY AUTOMATED COUNT: 13.8 % (ref 11.6–15.1)
GFR SERPL CREATININE-BSD FRML MDRD: 52 ML/MIN/1.73SQ M
GLUCOSE SERPL-MCNC: 102 MG/DL (ref 65–140)
HCT VFR BLD AUTO: 37.7 % (ref 34.8–46.1)
HGB BLD-MCNC: 12.1 G/DL (ref 11.5–15.4)
IMM GRANULOCYTES # BLD AUTO: 0.03 THOUSAND/UL (ref 0–0.2)
IMM GRANULOCYTES NFR BLD AUTO: 1 % (ref 0–2)
LYMPHOCYTES # BLD AUTO: 1.88 THOUSANDS/ΜL (ref 0.6–4.47)
LYMPHOCYTES NFR BLD AUTO: 29 % (ref 14–44)
MCH RBC QN AUTO: 27.6 PG (ref 26.8–34.3)
MCHC RBC AUTO-ENTMCNC: 32.1 G/DL (ref 31.4–37.4)
MCV RBC AUTO: 86 FL (ref 82–98)
MONOCYTES # BLD AUTO: 0.62 THOUSAND/ΜL (ref 0.17–1.22)
MONOCYTES NFR BLD AUTO: 10 % (ref 4–12)
NEUTROPHILS # BLD AUTO: 3.62 THOUSANDS/ΜL (ref 1.85–7.62)
NEUTS SEG NFR BLD AUTO: 54 % (ref 43–75)
NRBC BLD AUTO-RTO: 0 /100 WBCS
PLATELET # BLD AUTO: 181 THOUSANDS/UL (ref 149–390)
PMV BLD AUTO: 10 FL (ref 8.9–12.7)
POTASSIUM SERPL-SCNC: 3.7 MMOL/L (ref 3.5–5.3)
RBC # BLD AUTO: 4.39 MILLION/UL (ref 3.81–5.12)
SODIUM SERPL-SCNC: 139 MMOL/L (ref 136–145)
WBC # BLD AUTO: 6.51 THOUSAND/UL (ref 4.31–10.16)

## 2022-03-08 PROCEDURE — 80048 BASIC METABOLIC PNL TOTAL CA: CPT | Performed by: PHYSICIAN ASSISTANT

## 2022-03-08 PROCEDURE — 85025 COMPLETE CBC W/AUTO DIFF WBC: CPT | Performed by: PHYSICIAN ASSISTANT

## 2022-03-08 PROCEDURE — 99232 SBSQ HOSP IP/OBS MODERATE 35: CPT | Performed by: PHYSICIAN ASSISTANT

## 2022-03-08 RX ADMIN — Medication 250 MG: at 09:25

## 2022-03-08 RX ADMIN — LORATADINE 10 MG: 10 TABLET ORAL at 09:24

## 2022-03-08 RX ADMIN — Medication 1 TABLET: at 09:25

## 2022-03-08 RX ADMIN — APIXABAN 5 MG: 5 TABLET, FILM COATED ORAL at 16:34

## 2022-03-08 RX ADMIN — VANCOMYCIN HYDROCHLORIDE 250 MG: 5 INJECTION, POWDER, LYOPHILIZED, FOR SOLUTION INTRAVENOUS at 05:22

## 2022-03-08 RX ADMIN — VANCOMYCIN HYDROCHLORIDE 250 MG: 5 INJECTION, POWDER, LYOPHILIZED, FOR SOLUTION INTRAVENOUS at 23:47

## 2022-03-08 RX ADMIN — GABAPENTIN 600 MG: 300 CAPSULE ORAL at 09:24

## 2022-03-08 RX ADMIN — FERROUS SULFATE TAB 325 MG (65 MG ELEMENTAL FE) 325 MG: 325 (65 FE) TAB at 09:25

## 2022-03-08 RX ADMIN — PANTOPRAZOLE SODIUM 40 MG: 40 TABLET, DELAYED RELEASE ORAL at 09:24

## 2022-03-08 RX ADMIN — VANCOMYCIN HYDROCHLORIDE 250 MG: 5 INJECTION, POWDER, LYOPHILIZED, FOR SOLUTION INTRAVENOUS at 21:11

## 2022-03-08 RX ADMIN — Medication 250 MG: at 16:34

## 2022-03-08 RX ADMIN — LATANOPROST 1 DROP: 50 SOLUTION OPHTHALMIC at 21:12

## 2022-03-08 RX ADMIN — Medication 1 TABLET: at 16:34

## 2022-03-08 RX ADMIN — APIXABAN 5 MG: 5 TABLET, FILM COATED ORAL at 09:24

## 2022-03-08 RX ADMIN — TRAZODONE HYDROCHLORIDE 150 MG: 100 TABLET ORAL at 21:12

## 2022-03-08 RX ADMIN — VANCOMYCIN HYDROCHLORIDE 250 MG: 5 INJECTION, POWDER, LYOPHILIZED, FOR SOLUTION INTRAVENOUS at 14:34

## 2022-03-08 RX ADMIN — GABAPENTIN 600 MG: 300 CAPSULE ORAL at 16:34

## 2022-03-08 RX ADMIN — GABAPENTIN 600 MG: 300 CAPSULE ORAL at 21:12

## 2022-03-08 RX ADMIN — SERTRALINE HYDROCHLORIDE 100 MG: 100 TABLET, FILM COATED ORAL at 09:39

## 2022-03-08 NOTE — CASE MANAGEMENT
Case Management Progress Note    Patient name Keena Roman  Location [address] [id number] MRN [id number]  : 1940 Date 3/8/2022       LOS (days): 1  Geometric Mean LOS (GMLOS) (days): 3 80  Days to GMLOS:2 6        OBJECTIVE:        Current admission status: Inpatient  Preferred Pharmacy:   [facility] [address] ([phone] [phone])  [address] [place] [address]  Phone: [phone] Fax: [phone]    [facility]  [phone] - [place]  [place] - [address] [place] [address]  [place]  [address] [place] [address]  Phone: [phone] Fax: [phone]    Primary Care Provider: LUCY Mclaughlin    [facility]  Secondary Insurance: [facility] NOTE:      Outpatient CM -Kendy Theodore TT this CM to inform patient is accepted at [facility]  Hector Mcclain informed she spoke with jose guadalupe Guevara who states patient cannot return home  CM to follow up with [facility]

## 2022-03-08 NOTE — DISCHARGE SUMMARY
[facility]  Discharge- Shelia Hair 1940, [de-identified] y o  female MRN: [id number]  Unit/Bed#: -01 Encounter: [id number]  Primary Care Provider: LUCY Lopez   Date and time admitted to hospital: 3/6/2022  9:59 AM    * Acute encephalopathy  Assessment & Plan  · Patient with baseline mild dementia here with increased confusion with CT head no acute abnormality, electrolytes unremarkable, likely secondary to UTI  · Baseline mentation per daughter A&Ox4, appears to have improved at this time   · Suspected 2/2 recurrent UTI   · CT head no acute findings  · Vit B12, TSH WNL  · Continue IV antibiotics - Ertapenem   · Minimize sedative use, close monitoring of mental status  · Pending PT/OT evaluation  · Patient currently lives with daughter reports difficulty caring for patient at home   · Follow up with recommendations     C  difficile diarrhea  Assessment & Plan  · Saw GI as outpatient for recurrent diarrhea on 3/01   · On multiple courses of abx for recurrent UTI   · Hx of c diff 12/13 and positive PCR re-tests on 1/21 and 1/25  · Continue PTA vancomycin 250mg qid until 03/09 to prevent C diff recurrence  · Will place patient on probiotic  · Contact precaution    Frequent UTI  Assessment & Plan  · H/o ESBL UTI here with confusion, dysuria, frequency, subjective fever and chills  · UA shows UTI  · Was on Cefepime  · Blood cultures negative   · Continue IV Ertapenem per prior C&S results   · Follow up urine culture    Dementia (HCC)  Assessment & Plan  · Mild frontal lobe dementia  · Continue home regimen zoloft and trazodone     Acute on chronic diastolic heart failure (HCC)  Assessment & Plan  Wt Readings from Last 3 Encounters:   03/07/22 122 kg (268 lb)   10/19/21 122 kg (268 lb)   09/29/21 122 kg (268 lb)   · Increased shortness of breath, elevated [id number], troponin 11>10, EKG nonischemic likely CHF exacerbation  · 6/2017 Echo ventricle was dilated with systolic function was low normal, EF 55%  · Repeat Echo stable   · CXR shows mild CHF  · Not on diuretic PTA   · Continue IV lasix 20mg daily   · Renal function tolerating diuresis  · Could consider changing to Lasix 40 mg PO QD outpatient   · Strict I/Os-Daily weight  · Salt and fluid restricted diet       Atrial fibrillation (HCC)  Assessment & Plan  · Rate controlled   · Continue Eliquis       Hypertension  Assessment & Plan  · BP stable   · Continue on Norvasc and losartan  · Monitor on diuresis     COPD (chronic obstructive pulmonary disease) (HCC)  Assessment & Plan  · Albuterol p r n  Anxiety  Assessment & Plan  · No signs of exacerbation   · Continue home Ativan PRN    Sleep apnea  Assessment & Plan  · Refuses CPAP  · Not on oxygen at home         VTE Pharmacologic Prophylaxis: VTE Score: 8 High Risk (Score >/= 5) - Pharmacological DVT Prophylaxis Ordered: apixaban (Eliquis)  Sequential Compression Devices Ordered  Patient Centered Rounds: I performed bedside rounds with nursing staff today  Discussions with Specialists or Other Care Team Provider: Discussed with RN, CM    Education and Discussions with Family / Patient: Updated  (friend) via phone  Time Spent for Care: 30 minutes  More than 50% of total time spent on counseling and coordination of care as described above  Current Length of Stay: 1 day(s)  Current Patient Status: Inpatient   Certification Statement: The patient will continue to require additional inpatient hospital stay due to on going IV antibiotics pending UCx  Discharge Plan: Anticipate discharge in 24-48 hrs to discharge location to be determined pending rehab evaluations  Code Status: Level 3 - DNAR and DNI    Subjective:   Patient states that she is feeling great today  Reports that she was "acting crazy" at home which she acknowledges happens at times  Denies fevers or chills  Denies other complaints       Objective:     Vitals:   Temp (24hrs), Av 7 °F (37 1 °C), Min:97 8 °F (36 6 °C), Max:99 2 °F (37 3 °C)    Temp:  [97 8 °F (36 6 °C)-99 2 °F (37 3 °C)] 97 8 °F (36 6 °C)  HR:  [60-74] 65  Resp:  [17] 17  BP: (112-139)/(58-60) 112/58  SpO2:  [84 %-100 %] 100 %  Body mass index is 43 26 kg/m²  Input and Output Summary (last 24 hours): Intake/Output Summary (Last 24 hours) at 3/8/2022 1204  Last data filed at 3/8/2022 0900  Gross per 24 hour   Intake 540 ml   Output 1000 ml   Net -460 ml       Physical Exam:   Physical Exam  Constitutional:       General: She is not in acute distress  Appearance: Normal appearance  She is obese  She is not ill-appearing or diaphoretic  HENT:      Head: Normocephalic and atraumatic  Mouth/Throat:      Mouth: Mucous membranes are moist    Eyes:      General: No scleral icterus  Pupils: Pupils are equal, round, and reactive to light  Cardiovascular:      Rate and Rhythm: Normal rate  Rhythm irregularly irregular  Pulses: Normal pulses  Heart sounds: Normal heart sounds, S1 normal and S2 normal  No murmur heard  No systolic murmur is present  No diastolic murmur is present  No gallop  No S3 or S4 sounds  Pulmonary:      Effort: Pulmonary effort is normal  No accessory muscle usage or respiratory distress  Breath sounds: Normal breath sounds  No stridor  No decreased breath sounds, wheezing, rhonchi or rales  Chest:      Chest wall: No tenderness  Abdominal:      General: Bowel sounds are normal  There is no distension  Palpations: Abdomen is soft  Tenderness: There is no abdominal tenderness  There is no guarding  Musculoskeletal:      Right lower leg: No edema  Left lower leg: No edema  Skin:     General: Skin is warm and dry  Coloration: Skin is not jaundiced  Neurological:      General: No focal deficit present  Mental Status: She is alert  Mental status is at baseline  Motor: No tremor or seizure activity  Psychiatric:         Behavior: Behavior is cooperative  Additional Data:     Labs:  Results from last 7 days   Lab Units 03/08/22  0508   WBC Thousand/uL 6 51   HEMOGLOBIN g/dL 12 1   HEMATOCRIT % 37 7   PLATELETS Thousands/uL 181   NEUTROS PCT % 54   LYMPHS PCT % 29   MONOS PCT % 10   EOS PCT % 5     Results from last 7 days   Lab Units 03/08/22  0508 03/07/22  0355 03/06/22  1042   SODIUM mmol/L 139   < > 142   POTASSIUM mmol/L 3 7   < > 3 9   CHLORIDE mmol/L 103   < > 106   CO2 mmol/L 32   < > 30   BUN mg/dL 12   < > 10   CREATININE mg/dL 1 01   < > 0 91   ANION GAP mmol/L 4   < > 6   CALCIUM mg/dL 9 1   < > 9 1   ALBUMIN g/dL  --   --  3 0*   TOTAL BILIRUBIN mg/dL  --   --  0 33   ALK PHOS U/L  --   --  62   ALT U/L  --   --  19   AST U/L  --   --  19   GLUCOSE RANDOM mg/dL 102   < > 115    < > = values in this interval not displayed  Results from last 7 days   Lab Units 03/06/22  1042   INR  1 42*             Results from last 7 days   Lab Units 03/06/22  1042   LACTIC ACID mmol/L 1 6       Lines/Drains:  Invasive Devices  Report    Peripheral Intravenous Line            Peripheral IV 03/06/22 Left Antecubital 2 days          Drain            External Urinary Catheter 1 day                      Imaging: No pertinent imaging reviewed  Recent Cultures (last 7 days):   Results from last 7 days   Lab Units 03/06/22  1149 03/06/22  1042   BLOOD CULTURE   --  No Growth at 24 hrs  No Growth at 24 hrs     URINE CULTURE  <10,000 cfu/ml Gram Negative Jonh Enteric Like*  --        Last 24 Hours Medication List:   Current Facility-Administered Medications   Medication Dose Route Frequency Provider Last Rate    albuterol  2 puff Inhalation Q4H PRN Michelle Benavidez MD      amLODIPine  10 mg Oral Daily Michelle Benavidez MD      apixaban  5 mg Oral BID Michelle Benavidez MD      calcium carbonate-vitamin D  1 tablet Oral BID With Meals Michelle Benavidez MD      ertapenem  1,000 mg Intravenous Q24H Jovan Feathers, PA-C 1,000 mg (03/07/22 1212)    ferrous sulfate  325 mg Oral Daily With Breakfast Tiffanie Alvarez MD      furosemide  20 mg Intravenous Daily Tiffanie Alvarez MD      gabapentin  600 mg Oral TID Tiffanie Alvarez MD      latanoprost  1 drop Both Eyes HS Tiffanie Alvarez MD      loratadine  10 mg Oral Daily Tiffanie Alvarez MD      losartan  25 mg Oral Daily Tiffanie Alvarez MD      pantoprazole  40 mg Oral Daily Before Breakfast Tiffanie Alvarez MD      saccharomyces boulardii  250 mg Oral BID Shazia Mims PA-C      sertraline  100 mg Oral Daily Tiffanie Alvarez MD      traZODone  150 mg Oral HS Tiffanie Alvarez MD      vancomycin  250 mg Oral Q6H Sandra Velasquez MD          Today, Patient Was Seen By: Merrick Cerda PA-C    **Please Note: This note may have been constructed using a voice recognition system  **

## 2022-03-08 NOTE — PLAN OF CARE
Problem: PAIN - ADULT  Goal: Verbalizes/displays adequate comfort level or baseline comfort level  Description: Interventions:  - Encourage patient to monitor pain and request assistance  - Assess pain using appropriate pain scale  - Administer analgesics based on type and severity of pain and evaluate response  - Implement non-pharmacological measures as appropriate and evaluate response  - Consider cultural and social influences on pain and pain management  - Notify physician/advanced practitioner if interventions unsuccessful or patient reports new pain  Outcome: Progressing     Problem: INFECTION - ADULT  Goal: Absence or prevention of progression during hospitalization  Description: INTERVENTIONS:  - Assess and monitor for signs and symptoms of infection  - Monitor lab/diagnostic results  - Monitor all insertion sites, i e  indwelling lines, tubes, and drains  - Monitor endotracheal if appropriate and nasal secretions for changes in amount and color  - [place] appropriate cooling/warming therapies per order  - Administer medications as ordered  - Instruct and encourage patient and family to use good hand hygiene technique  - Identify and instruct in appropriate isolation precautions for identified infection/condition  Outcome: Progressing  Goal: Absence of fever/infection during neutropenic period  Description: INTERVENTIONS:  - Monitor WBC    Outcome: Progressing

## 2022-03-08 NOTE — PLAN OF CARE
Problem: PAIN - ADULT  Goal: Verbalizes/displays adequate comfort level or baseline comfort level  Description: Interventions:  - Encourage patient to monitor pain and request assistance  - Assess pain using appropriate pain scale  - Administer analgesics based on type and severity of pain and evaluate response  - Implement non-pharmacological measures as appropriate and evaluate response  - Consider cultural and social influences on pain and pain management  - Notify physician/advanced practitioner if interventions unsuccessful or patient reports new pain  Outcome: Progressing     Problem: INFECTION - ADULT  Goal: Absence or prevention of progression during hospitalization  Description: INTERVENTIONS:  - Assess and monitor for signs and symptoms of infection  - Monitor lab/diagnostic results  - Monitor all insertion sites, i e  indwelling lines, tubes, and drains  - Monitor endotracheal if appropriate and nasal secretions for changes in amount and color  - [place] appropriate cooling/warming therapies per order  - Administer medications as ordered  - Instruct and encourage patient and family to use good hand hygiene technique  - Identify and instruct in appropriate isolation precautions for identified infection/condition  Outcome: Progressing  Goal: Absence of fever/infection during neutropenic period  Description: INTERVENTIONS:  - Monitor WBC    Outcome: Progressing     Problem: SAFETY ADULT  Goal: Patient will remain free of falls  Description: INTERVENTIONS:  - Educate patient/family on patient safety including physical limitations  - Instruct patient to call for assistance with activity   - Consult OT/PT to assist with strengthening/mobility   - Keep Call bell within reach  - Keep bed low and locked with side rails adjusted as appropriate  - Keep care items and personal belongings within reach  - Initiate and maintain comfort rounds  - Make Fall Risk Sign visible to staff  - Offer Toileting every  Hours, in advance of need  - Initiate/Maintain alarm  - Obtain necessary fall risk management equipment:   - Apply yellow socks and bracelet for high fall risk patients  - Consider moving patient to room near nurses station  Outcome: Progressing  Goal: Maintain or return to baseline ADL function  Description: INTERVENTIONS:  -  Assess patient's ability to carry out ADLs; assess patient's baseline for ADL function and identify physical deficits which impact ability to perform ADLs (bathing, care of mouth/teeth, toileting, grooming, dressing, etc )  - Assess/evaluate cause of self-care deficits   - Assess range of motion  - Assess patient's mobility; develop plan if impaired  - Assess patient's need for assistive devices and provide as appropriate  - Encourage maximum independence but intervene and supervise when necessary  - Involve family in performance of ADLs  - Assess for home care needs following discharge   - Consider OT consult to assist with ADL evaluation and planning for discharge  - Provide patient education as appropriate  Outcome: Progressing  Goal: Maintains/Returns to pre admission functional level  Description: INTERVENTIONS:  - Perform BMAT or MOVE assessment daily    - Set and communicate daily mobility goal to care team and patient/family/caregiver  - Collaborate with rehabilitation services on mobility goals if consulted  - Perform Range of Motion  times a day  - Reposition patient every  hours    - Dangle patient  times a day  - Stand patient  times a day  - Ambulate patient  times a day  - Out of bed to chair  times a day   - Out of bed for meals  times a day  - Out of bed for toileting  - Record patient progress and toleration of activity level   Outcome: Progressing     Problem: DISCHARGE PLANNING  Goal: Discharge to home or other facility with appropriate resources  Description: INTERVENTIONS:  - Identify barriers to discharge w/patient and caregiver  - Arrange for needed discharge resources and transportation as appropriate  - Identify discharge learning needs (meds, wound care, etc )  - Arrange for interpretive services to assist at discharge as needed  - Refer to Case Management Department for coordinating discharge planning if the patient needs post-hospital services based on physician/advanced practitioner order or complex needs related to functional status, cognitive ability, or social support system  Outcome: Progressing     Problem: Knowledge Deficit  Goal: Patient/family/caregiver demonstrates understanding of disease process, treatment plan, medications, and discharge instructions  Description: Complete learning assessment and assess knowledge base    Interventions:  - Provide teaching at level of understanding  - Provide teaching via preferred learning methods  Outcome: Progressing     Problem: Potential for Falls  Goal: Patient will remain free of falls  Description: INTERVENTIONS:  - Educate patient/family on patient safety including physical limitations  - Instruct patient to call for assistance with activity   - Consult OT/PT to assist with strengthening/mobility   - Keep Call bell within reach  - Keep bed low and locked with side rails adjusted as appropriate  - Keep care items and personal belongings within reach  - Initiate and maintain comfort rounds  - Make Fall Risk Sign visible to staff  - Offer Toileting every  Hours, in advance of need  - Initiate/Maintain alarm  - Obtain necessary fall risk management equipment:   - Apply yellow socks and bracelet for high fall risk patients  - Consider moving patient to room near nurses station  Outcome: Progressing     Problem: MOBILITY - ADULT  Goal: Maintain or return to baseline ADL function  Description: INTERVENTIONS:  -  Assess patient's ability to carry out ADLs; assess patient's baseline for ADL function and identify physical deficits which impact ability to perform ADLs (bathing, care of mouth/teeth, toileting, grooming, dressing, etc )  - Assess/evaluate cause of self-care deficits   - Assess range of motion  - Assess patient's mobility; develop plan if impaired  - Assess patient's need for assistive devices and provide as appropriate  - Encourage maximum independence but intervene and supervise when necessary  - Involve family in performance of ADLs  - Assess for home care needs following discharge   - Consider OT consult to assist with ADL evaluation and planning for discharge  - Provide patient education as appropriate  Outcome: Progressing  Goal: Maintains/Returns to pre admission functional level  Description: INTERVENTIONS:  - Perform BMAT or MOVE assessment daily    - Set and communicate daily mobility goal to care team and patient/family/caregiver  - Collaborate with rehabilitation services on mobility goals if consulted  - Perform Range of Motion  times a day  - Reposition patient every  hours    - Dangle patient  times a day  - Stand patient  times a day  - Ambulate patient  times a day  - Out of bed to chair  times a day   - Out of bed for meals times a day  - Out of bed for toileting  - Record patient progress and toleration of activity level   Outcome: Progressing     Problem: Prexisting or High Potential for Compromised Skin Integrity  Goal: Skin integrity is maintained or improved  Description: INTERVENTIONS:  - Identify patients at risk for skin breakdown  - Assess and monitor skin integrity  - Assess and monitor nutrition and hydration status  - Monitor labs   - Assess for incontinence   - Turn and reposition patient  - Assist with mobility/ambulation  - Relieve pressure over bony prominences  - Avoid friction and shearing  - Provide appropriate hygiene as needed including keeping skin clean and dry  - Evaluate need for skin moisturizer/barrier cream  - Collaborate with interdisciplinary team   - Patient/family teaching  - Consider wound care consult   Outcome: Progressing     Problem: Nutrition/Hydration-ADULT  Goal: Nutrient/Hydration intake appropriate for improving, restoring or maintaining nutritional needs  Description: Monitor and assess patient's nutrition/hydration status for malnutrition  Collaborate with interdisciplinary team and initiate plan and interventions as ordered  Monitor patient's weight and dietary intake as ordered or per policy  Utilize nutrition screening tool and intervene as necessary  Determine patient's food preferences and provide high-protein, high-caloric foods as appropriate       INTERVENTIONS:  - Monitor oral intake, urinary output, labs, and treatment plans  - Assess nutrition and hydration status and recommend course of action  - Evaluate amount of meals eaten  - Assist patient with eating if necessary   - Allow adequate time for meals  - Recommend/ encourage appropriate diets, oral nutritional supplements, and vitamin/mineral supplements  - Order, calculate, and assess calorie counts as needed  - Assess need for intravenous fluids  - Provide nutrition/hydration education as appropriate  - Include patient/family/caregiver in decisions related to nutrition  Outcome: Progressing

## 2022-03-08 NOTE — PROGRESS NOTES
[address] [facility]  Progress Alfonso Donovan 1940, [de-identified] y o  female MRN: [id number]  Unit/Bed#: -01 Encounter: [id number]  Primary Care Provider: [id number], CRNP   Date and time admitted to hospital: 3/6/2022  9:59 AM  Addendum: UCx only showing <10,000 cfu  Not true UTI  Will monitor off antibiotics       * Acute encephalopathy  Assessment & Plan  · Patient with baseline mild dementia here with increased confusion with CT head no acute abnormality, electrolytes unremarkable, likely secondary to UTI  · Baseline mentation per daughter A&Ox4, appears to have improved at this time   · Suspected 2/2 recurrent UTI   · CT head no acute findings  · Vit B12, TSH WNL  · Continue IV antibiotics - Ertapenem   · Minimize sedative use, close monitoring of mental status  · Pending PT/OT evaluation  · Patient currently lives with daughter reports difficulty caring for patient at home   · Follow up with recommendations     C  difficile diarrhea  Assessment & Plan  · Saw GI as outpatient for recurrent diarrhea on 3/01   · On multiple courses of abx for recurrent UTI   · Hx of c diff 12/13 and positive PCR re-tests on 1/21 and 1/25  · Continue PTA vancomycin 250mg qid until 03/09 to prevent C diff recurrence  · Will place patient on probiotic  · Contact precaution    Frequent UTI  Assessment & Plan  · H/o ESBL UTI here with confusion, dysuria, frequency, subjective fever and chills  · UA shows UTI  · Was on Cefepime  · Blood cultures negative   · Continue IV Ertapenem per prior C&S results   · Follow up urine culture    Dementia (HCC)  Assessment & Plan  · Mild frontal lobe dementia  · Continue home regimen zoloft and trazodone     Acute on chronic diastolic heart failure (HCC)  Assessment & Plan  Wt Readings from Last 3 Encounters:   03/07/22 122 kg (268 lb)   10/19/21 122 kg (268 lb)   09/29/21 122 kg (268 lb)   · Increased shortness of breath, elevated [id number], troponin 11>10, EKG nonischemic likely CHF exacerbation  · 6/2017 Echo ventricle was dilated with systolic function was low normal, EF 55%  · Repeat Echo stable   · CXR shows mild CHF  · Not on diuretic PTA   · Continue IV lasix 20mg daily   · Renal function tolerating diuresis  · Could consider changing to Lasix 40 mg PO QD outpatient   · Strict I/Os-Daily weight  · Salt and fluid restricted diet       Atrial fibrillation (HCC)  Assessment & Plan  · Rate controlled   · Continue Eliquis       Hypertension  Assessment & Plan  · BP stable   · Continue on Norvasc and losartan  · Monitor on diuresis     COPD (chronic obstructive pulmonary disease) (HCC)  Assessment & Plan  · Albuterol p r n  Anxiety  Assessment & Plan  · No signs of exacerbation   · Continue home Ativan PRN    Sleep apnea  Assessment & Plan  · Refuses CPAP  · Not on oxygen at home         VTE Pharmacologic Prophylaxis: VTE Score: 8 High Risk (Score >/= 5) - Pharmacological DVT Prophylaxis Ordered: apixaban (Eliquis)  Sequential Compression Devices Ordered  Patient Centered Rounds: I performed bedside rounds with nursing staff today  Discussions with Specialists or Other Care Team Provider: Discussed with RN, CM    Education and Discussions with Family / Patient: Updated  (friend) via phone  Time Spent for Care: 30 minutes  More than 50% of total time spent on counseling and coordination of care as described above  Current Length of Stay: 1 day(s)  Current Patient Status: Inpatient   Certification Statement: The patient will continue to require additional inpatient hospital stay due to on going IV antibiotics pending UCx  Discharge Plan: Anticipate discharge in 24-48 hrs to discharge location to be determined pending rehab evaluations  Code Status: Level 3 - DNAR and DNI    Subjective:   Patient states that she is feeling great today  Reports that she was "acting crazy" at home which she acknowledges happens at times  Denies fevers or chills   Denies other complaints  Objective:     Vitals:   Temp (24hrs), Av 7 °F (37 1 °C), Min:97 8 °F (36 6 °C), Max:99 2 °F (37 3 °C)    Temp:  [97 8 °F (36 6 °C)-99 2 °F (37 3 °C)] 97 8 °F (36 6 °C)  HR:  [60-74] 65  Resp:  [17] 17  BP: (112-139)/(58-60) 112/58  SpO2:  [84 %-100 %] 100 %  Body mass index is 43 26 kg/m²  Input and Output Summary (last 24 hours): Intake/Output Summary (Last 24 hours) at 3/8/2022 1204  Last data filed at 3/8/2022 0900  Gross per 24 hour   Intake 540 ml   Output 1000 ml   Net -460 ml       Physical Exam:   Physical Exam  Constitutional:       General: She is not in acute distress  Appearance: Normal appearance  She is obese  She is not ill-appearing or diaphoretic  HENT:      Head: Normocephalic and atraumatic  Mouth/Throat:      Mouth: Mucous membranes are moist    Eyes:      General: No scleral icterus  Pupils: Pupils are equal, round, and reactive to light  Cardiovascular:      Rate and Rhythm: Normal rate  Rhythm irregularly irregular  Pulses: Normal pulses  Heart sounds: Normal heart sounds, S1 normal and S2 normal  No murmur heard  No systolic murmur is present  No diastolic murmur is present  No gallop  No S3 or S4 sounds  Pulmonary:      Effort: Pulmonary effort is normal  No accessory muscle usage or respiratory distress  Breath sounds: Normal breath sounds  No stridor  No decreased breath sounds, wheezing, rhonchi or rales  Chest:      Chest wall: No tenderness  Abdominal:      General: Bowel sounds are normal  There is no distension  Palpations: Abdomen is soft  Tenderness: There is no abdominal tenderness  There is no guarding  Musculoskeletal:      Right lower leg: No edema  Left lower leg: No edema  Skin:     General: Skin is warm and dry  Coloration: Skin is not jaundiced  Neurological:      General: No focal deficit present  Mental Status: She is alert  Mental status is at baseline        Motor: No tremor or seizure activity  Psychiatric:         Behavior: Behavior is cooperative  Additional Data:     Labs:  Results from last 7 days   Lab Units 03/08/22  0508   WBC Thousand/uL 6 51   HEMOGLOBIN g/dL 12 1   HEMATOCRIT % 37 7   PLATELETS Thousands/uL 181   NEUTROS PCT % 54   LYMPHS PCT % 29   MONOS PCT % 10   EOS PCT % 5     Results from last 7 days   Lab Units 03/08/22  0508 03/07/22  0355 03/06/22  1042   SODIUM mmol/L 139   < > 142   POTASSIUM mmol/L 3 7   < > 3 9   CHLORIDE mmol/L 103   < > 106   CO2 mmol/L 32   < > 30   BUN mg/dL 12   < > 10   CREATININE mg/dL 1 01   < > 0 91   ANION GAP mmol/L 4   < > 6   CALCIUM mg/dL 9 1   < > 9 1   ALBUMIN g/dL  --   --  3 0*   TOTAL BILIRUBIN mg/dL  --   --  0 33   ALK PHOS U/L  --   --  62   ALT U/L  --   --  19   AST U/L  --   --  19   GLUCOSE RANDOM mg/dL 102   < > 115    < > = values in this interval not displayed  Results from last 7 days   Lab Units 03/06/22  1042   INR  1 42*             Results from last 7 days   Lab Units 03/06/22  1042   LACTIC ACID mmol/L 1 6       Lines/Drains:  Invasive Devices  Report    Peripheral Intravenous Line            Peripheral IV 03/06/22 Left Antecubital 2 days          Drain            External Urinary Catheter 1 day                      Imaging: No pertinent imaging reviewed  Recent Cultures (last 7 days):   Results from last 7 days   Lab Units 03/06/22  1149 03/06/22  1042   BLOOD CULTURE   --  No Growth at 24 hrs  No Growth at 24 hrs     URINE CULTURE  <10,000 cfu/ml Gram Negative Jonh Enteric Like*  --        Last 24 Hours Medication List:   Current Facility-Administered Medications   Medication Dose Route Frequency Provider Last Rate    albuterol  2 puff Inhalation Q4H PRN Jonny Reyes MD      amLODIPine  10 mg Oral Daily Jonny Reyes MD      apixaban  5 mg Oral BID Jonny Reyes MD      calcium carbonate-vitamin D  1 tablet Oral BID With Meals Jonny Reyes MD      ertapenem  1,000 mg Intravenous Q24H Ruth Ann Blackwood PA-C 1,000 mg (03/07/22 1212)    ferrous sulfate  325 mg Oral Daily With Breakfast Dion Lewis MD      furosemide  20 mg Intravenous Daily Dion Lewis MD      gabapentin  600 mg Oral TID Dion Lewis MD      latanoprost  1 drop Both Eyes HS Dion Lewis MD      loratadine  10 mg Oral Daily Dion Lewis MD      losartan  25 mg Oral Daily Dion Lewis MD      pantoprazole  40 mg Oral Daily Before Breakfast Dion Lewis MD      saccharomyces boulardii  250 mg Oral BID Ruth Ann Blackwood PA-C      sertraline  100 mg Oral Daily Dion Lewis MD      traZODone  150 mg Oral HS Dion Lewis MD      vancomycin  250 mg Oral Q6H Ciro Maynard MD          Today, Patient Was Seen By: Daina Lu PA-C    **Please Note: This note may have been constructed using a voice recognition system  **

## 2022-03-08 NOTE — CASE MANAGEMENT
Case Management Discharge Planning Note    Patient name Render Harris  Location [address] [id number] MRN [id number]  : 1940 Date 3/8/2022       Current Admission Date: 3/6/2022  Current Admission Diagnosis:Acute encephalopathy   Patient Active Problem List    Diagnosis Date Noted    Sleep apnea 2022    Acute on chronic diastolic heart failure ([facility] ) 2022    C  difficile diarrhea 2022    Anxiety 2021    Fungal infection of the groin 2021    Dementia ([facility] ) 03/10/2021    Generalized weakness 03/10/2021    CHF (congestive heart failure) ([facility] ) 2020    Pacemaker 2020    Frequent UTI 2020    Bipolar affective disorder, currently manic, moderate ([place] [address] ) 2018    Chronic pain 2018    Physical deconditioning 2018    Moderate episode of recurrent major depressive disorder ([place] [address] ) 2018    Overactive bladder 06/10/2017    COPD (chronic obstructive pulmonary disease) ([facility] ) 2016    Acute encephalopathy 2016    Morbid obesity due to excess calories ([place] [address] ) 2016    Atrial fibrillation ([place] [address] ) 2016    Hypertension 2016    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    PAD (peripheral artery disease) ([place] [address] ) 2016      LOS (days): 1  Geometric Mean LOS (GMLOS) (days): 3 80  Days to GMLOS:2 5     OBJECTIVE:  Risk of Unplanned Readmission Score: 17         Current admission status: Inpatient   Preferred Pharmacy:   [address] [place], [place] - [facility] R R 1 ([phone] [id number] R 1 [phone] [id number])  [facility]  Phone: [phone] Fax: [phone]    [phone] - [place]  [place] - [address] [facility] [phone]  [place] [address]  Phone: [phone] Fax: [phone]    Primary Care Provider: LUCY Michaels    Primary Insurance: [facility]  Secondary Insurance: [address] DETAILS:    Discharge planning discussed with[de-identified] Navneet  Freedom of Choice: Yes  Comments - Freedom of Choice: patient is vaccinated with the first two vaccines  Navneet will provide the Vaccination card  MatthewjeanSalvatoreyohana Colette would like patient to go to [facility] and be LTC  Yesi Garcia have accepted  CM contacted family/caregiver?: Yes  Were Treatment Team discharge recommendations reviewed with patient/caregiver?: Yes  Did patient/caregiver verbalize understanding of patient care needs?: Yes  Were patient/caregiver advised of the risks associated with not following Treatment Team discharge recommendations?: Yes    Contacts  Patient Contacts: Navneet  Relationship to Patient[de-identified] Family  Contact Method: Phone  Phone Number: [phone]  Reason/Outcome: Discharge Planning,Emergency Contact,Continuity of [address] [place] [address]         Is the patient interested in [address] at discharge?: No    DME Referral Provided  Referral made for DME?: No    Other Referral/Resources/Interventions Provided:  Interventions: Short Term Rehab  Referral Comments: Darren Nicholas  AUTH TO BE INITIATE ONCE THERAPY NOTES IN   Would you like to participate in our [address] service program?  : No - Declined    Treatment Team Recommendation: Home with [facility]  Discharge Destination Plan[de-identified] Short Term Rehab (Demetrio Dixon)  Transport at Discharge : \[facility]\"" Ambulance  Dispatcher Contacted:  No

## 2022-03-08 NOTE — PROGRESS NOTES
Chart reviewed and patient is presently at [place] [address] note indicates that patient is refusing STR  Telephone call placed to patient's daughter, Marianna Richardson to discuss  Marianna Richardson informed me that she is unable to take patient back home and wishes to place patient in a SNF upon discharge  Patient was previously accepted to [facility] and Mariannaamauri Richardson is in agreement with patient going there if a bed is available  I told Marianna Debra that I will inform patient's Care Manager, Jacek Momin of her wishes  Tiger text message sent to [facility] and I informed her of above  Jacek Momin acknowledges that she received my message  I am closing this socially complex episode at this time

## 2022-03-09 ENCOUNTER — TELEPHONE (OUTPATIENT)
Dept: FAMILY MEDICINE CLINIC | Facility: CLINIC | Age: 82
End: 2022-03-09

## 2022-03-09 ENCOUNTER — TELEPHONE (OUTPATIENT)
Dept: OTHER | Facility: OTHER | Age: 82
End: 2022-03-09

## 2022-03-09 VITALS
TEMPERATURE: 98.1 F | OXYGEN SATURATION: 90 % | HEIGHT: 66 IN | BODY MASS INDEX: 43.07 KG/M2 | WEIGHT: 268 LBS | SYSTOLIC BLOOD PRESSURE: 126 MMHG | HEART RATE: 79 BPM | RESPIRATION RATE: 17 BRPM | DIASTOLIC BLOOD PRESSURE: 70 MMHG

## 2022-03-09 LAB
ANION GAP SERPL CALCULATED.3IONS-SCNC: 8 MMOL/L (ref 4–13)
BASOPHILS # BLD AUTO: 0.05 THOUSANDS/ΜL (ref 0–0.1)
BASOPHILS NFR BLD AUTO: 1 % (ref 0–1)
BUN SERPL-MCNC: 12 MG/DL (ref 5–25)
CALCIUM SERPL-MCNC: 9.2 MG/DL (ref 8.3–10.1)
CHLORIDE SERPL-SCNC: 103 MMOL/L (ref 100–108)
CO2 SERPL-SCNC: 26 MMOL/L (ref 21–32)
CREAT SERPL-MCNC: 0.93 MG/DL (ref 0.6–1.3)
EOSINOPHIL # BLD AUTO: 0.22 THOUSAND/ΜL (ref 0–0.61)
EOSINOPHIL NFR BLD AUTO: 3 % (ref 0–6)
ERYTHROCYTE [DISTWIDTH] IN BLOOD BY AUTOMATED COUNT: 13.8 % (ref 11.6–15.1)
GFR SERPL CREATININE-BSD FRML MDRD: 58 ML/MIN/1.73SQ M
GLUCOSE SERPL-MCNC: 107 MG/DL (ref 65–140)
HCT VFR BLD AUTO: 38.8 % (ref 34.8–46.1)
HGB BLD-MCNC: 12.8 G/DL (ref 11.5–15.4)
IMM GRANULOCYTES # BLD AUTO: 0.03 THOUSAND/UL (ref 0–0.2)
IMM GRANULOCYTES NFR BLD AUTO: 0 % (ref 0–2)
LYMPHOCYTES # BLD AUTO: 2.15 THOUSANDS/ΜL (ref 0.6–4.47)
LYMPHOCYTES NFR BLD AUTO: 31 % (ref 14–44)
MCH RBC QN AUTO: 28.1 PG (ref 26.8–34.3)
MCHC RBC AUTO-ENTMCNC: 33 G/DL (ref 31.4–37.4)
MCV RBC AUTO: 85 FL (ref 82–98)
MONOCYTES # BLD AUTO: 0.64 THOUSAND/ΜL (ref 0.17–1.22)
MONOCYTES NFR BLD AUTO: 9 % (ref 4–12)
NEUTROPHILS # BLD AUTO: 3.76 THOUSANDS/ΜL (ref 1.85–7.62)
NEUTS SEG NFR BLD AUTO: 56 % (ref 43–75)
NRBC BLD AUTO-RTO: 0 /100 WBCS
PLATELET # BLD AUTO: 158 THOUSANDS/UL (ref 149–390)
PMV BLD AUTO: 9.6 FL (ref 8.9–12.7)
POTASSIUM SERPL-SCNC: 4.3 MMOL/L (ref 3.5–5.3)
RBC # BLD AUTO: 4.56 MILLION/UL (ref 3.81–5.12)
SODIUM SERPL-SCNC: 137 MMOL/L (ref 136–145)
WBC # BLD AUTO: 6.85 THOUSAND/UL (ref 4.31–10.16)

## 2022-03-09 PROCEDURE — RECHECK: Performed by: PHYSICIAN ASSISTANT

## 2022-03-09 PROCEDURE — 94664 DEMO&/EVAL PT USE INHALER: CPT

## 2022-03-09 PROCEDURE — 80048 BASIC METABOLIC PNL TOTAL CA: CPT | Performed by: PHYSICIAN ASSISTANT

## 2022-03-09 PROCEDURE — 97167 OT EVAL HIGH COMPLEX 60 MIN: CPT

## 2022-03-09 PROCEDURE — 85025 COMPLETE CBC W/AUTO DIFF WBC: CPT | Performed by: PHYSICIAN ASSISTANT

## 2022-03-09 PROCEDURE — 94760 N-INVAS EAR/PLS OXIMETRY 1: CPT

## 2022-03-09 PROCEDURE — 99239 HOSP IP/OBS DSCHRG MGMT >30: CPT | Performed by: PHYSICIAN ASSISTANT

## 2022-03-09 PROCEDURE — 97163 PT EVAL HIGH COMPLEX 45 MIN: CPT

## 2022-03-09 RX ORDER — FUROSEMIDE 40 MG/1
40 TABLET ORAL DAILY
Qty: 30 TABLET | Refills: 0
Start: 2022-03-10

## 2022-03-09 RX ORDER — ALPRAZOLAM 0.5 MG/1
1 TABLET ORAL 2 TIMES DAILY PRN
Status: DISCONTINUED | OUTPATIENT
Start: 2022-03-09 | End: 2022-03-09 | Stop reason: HOSPADM

## 2022-03-09 RX ORDER — FUROSEMIDE 40 MG/1
40 TABLET ORAL DAILY
Status: DISCONTINUED | OUTPATIENT
Start: 2022-03-10 | End: 2022-03-09 | Stop reason: HOSPADM

## 2022-03-09 RX ORDER — SACCHAROMYCES BOULARDII 250 MG
250 CAPSULE ORAL 2 TIMES DAILY
Qty: 60 CAPSULE | Refills: 0
Start: 2022-03-09

## 2022-03-09 RX ADMIN — PANTOPRAZOLE SODIUM 40 MG: 40 TABLET, DELAYED RELEASE ORAL at 08:47

## 2022-03-09 RX ADMIN — LORATADINE 10 MG: 10 TABLET ORAL at 08:48

## 2022-03-09 RX ADMIN — AMLODIPINE BESYLATE 10 MG: 10 TABLET ORAL at 08:48

## 2022-03-09 RX ADMIN — ALPRAZOLAM 1 MG: 0.5 TABLET ORAL at 15:47

## 2022-03-09 RX ADMIN — VANCOMYCIN HYDROCHLORIDE 250 MG: 5 INJECTION, POWDER, LYOPHILIZED, FOR SOLUTION INTRAVENOUS at 11:43

## 2022-03-09 RX ADMIN — GABAPENTIN 600 MG: 300 CAPSULE ORAL at 08:47

## 2022-03-09 RX ADMIN — FERROUS SULFATE TAB 325 MG (65 MG ELEMENTAL FE) 325 MG: 325 (65 FE) TAB at 08:46

## 2022-03-09 RX ADMIN — Medication 1 TABLET: at 08:47

## 2022-03-09 RX ADMIN — VANCOMYCIN HYDROCHLORIDE 250 MG: 5 INJECTION, POWDER, LYOPHILIZED, FOR SOLUTION INTRAVENOUS at 05:40

## 2022-03-09 RX ADMIN — APIXABAN 5 MG: 5 TABLET, FILM COATED ORAL at 17:35

## 2022-03-09 RX ADMIN — Medication 250 MG: at 17:35

## 2022-03-09 RX ADMIN — LOSARTAN POTASSIUM 25 MG: 25 TABLET, FILM COATED ORAL at 08:48

## 2022-03-09 RX ADMIN — VANCOMYCIN HYDROCHLORIDE 250 MG: 5 INJECTION, POWDER, LYOPHILIZED, FOR SOLUTION INTRAVENOUS at 17:35

## 2022-03-09 RX ADMIN — Medication 1 TABLET: at 15:47

## 2022-03-09 RX ADMIN — FUROSEMIDE 20 MG: 10 INJECTION, SOLUTION INTRAMUSCULAR; INTRAVENOUS at 08:48

## 2022-03-09 RX ADMIN — Medication 250 MG: at 08:46

## 2022-03-09 RX ADMIN — GABAPENTIN 600 MG: 300 CAPSULE ORAL at 15:47

## 2022-03-09 RX ADMIN — SERTRALINE HYDROCHLORIDE 100 MG: 100 TABLET, FILM COATED ORAL at 09:11

## 2022-03-09 RX ADMIN — APIXABAN 5 MG: 5 TABLET, FILM COATED ORAL at 08:47

## 2022-03-09 NOTE — PLAN OF CARE
Problem: PHYSICAL THERAPY ADULT  Goal: Performs mobility at highest level of function for planned discharge setting  See evaluation for individualized goals  Description: Treatment/Interventions: Functional transfer training,LE strengthening/ROM,Therapeutic exercise,Endurance training,Cognitive reorientation,Patient/family training,Bed mobility,Gait training,Spoke to nursing,OT          See flowsheet documentation for full assessment, interventions and recommendations  Note: Prognosis: Good  Problem List: Decreased strength,Decreased endurance,Impaired balance,Decreased mobility,Obesity,Decreased cognition  Assessment: Pt is [de-identified]year old female seen for PT evaluation s/p admit to [facility] on 3/6/2022 with Acute encephalopathy  PT consulted to assess pt's functional mobility and d/c needs  Order placed for PT eval and tx  Comorbidities affecting pt's physical performance at time of assessment include atrial fibrillation, hypertension, COPD, frequent UTI, dementia, anxiety, acute on chronic diastolic heart failure, C  Difficile diarrhea, and sleep apnea  PTA, pt was requiring assist for mobility  Pt resides in a house with her daughter and has a caregiver  Personal factors affecting pt at time of IE include lives in a two story house, ambulating with an assistive device, stairs to enter home, inability to ambulate household distances, inability to navigate level surfaces without external assistance, unable to perform dynamic tasks in community, decreased cognition, inability to perform IADLs, inability to perform ADLs, and inability to live alone  Please find objective findings from PT assessment regarding body systems outlined above with impairments and limitations including weakness, impaired balance, decreased endurance, gait deviations, decreased activity tolerance, decreased functional mobility tolerance, fall risk, and decreased cognition   The following objective measures performed on IE also reveal limitations: Barthel Index: 35/100, Modified Lester: 4 (moderate/severe disability) and AM-PAC 6-Clicks: 1/35  Pt's clinical presentation is currently unstable/unpredictable seen in pt's presentation of need for ongoing medical management/monitoring, pt is a fall risk, and pt requires cues/assist of two for safety with functional mobility  Pt to benefit from continued PT tx to address deficits as defined above and maximize level of functional independent mobility and consistency  From PT/mobility standpoint, recommendation at time of d/c would be STR pending progress in order to facilitate return to PLOF  Barriers to Discharge: Inaccessible home environment,Decreased caregiver support     PT Discharge Recommendation: Post acute rehabilitation services     See flowsheet documentation for full assessment

## 2022-03-09 NOTE — ASSESSMENT & PLAN NOTE
Wt Readings from Last 3 Encounters:   03/07/22 122 kg (268 lb)   10/19/21 122 kg (268 lb)   09/29/21 122 kg (268 lb)   · Increased shortness of breath, elevated [id number], troponin 11>10, EKG nonischemic likely CHF exacerbation  · 6/2017 Echo ventricle was dilated with systolic function was low normal, EF 55%  · Repeat Echo stable   · CXR shows mild CHF  · Not on diuretic PTA, hx of being on lasix 40mg daily   · Was on IV lasix 20mg daily, will transition to PO lasix 40mg qdaily tomorrow    · Renal function tolerating diuresis  · Strict I/Os-Daily weight  · Salt and fluid restricted diet

## 2022-03-09 NOTE — PLAN OF CARE
Problem: PAIN - ADULT  Goal: Verbalizes/displays adequate comfort level or baseline comfort level  Description: Interventions:  - Encourage patient to monitor pain and request assistance  - Assess pain using appropriate pain scale  - Administer analgesics based on type and severity of pain and evaluate response  - Implement non-pharmacological measures as appropriate and evaluate response  - Consider cultural and social influences on pain and pain management  - Notify physician/advanced practitioner if interventions unsuccessful or patient reports new pain  Outcome: Progressing     Problem: INFECTION - ADULT  Goal: Absence or prevention of progression during hospitalization  Description: INTERVENTIONS:  - Assess and monitor for signs and symptoms of infection  - Monitor lab/diagnostic results  - Monitor all insertion sites, i e  indwelling lines, tubes, and drains  - Monitor endotracheal if appropriate and nasal secretions for changes in amount and color  - [place] appropriate cooling/warming therapies per order  - Administer medications as ordered  - Instruct and encourage patient and family to use good hand hygiene technique  - Identify and instruct in appropriate isolation precautions for identified infection/condition  Outcome: Progressing  Goal: Absence of fever/infection during neutropenic period  Description: INTERVENTIONS:  - Monitor WBC    Outcome: Progressing     Problem: SAFETY ADULT  Goal: Patient will remain free of falls  Description: INTERVENTIONS:  - Educate patient/family on patient safety including physical limitations  - Instruct patient to call for assistance with activity   - Consult OT/PT to assist with strengthening/mobility   - Keep Call bell within reach  - Keep bed low and locked with side rails adjusted as appropriate  - Keep care items and personal belongings within reach  - Initiate and maintain comfort rounds  - Make Fall Risk Sign visible to staff  - Offer Toileting every  Hours, in advance of need  - Initiate/Maintain alarm  - Obtain necessary fall risk management equipment:   - Apply yellow socks and bracelet for high fall risk patients  - Consider moving patient to room near nurses station  Outcome: Progressing  Goal: Maintain or return to baseline ADL function  Description: INTERVENTIONS:  -  Assess patient's ability to carry out ADLs; assess patient's baseline for ADL function and identify physical deficits which impact ability to perform ADLs (bathing, care of mouth/teeth, toileting, grooming, dressing, etc )  - Assess/evaluate cause of self-care deficits   - Assess range of motion  - Assess patient's mobility; develop plan if impaired  - Assess patient's need for assistive devices and provide as appropriate  - Encourage maximum independence but intervene and supervise when necessary  - Involve family in performance of ADLs  - Assess for home care needs following discharge   - Consider OT consult to assist with ADL evaluation and planning for discharge  - Provide patient education as appropriate  Outcome: Progressing  Goal: Maintains/Returns to pre admission functional level  Description: INTERVENTIONS:  - Perform BMAT or MOVE assessment daily    - Set and communicate daily mobility goal to care team and patient/family/caregiver  - Collaborate with rehabilitation services on mobility goals if consulted  - Perform Range of Motion  times a day  - Reposition patient every  hours    - Dangle patient  times a day  - Stand patient  times a day  - Ambulate patient  times a day  - Out of bed to chair  times a day   - Out of bed for meals  times a day  - Out of bed for toileting  - Record patient progress and toleration of activity level   Outcome: Progressing     Problem: DISCHARGE PLANNING  Goal: Discharge to home or other facility with appropriate resources  Description: INTERVENTIONS:  - Identify barriers to discharge w/patient and caregiver  - Arrange for needed discharge resources and transportation as appropriate  - Identify discharge learning needs (meds, wound care, etc )  - Arrange for interpretive services to assist at discharge as needed  - Refer to Case Management Department for coordinating discharge planning if the patient needs post-hospital services based on physician/advanced practitioner order or complex needs related to functional status, cognitive ability, or social support system  Outcome: Progressing     Problem: Knowledge Deficit  Goal: Patient/family/caregiver demonstrates understanding of disease process, treatment plan, medications, and discharge instructions  Description: Complete learning assessment and assess knowledge base    Interventions:  - Provide teaching at level of understanding  - Provide teaching via preferred learning methods  Outcome: Progressing     Problem: Potential for Falls  Goal: Patient will remain free of falls  Description: INTERVENTIONS:  - Educate patient/family on patient safety including physical limitations  - Instruct patient to call for assistance with activity   - Consult OT/PT to assist with strengthening/mobility   - Keep Call bell within reach  - Keep bed low and locked with side rails adjusted as appropriate  - Keep care items and personal belongings within reach  - Initiate and maintain comfort rounds  - Make Fall Risk Sign visible to staff  - Offer Toileting every  Hours, in advance of need  - Initiate/Maintain alarm  - Obtain necessary fall risk management equipment:  - Apply yellow socks and bracelet for high fall risk patients  - Consider moving patient to room near nurses station  Outcome: Progressing     Problem: MOBILITY - ADULT  Goal: Maintain or return to baseline ADL function  Description: INTERVENTIONS:  -  Assess patient's ability to carry out ADLs; assess patient's baseline for ADL function and identify physical deficits which impact ability to perform ADLs (bathing, care of mouth/teeth, toileting, grooming, dressing, etc )  - Assess/evaluate cause of self-care deficits   - Assess range of motion  - Assess patient's mobility; develop plan if impaired  - Assess patient's need for assistive devices and provide as appropriate  - Encourage maximum independence but intervene and supervise when necessary  - Involve family in performance of ADLs  - Assess for home care needs following discharge   - Consider OT consult to assist with ADL evaluation and planning for discharge  - Provide patient education as appropriate  Outcome: Progressing  Goal: Maintains/Returns to pre admission functional level  Description: INTERVENTIONS:  - Perform BMAT or MOVE assessment daily    - Set and communicate daily mobility goal to care team and patient/family/caregiver  - Collaborate with rehabilitation services on mobility goals if consulted  - Perform Range of Motion times a day  - Reposition patient every  hours    - Dangle patient  times a day  - Stand patient  times a day  - Ambulate patient  times a day  - Out of bed to chair  times a day   - Out of bed for meals  times a day  - Out of bed for toileting  - Record patient progress and toleration of activity level   Outcome: Progressing     Problem: Prexisting or High Potential for Compromised Skin Integrity  Goal: Skin integrity is maintained or improved  Description: INTERVENTIONS:  - Identify patients at risk for skin breakdown  - Assess and monitor skin integrity  - Assess and monitor nutrition and hydration status  - Monitor labs   - Assess for incontinence   - Turn and reposition patient  - Assist with mobility/ambulation  - Relieve pressure over bony prominences  - Avoid friction and shearing  - Provide appropriate hygiene as needed including keeping skin clean and dry  - Evaluate need for skin moisturizer/barrier cream  - Collaborate with interdisciplinary team   - Patient/family teaching  - Consider wound care consult   Outcome: Progressing     Problem: Nutrition/Hydration-ADULT  Goal: Nutrient/Hydration intake appropriate for improving, restoring or maintaining nutritional needs  Description: Monitor and assess patient's nutrition/hydration status for malnutrition  Collaborate with interdisciplinary team and initiate plan and interventions as ordered  Monitor patient's weight and dietary intake as ordered or per policy  Utilize nutrition screening tool and intervene as necessary  Determine patient's food preferences and provide high-protein, high-caloric foods as appropriate       INTERVENTIONS:  - Monitor oral intake, urinary output, labs, and treatment plans  - Assess nutrition and hydration status and recommend course of action  - Evaluate amount of meals eaten  - Assist patient with eating if necessary   - Allow adequate time for meals  - Recommend/ encourage appropriate diets, oral nutritional supplements, and vitamin/mineral supplements  - Order, calculate, and assess calorie counts as needed  - Assess need for intravenous fluids  - Provide nutrition/hydration education as appropriate  - Include patient/family/caregiver in decisions related to nutrition  Outcome: Progressing

## 2022-03-09 NOTE — ASSESSMENT & PLAN NOTE
· Patient with baseline mild dementia here with increased confusion with CT head no acute abnormality, electrolytes unremarkable  · Baseline mentation per daughter (POA) A&Ox4 however does wax and wane, now improved, back to baseline mentation   · Initially suspected 2/2 recurrent UTI however urine cx <10,000 gram negative brayan-like, therefore IV abx discontinued after 2 days; unclear etiology possible in setting of acute CHF exacerbation  · CT head no acute findings  · Vit B12, TSH WNL  · Minimize sedative use, close monitoring of mental status  · PT/OT recommend STR- discharge today to [facility] on Request   · Patient currently lives with daughter Javier Renee eports difficulty caring for patient at home

## 2022-03-09 NOTE — CASE MANAGEMENT
Case Management Progress Note    Patient name Bimal Beal  Location [id number] MRN [id number]  : 1940 Date 3/9/2022       LOS (days): 2  Geometric Mean LOS (GMLOS) (days): 3 80  Days to GMLOS:1 6        OBJECTIVE:        Current admission status: Inpatient  Preferred Pharmacy:   [facility] [address] R R 1 ([phone] [id number] [id number])  [address] [place] [address]  Phone: [phone] Fax: [phone]    [id number] - [place]  [address] - [address]  [place]  [address] [place] [address]  Phone: [phone] Fax: [phone]    Primary Care Provider: LUCY Trinidad    Primary Insurance: [id number] REP  Secondary Insurance: [address]    PROGRESS NOTE:        CM phoned and spoke to Gracy who states patient's discharge plan is patient to go to [facility] and then LTC  Gracy states she does not want ex-caregiver Virgil Morris visiting patient as he causes patient confusion stating to  Patient she will come home to live with him  Nurse is informed to not allow Virgil Morris to visit patient  Ariana Maki states she will have a conversation with Virgil Morris not to visit patient

## 2022-03-09 NOTE — DISCHARGE INSTRUCTIONS
Heart Failure   WHAT YOU NEED TO KNOW:   Heart failure is a condition that does not allow your heart to fill or pump properly  Not enough oxygen in your blood gets to your organs and tissues  Fluid may not move through your body properly  Fluid builds up and causes swelling and trouble breathing  This is known as congestive heart failure  Heart failure may start in the left or right ventricle  Heart failure is often caused by damage or injury to your heart  The damage may be caused by other heart problems, diabetes, or high blood pressure  The damage may have also been caused by an infection  Heart failure is a long-term condition that tends to get worse over time  It is important to manage your health to improve your quality of life  DISCHARGE INSTRUCTIONS:   Call your local emergency number (911 in the [address] [facility]) if:   · You have any of the following signs of a heart attack:      ? Squeezing, pressure, or pain in your chest    ? You may  also have any of the following:     § Discomfort or pain in your back, neck, jaw, stomach, or arm    § Shortness of breath    § Nausea or vomiting    § Lightheadedness or a sudden cold sweat      Call your doctor if:   · Your heartbeat is fast, slow, or uneven all the time  · You have symptoms of worsening heart failure:      ? Shortness of breath at rest, at night, or that is getting worse in any way    ? Weight gain of 3 or more pounds (1 4 kg) in a day, or more than your healthcare provider says is okay    ? More swelling in your legs or ankles    ? Abdominal pain or swelling    ? More coughing    ? Loss of appetite    ? Feeling tired all the time    · You feel hopeless or depressed, or you have lost interest in things you used to enjoy  · You often feel worried or afraid  · You have questions or concerns about your condition or care  Medicines:   · Medicines  may be given to help regulate your heart rhythm and lower your blood pressure   You may also need medicines to help decrease extra fluids  Medicines, such as NSAIDs, may be stopped if they are causing your heart failure to become worse  Do not stop any of your medicines on your own  · Take your medicine as directed  Contact your healthcare provider if you think your medicine is not helping or if you have side effects  Tell him or her if you are allergic to any medicine  Keep a list of the medicines, vitamins, and herbs you take  Include the amounts, and when and why you take them  Bring the list or the pill bottles to follow-up visits  Carry your medicine list with you in case of an emergency  Go to cardiac rehab if directed:  Cardiac rehab is a program run by specialists who will help you safely strengthen your heart  In the program you will learn about exercise, relaxation, stress management, and heart-healthy nutrition  Manage swelling from extra fluid:   · Elevate (raise) your legs above the level of your heart  This will help with fluid that builds up in your legs or ankles  Elevate your legs as often as possible during the day  Prop your legs on pillows or blankets to keep them elevated comfortably  Try not to stand for long periods of time during the day  Move around to keep your blood circulating  · Limit sodium (salt)  Ask how much sodium you can have each day  Your healthcare provider may give you a limit, such as 2,300 milligrams (mg) a day  Your provider or a dietitian can teach you how to read food labels for the number of mg in a food  He or she can also help you find ways to have less salt  For example, if you add salt to food as you cook, do not add more at the table  · Drink liquids as directed  You may need to limit the amount of liquid you drink within 24 hours  Your healthcare provider will tell you how much liquid to have and which liquids are best for you  He or she may tell you to limit liquid to 1 5 to 2 liters in a day   He or she will also tell you how often to drink liquid throughout the day  · Weigh yourself every morning  Use the same scale, in the same spot  Do this after you use the bathroom, but before you eat or drink  Wear the same type of clothing each time  Write down your weight and call your healthcare provider if you have a sudden weight gain  Swelling and weight gain are signs of fluid buildup  Manage heart failure: Your quality of life may improve with treatment and the following:  · Do not smoke  Nicotine and other chemicals in cigarettes and cigars can cause lung and heart damage  Ask your healthcare provider for information if you currently smoke and need help to quit  E-cigarettes or smokeless tobacco still contain nicotine  Talk to your healthcare provider before you use these products  · Do not drink alcohol or use illegal drugs  Alcohol and drugs can increase your risk for high blood pressure, diabetes, and coronary artery disease  · Eat heart-healthy foods  Heart-healthy foods include fruits, vegetables, lean meat (such as beef, chicken, or pork), and low-fat dairy products  Fatty fish such as salmon and tuna are also heart healthy  Other heart-healthy foods include walnuts, whole-grain breads, beans, and cooked beans  Replace butter and margarine with heart-healthy oils such as olive oil or canola oil  Your provider or a dietitian can help you create heart-healthy meal plans  · Manage any chronic health conditions you have  These include high blood pressure, diabetes, obesity, high cholesterol, metabolic syndrome, and COPD  You will have fewer symptoms if you manage these health conditions  Follow your healthcare provider's recommendations and follow up with him or her regularly  · Maintain a healthy weight  Being overweight can increase your risk for high blood pressure, diabetes, and coronary artery disease  These conditions can make your symptoms worse  Ask your healthcare provider how much you should weigh  Ask him or her to help you create a weight loss plan if you are overweight  · Stay active  Activity can help keep your symptoms from getting worse  Walking is a type of physical activity that helps maintain your strength and improve your mood  Physical activity also helps you manage your weight  Work with your healthcare provider to create an exercise plan that is right for you  · Get vaccines as directed  The flu and pneumonia can be severe for a person who has heart failure  Vaccines protect you from these infections  Get a flu shot every year as soon as it is recommended, usually in September or October  You may also need the pneumonia vaccine  Your healthcare provider can tell you if you need other vaccines, and when to get them  Follow up with your doctor within 7 days and as directed: You may need to return for other tests  You may need home health care  A healthcare provider will monitor your vital signs, weight, and make sure your medicines are working  Write down your questions so you remember to ask them during your visits  Join a support group:  Heart failure can be difficult to manage  It may be helpful to talk with others who have heart failure  You may learn how to better manage your condition or get emotional support  For more information:  · [address]  [place] , [place]   Phone: [phone]  Web Address: https://www [employer]/  [employer] 2022 Information is for End User's use only and may not be sold, redistributed or otherwise used for commercial purposes  All illustrations and images included in CareNotes® are the copyrighted property of [employer]  or [address] [place], [place] [address]thom   The above information is an  only  It is not intended as medical advice for individual conditions or treatments   Talk to your doctor, nurse or pharmacist before following any medical regimen to see if it is safe and effective for you  Furosemide (By mouth)   Furosemide ([id number]-OG-bj-mide)  Treats fluid retention (edema) and high blood pressure  This medicine is a diuretic (water pill)  Brand Name(s): Lasix   There may be other brand names for this medicine  When This Medicine Should Not Be Used: This medicine is not right for everyone  Do not use it if you had an allergic reaction to furosemide  How to Use This Medicine:   Liquid, Tablet  · Take your medicine as directed  Your dose may need to be changed several times to find what works best for you  · You may take this medicine with food if it upsets your stomach  · Oral liquid: Measure the oral liquid medicine with a marked measuring spoon, oral syringe, or medicine cup  · Tablet: Swallow the tablet whole  Do not crush, break, or chew it  · Missed dose: Take a dose as soon as you remember  If it is almost time for your next dose, wait until then and take a regular dose  Do not take extra medicine to make up for a missed dose  · Store the medicine in a closed container at room temperature, away from heat, moisture, and direct light  Drugs and Foods to Avoid:   Ask your doctor or pharmacist before using any other medicine, including over-the-counter medicines, vitamins, and herbal products  · Some medicines can affect how furosemide works  Tell your doctor if you are also using any of the following:  ? Cisplatin, cyclosporine, digoxin, ethacrynic acid, licorice, lithium, methotrexate, or phenytoin  ? Adrenocorticotropic hormone (ACTH)  ? Laxative  ? Medicine to treat an infection  ? NSAID pain or arthritis medicine (including aspirin, diclofenac, ibuprofen, indomethacin, naproxen)  ? Other blood pressure medicines  ? Steroid medicine (including dexamethasone, hydrocortisone, methylprednisolone, prednisolone, prednisone)  ?  Thyroid medicine  · If you also take sucralfate, allow at least 2 hours between the time you take furosemide and the time you take sucralfate  · Alcohol, narcotic pain medicine, or sleeping pills may cause you to feel more lightheaded, dizzy, or faint when used with this medicine  Warnings While Using This Medicine:   · Tell your doctor if you are pregnant or breastfeeding, or if you have kidney disease, liver disease (including cirrhosis), diabetes, gout, low blood pressure, lupus, an enlarged prostate, trouble urinating, or an allergy to sulfa drugs  Tell your doctor if you are on a low-salt diet  · This medicine may cause the following problems:   ? Low levels of minerals in your blood, such as potassium and sodium  ? Blood sugar level changes  ? Hearing problems  · Make sure any doctor or dentist who treats you knows that you are using this medicine  · This medicine could lower your blood pressure too much, especially when you first use it or if you are dehydrated  Stand or sit up slowly if you feel lightheaded or dizzy  · This medicine may make your skin more sensitive to sunlight  Wear sunscreen  Do not use sunlamps or tanning beds  · Your doctor will do lab tests at regular visits to check on the effects of this medicine  Keep all appointments  · Keep all medicine out of the reach of children  Never share your medicine with anyone    Possible Side Effects While Using This Medicine:   Call your doctor right away if you notice any of these side effects:  · Allergic reaction: Itching or hives, swelling in your face or hands, swelling or tingling in your mouth or throat, chest tightness, trouble breathing  · Blistering, peeling, red skin rash  · Confusion, weakness, muscle twitching  · Dry mouth, increased thirst, muscle cramps, uneven heartbeat  · Sudden and severe stomach pain, nausea, vomiting, fever, lightheadedness  · Hearing loss, ringing in the ears  · Lightheadedness, dizziness, fainting  · Severe diarrhea  · Unusual bleeding or bruising  · Yellow skin or eyes  If you notice these less serious side effects, talk with your doctor:   · Loss of appetite, stomach cramps  If you notice other side effects that you think are caused by this medicine, tell your doctor  Call your doctor for medical advice about side effects  You may report side effects to FDA at 4-880-FDA-8177    © Copyright [employer] 2022 Information is for End User's use only and may not be sold, redistributed or otherwise used for commercial purposes  The above information is an  only  It is not intended as medical advice for individual conditions or treatments  Talk to your doctor, nurse or pharmacist before following any medical regimen to see if it is safe and effective for you

## 2022-03-09 NOTE — DISCHARGE INSTR - AVS FIRST PAGE
Start taking Lasix 40 mg daily  Continue taking your oral vancomycin for C diff prophylaxis until you follow-up with gastroenterology as an outpatient  Recommended schedule follow-up within 1 week    Please also follow-up with primary care provider  Return to ER with worsening of symptoms

## 2022-03-09 NOTE — TELEPHONE ENCOUNTER
[facility] Marques Baird is asking you to call her back  She is looking for Nursing Respiratory Care for this patient   Please advise, Thank you

## 2022-03-09 NOTE — CASE MANAGEMENT
Case Management Discharge Planning Note    Patient name Shelia Hair  Location [address] 207/-79 MRN [id number]  : 1940 Date 3/9/2022       Current Admission Date: 3/6/2022  Current Admission Diagnosis:Acute encephalopathy   Patient Active Problem List    Diagnosis Date Noted    Sleep apnea 2022    Acute on chronic diastolic heart failure ([facility] ) 2022    C  difficile diarrhea 2022    Anxiety 2021    Fungal infection of the groin 2021    Dementia ([facility] ) 03/10/2021    Generalized weakness 03/10/2021    CHF (congestive heart failure) ([facility] ) 2020    Pacemaker 2020    Frequent UTI 2020    Bipolar affective disorder, currently manic, moderate ([facility] ) 2018    Chronic pain 2018    Physical deconditioning 2018    Moderate episode of recurrent major depressive disorder ([facility] ) 2018    Overactive bladder 06/10/2017    COPD (chronic obstructive pulmonary disease) ([facility] ) 2016    Acute encephalopathy 2016    Morbid obesity due to excess calories ([facility] ) 2016    Atrial fibrillation ([facility] ) 2016    Hypertension 2016    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[facility] ) 2016    PAD (peripheral artery disease) ([facility] ) 2016      LOS (days): 2  Geometric Mean LOS (GMLOS) (days): 3 80  Days to GMLOS:1 4     OBJECTIVE:  Risk of Unplanned Readmission Score: 16         Current admission status: Inpatient   Preferred Pharmacy:   [address] - [facility] R R 1 ([phone] [id number] R [id number])  [address] [facility]  Phone: [phone] Fax: [phone]    TE[address] Mayen  [phone] - [place]  [address] - [address] [facility] [address]  [place] [address]  Phone: [phone] Fax: [phone]    Primary Care Provider: LUCY Lopez    Primary Insurance: [facility]  Secondary Insurance: [id number] DETAILS:    Discharge planning discussed with[de-identified] DENNIS Abernathy of Choice: Yes  Comments - Freedom of Choice: patient is vaccinated with the first two vaccines  Navneet provided the Vaccination card  Taryntasia Christiansons would like patient to go to [facility] and be LTC  Dm Leon have accepted  CM contacted family/caregiver?: Yes  Were Treatment Team discharge recommendations reviewed with patient/caregiver?: Yes  Did patient/caregiver verbalize understanding of patient care needs?: Yes  Were patient/caregiver advised of the risks associated with not following Treatment Team discharge recommendations?: Yes    Contacts  Patient Contacts: Navneet  Relationship to Patient[de-identified] Family  Contact Method: Phone  Phone Number: [phone]  Reason/Outcome: Discharge Planning,Emergency Contact,Continuity of [facility]         Is the patient interested in [facility] at discharge?: No    DME Referral Provided  Referral made for DME?: No    Other Referral/Resources/Interventions Provided:  Interventions: Short Term Rehab  Referral Comments: Darren Dominguez De Bossman  AUTH IS OBTAINED BY [facility]      Would you like to participate in our [address] service program?  : No - Declined    Treatment Team Recommendation: Short Term Rehab  Discharge Destination Plan[de-identified] Short Term Rehab ([address] [facility] )  Transport at Discharge : [facility]  Dispatcher Contacted: Yes  Number/Name of Dispatcher: [address] [employer] and Unit #): AWAITING TRANSPORT COMPANY NAME  ETA of Transport (Date): 03/09/22                 IMM Given (Date):: 03/09/22  IMM Given to[de-identified] Family  Family notified[de-identified] [address], [address] : [address]  Receiving Facility/Agency Phone Number: [phone]  Facility/Agency Fax Number: [phone]

## 2022-03-09 NOTE — PLAN OF CARE
Problem: OCCUPATIONAL THERAPY ADULT  Goal: Performs self-care activities at highest level of function for planned discharge setting  See evaluation for individualized goals  Description: Treatment Interventions: ADL retraining,Functional transfer training,UE strengthening/ROM,Endurance training,Cognitive reorientation,Patient/family training,Compensatory technique education,Continued evaluation,Energy conservation,Activityengagement          See flowsheet documentation for full assessment, interventions and recommendations  Note: Limitation: Decreased ADL status,Decreased UE strength,Decreased Safe judgement during ADL,Decreased cognition,Decreased endurance,Decreased self-care trans,Decreased high-level ADLs  Prognosis: Good  Assessment: Patient is a [de-identified] y o  female seen for OT evaluation s/p admit to [id number] [place] [address] on 3/6/2022 w/Acute encephalopathy  Commorbidities affecting patient's functional performance at time of assessment include: atrial fibrillation, hypertension, COPD, frequent UTI, dementia, anxiety, acute on chronic diastolic heart failure, C  Difficile diarrhea, and sleep apnea  Orders placed for OT evaluation and treatment  Performed at least two patient identifiers during session including name and wristband  Prior to admission, Pt is a poor historian; pt with increased agitation when asking questions  Information regarding home setup and PLOF obtained from chart review  Personal factors affecting patient at time of initial evaluation include: limited caregiver support, decreased initiation and engagement, difficulty performing ADLs and difficulty performing IADLs  Upon evaluation, patient requires moderate and maximal assist for UB ADLs, maximal and total assist for LB ADLs    Occupational performance is affected by the following deficits: orientation, attention span, irritability, flat affect, impulsive behavior, decreased functional use of BUEs, decreased muscle strength, degenerative arthritic joint changes, impaired gross motor coordination, impaired fine motor coordination, dynamic sit/ stand balance deficit with poor standing tolerance time for self care and functional mobility, decreased activity tolerance, decreased safety awareness and postural control and postural alignment deficit, requiring external assistance to complete transitional movements  Patient to benefit from continued Occupational Therapy treatment while in the hospital to address deficits as defined above and maximize level of functional independence with ADLs and functional mobility  Occupational Performance areas to address include: bathing/ shower, dressing, toilet hygiene, transfer to all surfaces, functional mobility, emergency response, health maintenance, IADLs: safety procedures and Leisure Participation  From OT standpoint, recommendation at time of d/c would be Short Term Rehab       OT Discharge Recommendation: Post acute rehabilitation services

## 2022-03-09 NOTE — CASE MANAGEMENT
Case Management Progress Note    Patient name Macho Torres  Location [address] 207/-61 MRN [id number]  : 1940 Date 3/9/2022       LOS (days): 2  Geometric Mean LOS (GMLOS) (days): 3 80  Days to GMLOS:1 4        OBJECTIVE:        Current admission status: Inpatient  Preferred Pharmacy:   [address] [place] [address] 9293 R R 1 ([phone] R R [id number] [phone] [id number])  [address] [place] [address]  Phone: [phone] Fax: [phone]    [facility] [address] - [place]  [place] [place] - [address] [facility] [address]  [place]  [address] [place] [address]  Phone: [phone] Fax: [phone]    Primary Care Provider: [id number] [place], [place] [address]LUCY    Primary Insurance: [id number]  Secondary Insurance: [address]    PROGRESS NOTE:      Per Nurse supervisor Anshu Horne patient will receive covid booster today  Prior to transport  CM currently awaiting transport time  SLETS to call Charge with transport time to [email or web address]

## 2022-03-09 NOTE — RESPIRATORY THERAPY NOTE
RT Protocol Note  Gilles Kyle [de-identified] y o  female MRN: [id number]  Unit/Bed#: -01 Encounter: [id number]    Assessment    Principal Problem:    Acute encephalopathy  Active Problems:    Atrial fibrillation (HCC)    Hypertension    COPD (chronic obstructive pulmonary disease) (HCC)    Frequent UTI    Dementia (HCC)    Anxiety    Acute on chronic diastolic heart failure (HCC)    C  difficile diarrhea    Sleep apnea      Home Pulmonary Medications:  Albuterol inhaler       Past Medical History:   Diagnosis Date    A-fib ([facility] )     Anxiety     Chronic pain     Chronic respiratory failure with hypoxia (HCC)     COPD (chronic obstructive pulmonary disease) (HCC)     Dementia (HCC)     Dorsalgia, unspecified     Edema     Encephalopathy     GERD (gastroesophageal reflux disease)     Hypertension     Morbid obesity ([facility])     Muscle weakness (generalized)     Opioid dependence ([facility])     Overactive bladder     Pneumonia     Psychiatric disorder     anxiety, bipolar    Radiculopathy of thoracolumbar region     Sciatica     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 ([facility] )     Urinary incontinence     UTI (urinary tract infection)      Social History     Socioeconomic History    Marital status:       Spouse name: None    Number of children: None    Years of education: None    Highest education level: None   Occupational History    None   Tobacco Use    Smoking status: Former Smoker     Types: Cigarettes     Quit date: 1995     Years since quittin 3    Smokeless tobacco: Never Used   Vaping Use    Vaping Use: Never used   Substance and Sexual Activity    Alcohol use: Never    Drug use: No    Sexual activity: Not Currently   Other Topics Concern    None   Social History Narrative    None     Social Determinants of Health     Financial Resource Strain: Not on file   Food Insecurity: No Food Insecurity    Worried About Running Out of Food in the Last Year: Never true   [address] in the Last Year: Never true   Transportation Needs: No Transportation Needs    Lack of Transportation (Medical): No    Lack of Transportation (Non-Medical): No   Physical Activity: Not on file   Stress: Not on file   Social Connections: Not on file   Intimate Partner Violence: Not on file   Housing Stability: Low Risk     Unable to Pay for Housing in the Last Year: No    Number of Places Lived in the Last Year: 1    Unstable Housing in the Last Year: No       Subjective         Objective    Physical Exam:        Vitals:  Blood pressure 156/79, pulse 79, temperature 98 2 °F (36 8 °C), resp  rate 17, height 5' 6" (1 676 m), weight 122 kg (268 lb), SpO2 93 %, not currently breastfeeding  Imaging and other studies: I have personally reviewed pertinent reports              Plan    Respiratory Plan: Home Bronchodilator Patient pathway        Resp Comments: pt with hx of copd uses inhaler @ home prn  will conitnue with this order

## 2022-03-09 NOTE — PLAN OF CARE
Problem: PAIN - ADULT  Goal: Verbalizes/displays adequate comfort level or baseline comfort level  Description: Interventions:  - Encourage patient to monitor pain and request assistance  - Assess pain using appropriate pain scale  - Administer analgesics based on type and severity of pain and evaluate response  - Implement non-pharmacological measures as appropriate and evaluate response  - Consider cultural and social influences on pain and pain management  - Notify physician/advanced practitioner if interventions unsuccessful or patient reports new pain  Outcome: Progressing     Problem: INFECTION - ADULT  Goal: Absence or prevention of progression during hospitalization  Description: INTERVENTIONS:  - Assess and monitor for signs and symptoms of infection  - Monitor lab/diagnostic results  - Monitor all insertion sites, i e  indwelling lines, tubes, and drains  - Monitor endotracheal if appropriate and nasal secretions for changes in amount and color  - Wilder appropriate cooling/warming therapies per order  - Administer medications as ordered  - Instruct and encourage patient and family to use good hand hygiene technique  - Identify and instruct in appropriate isolation precautions for identified infection/condition  Outcome: Progressing  Goal: Absence of fever/infection during neutropenic period  Description: INTERVENTIONS:  - Monitor WBC    Outcome: Progressing     Problem: SAFETY ADULT  Goal: Patient will remain free of falls  Description: INTERVENTIONS:  - Educate patient/family on patient safety including physical limitations  - Instruct patient to call for assistance with activity   - Consult OT/PT to assist with strengthening/mobility   - Keep Call bell within reach  - Keep bed low and locked with side rails adjusted as appropriate  - Keep care items and personal belongings within reach  - Initiate and maintain comfort rounds  - Make Fall Risk Sign visible to staff  - Offer Toileting every  Hours, in advance of need  - Initiate/Maintain alarm  - Obtain necessary fall risk management equipment:   - Apply yellow socks and bracelet for high fall risk patients  - Consider moving patient to room near nurses station  Outcome: Progressing  Goal: Maintain or return to baseline ADL function  Description: INTERVENTIONS:  -  Assess patient's ability to carry out ADLs; assess patient's baseline for ADL function and identify physical deficits which impact ability to perform ADLs (bathing, care of mouth/teeth, toileting, grooming, dressing, etc )  - Assess/evaluate cause of self-care deficits   - Assess range of motion  - Assess patient's mobility; develop plan if impaired  - Assess patient's need for assistive devices and provide as appropriate  - Encourage maximum independence but intervene and supervise when necessary  - Involve family in performance of ADLs  - Assess for home care needs following discharge   - Consider OT consult to assist with ADL evaluation and planning for discharge  - Provide patient education as appropriate  Outcome: Progressing  Goal: Maintains/Returns to pre admission functional level  Description: INTERVENTIONS:  - Perform BMAT or MOVE assessment daily    - Set and communicate daily mobility goal to care team and patient/family/caregiver  - Collaborate with rehabilitation services on mobility goals if consulted  - Perform Range of Motion  times a day  - Reposition patient every  hours    - Dangle patient  times a day  - Stand patient  times a day  - Ambulate patient  times a day  - Out of bed to chair  times a day   - Out of bed for meals  times a day  - Out of bed for toileting  - Record patient progress and toleration of activity level   Outcome: Progressing     Problem: DISCHARGE PLANNING  Goal: Discharge to home or other facility with appropriate resources  Description: INTERVENTIONS:  - Identify barriers to discharge w/patient and caregiver  - Arrange for needed discharge resources and transportation as appropriate  - Identify discharge learning needs (meds, wound care, etc )  - Arrange for interpretive services to assist at discharge as needed  - Refer to Case Management Department for coordinating discharge planning if the patient needs post-hospital services based on physician/advanced practitioner order or complex needs related to functional status, cognitive ability, or social support system  Outcome: Progressing     Problem: Knowledge Deficit  Goal: Patient/family/caregiver demonstrates understanding of disease process, treatment plan, medications, and discharge instructions  Description: Complete learning assessment and assess knowledge base    Interventions:  - Provide teaching at level of understanding  - Provide teaching via preferred learning methods  Outcome: Progressing     Problem: Potential for Falls  Goal: Patient will remain free of falls  Description: INTERVENTIONS:  - Educate patient/family on patient safety including physical limitations  - Instruct patient to call for assistance with activity   - Consult OT/PT to assist with strengthening/mobility   - Keep Call bell within reach  - Keep bed low and locked with side rails adjusted as appropriate  - Keep care items and personal belongings within reach  - Initiate and maintain comfort rounds  - Make Fall Risk Sign visible to staff  - Offer Toileting every  Hours, in advance of need  - Initiate/Maintain alarm  - Obtain necessary fall risk management equipment:   - Apply yellow socks and bracelet for high fall risk patients  - Consider moving patient to room near nurses station  Outcome: Progressing     Problem: MOBILITY - ADULT  Goal: Maintain or return to baseline ADL function  Description: INTERVENTIONS:  -  Assess patient's ability to carry out ADLs; assess patient's baseline for ADL function and identify physical deficits which impact ability to perform ADLs (bathing, care of mouth/teeth, toileting, grooming, dressing, etc )  - Assess/evaluate cause of self-care deficits   - Assess range of motion  - Assess patient's mobility; develop plan if impaired  - Assess patient's need for assistive devices and provide as appropriate  - Encourage maximum independence but intervene and supervise when necessary  - Involve family in performance of ADLs  - Assess for home care needs following discharge   - Consider OT consult to assist with ADL evaluation and planning for discharge  - Provide patient education as appropriate  Outcome: Progressing  Goal: Maintains/Returns to pre admission functional level  Description: INTERVENTIONS:  - Perform BMAT or MOVE assessment daily    - Set and communicate daily mobility goal to care team and patient/family/caregiver  - Collaborate with rehabilitation services on mobility goals if consulted  - Perform Range of Motion  times a day  - Reposition patient every  hours    - Dangle patient  times a day  - Stand patient  times a day  - Ambulate patient  times a day  - Out of bed to chair  times a day   - Out of bed for meal times a day  - Out of bed for toileting  - Record patient progress and toleration of activity level   Outcome: Progressing     Problem: Prexisting or High Potential for Compromised Skin Integrity  Goal: Skin integrity is maintained or improved  Description: INTERVENTIONS:  - Identify patients at risk for skin breakdown  - Assess and monitor skin integrity  - Assess and monitor nutrition and hydration status  - Monitor labs   - Assess for incontinence   - Turn and reposition patient  - Assist with mobility/ambulation  - Relieve pressure over bony prominences  - Avoid friction and shearing  - Provide appropriate hygiene as needed including keeping skin clean and dry  - Evaluate need for skin moisturizer/barrier cream  - Collaborate with interdisciplinary team   - Patient/family teaching  - Consider wound care consult   Outcome: Progressing     Problem: Nutrition/Hydration-ADULT  Goal: Nutrient/Hydration intake appropriate for improving, restoring or maintaining nutritional needs  Description: Monitor and assess patient's nutrition/hydration status for malnutrition  Collaborate with interdisciplinary team and initiate plan and interventions as ordered  Monitor patient's weight and dietary intake as ordered or per policy  Utilize nutrition screening tool and intervene as necessary  Determine patient's food preferences and provide high-protein, high-caloric foods as appropriate       INTERVENTIONS:  - Monitor oral intake, urinary output, labs, and treatment plans  - Assess nutrition and hydration status and recommend course of action  - Evaluate amount of meals eaten  - Assist patient with eating if necessary   - Allow adequate time for meals  - Recommend/ encourage appropriate diets, oral nutritional supplements, and vitamin/mineral supplements  - Order, calculate, and assess calorie counts as needed  - Assess need for intravenous fluids  - Provide nutrition/hydration education as appropriate  - Include patient/family/caregiver in decisions related to nutrition  Outcome: Progressing

## 2022-03-09 NOTE — OCCUPATIONAL THERAPY NOTE
Occupational Therapy Evaluation        Patient Name: Hussain Dickerson  TCNUZ'X Date: 3/9/2022       03/09/22 0912   OT Last Visit   OT Visit Date 03/09/22   Note Type   Note type Evaluation   Restrictions/Precautions   Weight Bearing Precautions Per Order No   Other Precautions Cognitive; Bed Alarm; Chair Alarm;Multiple lines; Fall Risk;Telemetry;Contact/isolation;O2   Pain Assessment   Pain Assessment Tool 0-10   Pain Score No Pain   Home Living   Type of Home House   Home Layout Two level; Able to live on main level with bedroom/bathroom;Stairs to enter with rails   [facility] bed;Walker; Wheelchair-manual   Prior Function   Level of [place] Needs assistance with ADLs and functional mobility; Needs assistance with IADLs   Lives With Daughter   Receives Help From Family  (caregiver)   ADL Assistance Needs assistance   IADLs Needs assistance   Falls in the last 6 months   (unknown)   Lifestyle   Autonomy Pt is a poor historian; pt with increased agitation when asking questions  Information regarding home setup and PLOF obtained from chart review  Psychosocial   Psychosocial (WDL) X   Patient Behaviors/Mood Anxious; Flat affect;Irritable   Ability to Express Feelings Able to express   Ability to Express Needs Able to express   Ability to Express Thoughts Able to express   Ability to Understand Others Usually understands   ADL   Eating Assistance 5  Supervision/Setup   Eating Deficit Setup; Increased time to complete   Grooming Assistance 4  Minimal Assistance   UB Bathing Assistance 2  Maximal Assistance   LB Bathing Assistance 2  Maximal Assistance   UB Dressing Assistance 2  Maximal Assistance   LB Dressing Assistance 1  Total Assistance   Toileting Assistance  2  Maximal Assistance   Functional Assistance 3  Moderate Assistance   Bed Mobility   Rolling R 3  Moderate assistance   Additional items Assist x 1;Bedrails; Increased time required;Verbal cues;LE management   Rolling L 3  Moderate assistance   Additional items Assist x 1;Bedrails; Increased time required;Verbal cues;LE management   Supine to Sit 3  Moderate assistance   Additional items Assist x 2;HOB elevated; Bedrails; Increased time required;Verbal cues;LE management   Sit to Supine 3  Moderate assistance   Additional items Assist x 2;HOB elevated; Bedrails; Increased time required;Verbal cues;LE management   Transfers   Sit to Stand 3  Moderate assistance   Additional items Assist x 2; Increased time required;Verbal cues   Stand to Sit 3  Moderate assistance   Additional items Assist x 2; Increased time required;Verbal cues   Functional Mobility   Functional Mobility 3  Moderate assistance   Additional Comments assist of 2, patient completed standing bedside with assist of 2, was able to take ~ 4-5 lateral steps towards the head of the bed with assist of 2 and verbal cues for safety and technique     Additional items Rolling walker   Balance   Static Sitting Fair   Dynamic Sitting Fair -   Static Standing Poor +   Dynamic Standing Poor   Ambulatory Poor   Activity Tolerance   Activity Tolerance Patient limited by fatigue;Treatment limited secondary to agitation   Nurse Made Aware Discussed case with RN   RUE Assessment   RUE Assessment X  (AROM grossly WFL, strength deficit)   RUE Strength   RUE Overall Strength Deficits  (3+/5)   LUE Assessment   LUE Assessment X  (AROM grossly WFL, strength deficit)   LUE Strength   LUE Overall Strength Deficits  (3+/5)   Hand Function   Gross Motor Coordination Impaired   Fine Motor Coordination Functional   Sensation   Light Touch No apparent deficits  (BUEs)   Vision-Basic Assessment   Current Vision Does not wear glasses   Cognition   Overall Cognitive Status Impaired   Arousal/Participation Persistent stimuli required   Attention Difficulty attending to directions   Orientation Level Oriented to person   Memory Decreased recall of recent events   Following Commands Follows one step commands with increased time or repetition   Assessment   Limitation Decreased ADL status; Decreased UE strength;Decreased Safe judgement during ADL;Decreased cognition;Decreased endurance;Decreased self-care trans;Decreased high-level ADLs   Prognosis Good   Assessment Patient is a [de-identified] y o  female seen for OT evaluation s/p admit to [id number] [place] [address] on 3/6/2022 w/Acute encephalopathy  Commorbidities affecting patient's functional performance at time of assessment include: atrial fibrillation, hypertension, COPD, frequent UTI, dementia, anxiety, acute on chronic diastolic heart failure, C  Difficile diarrhea, and sleep apnea  Orders placed for OT evaluation and treatment  Performed at least two patient identifiers during session including name and wristband  Prior to admission, Pt is a poor historian; pt with increased agitation when asking questions  Information regarding home setup and PLOF obtained from chart review  Personal factors affecting patient at time of initial evaluation include: limited caregiver support, decreased initiation and engagement, difficulty performing ADLs and difficulty performing IADLs  Upon evaluation, patient requires moderate and maximal assist for UB ADLs, maximal and total assist for LB ADLs  Occupational performance is affected by the following deficits: orientation, attention span, irritability, flat affect, impulsive behavior, decreased functional use of BUEs, decreased muscle strength, degenerative arthritic joint changes, impaired gross motor coordination, impaired fine motor coordination, dynamic sit/ stand balance deficit with poor standing tolerance time for self care and functional mobility, decreased activity tolerance, decreased safety awareness and postural control and postural alignment deficit, requiring external assistance to complete transitional movements    Patient to benefit from continued Occupational Therapy treatment while in the hospital to address deficits as defined above and maximize level of functional independence with ADLs and functional mobility  Occupational Performance areas to address include: bathing/ shower, dressing, toilet hygiene, transfer to all surfaces, functional mobility, emergency response, health maintenance, IADLs: safety procedures and Leisure Participation  From OT standpoint, recommendation at time of d/c would be Short Term Rehab  Plan   Treatment Interventions ADL retraining;Functional transfer training;UE strengthening/ROM; Endurance training;Cognitive reorientation;Patient/family training; Compensatory technique education;Continued evaluation; Energy conservation; Activityengagement   Goal Expiration Date 03/23/22   OT Frequency 3-5x/wk   Recommendation   OT Discharge Recommendation Post acute rehabilitation services   AM-PAC Daily Activity Inpatient   Lower Body Dressing 1   Bathing 1   Toileting 2   Upper Body Dressing 2   Grooming 2   Eating 3   Daily Activity Raw Score 11   Daily Activity Standardized Score (Calc for Raw Score >=11) 29 04   AM-PAC Applied Cognition Inpatient   Following a Speech/Presentation 2   Understanding Ordinary Conversation 2   Taking Medications 1   Remembering Where Things Are Placed or Put Away 2   Remembering List of 4-5 Errands 1   Taking Care of Complicated Tasks 1   Applied Cognition Raw Score 9   Applied Cognition Standardized Score 22 48   Barthel Index   Feeding 5   Bathing 0   Grooming Score 0   Dressing Score 5   Bladder Score 5   Bowels Score 10   Toilet Use Score 5   Transfers (Bed/Chair) Score 5   Mobility (Level Surface) Score 0   Stairs Score 0   Barthel Index Score 35       Occupational Therapy goals: In 5-7 days:  1- Patient will complete self feeding task with set up assist  2- Patient will complete rolling to R/L with use of side rail and min assist x1    3- Patient will increase OOB/ sitting tolerance to 2-4 hours per day for increased participation in self care and leisure tasks with no s/s of exertion    4- Patient will verbalize and demonstrate weight shifting technique in bed and sitting position with 10% verbal cues  5- Patient will complete grooming task with set up assist  6-Patient/ Family  will demonstrate competency with UE Home Exercise Program   7- Patient will complete Interest checklist to explore participation in play/ leisure tasks for increased satisfaction and quality of life

## 2022-03-09 NOTE — PHYSICAL THERAPY NOTE
Physical Therapy Evaluation     Patient's Name: Radha Doe    Admitting Diagnosis  UTI (urinary tract infection) [N39 0]  Altered mental status [R41 82]  UTI symptoms [R39 9]    Problem List  Patient Active Problem List   Diagnosis    Atrial fibrillation ([place] [address] )    Hypertension    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[place] [address] )    PAD (peripheral artery disease) ([place] [address] )    Morbid obesity due to excess calories ([place] [address] )    Acute encephalopathy    COPD (chronic obstructive pulmonary disease) ([place] [address] )    Overactive bladder    Moderate episode of recurrent major depressive disorder ([facility])    Chronic pain    Physical deconditioning    Bipolar affective disorder, currently manic, moderate ([facility])    CHF (congestive heart failure) ([place] [address] )    Pacemaker    Frequent UTI    Dementia ([place] [address] )    Generalized weakness    Fungal infection of the groin    Anxiety    Acute on chronic diastolic heart failure ([facility])    C  difficile diarrhea    Sleep apnea     Past Medical History  Past Medical History:   Diagnosis Date    A-fib ([place] [address] )     Anxiety     Chronic pain     Chronic respiratory failure with hypoxia ([facility])     COPD (chronic obstructive pulmonary disease) ([facility])     Dementia ([facility])     Dorsalgia, unspecified     Edema     Encephalopathy     GERD (gastroesophageal reflux disease)     Hypertension     Morbid obesity ([facility])     Muscle weakness (generalized)     Opioid dependence ([facility])     Overactive bladder     Pneumonia     Psychiatric disorder     anxiety, bipolar    Radiculopathy of thoracolumbar region     Sciatica     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 ([place] [address] )     Urinary incontinence     UTI (urinary tract infection)      Past Surgical History  Past Surgical History:   Procedure Laterality Date    APPENDECTOMY      BACK SURGERY      CHOLECYSTECTOMY      CYSTOSCOPY  11/16/2021    Dr Greg Gagnon        03/09/22 0859   PT Last Visit PT Visit Date 03/09/22   Note Type   Note type Evaluation   Pain Assessment   Pain Assessment Tool 0-10   Pain Score No Pain   Restrictions/Precautions   Weight Bearing Precautions Per Order No   Other Precautions Cognitive; Bed Alarm; Chair Alarm;Multiple lines; Fall Risk;Telemetry;Contact/isolation;O2   Home Living   Type of [address] Two level; Able to live on main level with bedroom/bathroom;Stairs to enter with rails  ([address])   [id number];Walker; Wheelchair-manual   Prior Function   Level of [place] Needs assistance with ADLs and functional mobility; Needs assistance with IADLs   Lives With Daughter   Receives Help From Family  (caregiver)   ADL Assistance Needs assistance   IADLs Needs assistance   Falls in the last 6 months   (unknown)   Comments Pt is a poor historian; pt with increased agitation when asking questions  Information regarding home setup and PLOF obtained from chart review  General   Family/Caregiver Present No   Cognition   Overall Cognitive Status Impaired   Arousal/Participation Alert   Orientation Level Oriented to person   Memory Decreased recall of recent events   Following Commands Follows one step commands with increased time or repetition   Comments Pt agreeable to PT with encouragement  Pt with increased agitation throughout the session  Subjective   Subjective "I want to sleep"   RUE Assessment   RUE Assessment   (defer to OT assessment)   LUE Assessment   LUE Assessment   (defer to OT assessment)   RLE Assessment   RLE Assessment X   Strength RLE   RLE Overall Strength 3+/5  (grossly assessed with functional mobility)   LLE Assessment   LLE Assessment X   Strength LLE   LLE Overall Strength 3+/5  (grossly assessed with functional mobility)   Light Touch   RLE Light Touch Grossly intact   LLE Light Touch Grossly intact   Bed Mobility   Rolling R 3  Moderate assistance   Additional items Assist x 1;Bedrails; Increased time required;Verbal cues;LE management   Rolling L 3  Moderate assistance   Additional items Assist x 1;Bedrails; Increased time required;Verbal cues;LE management   Supine to Sit 3  Moderate assistance   Additional items Assist x 2;HOB elevated; Bedrails; Increased time required;Verbal cues;LE management   Sit to Supine 3  Moderate assistance   Additional items Assist x 2;HOB elevated; Bedrails; Increased time required;Verbal cues;LE management   Transfers   Sit to Stand 3  Moderate assistance   Additional items Assist x 2; Increased time required;Verbal cues   Stand to Sit 3  Moderate assistance   Additional items Assist x 2; Increased time required;Verbal cues   Ambulation/Elevation   Gait pattern Excessively slow; Step to;Short stride; Shuffling   Gait Assistance 3  Moderate assist   Additional items Assist x 2;Verbal cues   Assistive Device Rolling walker   Distance 3 side steps at EOB   Stair Management Assistance Not tested   Balance   Static Sitting Fair   Dynamic Sitting Fair -   Static Standing Poor +   Dynamic Standing Poor   Ambulatory Poor   Endurance Deficit   Endurance Deficit Yes   Endurance Deficit Description decreased activity tolerance   Activity Tolerance   Activity Tolerance Patient limited by fatigue;Treatment limited secondary to agitation   Medical Staff Made Aware OT Marisabel Limon   Nurse Made Aware Discussed case with RN   Assessment   Prognosis Good   Problem List Decreased strength;Decreased endurance; Impaired balance;Decreased mobility;Obesity; Decreased cognition   Assessment Pt is [de-identified]year old female seen for PT evaluation s/p admit [facility] on 3/6/2022 with Acute encephalopathy  PT consulted to assess pt's functional mobility and d/c needs  Order placed for PT eval and tx  Comorbidities affecting pt's physical performance at time of assessment include atrial fibrillation, hypertension, COPD, frequent UTI, dementia, anxiety, acute on chronic diastolic heart failure, C  Difficile diarrhea, and sleep apnea   PTA, pt was requiring assist for mobility  Pt resides in a house with her daughter and has a caregiver  Personal factors affecting pt at time of IE include lives in a two story house, ambulating with an assistive device, stairs to enter home, inability to ambulate household distances, inability to navigate level surfaces without external assistance, unable to perform dynamic tasks in community, decreased cognition, inability to perform IADLs, inability to perform ADLs, and inability to live alone  Please find objective findings from PT assessment regarding body systems outlined above with impairments and limitations including weakness, impaired balance, decreased endurance, gait deviations, decreased activity tolerance, decreased functional mobility tolerance, fall risk, and decreased cognition  The following objective measures performed on IE also reveal limitations: Barthel Index: 35/100, Modified [place]: 4 (moderate/severe disability) and -PAC 6-Clicks: 3/46  Pt's clinical presentation is currently unstable/unpredictable seen in pt's presentation of need for ongoing medical management/monitoring, pt is a fall risk, and pt requires cues/assist of two for safety with functional mobility  Pt to benefit from continued PT tx to address deficits as defined above and maximize level of functional independent mobility and consistency  From PT/mobility standpoint, recommendation at time of d/c would be STR pending progress in order to facilitate return to PLOF     Barriers to Discharge Inaccessible home environment;Decreased caregiver support   Goals   STG Expiration Date 03/19/22   Short Term Goal #1 In 7-10 days: Increase bilateral LE strength 1/2 grade to facilitate independent mobility, Perform all bed mobility tasks with min A of 1 to decrease caregiver burden, Perform all transfers with min A of 1 to improve independence, Ambulate > 50 ft  with RW with min A of 1 w/o LOB and w/ normalized gait pattern 100% of the time and PT to see and establish goals for stair if/when appropriate   Plan   Treatment/Interventions Functional transfer training;LE strengthening/ROM; Therapeutic exercise; Endurance training;Cognitive reorientation;Patient/family training;Bed mobility;Gait training;Spoke to nursing;OT   PT Frequency 3-5x/wk   Recommendation   PT Discharge Recommendation Post acute rehabilitation services   AM-PAC Basic Mobility Inpatient   Turning in Bed Without Bedrails 2   Lying on Back to Sitting on Edge of Flat Bed 1   Moving Bed to Chair 1   Standing Up From Chair 1   Walk in Room 1   Climb 3-5 Stairs 1   Basic Mobility Inpatient Raw Score 7   Turning Head Towards Sound 4   Follow Simple Instructions 3   Low Function Basic Mobility Raw Score 14   Low Function Basic Mobility Standardized Score 22 01   Highest Level Of Mobility   JH-HL Goal 2: Bed activities/Dependent transfer   JH-HLM Highest Level of Mobility 3: Sit at edge of bed   -HLM Goal Achieved Yes   Modified [place] Scale   Modified [place] Scale 4   Barthel Index   Feeding 5   Bathing 0   Grooming Score 0   Dressing Score 5   Bladder Score 5   Bowels Score 10   Toilet Use Score 5   Transfers (Bed/Chair) Score 5   Mobility (Level Surface) Score 0   Stairs Score 0   Barthel Index Score 35     PT Evaluation Time: 7315-7521    Melania Menjivar, PT, DPT

## 2022-03-09 NOTE — PROGRESS NOTES
[address] [facility]  Progress Note Mehul Lose 1940, [de-identified] y o  female MRN: [id number]  Unit/Bed#: MS K[facility] Encounter: [id number]  Primary Care Provider: LUCY Lee   Date and time admitted to hospital: 3/6/2022  9:59 AM    * Acute encephalopathy  Assessment & Plan  · Patient with baseline mild dementia here with increased confusion with CT head no acute abnormality, electrolytes unremarkable  · Baseline mentation per daughter (POA) A&Ox4 however does wax and wane, now improved, back to baseline mentation   · Initially suspected 2/2 recurrent UTI however urine cx <10,000 gram negative brayan-like, therefore IV abx discontinued after 2 days; unclear etiology possible in setting of acute CHF exacerbation  · CT head no acute findings  · Vit B12, TSH WNL  · Minimize sedative use, close monitoring of mental status  · PT/OT recommend STR- pending authorization at [address] [place] [address]   · Patient currently lives with daughter ([address] [facility]) eports difficulty caring for patient at home     Frequent UTI  Assessment & Plan  · H/o ESBL UTI here with confusion, dysuria, frequency, subjective fever and chills  · UA with leukocytes, nitrites, bacteria  · Was on cefepime and switched to Ertapenem per prior C&S results which were discontinued after 2 days based on urine cx   · Urine cx showed <10,000 gram negative rods   · Blood cultures negative     Acute on chronic diastolic heart failure (HCC)  Assessment & Plan  Wt Readings from Last 3 Encounters:   03/07/22 122 kg (268 lb)   10/19/21 122 kg (268 lb)   09/29/21 122 kg (268 lb)   · Increased shortness of breath, elevated [id number], troponin 11>10, EKG nonischemic likely CHF exacerbation  · 6/2017 Echo ventricle was dilated with systolic function was low normal, EF 55%  · Repeat Echo stable   · CXR shows mild CHF  · Was on PO lasix 20mg daily PTA   · Was on IV lasix 20mg daily, will transition to PO lasix 40mg qdaily tomorrow    · Renal function tolerating diuresis  · Strict I/Os-Daily weight  · Salt and fluid restricted diet       C  difficile diarrhea  Assessment & Plan  · Saw GI as outpatient for recurrent diarrhea on 3/01   · On multiple courses of abx for recurrent UTI   · Hx of c diff 12/13 and positive PCR re-tests on 1/21 and 1/25  · Continue PTA vancomycin 250mg qid until outpatient follow up with GI to prevent C diff recurrence as patient was on IV abx while inpatient   · Will place patient on probiotic  · Contact precaution    Dementia ([facility] )  Assessment & Plan  · Mild frontal lobe dementia  · Continue home regimen zoloft and trazodone     Sleep apnea  Assessment & Plan  · Refuses CPAP  · Not on oxygen at home     [address]  · PDMP reviewed continue xanax 1mg BID PRN    COPD (chronic obstructive pulmonary disease) ([facility])  Assessment & Plan  · Albuterol p r n  Hypertension  Assessment & Plan  · BP stable   · Continue on Norvasc and losartan  · Monitor on diuresis     Atrial fibrillation ([facility])  Assessment & Plan  · Rate controlled   · Continue Eliquis           VTE Pharmacologic Prophylaxis: VTE Score: 8 High Risk (Score >/= 5) - Pharmacological DVT Prophylaxis Ordered: apixaban (Eliquis)  Sequential Compression Devices Ordered  Patient Centered Rounds: I performed bedside rounds with nursing staff today  Discussions with Specialists or Other Care Team Provider: CM, RN    Education and Discussions with Family / Patient: Attempted to update  (daughter) via phone  Left voicemail  Updated caregiver at bedside  Time Spent for Care: 30 minutes  More than 50% of total time spent on counseling and coordination of care as described above      Current Length of Stay: 2 day(s)  Current Patient Status: Inpatient   Certification Statement: The patient will continue to require additional inpatient hospital stay due to pending rehab auth   Discharge Plan: asap pending rehab auth     Code Status: Level 3 - DNAR and DNI    Subjective: Patient was lying in bed comfortably with caregiver at bedside  No complaints at this time  Reports that she does not know why her daughter sent her to the hospital     Objective:     Vitals:   Temp (24hrs), Av 8 °F (37 1 °C), Min:98 7 °F (37 1 °C), Max:98 9 °F (37 2 °C)    Temp:  [98 7 °F (37 1 °C)-98 9 °F (37 2 °C)] 98 9 °F (37 2 °C)  HR:  [60-92] 60  Resp:  [20-31] 20  BP: (120-139)/(60-70) 139/70  SpO2:  [96 %-100 %] 96 %  Body mass index is 43 26 kg/m²  Input and Output Summary (last 24 hours): Intake/Output Summary (Last 24 hours) at 3/9/2022 1509  Last data filed at 3/9/2022 0601  Gross per 24 hour   Intake 200 ml   Output 550 ml   Net -350 ml       Physical Exam:   Physical Exam  Constitutional:       Appearance: She is obese  Neurological:      Mental Status: She is alert  Additional Data:     Labs:  Results from last 7 days   Lab Units 22  0547   WBC Thousand/uL 6 85   HEMOGLOBIN g/dL 12 8   HEMATOCRIT % 38 8   PLATELETS Thousands/uL 158   NEUTROS PCT % 56   LYMPHS PCT % 31   MONOS PCT % 9   EOS PCT % 3     Results from last 7 days   Lab Units 22  0547 22  0355 22  1042   SODIUM mmol/L 137   < > 142   POTASSIUM mmol/L 4 3   < > 3 9   CHLORIDE mmol/L 103   < > 106   CO2 mmol/L 26   < > 30   BUN mg/dL 12   < > 10   CREATININE mg/dL 0 93   < > 0 91   ANION GAP mmol/L 8   < > 6   CALCIUM mg/dL 9 2   < > 9 1   ALBUMIN g/dL  --   --  3 0*   TOTAL BILIRUBIN mg/dL  --   --  0 33   ALK PHOS U/L  --   --  62   ALT U/L  --   --  19   AST U/L  --   --  19   GLUCOSE RANDOM mg/dL 107   < > 115    < > = values in this interval not displayed       Results from last 7 days   Lab Units 22  1042   INR  1 42*             Results from last 7 days   Lab Units 22  1042   LACTIC ACID mmol/L 1 6       Lines/Drains:  Invasive Devices  Report    Peripheral Intravenous Line            Peripheral IV 22 Distal;Right;Ventral (anterior) Forearm <1 day    Peripheral IV 03/08/22 Proximal;Right;Ventral (anterior) Forearm <1 day          Drain            External Urinary Catheter 2 days                      Imaging: No pertinent imaging reviewed  Recent Cultures (last 7 days):   Results from last 7 days   Lab Units 03/06/22  1149 03/06/22  1042   BLOOD CULTURE   --  No Growth at 48 hrs  No Growth at 48 hrs  URINE CULTURE  <10,000 cfu/ml Gram Negative Jonh Enteric Like*  --        Last 24 Hours Medication List:   Current Facility-Administered Medications   Medication Dose Route Frequency Provider Last Rate    albuterol  2 puff Inhalation Q4H PRN Errol Heath MD      ALPRAZolam  1 mg Oral BID PRN Franklin Gates PA-C      amLODIPine  10 mg Oral Daily Errol Heath MD      apixaban  5 mg Oral BID Errol Heath MD      calcium carbonate-vitamin D  1 tablet Oral BID With Meals Errol Heath MD      ferrous sulfate  325 mg Oral Daily With Breakfast MD Nikki Montesinos ON 3/10/2022] furosemide  40 mg Oral Daily Franklin Gates PA-C      gabapentin  600 mg Oral TID Errol Heath MD      latanoprost  1 drop Both Eyes HS Errol Heath MD      loratadine  10 mg Oral Daily Errol Heath MD      losartan  25 mg Oral Daily Errol Heath MD      pantoprazole  40 mg Oral Daily Before Breakfast Errol Heath MD      saccharomyces boulardii  250 mg Oral BID Franklin Gates PA-C      sertraline  100 mg Oral Daily Errol Heath MD      traZODone  150 mg Oral HS Errol Heath MD      vancomycin  250 mg Oral Q6H Zulema Waddell MD          Today, Patient Was Seen By: Franklin Gates PA-C    **Please Note: This note may have been constructed using a voice recognition system  **

## 2022-03-10 ENCOUNTER — NURSING HOME VISIT (OUTPATIENT)
Dept: GERIATRICS | Facility: OTHER | Age: 82
End: 2022-03-10
Payer: COMMERCIAL

## 2022-03-10 ENCOUNTER — TELEPHONE (OUTPATIENT)
Dept: OTHER | Facility: OTHER | Age: 82
End: 2022-03-10

## 2022-03-10 DIAGNOSIS — G31.09 BEHAVIORAL VARIANT FRONTOTEMPORAL DEMENTIA (HCC): Chronic | ICD-10-CM

## 2022-03-10 DIAGNOSIS — I50.33 ACUTE ON CHRONIC DIASTOLIC HEART FAILURE (HCC): Primary | ICD-10-CM

## 2022-03-10 DIAGNOSIS — F41.9 ANXIETY: ICD-10-CM

## 2022-03-10 DIAGNOSIS — I15.8 OTHER SECONDARY HYPERTENSION: ICD-10-CM

## 2022-03-10 DIAGNOSIS — R26.2 AMBULATORY DYSFUNCTION: ICD-10-CM

## 2022-03-10 DIAGNOSIS — F02.81 BEHAVIORAL VARIANT FRONTOTEMPORAL DEMENTIA (HCC): Chronic | ICD-10-CM

## 2022-03-10 DIAGNOSIS — A04.72 C. DIFFICILE DIARRHEA: ICD-10-CM

## 2022-03-10 DIAGNOSIS — I48.0 PAROXYSMAL ATRIAL FIBRILLATION (HCC): ICD-10-CM

## 2022-03-10 DIAGNOSIS — F31.12 BIPOLAR AFFECTIVE DISORDER, CURRENTLY MANIC, MODERATE (HCC): ICD-10-CM

## 2022-03-10 PROCEDURE — 99306 1ST NF CARE HIGH MDM 50: CPT | Performed by: FAMILY MEDICINE

## 2022-03-10 NOTE — UTILIZATION REVIEW
Notification of Discharge   This is a Notification of Discharge from our facility [address]  Please be advised that this patient has been discharge from our facility  Below you will find the admission and discharge date and time including the patients disposition  UTILIZATION REVIEW CONTACT:  Claudell Ice Brendlinger  Utilization   Network Utilization Review Department  Phone: [phone] x carefully listen to the prompts  All voicemails are confidential   Email: [email or web address]     PHYSICIAN ADVISORY SERVICES:  FOR TPOX-SQ-OMBN REVIEW - MEDICAL NECESSITY DENIAL  Phone: [phone]  Fax: [phone]  Email: [email or web address]     PRESENTATION DATE: 3/6/2022  9:59 AM  OBERVATION ADMISSION DATE: 03/06/22  INPATIENT ADMISSION DATE: 3/7/22  7:32 AM   DISCHARGE DATE: 3/9/2022  9:26 PM  DISPOSITION: Non [facility]/S[facility] Non [facility]/SNU      IMPORTANT INFORMATION:  Send all requests for admission clinical reviews, approved or denied determinations and any other requests to dedicated fax number below belonging to the campus where the patient is receiving treatment   List of dedicated fax numbers:  [address] DENIALS (Administrative/Medical Necessity) [phone]   [address] [place] [address] (Maternity/NICU/Pediatrics) [phone]   Lior Chilel [phone]   [address] [facility] [phone]   [address] [place] [address] [phone]   [address] [phone]   [facility]  [phone]   [address] [facility]  [facility] [address] [facility] [phone]

## 2022-03-10 NOTE — PROGRESS NOTES
[facility]  [address] [place] [address] 40, [address] [facility]  Facility: [facility]  NAME: Rigo Dykes  AGE: [de-identified] y o  SEX: female    DATE OF ENCOUNTER: 3/10/2022    Code status:  No CPR DNI/DNR d/w pt's daughter Michelle Hudson  the code status     Assessment and Plan     Acute on chronic diastolic heart failure ([address] )  S/P recent hospitalization, and IV lasix  now is on po Lasix  Ordered daily wt, and labs  Will monitor closely  Anxiety  Hospital script was not signed, DC'd prn xanax  Added low dose of 0 5 TID with holding for lethargy or sedation  Close monitoring  Ambulatory dysfunction  2nd to generalized weakness due to recent hospitalization  Ordered PT/OT to improve gait, transfer, ADLS, strength, endurance  Needs NH care  Behavioral variant frontotemporal dementia  Ordered speech therapy, needs NH Care  On Trazodone, and Zoloft, will monitor closely  C  difficile diarrhea  On Vancomycin, needs NH Care  Atrial fibrillation (HCC)  HR controlled, on Eliquis  Ordered labs  Hypertension  On Losartan  Ordered labs  Will monitor closely  Bipolar affective disorder, currently manic, moderate (HCC)  On Trazodone and Zoloft  Will monitor E-lytes closely  All medications and routine orders were reviewed and updated as needed  Plan discussed with: Pat  57 staff, and pt's daughter Patience Massed     Chief Complaint   Pt has no specific complaint     History of Present Illness   Pt with PMSSFH and medical problem as this note who was admitted to [facility] on 3/6/2022 with acute encephalopathy now is at [facility] since last night for STR, and possible LTC  Pt also had increased LEs edema and SOB  There was a concern for UTI (pt with hx of frequent UTI) with elevated WBC, and leukocytes and nitrates in the urine  She was treated for 2 days with antibiotic which was discontinued after Ucx showed only ,10,000 bacteria   Pt is no Po Vancomycin since January for C-diff, but she still has diarrhea  She was on IV Lasix but then was transitioned to 40 mg of po lasix  Pt also had LIU  Per hospital note daughter has mentioned that pt is A&O X4 but with waxing and waning mentation  Per staff report pt was resistant to care earlier today and right after my visit she was agitated and taking out all her clothing and saying she wants to go out of "here"  I had a long conversation with pt's daughter, she states that mom has had several UTI in the past, and she was also treated on/off with antipsychotic meds but she had tardive dyskinesia as its SE  On today's exam pt kept asking and talking to Zoila Meza and said he was her care giver and daughter also confirmed it  HISTORY:  Past Medical History:   Diagnosis Date    [facility]     Anxiety     Chronic pain     Chronic respiratory failure with hypoxia (HCC)     COPD (chronic obstructive pulmonary disease) (HCC)     Dementia (HCC)     Dorsalgia, unspecified     Edema     Encephalopathy     GERD (gastroesophageal reflux disease)     Hypertension     Morbid obesity ([facility])     Muscle weakness (generalized)     Opioid dependence ([facility])     Overactive bladder     Pneumonia     Psychiatric disorder     anxiety, bipolar    Radiculopathy of thoracolumbar region     Sciatica     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 ([facility] )     Urinary incontinence     UTI (urinary tract infection)      History reviewed  No pertinent family history  Social History     Socioeconomic History    Marital status:       Spouse name: None    Number of children: None    Years of education: None    Highest education level: None   Occupational History    None   Tobacco Use    Smoking status: Former Smoker     Types: Cigarettes     Quit date: 1995     Years since quittin 3    Smokeless tobacco: Never Used   Vaping Use    Vaping Use: Never used   Substance and Sexual Activity    Alcohol use: Never    Drug use: No    Sexual activity: Not Currently   Other Topics Concern    None   Social History Narrative    None     Social Determinants of Health     Financial Resource Strain: Not on file   Food Insecurity: No Food Insecurity    Worried About Running Out of Food in the Last Year: Never true    Oleg of Food in the Last Year: Never true   Transportation Needs: No Transportation Needs    Lack of Transportation (Medical): No    Lack of Transportation (Non-Medical): No   Physical Activity: Not on file   Stress: Not on file   Social Connections: Not on file   Intimate Partner Violence: Not on file   Housing Stability: Low Risk     Unable to Pay for Housing in the Last Year: No    Number of Places Lived in the Last Year: 1    Unstable Housing in the Last Year: No       Allergies: Allergies   Allergen Reactions    Aspirin     Iodinated Diagnostic Agents Throat Swelling     Pt states "when I was 14years old driving home from the hospital I felt like I couldn't think and it went away after 10 minutes"     Iodine - Food Allergy     Nsaids     Other Other (See Comments)     difficulty swallowing for short period       Review of Systems     Review of Systems   Unable to perform ROS: Dementia (overall limited with pt's dementia and delusions )   Musculoskeletal:        Denies any pain    Psychiatric/Behavioral:        "Krystle Cardoso is here !"       Medications and orders     All medications reviewed and updated in Nursing Home EMR  Objective     Vitals: Reviewed     Physical Exam  Vitals and nursing note reviewed  Constitutional:       General: She is not in acute distress  Appearance: She is well-developed  She is obese  She is not ill-appearing, toxic-appearing or diaphoretic  HENT:      Head: Normocephalic and atraumatic  Right Ear: External ear normal       Left Ear: External ear normal       Nose: Nose normal  No congestion or rhinorrhea        Mouth/Throat:      Comments: Missing teeth   Eyes:      General: No scleral icterus  Right eye: No discharge  Left eye: No discharge  Conjunctiva/sclera: Conjunctivae normal       Pupils: Pupils are equal, round, and reactive to light  Cardiovascular:      Rate and Rhythm: Normal rate  Rhythm irregular  Heart sounds: Murmur heard  No friction rub  No gallop  Pulmonary:      Effort: Pulmonary effort is normal  No respiratory distress  Breath sounds: Normal breath sounds  No stridor  No wheezing, rhonchi or rales  Chest:      Chest wall: No tenderness  Abdominal:      General: Bowel sounds are normal  There is no distension  Palpations: Abdomen is soft  There is no mass  Tenderness: There is no abdominal tenderness  There is no guarding or rebound  Hernia: No hernia is present  Comments: Protuberant    Genitourinary:     Comments: Deferred  Musculoskeletal:         General: No swelling, tenderness, deformity or signs of injury  Cervical back: Normal range of motion and neck supple  No rigidity or tenderness  Right lower leg: No edema  Left lower leg: No edema  Comments: Lying in bed  Involuntary movement of upper and lower body   Lymphadenopathy:      Cervical: No cervical adenopathy  Skin:     General: Skin is warm and dry  Coloration: Skin is not jaundiced or pale  Findings: Erythema present  No bruising, lesion or rash  Comments: Didn't examine sacral area  Neurological:      Cranial Nerves: Cranial nerve deficit ([place]) present  Comments: Limited with pt's dementia  Involuntary movement of UEs and LEs, body and tongue    Psychiatric:         Behavior: Behavior normal          Pertinent Laboratory/Diagnostic Studies: The following labs/studies were reviewed please see chart or hospital paperwork for details            Emely Contreras MD  3/10/2022 3:16 PM

## 2022-03-10 NOTE — ASSESSMENT & PLAN NOTE
Hospital script was not signed, DC'd prn xanax  Added low dose of 0 5 TID with holding for lethargy or sedation  Close monitoring

## 2022-03-10 NOTE — ASSESSMENT & PLAN NOTE
S/P recent hospitalization, and IV lasix  now is on po Lasix  Ordered daily wt, and labs  Will monitor closely

## 2022-03-10 NOTE — ASSESSMENT & PLAN NOTE
2nd to generalized weakness due to recent hospitalization  Ordered PT/OT to improve gait, transfer, ADLS, strength, endurance  Needs NH care

## 2022-03-12 LAB
BACTERIA BLD CULT: NORMAL
BACTERIA BLD CULT: NORMAL

## 2022-03-13 ENCOUNTER — TELEPHONE (OUTPATIENT)
Dept: OTHER | Facility: OTHER | Age: 82
End: 2022-03-13

## 2022-03-13 NOTE — TELEPHONE ENCOUNTER
Liz Moody called from [employer] [facility], Unable to Redirect Patient      Paged the on call Tiffanie

## 2022-03-15 ENCOUNTER — TELEPHONE (OUTPATIENT)
Dept: GASTROENTEROLOGY | Facility: CLINIC | Age: 82
End: 2022-03-15

## 2022-03-15 NOTE — TELEPHONE ENCOUNTER
Wilton Andres from a facility in [facility], could not make out the name, [phone], called  To make Virtual OV for patient  Called Liliana back at number and all it did was ring  Will try home number  Called [phone] left message to call the office   Explained the above on machine

## 2022-03-24 ENCOUNTER — NURSING HOME VISIT (OUTPATIENT)
Dept: GERIATRICS | Facility: OTHER | Age: 82
End: 2022-03-24
Payer: COMMERCIAL

## 2022-03-24 ENCOUNTER — TELEPHONE (OUTPATIENT)
Dept: UROLOGY | Facility: CLINIC | Age: 82
End: 2022-03-24

## 2022-03-24 DIAGNOSIS — F02.81 BEHAVIORAL VARIANT FRONTOTEMPORAL DEMENTIA (HCC): Chronic | ICD-10-CM

## 2022-03-24 DIAGNOSIS — G31.09 BEHAVIORAL VARIANT FRONTOTEMPORAL DEMENTIA (HCC): Chronic | ICD-10-CM

## 2022-03-24 DIAGNOSIS — R26.2 AMBULATORY DYSFUNCTION: ICD-10-CM

## 2022-03-24 DIAGNOSIS — F31.12 BIPOLAR AFFECTIVE DISORDER, CURRENTLY MANIC, MODERATE (HCC): ICD-10-CM

## 2022-03-24 DIAGNOSIS — I15.8 OTHER SECONDARY HYPERTENSION: ICD-10-CM

## 2022-03-24 DIAGNOSIS — I48.0 PAROXYSMAL ATRIAL FIBRILLATION (HCC): ICD-10-CM

## 2022-03-24 DIAGNOSIS — A04.72 C. DIFFICILE DIARRHEA: Primary | ICD-10-CM

## 2022-03-24 PROCEDURE — 99309 SBSQ NF CARE MODERATE MDM 30: CPT | Performed by: FAMILY MEDICINE

## 2022-03-24 NOTE — TELEPHONE ENCOUNTER
Facility called to reschedule patient's apt  She is scheduled for 3/30   The facility stated pt is being treated for C Diff and they want to know if apt should be virtual

## 2022-03-24 NOTE — TELEPHONE ENCOUNTER
Called and spoke with Annemarie Workman at [facility]  Appointment changed to virtual on 3/30/22 per Dorinda Mckenna  Facility prefers telephone call as they sometimes have issues setting up video visits  Best phone to reach patient at for appointment is [phone]  Annemarie Workman is faxing a copy of patients recent labs and urine culture results to the [place] office to be reviewed by Dorinda Mckenna prior to appointment   Will give test results to CRNP upon arrival

## 2022-03-24 NOTE — ASSESSMENT & PLAN NOTE
Still with some loose stool, last one reported was last night, on Vancomycin  Will have virtual visit with GI, end date for vanco to be determined by GI  Labs have been stable  Will continue with monitoring

## 2022-03-24 NOTE — PROGRESS NOTES
[facility]  Leonor Hernandez 81  [phone]  Facility: [facility]          NAME: Lender Keepers  AGE: [de-identified] y o  SEX: female    DATE OF ENCOUNTER: 3/24/2022    Chief Complaint     Pt has no specific complaint  " I think I will be having bowel movement!"    History of Present Illness     HPI    The following portions of the patient's history were reviewed and updated as appropriate (from facility chart and hospital records): allergies, current medications, past family history, past medical history, past social history, past surgical history and problem list pt was seen and examined for f/u on C-diff, Ambulatory dysfunction, Dementia, Afib, Anxiety disorder, CHF, HTN, and Bipolar disorder  Pt continues with NH care  Had some hematuria last week and her Eliquis was held, in was decreased in amount and Eliquis was restarted couple of days ago  Per nursing staff her urine is more brownish discoloration  BP stable  Has been loosing wt since admission but meal completion has been good  HR controlled  Per staff report pt had loose stool last night  Mood stable  Per Speech therapist staying on a task is very challenging for the patient  Pt gets very distracted, and overall psych plays an important rule in pt's limitation  Pt has been walking short distance with therapy  She denies having pain, states that someone calling the horses and says she likes horses  No sign or symptoms of CHF exacerbation  Pt will have f/u with renal and GI in near future, continues with po vancomycin  Review of Systems     Review of Systems   Unable to perform ROS: Dementia   Psychiatric/Behavioral:        She reports someone calling a horse!        Active Problem List     Patient Active Problem List   Diagnosis    [facility]    Hypertension    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[facility]    PAD (peripheral artery disease) ([address]    Morbid obesity due to excess calories ([address]    Acute encephalopathy    [facility]) [facility]    Overactive bladder    Moderate episode of recurrent major depressive disorder ([facility])    Chronic pain    Physical deconditioning    Bipolar affective disorder, currently manic, moderate ([facility])    CHF (congestive heart failure) ([facility])    Pacemaker    Frequent UTI    Dementia ([facility])    Generalized weakness    Fungal infection of the groin    Anxiety    Acute on chronic diastolic heart failure ([facility])    C  difficile diarrhea    Sleep apnea    Ambulatory dysfunction       Objective     Vitals: Reviewed     Physical Exam  Vitals reviewed  Constitutional:       General: She is not in acute distress  Appearance: She is well-developed  She is obese  She is not ill-appearing, toxic-appearing or diaphoretic  HENT:      Head: Normocephalic and atraumatic  Right Ear: External ear normal       Left Ear: External ear normal       Nose: Nose normal  No congestion or rhinorrhea  Mouth/Throat:      Mouth: Mucous membranes are moist       Comments: Missing teeth   Eyes:      General: No scleral icterus  Right eye: No discharge  Left eye: No discharge  Extraocular Movements: Extraocular movements intact  Conjunctiva/sclera: Conjunctivae normal       Pupils: Pupils are equal, round, and reactive to light  Cardiovascular:      Rate and Rhythm: Normal rate and regular rhythm  Heart sounds: Normal heart sounds  No murmur heard  No friction rub  No gallop  Pulmonary:      Effort: Pulmonary effort is normal  No respiratory distress  Breath sounds: Normal breath sounds  No stridor  No wheezing, rhonchi or rales  Chest:      Chest wall: No tenderness  Abdominal:      General: Bowel sounds are normal  There is no distension  Palpations: Abdomen is soft  There is no mass  Tenderness: There is no abdominal tenderness  There is no guarding or rebound  Hernia: No hernia is present  Genitourinary:     Comments: Deferred  Musculoskeletal:         General: No swelling, tenderness, deformity or signs of injury  Cervical back: Normal range of motion and neck supple  No rigidity or tenderness  Right lower leg: No edema  Left lower leg: No edema  Comments: In bed, lying, limited ROM   Lymphadenopathy:      Cervical: No cervical adenopathy  Skin:     General: Skin is warm and dry  Coloration: Skin is not jaundiced or pale  Findings: Erythema present  No bruising, lesion or rash  Comments: Didn't examine sacral area  Neurological:      General: No focal deficit present  Cranial Nerves: Cranial nerve deficit present  Comments: Forgetful  Has involuntary movement of tongue and body, more in Upper body  Pertinent Laboratory/Diagnostic Studies:  3/22: CBC, 3/15: BMP    Current Medications   Medication list in facility chart was reviewed and no changes made  Assessment and Plan   C  difficile diarrhea  Still with some loose stool, last one reported was last night, on Vancomycin  Will have virtual visit with GI, end date for vanco to be determined by GI  Labs have been stable  Will continue with monitoring  Atrial fibrillation ([facility] [address] )  HR controlled, Eliquis was on held for few days, back on ELiquis, CBC WNL on 3/22  Bipolar affective disorder, currently manic, moderate (HCC)  On Trazodone, and Zoloft, and Gabapentin  last lab on 3/15 stable  Hypertension  BP stable with Losartan, labs stable on 3/15  Behavioral variant frontotemporal dementia  Needs NH care  Speech on board  Ambulatory dysfunction  Continues with NH care, on therapy  Walks with therapy       Hetal Varner MD  2/56/33198:30 PM

## 2022-03-25 ENCOUNTER — TELEPHONE (OUTPATIENT)
Dept: FAMILY MEDICINE CLINIC | Facility: CLINIC | Age: 82
End: 2022-03-25

## 2022-03-25 NOTE — TELEPHONE ENCOUNTER
daughter of Cecille Ramos wants you to contact her as soon as possible it is about the patient thank you

## 2022-03-28 ENCOUNTER — TELEPHONE (OUTPATIENT)
Dept: FAMILY MEDICINE CLINIC | Facility: CLINIC | Age: 82
End: 2022-03-28

## 2022-03-28 NOTE — TELEPHONE ENCOUNTER
Called and spoke to Dana on 3/25  She is concerned about Mariama's care at [facility]  She is concerned about Amanda Galeano having another UTI  She states she has expressed this to the staff, but they cannot obtain a straight cath for a clean catch  Suellenflavio Flor to express her concerns again to the staff

## 2022-03-29 ENCOUNTER — TELEPHONE (OUTPATIENT)
Dept: GERIATRICS | Facility: OTHER | Age: 82
End: 2022-03-29

## 2022-03-29 ENCOUNTER — TELEPHONE (OUTPATIENT)
Dept: UROLOGY | Facility: CLINIC | Age: 82
End: 2022-03-29

## 2022-03-29 DIAGNOSIS — I10 HYPERTENSION: Chronic | ICD-10-CM

## 2022-03-29 RX ORDER — LOSARTAN POTASSIUM 25 MG/1
25 TABLET ORAL DAILY
Qty: 90 TABLET | Refills: 0 | Status: SHIPPED | OUTPATIENT
Start: 2022-03-29

## 2022-03-29 NOTE — TELEPHONE ENCOUNTER
Called pt's son Billy Shanks per his request and had a long conversation with him regarding pt's care, and her increased behaviors  Billy Vaughananne marie mentioned that patient would need IV antibiotic, and I explained with pt's prolonged presentation and behaviors since admission to the facility it is unlikely that her behaviors are due to UTI and it is more related to her dementia and her psychiatric disorder, and "blindy" treating a possible UIT with IV antibiotic is not safe and a wise treatment for patient specially that pt is currently getting treatment for C-diff infection as a result of being treated with antibiotic in the past  I informed son regarding pt's hematuria and the fact that we will be testing pt's urine for possible infection that is causing hematuria  I also let son know that pt will have a near future virtual visit with urology and GI  Per staff report pt's POA (jackie) was not signing agreement for pt to be seen by psych, but now it has been approved  I mentioned to Billy Shanks that psych would see pt and will give us recommendations which would be reviewed by me before it gets approved

## 2022-03-30 ENCOUNTER — TELEMEDICINE (OUTPATIENT)
Dept: UROLOGY | Facility: CLINIC | Age: 82
End: 2022-03-30
Payer: COMMERCIAL

## 2022-03-30 ENCOUNTER — TELEPHONE (OUTPATIENT)
Dept: UROLOGY | Facility: CLINIC | Age: 82
End: 2022-03-30

## 2022-03-30 DIAGNOSIS — N39.0 FREQUENT UTI: Primary | ICD-10-CM

## 2022-03-30 DIAGNOSIS — R31.0 GROSS HEMATURIA: ICD-10-CM

## 2022-03-30 PROCEDURE — 99213 OFFICE O/P EST LOW 20 MIN: CPT | Performed by: NURSE PRACTITIONER

## 2022-03-30 RX ORDER — CIPROFLOXACIN 500 MG/1
500 TABLET, FILM COATED ORAL EVERY 12 HOURS SCHEDULED
Qty: 14 TABLET | Refills: 0 | Status: SHIPPED | OUTPATIENT
Start: 2022-03-30 | End: 2022-04-06

## 2022-03-30 RX ORDER — CONJUGATED ESTROGENS 0.62 MG/G
CREAM VAGINAL
Qty: 30 G | Refills: 6 | Status: SHIPPED | OUTPATIENT
Start: 2022-03-30 | End: 2022-07-19 | Stop reason: SDUPTHER

## 2022-03-30 RX ORDER — METHENAMINE HIPPURATE 1000 MG/1
1 TABLET ORAL 2 TIMES DAILY WITH MEALS
Qty: 60 TABLET | Refills: 12 | Status: SHIPPED | OUTPATIENT
Start: 2022-03-30

## 2022-03-30 NOTE — PROGRESS NOTES
Assessment and plan:     No problem-specific Assessment & Plan notes found for this encounter  LUCY Melchor    History of Present Illness     Champ Cadet is a [de-identified] y o  female who resides in [facility] who was initially referred for possible bladder lesion noted on CT scan from outside imaging  She has multiple medical comorbidities including frontotemporal dementia, congestive heart failure, morbid obesity with a BMI of 43 and chronic pain  Patient is bedbound  Patient underwent outside imaging which described a potential subcentimeter nodule within the lumen of the bladder  she had a negative cystoscopy November 2021 by Dr Vicente Correa  She has ongoing gross hematuria  She presents today via telemedicine visit due to recurrent urinary tract infections  Laboratory     Lab Results   Component Value Date    BUN 12 03/09/2022    CREATININE 0 93 03/09/2022       No components found for: GFR    Lab Results   Component Value Date    GLUCOSE 103 06/10/2017    CALCIUM 9 2 03/09/2022    K 4 3 03/09/2022    CO2 26 03/09/2022     03/09/2022       Lab Results   Component Value Date    WBC 6 85 03/09/2022    HGB 12 8 03/09/2022    HCT 38 8 03/09/2022    MCV 85 03/09/2022     03/09/2022       No results found for: PSA    No results found for this or any previous visit (from the past 1 hour(s))  @RESULT(URINEMICROSCOPIC)@    @RESULT(URINECULTURE)@    Radiology     CT ABDOMEN AND PELVIS WITHOUT IV CONTRAST 12/07/2021     INDICATION:   hematuria, diarrhea  Hematuria 4 months and diarrhea for approximately 4 months  Cholecystectomy    Appendectomy      COMPARISON:  CT abdomen pelvis without contrast 3/10/2021     TECHNIQUE:  CT examination of the abdomen and pelvis was performed without intravenous contrast   Axial, sagittal, and coronal 2D reformatted images were created from the source data and submitted for interpretation       Radiation dose length product (DLP) for this visit: 1657 mGy-cm   This examination, like all CT scans performed in the [facility], was performed utilizing techniques to minimize radiation dose exposure, including the use of iterative   reconstruction and automated exposure control       Enteric contrast was not administered  Evaluation is limited without the use of intravenous contrast      FINDINGS:     ABDOMEN     LOWER CHEST:  Heart is enlarged  Partially visualized cardiac lead  Coronary artery and annular calcification  Mildly dilated gas-filled esophagus with small air-fluid level  Small hiatal hernia  Stable appearance lung bases      LIVER/BILIARY TREE:  Hepatic steatosis  Nodular contours of the liver suggestive of  No discrete mass identified within the limitations of the exam      GALLBLADDER:  Gallbladder is surgically absent      SPLEEN:  Unremarkable      PANCREAS:  Stable appearance with severe atrophy and near completely fatty replaced with coarse calcifications at the downstream pancreas      ADRENAL GLANDS:  Unremarkable      KIDNEYS/URETERS:  4 mm interpole and punctate lower pole nonobstructing left renal calculi  No hydronephrosis      STOMACH AND BOWEL:  There is colonic diverticulosis without evidence of acute diverticulitis      APPENDIX:  There are expected postoperative changes of appendectomy      ABDOMINOPELVIC CAVITY:  No ascites  No pneumoperitoneum  No lymphadenopathy      VESSELS:  Atherosclerotic changes are present  No evidence of aneurysm      PELVIS     REPRODUCTIVE ORGANS:  Unremarkable for patient's age      URINARY BLADDER:  Collapsed with suggestion of surrounding inflammation      ABDOMINAL WALL/INGUINAL REGIONS:  There is a small fat-containing umbilical hernia  Tubing external to the patient between the thighs with what appears to be is an elongated inflated balloon   Mild body wall edema      OSSEOUS STRUCTURES:  Similar appearance of the lumbosacral spine and pelvis status post fixation  Hardware appears similar from prior  L5-S1 spondylolisthesis and to a lesser extent  L3-4  Prior ORIF of the right femur  Similar appearance of pelvic   bones      IMPRESSION:     Urinary bladder is collapsed  There is suggestion of some pericystic inflammation which may relate to cystitis      Tubing external to the patient between the thighs as described      Additional findings as above  Review of Systems     Review of Systems                Allergies     Allergies   Allergen Reactions    Aspirin     Iodinated Diagnostic Agents Throat Swelling     Pt states "when I was 14years old driving home from the hospital I felt like I couldn't think and it went away after 10 minutes"     Iodine - Food Allergy     Nsaids     Other Other (See Comments)     difficulty swallowing for short period       Physical Exam     Physical Exam    Vital Signs     There were no vitals filed for this visit      Current Medications       Current Outpatient Medications:     ALPRAZolam (XANAX) 1 mg tablet, Take 0 5 mg by mouth 3 (three) times a day Hold for sedation and lethargy  , Disp: , Rfl:     amLODIPine (NORVASC) 10 mg tablet, Take 1 tablet (10 mg total) by mouth daily, Disp: 90 tablet, Rfl: 1    Baclofen 5 MG TABS, TAKE 1 TABLET EVERY MORNING, Disp: 90 tablet, Rfl: 1    calcium carbonate-vitamin D (OSCAL-D) 500 mg-200 units per tablet, Take 1 tablet by mouth 2 (two) times a day with meals  , Disp: , Rfl:     cholestyramine (QUESTRAN) 4 g packet, 1 packet mixed with 8 oz liquids and drink 4 times daily  Must be dosed 1 hour apart from other medications, Disp: 90 packet, Rfl: 3    Disposable Gloves (Latex Gloves) MISC, Use 4 (four) times a day as needed (cleaning) XLARGE, Disp: 100 each, Rfl: 3    Eliquis 5 MG, TAKE 1 TABLET BY MOUTH TWICE A DAY, Disp: 180 tablet, Rfl: 1    ferrous sulfate 325 (65 Fe) mg tablet, TAKE 1 TABLET BY MOUTH EVERY DAY WITH BREAKFAST, Disp: 90 tablet, Rfl: 1    fluticasone (EQL Fluticasone Propionate) 50 mcg/act nasal spray, 1 spray into each nostril daily, Disp: 16 g, Rfl: 3    furosemide (LASIX) 40 mg tablet, Take 1 tablet (40 mg total) by mouth daily, Disp: 30 tablet, Rfl: 0    gabapentin (NEURONTIN) 600 MG tablet, TAKE 1 TABLET BY MOUTH THREE TIMES A DAY, Disp: 270 tablet, Rfl: 1    Incontinence Supply Disposable (PROCare Bariatric Briefs) MISC, Use every 2 (two) hours as needed (incontinence), Disp: 120 each, Rfl: 1    Incontinence Supply Disposable (SAPS health Incontinence Pads) MISC, Use 1 each every 2 (two) hours, Disp: 150 each, Rfl: 1    Incontinence Supply Disposable (Wings Adult Briefs XXL) MISC, Use every 2 (two) hours, Disp: 100 each, Rfl: 3    Infant Care Products (Baby Wipes) MISC, Use every 2 (two) hours, Disp: 100 each, Rfl: 3    latanoprost (XALATAN) 0 005 % ophthalmic solution, Administer 1 drop to both eyes daily at bedtime, Disp: 7 5 mL, Rfl: 0    loratadine (CLARITIN) 10 mg tablet, TAKE 1 TABLET BY MOUTH EVERY DAY, Disp: 90 tablet, Rfl: 1    losartan (COZAAR) 25 mg tablet, TAKE 1 TABLET (25 MG TOTAL) BY MOUTH DAILY  , Disp: 90 tablet, Rfl: 0    nystatin (MYCOSTATIN) powder, Apply topically 3 (three) times a day, Disp: 15 g, Rfl: 0    pantoprazole (PROTONIX) 40 mg tablet, Take 1 tablet (40 mg total) by mouth daily before breakfast, Disp: 90 tablet, Rfl: 3    saccharomyces boulardii (FLORASTOR) 250 mg capsule, Take 1 capsule (250 mg total) by mouth 2 (two) times a day, Disp: 60 capsule, Rfl: 0    sertraline (ZOLOFT) 100 mg tablet, TAKE 1 TABLET BY MOUTH EVERY DAY, Disp: 90 tablet, Rfl: 0    traZODone (DESYREL) 100 mg tablet, Take 1 5 tablets (150 mg total) by mouth daily at bedtime, Disp: 135 tablet, Rfl: 0    Active Problems     Patient Active Problem List   Diagnosis    Atrial fibrillation ([facility] [address] [facility]    Hypertension    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[facility] [address] [facility]    PAD (peripheral artery disease) ([address]    [facility]bid [facility] to excess calories [facility]    Acute encephalopathy    COPD (ch[facility]) [facility]    Overactive bladder    Moderate episode of recurrent major depressive disorder (HCC)    Chronic pain    Physical deconditioning    Bipolar affective disorder, currently manic, moderate (HCC)    CHF (congestive heart failure) ([facility])    Pacemaker    Frequent UTI    Dementia ([facility])    Generalized weakness    Fungal infection of the groin    Anxiety    Acute on chronic diastolic heart failure ([facility])    C  difficile diarrhea    Sleep apnea    Ambulatory dysfunction       Past Medical History     Past Medical History:   Diagnosis Date    A-fib ([facility])     Anxiety     Chronic pain     Chronic respiratory failure with hypoxia ([facility])     COPD (chronic obstructive pulmonary disease) ([facility]     Dementia ([facility])     Dorsalgia, unspecified     Edema     Encephalopathy     GERD (gastroesophageal reflux disease)     Hypertension     Morbid obesity ([facility])     Muscle weakness (generalized)     Opioid dependence ([facility])     Overactive bladder     Pneumonia     Psychiatric disorder     anxiety, bipolar    Radiculopathy of thoracolumbar region     Sciatica     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 ([facility] [address] [facility]     Urinary incontinence     UTI (urinary tract infection)        Surgical History     Past Surgical History:   Procedure Laterality Date    APPENDECTOMY      BACK SURGERY      CHOLECYSTECTOMY      CYSTOSCOPY  2021    Dr Monica Gamez History     No family history on file      Social History     Social History     Social History     Tobacco Use   Smoking Status Former Smoker    Types: Cigarettes    Quit date: 1995    Years since quittin 3   Smokeless Tobacco Never Used       Past Surgical History:   Procedure Laterality Date    APPENDECTOMY      BACK SURGERY      CHOLECYSTECTOMY      CYSTOSCOPY  2021    Dr Anne Lr SURGERY           The following portions of the patient's history were reviewed and updated as appropriate: allergies, current medications, past family history, past medical history, past social history, past surgical history and problem list    Please note :  Voice dictation software has been used to create this document  There may be inadvertent transcription errors      [id number] [id number] Hetal Morton

## 2022-03-30 NOTE — PROGRESS NOTES
Virtual Regular Visit    Verification of patient location:    Patient is located in the following state in which I hold an active license [place]      Assessment/Plan:    Problem List Items Addressed This Visit        Genitourinary    Frequent UTI - Primary     · Continue daily probiotic  · Begin D mannose over-the-counter  · Begin Hiprex 1 g b i d  after completion of Cipro  · Begin estrogen cream twice a week  · Referral to Infectious Disease due to recurrent UTI/ESBL  · Follow-up 3 months for recheck         Relevant Medications    methenamine hippurate (HIPREX) 1 g tablet    ciprofloxacin (CIPRO) 500 mg tablet    conjugated estrogens (Premarin) vaginal cream    Other Relevant Orders    CT renal protocol    Ambulatory Referral to Infectious Disease    Gross hematuria     · On Eliquis  · Negative cystoscopy November 2021  · Schedule CT renal protocol  · Follow-up pending results of CT         Relevant Orders    CT renal protocol               Reason for visit is   Chief Complaint   Patient presents with    Virtual Regular Visit        Encounter provider Nerissa Dykes, [address] [place] [address]    Provider located at [address] [place] [id number]      Recent Visits  Date Type Provider Dept   03/30/22 Telephone Nerissa Dykes, [address] [place] [address] For Urology [facility] End   03/30/22 Telemedicine Nerissa Dykes, [address] [place] [address] Urology [facility]   03/28/22 Telephone Gerardo Mario, [address] [place] [address]   03/25/22 Telephone LUCY Shultz Pg  [phone] [address] [place] [address]   Showing recent visits within past 7 days and meeting all other requirements  Future Appointments  No visits were found meeting these conditions  Showing future appointments within next 150 days and meeting all other requirements       The patient was identified by name and date of birth   Hyacinth Gerard was informed that this is a telemedicine visit and that the visit is being conducted through Telephone  My office door was closed  No one else was in the room  She acknowledged consent and understanding of privacy and security of the video platform  The patient has agreed to participate and understands they can discontinue the visit at any time  Patient is aware this is a billable service  Subjective  Champ Cadet is a [de-identified] y o  female who resides in [facility] who was initially referred for possible bladder lesion noted on CT scan from outside imaging  She has multiple medical comorbidities including frontotemporal dementia, congestive heart failure, morbid obesity with a BMI of 43 and chronic pain  Patient is bedbound  Patient underwent outside imaging which described a potential subcentimeter nodule within the lumen of the bladder  she had a negative cystoscopy November 2021 by Dr Vicente Correa  She has ongoing gross hematuria  She presents today via telemedicine visit due to recurrent urinary tract infections  She was hospitalized 3/6/2022 for change in mental status  CT scan of the head with no abnormalities  Initially her change in mentation is was thought to be suspected UTI however her urine culture had < 61390 g negative brayan like and her IV antibiotics were discontinued after 2 days  She has prior history of ESBL UTI with confusion, dysuria, frequency, subjective fevers and chills  She was initially started on cefepime and switched to ertapenem and again this was discontinued due to her culture being negative  Blood cultures were also negative  Her daughter has concerns that her confusion and decreases with IV antibiotics and then returns well off of antibiotics  She is concerned that her behavioral issues are related to her infections  I received results from [facility] that revealed > 10,000 CFU/mL ESBL  Will begin Cipro and referred to Infectious Disease for recurrent UTI    Also started on Hiprex at completion of Cipro, D mannose, continue daily probiotic and begin estrogen cream if she has no contraindications  She also has been having ongoing gross hematuria  She had a negative cystoscopy November 2021  CT renal protocol was previously ordered but not completed  Will have this completed and follow-up accordingly  She is on Eliquis  The hematuria which is intermittent can be secondary to either her infection or Eliquis however I would like to rule out any other pathology        HPI     Past Medical History:   Diagnosis Date    A-fib ([facility] )     Anxiety     Chronic pain     Chronic respiratory failure with hypoxia ([facility])     COPD (chronic obstructive pulmonary disease) ([facility])     Dementia ([facility])     Dorsalgia, unspecified     Edema     Encephalopathy     GERD (gastroesophageal reflux disease)     Hypertension     Morbid obesity ([facility])     Muscle weakness (generalized)     Opioid dependence ([facility])     Overactive bladder     Pneumonia     Psychiatric disorder     anxiety, bipolar    Radiculopathy of thoracolumbar region     Sciatica     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 ([place] [address] )     Urinary incontinence     UTI (urinary tract infection)        Past Surgical History:   Procedure Laterality Date    APPENDECTOMY      BACK SURGERY      CHOLECYSTECTOMY      CYSTOSCOPY  11/16/2021    Dr Inocencia Orellana         Current Outpatient Medications   Medication Sig Dispense Refill    ALPRAZolam (XANAX) 1 mg tablet Take 0 5 mg by mouth 3 (three) times a day Hold for sedation and lethargy        amLODIPine (NORVASC) 10 mg tablet Take 1 tablet (10 mg total) by mouth daily 90 tablet 1    Baclofen 5 MG TABS TAKE 1 TABLET EVERY MORNING 90 tablet 1    calcium carbonate-vitamin D (OSCAL-D) 500 mg-200 units per tablet Take 1 tablet by mouth 2 (two) times a day with meals        cholestyramine (QUESTRAN) 4 g packet 1 packet mixed with 8 oz liquids and drink 4 times daily  Must be dosed 1 hour apart from other medications 90 packet 3    ciprofloxacin (CIPRO) 500 mg tablet Take 1 tablet (500 mg total) by mouth every 12 (twelve) hours for 7 days 14 tablet 0    conjugated estrogens (Premarin) vaginal cream Apply pea-sized amount to urethral opening and vaginal opening twice a week on Wednesday and Sunday if she has no contraindications such as breast cancer or blood clots 30 g 6    Disposable Gloves (Latex Gloves) MISC Use 4 (four) times a day as needed (cleaning) XLARGE 100 each 3    Eliquis 5 MG TAKE 1 TABLET BY MOUTH TWICE A  tablet 1    ferrous sulfate 325 (65 Fe) mg tablet TAKE 1 TABLET BY MOUTH EVERY DAY WITH BREAKFAST 90 tablet 1    fluticasone (EQL Fluticasone Propionate) 50 mcg/act nasal spray 1 spray into each nostril daily 16 g 3    furosemide (LASIX) 40 mg tablet Take 1 tablet (40 mg total) by mouth daily 30 tablet 0    gabapentin (NEURONTIN) 600 MG tablet TAKE 1 TABLET BY MOUTH THREE TIMES A  tablet 1    Incontinence Supply Disposable (PROCare Bariatric Briefs) MISC Use every 2 (two) hours as needed (incontinence) 120 each 1    Incontinence Supply Disposable (SAPS health Incontinence Pads) MISC Use 1 each every 2 (two) hours 150 each 1    Incontinence Supply Disposable (Wings Adult Briefs XXL) MISC Use every 2 (two) hours 100 each 3    Infant Care Products (Baby Wipes) MISC Use every 2 (two) hours 100 each 3    latanoprost (XALATAN) 0 005 % ophthalmic solution Administer 1 drop to both eyes daily at bedtime 7 5 mL 0    loratadine (CLARITIN) 10 mg tablet TAKE 1 TABLET BY MOUTH EVERY DAY 90 tablet 1    losartan (COZAAR) 25 mg tablet TAKE 1 TABLET (25 MG TOTAL) BY MOUTH DAILY   90 tablet 0    methenamine hippurate (HIPREX) 1 g tablet Take 1 tablet (1 g total) by mouth 2 (two) times a day with meals 60 tablet 12    nystatin (MYCOSTATIN) powder Apply topically 3 (three) times a day 15 g 0    pantoprazole (PROTONIX) 40 mg tablet Take 1 tablet (40 mg total) by mouth daily before breakfast 90 tablet 3    saccharomyces boulardii (FLORASTOR) 250 mg capsule Take 1 capsule (250 mg total) by mouth 2 (two) times a day 60 capsule 0    sertraline (ZOLOFT) 100 mg tablet TAKE 1 TABLET BY MOUTH EVERY DAY 90 tablet 0    traZODone (DESYREL) 100 mg tablet Take 1 5 tablets (150 mg total) by mouth daily at bedtime 135 tablet 0     No current facility-administered medications for this visit  Allergies   Allergen Reactions    Aspirin     Iodinated Diagnostic Agents Throat Swelling     Pt states "when I was 14years old driving home from the hospital I felt like I couldn't think and it went away after 10 minutes"     Iodine - Food Allergy     Nsaids     Other Other (See Comments)     difficulty swallowing for short period       Review of Systems   Constitutional: Negative for activity change, appetite change, chills, fatigue, fever and unexpected weight change  Review of systems performed with primary nurse at [address] [place] [address] Rd: Negative for facial swelling  Eyes: Negative for discharge  Respiratory: Negative  Negative for cough and shortness of breath  Cardiovascular: Negative for chest pain and leg swelling  Gastrointestinal: Negative  Negative for abdominal distention, abdominal pain, constipation, diarrhea, nausea and vomiting  Treated for C diff while hospitalized in early March  Still on vancomycin   Endocrine: Negative  Genitourinary: Positive for hematuria  Negative for decreased urine volume, difficulty urinating, dysuria, enuresis, flank pain, frequency, genital sores and urgency  Musculoskeletal: Negative for back pain and myalgias  Skin: Negative for pallor and rash  Allergic/Immunologic: Negative  Negative for immunocompromised state  Neurological: Negative for facial asymmetry and speech difficulty  Psychiatric/Behavioral: Positive for confusion  Negative for agitation         Video Exam    There were no vitals filed for this visit     Physical Exam  Vitals reviewed: [place] nurse at [facility] [place] stable vital signs without fevers  Constitutional:       Appearance: She is obese  Neurological:      Mental Status: She is alert  Mental status is at baseline  She is disoriented  Gait: Gait abnormal       Comments: Patient is bed-bound   Psychiatric:      Comments: Patient unable to provide a good history  Most of history obtained from primary nurse as well as daughter Pablo Elizabeth over the phone           I spent 20 minutes directly with the patient during this visit  No results found for: PSA   Lab Results   Component Value Date    WBC 6 85 03/09/2022    HGB 12 8 03/09/2022    HCT 38 8 03/09/2022    MCV 85 03/09/2022     03/09/2022      Lab Results   Component Value Date    SODIUM 137 03/09/2022    K 4 3 03/09/2022     03/09/2022    CO2 26 03/09/2022    AGAP 8 03/09/2022    BUN 12 03/09/2022    CREATININE 0 93 03/09/2022    GLUC 107 03/09/2022    GLUF 103 (H) 03/11/2021    CALCIUM 9 2 03/09/2022    AST 19 03/06/2022    ALT 19 03/06/2022    ALKPHOS 62 03/06/2022    TP 7 3 03/06/2022    TBILI 0 33 03/06/2022    EGFR 58 03/09/2022      Circumferential bladder wall thickening may be related to underdistention versus cystitis   Correlate with urinalysis   No intraluminal calculi   Limited evaluation for intraluminal lesions without IV contrast   No dominant polypoid mass seen on limited   noncontrast study        Nonobstructing left renal calculi        Colonic diverticulosis without evidence of diverticulitis        Grossly stable postoperative and degenerative changes lumbar spine     Interpreted by Jonnie Kan DO on 3/10/2021  2:33 PM   Narrative & Impression   CT ABDOMEN AND PELVIS WITHOUT IV CONTRAST 03/10/2021     INDICATION: Patient complaining of blood in urine and back pain, also diarrhea      COMPARISON:  CT abdomen pelvis 7/23/2018     TECHNIQUE:  CT examination of the abdomen and pelvis was performed without intravenous contrast   Axial, sagittal, and coronal 2D reformatted images were created from the source data and submitted for interpretation       Radiation dose length product (DLP) for this visit:  8327 mGy-cm  This examination, like all CT scans performed in the [facility], was performed utilizing techniques to minimize radiation dose exposure, including the use of iterative   reconstruction and automated exposure control       Enteric contrast was not administered       FINDINGS:     ABDOMEN  Evaluation of the solid organs is limited without IV contrast      LOWER CHEST:  Cardiomegaly      LIVER/BILIARY TREE:  Unremarkable      GALLBLADDER:  Surgically absent      SPLEEN:  Unremarkable      PANCREAS:  Severely atrophic and near completely fatty replaced with some calcifications in the region of the head compatible with chronic pancreatitis  No acute findings      ADRENAL GLANDS:  Unremarkable      KIDNEYS/URETERS:  3-4 mm nonobstructing left midpole calculus and an additional smaller lower pole calculus  No hydronephrosis or ureteral calculi      STOMACH AND BOWEL:    The stomach is collapsed, evaluation limited  The small bowel is normal caliber  Descending and sigmoid colon diverticulosis without evidence of diverticulitis      APPENDIX:  Absent per surgical history      ABDOMINOPELVIC CAVITY:  No ascites  No pneumoperitoneum  No lymphadenopathy      VESSELS: Atherosclerotic changes abdominal aorta without evidence of aneurysm       PELVIS     REPRODUCTIVE ORGANS:  Unremarkable for patient's age      URINARY BLADDER:  Circumferential bladder wall thickening may in part be related to underdistention versus cystitis  No intraluminal calculi    Limited evaluation for intraluminal lesions without IV contrast   No dominant polypoid mass seen      ABDOMINAL WALL/INGUINAL REGIONS:  Small fat-containing umbilical and left inguinal hernia is again seen      OSSEOUS STRUCTURES:  No acute fracture or destructive osseous lesion      Posterior fusion lumbosacral spine with extension to the SI joints  The hardware appears similar to the previous study  Morselized bone grafts are seen along the paraspinal lower lumbar spine  Grade 2 spondylolisthesis L5 on S1 again seen, similar  Grade 1 anterolisthesis L3 on L4, also unchanged  Multilevel degenerative changes of the spine and pubic symphysis  Fixation hardware right proximal femur      IMPRESSION:     Circumferential bladder wall thickening may be related to underdistention versus cystitis  Correlate with urinalysis  No intraluminal calculi  Limited evaluation for intraluminal lesions without IV contrast   No dominant polypoid mass seen on limited   noncontrast study      Nonobstructing left renal calculi      Colonic diverticulosis without evidence of diverticulitis      Grossly stable postoperative and degenerative changes lumbar spine  Echo complete w/ contrast if indicated    This is a technically difficult study  Interpretation is limited    Left Ventricle: Left ventricular cavity size is normal  Wall thickness   is normal  The left ventricular ejection fraction is 55%  Systolic   function is normal  Wall motion is normal  Diastolic function is abnormal    Rhythm is Afib    Right Ventricle: Right ventricular cavity size is mildly dilated  Systolic function is normal     Left Atrium: The atrium is moderate to severely dilated    Right Atrium: The atrium is moderately dilated  XR chest 1 view  Narrative: CHEST     INDICATION:   sob  COMPARISON:  03/10/2021    EXAM PERFORMED/VIEWS:  XR CHEST 1 VIEW  Images: 3    FINDINGS:    Cardiomediastinal silhouette appears enlarged  Left-sided pacemaker  Mild CHF No pneumothorax or pleural effusion  Osseous structures appear within normal limits for patient age  Orthopedic hardware in the lumbar region  Impression: Mild CHF      Workstation performed: [id number]       The following portions of the patient's history were reviewed and updated as appropriate: allergies, current medications, past family history, past medical history, past social history, past surgical history and problem list    It was my intent to perform this visit via video technology but the patient was not able to do a video connection so the visit was completed via audio telephone only  Please note :  Voice dictation software has been used to create this document  There may be inadvertent transcription errors  LUCY Shaikh  VIRTUAL VISIT DISCLAIMER      Bimal Beal verbally agrees to participate in [employer]  Pt is aware that [employer] could be limited without vital signs or the ability to perform a full hands-on physical Destini Posadas understands she or the provider may request at any time to terminate the video visit and request the patient to seek care or treatment in person

## 2022-03-30 NOTE — TELEPHONE ENCOUNTER
Received a message from the staff in an e-mail from patient's daughter Bronwyn Solorio concerning her symptoms  Call patient this morning to discuss her concerns  She reports history of recurrent resistant urinary tract infections and mental status changes

## 2022-03-31 ENCOUNTER — TELEPHONE (OUTPATIENT)
Dept: UROLOGY | Facility: CLINIC | Age: 82
End: 2022-03-31

## 2022-03-31 DIAGNOSIS — R31.0 GROSS HEMATURIA: Primary | ICD-10-CM

## 2022-03-31 NOTE — TELEPHONE ENCOUNTER
Called and left voicemail message for patients daughter Ela Bennett to please return call to the office to discuss patients office visit from yesterday and recommendations of CRNP  Please review Gail's note when daughter returns call

## 2022-03-31 NOTE — PATIENT INSTRUCTIONS
Urinary Tract Infection in Older Adults   WHAT YOU NEED TO KNOW:   A urinary tract infection (UTI) is caused by bacteria that get inside your urinary tract  Your urinary tract includes your kidneys, ureters, bladder, and urethra  Urine is made in your kidneys, and it flows from the ureters to the bladder  Urine leaves the bladder through the urethra  A UTI is more common in your lower urinary tract, which includes your bladder and urethra  DISCHARGE INSTRUCTIONS:   Return to the emergency department if:   · You are urinating very little or not at all  · You are vomiting  · You have a high fever with shaking chills  · You have side or back pain that gets worse  Call your doctor if:   · You have a fever  · You are a woman and you have increased white or yellow discharge from your vagina  · You do not feel better after 2 days of taking antibiotics  · You have questions or concerns about your condition or care  Medicines:   · Medicines  help treat the bacterial infection or decrease pain and burning when you urinate  You may also need medicines to decrease the urge to urinate often  If you have UTIs often (called recurrent UTIs), you may be given antibiotics to take regularly  You will be given directions for when and how to use antibiotics  The goal is to prevent UTIs but not cause antibiotic resistance by using antibiotics too often  · Take your medicine as directed  Contact your healthcare provider if you think your medicine is not helping or if you have side effects  Tell him or her if you are allergic to any medicine  Keep a list of the medicines, vitamins, and herbs you take  Include the amounts, and when and why you take them  Bring the list or the pill bottles to follow-up visits  Carry your medicine list with you in case of an emergency  Self-care:   · Drink liquids as directed  Liquids can help flush bacteria from your urinary tract   Ask how much liquid to drink each day and which liquids are best for you  You may need to drink more liquids than usual to help flush out the bacteria  Do not drink alcohol, caffeine, and citrus juices  These can irritate your bladder and increase your symptoms  · Apply heat  on your abdomen for 20 to 30 minutes every 2 hours for as many days as directed  Heat helps decrease discomfort and pressure in your bladder  Prevent a UTI:   · Urinate when you feel the urge  Do not hold your urine  Bacteria can grow if urine stays in the bladder too long  It may be helpful to urinate at least every 3 to 4 hours  · Urinate after you have sex  to flush away bacteria that can enter your urinary tract during sex  · Wear cotton underwear and clothes that are loose  Tight pants and nylon underwear can trap moisture and cause bacteria to grow  · Cranberry juice or cranberry supplements  may help prevent UTIs  Your healthcare provider can recommend the right juice or supplement for you  · Women should wipe front to back  after urinating or having a bowel movement  This may prevent germs from getting into the urinary tract  Do not douche or use feminine deodorants  These can change the chemical balance in your vagina  You may also be given vaginal estrogen medicine  This medicine helps prevent recurrent UTIs in women who have gone through menopause or are in juan jose-menopause  Follow up with your doctor as directed:  Write down your questions so you remember to ask them during your visits  © Copyright [employer] 2022 Information is for End User's use only and may not be sold, redistributed or otherwise used for commercial purposes  All illustrations and images included in CareNotes® are the copyrighted property of A D A M , Inc  or [place] [facility] [employer]aley Vann   The above information is an  only  It is not intended as medical advice for individual conditions or treatments   Talk to your doctor, nurse or pharmacist before following any medical regimen to see if it is safe and effective for you

## 2022-03-31 NOTE — ASSESSMENT & PLAN NOTE
· Continue daily probiotic  · Begin D mannose over-the-counter  · Begin Hiprex 1 g b i d  after completion of Cipro  · Begin estrogen cream twice a week  · Referral to Infectious Disease due to recurrent UTI/ESBL  · Follow-up 3 months for recheck Septic shock Metabolic encephalopathy

## 2022-03-31 NOTE — ASSESSMENT & PLAN NOTE
· On Eliquis  · Negative cystoscopy November 2021  · Schedule CT renal protocol  · Follow-up pending results of CT

## 2022-03-31 NOTE — TELEPHONE ENCOUNTER
Please let patient's daughter Jenifer Motley know that I will be referring her mother to Infectious Disease for recurrent UTI/ESBL  I am also going to treat her with Cipro for a mild UTI at this time  We will then initiate preventive treatments such as D mannose, Hiprex, estrogen cream, continuation of her daily probiotic  I would also like her to get a CT scan to further evaluate her blood in the urine  She did have a normal cystoscopy in November  This may be related to her infections or her Eliquis    However I would like to rule out any other pathology

## 2022-04-01 NOTE — TELEPHONE ENCOUNTER
Returned call to patients daughter Ana Cristina Antunez and reviewed Elizabeth Carr, [facility] office visit note and recommendations  Daughter verbalized understanding and requested that I contact [facility] [place] to review orders with them as well  I called and spoke with patients nurse at [facility]  Medication orders faxed to [facility] at [phone] along with CT scan order and fax confirmation received  Patient is currently on Cipro as ordered and will begin preventative medication after antibiotic course is completed  [facility] will call central scheduling to scheduled CT scan

## 2022-04-07 ENCOUNTER — TELEPHONE (OUTPATIENT)
Dept: INFECTIOUS DISEASES | Facility: CLINIC | Age: 82
End: 2022-04-07

## 2022-04-07 ENCOUNTER — TELEPHONE (OUTPATIENT)
Dept: GASTROENTEROLOGY | Facility: CLINIC | Age: 82
End: 2022-04-07

## 2022-04-07 NOTE — TELEPHONE ENCOUNTER
Called and spoke with patients daughter Ana Cristina Antunez  States she was willing to talk to anyone at the office, she just wanted to give Raudel Tracy a heads up that she will be taking patient to the ER as suggested by Infectious Disease due to altered mental status  Advised that Raudel Tracy is currently out of the office, however she will be updated on Monday  States patient is very alert and oriented at baseline and she does not want patient to become septic

## 2022-04-07 NOTE — TELEPHONE ENCOUNTER
Called and spoke to Dorinda Adorno from infectious disease  Dorinda Adorno explained that it is not that ID is refusing to see patient, patient is not a candidate for ID  Dorinda Adorno stated that the only symptom that the patient was experiencing was altered mental status, if that was the case they would send patient to ER for further testing  Dorinda Adorno stated she did talk to the daughter and explain this but unsure if the daughter understood  Dorinda Adorno stated she would call the daughter back and further explain  Dorinda Adorno wanted us to have this on record

## 2022-04-07 NOTE — TELEPHONE ENCOUNTER
Her allergy list reports iodonated contrast agents "couldn't think straight for 10 minutes after scan at age 14" but then also has listed "throat swelling" please clarify with patient or family   Can pre-treat with steroid prep or proceed with scan without contrast  Defer followup to [place] office (established)

## 2022-04-07 NOTE — TELEPHONE ENCOUNTER
Jennifer Delgado calling from [employer] was calling to let you know when she was going to schedule this CT put in by [facility]  It came up that patient is allergic to betadine and they need to use it for this particular view   Please call her back for alternatives

## 2022-04-07 NOTE — TELEPHONE ENCOUNTER
Pt daughter Verito Lawrence called yesterday regarding her mothers referral  She stated that she has more information to give our office regarding her mother's diagnosis and symptoms  Per Verito Lawrence, "My mom has been having the same UTI infection for the past 5 years  We have taken her to multiple providers and notice that when she is on antibiotic she does well, the second she is off antibiotics she starts to decline in mental status and gets UTI immediately "     Did inform Verito Lawrence that typically if she has been dealing with the same infection and it is colonization we do not treat with antibiotics as they are unable to treat the infection, that treatment for colonizations are based on the symptoms pt is experiencing and can usually be treated by Urology but that if her infection was different bacteria could be reviewed  Explained that we look at imaging as well to rule out anatomical problems, such as US of bladder, if it noted that there is retention then her chances of infection are greater and correlate with the colonization  Asked the daughter what symptoms her mother was having, per Tessie Oneal her mother has Altered mental status, changes in behavior, burning and frequency  Did inform her that because of the altered mental status it is best for her to be seen at the ED  Daughter then asked if she should be on a Carbapenem and what that can treat  Did inform her that unable to provide her information regarding the drug as we did not prescribe and some drugs can have multiple uses would not want to give incorrect information  Daughter did state she understood and was glad to have more information regarding reasoning for treatment  Did tell her would let her know that provider in office agreed that if symptom included altered mental status then she should be going to ED for further evaluation

## 2022-04-07 NOTE — TELEPHONE ENCOUNTER
Erika Kim pt  Hollis Baez called, she needs a copy of a note from yesterday, she said some meds were changed and they need it in writing before they can do anything   She also was asking about a follow-up visit  Fax [phone]

## 2022-04-07 NOTE — TELEPHONE ENCOUNTER
Called and spoke to Dana, patient's daughter   Dana stated it is ok to pre-treat patient for CT with contrast

## 2022-04-07 NOTE — TELEPHONE ENCOUNTER
Patient's daughter would like to speak to Rocio Cardoso concerning her mom and her altered mental status

## 2022-04-08 ENCOUNTER — TELEPHONE (OUTPATIENT)
Dept: UROLOGY | Facility: CLINIC | Age: 82
End: 2022-04-08

## 2022-04-08 ENCOUNTER — HOSPITAL ENCOUNTER (INPATIENT)
Facility: HOSPITAL | Age: 82
LOS: 3 days | Discharge: NURSING, REHAB OR LONG-TERM CARE | DRG: 056 | End: 2022-04-11
Attending: EMERGENCY MEDICINE | Admitting: INTERNAL MEDICINE
Payer: COMMERCIAL

## 2022-04-08 ENCOUNTER — TELEPHONE (OUTPATIENT)
Dept: GERIATRICS | Age: 82
End: 2022-04-08

## 2022-04-08 DIAGNOSIS — G93.41 ACUTE METABOLIC ENCEPHALOPATHY: ICD-10-CM

## 2022-04-08 DIAGNOSIS — N39.0 UTI (URINARY TRACT INFECTION): Primary | ICD-10-CM

## 2022-04-08 DIAGNOSIS — I48.91 ATRIAL FIBRILLATION, UNSPECIFIED TYPE (HCC): ICD-10-CM

## 2022-04-08 DIAGNOSIS — A04.72 C. DIFFICILE DIARRHEA: ICD-10-CM

## 2022-04-08 DIAGNOSIS — F02.818 BEHAVIORAL VARIANT FRONTOTEMPORAL DEMENTIA (HCC): ICD-10-CM

## 2022-04-08 DIAGNOSIS — G31.09 BEHAVIORAL VARIANT FRONTOTEMPORAL DEMENTIA (HCC): ICD-10-CM

## 2022-04-08 DIAGNOSIS — R41.82 ALTERED MENTAL STATUS: ICD-10-CM

## 2022-04-08 PROBLEM — R82.90 ABNORMAL URINALYSIS: Status: ACTIVE | Noted: 2022-04-08

## 2022-04-08 LAB
2HR DELTA HS TROPONIN: 1 NG/L
ALBUMIN SERPL BCP-MCNC: 3 G/DL (ref 3.5–5)
ALP SERPL-CCNC: 75 U/L (ref 46–116)
ALT SERPL W P-5'-P-CCNC: 14 U/L (ref 12–78)
ANION GAP SERPL CALCULATED.3IONS-SCNC: 6 MMOL/L (ref 4–13)
APTT PPP: 37 SECONDS (ref 23–37)
AST SERPL W P-5'-P-CCNC: 22 U/L (ref 5–45)
BACTERIA UR QL AUTO: ABNORMAL /HPF
BASOPHILS # BLD AUTO: 0.01 THOUSANDS/ΜL (ref 0–0.1)
BASOPHILS NFR BLD AUTO: 0 % (ref 0–1)
BILIRUB SERPL-MCNC: 0.31 MG/DL (ref 0.2–1)
BILIRUB UR QL STRIP: ABNORMAL
BUN SERPL-MCNC: 14 MG/DL (ref 5–25)
CALCIUM ALBUM COR SERPL-MCNC: 10 MG/DL (ref 8.3–10.1)
CALCIUM SERPL-MCNC: 9.2 MG/DL (ref 8.3–10.1)
CARDIAC TROPONIN I PNL SERPL HS: 12 NG/L
CARDIAC TROPONIN I PNL SERPL HS: 13 NG/L
CHLORIDE SERPL-SCNC: 109 MMOL/L (ref 100–108)
CLARITY UR: ABNORMAL
CO2 SERPL-SCNC: 26 MMOL/L (ref 21–32)
COLOR UR: ABNORMAL
CREAT SERPL-MCNC: 1.12 MG/DL (ref 0.6–1.3)
EOSINOPHIL # BLD AUTO: 0.12 THOUSAND/ΜL (ref 0–0.61)
EOSINOPHIL NFR BLD AUTO: 3 % (ref 0–6)
ERYTHROCYTE [DISTWIDTH] IN BLOOD BY AUTOMATED COUNT: 13.6 % (ref 11.6–15.1)
GFR SERPL CREATININE-BSD FRML MDRD: 46 ML/MIN/1.73SQ M
GLUCOSE SERPL-MCNC: 101 MG/DL (ref 65–140)
GLUCOSE UR STRIP-MCNC: NEGATIVE MG/DL
HCT VFR BLD AUTO: 42.3 % (ref 34.8–46.1)
HGB BLD-MCNC: 13.1 G/DL (ref 11.5–15.4)
HGB UR QL STRIP.AUTO: ABNORMAL
IMM GRANULOCYTES # BLD AUTO: 0.02 THOUSAND/UL (ref 0–0.2)
IMM GRANULOCYTES NFR BLD AUTO: 1 % (ref 0–2)
INR PPP: 1.68 (ref 0.84–1.19)
KETONES UR STRIP-MCNC: NEGATIVE MG/DL
LACTATE SERPL-SCNC: 0.6 MMOL/L (ref 0.5–2)
LEUKOCYTE ESTERASE UR QL STRIP: ABNORMAL
LYMPHOCYTES # BLD AUTO: 1.1 THOUSANDS/ΜL (ref 0.6–4.47)
LYMPHOCYTES NFR BLD AUTO: 26 % (ref 14–44)
MCH RBC QN AUTO: 27.4 PG (ref 26.8–34.3)
MCHC RBC AUTO-ENTMCNC: 31 G/DL (ref 31.4–37.4)
MCV RBC AUTO: 89 FL (ref 82–98)
MONOCYTES # BLD AUTO: 0.53 THOUSAND/ΜL (ref 0.17–1.22)
MONOCYTES NFR BLD AUTO: 13 % (ref 4–12)
NEUTROPHILS # BLD AUTO: 2.39 THOUSANDS/ΜL (ref 1.85–7.62)
NEUTS SEG NFR BLD AUTO: 57 % (ref 43–75)
NITRITE UR QL STRIP: POSITIVE
NON-SQ EPI CELLS URNS QL MICRO: ABNORMAL /HPF
NRBC BLD AUTO-RTO: 0 /100 WBCS
NT-PROBNP SERPL-MCNC: 1711 PG/ML
PH UR STRIP.AUTO: 6 [PH]
PLATELET # BLD AUTO: 184 THOUSANDS/UL (ref 149–390)
PMV BLD AUTO: 10 FL (ref 8.9–12.7)
POTASSIUM SERPL-SCNC: 3.6 MMOL/L (ref 3.5–5.3)
PROT SERPL-MCNC: 7.8 G/DL (ref 6.4–8.2)
PROT UR STRIP-MCNC: >=300 MG/DL
PROTHROMBIN TIME: 19.1 SECONDS (ref 11.6–14.5)
RBC # BLD AUTO: 4.78 MILLION/UL (ref 3.81–5.12)
RBC #/AREA URNS AUTO: ABNORMAL /HPF
SODIUM SERPL-SCNC: 141 MMOL/L (ref 136–145)
SP GR UR STRIP.AUTO: >=1.03 (ref 1–1.03)
UROBILINOGEN UR QL STRIP.AUTO: 1 E.U./DL
WBC # BLD AUTO: 4.17 THOUSAND/UL (ref 4.31–10.16)
WBC #/AREA URNS AUTO: ABNORMAL /HPF

## 2022-04-08 PROCEDURE — 85025 COMPLETE CBC W/AUTO DIFF WBC: CPT | Performed by: EMERGENCY MEDICINE

## 2022-04-08 PROCEDURE — 87154 CUL TYP ID BLD PTHGN 6+ TRGT: CPT | Performed by: EMERGENCY MEDICINE

## 2022-04-08 PROCEDURE — 83880 ASSAY OF NATRIURETIC PEPTIDE: CPT | Performed by: EMERGENCY MEDICINE

## 2022-04-08 PROCEDURE — 83605 ASSAY OF LACTIC ACID: CPT | Performed by: EMERGENCY MEDICINE

## 2022-04-08 PROCEDURE — 80053 COMPREHEN METABOLIC PANEL: CPT | Performed by: EMERGENCY MEDICINE

## 2022-04-08 PROCEDURE — 81001 URINALYSIS AUTO W/SCOPE: CPT | Performed by: EMERGENCY MEDICINE

## 2022-04-08 PROCEDURE — 93005 ELECTROCARDIOGRAM TRACING: CPT

## 2022-04-08 PROCEDURE — 85730 THROMBOPLASTIN TIME PARTIAL: CPT | Performed by: EMERGENCY MEDICINE

## 2022-04-08 PROCEDURE — 36415 COLL VENOUS BLD VENIPUNCTURE: CPT | Performed by: EMERGENCY MEDICINE

## 2022-04-08 PROCEDURE — 87186 SC STD MICRODIL/AGAR DIL: CPT | Performed by: EMERGENCY MEDICINE

## 2022-04-08 PROCEDURE — 99223 1ST HOSP IP/OBS HIGH 75: CPT | Performed by: PHYSICIAN ASSISTANT

## 2022-04-08 PROCEDURE — 99285 EMERGENCY DEPT VISIT HI MDM: CPT | Performed by: EMERGENCY MEDICINE

## 2022-04-08 PROCEDURE — 87086 URINE CULTURE/COLONY COUNT: CPT | Performed by: EMERGENCY MEDICINE

## 2022-04-08 PROCEDURE — 85610 PROTHROMBIN TIME: CPT | Performed by: EMERGENCY MEDICINE

## 2022-04-08 PROCEDURE — 87040 BLOOD CULTURE FOR BACTERIA: CPT | Performed by: EMERGENCY MEDICINE

## 2022-04-08 PROCEDURE — 99285 EMERGENCY DEPT VISIT HI MDM: CPT

## 2022-04-08 PROCEDURE — 87181 SC STD AGAR DILUTION PER AGT: CPT | Performed by: EMERGENCY MEDICINE

## 2022-04-08 PROCEDURE — 87077 CULTURE AEROBIC IDENTIFY: CPT | Performed by: EMERGENCY MEDICINE

## 2022-04-08 PROCEDURE — 84484 ASSAY OF TROPONIN QUANT: CPT | Performed by: EMERGENCY MEDICINE

## 2022-04-08 RX ORDER — AMLODIPINE BESYLATE 10 MG/1
10 TABLET ORAL DAILY
Status: DISCONTINUED | OUTPATIENT
Start: 2022-04-09 | End: 2022-04-11 | Stop reason: HOSPADM

## 2022-04-08 RX ORDER — CEFAZOLIN SODIUM 2 G/50ML
2000 SOLUTION INTRAVENOUS EVERY 8 HOURS
Status: DISCONTINUED | OUTPATIENT
Start: 2022-04-08 | End: 2022-04-08

## 2022-04-08 RX ORDER — FUROSEMIDE 40 MG/1
40 TABLET ORAL DAILY
Status: DISCONTINUED | OUTPATIENT
Start: 2022-04-09 | End: 2022-04-11 | Stop reason: HOSPADM

## 2022-04-08 RX ORDER — SERTRALINE HYDROCHLORIDE 100 MG/1
100 TABLET, FILM COATED ORAL DAILY
Status: DISCONTINUED | OUTPATIENT
Start: 2022-04-09 | End: 2022-04-11 | Stop reason: HOSPADM

## 2022-04-08 RX ORDER — DIPHENHYDRAMINE HCL 50 MG
50 CAPSULE ORAL
Qty: 1 CAPSULE | Refills: 0 | Status: SHIPPED | OUTPATIENT
Start: 2022-04-08 | End: 2022-04-08 | Stop reason: SDUPTHER

## 2022-04-08 RX ORDER — ONDANSETRON 2 MG/ML
4 INJECTION INTRAMUSCULAR; INTRAVENOUS EVERY 6 HOURS PRN
Status: DISCONTINUED | OUTPATIENT
Start: 2022-04-08 | End: 2022-04-11 | Stop reason: HOSPADM

## 2022-04-08 RX ORDER — LATANOPROST 50 UG/ML
1 SOLUTION/ DROPS OPHTHALMIC
Status: DISCONTINUED | OUTPATIENT
Start: 2022-04-08 | End: 2022-04-11 | Stop reason: HOSPADM

## 2022-04-08 RX ORDER — PANTOPRAZOLE SODIUM 40 MG/1
40 TABLET, DELAYED RELEASE ORAL
Status: DISCONTINUED | OUTPATIENT
Start: 2022-04-09 | End: 2022-04-11 | Stop reason: HOSPADM

## 2022-04-08 RX ORDER — LOSARTAN POTASSIUM 25 MG/1
25 TABLET ORAL DAILY
Status: DISCONTINUED | OUTPATIENT
Start: 2022-04-09 | End: 2022-04-11 | Stop reason: HOSPADM

## 2022-04-08 RX ORDER — ACETAMINOPHEN 325 MG/1
650 TABLET ORAL EVERY 6 HOURS PRN
Status: DISCONTINUED | OUTPATIENT
Start: 2022-04-08 | End: 2022-04-11 | Stop reason: HOSPADM

## 2022-04-08 RX ORDER — QUETIAPINE FUMARATE 25 MG/1
25 TABLET, FILM COATED ORAL
Status: DISCONTINUED | OUTPATIENT
Start: 2022-04-08 | End: 2022-04-09

## 2022-04-08 RX ORDER — METHYLPREDNISOLONE 32 MG/1
32 TABLET ORAL SEE ADMIN INSTRUCTIONS
Qty: 2 TABLET | Refills: 0 | Status: SHIPPED | OUTPATIENT
Start: 2022-04-08 | End: 2022-04-08 | Stop reason: SDUPTHER

## 2022-04-08 RX ORDER — DIPHENHYDRAMINE HYDROCHLORIDE 50 MG/ML
25 INJECTION INTRAMUSCULAR; INTRAVENOUS ONCE
Status: COMPLETED | OUTPATIENT
Start: 2022-04-08 | End: 2022-04-08

## 2022-04-08 RX ORDER — LORAZEPAM 2 MG/ML
1 INJECTION INTRAMUSCULAR ONCE
Status: COMPLETED | OUTPATIENT
Start: 2022-04-08 | End: 2022-04-08

## 2022-04-08 RX ORDER — GABAPENTIN 300 MG/1
600 CAPSULE ORAL 3 TIMES DAILY
Status: DISCONTINUED | OUTPATIENT
Start: 2022-04-08 | End: 2022-04-11 | Stop reason: HOSPADM

## 2022-04-08 RX ADMIN — LORAZEPAM 1 MG: 2 INJECTION INTRAMUSCULAR; INTRAVENOUS at 21:23

## 2022-04-08 RX ADMIN — DIPHENHYDRAMINE HYDROCHLORIDE 25 MG: 50 INJECTION, SOLUTION INTRAMUSCULAR; INTRAVENOUS at 22:25

## 2022-04-08 NOTE — TELEPHONE ENCOUNTER
Patient's daughter Rajnichristian Alfaro and would like to Giancarlo Cosby about the patient  Patient's daughter has questions about the urinalysis and if it has been sent to the office yet  Jose Calderon also stated that she is with her right now and there has been a drastic mental change in her mother at this time  She is very concerned        Jose Calderon can be reached [phone]    Daughter was transferred to Giancarlo Cosby

## 2022-04-08 NOTE — TELEPHONE ENCOUNTER
I ended up not writing an official office note since I did not actually speak to the patient only the nurse and the patients daughter  Can I just send orders saying that we dced the cholestyramine And contact precautions?

## 2022-04-08 NOTE — TELEPHONE ENCOUNTER
Pt daughter called stating pt has had a significant change in mental status over the last 3-4 days   Pt daughter states that Rhonda Starkey would benefit from IV antibiotics   Verito Lawrence would like a call from Dr An Gonzalez

## 2022-04-08 NOTE — ED PROVIDER NOTES
Pt Name: Sunita Ortiz  MRN: [id number]  [place] 1940  Age/Sex: [de-identified] y o  female  Date of evaluation: 4/8/2022  PCP: Shima Degroot, [address] [place] [address]    Chief Complaint   Patient presents with    Possible UTI     Per [place] [facility], reports dx with UTI and given oral abx for same  Pt reports no relief from medications  Also reports recent dx with c-diff but reports since resolved  Pt daughter requesting being sent to ED for IV abx  HPI    Mauricio Viera presents to the Emergency Department with concern for UTI  Her daughter reports that she gets extremely agitated with an infection and this has happened to her many times in the past   She just completed cipro and vanc and her daughter's concern is that she has a hx of ESBL and has improved dramatically in the past with IV abx  HPI      Past Medical and Surgical History    Past Medical History:   Diagnosis Date    A-fib ([facility] [address] )     Anxiety     Chronic pain     Chronic respiratory failure with hypoxia (HCC)     COPD (chronic obstructive pulmonary disease) (HCC)     Dementia (HCC)     Dorsalgia, unspecified     Edema     Encephalopathy     GERD (gastroesophageal reflux disease)     Hypertension     Morbid obesity (HCC)     Muscle weakness (generalized)     Opioid dependence ([facility])     Overactive bladder     Pneumonia     Psychiatric disorder     anxiety, bipolar    Radiculopathy of thoracolumbar region     Sciatica     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 ([facility] [address] )     Urinary incontinence     UTI (urinary tract infection)        Past Surgical History:   Procedure Laterality Date    APPENDECTOMY      BACK SURGERY      CHOLECYSTECTOMY      CYSTOSCOPY  11/16/2021    Dr Olayinka Lopes         History reviewed  No pertinent family history      Social History     Tobacco Use    Smoking status: Former Smoker     Types: Cigarettes     Quit date: 11/13/1995     Years since quittin 4    Smokeless tobacco: Never Used   Vaping Use    Vaping Use: Never used   Substance Use Topics    Alcohol use: Never    Drug use: No              Allergies    Allergies   Allergen Reactions    Aspirin     Iodinated Diagnostic Agents Throat Swelling     Pt states "when I was 14years old driving home from the hospital I felt like I couldn't think and it went away after 10 minutes"     Iodine - Food Allergy     Nsaids     Other Other (See Comments)     difficulty swallowing for short period       Home Medications    Prior to Admission medications    Medication Sig Start Date End Date Taking?  Authorizing Provider   ALPRAZolam Macie Gastelum) 1 mg tablet Take 0 5 mg by mouth 3 (three) times a day Hold for sedation and lethargy   22   Historical Provider, MD   amLODIPine (NORVASC) 10 mg tablet Take 1 tablet (10 mg total) by mouth daily 21   LUCY Shultz   Baclofen 5 MG TABS TAKE 1 TABLET EVERY MORNING 21   LUCY Shultz   calcium carbonate-vitamin D (OSCAL-D) 500 mg-200 units per tablet Take 1 tablet by mouth 2 (two) times a day with meals      Historical Provider, MD   cholestyramine (QUESTRAN) 4 g packet 1 packet mixed with 8 oz liquids and drink 4 times daily  Must be dosed 1 hour apart from other medications 22   Beatrice Burgess PA-C   conjugated estrogens (Premarin) vaginal cream Apply pea-sized amount to urethral opening and vaginal opening twice a week on Wednesday and  if she has no contraindications such as breast cancer or blood clots 3/30/22   LUCY Degroot   diphenhydrAMINE (BENADRYL) 50 mg capsule Take 1 capsule (50 mg total) by mouth 60 minutes pre-procedure for 1 dose Take 1 hour prior to contrast administration 22   Whitney Mckeon PA-C   Disposable Gloves (Latex Gloves) MISC Use 4 (four) times a day as needed (cleaning) Shira Shove 2/3/22   LUCY Shultz   Eliquis 5 MG TAKE 1 TABLET BY MOUTH TWICE A DAY 21   LUCY Shultz ferrous sulfate 325 (65 Fe) mg tablet TAKE 1 TABLET BY MOUTH EVERY DAY WITH BREAKFAST 8/23/21   LUCY Shultz   fluticasone (EQL Fluticasone Propionate) 50 mcg/act nasal spray 1 spray into each nostril daily 12/22/21   LUCY Shultz   furosemide (LASIX) 40 mg tablet Take 1 tablet (40 mg total) by mouth daily 3/10/22   Kyler Muhammad PA-C   gabapentin (NEURONTIN) 600 MG tablet TAKE 1 TABLET BY MOUTH THREE TIMES A DAY 12/29/21   LUCY Shultz   Incontinence Supply Disposable (PROCare Bariatric Briefs) MISC Use every 2 (two) hours as needed (incontinence) 12/9/20   LUCY Shultz   Incontinence Supply Disposable ([facility] health Incontinence Pads) MISC Use 1 each every 2 (two) hours 2/3/22   LUCY Shultz   Incontinence Supply Disposable (Wings Adult Briefs XXL) MISC Use every 2 (two) hours 2/3/22   Ida GrossLUCY Finn   Infant Care Products (Baby Wipes) MISC Use every 2 (two) hours 2/3/22   LUCY Shultz   latanoprost (XALATAN) 0 005 % ophthalmic solution Administer 1 drop to both eyes daily at bedtime 10/29/21   LUCY Shultz   loratadine (CLARITIN) 10 mg tablet TAKE 1 TABLET BY MOUTH EVERY DAY 8/16/21   LUCY Shultz   losartan (COZAAR) 25 mg tablet TAKE 1 TABLET (25 MG TOTAL) BY MOUTH DAILY   3/29/22   LUCY Shultz   methenamine hippurate (HIPREX) 1 g tablet Take 1 tablet (1 g total) by mouth 2 (two) times a day with meals 3/30/22   LUCY Bajwa   methylPREDNISolone (MEDROL) 32 MG tablet Take 1 tablet (32 mg total) by mouth see administration instructions Take first dose 12 hours prior to contrast administration Take second dose 2 hours prior to contrast administration 4/8/22   Vik Salcido PA-C   nystatin (MYCOSTATIN) powder Apply topically 3 (three) times a day 2/3/22   LUCY Shultz   pantoprazole (PROTONIX) 40 mg tablet Take 1 tablet (40 mg total) by mouth daily before breakfast 3/30/21   LUCY Shultz   saccharomyces boulardii (FLORASTOR) 250 mg capsule Take 1 capsule (250 mg total) by mouth 2 (two) times a day 3/9/22   Albert Mercado PA-C   sertraline (ZOLOFT) 100 mg tablet TAKE 1 TABLET BY MOUTH EVERY DAY 12/18/21   LUCY Shultz   traZODone (DESYREL) 100 mg tablet Take 1 5 tablets (150 mg total) by mouth daily at bedtime 2/23/22   LUCY Lynch   diphenhydrAMINE (BENADRYL) 50 mg capsule Take 1 capsule (50 mg total) by mouth 60 minutes pre-procedure for 1 dose Take 1 hour prior to contrast administration 4/8/22 4/8/22  LUCY Bang   methylPREDNISolone (MEDROL) 32 MG tablet Take 1 tablet (32 mg total) by mouth see administration instructions Take first dose 12 hours prior to contrast administration Take second dose 2 hours prior to contrast administration 4/8/22 4/8/22  LUCY Mathew           Review of Systems    Review of Systems   Constitutional: Negative for chills and fever  HENT: Negative for ear pain and sore throat  Eyes: Negative for pain and visual disturbance  Respiratory: Negative for cough and shortness of breath  Cardiovascular: Negative for chest pain and palpitations  Gastrointestinal: Negative for abdominal pain and vomiting  Genitourinary: Positive for hematuria  Negative for dysuria  Musculoskeletal: Negative for arthralgias and back pain  Skin: Negative for color change and rash  Neurological: Negative for seizures and syncope  Psychiatric/Behavioral: Positive for agitation, behavioral problems and confusion  All other systems reviewed and are negative        Physical Exam      ED Triage Vitals   Temperature Pulse Respirations Blood Pressure SpO2   04/08/22 1708 04/08/22 1707 04/08/22 1707 04/08/22 1707 04/08/22 1707   (!) 97 4 °F (36 3 °C) 77 18 132/59 92 %      Temp Source Heart Rate Source Patient Position - Orthostatic VS BP Location FiO2 (%)   04/08/22 1708 04/08/22 1707 04/08/22 1707 04/08/22 1707 --   Oral Monitor Lying Right arm       Pain Score       -- Physical Exam  Constitutional:       General: She is not in acute distress  Appearance: She is not ill-appearing  HENT:      Head: Normocephalic  Nose: Nose normal    Eyes:      Pupils: Pupils are equal, round, and reactive to light  Cardiovascular:      Rate and Rhythm: Normal rate  Pulmonary:      Effort: Pulmonary effort is normal    Abdominal:      Palpations: Abdomen is soft  Musculoskeletal:         General: Normal range of motion  Skin:     General: Skin is warm and dry  Neurological:      General: No focal deficit present  Mental Status: She is alert  She is disoriented  Assessment and Plan    Maci Dos Santos is a [de-identified] y o  female who presents with recurrent UTIs  Physical examination remarkable for confusion  Plan will be to perform diagnostic testing and treat symptomatically  MDM    Diagnostic Results    EKG INTERPRETATION  EKG Interpretation    EKG interpreted by me  Interpretation by Jacky La DO  EKG reviewed and interpreted independently      Labs:    Results for orders placed or performed during the hospital encounter of 04/08/22   UA w Reflex to Microscopic w Reflex to Culture    Specimen: Urine, Straight Cath   Result Value Ref Range    Color, UA Brown     Clarity, UA Cloudy     Specific Gravity, UA >=1 030 1 003 - 1 030    pH, UA 6 0 4 5, 5 0, 5 5, 6 0, 6 5, 7 0, 7 5, 8 0    Leukocytes, UA Trace (A) Negative    Nitrite, UA Positive (A) Negative    Protein, UA >=300 (A) Negative mg/dl    Glucose, UA Negative Negative mg/dl    Ketones, UA Negative Negative mg/dl    Urobilinogen, UA 1 0 0 2, 1 0 E U /dl E U /dl    Bilirubin, UA Interference- unable to analyze (A) Negative    Blood, UA Large (A) Negative   CBC and differential   Result Value Ref Range    WBC 4 17 (L) 4 31 - 10 16 Thousand/uL    RBC 4 78 3 81 - 5 12 Million/uL    Hemoglobin 13 1 11 5 - 15 4 g/dL    Hematocrit 42 3 34 8 - 46 1 %    MCV 89 82 - 98 fL    MCH 27 4 26 8 - 34 3 pg    MCHC 31 0 (L) 31 4 - 37 4 g/dL    RDW 13 6 11 6 - 15 1 %    MPV 10 0 8 9 - 12 7 fL    Platelets 262 893 - 807 Thousands/uL    nRBC 0 /100 WBCs    Neutrophils Relative 57 43 - 75 %    Immat GRANS % 1 0 - 2 %    Lymphocytes Relative 26 14 - 44 %    Monocytes Relative 13 (H) 4 - 12 %    Eosinophils Relative 3 0 - 6 %    Basophils Relative 0 0 - 1 %    Neutrophils Absolute 2 39 1 85 - 7 62 Thousands/µL    Immature Grans Absolute 0 02 0 00 - 0 20 Thousand/uL    Lymphocytes Absolute 1 10 0 60 - 4 47 Thousands/µL    Monocytes Absolute 0 53 0 17 - 1 22 Thousand/µL    Eosinophils Absolute 0 12 0 00 - 0 61 Thousand/µL    Basophils Absolute 0 01 0 00 - 0 10 Thousands/µL   Comprehensive metabolic panel   Result Value Ref Range    Sodium 141 136 - 145 mmol/L    Potassium 3 6 3 5 - 5 3 mmol/L    Chloride 109 (H) 100 - 108 mmol/L    CO2 26 21 - 32 mmol/L    ANION GAP 6 4 - 13 mmol/L    BUN 14 5 - 25 mg/dL    Creatinine 1 12 0 60 - 1 30 mg/dL    Glucose 101 65 - 140 mg/dL    Calcium 9 2 8 3 - 10 1 mg/dL    Corrected Calcium 10 0 8 3 - 10 1 mg/dL    AST 22 5 - 45 U/L    ALT 14 12 - 78 U/L    Alkaline Phosphatase 75 46 - 116 U/L    Total Protein 7 8 6 4 - 8 2 g/dL    Albumin 3 0 (L) 3 5 - 5 0 g/dL    Total Bilirubin 0 31 0 20 - 1 00 mg/dL    eGFR 46 ml/min/1 73sq m   Lactic acid   Result Value Ref Range    LACTIC ACID 0 6 0 5 - 2 0 mmol/L   Protime-INR   Result Value Ref Range    Protime 19 1 (H) 11 6 - 14 5 seconds    INR 1 68 (H) 0 84 - 1 19   APTT   Result Value Ref Range    PTT 37 23 - 37 seconds   HS Troponin 0hr (reflex protocol)   Result Value Ref Range    hs TnI 0hr 12 "Refer to ACS Flowchart"- see link ng/L   NT-BNP PRO   Result Value Ref Range    NT-proBNP 1,711 (H) <450 pg/mL   HS Troponin I 2hr   Result Value Ref Range    hs TnI 2hr 13 "Refer to ACS Flowchart"- see link ng/L    Delta 2hr hsTnI 1 <20 ng/L   Urine Microscopic   Result Value Ref Range    RBC, UA Innumerable (A) None Seen, 2-4 /hpf    WBC, UA Field obscured, unable to enumerate (A) None Seen, 2-4, 5-60 /hpf    Epithelial Cells Field obscured, unable to enumerate (A) None Seen, Occasional /hpf    Bacteria, UA Field obscured, unable to enumerate (A) None Seen, Occasional /hpf       All labs reviewed and utilized in the medical decision making process    Radiology:    No orders to display       All radiology studies independently viewed by me and interpreted by the radiologist     Procedure    Procedures      ED Course of Care and Re-Assessments     Case discussed with SLIM and patient admitted for further evaluation and treatment  Patient just completed abx and also has had C diff  She could be colonized with bacteria and I am hesitant to keep putting her on abx  Her daughter has been attempting to seek the advice of ID and Urology at this point  Medications   vancomycin (VANCOCIN) oral solution 125 mg (has no administration in time range)   LORazepam (ATIVAN) injection 1 mg (1 mg Intravenous Given 4/8/22 2123)   diphenhydrAMINE (BENADRYL) injection 25 mg (25 mg Intravenous Given 4/8/22 2225)     Patient was given 1 mg of ativan and became extremely agitated after her daughter had told me that in the past she was ok with ativan         FINAL IMPRESSION    Final diagnoses:   UTI (urinary tract infection)   Altered mental status         DISPOSITION/PLAN    Time reflects when diagnosis was documented in both MDM as applicable and the Disposition within this note     Time User Action Codes Description Comment    4/8/2022  9:12 PM Siddharth Segal [N39 0] UTI (urinary tract infection)     4/8/2022  9:13 PM Siddharth Segal [R41 [id number]] Altered mental status       ED Disposition     ED Disposition Condition Date/Time Comment    Admit Stable Fri Apr 8, 2022  9:12 PM Case was discussed with FRAN and the patient's admission status was agreed to be Admission Status: inpatient status to the service of Dr Callie Aguilar Information    None           PATIENT REFERRED TO:    No follow-up provider specified  DISCHARGE MEDICATIONS:    Patient's Medications   Discharge Prescriptions    No medications on file       No discharge procedures on file           Tessie Marlee, [address] [facility],   04/08/22 8101

## 2022-04-08 NOTE — TELEPHONE ENCOUNTER
Faxed orders at this time  Regarding encounter below, Spoke to Krysten Ureña from infectious disease yesterday under different encounter

## 2022-04-08 NOTE — TELEPHONE ENCOUNTER
Received another call from patients daughter stating that she talked to [facility] and they will not sent patient to the ER because they don't feel it's necessary  Joann Santoro stated [facility] needs to be told directly from our office to send patient to the ER  Called and spoke with the CRNP at [facility]  They did not get a urine sample today as they state patient is afebrile and her mental status has not changed since she was admitted there  According to daughter mental status has gotten worse  Spoke with Maury Lentz who stated patient should be evaluated in the ER to rule out infection due to mental status change  Relayed this info to CRNP  States she doesn't feel it is warranted and patient may have underlying dementia or need psych, but will sent patient to ER to rule out infection  I relayed this information to daughter Joann Santoro and made her aware CRNP from [facility] will be reaching out to her as well

## 2022-04-08 NOTE — TELEPHONE ENCOUNTER
Received incoming call from patients daughter Mike Friedman  States she is with her mother at the nursing home and patients mental status is even worse than it was yesterday  States this happens all the time with UTI's and she thinks she needs antibiotics  Daughter unsure if nursing home obtained a u/a today- stated nursing home is not listening to her and stating that patient is just having a bad day  Asked daughter if patient had been evaluated in the ER as discussed yesterday  She stated no, however patient is requesting to go to ER as she knows she is "not right"  Daughter states she will be proceeding to the ER with patient for evaluation

## 2022-04-08 NOTE — TELEPHONE ENCOUNTER
Called and spoke to Sergo No from [facility]  Informed that we placed orders  Orders to be faxed to [phone]  Sergo No stated they took a urine sample asked if she wanted us to have her fax results  Provided our fax number to Sergo No to have that done

## 2022-04-08 NOTE — TELEPHONE ENCOUNTER
Kelli Haines patient - Azar Newsome called again looking for the office notes in writing  FAX to [phone]  Before any changes can be made  Please call to confirm faxing notes ()    Thx

## 2022-04-09 DIAGNOSIS — K21.9 GASTROESOPHAGEAL REFLUX DISEASE, UNSPECIFIED WHETHER ESOPHAGITIS PRESENT: ICD-10-CM

## 2022-04-09 PROBLEM — R78.81 POSITIVE BLOOD CULTURE: Status: ACTIVE | Noted: 2022-04-09

## 2022-04-09 PROBLEM — I50.30 (HFPEF) HEART FAILURE WITH PRESERVED EJECTION FRACTION (HCC): Status: ACTIVE | Noted: 2020-11-13

## 2022-04-09 LAB
4HR DELTA HS TROPONIN: 1 NG/L
ANION GAP SERPL CALCULATED.3IONS-SCNC: 11 MMOL/L (ref 4–13)
ATRIAL RATE: 105 BPM
BASOPHILS # BLD AUTO: 0.02 THOUSANDS/ΜL (ref 0–0.1)
BASOPHILS NFR BLD AUTO: 0 % (ref 0–1)
BUN SERPL-MCNC: 14 MG/DL (ref 5–25)
CALCIUM SERPL-MCNC: 9.3 MG/DL (ref 8.3–10.1)
CARDIAC TROPONIN I PNL SERPL HS: 13 NG/L
CHLORIDE SERPL-SCNC: 110 MMOL/L (ref 100–108)
CO2 SERPL-SCNC: 22 MMOL/L (ref 21–32)
CREAT SERPL-MCNC: 1.07 MG/DL (ref 0.6–1.3)
EOSINOPHIL # BLD AUTO: 0.14 THOUSAND/ΜL (ref 0–0.61)
EOSINOPHIL NFR BLD AUTO: 3 % (ref 0–6)
ERYTHROCYTE [DISTWIDTH] IN BLOOD BY AUTOMATED COUNT: 13.7 % (ref 11.6–15.1)
GFR SERPL CREATININE-BSD FRML MDRD: 49 ML/MIN/1.73SQ M
GLUCOSE SERPL-MCNC: 97 MG/DL (ref 65–140)
HCT VFR BLD AUTO: 40.9 % (ref 34.8–46.1)
HGB BLD-MCNC: 12.8 G/DL (ref 11.5–15.4)
IMM GRANULOCYTES # BLD AUTO: 0.02 THOUSAND/UL (ref 0–0.2)
IMM GRANULOCYTES NFR BLD AUTO: 0 % (ref 0–2)
LYMPHOCYTES # BLD AUTO: 1.46 THOUSANDS/ΜL (ref 0.6–4.47)
LYMPHOCYTES NFR BLD AUTO: 29 % (ref 14–44)
MCH RBC QN AUTO: 27.6 PG (ref 26.8–34.3)
MCHC RBC AUTO-ENTMCNC: 31.3 G/DL (ref 31.4–37.4)
MCV RBC AUTO: 88 FL (ref 82–98)
MONOCYTES # BLD AUTO: 0.64 THOUSAND/ΜL (ref 0.17–1.22)
MONOCYTES NFR BLD AUTO: 13 % (ref 4–12)
NEUTROPHILS # BLD AUTO: 2.75 THOUSANDS/ΜL (ref 1.85–7.62)
NEUTS SEG NFR BLD AUTO: 55 % (ref 43–75)
NRBC BLD AUTO-RTO: 0 /100 WBCS
PLATELET # BLD AUTO: 194 THOUSANDS/UL (ref 149–390)
PMV BLD AUTO: 10.7 FL (ref 8.9–12.7)
POTASSIUM SERPL-SCNC: 3.3 MMOL/L (ref 3.5–5.3)
QRS AXIS: 56 DEGREES
QRSD INTERVAL: 96 MS
QT INTERVAL: 360 MS
QTC INTERVAL: 388 MS
RBC # BLD AUTO: 4.63 MILLION/UL (ref 3.81–5.12)
SODIUM SERPL-SCNC: 143 MMOL/L (ref 136–145)
T WAVE AXIS: 253 DEGREES
VENTRICULAR RATE: 70 BPM
WBC # BLD AUTO: 5.03 THOUSAND/UL (ref 4.31–10.16)

## 2022-04-09 PROCEDURE — 84484 ASSAY OF TROPONIN QUANT: CPT | Performed by: PHYSICIAN ASSISTANT

## 2022-04-09 PROCEDURE — 99232 SBSQ HOSP IP/OBS MODERATE 35: CPT | Performed by: INTERNAL MEDICINE

## 2022-04-09 PROCEDURE — 93010 ELECTROCARDIOGRAM REPORT: CPT | Performed by: INTERNAL MEDICINE

## 2022-04-09 PROCEDURE — 85025 COMPLETE CBC W/AUTO DIFF WBC: CPT | Performed by: PHYSICIAN ASSISTANT

## 2022-04-09 PROCEDURE — 80048 BASIC METABOLIC PNL TOTAL CA: CPT | Performed by: PHYSICIAN ASSISTANT

## 2022-04-09 PROCEDURE — 99223 1ST HOSP IP/OBS HIGH 75: CPT | Performed by: INTERNAL MEDICINE

## 2022-04-09 RX ORDER — OLANZAPINE 10 MG/1
2.5 INJECTION, POWDER, LYOPHILIZED, FOR SOLUTION INTRAMUSCULAR ONCE
Status: COMPLETED | OUTPATIENT
Start: 2022-04-09 | End: 2022-04-09

## 2022-04-09 RX ORDER — QUETIAPINE FUMARATE 25 MG/1
50 TABLET, FILM COATED ORAL
Status: DISCONTINUED | OUTPATIENT
Start: 2022-04-09 | End: 2022-04-11 | Stop reason: HOSPADM

## 2022-04-09 RX ORDER — OLANZAPINE 5 MG/1
7.5 TABLET ORAL DAILY
Status: DISCONTINUED | OUTPATIENT
Start: 2022-04-09 | End: 2022-04-11 | Stop reason: HOSPADM

## 2022-04-09 RX ORDER — PANTOPRAZOLE SODIUM 40 MG/1
TABLET, DELAYED RELEASE ORAL
Qty: 90 TABLET | Refills: 3 | Status: SHIPPED | OUTPATIENT
Start: 2022-04-09

## 2022-04-09 RX ADMIN — GABAPENTIN 600 MG: 300 CAPSULE ORAL at 09:53

## 2022-04-09 RX ADMIN — AMLODIPINE BESYLATE 10 MG: 10 TABLET ORAL at 09:53

## 2022-04-09 RX ADMIN — LOSARTAN POTASSIUM 25 MG: 25 TABLET, FILM COATED ORAL at 09:53

## 2022-04-09 RX ADMIN — OLANZAPINE 2.5 MG: 10 INJECTION, POWDER, LYOPHILIZED, FOR SOLUTION INTRAMUSCULAR at 00:11

## 2022-04-09 RX ADMIN — APIXABAN 5 MG: 5 TABLET, FILM COATED ORAL at 17:22

## 2022-04-09 RX ADMIN — GABAPENTIN 600 MG: 300 CAPSULE ORAL at 21:48

## 2022-04-09 RX ADMIN — FUROSEMIDE 40 MG: 40 TABLET ORAL at 09:53

## 2022-04-09 RX ADMIN — OLANZAPINE 2.5 MG: 10 INJECTION, POWDER, FOR SOLUTION INTRAMUSCULAR at 02:26

## 2022-04-09 RX ADMIN — OLANZAPINE 2.5 MG: 10 INJECTION, POWDER, LYOPHILIZED, FOR SOLUTION INTRAMUSCULAR at 00:54

## 2022-04-09 RX ADMIN — SERTRALINE HYDROCHLORIDE 100 MG: 100 TABLET, FILM COATED ORAL at 09:53

## 2022-04-09 RX ADMIN — GABAPENTIN 600 MG: 300 CAPSULE ORAL at 17:21

## 2022-04-09 RX ADMIN — TRAZODONE HYDROCHLORIDE 150 MG: 100 TABLET ORAL at 22:16

## 2022-04-09 RX ADMIN — QUETIAPINE FUMARATE 50 MG: 25 TABLET ORAL at 22:15

## 2022-04-09 RX ADMIN — OLANZAPINE 7.5 MG: 5 TABLET, FILM COATED ORAL at 13:19

## 2022-04-09 RX ADMIN — APIXABAN 5 MG: 5 TABLET, FILM COATED ORAL at 09:53

## 2022-04-09 NOTE — ED NOTES
Pt has become aggressive and violent, provider is aware  Per daughter pt gets this way with all benzodiazepines        Sahil Yoon RN  04/08/22 [id number]

## 2022-04-09 NOTE — H&P
Abdoulaye 50  H&P- Smiley Lacks 1940, [de-identified] y o  female MRN: [id number]  Unit/Bed#: ED 14 Encounter: [id number]  Primary Care Provider: LUCY Wray   Date and time admitted to hospital: 4/8/2022  5:02 PM    * Acute metabolic encephalopathy  Assessment & Plan  Suspected acute metabolic encephalopathy 2* UTI superimposed on behavioral variant frontotemporal dementia  -Unfortunately has multiple pathogens including ESBL E  Coli  Most recent ucx at 3/28/22 >10K ESBL E  Coli opposed to greater than 100K E  Coli  Has also grown P mirabilis and E coli/E faecalis susceptible to ancef  -no other s/sx of sepsis  Pt on eliquis w/o focal neurodeficits  -will monitor off abx, treat delirium/agitation, consult ID  -no improvement and paradoxical response w/ativan  Pt's family requesting to avoid antipsychotics unless no other options  We will trial benadryl and if no improvement, likely antipsychotics  Pt will irrespective need 4 point restraints given severe agitation      UTI (urinary tract infection)  Assessment & Plan  Suspected uti in pt w/underlying dementia  No s/sx of sepsis  Pt has change in mentation in underlying frontotemporal dementia    Pt has been on oral cipro x3d w/o worsening mentation   -ua + RBCs but in setting of chronic eliquis use making discerning UTI difficult  -for now given recent C diff, and recent ucx + for ESBL E  Coli >10K (not 100K/cfu) will defer off abx recheck ucx, consult ID    C  difficile diarrhea  Assessment & Plan  Just complete course of abx and diarrhea improved per pt's daughter  If requires abx will benefit from prophylaxis    Pacemaker  Assessment & Plan  S/p ppm    COPD (chronic obstructive pulmonary disease) ([facility] )  Assessment & Plan  No acute exacerbation      Morbid obesity due to excess calories (HCC)  Assessment & Plan  bmi >40 noted    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[facility] )  Assessment & Plan  Presently off pharmacotherapy      Atrial fibrillation ([facility] )  Assessment & Plan  Rate controlled continue eliquis      VTE Prophylaxis: Heparin  / sequential compression device   Code Status: fc  POLST: There is no POLST form on file for this patient (pre-hospital)  Discussion with family:     Anticipated Length of Stay:  Patient will be admitted on an Inpatient basis with an anticipated length of stay of  Greater than 2 midnights  Justification for Hospital Stay: acute metabolic encephalopathy    Total Time for Visit, including Counseling / Coordination of Care: 45 minutes  Greater than 50% of this total time spent on direct patient counseling and coordination of care  Chief Complaint:   Increased agitation    History of Present Illness:    Milena Mason is a [de-identified] y o  female who presents with pmh of afib, anxiety, chronic back pain, frontotemporal behavioral variant dementia, hx of esbl e coli, hx of c diff coming to hospital for ams  Pt has beeen at str[facility]snf x [facility]  She has had over the last 4-6 months some decline in her cognition as well as some behavioral issues  Over the last month or so this has been more noticeable per daughter  She notes over the last week or so being more agitated and was concerned she may have a UTI  Her op urologist checked a ua and placed her on oral cipro  Pt's daughter reports this did not make her mother any worse nor improve her s/sx  Due to her hx of recurrent UTIs, hematuria on eliquis and ESBL  Deepali Cape, urology had placed an ambulatory referral to ID in op setting  Cystoscopy was negative for malignancy in fall 2021 per urology documentation  The ucx was + for >10K ESBL E  Coli  Upon review of her other cultures she often grows E  Coli, E faecalis and P mirabilis w/ancef sensitivities to E  Coli and P mirablis as well as ESBL E  Coli  Her mother's agitation and confusion unfortunately did not improve    There was no fever but given lack of improvement pt's daughter requested she get brought to ED for further evaluation     In ED she was agitated but and confused  She was given ativan which exacerbated this markedly causing her to scream and lash out and become increasingly difficult to redirect  Her urine again remained nitrite + with innumerable RBCs and given concern for failure of op abx admission was requested  Review of Systems:    Review of Systems   Unable to perform ROS: Mental status change   Constitutional: Negative for fever  Psychiatric/Behavioral: Positive for agitation, behavioral problems and confusion  All other systems reviewed and are negative  Past Medical and Surgical History:     Past Medical History:   Diagnosis Date    [facility]     Anxiety     Chronic pain     [facility] failure with hypoxia ([facility])     COPD (chronic obstructive pulmonary disease) (HCC)     Dementia ([facility])     Dorsalgia, unspecified     Edema     Encephalopathy     GERD (gastroesophageal reflux disease)     Hypertension     Morbid obesity ([facility])     Muscle weakness (generalized)     Opioid dependence ([facility])     Overactive bladder     Pneumonia     Psychiatric disorder     anxiety, bipolar    Radiculopathy of thoracolumbar region     Sciatica     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 ([facility] )     Urinary incontinence     UTI (urinary tract infection)        Past Surgical History:   Procedure Laterality Date    APPENDECTOMY      BACK SURGERY      CHOLECYSTECTOMY      CYSTOSCOPY  11/16/2021    Dr Reid Officer         Meds/Allergies:    Prior to Admission medications    Medication Sig Start Date End Date Taking?  Authorizing Provider   ALPRAZolam Meagan Lock) 1 mg tablet Take 0 5 mg by mouth 3 (three) times a day Hold for sedation and lethargy   2/25/22   Historical Provider, MD   amLODIPine (NORVASC) 10 mg tablet Take 1 tablet (10 mg total) by mouth daily 12/22/21   LUCY Shultz Baclofen 5 MG TABS TAKE 1 TABLET EVERY MORNING 11/8/21   LUCY Shultz   calcium carbonate-vitamin D (OSCAL-D) 500 mg-200 units per tablet Take 1 tablet by mouth 2 (two) times a day with meals      Historical Provider, MD   cholestyramine (QUESTRAN) 4 g packet 1 packet mixed with 8 oz liquids and drink 4 times daily  Must be dosed 1 hour apart from other medications 1/27/22   Renny He PA-C   conjugated estrogens (Premarin) vaginal cream Apply pea-sized amount to urethral opening and vaginal opening twice a week on Wednesday and Sunday if she has no contraindications such as breast cancer or blood clots 3/30/22   MekhianeLUCY Brady   diphenhydrAMINE (BENADRYL) 50 mg capsule Take 1 capsule (50 mg total) by mouth 60 minutes pre-procedure for 1 dose Take 1 hour prior to contrast administration 4/8/22   Denise Cuevas PA-C   Disposable Gloves (Latex Gloves) MISC Use 4 (four) times a day as needed (cleaning) Eric Barnes 2/3/22   LUCY Shultz   Eliquis 5 MG TAKE 1 TABLET BY MOUTH TWICE A DAY 12/29/21   LUCY Shultz   ferrous sulfate 325 (65 Fe) mg tablet TAKE 1 TABLET BY MOUTH EVERY DAY WITH BREAKFAST 8/23/21   LUCY Shultz   fluticasone (EQL Fluticasone Propionate) 50 mcg/act nasal spray 1 spray into each nostril daily 12/22/21   LUCY Shultz   furosemide (LASIX) 40 mg tablet Take 1 tablet (40 mg total) by mouth daily 3/10/22   Stephenie Russell PA-C   gabapentin (NEURONTIN) 600 MG tablet TAKE 1 TABLET BY MOUTH THREE TIMES A DAY 12/29/21   LUCY Shultz   Incontinence Supply Disposable (PROCare Bariatric Briefs) MISC Use every 2 (two) hours as needed (incontinence) 12/9/20   LUCY Shultz   Incontinence Supply Disposable (S[facility] health Incontinence Pads) MISC Use 1 each every 2 (two) hours 2/3/22   LUCY Shultz   Incontinence Supply Disposable (Wings Adult Briefs XXL) MISC Use every 2 (two) hours 2/3/22   [facility] LUCY Mario   Infant Care Products (Baby Wipes) MISC Use every 2 (two) hours 2/3/22   LUCY Shultz   latanoprost (XALATAN) 0 005 % ophthalmic solution Administer 1 drop to both eyes daily at bedtime 10/29/21   LUCY Shultz   loratadine (CLARITIN) 10 mg tablet TAKE 1 TABLET BY MOUTH EVERY DAY 8/16/21   LUCY Shultz   losartan (COZAAR) 25 mg tablet TAKE 1 TABLET (25 MG TOTAL) BY MOUTH DAILY  3/29/22   LUCY Shultz   methenamine hippurate (HIPREX) 1 g tablet Take 1 tablet (1 g total) by mouth 2 (two) times a day with meals 3/30/22   LUCY Bajwa   methylPREDNISolone (MEDROL) 32 MG tablet Take 1 tablet (32 mg total) by mouth see administration instructions Take first dose 12 hours prior to contrast administration Take second dose 2 hours prior to contrast administration 4/8/22   Ana Maria PA-C   nystatin (MYCOSTATIN) powder Apply topically 3 (three) times a day 2/3/22   LUCY Shultz   pantoprazole (PROTONIX) 40 mg tablet Take 1 tablet (40 mg total) by mouth daily before breakfast 3/30/21   LUCY Shultz   saccharomyces boulardii (FLORASTOR) 250 mg capsule Take 1 capsule (250 mg total) by mouth 2 (two) times a day 3/9/22   Gatito Larkin PA-C   sertraline (ZOLOFT) 100 mg tablet TAKE 1 TABLET BY MOUTH EVERY DAY 12/18/21   LUCY Shultz   traZODone (DESYREL) 100 mg tablet Take 1 5 tablets (150 mg total) by mouth daily at bedtime 2/23/22   LUCY Whiting   diphenhydrAMINE (BENADRYL) 50 mg capsule Take 1 capsule (50 mg total) by mouth 60 minutes pre-procedure for 1 dose Take 1 hour prior to contrast administration 4/8/22 4/8/22  LUCY French   methylPREDNISolone (MEDROL) 32 MG tablet Take 1 tablet (32 mg total) by mouth see administration instructions Take first dose 12 hours prior to contrast administration Take second dose 2 hours prior to contrast administration 4/8/22 4/8/22  Jenita Gift, CRNP     I have reveiwed home medications using records provided by SNF  Allergies:    Allergies   Allergen Reactions    Aspirin     Iodinated Diagnostic Agents Throat Swelling     Pt states "when I was 14years old driving home from the hospital I felt like I couldn't think and it went away after 10 minutes"     Iodine - Food Allergy     Nsaids     Other Other (See Comments)     difficulty swallowing for short period       Social History:     Marital Status:    Occupation:   Patient Pre-hospital Living Situation: Patient Pre-hospital Level of Mobility:   Patient Pre-hospital Diet Restrictions:   Substance Use History:   Social History     Substance and Sexual Activity   Alcohol Use Never     Social History     Tobacco Use   Smoking Status Former Smoker    Types: Cigarettes    Quit date: 1995    Years since quittin 4   Smokeless Tobacco Never Used     Social History     Substance and Sexual Activity   Drug Use No       Family History:    History reviewed  No pertinent family history  Physical Exam:     Vitals:   Blood Pressure: 126/62 (22)  Pulse: 74 (22)  Temperature: (!) 97 4 °F (36 3 °C) (22)  Temp Source: Oral (22)  Respirations: 18 (22)  Height: 5' 6" (167 6 cm) (22)  Weight - Scale: 118 kg (259 lb 0 7 oz) (22)  SpO2: 94 % (22)    Physical Exam  Vitals reviewed  Constitutional:       General: She is not in acute distress  Appearance: She is obese  She is not ill-appearing, toxic-appearing or diaphoretic  HENT:      Head: Normocephalic and atraumatic  Right Ear: External ear normal       Left Ear: External ear normal       Nose: Nose normal    Eyes:      Extraocular Movements: Extraocular movements intact  Cardiovascular:      Rate and Rhythm: Normal rate and regular rhythm  Heart sounds: No murmur heard  No friction rub  No gallop  Pulmonary:      Effort: No respiratory distress  Breath sounds: No wheezing, rhonchi or rales  Abdominal:      Palpations: Abdomen is soft  Musculoskeletal:      Cervical back: Normal range of motion  Right lower leg: No edema  Left lower leg: No edema  Skin:     General: Skin is warm and dry  Neurological:      Mental Status: She is alert  Mental status is at baseline  Psychiatric:         Mood and Affect: Mood normal            Additional Data:     Lab Results: I have personally reviewed pertinent reports  Results from last 7 days   Lab Units 04/08/22  1829   WBC Thousand/uL 4 17*   HEMOGLOBIN g/dL 13 1   HEMATOCRIT % 42 3   PLATELETS Thousands/uL 184   NEUTROS PCT % 57   LYMPHS PCT % 26   MONOS PCT % 13*   EOS PCT % 3     Results from last 7 days   Lab Units 04/08/22  1829   SODIUM mmol/L 141   POTASSIUM mmol/L 3 6   CHLORIDE mmol/L 109*   CO2 mmol/L 26   BUN mg/dL 14   CREATININE mg/dL 1 12   ANION GAP mmol/L 6   CALCIUM mg/dL 9 2   ALBUMIN g/dL 3 0*   TOTAL BILIRUBIN mg/dL 0 31   ALK PHOS U/L 75   ALT U/L 14   AST U/L 22   GLUCOSE RANDOM mg/dL 101     Results from last 7 days   Lab Units 04/08/22  1829   INR  1 68*             Results from last 7 days   Lab Units 04/08/22  1829   LACTIC ACID mmol/L 0 6       Imaging:     No orders to display       EKG, Pathology, and Other Studies Reviewed on Admission:   · EKG:      [facility] Review[facility] Yes     ** Please Note: This note has been constructed using a voice recognition system   **

## 2022-04-09 NOTE — UTILIZATION REVIEW
Initial Clinical Review    Admission: Date/Time/Statement:   Admission Orders (From admission, onward)     Ordered        04/08/22 2114  Inpatient Admission  Once                      Orders Placed This Encounter   Procedures    Inpatient Admission     Standing Status:   Standing     Number of Occurrences:   1     Order Specific Question:   Level of Care     Answer:   Med Surg [16]     Order Specific Question:   Estimated length of stay     Answer:   More than 2 Midnights     Order Specific Question:   Certification     Answer:   I certify that inpatient services are medically necessary for this patient for a duration of greater than two midnights  See H&P and MD Progress Notes for additional information about the patient's course of treatment  ED Arrival Information     Expected Arrival Acuity    4/8/2022 4/8/2022 17:02 Urgent         Means of arrival Escorted by Service Admission type    [facility] ([facility]) Hospitalist Urgent         Arrival complaint    chronic UTI        Chief Complaint   Patient presents with    Possible UTI     Per EMS pt from [facility], reports dx with UTI and given oral abx for same  Pt reports no relief from medications  Also reports recent dx with c-diff but reports since resolved  Pt daughter requesting being sent to ED for IV abx  Initial Presentation: [de-identified] y o  female who presents with pmh of afib, anxiety, chronic back pain, frontotemporal behavioral variant dementia, hx of esbl e coli, hx of c diff coming to hospital for AMS  daughter d/w op urology worsening agitated behavior and concerned for UTI  They place pt on po cipro  No relief in symptoms  Urine cx done + for >10K ESBL E Coli  cipro did not help and mother agitation and confusion continued so came to ED for evaluation  In ED agitated and confused   Received ativan and this exacerbated behavior pt scream and lash out and difficult to redirect   Her urine again remained nitrite + with innumerable RBCs and given failure of outpatient abx admitted Inpatient with UTI  Acute metabolic encephalopathy no improvement and paradoxical response w/ativan  Pt's family requesting to avoid antipsychotics unless no other options  We will trial benadryl and if no improvement, likely antipsychotics  Pt will irrespective need 4 point restraints given severe agitation  UTI ua + RBCs but in setting of chronic eliquis use making discerning UTI difficult  for now given recent C diff, and recent ucx + for ESBL E  Coli >10K (not 100K/cfu) will defer off abx recheck ucx, consult ID  C difficile diarrhea just complete course of abx and diarrhea improved per daughter    Pacemaker s/p ppm  Afib rate controlled conitiue eliquis    Date:4/9/22    Day 2:   Behavioral variant frontotemporal dementia, she is disoriented and agitated, decreased breath sounds   Has been trialed on various abx recent for UTI no evidence of acute encephalopathy  ID Consulted for significance of pyuria  Change mental status agitation 2/2 dementia , martinez djust psychotropic medication add zyprexia in am , increased quetiapine at bs continue trazodone and sertraline  Heart failure with preserved EF ,continue furosemide  Morbid obesity  BMO > 40  htn continue amlodipine losartan  afib s/p ppm continue eliquis   Current Patient Status: Inpatient   Certification Statement: The patient will continue to require additional inpatient hospital stay due to agitation  Discharge Plan / Estimated Discharge Date: Anticipate discharge in > 72 hrs to rehab facility  ID CONSULT  Acute encephalopathy-in the setting of a patient with baseline advanced dementia  Suspect this is more of a medication effect as the patient was placed on ciprofloxacin in the setting of possible UTI  However the patient has no clinical evidence of an active urinary tract infection at this time  Regardless she has already received a 3 day course of ciprofloxacin    This point it is reasonable to monitor the patient off all antibiotics to see if her cognition returns to baseline    no additional antibiotics for now  follow-up blood cultures and urine cultures  monitor cognition  recheck CBC with diff and BMP  supportive care  ED Triage Vitals   Temperature Pulse Respirations Blood Pressure SpO2   04/08/22 1708 04/08/22 1707 04/08/22 1707 04/08/22 1707 04/08/22 1707   (!) 97 4 °F (36 3 °C) 77 18 132/59 92 %      Temp Source Heart Rate Source Patient Position - Orthostatic VS BP Location FiO2 (%)   04/08/22 1708 04/08/22 1707 04/08/22 1707 04/08/22 1707 --   Oral Monitor Lying Right arm       Pain Score       04/09/22 0000       No Pain          Wt Readings from Last 1 Encounters:   04/08/22 118 kg (259 lb 0 7 oz)     Additional Vital Signs:   04/09/22 14:46:33 97 7 °F (36 5 °C) 62 18 128/71 90 95 % None (Room air) Lying   04/09/22 09:40:22 98 5 °F (36 9 °C) 72 22 125/72 90 96 % None (Room air) Lying   04/09/22 00:53:41 -- 116 Abnormal  -- 139/72 94 95 % -- --   04/08/22 2306 -- 110 Abnormal  20 172/96 Abnormal  -- 95 % None (Room air) Lying   04/08/22 1915 -- 74 18 126/62 -- 94 % None (Room air) Lying   04/08/22 1708 97 4 °F (36 3 °C) Abnormal  -- -- -- -- --         Pertinent Labs/Diagnostic Test Results:     Results from last 7 days   Lab Units 04/09/22  0506 04/08/22  1829   WBC Thousand/uL 5 03 4 17*   HEMOGLOBIN g/dL 12 8 13 1   HEMATOCRIT % 40 9 42 3   PLATELETS Thousands/uL 194 184   NEUTROS ABS Thousands/µL 2 75 2 39     Results from last 7 days   Lab Units 04/09/22  0506 04/08/22  1829   SODIUM mmol/L 143 141   POTASSIUM mmol/L 3 3* 3 6   CHLORIDE mmol/L 110* 109*   CO2 mmol/L 22 26   ANION GAP mmol/L 11 6   BUN mg/dL 14 14   CREATININE mg/dL 1 07 1 12   EGFR ml/min/1 73sq m 49 46   CALCIUM mg/dL 9 3 9 2     Results from last 7 days   Lab Units 04/08/22  1829   AST U/L 22   ALT U/L 14   ALK PHOS U/L 75   TOTAL PROTEIN g/dL 7 8   ALBUMIN g/dL 3 0*   TOTAL BILIRUBIN mg/dL 0 31     Results from last 7 days   Lab Units 04/09/22  0506 04/08/22  1829   GLUCOSE RANDOM mg/dL 97 101     Results from last 7 days   Lab Units 04/09/22  0506 04/08/22  2044 04/08/22  1829   HS TNI 0HR ng/L  --   --  12   HS TNI 2HR ng/L  --  13  --    HSTNI D2 ng/L  --  1  --    HS TNI 4HR ng/L 13  --   --    HSTNI D4 ng/L 1  --   --      Results from last 7 days   Lab Units 04/08/22  1829   PROTIME seconds 19 1*   INR  1 68*   PTT seconds 37     Results from last 7 days   Lab Units 04/08/22  1829   LACTIC ACID mmol/L 0 6     Results from last 7 days   Lab Units 04/08/22  1829   NT-PRO BNP pg/mL 1,711*     Results from last 7 days   Lab Units 04/08/22  1951   CLARITY UA  Cloudy   COLOR UA  Brown   SPEC GRAV UA  >=1 030   PH UA  6 0   GLUCOSE UA mg/dl Negative   KETONES UA mg/dl Negative   BLOOD UA  Large*   PROTEIN UA mg/dl >=300*   NITRITE UA  Positive*   BILIRUBIN UA  Interference- unable to analyze*   UROBILINOGEN UA E U /dl 1 0   LEUKOCYTES UA  Trace*   WBC UA /hpf Field obscured, unable to enumerate*   RBC UA /hpf Innumerable*   BACTERIA UA /hpf Field obscured, unable to enumerate*   EPITHELIAL CELLS WET PREP /hpf Field obscured, unable to enumerate*     Results from last 7 days   Lab Units 04/08/22  1831 04/08/22  1829   BLOOD CULTURE  Received in Microbiology Lab  Culture in Progress  Received in Microbiology Lab  Culture in Progress       ED Treatment:   Medication Administration from 04/08/2022 1641 to 04/08/2022 2318       Date/Time Order Dose Route Action Action by Comments     04/08/2022 2123 LORazepam (ATIVAN) injection 1 mg 1 mg Intravenous Given Heather Mcdowell RN      04/08/2022 2225 diphenhydrAMINE (BENADRYL) injection 25 mg 25 mg Intravenous Given Heather Mcdowell RN         Past Medical History:   Diagnosis Date    [facility]fib ([facility] )     Anxiety     Chronic pain     Chronic respiratory failure with hypoxia (HCC)     COPD (chronic obstructive pulmonary disease) (HCC)     Dementia ([facility] )     Dorsalgia, unspecified     Edema     Encephalopathy     GERD (gastroesophageal reflux disease)     Hypertension     Morbid obesity (HCC)     Muscle weakness (generalized)     Opio[facility] dependence [facility]     Overactive bladder     Pneumonia     Psychiatric disorder     anxiety, bipolar    Radiculopathy of thoracolumbar region     Sciatica     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 ([facility] )     Urinary incontinence     UTI (urinary tract infection)      Present on Admission:   Atrial fibrillation ([facility])   C  difficile diarrhea   Dementia ([facility] )   Morbid obesity due to excess calories ([facility] )   Pacemaker   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[place] [address] )   Hypertension   (HFpEF) heart failure with preserved ejection fraction ([facility])      Admitting Diagnosis: UTI (urinary tract infection) [N39 0]  Altered mental status [R41 82]  C  difficile diarrhea [A04 72]  UTI symptoms [R39 9]  Atrial fibrillation, unspecified type ([facility]) [F55 76]  Acute metabolic encephalopathy [U00 73]  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[place] [address] ) [G31 09, F02 81]  Age/Sex: [de-identified] y o  female  Admission Orders:  gmf  Cbc cmp   Urine culture  Restraints non violent  Aspiration precautions  Elevate HOB   Scheduled Medications:  amLODIPine, 10 mg, Oral, Daily  apixaban, 5 mg, Oral, BID  furosemide, 40 mg, Oral, Daily  gabapentin, 600 mg, Oral, TID  latanoprost, 1 drop, Both Eyes, HS  losartan, 25 mg, Oral, Daily  OLANZapine, 7 5 mg, Oral, Daily  pantoprazole, 40 mg, Oral, Early Morning  QUEtiapine, 50 mg, Oral, HS  sertraline, 100 mg, Oral, Daily  traZODone, 150 mg, Oral, HS      Continuous IV Infusions:     PRN Meds:  acetaminophen, 650 mg, Oral, Q6H PRN  ondansetron, 4 mg, Intravenous, Q6H PRN        IP CONSULT TO INFECTIOUS DISEASES  IP CONSULT TO CASE MANAGEMENT    Network Utilization Review Department  ATTENTION: Please call with any questions or concerns to [phone] and carefully listen to the prompts so that you are directed to the right person  All voicemails are confidential   Elliott Cintron all requests for admission clinical reviews, approved or denied determinations and any other requests to dedicated fax number below belonging to the campus where the patient is receiving treatment   List of dedicated fax numbers for the Facilities:  [address] DENIALS (Administrative/Medical Necessity) [phone]   1000  [facility] (Maternity/NICU/Pediatrics) [phone]   [address]  [address] [facility] [place] [phone] [address] [facility] [phone]

## 2022-04-09 NOTE — PROGRESS NOTES
[address] [place] [address]  Progress Note Della Sesay 1940, [de-identified] y o  female MRN: [id number]  Unit/Bed#: [place] [address] -01 Encounter: [id number]  Primary Care Provider: LUCY Ireland   Date and time admitted to hospital: 4/8/2022  5:02 PM    * Behavioral variant frontotemporal dementia [facility])  Assessment & Plan  81-year-old female with history of COPD CHF atrial fibrillation and frontotemporal dementia from SNF presents with increased agitation  · Has been trialed on various antibiotics recently for UTI  · No evidence of acute encephalopathy  ID consulted for significance of pyuria  · Change in mental status/agitation secondary to frontal temporal dementia  · Will adjust psychotropics medications:  Add zyprexa in a m , increased quetiapine at bedtime to 50 mg  Continue trazodone and sertraline  (HFpEF) heart failure with preserved ejection fraction [facility])  Assessment & Plan  Wt Readings from Last 3 Encounters:   04/08/22 118 kg (259 lb 0 7 oz)   03/07/22 122 kg (268 lb)   10/19/21 122 kg (268 lb)     · Chronic diastolic CHF continue furosemide    Morbid obesity due to excess calories (HCC)  Assessment & Plan  · Body mass index is 41 81 kg/m²  Hypertension  Assessment & Plan  · Continue amlodipine and losartan    Atrial fibrillation (HCC)  Assessment & Plan  · Paroxysmal atrial fibrillation  Continue eliquis for anticoagulation  VTE Pharmacologic Prophylaxis:   High Risk (Score >/= 5) - Pharmacological DVT Prophylaxis Ordered: Apixaban (Eliquis)  Sequential Compression Devices Ordered  Patient Centered Rounds: I have performed bedside rounds with nursing staff today  Discussions with Specialists or Other Care Team Provider:     Education and Discussions with Family / Patient: daughter on telephone    Time Spent for Care: 25 mins  More than 50% of total time spent on counseling and coordination of care as described above      Current Length of Stay: 1 day(s)  Current Patient Status: Inpatient   Certification Statement: The patient will continue to require additional inpatient hospital stay due to agitation  Discharge Plan / Estimated Discharge Date: Anticipate discharge in > 72 hrs to rehab facility  Code Status: Level 3 - DNAR and DNI      Subjective:   Patient seen and examined  Agitated but not encephalopathic  No complaints    Objective:   Vitals: Blood pressure 125/72, pulse 72, temperature 98 5 °F (36 9 °C), resp  rate 22, height 5' 6" (1 676 m), weight 118 kg (259 lb 0 7 oz), SpO2 96 %, not currently breastfeeding  Physical Exam  Vitals reviewed  Constitutional:       General: She is not in acute distress  Appearance: Normal appearance  Cardiovascular:      Rate and Rhythm: Regular rhythm  Heart sounds: Normal heart sounds  Pulmonary:      Breath sounds: Decreased breath sounds present  No wheezing  Abdominal:      General: Bowel sounds are normal       Palpations: Abdomen is soft  Tenderness: There is no guarding or rebound  Musculoskeletal:         General: No swelling  Skin:     General: Skin is warm  Neurological:      Mental Status: She is alert  Mental status is at baseline  She is disoriented  Psychiatric:         Behavior: Behavior is agitated         Additional Data:   Labs:  Results from last 7 days   Lab Units 04/09/22  0506 04/08/22  1829   WBC Thousand/uL 5 03 4 17*   HEMOGLOBIN g/dL 12 8 13 1   HEMATOCRIT % 40 9 42 3   MCV fL 88 89   PLATELETS Thousands/uL 194 184   INR   --  1 68*     Results from last 7 days   Lab Units 04/09/22  0506 04/08/22  1829   SODIUM mmol/L 143 141   POTASSIUM mmol/L 3 3* 3 6   CHLORIDE mmol/L 110* 109*   CO2 mmol/L 22 26   ANION GAP mmol/L 11 6   BUN mg/dL 14 14   CREATININE mg/dL 1 07 1 12   CALCIUM mg/dL 9 3 9 2   ALBUMIN g/dL  --  3 0*   TOTAL BILIRUBIN mg/dL  --  0 31   ALK PHOS U/L  --  75   ALT U/L  --  14   AST U/L  --  22   EGFR ml/min/1 73sq m 49 46   GLUCOSE RANDOM mg/dL 97 101 Results from last 7 days   Lab Units 04/09/22  0506 04/08/22  2044 04/08/22  1829   HS TNI 0HR ng/L  --   --  12   HS TNI 2HR ng/L  --  13  --    HS TNI 4HR ng/L 13  --   --      Results from last 7 days   Lab Units 04/08/22  1829   NT-PRO BNP pg/mL 1,711*      Results from last 7 days   Lab Units 04/08/22  1829   LACTIC ACID mmol/L 0 6                 * I Have Reviewed All Lab Data Listed Above  Cultures:   Results from last 7 days   Lab Units 04/08/22  1831 04/08/22  1829   BLOOD CULTURE  Received in Microbiology Lab  Culture in Progress  Received in Microbiology Lab  Culture in Progress  Lines/Drains:  Invasive Devices  Report    Peripheral Intravenous Line            Peripheral IV 04/08/22 Right Antecubital <1 day              Telemetry:      Imaging:  Imaging Reports Reviewed Today Include:   No results found  Scheduled Meds:  Current Facility-Administered Medications   Medication Dose Route Frequency Provider Last Rate    acetaminophen  650 mg Oral Q6H PRN Marita Love PA-C      amLODIPine  10 mg Oral Daily Marita Love PA-C      apixaban  5 mg Oral BID Marita Love PA-C      furosemide  40 mg Oral Daily Marita Love PA-C      gabapentin  600 mg Oral TID Marita Love PA-C      latanoprost  1 drop Both Eyes HS Marita Love PA-C      losartan  25 mg Oral Daily Mariat Love PA-C      ondansetron  4 mg Intravenous Q6H PRN Marita Love PA-C      pantoprazole  40 mg Oral Early Morning Marita Love PA-C      QUEtiapine  25 mg Oral HS Marita Love PA-C      sertraline  100 mg Oral Daily Marita Love PA-C      traZODone  150 mg Oral HS Marita Persaud PA-C         Today, Patient Was Seen By: Ashley Bonilla DO    ** Please Note: Dictation voice to text software may have been used in the creation of this document   **

## 2022-04-09 NOTE — ASSESSMENT & PLAN NOTE
Suspected uti in pt w/underlying dementia  No s/sx of sepsis  Pt has change in mentation in underlying frontotemporal dementia    Pt has been on oral cipro x3d w/o worsening mentation   -ua + RBCs but in setting of chronic eliquis use making discerning UTI difficult  -for now given recent C diff, and recent ucx + for ESBL E  Coli >10K (not 100K/cfu) will defer off abx recheck ucx, consult ID

## 2022-04-09 NOTE — ASSESSMENT & PLAN NOTE
Suspected acute metabolic encephalopathy 2* UTI superimposed on behavioral variant frontotemporal dementia  -Unfortunately has multiple pathogens including ESBL E  Coli  Most recent ucx at 3/28/22 >10K ESBL E  Coli opposed to greater than 100K E  Coli  Has also grown P mirabilis and E coli/E faecalis susceptible to ancef  -no other s/sx of sepsis  Pt on eliquis w/o focal neurodeficits  -will monitor off abx, treat delirium/agitation, consult ID  -no improvement and paradoxical response w/ativan  Pt's family requesting to avoid antipsychotics unless no other options  We will trial benadryl and if no improvement, likely antipsychotics    Pt will irrespective need 4 point restraints given severe agitation

## 2022-04-09 NOTE — ASSESSMENT & PLAN NOTE
61-year-old female with history of COPD CHF atrial fibrillation and frontotemporal dementia from SNF presents with increased agitation  · Has been trialed on various antibiotics recently for UTI  · No evidence of acute encephalopathy  ID consulted for significance of pyuria  · Change in mental status/agitation secondary to frontal temporal dementia  · Will adjust psychotropics medications:  Add zyprexa in a m , increased quetiapine at bedtime to 50 mg  Continue trazodone and sertraline

## 2022-04-09 NOTE — CONSULTS
Consultation - Infectious Disease   Lexis Arredondo [de-identified] y o  female MRN: [id number]  Unit/Bed#: [place] [address] -01 Encounter: [id number]    IMPRESSION & RECOMMENDATIONS:   1  Acute metabolic encephalopathy  In setting of advancing dementia  Suspect recent Cipro may be playing a role given risk of CNS side effects with fluoroquinolones in the elderly population  -monitor patient off antibiotics  -SLIM adjusting mental health medications  -serial neuro exams  -monitor mood mental status  -supportive care    2  Recent UTI  Diagnosed at [facility] and treated with 3x cipro  Past urine culture results have shown mixed urinary alfredo, E coli, and E coli ESBL  There was susceptibility to Cipro on her previous sensitivity profile  Patient's current urinalysis is not strongly indicative of infection although formal urine culture is pending  She has been afebrile without leukocytosis since her admission  At this time I favor monitoring patient off additional antibiotics while we await updated urine culture results  I would also avoid future fluoroquinolone use given patient's AIDS, baseline dementia, and history of Clostridium difficile   -no indication for ongoing antibiotics at this time  -recommend avoidance of future fluoroquinolones  -monitor CBC and BMP  -follow-up urine culture results  -monitor vitals    3  History of C diff diarrhea  Patient with positive PCR and negative EIA in December 2021 and January 2022  It is unclear if she received prophylactic vancomycin while on her recent course of Cipro  Fortunately the patient is not having diarrhea today  Will continue to monitor stool output  If she is to require systemic antibiotics she will require oral vancomycin prophylaxis   -if patient were to require systemic antibiotics she will require oral vancomycin prophylaxis  -recommend avoidance of future fluoroquinolones  -serial abdominal exams  -monitor stool output    4   Behavioral variant frontotemporal dementia  -supportive care    5  Pacemaker present  6  MO  BMI = 41 81  Thank you for the consult  We will continue to follow along  Above plan was discussed in detail with patient at the bedside  Above plan was discussed in detail with SLIM  HISTORY OF PRESENT ILLNESS:  Reason for Consult:  UTI, acute metabolic encephalopathy, C difficile diarrhea, behavioral variant frontotemporal dementia, atrial fibrillation unspecified type  HPI: Anahi Johnson is a [de-identified]y o  year old female who presented to the [facility] Emergency Department on 04/08/2022 with agitation and confusion  Per patient's family she has been residing at [facility] with worsening dementia symptoms over the past few months  Recently she had increased confusion and was worked up for a possible UTI  She was given a three day course of oral ciprofloxacin  Patient's family felt her confusion became worsening wanted her assessed in the emergency department for further workup  Upon arrival to the ED the patient's temperature was 97 4° F  Heart rate was 77  Respiratory was 18  O2 saturation was 92% on room air  Her BP was 132/59  Patient's white blood cell count was 4 17  Lactic acid was 0 6  Her creatinine was 1 12 with a GFR 46  LFTs were stable  Urinalysis showed innumerable RBC, trace leukocytes, positive nitrate, protein, and large blood  The urine was sent for culture  Blood cultures were sent  Patient was admitted for additional medical management  Since her admission she has been maintained off of antibiotic treatment  SLIM is re-evaluating her mental health medication profile and making adjustments  We have been asked for formal consult for UTI, acute metabolic encephalopathy, C difficile diarrhea, behavioral variant frontotemporal dementia, atrial fibrillation unspecified type      The patient has GERD, bipolar disorder, hypertension, morbid obesity, AFib, anxiety, chronic pain with opioid dependence, sciatica, urinary incontinence, OAB, chronic muscle weakness, dorsalgia, COPD, and dementia  She has a history of edema, encephalopathy, pneumonia, radiculopathy, psychosis, urinary tract infection, cholecystectomy, knee surgery, cystoscopy, back surgery, and appendectomy  She is a former smoker  She has allergies to iodine, aspirin, and NSAIDs,  REVIEW OF SYSTEMS:  Unable to obtain as patient is nonverbal with dementia      PAST MEDICAL HISTORY:  Past Medical History:   Diagnosis Date    A-fib ([facility] )     Anxiety     Chronic pain     Chronic respiratory failure with hypoxia (HCC)     COPD (chronic obstructive pulmonary disease) (HCC)     Dementia (HCC)     Dorsalgia, unspecified     Edema     Encephalopathy     GERD (gastroesophageal reflux disease)     Hypertension     Morbid obesity ([facility])     Muscle weakness (generalized)     Opioid dependence ([facility])     Overactive bladder     Pneumonia     Psychiatric disorder     anxiety, bipolar    Radiculopathy of thoracolumbar region     Sciatica     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 ([facility] )     Urinary incontinence     UTI (urinary tract infection)      Past Surgical History:   Procedure Laterality Date    APPENDECTOMY      BACK SURGERY      CHOLECYSTECTOMY      CYSTOSCOPY  2021    Dr Loni Cook HISTORY:  Non-contributory    SOCIAL HISTORY:  Social History   Social History     Substance and Sexual Activity   Alcohol Use Never     Social History     Substance and Sexual Activity   Drug Use No     Social History     Tobacco Use   Smoking Status Former Smoker    Types: Cigarettes    Quit date: 1995    Years since quittin 4   Smokeless Tobacco Never Used     ALLERGIES:  Allergies   Allergen Reactions    Aspirin     Iodinated Diagnostic Agents Throat Swelling     Pt states "when I was 14years old driving home from the hospital I felt like I couldn't think and it went away after 10 minutes"     Iodine - Food Allergy     Nsaids     Other Other (See Comments)     difficulty swallowing for short period     MEDICATIONS:  All current active medications have been reviewed  ANTIBIOTICS:  No current antibiotic use    PHYSICAL EXAM:  Temp:  [97 4 °F (36 3 °C)] 97 4 °F (36 3 °C)  HR:  [] 116  Resp:  [18-20] 20  BP: (126-172)/(59-96) 139/72  SpO2:  [92 %-95 %] 95 %  Temp (24hrs), Av 4 °F (36 3 °C), Min:97 4 °F (36 3 °C), Max:97 4 °F (36 3 °C)  Current: Temperature: (!) 97 4 °F (36 3 °C)    Intake/Output Summary (Last 24 hours) at 2022 0729  Last data filed at 2022  Gross per 24 hour   Intake --   Output 30 ml   Net -30 ml     General Appearance:  Appearing comfortable supine in bed when I arrived then became agitated and was trying to pull her wrist restraints  She is nonverbal other than a few grunts  She appears chronically debilitated  She appears nontoxic  Head:  Normocephalic, without obvious abnormality, atraumatic  Eyes:  Conjunctiva pink and sclera anicteric, both eyes  Nose: Nares normal, mucosa normal, no drainage  Throat: Oropharynx moist without lesions  Neck: Supple, symmetrical, no adenopathy, no mass/nodules  Back:   Difficult to role as she was trying pull her wrist restraints I was able to partially reposition her for exam, no facial grimace or moaning with CVA palpation  Lungs:   Clear to auscultation bilaterally, respirations unlabored on room air  Chest Wall:  No deformity  Heart:  Tachycardic; no murmur, rub or gallop  Abdomen:   Soft, no facial grimace with abdominal palpation, non-distended, positive bowel sounds throughout  Extremities: No cyanosis or clubbing, no edema  Skin: No rashes or lesions noted on exposed skin  Lymph nodes: Cervical, supraclavicular nodes normal    Neurologic: Unable to obtain, patient nonverbal and not interactive with dementia    She was spontaneously moving all four extremity  LABS, IMAGING, & OTHER STUDIES:  Lab Results:  I have personally reviewed pertinent labs  Results from last 7 days   Lab Units 04/09/22  0506 04/08/22  1829   WBC Thousand/uL 5 03 4 17*   HEMOGLOBIN g/dL 12 8 13 1   PLATELETS Thousands/uL 194 184     Results from last 7 days   Lab Units 04/09/22  0506 04/08/22  1829 04/08/22  1829   POTASSIUM mmol/L 3 3*   < > 3 6   CHLORIDE mmol/L 110*   < > 109*   CO2 mmol/L 22   < > 26   BUN mg/dL 14   < > 14   CREATININE mg/dL 1 07   < > 1 12   EGFR ml/min/1 73sq m 49   < > 46   CALCIUM mg/dL 9 3   < > 9 2   AST U/L  --   --  22   ALT U/L  --   --  14   ALK PHOS U/L  --   --  75    < > = values in this interval not displayed  Results from last 7 days   Lab Units 04/08/22  1831 04/08/22  1829   BLOOD CULTURE  Received in Microbiology Lab  Culture in Progress  Received in Microbiology Lab  Culture in Progress  Imaging Studies:   No imaging so far during this hospitalization  Other Studies:   Blood cultures are pending  Urine straight cath culture is pending

## 2022-04-09 NOTE — ASSESSMENT & PLAN NOTE
Just complete course of abx and diarrhea improved per pt's daughter  If requires abx will benefit from prophylaxis

## 2022-04-09 NOTE — PLAN OF CARE
Problem: MOBILITY - ADULT  Goal: Maintain or return to baseline ADL function  Description: INTERVENTIONS:  -  Assess patient's ability to carry out ADLs; assess patient's baseline for ADL function and identify physical deficits which impact ability to perform ADLs (bathing, care of mouth/teeth, toileting, grooming, dressing, etc )  - Assess/evaluate cause of self-care deficits   - Assess range of motion  - Assess patient's mobility; develop plan if impaired  - Assess patient's need for assistive devices and provide as appropriate  - Encourage maximum independence but intervene and supervise when necessary  - Involve family in performance of ADLs  - Assess for home care needs following discharge   - Consider OT consult to assist with ADL evaluation and planning for discharge  - Provide patient education as appropriate  Outcome: Not Progressing  Goal: Maintains/Returns to pre admission functional level  Description: INTERVENTIONS:  - Perform BMAT or MOVE assessment daily    - Set and communicate daily mobility goal to care team and patient/family/caregiver  - Collaborate with rehabilitation services on mobility goals if consulted  - Perform Range of Motion 2 times a day  - Reposition patient every 2 hours    - Ambulate patient 2 times a day  - Out of bed to chair 2 times a day   - Out of bed for meals 2 times a day  - Out of bed for toileting  - Record patient progress and toleration of activity level   Outcome: Not Progressing     Problem: Prexisting or High Potential for Compromised Skin Integrity  Goal: Skin integrity is maintained or improved  Description: INTERVENTIONS:  - Identify patients at risk for skin breakdown  - Assess and monitor skin integrity  - Assess and monitor nutrition and hydration status  - Monitor labs   - Assess for incontinence   - Turn and reposition patient  - Assist with mobility/ambulation  - Relieve pressure over bony prominences  - Avoid friction and shearing  - Provide appropriate hygiene as needed including keeping skin clean and dry  - Evaluate need for skin moisturizer/barrier cream  - Collaborate with interdisciplinary team   - Patient/family teaching  - Consider wound care consult   Outcome: Not Progressing     Problem: PAIN - ADULT  Goal: Verbalizes/displays adequate comfort level or baseline comfort level  Description: Interventions:  - Encourage patient to monitor pain and request assistance  - Assess pain using appropriate pain scale  - Administer analgesics based on type and severity of pain and evaluate response  - Implement non-pharmacological measures as appropriate and evaluate response  - Consider cultural and social influences on pain and pain management  - Notify physician/advanced practitioner if interventions unsuccessful or patient reports new pain  Outcome: Not Progressing     Problem: INFECTION - ADULT  Goal: Absence or prevention of progression during hospitalization  Description: INTERVENTIONS:  - Assess and monitor for signs and symptoms of infection  - Monitor lab/diagnostic results  - Monitor all insertion sites, i e  indwelling lines, tubes, and drains  - Monitor endotracheal if appropriate and nasal secretions for changes in amount and color  - [place] appropriate cooling/warming therapies per order  - Administer medications as ordered  - Instruct and encourage patient and family to use good hand hygiene technique  - Identify and instruct in appropriate isolation precautions for identified infection/condition  Outcome: Not Progressing  Goal: Absence of fever/infection during neutropenic period  Description: INTERVENTIONS:  - Monitor WBC    Outcome: Not Progressing     Problem: SAFETY ADULT  Goal: Maintain or return to baseline ADL function  Description: INTERVENTIONS:  -  Assess patient's ability to carry out ADLs; assess patient's baseline for ADL function and identify physical deficits which impact ability to perform ADLs (bathing, care of mouth/teeth, toileting, grooming, dressing, etc )  - Assess/evaluate cause of self-care deficits   - Assess range of motion  - Assess patient's mobility; develop plan if impaired  - Assess patient's need for assistive devices and provide as appropriate  - Encourage maximum independence but intervene and supervise when necessary  - Involve family in performance of ADLs  - Assess for home care needs following discharge   - Consider OT consult to assist with ADL evaluation and planning for discharge  - Provide patient education as appropriate  Outcome: Not Progressing  Goal: Maintains/Returns to pre admission functional level  Description: INTERVENTIONS:  - Perform BMAT or MOVE assessment daily    - Set and communicate daily mobility goal to care team and patient/family/caregiver  - Collaborate with rehabilitation services on mobility goals if consulted  - Perform Range of Motion 2 times a day  - Reposition patient every 2 hours    - Ambulate patient 2 times a day  - Out of bed to chair 2 times a day   - Out of bed for meals 2 times a day  - Out of bed for toileting  - Record patient progress and toleration of activity level   Outcome: Not Progressing  Goal: Patient will remain free of falls  Description: INTERVENTIONS:  - Educate patient/family on patient safety including physical limitations  - Instruct patient to call for assistance with activity   - Consult OT/PT to assist with strengthening/mobility   - Keep Call bell within reach  - Keep bed low and locked with side rails adjusted as appropriate  - Keep care items and personal belongings within reach  - Initiate and maintain comfort rounds  - Make Fall Risk Sign visible to staff  - Offer Toileting every 2 Hours, in advance of need  - Initiate/Maintain bed alarm  - Apply yellow socks and bracelet for high fall risk patients  - Consider moving patient to room near nurses station  Outcome: Not Progressing     Problem: DISCHARGE PLANNING  Goal: Discharge to home or other facility with appropriate resources  Description: INTERVENTIONS:  - Identify barriers to discharge w/patient and caregiver  - Arrange for needed discharge resources and transportation as appropriate  - Identify discharge learning needs (meds, wound care, etc )  - Arrange for interpretive services to assist at discharge as needed  - Refer to Case Management Department for coordinating discharge planning if the patient needs post-hospital services based on physician/advanced practitioner order or complex needs related to functional status, cognitive ability, or social support system  Outcome: Not Progressing     Problem: Knowledge Deficit  Goal: Patient/family/caregiver demonstrates understanding of disease process, treatment plan, medications, and discharge instructions  Description: Complete learning assessment and assess knowledge base    Interventions:  - Provide teaching at level of understanding  - Provide teaching via preferred learning methods  Outcome: Not Progressing

## 2022-04-09 NOTE — ASSESSMENT & PLAN NOTE
Wt Readings from Last 3 Encounters:   04/08/22 118 kg (259 lb 0 7 oz)   03/07/22 122 kg (268 lb)   10/19/21 122 kg (268 lb)     · Chronic diastolic CHF continue furosemide

## 2022-04-09 NOTE — PLAN OF CARE
Problem: MOBILITY - ADULT  Goal: Maintain or return to baseline ADL function  Description: INTERVENTIONS:  -  Assess patient's ability to carry out ADLs; assess patient's baseline for ADL function and identify physical deficits which impact ability to perform ADLs (bathing, care of mouth/teeth, toileting, grooming, dressing, etc )  - Assess/evaluate cause of self-care deficits   - Assess range of motion  - Assess patient's mobility; develop plan if impaired  - Assess patient's need for assistive devices and provide as appropriate  - Encourage maximum independence but intervene and supervise when necessary  - Involve family in performance of ADLs  - Assess for home care needs following discharge   - Consider OT consult to assist with ADL evaluation and planning for discharge  - Provide patient education as appropriate  Outcome: Progressing  Goal: Maintains/Returns to pre admission functional level  Description: INTERVENTIONS:  - Perform BMAT or MOVE assessment daily    - Set and communicate daily mobility goal to care team and patient/family/caregiver  - Collaborate with rehabilitation services on mobility goals if consulted  - Perform Range of Motion 3 times a day  - Reposition patient every 2 hours    - Dangle patient 3 times a day  - Stand patient 2 times a day  - Ambulate patient 3 times a day  - Out of bed to chair 3 times a day   - Out of bed for meals 3 times a day  - Out of bed for toileting  - Record patient progress and toleration of activity level   Outcome: Progressing     Problem: Prexisting or High Potential for Compromised Skin Integrity  Goal: Skin integrity is maintained or improved  Description: INTERVENTIONS:  - Identify patients at risk for skin breakdown  - Assess and monitor skin integrity  - Assess and monitor nutrition and hydration status  - Monitor labs   - Assess for incontinence   - Turn and reposition patient  - Assist with mobility/ambulation  - Relieve pressure over bony prominences  - Avoid friction and shearing  - Provide appropriate hygiene as needed including keeping skin clean and dry  - Evaluate need for skin moisturizer/barrier cream  - Collaborate with interdisciplinary team   - Patient/family teaching  - Consider wound care consult   Outcome: Progressing     Problem: PAIN - ADULT  Goal: Verbalizes/displays adequate comfort level or baseline comfort level  Description: Interventions:  - Encourage patient to monitor pain and request assistance  - Assess pain using appropriate pain scale  - Administer analgesics based on type and severity of pain and evaluate response  - Implement non-pharmacological measures as appropriate and evaluate response  - Consider cultural and social influences on pain and pain management  - Notify physician/advanced practitioner if interventions unsuccessful or patient reports new pain  Outcome: Progressing     Problem: INFECTION - ADULT  Goal: Absence or prevention of progression during hospitalization  Description: INTERVENTIONS:  - Assess and monitor for signs and symptoms of infection  - Monitor lab/diagnostic results  - Monitor all insertion sites, i e  indwelling lines, tubes, and drains  - Monitor endotracheal if appropriate and nasal secretions for changes in amount and color  - [place] appropriate cooling/warming therapies per order  - Administer medications as ordered  - Instruct and encourage patient and family to use good hand hygiene technique  - Identify and instruct in appropriate isolation precautions for identified infection/condition  Outcome: Progressing  Goal: Absence of fever/infection during neutropenic period  Description: INTERVENTIONS:  - Monitor WBC    Outcome: Progressing     Problem: SAFETY ADULT  Goal: Maintain or return to baseline ADL function  Description: INTERVENTIONS:  -  Assess patient's ability to carry out ADLs; assess patient's baseline for ADL function and identify physical deficits which impact ability to perform ADLs (bathing, care of mouth/teeth, toileting, grooming, dressing, etc )  - Assess/evaluate cause of self-care deficits   - Assess range of motion  - Assess patient's mobility; develop plan if impaired  - Assess patient's need for assistive devices and provide as appropriate  - Encourage maximum independence but intervene and supervise when necessary  - Involve family in performance of ADLs  - Assess for home care needs following discharge   - Consider OT consult to assist with ADL evaluation and planning for discharge  - Provide patient education as appropriate  Outcome: Progressing  Goal: Maintains/Returns to pre admission functional level  Description: INTERVENTIONS:  - Perform BMAT or MOVE assessment daily    - Set and communicate daily mobility goal to care team and patient/family/caregiver  - Collaborate with rehabilitation services on mobility goals if consulted  - Perform Range of Motion 3 times a day  - Reposition patient every 2 hours    - Dangle patient 3 times a day  - Stand patient 3 times a day  - Ambulate patient 3 times a day  - Out of bed to chair 3 times a day   - Out of bed for meals 3 times a day  - Out of bed for toileting  - Record patient progress and toleration of activity level   Outcome: Progressing  Goal: Patient will remain free of falls  Description: INTERVENTIONS:  - Educate patient/family on patient safety including physical limitations  - Instruct patient to call for assistance with activity   - Consult OT/PT to assist with strengthening/mobility   - Keep Call bell within reach  - Keep bed low and locked with side rails adjusted as appropriate  - Keep care items and personal belongings within reach  - Initiate and maintain comfort rounds  - Make Fall Risk Sign visible to staff  - Offer Toileting every 2 Hours, in advance of need  - Initiate/Maintain alarm  - Obtain necessary fall risk management equipment  - Apply yellow socks and bracelet for high fall risk patients  - Consider moving patient to room near nurses station  Outcome: Progressing     Problem: DISCHARGE PLANNING  Goal: Discharge to home or other facility with appropriate resources  Description: INTERVENTIONS:  - Identify barriers to discharge w/patient and caregiver  - Arrange for needed discharge resources and transportation as appropriate  - Identify discharge learning needs (meds, wound care, etc )  - Arrange for interpretive services to assist at discharge as needed  - Refer to Case Management Department for coordinating discharge planning if the patient needs post-hospital services based on physician/advanced practitioner order or complex needs related to functional status, cognitive ability, or social support system  Outcome: Progressing     Problem: Knowledge Deficit  Goal: Patient/family/caregiver demonstrates understanding of disease process, treatment plan, medications, and discharge instructions  Description: Complete learning assessment and assess knowledge base    Interventions:  - Provide teaching at level of understanding  - Provide teaching via preferred learning methods  Outcome: Progressing     Problem: Potential for Falls  Goal: Patient will remain free of falls  Description: INTERVENTIONS:  - Educate patient/family on patient safety including physical limitations  - Instruct patient to call for assistance with activity   - Consult OT/PT to assist with strengthening/mobility   - Keep Call bell within reach  - Keep bed low and locked with side rails adjusted as appropriate  - Keep care items and personal belongings within reach  - Initiate and maintain comfort rounds  - Make Fall Risk Sign visible to staff  - Offer Toileting every 2 Hours, in advance of need  - Initiate/Maintain alarm  - Obtain necessary fall risk management equipment  - Apply yellow socks and bracelet for high fall risk patients  - Consider moving patient to room near nurses station  Outcome: Progressing

## 2022-04-10 LAB
ALBUMIN SERPL BCP-MCNC: 2.9 G/DL (ref 3.5–5)
ALP SERPL-CCNC: 69 U/L (ref 46–116)
ALT SERPL W P-5'-P-CCNC: 17 U/L (ref 12–78)
ANION GAP SERPL CALCULATED.3IONS-SCNC: 10 MMOL/L (ref 4–13)
AST SERPL W P-5'-P-CCNC: 22 U/L (ref 5–45)
BILIRUB SERPL-MCNC: 0.26 MG/DL (ref 0.2–1)
BUN SERPL-MCNC: 12 MG/DL (ref 5–25)
CALCIUM ALBUM COR SERPL-MCNC: 10 MG/DL (ref 8.3–10.1)
CALCIUM SERPL-MCNC: 9.1 MG/DL (ref 8.3–10.1)
CHLORIDE SERPL-SCNC: 109 MMOL/L (ref 100–108)
CO2 SERPL-SCNC: 24 MMOL/L (ref 21–32)
CREAT SERPL-MCNC: 1.22 MG/DL (ref 0.6–1.3)
ERYTHROCYTE [DISTWIDTH] IN BLOOD BY AUTOMATED COUNT: 13.9 % (ref 11.6–15.1)
GFR SERPL CREATININE-BSD FRML MDRD: 41 ML/MIN/1.73SQ M
GLUCOSE SERPL-MCNC: 101 MG/DL (ref 65–140)
HCT VFR BLD AUTO: 41.8 % (ref 34.8–46.1)
HGB BLD-MCNC: 12.6 G/DL (ref 11.5–15.4)
MCH RBC QN AUTO: 26.5 PG (ref 26.8–34.3)
MCHC RBC AUTO-ENTMCNC: 30.1 G/DL (ref 31.4–37.4)
MCV RBC AUTO: 88 FL (ref 82–98)
PLATELET # BLD AUTO: 189 THOUSANDS/UL (ref 149–390)
PMV BLD AUTO: 10.4 FL (ref 8.9–12.7)
POTASSIUM SERPL-SCNC: 3.4 MMOL/L (ref 3.5–5.3)
PROT SERPL-MCNC: 7 G/DL (ref 6.4–8.2)
RBC # BLD AUTO: 4.75 MILLION/UL (ref 3.81–5.12)
SODIUM SERPL-SCNC: 143 MMOL/L (ref 136–145)
WBC # BLD AUTO: 4.6 THOUSAND/UL (ref 4.31–10.16)

## 2022-04-10 PROCEDURE — 99232 SBSQ HOSP IP/OBS MODERATE 35: CPT | Performed by: INTERNAL MEDICINE

## 2022-04-10 PROCEDURE — 85027 COMPLETE CBC AUTOMATED: CPT | Performed by: INTERNAL MEDICINE

## 2022-04-10 PROCEDURE — 80053 COMPREHEN METABOLIC PANEL: CPT | Performed by: INTERNAL MEDICINE

## 2022-04-10 RX ORDER — POTASSIUM CHLORIDE 20 MEQ/1
40 TABLET, EXTENDED RELEASE ORAL ONCE
Status: COMPLETED | OUTPATIENT
Start: 2022-04-10 | End: 2022-04-10

## 2022-04-10 RX ADMIN — LATANOPROST 1 DROP: 50 SOLUTION OPHTHALMIC at 21:53

## 2022-04-10 RX ADMIN — OLANZAPINE 7.5 MG: 5 TABLET, FILM COATED ORAL at 08:01

## 2022-04-10 RX ADMIN — GABAPENTIN 600 MG: 300 CAPSULE ORAL at 08:01

## 2022-04-10 RX ADMIN — POTASSIUM CHLORIDE 40 MEQ: 20 TABLET, EXTENDED RELEASE ORAL at 16:07

## 2022-04-10 RX ADMIN — GABAPENTIN 600 MG: 300 CAPSULE ORAL at 16:07

## 2022-04-10 RX ADMIN — TRAZODONE HYDROCHLORIDE 150 MG: 100 TABLET ORAL at 21:52

## 2022-04-10 RX ADMIN — ONDANSETRON 4 MG: 2 INJECTION INTRAMUSCULAR; INTRAVENOUS at 19:30

## 2022-04-10 RX ADMIN — APIXABAN 5 MG: 5 TABLET, FILM COATED ORAL at 08:01

## 2022-04-10 RX ADMIN — QUETIAPINE FUMARATE 50 MG: 25 TABLET ORAL at 21:51

## 2022-04-10 RX ADMIN — GABAPENTIN 600 MG: 300 CAPSULE ORAL at 20:38

## 2022-04-10 RX ADMIN — PANTOPRAZOLE SODIUM 40 MG: 40 TABLET, DELAYED RELEASE ORAL at 06:09

## 2022-04-10 RX ADMIN — APIXABAN 5 MG: 5 TABLET, FILM COATED ORAL at 16:07

## 2022-04-10 RX ADMIN — SERTRALINE HYDROCHLORIDE 100 MG: 100 TABLET, FILM COATED ORAL at 08:01

## 2022-04-10 NOTE — PROGRESS NOTES
[address] [place] [address]  Progress Note Tracy Garza 1940, [de-identified] y o  female MRN: [id number]  Unit/Bed#: [place] [address] -01 Encounter: [id number]  Primary Care Provider: LUCY Wilkinson   Date and time admitted to hospital: 4/8/2022  5:02 PM    * Behavioral variant frontotemporal dementia [facility])  Assessment & Plan  82-year-old female with history of COPD CHF atrial fibrillation and frontotemporal dementia from SNF presents with increased agitation  · Has been trialed on various antibiotics recently for UTI  · No evidence of acute encephalopathy  ID consulted for significance of pyuria  · Change in mental status/agitation secondary to frontal temporal dementia  · Adjusted psychotropics medications: Added zyprexa in a m  and increased quetiapine at bedtime to 50 mg  Continue trazodone and sertraline  Positive blood culture  Assessment & Plan  · CNS, presumed contaminant  C  difficile diarrhea  Assessment & Plan  · History of c difficile diarrhea likely secondary to recurrent antibiotic use    (HFpEF) heart failure with preserved ejection fraction [facility])  Assessment & Plan  Wt Readings from Last 3 Encounters:   04/08/22 118 kg (259 lb 0 7 oz)   03/07/22 122 kg (268 lb)   10/19/21 122 kg (268 lb)     · Chronic diastolic CHF continue furosemide    Hypokalemia  Assessment & Plan  · Replete and recheck in a m  Results from last 7 days   Lab Units 04/10/22  0547 04/09/22  0506 04/08/22  1829   POTASSIUM mmol/L 3 4* 3 3* 3 6       Morbid obesity due to excess calories (HCC)  Assessment & Plan  · Body mass index is 41 81 kg/m²  Hypertension  Assessment & Plan  · Continue amlodipine and losartan    Atrial fibrillation (HCC)  Assessment & Plan  · Paroxysmal atrial fibrillation  Continue eliquis for anticoagulation  VTE Pharmacologic Prophylaxis: VTE Score: 3 Moderate Risk (Score 3-4) - Pharmacological DVT Prophylaxis Ordered: Apixaban (Eliquis)      Patient Centered Rounds: I have performed bedside rounds with nursing staff today  Discussions with Specialists or Other Care Team Provider:  Infectious Disease    Education and Discussions with Family / Patient:  Daughter on telephone    Time Spent for Care: 20 mins  More than 50% of total time spent on counseling and coordination of care as described above  Current Length of Stay: 2 day(s)  Current Patient Status: Inpatient   Certification Statement: The patient will continue to require additional inpatient hospital stay due to monitoring mentation  Discharge Plan / Estimated Discharge Date: Anticipate discharge tomorrow to rehab facility  Code Status: Level 3 - DNAR and DNI      Subjective:   Patient seen and examined  No events, calm and composed  Episodes of screaming which is at baseline    Objective:   Vitals: Blood pressure 109/56, pulse 60, temperature 98 7 °F (37 1 °C), resp  rate 15, height 5' 6" (1 676 m), weight 118 kg (259 lb 0 7 oz), SpO2 97 %, not currently breastfeeding  Physical Exam  Vitals reviewed  Constitutional:       General: She is not in acute distress  Appearance: Normal appearance  She is obese  Cardiovascular:      Rate and Rhythm: Regular rhythm  Pulmonary:      Breath sounds: Decreased breath sounds present  No wheezing  Abdominal:      General: Bowel sounds are normal       Palpations: Abdomen is soft  Tenderness: There is no guarding or rebound  Musculoskeletal:         General: No swelling or tenderness  Cervical back: Normal range of motion  Skin:     General: Skin is warm  Neurological:      Mental Status: She is alert  Mental status is at baseline  Motor: No weakness     Psychiatric:         Mood and Affect: Mood normal        Additional Data:   Labs:  Results from last 7 days   Lab Units 04/10/22  0547 04/09/22  0506 04/08/22  1829   WBC Thousand/uL 4 60 5 03 4 17*   HEMOGLOBIN g/dL 12 6 12 8 13 1   HEMATOCRIT % 41 8 40 9 42 3   MCV fL 88 88 89   PLATELETS Thousands/uL 189 194 184   INR   --   --  1 68*     Results from last 7 days   Lab Units 04/10/22  0547 04/09/22  0506 04/08/22  1829   SODIUM mmol/L 143 143 141   POTASSIUM mmol/L 3 4* 3 3* 3 6   CHLORIDE mmol/L 109* 110* 109*   CO2 mmol/L 24 22 26   ANION GAP mmol/L 10 11 6   BUN mg/dL 12 14 14   CREATININE mg/dL 1 22 1 07 1 12   CALCIUM mg/dL 9 1 9 3 9 2   ALBUMIN g/dL 2 9*  --  3 0*   TOTAL BILIRUBIN mg/dL 0 26  --  0 31   ALK PHOS U/L 69  --  75   ALT U/L 17  --  14   AST U/L 22  --  22   EGFR ml/min/1 73sq m 41 49 46   GLUCOSE RANDOM mg/dL 101 97 101             Results from last 7 days   Lab Units 04/09/22  0506 04/08/22  2044 04/08/22  1829   HS TNI 0HR ng/L  --   --  12   HS TNI 2HR ng/L  --  13  --    HS TNI 4HR ng/L 13  --   --      Results from last 7 days   Lab Units 04/08/22  1829   NT-PRO BNP pg/mL 1,711*      Results from last 7 days   Lab Units 04/08/22  1829   LACTIC ACID mmol/L 0 6                 * I Have Reviewed All Lab Data Listed Above  Cultures:   Results from last 7 days   Lab Units 04/08/22  1951 04/08/22  1831 04/08/22  1829   BLOOD CULTURE   --  No Growth at 24 hrs  Staphylococcus coagulase negative*   GRAM STAIN RESULT   --   --  Gram positive cocci in clusters*   URINE CULTURE  >100,000 cfu/ml Gram Negative Jonh Enteric Like*  --   --                  Lines/Drains:  Invasive Devices  Report    Peripheral Intravenous Line            Peripheral IV 04/08/22 Right Antecubital 1 day          Drain            External Urinary Catheter <1 day              Telemetry:      Imaging:  Imaging Reports Reviewed Today Include:   No results found      Scheduled Meds:  Current Facility-Administered Medications   Medication Dose Route Frequency Provider Last Rate    acetaminophen  650 mg Oral Q6H PRN Marita Love PA-C      amLODIPine  10 mg Oral Daily Marita Love PA-C      apixaban  5 mg Oral BID Marita Love PA-C      furosemide  40 mg Oral Daily Marita Santoro PA-C      gabapentin  600 mg Oral TID Marita Love PA-C      latanoprost  1 drop Both Eyes HS Marita Love PA-C      losartan  25 mg Oral Daily Marita Love PA-C      OLANZapine  7 5 mg Oral Daily Modesto Monroy DO      ondansetron  4 mg Intravenous Q6H PRN Marita Love PA-C      pantoprazole  40 mg Oral Early Morning Marita Love PA-C      QUEtiapine  50 mg Oral HS Modesto Monroy DO      sertraline  100 mg Oral Daily Marita Love PA-C      traZODone  150 mg Oral HS Marita Rabago PA-C         Today, Patient Was Seen By: Belia Odell DO    ** Please Note: Dictation voice to text software may have been used in the creation of this document   **

## 2022-04-10 NOTE — PROGRESS NOTES
Progress Note - Infectious Disease   Graeme Keating [de-identified] y o  female MRN: [id number]  Unit/Bed#: [place] [facility] -01 Encounter: [id number]    Impression/Plan:  1  Acute metabolic encephalopathy  In setting of advancing dementia  Suspect recent Cipro may be playing a role given risk of CNS side effects with fluoroquinolones in the elderly population  This morning the patient's mentation is improving and she no longer requires wrist restraints  -monitor patient off antibiotics  -SLIM adjusting mental health medications  -serial neuro exams  -monitor mood mental status  -supportive care     2  Recent UTI  Diagnosed at [facility] and treated with 3x cipro  Past urine culture results have shown mixed urinary alfredo, E coli, and E coli ESBL  There was susceptibility to Cipro on her previous sensitivity profile  Patient's current urinalysis is not strongly indicative of infection  Formal culture is preliminarily showing a GNR  She has been afebrile without leukocytosis since her admission  At this time I favor monitoring patient off additional antibiotics  I would also avoid future fluoroquinolone use given patient's AIDS, baseline dementia, and history of Clostridium difficile   -no indication for ongoing antibiotics at this time  -recommend avoidance of future fluoroquinolones  -monitor CBC and BMP  -follow-up urine culture results  -monitor vitals     3  Positive blood culture  GPC in clusters showing in one set of patient's initial blood cultures  BCI suggesting possible staph contaminant  Low suspicion for active bacteremia at this time given patient remains afebrile without leukocytosis  I would not target this finding with antibiotic treatment at this time   -monitor patient off antibiotics  -monitor CBC and BMP  -follow-up blood cultures  -monitor vitals    4  History of C diff diarrhea  Patient with positive PCR and negative EIA in December 2021 and January 2022   It is unclear if she received prophylactic vancomycin while on her recent course of Cipro  Fortunately the patient is not having diarrhea today  Will continue to monitor stool output  If she is to require systemic antibiotics she will require oral vancomycin prophylaxis   -if patient were to require systemic antibiotics she will require oral vancomycin prophylaxis  -recommend avoidance of future fluoroquinolones  -serial abdominal exams  -monitor stool output     5  Behavioral variant frontotemporal dementia  -supportive care     6  Pacemaker present      7  MO  BMI = 41 81  Above plan was discussed in detail with patient at the bedside  Above plan was discussed in detail with SLIM  Antibiotics:  No current antibiotic use    Subjective:  Unable to obtain full ROS as patient has dementia  She is more awake and interactive today  She cannot tell me where she is but is able to tell me she's comfortable  She offers no other symptoms  Objective:  Vitals:  Temp:  [97 7 °F (36 5 °C)-98 5 °F (36 9 °C)] 97 9 °F (36 6 °C)  HR:  [62-72] 71  Resp:  [18-22] 18  BP: (100-128)/(63-72) 100/63  SpO2:  [95 %-96 %] 96 %  Temp (24hrs), Av °F (36 7 °C), Min:97 7 °F (36 5 °C), Max:98 5 °F (36 9 °C)  Current: Temperature: 97 9 °F (36 6 °C)    Physical Exam:   General Appearance:  Sleepy, somewhat interactive but still confused  She appears comfortable sitting up in bed, nontoxic, in no acute distress  Throat: Oropharynx moist without lesions  Lungs:   Clear to auscultation bilaterally; no wheezes, rhonchi or rales; respirations unlabored on room air  Heart:  RRR; no murmur, rub or gallop  Abdomen:   Soft, non-tender, non-distended, positive bowel sounds  Extremities: No clubbing or cyanosis, no edema  Venodynes on  Skin: No new rashes or lesions noted on exposed skin       Labs, Imaging, & Other studies:   All pertinent labs and imaging studies were personally reviewed  Results from last 7 days   Lab Units 04/10/22  0547 22  7340 04/08/22  1829   WBC Thousand/uL 4 60 5 03 4 17*   HEMOGLOBIN g/dL 12 6 12 8 13 1   PLATELETS Thousands/uL 189 194 184     Results from last 7 days   Lab Units 04/10/22  0547 04/09/22  0506 04/08/22  1829   POTASSIUM mmol/L 3 4*   < > 3 6   CHLORIDE mmol/L 109*   < > 109*   CO2 mmol/L 24   < > 26   BUN mg/dL 12   < > 14   CREATININE mg/dL 1 22   < > 1 12   EGFR ml/min/1 73sq m 41   < > 46   CALCIUM mg/dL 9 1   < > 9 2   AST U/L 22  --  22   ALT U/L 17  --  14   ALK PHOS U/L 69  --  75    < > = values in this interval not displayed       Results from last 7 days   Lab Units 04/08/22 1951 04/08/22  1831 04/08/22  1829   BLOOD CULTURE   --  No Growth at 24 hrs   --    GRAM STAIN RESULT   --   --  Gram positive cocci in clusters*   URINE CULTURE  >100,000 cfu/ml Gram Negative Jnoh Enteric Like*  --   --

## 2022-04-10 NOTE — TREATMENT PLAN
Case discussed at length with patient's daughter on telephone  Daughter requesting urology consult as urology service recommended patient come to the hospital  Will consult

## 2022-04-10 NOTE — ASSESSMENT & PLAN NOTE
· Replete and recheck in a m      Results from last 7 days   Lab Units 04/10/22  0547 04/09/22  0506 04/08/22  1829   POTASSIUM mmol/L 3 4* 3 3* 3 6

## 2022-04-10 NOTE — CASE MANAGEMENT
Case Management Assessment & Discharge Planning Note    Patient name Marnee Hodgkins  Location \[facility]\"" 68 2 /[address] 2 Larry Boss* MRN [id number]  : 1940 Date 4/10/2022       Current Admission Date: 2022  Current Admission Diagnosis:Behavioral variant frontotemporal dementia [facility])   Patient Active Problem List    Diagnosis Date Noted    Positive blood culture 2022    Acute metabolic encephalopathy     UTI (urinary tract infection) 2022    Gross hematuria 2022    Ambulatory dysfunction 03/10/2022    Sleep apnea 2022    Acute on chronic diastolic heart failure ([facility] ) 2022    C  difficile diarrhea 2022    Anxiety 2021    Fungal infection of the groin 2021    Dementia ([facility] ) 03/10/2021    Generalized weakness 03/10/2021    (HFpEF) heart failure with preserved ejection fraction ([facility] ) 2020    Pacemaker 2020    Frequent UTI 2020    Bipolar affective disorder, currently manic, moderate ([facility] ) 2018    Chronic pain 2018    Physical deconditioning 2018    Moderate episode of recurrent major depressive disorder ([facility] ) 2018    Hypokalemia 2017    Overactive bladder 06/10/2017    COPD (chronic obstructive pulmonary disease) ([facility] ) 2016    Acute encephalopathy 2016    Morbid obesity due to excess calories ([facility] ) 2016    Atrial fibrillation ([facility] ) 2016    Hypertension 2016    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[facility] ) 2016    PAD (peripheral artery disease) ([facility] ) 2016      LOS (days): 2  Geometric Mean LOS (GMLOS) (days):   Days to GMLOS:     OBJECTIVE:  PATIENT READMITTED TO HOSPITAL  Risk of Unplanned Readmission Score: 24         Current admission status: Inpatient       Preferred Pharmacy:   [address] [place] [address] 9293 R R 1 ([phone] R R 1 (Rhiou 559) [address] [facility] [address]  Phone: [phone] Fax: [phone] [facility]  [phone] - [place]  [place] - [address] [place] [address]  [place]  [address] [place] [address]  Phone: [phone] Fax: [phone]    Primary Care Provider: LUCY Keith    Primary Insurance: [id number] REP  Secondary Insurance: [id number]    ASSESSMENT:  [facility] [address] [facility] Daughter   Primary Phone: [phone] (Mobile)               Advance Directives  Does patient have a [address] [place] [address] Avenue?: Yes  Does patient have Advance Directives?: Yes  Advance Directives: Power of  for health care,Power of  for finance  Primary Contact: Ashish Arriaga [phone]    Readmission Root Cause  30 Day Readmission: Yes  Who directed you to return to the hospital?: Family  Did you understand whom to contact if you had questions or problems?: Yes  Did you get your prescriptions before you left the hospital?: Yes  Were you able to get your prescriptions filled when you left the hospital?: Yes  Did you take your medications as prescribed?: Yes  Were you able to get to your follow-up appointments?: Yes  During previous admission, was a post-acute recommendation made?: Yes  What post-acute resources were offered?: STR  Patient was readmitted due to: dementia  Action Plan: daughter states this is not her baseline, dgt states she is confused beause of UTI    Patient Information  Admitted from[de-identified] Facility  Mental Status: Confused  During Assessment patient was accompanied by: Not accompanied during assessment  Assessment information provided by[de-identified] Daughter  Primary Caregiver: Self  Support Systems: Self,Family members  Home entry access options   Select all that apply : No steps to enter home  Type of Current Residence: Facility  Upon entering residence, is there a bedroom on the main floor (no further steps)?: Yes  Upon entering residence, is there a bathroom on the main floor (no further steps)?: Yes  Living Arrangements: Other (Comment)    Activities of Daily Living Prior to Admission  Functional Status: Assistance  Completes ADLs independently?: No  Level of ADL dependence: Assistance  Ambulates independently?: No  Level of ambulatory dependence: Assistance  Does patient use assisted devices?: Yes  Assisted Devices (DME) used: Jami Carson  Does patient currently own DME?: Yes  What DME does the patient currently own?: Jami Carson  Does patient have a history of Outpatient Therapy (PT/OT)?: No  Does the patient have a history of Short-Term Rehab?: Yes  Does patient have a history of HHC?: No  Does patient currently have [facility]?: No    Patient Information Continued  Does patient have prescription coverage?: Yes  Does patient have a history of substance abuse?: No  Does patient have a history of Mental Health Diagnosis?: Yes (biploar)  Has patient received inpatient treatment related to mental health in the last 2 years?: No        DISCHARGE DETAILS:    Discharge planning discussed with[de-identified] dgt Dorian Pickard  Freedom of Choice: Yes  Comments - Freedom of Choice:  Dorian Pickard is requesting return home to facility [address] [place] [address] when medically clear  CM contacted family/caregiver?: Yes  Were Treatment Team discharge recommendations reviewed with patient/caregiver?: Yes  Did patient/caregiver verbalize understanding of patient care needs?: N/A- going to facility  Were patient/caregiver advised of the risks associated with not following Treatment Team discharge recommendations?: Yes    Contacts  Patient Contacts: Navneet  Relationship to Patient[de-identified] Family  Contact Method: Phone  Phone Number: [phone]  Reason/Outcome: Discharge Planning,Emergency Contact,Continuity of [address] [place] [address]         Is the patient interested in [facility] at discharge?: No    DME Referral Provided  Referral made for DME?: No    Other Referral/Resources/Interventions Provided:  Interventions: Facility Return  Referral Comments: dgt states patient was at [facility]    Treatment Team Recommendation: Facility Return,Short Term Rehab  Discharge Destination Plan[de-identified] Short Term Rehab,Facility Return  Transport at Discharge : [facility]

## 2022-04-10 NOTE — PLAN OF CARE
Problem: MOBILITY - ADULT  Goal: Maintain or return to baseline ADL function  Description: INTERVENTIONS:  -  Assess patient's ability to carry out ADLs; assess patient's baseline for ADL function and identify physical deficits which impact ability to perform ADLs (bathing, care of mouth/teeth, toileting, grooming, dressing, etc )  - Assess/evaluate cause of self-care deficits   - Assess range of motion  - Assess patient's mobility; develop plan if impaired  - Assess patient's need for assistive devices and provide as appropriate  - Encourage maximum independence but intervene and supervise when necessary  - Involve family in performance of ADLs  - Assess for home care needs following discharge   - Consider OT consult to assist with ADL evaluation and planning for discharge  - Provide patient education as appropriate  Outcome: Progressing  Goal: Maintains/Returns to pre admission functional level  Description: INTERVENTIONS:  - Perform BMAT or MOVE assessment daily    - Set and communicate daily mobility goal to care team and patient/family/caregiver  - Collaborate with rehabilitation services on mobility goals if consulted  - Perform Range of Motion 3 times a day  - Reposition patient every 2 hours    - Ambulate patient 3 times a day  - Out of bed to chair 3 times a day   - Out of bed for meals 3 times a day  - Out of bed for toileting  - Record patient progress and toleration of activity level   Outcome: Progressing     Problem: Prexisting or High Potential for Compromised Skin Integrity  Goal: Skin integrity is maintained or improved  Description: INTERVENTIONS:  - Identify patients at risk for skin breakdown  - Assess and monitor skin integrity  - Assess and monitor nutrition and hydration status  - Monitor labs   - Assess for incontinence   - Turn and reposition patient  - Assist with mobility/ambulation  - Relieve pressure over bony prominences  - Avoid friction and shearing  - Provide appropriate hygiene as needed including keeping skin clean and dry  - Evaluate need for skin moisturizer/barrier cream  - Collaborate with interdisciplinary team   - Patient/family teaching  - Consider wound care consult   Outcome: Progressing     Problem: PAIN - ADULT  Goal: Verbalizes/displays adequate comfort level or baseline comfort level  Description: Interventions:  - Encourage patient to monitor pain and request assistance  - Assess pain using appropriate pain scale  - Administer analgesics based on type and severity of pain and evaluate response  - Implement non-pharmacological measures as appropriate and evaluate response  - Consider cultural and social influences on pain and pain management  - Notify physician/advanced practitioner if interventions unsuccessful or patient reports new pain  Outcome: Progressing     Problem: INFECTION - ADULT  Goal: Absence or prevention of progression during hospitalization  Description: INTERVENTIONS:  - Assess and monitor for signs and symptoms of infection  - Monitor lab/diagnostic results  - Monitor all insertion sites, i e  indwelling lines, tubes, and drains  - Monitor endotracheal if appropriate and nasal secretions for changes in amount and color  - [place] appropriate cooling/warming therapies per order  - Administer medications as ordered  - Instruct and encourage patient and family to use good hand hygiene technique  - Identify and instruct in appropriate isolation precautions for identified infection/condition  Outcome: Progressing  Goal: Absence of fever/infection during neutropenic period  Description: INTERVENTIONS:  - Monitor WBC    Outcome: Progressing     Problem: SAFETY ADULT  Goal: Maintain or return to baseline ADL function  Description: INTERVENTIONS:  -  Assess patient's ability to carry out ADLs; assess patient's baseline for ADL function and identify physical deficits which impact ability to perform ADLs (bathing, care of mouth/teeth, toileting, grooming, dressing, etc )  - Assess/evaluate cause of self-care deficits   - Assess range of motion  - Assess patient's mobility; develop plan if impaired  - Assess patient's need for assistive devices and provide as appropriate  - Encourage maximum independence but intervene and supervise when necessary  - Involve family in performance of ADLs  - Assess for home care needs following discharge   - Consider OT consult to assist with ADL evaluation and planning for discharge  - Provide patient education as appropriate  Outcome: Progressing  Goal: Maintains/Returns to pre admission functional level  Description: INTERVENTIONS:  - Perform BMAT or MOVE assessment daily    - Set and communicate daily mobility goal to care team and patient/family/caregiver  - Collaborate with rehabilitation services on mobility goals if consulted  - Perform Range of Motion 3 times a day  - Reposition patient every 2 hours    - Ambulate patient 3 times a day  - Out of bed to chair 3 times a day   - Out of bed for meals 3 times a day  - Out of bed for toileting  - Record patient progress and toleration of activity level   Outcome: Progressing  Goal: Patient will remain free of falls  Description: INTERVENTIONS:  - Educate patient/family on patient safety including physical limitations  - Instruct patient to call for assistance with activity   - Consult OT/PT to assist with strengthening/mobility   - Keep Call bell within reach  - Keep bed low and locked with side rails adjusted as appropriate  - Keep care items and personal belongings within reach  - Initiate and maintain comfort rounds  - Make Fall Risk Sign visible to staff  - Offer Toileting every 2 Hours, in advance of need  - Initiate/Maintain bed alarm  - Apply yellow socks and bracelet for high fall risk patients  - Consider moving patient to room near nurses station  Outcome: Progressing     Problem: DISCHARGE PLANNING  Goal: Discharge to home or other facility with appropriate resources  Description: INTERVENTIONS:  - Identify barriers to discharge w/patient and caregiver  - Arrange for needed discharge resources and transportation as appropriate  - Identify discharge learning needs (meds, wound care, etc )  - Arrange for interpretive services to assist at discharge as needed  - Refer to Case Management Department for coordinating discharge planning if the patient needs post-hospital services based on physician/advanced practitioner order or complex needs related to functional status, cognitive ability, or social support system  Outcome: Progressing     Problem: Knowledge Deficit  Goal: Patient/family/caregiver demonstrates understanding of disease process, treatment plan, medications, and discharge instructions  Description: Complete learning assessment and assess knowledge base  Interventions:  - Provide teaching at level of understanding  - Provide teaching via preferred learning methods  Outcome: Progressing     Problem: Potential for Falls  Goal: Patient will remain free of falls  Description: INTERVENTIONS:  - Educate patient/family on patient safety including physical limitations  - Instruct patient to call for assistance with activity   - Consult OT/PT to assist with strengthening/mobility   - Keep Call bell within reach  - Keep bed low and locked with side rails adjusted as appropriate  - Keep care items and personal belongings within reach  - Initiate and maintain comfort rounds  - Make Fall Risk Sign visible to staff  - Offer Toileting every 2 Hours, in advance of need  - Initiate/Maintain bed alarm  - Apply yellow socks and bracelet for high fall risk patients  - Consider moving patient to room near nurses station  Outcome: Progressing     Problem: Nutrition/Hydration-ADULT  Goal: Nutrient/Hydration intake appropriate for improving, restoring or maintaining nutritional needs  Description: Monitor and assess patient's nutrition/hydration status for malnutrition   Collaborate with interdisciplinary team and initiate plan and interventions as ordered  Monitor patient's weight and dietary intake as ordered or per policy  Utilize nutrition screening tool and intervene as necessary  Determine patient's food preferences and provide high-protein, high-caloric foods as appropriate       INTERVENTIONS:  - Monitor oral intake, urinary output, labs, and treatment plans  - Assess nutrition and hydration status and recommend course of action  - Evaluate amount of meals eaten  - Assist patient with eating if necessary   - Allow adequate time for meals  - Recommend/ encourage appropriate diets, oral nutritional supplements, and vitamin/mineral supplements  - Order, calculate, and assess calorie counts as needed  - Recommend, monitor, and adjust tube feedings and TPN/PPN based on assessed needs  - Assess need for intravenous fluids  - Provide specific nutrition/hydration education as appropriate  - Include patient/family/caregiver in decisions related to nutrition  Outcome: Progressing

## 2022-04-10 NOTE — ASSESSMENT & PLAN NOTE
[de-identified] female with history of COPD CHF atrial fibrillation and frontotemporal dementia from SNF presents with increased agitation  · Has been trialed on various antibiotics recently for UTI  · No evidence of acute encephalopathy  ID consulted for significance of pyuria  · Change in mental status/agitation secondary to frontal temporal dementia  · Adjusted psychotropics medications: Added zyprexa in a m  and increased quetiapine at bedtime to 50 mg  Continue trazodone and sertraline

## 2022-04-10 NOTE — PLAN OF CARE
Problem: MOBILITY - ADULT  Goal: Maintain or return to baseline ADL function  Description: INTERVENTIONS:  -  Assess patient's ability to carry out ADLs; assess patient's baseline for ADL function and identify physical deficits which impact ability to perform ADLs (bathing, care of mouth/teeth, toileting, grooming, dressing, etc )  - Assess/evaluate cause of self-care deficits   - Assess range of motion  - Assess patient's mobility; develop plan if impaired  - Assess patient's need for assistive devices and provide as appropriate  - Encourage maximum independence but intervene and supervise when necessary  - Involve family in performance of ADLs  - Assess for home care needs following discharge   - Consider OT consult to assist with ADL evaluation and planning for discharge  - Provide patient education as appropriate  4/10/2022 0713 by Dorian Yeager RN  Outcome: Progressing  4/10/2022 0713 by Dorian Yeager RN  Outcome: Progressing  Goal: Maintains/Returns to pre admission functional level  Description: INTERVENTIONS:  - Perform BMAT or MOVE assessment daily    - Set and communicate daily mobility goal to care team and patient/family/caregiver  - Collaborate with rehabilitation services on mobility goals if consulted  - Perform Range of Motion  times a day  - Reposition patient every  hours    - Dangle patient  times a day  - Stand patient  times a day  - Ambulate patient  times a day  - Out of bed to chair  times a day   - Out of bed for meal times a day  - Out of bed for toileting  - Record patient progress and toleration of activity level   4/10/2022 0713 by Dorian Yeager RN  Outcome: Progressing  4/10/2022 0713 by Dorian Yeager RN  Outcome: Progressing     Problem: Prexisting or High Potential for Compromised Skin Integrity  Goal: Skin integrity is maintained or improved  Description: INTERVENTIONS:  - Identify patients at risk for skin breakdown  - Assess and monitor skin integrity  - Assess and monitor nutrition and hydration status  - Monitor labs   - Assess for incontinence   - Turn and reposition patient  - Assist with mobility/ambulation  - Relieve pressure over bony prominences  - Avoid friction and shearing  - Provide appropriate hygiene as needed including keeping skin clean and dry  - Evaluate need for skin moisturizer/barrier cream  - Collaborate with interdisciplinary team   - Patient/family teaching  - Consider wound care consult   4/10/2022 0713 by Casey Oneill RN  Outcome: Progressing  4/10/2022 0713 by Casey Oneill RN  Outcome: Progressing     Problem: PAIN - ADULT  Goal: Verbalizes/displays adequate comfort level or baseline comfort level  Description: Interventions:  - Encourage patient to monitor pain and request assistance  - Assess pain using appropriate pain scale  - Administer analgesics based on type and severity of pain and evaluate response  - Implement non-pharmacological measures as appropriate and evaluate response  - Consider cultural and social influences on pain and pain management  - Notify physician/advanced practitioner if interventions unsuccessful or patient reports new pain  4/10/2022 0713 by Casey Oneill RN  Outcome: Progressing  4/10/2022 0713 by Casey Oneill RN  Outcome: Progressing

## 2022-04-11 VITALS
OXYGEN SATURATION: 97 % | HEART RATE: 60 BPM | TEMPERATURE: 97.8 F | WEIGHT: 259.04 LBS | HEIGHT: 66 IN | RESPIRATION RATE: 16 BRPM | SYSTOLIC BLOOD PRESSURE: 122 MMHG | DIASTOLIC BLOOD PRESSURE: 54 MMHG | BODY MASS INDEX: 41.63 KG/M2

## 2022-04-11 PROBLEM — R82.71 BACTERIA IN URINE: Status: ACTIVE | Noted: 2022-04-11

## 2022-04-11 LAB
ALBUMIN SERPL BCP-MCNC: 2.7 G/DL (ref 3.5–5)
ALP SERPL-CCNC: 63 U/L (ref 46–116)
ALT SERPL W P-5'-P-CCNC: 16 U/L (ref 12–78)
ANION GAP SERPL CALCULATED.3IONS-SCNC: 9 MMOL/L (ref 4–13)
AST SERPL W P-5'-P-CCNC: 17 U/L (ref 5–45)
BACTERIA BLD CULT: ABNORMAL
BACTERIA UR CULT: ABNORMAL
BILIRUB SERPL-MCNC: 0.29 MG/DL (ref 0.2–1)
BUN SERPL-MCNC: 16 MG/DL (ref 5–25)
CALCIUM ALBUM COR SERPL-MCNC: 10.1 MG/DL (ref 8.3–10.1)
CALCIUM SERPL-MCNC: 9.1 MG/DL (ref 8.3–10.1)
CHLORIDE SERPL-SCNC: 109 MMOL/L (ref 100–108)
CO2 SERPL-SCNC: 24 MMOL/L (ref 21–32)
CREAT SERPL-MCNC: 1.16 MG/DL (ref 0.6–1.3)
ERYTHROCYTE [DISTWIDTH] IN BLOOD BY AUTOMATED COUNT: 13.8 % (ref 11.6–15.1)
FLUAV RNA RESP QL NAA+PROBE: NEGATIVE
FLUBV RNA RESP QL NAA+PROBE: NEGATIVE
GFR SERPL CREATININE-BSD FRML MDRD: 44 ML/MIN/1.73SQ M
GLUCOSE SERPL-MCNC: 98 MG/DL (ref 65–140)
GRAM STN SPEC: ABNORMAL
HCT VFR BLD AUTO: 39 % (ref 34.8–46.1)
HGB BLD-MCNC: 12.3 G/DL (ref 11.5–15.4)
MCH RBC QN AUTO: 28 PG (ref 26.8–34.3)
MCHC RBC AUTO-ENTMCNC: 31.5 G/DL (ref 31.4–37.4)
MCV RBC AUTO: 89 FL (ref 82–98)
PLATELET # BLD AUTO: 184 THOUSANDS/UL (ref 149–390)
PMV BLD AUTO: 10.9 FL (ref 8.9–12.7)
POTASSIUM SERPL-SCNC: 3.8 MMOL/L (ref 3.5–5.3)
PROT SERPL-MCNC: 6.9 G/DL (ref 6.4–8.2)
RBC # BLD AUTO: 4.4 MILLION/UL (ref 3.81–5.12)
RSV RNA RESP QL NAA+PROBE: NEGATIVE
S AUREUS+CONS DNA BLD POS NAA+NON-PROBE: DETECTED
SARS-COV-2 RNA RESP QL NAA+PROBE: NEGATIVE
SODIUM SERPL-SCNC: 142 MMOL/L (ref 136–145)
WBC # BLD AUTO: 4.54 THOUSAND/UL (ref 4.31–10.16)

## 2022-04-11 PROCEDURE — 99232 SBSQ HOSP IP/OBS MODERATE 35: CPT | Performed by: INTERNAL MEDICINE

## 2022-04-11 PROCEDURE — 0241U HB NFCT DS VIR RESP RNA 4 TRGT: CPT | Performed by: INTERNAL MEDICINE

## 2022-04-11 PROCEDURE — 80053 COMPREHEN METABOLIC PANEL: CPT | Performed by: INTERNAL MEDICINE

## 2022-04-11 PROCEDURE — 99239 HOSP IP/OBS DSCHRG MGMT >30: CPT | Performed by: INTERNAL MEDICINE

## 2022-04-11 PROCEDURE — 85027 COMPLETE CBC AUTOMATED: CPT | Performed by: INTERNAL MEDICINE

## 2022-04-11 PROCEDURE — 97167 OT EVAL HIGH COMPLEX 60 MIN: CPT

## 2022-04-11 PROCEDURE — 97163 PT EVAL HIGH COMPLEX 45 MIN: CPT

## 2022-04-11 PROCEDURE — 99222 1ST HOSP IP/OBS MODERATE 55: CPT | Performed by: PHYSICIAN ASSISTANT

## 2022-04-11 RX ORDER — QUETIAPINE FUMARATE 50 MG/1
50 TABLET, FILM COATED ORAL
Qty: 30 TABLET | Refills: 0
Start: 2022-04-11 | End: 2022-06-16

## 2022-04-11 RX ORDER — OLANZAPINE 7.5 MG/1
7.5 TABLET ORAL DAILY
Qty: 30 TABLET | Refills: 0
Start: 2022-04-12 | End: 2022-05-05

## 2022-04-11 RX ADMIN — OLANZAPINE 7.5 MG: 5 TABLET, FILM COATED ORAL at 08:38

## 2022-04-11 RX ADMIN — GABAPENTIN 600 MG: 300 CAPSULE ORAL at 08:38

## 2022-04-11 RX ADMIN — FUROSEMIDE 40 MG: 40 TABLET ORAL at 08:38

## 2022-04-11 RX ADMIN — LOSARTAN POTASSIUM 25 MG: 25 TABLET, FILM COATED ORAL at 08:38

## 2022-04-11 RX ADMIN — PANTOPRAZOLE SODIUM 40 MG: 40 TABLET, DELAYED RELEASE ORAL at 06:09

## 2022-04-11 RX ADMIN — AMLODIPINE BESYLATE 10 MG: 10 TABLET ORAL at 08:38

## 2022-04-11 RX ADMIN — SERTRALINE HYDROCHLORIDE 100 MG: 100 TABLET, FILM COATED ORAL at 08:38

## 2022-04-11 RX ADMIN — APIXABAN 5 MG: 5 TABLET, FILM COATED ORAL at 08:38

## 2022-04-11 NOTE — PROGRESS NOTES
Progress Note - Infectious Disease   [facility] [de-identified] y o  female MRN: [id number]  Unit/Bed#: [place] 68 2 -01 Encounter: [id number]      Impression/Plan:  1  Acute encephalopathy-in the setting of a patient with baseline advanced dementia   Suspect this is more of a medication effect as the patient was placed on ciprofloxacin in the setting of possible UTI   However the patient has no clinical evidence of an active urinary tract infection at this time   Regardless she has already received a 3 day course of ciprofloxacin   Her cognition is significantly improved  At this point it is reasonable to monitor the patient off all antibiotics to see if her cognition returns to baseline     -no additional antibiotics for now  -follow-up blood cultures and urine cultures  -monitor cognition  -recheck CBC with diff and BMP  -supportive care  -discussed the above management pain in detail with the patient's son, Leandro Parra    2  Recent UTI  Diagnosed [address] and treated with 3x cipro   Past urine culture results have shown mixed urinary alfredo, E coli, and E coli ESBL   There was susceptibility to Cipro on her previous sensitivity profile   Patient's current urinalysis is not strongly indicative of infection  Formal culture is preliminarily showing a GNR   She has been afebrile without leukocytosis since her admission    -no indication for ongoing antibiotics at this time  -recommend avoidance of future fluoroquinolones  -monitor CBC and BMP  -follow-up urine culture results  -monitor vitals     3  Coagulase-negative Staphylococcus bacteremia  In only 1 of 2 sets  Strongly suspect contaminant  Low suspicion for active bacteremia at this time given patient remains afebrile without leukocytosis  -monitor patient off antibiotics  -monitor CBC and BMP  -follow-up blood cultures  -monitor vitals     4  History of C diff diarrhea   Patient with positive PCR and negative EIA in December 2021 and January 2022   It is unclear if she received prophylactic vancomycin while on her recent course of Cipro   Fortunately the patient is not having diarrhea today   Will continue to monitor stool output   If she is to require systemic antibiotics she will require oral vancomycin prophylaxis   -if patient were to require systemic antibiotics she will require oral vancomycin prophylaxis  -recommend avoidance of systemic antibiotics unless clearly needed  -serial abdominal exams  -monitor stool output     5  Behavioral variant frontotemporal dementia  Cognition improved  -monitor cognition  -avoid fluoroquinolones in the future     6  Pacemaker present      7  MO  BMI = 41 81  Discussed the above management plan with the primary service    Antibiotics:  None    Subjective:  Patient has no fever, chills, sweats; no nausea, vomiting, diarrhea; no cough, shortness of breath; no pain  No new symptoms  Objective:  Vitals:  Temp:  [97 8 °F (36 6 °C)-97 9 °F (36 6 °C)] 97 8 °F (36 6 °C)  HR:  [60-65] 60  Resp:  [16] 16  BP: (112-122)/(54-75) 122/54  SpO2:  [91 %-97 %] 97 %  Temp (24hrs), Av 9 °F (36 6 °C), Min:97 8 °F (36 6 °C), Max:97 9 °F (36 6 °C)  Current: Temperature: 97 8 °F (36 6 °C)    Physical Exam:   General Appearance:  Alert, interactive, nontoxic, no acute distress  Throat: Oropharynx moist without lesions  Lungs:   Clear to auscultation bilaterally; no wheezes, rhonchi or rales; respirations unlabored   Heart:  RRR; no murmur, rub or gallop   Abdomen:   Soft, non-tender, non-distended, positive bowel sounds  Extremities: No clubbing, cyanosis or edema   Skin: No new rashes or lesions  No draining wounds noted         Labs, Imaging, & Other studies:   All pertinent labs and imaging studies were personally reviewed  Results from last 7 days   Lab Units 22  0453 04/10/22  0547 22  0506   WBC Thousand/uL 4 54 4 60 5 03   HEMOGLOBIN g/dL 12 3 12 6 12 8   PLATELETS Thousands/uL 184 189 194     Results from last 7 days Lab Units 04/11/22  0450 04/10/22  0547 04/10/22  0547 04/09/22  0506 04/09/22  0506 04/08/22  1829 04/08/22  1829   SODIUM mmol/L 142  --  143  --  143   < > 141   POTASSIUM mmol/L 3 8   < > 3 4*   < > 3 3*   < > 3 6   CHLORIDE mmol/L 109*   < > 109*   < > 110*   < > 109*   CO2 mmol/L 24   < > 24   < > 22   < > 26   BUN mg/dL 16   < > 12   < > 14   < > 14   CREATININE mg/dL 1 16   < > 1 22   < > 1 07   < > 1 12   EGFR ml/min/1 73sq m 44   < > 41   < > 49   < > 46   CALCIUM mg/dL 9 1   < > 9 1   < > 9 3   < > 9 2   AST U/L 17  --  22  --   --   --  22   ALT U/L 16   < > 17  --   --    < > 14   ALK PHOS U/L 63   < > 69  --   --    < > 75    < > = values in this interval not displayed  Results from last 7 days   Lab Units 04/08/22  1951 04/08/22  1831 04/08/22  1829   BLOOD CULTURE   --  No Growth at 48 hrs   Staphylococcus coagulase negative*   GRAM STAIN RESULT   --   --  Gram positive cocci in clusters*   URINE CULTURE  >100,000 cfu/ml Escherichia coli ESBL*  --   --

## 2022-04-11 NOTE — TELEPHONE ENCOUNTER
Patient is currently in the hospital and evaluated by Infectious Disease  No further interventions from a urological standpoint at this time

## 2022-04-11 NOTE — PLAN OF CARE
Problem: MOBILITY - ADULT  Goal: Maintain or return to baseline ADL function  Description: INTERVENTIONS:  -  Assess patient's ability to carry out ADLs; assess patient's baseline for ADL function and identify physical deficits which impact ability to perform ADLs (bathing, care of mouth/teeth, toileting, grooming, dressing, etc )  - Assess/evaluate cause of self-care deficits   - Assess range of motion  - Assess patient's mobility; develop plan if impaired  - Assess patient's need for assistive devices and provide as appropriate  - Encourage maximum independence but intervene and supervise when necessary  - Involve family in performance of ADLs  - Assess for home care needs following discharge   - Consider OT consult to assist with ADL evaluation and planning for discharge  - Provide patient education as appropriate  Outcome: Progressing  Goal: Maintains/Returns to pre admission functional level  Description: INTERVENTIONS:  - Perform BMAT or MOVE assessment daily    - Set and communicate daily mobility goal to care team and patient/family/caregiver  - Collaborate with rehabilitation services on mobility goals if consulted  - Perform Range of Motion 3 times a day  - Reposition patient every 2 hours    - Out of bed to chair 3 times a day   - Out of bed for meals 3 times a day  - Out of bed for toileting  - Record patient progress and toleration of activity level   Outcome: Progressing     Problem: Prexisting or High Potential for Compromised Skin Integrity  Goal: Skin integrity is maintained or improved  Description: INTERVENTIONS:  - Identify patients at risk for skin breakdown  - Assess and monitor skin integrity  - Assess and monitor nutrition and hydration status  - Monitor labs   - Assess for incontinence   - Turn and reposition patient  - Assist with mobility/ambulation  - Relieve pressure over bony prominences  - Avoid friction and shearing  - Provide appropriate hygiene as needed including keeping skin clean and dry  - Evaluate need for skin moisturizer/barrier cream  - Collaborate with interdisciplinary team   - Patient/family teaching  - Consider wound care consult   Outcome: Progressing     Problem: PAIN - ADULT  Goal: Verbalizes/displays adequate comfort level or baseline comfort level  Description: Interventions:  - Encourage patient to monitor pain and request assistance  - Assess pain using appropriate pain scale  - Administer analgesics based on type and severity of pain and evaluate response  - Implement non-pharmacological measures as appropriate and evaluate response  - Consider cultural and social influences on pain and pain management  - Notify physician/advanced practitioner if interventions unsuccessful or patient reports new pain  Outcome: Progressing     Problem: INFECTION - ADULT  Goal: Absence or prevention of progression during hospitalization  Description: INTERVENTIONS:  - Assess and monitor for signs and symptoms of infection  - Monitor lab/diagnostic results  - Monitor all insertion sites, i e  indwelling lines, tubes, and drains  - Monitor endotracheal if appropriate and nasal secretions for changes in amount and color  - [place] appropriate cooling/warming therapies per order  - Administer medications as ordered  - Instruct and encourage patient and family to use good hand hygiene technique  - Identify and instruct in appropriate isolation precautions for identified infection/condition  Outcome: Progressing  Goal: Absence of fever/infection during neutropenic period  Description: INTERVENTIONS:  - Monitor WBC    Outcome: Progressing     Problem: SAFETY ADULT  Goal: Maintain or return to baseline ADL function  Description: INTERVENTIONS:  -  Assess patient's ability to carry out ADLs; assess patient's baseline for ADL function and identify physical deficits which impact ability to perform ADLs (bathing, care of mouth/teeth, toileting, grooming, dressing, etc )  - Assess/evaluate cause of self-care deficits   - Assess range of motion  - Assess patient's mobility; develop plan if impaired  - Assess patient's need for assistive devices and provide as appropriate  - Encourage maximum independence but intervene and supervise when necessary  - Involve family in performance of ADLs  - Assess for home care needs following discharge   - Consider OT consult to assist with ADL evaluation and planning for discharge  - Provide patient education as appropriate  Outcome: Progressing  Goal: Maintains/Returns to pre admission functional level  Description: INTERVENTIONS:  - Perform BMAT or MOVE assessment daily    - Set and communicate daily mobility goal to care team and patient/family/caregiver  - Collaborate with rehabilitation services on mobility goals if consulted  - Perform Range of Motion 3 times a day  - Reposition patient every 2 hours    - Out of bed to chair 3 times a day   - Out of bed for meals 3 times a day  - Out of bed for toileting  - Record patient progress and toleration of activity level   Outcome: Progressing  Goal: Patient will remain free of falls  Description: INTERVENTIONS:  - Educate patient/family on patient safety including physical limitations  - Instruct patient to call for assistance with activity   - Consult OT/PT to assist with strengthening/mobility   - Keep Call bell within reach  - Keep bed low and locked with side rails adjusted as appropriate  - Keep care items and personal belongings within reach  - Initiate and maintain comfort rounds  - Make Fall Risk Sign visible to staff  - Offer Toileting every 2 Hours, in advance of need  - Initiate/Maintain bed alarm  - Apply yellow socks and bracelet for high fall risk patients  - Consider moving patient to room near nurses station  Outcome: Progressing     Problem: DISCHARGE PLANNING  Goal: Discharge to home or other facility with appropriate resources  Description: INTERVENTIONS:  - Identify barriers to discharge w/patient and caregiver  - Arrange for needed discharge resources and transportation as appropriate  - Identify discharge learning needs (meds, wound care, etc )  - Arrange for interpretive services to assist at discharge as needed  - Refer to Case Management Department for coordinating discharge planning if the patient needs post-hospital services based on physician/advanced practitioner order or complex needs related to functional status, cognitive ability, or social support system  Outcome: Progressing     Problem: Knowledge Deficit  Goal: Patient/family/caregiver demonstrates understanding of disease process, treatment plan, medications, and discharge instructions  Description: Complete learning assessment and assess knowledge base  Interventions:  - Provide teaching at level of understanding  - Provide teaching via preferred learning methods  Outcome: Progressing     Problem: Potential for Falls  Goal: Patient will remain free of falls  Description: INTERVENTIONS:  - Educate patient/family on patient safety including physical limitations  - Instruct patient to call for assistance with activity   - Consult OT/PT to assist with strengthening/mobility   - Keep Call bell within reach  - Keep bed low and locked with side rails adjusted as appropriate  - Keep care items and personal belongings within reach  - Initiate and maintain comfort rounds  - Make Fall Risk Sign visible to staff  - Offer Toileting every 2 Hours, in advance of need  - Initiate/Maintain bed alarm  - Apply yellow socks and bracelet for high fall risk patients  - Consider moving patient to room near nurses station  Outcome: Progressing     Problem: Nutrition/Hydration-ADULT  Goal: Nutrient/Hydration intake appropriate for improving, restoring or maintaining nutritional needs  Description: Monitor and assess patient's nutrition/hydration status for malnutrition  Collaborate with interdisciplinary team and initiate plan and interventions as ordered    Monitor patient's weight and dietary intake as ordered or per policy  Utilize nutrition screening tool and intervene as necessary  Determine patient's food preferences and provide high-protein, high-caloric foods as appropriate       INTERVENTIONS:  - Monitor oral intake, urinary output, labs, and treatment plans  - Assess nutrition and hydration status and recommend course of action  - Evaluate amount of meals eaten  - Assist patient with eating if necessary   - Allow adequate time for meals  - Recommend/ encourage appropriate diets, oral nutritional supplements, and vitamin/mineral supplements  - Order, calculate, and assess calorie counts as needed  - Recommend, monitor, and adjust tube feedings and TPN/PPN based on assessed needs  - Assess need for intravenous fluids  - Provide specific nutrition/hydration education as appropriate  - Include patient/family/caregiver in decisions related to nutrition  Outcome: Progressing

## 2022-04-11 NOTE — ASSESSMENT & PLAN NOTE
Asymptomatic bacteriuria with ESBL  Evaluated by Infectious Disease-patient completed 3 days of ciprofloxacin  As patient is asymptomatic there is no indication for further antibiotic use  Evaluated by urology-recommend avoidance of further course of antibiotics unless typical symptoms  -Supplements for management of asymptomatic bacteriuria to consider include cranberry, d-mannose, topical estrogen, and methenamine hippurate, some of which she is already prescribed

## 2022-04-11 NOTE — CASE MANAGEMENT
Case Management Discharge Planning Note    Patient name Dylan Greenfield  Location [facility] 2 /[facility] 2 Emily Gant* MRN [id number]  : 1940 Date 2022       Current Admission Date: 2022  Current Admission Diagnosis:Behavioral variant frontotemporal dementia [facility])   Patient Active Problem List    Diagnosis Date Noted    Positive blood culture 2022    Acute metabolic encephalopathy     UTI (urinary tract infection) 2022    Gross hematuria 2022    Ambulatory dysfunction 03/10/2022    Sleep apnea 2022    Acute on chronic diastolic heart failure ([facility] ) 2022    C  difficile diarrhea 2022    Anxiety 2021    Fungal infection of the groin 2021    Dementia ([facility] ) 03/10/2021    Generalized weakness 03/10/2021    (HFpEF) heart failure with preserved ejection fraction ([facility] ) 2020    Pacemaker 2020    Frequent UTI 2020    Bipolar affective disorder, currently manic, moderate ([facility] ) 2018    Chronic pain 2018    Physical deconditioning 2018    Moderate episode of recurrent major depressive disorder ([facility] ) 2018    Hypokalemia 2017    Overactive bladder 06/10/2017    COPD (chronic obstructive pulmonary disease) ([facility] ) 2016    Acute encephalopathy 2016    Morbid obesity due to excess calories ([facility] ) 2016    [facility] 2016    Hypertension 2016    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[facility] ) 2016    PAD (peripheral artery disease) ([facility] ) 2016      LOS (days): 3  Geometric Mean LOS (GMLOS) (days): 3 90  Days to GMLOS:1 3     OBJECTIVE:  Risk of Unplanned Readmission Score: 27         Current admission status: Inpatient   Preferred Pharmacy:   [address] [place] [address] R R 1 ([phone] [id number])  [facility]  Phone: [phone] Fax: [phone]    [phone] [place] [address] [id number]  [address]  [place] [address]  Phone: [phone] Fax: [phone]    Primary Care Provider: LUCY Paula    Primary Insurance: [address] [facility] RE[facility]  Secondary Insurance: [address]:       Treatment Team Recommendation: Facility Return,SNF  Discharge Destination Plan[de-identified] SNF,Facility Return ([facility]15 resident)  Transport at Discharge : S Ambulance  Dispatcher Contacted: Yes  Number/Name of Dispatcher: [employer] ETS  Transported by [employer] and Unit #):  SLETS  ETA of Transport (Date): 04/11/22  ETA of Transport (Time): 1630              IMM Given (Date):: 04/11/22  IMM Given to[de-identified] Family  Family notified[de-identified] Red Bautista and Andreina Hamm made aware of d/c plan and  time       Accepting Facility Name, [facility] : [facility]  Receiving Facility/Agency Phone Number: [phone]  Facility/Agency Fax Number: [phone]

## 2022-04-11 NOTE — PLAN OF CARE
Problem: PHYSICAL THERAPY ADULT  Goal: Performs mobility at highest level of function for planned discharge setting  See evaluation for individualized goals  Description: Treatment/Interventions: Functional transfer training,LE strengthening/ROM,Therapeutic exercise,Endurance training,Patient/family training,Equipment eval/education,Bed mobility,Gait training,Compensatory technique education,Spoke to nursing,OT  Equipment Recommended:  (TBD by rehab)       See flowsheet documentation for full assessment, interventions and recommendations  Note: Prognosis: Fair  Problem List: Decreased strength,Decreased range of motion,Decreased endurance,Impaired balance,Decreased mobility,Decreased cognition,Impaired judgement,Decreased safety awareness,Obesity,Pain  Assessment: Pt is a [de-identified] y o  female seen for PT evaluation s/p admission to [address] [place] [address] on 4/8/2022 with 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[facility] )  Order placed for PT services  Upon evaluation: Pt is presenting with impaired functional mobility due to pain, decreased strength, decreased ROM, decreased endurance, impaired balance, impaired cognition, decreased safety awareness, impaired judgment and fall risk requiring max A x 2 assistance for bed mobility and max A x 2 assistance for transfers  Pt's clinical presentation is currently unstable/unpredictable given the functional mobility deficits above coupled with fall risks as indicated by AM-PAC 6-Clicks: 2/98 as well as impaired balance, polypharmacy, impaired judgement, decreased safety awareness and decreased cognition and combined with medical complications of hypertension , pain impacting overall mobility status, fear/retreat and need for input for mobility technique/safety  Pt's PMHx and comorbidities that may affect physical performance and progress include: CHF, COPD, A fib, HTN, Dementia, PAD, obesity and bipolar disorder   Personal factors affecting pt at time of IE include: anxiety, depression, inaccessible home environment, advanced age, behavioral pattern, inability to perform IADLs, inability to perform ADLs, inability to navigate level surfaces without external assistance, inability to ambulate household distances, inability to navigate community distances and limited insight into impairments  Pt will benefit from continued skilled PT services to address deficits as defined above and to maximize level of functional mobility to facilitate return toward PLOF and improved QOL  From PT/mobility standpoint, recommendation at time of d/c would be Short term rehab pending progress in order to reduce fall risk and maximize pt's functional independence and consistency with mobility in order to facilitate return to PLOF  Recommend trial with walker next 1-2 sessions and ther ex next 1-2 sessions  Barriers to Discharge: None        PT Discharge Recommendation: Post acute rehabilitation services          See flowsheet documentation for full assessment

## 2022-04-11 NOTE — ASSESSMENT & PLAN NOTE
· History of c difficile diarrhea likely secondary to recurrent antibiotic use  · Recommend C diff prophylaxis in the future with antibiotics

## 2022-04-11 NOTE — ASSESSMENT & PLAN NOTE
Wt Readings from Last 3 Encounters:   04/08/22 118 kg (259 lb 0 7 oz)   03/07/22 122 kg (268 lb)   10/19/21 122 kg (268 lb)     · Continue Lasix

## 2022-04-11 NOTE — OCCUPATIONAL THERAPY NOTE
Occupational Therapy Evaluation(time=0971-7049)     Patient Name: Jules Cockayne  GRJVM'R Date: 4/11/2022  Problem List  Principal Problem:    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[facility] )  Active Problems:    Atrial fibrillation ([facility] )    Hypertension    Morbid obesity due to excess calories ([facility])    Hypokalemia    (HFpEF) heart failure with preserved ejection fraction ([facility])    Pacemaker    C  difficile diarrhea    Positive blood culture    Past Medical History  Past Medical History:   Diagnosis Date    A-fib ([place] [address] )     Anxiety     Chronic pain     Chronic respiratory failure with hypoxia ([facility])     COPD (chronic obstructive pulmonary disease) ([facility])     Dementia ([facility])     Dorsalgia, unspecified     Edema     Encephalopathy     GERD (gastroesophageal reflux disease)     Hypertension     Morbid obesity ([place] [address] )     Muscle weakness (generalized)     Opioid dependence ([place] [address] )     Overactive bladder     Pneumonia     Psychiatric disorder     anxiety, bipolar    Radiculopathy of thoracolumbar region     Sciatica     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 ([place] [address] )     Urinary incontinence     UTI (urinary tract infection)      Past Surgical History  Past Surgical History:   Procedure Laterality Date    APPENDECTOMY      BACK SURGERY      CHOLECYSTECTOMY      CYSTOSCOPY  11/16/2021    Dr Dejan Lazaro           04/11/22 0921   Note Type   Note type Evaluation   Restrictions/Precautions   Weight Bearing Precautions Per Order No   Other Precautions Contact/isolation;Cognitive; Chair Alarm; Bed Alarm;Multiple lines; Fall Risk;Pain   Pain Assessment   Pain Assessment Tool 0-10   Pain Score 5   Pain Location/Orientation Orientation: Bilateral;Location: Foot   Home Living   Type of Home SNF  (prior to (3/22)--home with dtr)   Home Layout One level   [id number] [place] [address] bed; Wheelchair-manual;Walker   Prior Function   Lives With Facility staff ADL Assistance Needs assistance   Lifestyle   Autonomy Prior to 3/22, pt had assistance with her ADLs; at rehab, pt states need for assistance with functional mobility; pt is a poor historian   Reciprocal Relationships 3 children   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   Patient Behaviors/Mood Anxious; Cooperative   Subjective   Subjective "I'm afraid of falling "   ADL   Where Assessed Edge of bed   Eating Assistance 5  Supervision/Setup   Grooming Assistance 5  Supervision/Setup   UB Bathing Assistance 4  Minimal Assistance   LB Bathing Assistance 2  Maximal [address] 4  Minimal [address] 2  Maximal Assistance   Bed Mobility   Rolling R 4  Minimal assistance   Additional items Assist x 1; Increased time required;Verbal cues   Rolling L 4  Minimal assistance   Additional items Assist x 1; Increased time required;Verbal cues;LE management   Supine to Sit 2  Maximal assistance   Additional items Assist x 2; Increased time required;LE management   Sit to Supine 2  Maximal assistance   Additional items Assist x 2; Increased time required;Verbal cues   Transfers   Sit to Stand 2  Maximal assistance   Additional items Assist x 2; Increased time required;Verbal cues   Stand to Sit 2  Maximal assistance   Additional items Assist x 2; Increased time required;Verbal cues   Functional Mobility   Functional Mobility   ([facility])   Balance   Static Sitting Fair -   Dynamic Sitting Fair -   Static Standing Poor   Dynamic Standing Poor -   Activity Tolerance   Activity Tolerance Patient limited by fatigue;Patient limited by pain   Medical Staff Made Aware nsg, P T     RUE Assessment   RUE Assessment WFL  (limited b/l shr AROM(i e flex=beyond 90*); elbow-distal=WFL;)   RUE Strength   RUE Overall Strength Within Functional Limits - able to perform ADL tasks with strength  (4/5 throughout)   LUE Assessment   LUE Assessment WFL  (limited b/l shr AROM(i e flex=beyond 90*); elbow-distal=WFL;)   LUE Strength   LUE Overall Strength Within Functional Limits - able to perform ADL tasks with strength  (4/5 throughout)   Hand Function   Gross Motor Coordination Functional   Fine Motor Coordination Functional   Sensation   Light Touch No apparent deficits   Proprioception   Proprioception No apparent deficits   Vision-Basic Assessment   Current Vision Wears glasses all the time   Vision - Complex Assessment   Acuity Able to read clock/calendar on wall without difficulty   Perception   Inattention/Neglect Appears intact   Cognition   Overall Cognitive Status Impaired   Arousal/Participation Alert   Attention Within functional limits   Orientation Level Oriented to person;Oriented to place; Disoriented to time;Disoriented to situation   Memory Decreased recall of recent events;Decreased recall of precautions;Decreased short term memory   Following Commands Follows one step commands with increased time or repetition   Comments hx dementia   Assessment   Limitation Decreased ADL status; Decreased UE ROM; Decreased UE strength;Decreased Safe judgement during ADL;Decreased cognition;Decreased endurance;Decreased high-level ADLs   Prognosis Fair   Assessment Pt is a [de-identified] female admitted to the hospital 2* symptoms of increased agitation, confusion  Pt noted with +blood cultures  Pt with PMH A-fib, COPD, chronic pain, dementia, bipolar, encephalopathy, UTI, back sx, and knee sx  Prior to 3/22, pt had assistance with her ADLs; at rehab, pt states need for assistance with functional mobility; pt is a poor historian  During initial eval, pt demonstrated deficits with her functional balance, functional mobility, ADL status, transfer safety, b/l UE strength, activity tolerance(currently fair=15-20mins), and cognition(i e memory, orientation, problem-solving, judgement/safety)   Pt would benefit from continued OT tx for the above deficits  3-5xwk/1-2wks  Goals   Patient Goals "to get back home"   STG Time Frame   (1-7 days)   Short Term Goal #1 Pt will demonstrate minAwith their bed mobility to facilitate EOB ADLs  Short Term Goal #2 Pt will demonstrate Macario with their sit-stand transfers to assist with completion of their LE dressing  Short Term Goal  Pt will demonstrate improved activity tolerance to good(20-30mins) and standing tolerance to 1-3mins to assist with ADLs  LTG Time Frame   (7-14 days)   Long Term Goal #1 Pt will demonstrate proper walker/transfer safety 100% of the time  Long Term Goal #2 Pt will demonstrate supervision with her UE and mod A with her LE bathing/dressing  Long Term Goal Pt will demonstrate improve functional balance by 1 grade to assist with ADLs/transfers  Plan   Treatment Interventions ADL retraining;Functional transfer training;UE strengthening/ROM; Endurance training;Cognitive reorientation;Patient/family training; Compensatory technique education   Goal Expiration Date 04/25/22   OT Treatment Day 0   OT Frequency 3-5x/wk   Recommendation   OT Discharge Recommendation Post acute rehabilitation services   AM-PAC Daily Activity Inpatient   Lower Body Dressing 2   Bathing 2   Toileting 1   Upper Body Dressing 2   Grooming 3   Eating 4   Daily Activity Raw Score 14   Daily Activity Standardized Score (Calc for Raw Score >=11) 33 39   AM-PAC Applied Cognition Inpatient   Following a Speech/Presentation 3   Understanding Ordinary Conversation 3   Taking Medications 3   Remembering Where Things Are Placed or Put Away 2   Remembering List of 4-5 Errands 2   Taking Care of Complicated Tasks 2   Applied Cognition Raw Score 15   Applied Cognition Standardized Score 33 54   Julio C Artist, OT

## 2022-04-11 NOTE — DISCHARGE SUMMARY
[facility]  Discharge- [facility] 1940, [de-identified] y o  female MRN: [id number]  Unit/Bed#: [facility] -01 Encounter: [id number]  Primary Care Provider: LUCY Bacon   Date and time admitted to hospital: 4/8/2022  5:02 PM    Bacteria in urine  Assessment & Plan  Asymptomatic bacteriuria with ESBL  Evaluated by Infectious Disease-patient completed 3 days of ciprofloxacin  As patient is asymptomatic there is no indication for further antibiotic use  Evaluated by urology-recommend avoidance of further course of antibiotics unless typical symptoms  -Supplements for management of asymptomatic bacteriuria to consider include cranberry, d-mannose, topical estrogen, and methenamine hippurate, some of which she is already prescribed  Positive blood culture  Assessment & Plan  · Likely contaminant    C  difficile diarrhea  Assessment & Plan  · History of c difficile diarrhea likely secondary to recurrent antibiotic use  · Recommend C diff prophylaxis in the future with antibiotics    (HFpEF) heart failure with preserved ejection fraction [facility])  Assessment & Plan  Wt Readings from Last 3 Encounters:   04/08/22 118 kg (259 lb 0 7 oz)   03/07/22 122 kg (268 lb)   10/19/21 122 kg (268 lb)     · Continue Lasix    Hypokalemia  Assessment & Plan  · Repleted    Morbid obesity due to excess calories (HCC)  Assessment & Plan  · Body mass index is 41 81 kg/m²  Hypertension  Assessment & Plan  · Continue amlodipine, losartan, Lasix    Atrial fibrillation (HCC)  Assessment & Plan  · Paroxysmal atrial fibrillation   · Continue eliquis for anticoagulation  * Behavioral variant frontotemporal dementia [facility])  Assessment & Plan  46-year-old female with history of COPD CHF atrial fibrillation and frontotemporal dementia from SNF presents with increased agitation    · Likely secondary to recent use of fluoroquinolones for UTI in setting of known dementia  · Patient's symptoms have improved throughout hospital stay  · Psychotropic medications were adjusted during hospital stay   · Zyprexa added in the morning, Seroquel in the evening      Discharging Physician / Practitioner: Eliazar Toure DO  PCP: Fredy Cordon  Admission Date:   Admission Orders (From admission, onward)     Ordered        04/08/22 2114  Inpatient Admission  Once                      Discharge Date: 04/11/22    Medical Problems             Resolved Problems  Date Reviewed: 4/10/2022    None                Consultations During Hospital Stay:  Infectious Disease, Urology    Procedures Performed:  No procedures    Significant Findings / Test Results:     No results found  Incidental Findings:  Non    Test Results Pending at Discharge (will require follow up): None     Outpatient Tests Requested:  None    Reason for Admission:  Acute encephalopathy    Hospital Course:     Graeme Keating is a [de-identified] y o  female With past medical history of atrial fibrillation, frontal temporal dementia, recurrent UTI, recurrent history of C diff presented from [facility] with acute encephalopathy  She has a history of dementia with behavioral disturbances but concern for worsening agitation  Patient was recently initiated on ciprofloxacin prior to admission  Patient was evaluated by Infectious Disease as well as Urology throughout hospital stay  Concerned that patient's encephalopathy related to use of fluoroquinolones  Patient was monitored off with improvement in symptoms  Patient was discharged return to [facility]  Please see above list of diagnoses and related plan for additional information  Condition at Discharge: serious     Discharge Day Visit / Exam:     Subjective:    Patient seen evaluated at bedside  She is frustrated with the news about the ongoing war  Otherwise denies chest pain, fevers, chills, difficulty voiding      Vitals: Blood Pressure: 122/54 (04/11/22 0625)  Pulse: 60 (04/11/22 0625)  Temperature: 97 8 °F (36 6 °C) (04/11/22 6032)  Temp Source: Oral (04/11/22 0625)  Respirations: 16 (04/11/22 0625)  Height: 5' 6" (167 6 cm) (04/09/22 1904)  Weight - Scale: 118 kg (259 lb 0 7 oz) (04/08/22 1707)  SpO2: 97 % (04/11/22 3945)  Exam:   Physical Exam  Vitals reviewed  Constitutional:       General: She is not in acute distress  Appearance: She is well-developed  She is not ill-appearing, toxic-appearing or diaphoretic  HENT:      Head: Normocephalic and atraumatic  Mouth/Throat:      Pharynx: No oropharyngeal exudate  Eyes:      General: No scleral icterus  Extraocular Movements: Extraocular movements intact  Conjunctiva/sclera: Conjunctivae normal    Cardiovascular:      Rate and Rhythm: Normal rate and regular rhythm  Heart sounds: Normal heart sounds  Pulmonary:      Effort: Pulmonary effort is normal  No respiratory distress  Breath sounds: Normal breath sounds  No wheezing or rales  Abdominal:      General: There is no distension  Palpations: Abdomen is soft  Tenderness: There is no abdominal tenderness  There is no guarding or rebound  Musculoskeletal:         General: No swelling, tenderness or deformity  Skin:     General: Skin is warm and dry  Neurological:      General: No focal deficit present  Mental Status: She is alert  Mental status is at baseline  Psychiatric:         Mood and Affect: Mood normal            Discharge instructions/Information to patient and family:   See after visit summary for information provided to patient and family  Provisions for Follow-Up Care:  See after visit summary for information related to follow-up care and any pertinent home health orders  Disposition:     [address] 59     Discharge Statement:  I spent 60 minutes discharging the patient  This time was spent on the day of discharge  I had direct contact with the patient on the day of discharge   Greater than 50% of the total time was spent examining patient, answering all patient questions, arranging and discussing plan of care with patient as well as directly providing post-discharge instructions  Additional time then spent on discharge activities  Discharge Medications:  See after visit summary for reconciled discharge medications provided to patient and family        ** Please Note: This note has been constructed using a voice recognition system **

## 2022-04-11 NOTE — ASSESSMENT & PLAN NOTE
28-year-old female with history of COPD CHF atrial fibrillation and frontotemporal dementia from SNF presents with increased agitation    · Likely secondary to recent use of fluoroquinolones for UTI in setting of known dementia  · Patient's symptoms have improved throughout hospital stay  · Psychotropic medications were adjusted during hospital stay   · Zyprexa added in the morning, Seroquel in the evening

## 2022-04-11 NOTE — PHYSICAL THERAPY NOTE
PHYSICAL THERAPY EVALUATION  NAME:  Mark Glass  DATE: 04/11/22    AGE:   [de-identified] y o  Mrn:   [id number]  ADMIT DX:  UTI (urinary tract infection) [N39 0]  Altered mental status [R41 82]  C  difficile diarrhea [A04 72]  UTI symptoms [R39 9]  Atrial fibrillation, unspecified type ([place] [address] ) [L26 39]  Acute metabolic encephalopathy [T03 72]  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[place] [address] ) [G31 09, F02 81]    Past Medical History:   Diagnosis Date    A-fib ([place] [address] )     Anxiety     Chronic pain     [facility])     COPD (chronic obstructive pulmonary disease) ([facility])     Dementia ([facility])     Dorsalgia, unspecified     Edema     Encephalopathy     GERD (gastroesophageal reflux disease)     Hypertension     Morbid obesity ([facility])     Muscle weakness (generalized)     Opioid dependence ([facility])     Overactive bladder     Pneumonia     Psychiatric disorder     anxiety, bipolar    Radiculopathy of thoracolumbar region     Sciatica     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 ([place] [address] )     Urinary incontinence     UTI (urinary tract infection)      Length Of Stay: 3  Performed at least 2 patient identifiers during session: Name and Birthday  PHYSICAL THERAPY EVALUATION :        04/11/22 7056   Note Type   Note type Evaluation   Pain Assessment   Pain Assessment Tool 0-10   Pain Score 5   Pain Location/Orientation Location: Back   Restrictions/Precautions   Other Precautions Contact/isolation; Bed Alarm; Chair Alarm; Fall Risk;Pain   Home Living   Additional Comments Pt is a poor historian; able to provide limited info regarding PLOF and home setup with questionable accuracy  Per ED note, pt recently residing [facility]  Pt requires assistance for ADLs, IADLs, and mobility  Cognition   Overall Cognitive Status Impaired   Orientation Level Oriented to person;Oriented to place; Disoriented to time;Disoriented to situation   Following Commands Follows one step commands with increased time or repetition   Comments Pt agreeable to PT session however requires frequent redirection and cues for attention to task   RLE Assessment   RLE Assessment X  (grossly assessed 3/5 strength, except ankle 2-/5)   RLE Overall AROM   R Ankle Dorsiflexion -20   LLE Assessment   LLE Assessment X  (grossly assessed 3/5 strength, except ankle 2-/5)   LLE Overall AROM   L Ankle Dorsiflexion -20   Bed Mobility   Rolling R 4  Minimal assistance   Additional items Assist x 1; Increased time required;Verbal cues; Bedrails   Rolling L 4  Minimal assistance   Additional items Assist x 1;Bedrails; Increased time required;Verbal cues   Supine to Sit 2  Maximal assistance   Additional items Assist x 2; Increased time required;Verbal cues;LE management   Sit to Supine 2  Maximal assistance   Additional items Assist x 2; Increased time required;Verbal cues;LE management   Additional Comments VC for hand placement and technique  Pt able to sit EOB x 7-8 min with supervision-min A to remain upright  Transfers   Sit to Stand 2  Maximal assistance   Additional items Assist x 2; Increased time required;Verbal cues   Stand to Sit 2  Maximal assistance   Additional items Assist x 2; Increased time required;Verbal cues   Additional Comments Pt completed STS with max A x 2 for 3 trials, initial trial pt able to achieve 3/4 stand limited by LLE/foot pain  Second/third trials pt achieved full upright able to maintain with continued max a x 2 with RW for ~30 sec prior to pt noting pain and unable to continue; pt also fearful of falling  Pt attempted to take 1 step laterally at bedside but limited by pain/fear returning to seated EOB   Ambulation/Elevation   Gait pattern Not appropriate; Not tested   Balance   Static Sitting Fair -   Dynamic Sitting Poor +   Static Standing Poor   Dynamic Standing Poor -   Endurance Deficit   Endurance Deficit Yes   Endurance Deficit Description pain, fatigue, self   Activity Tolerance   Activity Tolerance Patient limited by fatigue;Patient limited by pain   Medical Staff Made Aware [place] SIEGEL   Nurse Made Aware Vijay MIKE   Assessment   Prognosis Fair   Problem List Decreased strength;Decreased range of motion;Decreased endurance; Impaired balance;Decreased mobility; Decreased cognition; Impaired judgement;Decreased safety awareness; Obesity;Pain   Barriers to Discharge None   Goals   Patient Goals "I'll move"   STG Expiration Date 04/25/22   Plan   Treatment/Interventions Functional transfer training;LE strengthening/ROM; Therapeutic exercise; Endurance training;Patient/family training;Equipment eval/education; Bed mobility;Gait training; Compensatory technique education;Spoke to nursing;OT   PT Frequency 2-3x/wk   Recommendation   PT Discharge Recommendation Post acute rehabilitation services   Equipment Recommended   (TBD by rehab)   AM-PAC Basic Mobility Inpatient   Turning in Bed Without Bedrails 3   Lying on Back to Sitting on Edge of Flat Bed 1   Moving Bed to Chair 1   Standing Up From Chair 1   Walk in Room 1   Climb 3-5 Stairs 1   Basic Mobility Inpatient Raw Score 8   Turning Head Towards Sound 4   Follow Simple Instructions 2   Low Function Basic Mobility Raw Score 14   Low Function Basic Mobility Standardized Score 22 01   Highest Level Of Mobility   JH-HLM Goal 3: Sit at edge of bed   JH-HLM Highest Level of Mobility 3: Sit at edge of bed   JH-HLM Goal Achieved Yes   End of Consult   Patient Position at End of Consult Supine;Bed/Chair alarm activated; All needs within reach     The patient's AM-PAC Basic Mobility Inpatient Short Form Raw Score is 8    A Raw score of less than or equal to 16 suggests the patient may benefit from discharge to post-acute rehabilitation services  Please also refer to the recommendation of the Physical Therapist for safe discharge planning  (Please find full objective findings from PT assessment regarding body systems outlined above)       Assessment: Pt is a [de-identified] y o  female seen for PT evaluation s/p admission to [address] [place] [address] on 4/8/2022 with 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[facility] )  Order placed for PT services  Upon evaluation: Pt is presenting with impaired functional mobility due to pain, decreased strength, decreased ROM, decreased endurance, impaired balance, impaired cognition, decreased safety awareness, impaired judgment and fall risk requiring max A x 2 assistance for bed mobility and max A x 2 assistance for transfers  Pt's clinical presentation is currently unstable/unpredictable given the functional mobility deficits above coupled with fall risks as indicated by AM-PAC 6-Clicks: 6/64 as well as impaired balance, polypharmacy, impaired judgement, decreased safety awareness and decreased cognition and combined with medical complications of hypertension , pain impacting overall mobility status, fear/retreat and need for input for mobility technique/safety  Pt's PMHx and comorbidities that may affect physical performance and progress include: CHF, COPD, A fib, HTN, Dementia, PAD, obesity and bipolar disorder  Personal factors affecting pt at time of IE include: anxiety, depression, inaccessible home environment, advanced age, behavioral pattern, inability to perform IADLs, inability to perform ADLs, inability to navigate level surfaces without external assistance, inability to ambulate household distances, inability to navigate community distances and limited insight into impairments  Pt will benefit from continued skilled PT services to address deficits as defined above and to maximize level of functional mobility to facilitate return toward PLOF and improved QOL  From PT/mobility standpoint, recommendation at time of d/c would be Short term rehab pending progress in order to reduce fall risk and maximize pt's functional independence and consistency with mobility in order to facilitate return to PLOF    Recommend trial with walker next 1-2 sessions and ther ex next 1-2 sessions  Goals: Pt will: Perform bed mobility tasks with consistent min A of 1 to reposition in bed and prepare for transfers  Pt will perform transfers with consistent min A of 1 to decrease burden of care and prepare for ambulation  Pt will ambulate with RW for >/= 10' with  consistent min A of 1  to improve gait quality and promote proper use of assistive device and to access home environment  Increase bilateral LE strength 1/2 grade to facilitate independent mobility and Increase all balance 1/2 grade to decrease risk for falls          Maryanne Yao, PT,DPT

## 2022-04-11 NOTE — CONSULTS
Consult - Urology   Molly Araujo 1940, [de-identified] y o  female MRN: [id number]    Unit/Bed#: [facility] 2 -01 Encounter: [id number]      Assessment & Plan  Chronic asymptomatic bacteriuria esbl e coli several years  Intermittent mental status changes/confusion, dementia  Afebrile  WBC 4  Creatinine 1 1  LA 0 6    She has been formally worked up for urinary tract infection within the last six months with ct scans and cystoscopy without significant findings  Age, bedbound status and need for toileting assistance put her at risk for bacteriuria, though fortunately she is asymptomatic with no septic or febrile episodes  She is at risk for antibiotic side effect including encephalopathy and c diff colitis  There is currently no indication for systemic antibiotic therapy  Supplements for management of asymptomatic bacteriuria to consider include cranberry, d-mannose, topical estrogen, and methenamine hippurate, some of which she is already prescribed  In the setting of known chronic bacteriuria, I recommend avoiding urine testing unless new focal symptoms arise such as dysuria hematuria suprapubic pain or fevers  Urology will sign off but remain available for any further inpatient needs  Please feel free to contact the provider currently covering the Urology [facility] role for this [place] with questions or concerns  Subjective: no abdominal pain nausea dysuria hematuria or difficulty voiding  No diarrhea  Staff reports mentation clearer than last week  Pt has no complaint today just waiting to go back to [facility] care  Gives her own history and conversation and is consistent with discussion to other providers today  Has been seen in office for same, chronic bacteriuria and few episodes of mild gross hematuria  Normal cystoscopy November 2021  Cultures intermittently grow esbl e coli  She has never had focal pelvic complaints   Family reports change in mental status is usually what prompts urine culture testing by her facility  Review of Systems   Constitutional: Negative for activity change, appetite change, chills, fever and unexpected weight change  HENT: Negative  Respiratory: Negative  Negative for shortness of breath  Cardiovascular: Negative  Negative for chest pain  Gastrointestinal: Negative for abdominal pain, diarrhea, nausea and vomiting  Endocrine: Negative  Genitourinary: Negative for decreased urine volume, difficulty urinating, dysuria, flank pain, frequency, hematuria and urgency  Musculoskeletal: Positive for back pain and gait problem  Skin: Negative  Allergic/Immunologic: Negative  Hematological: Negative for adenopathy  Does not bruise/bleed easily  Objective:  Vitals: Blood pressure 122/54, pulse 60, temperature 97 8 °F (36 6 °C), temperature source Oral, resp  rate 16, height 5' 6" (1 676 m), weight 118 kg (259 lb 0 7 oz), SpO2 97 %, not currently breastfeeding  ,Body mass index is 41 81 kg/m²  Physical Exam  Vitals and nursing note reviewed  Constitutional:       General: She is not in acute distress  Appearance: Normal appearance  She is well-developed  She is obese  She is not diaphoretic  HENT:      Head: Normocephalic and atraumatic  Comments: Hard of hearing  Cardiovascular:      Rate and Rhythm: Normal rate and regular rhythm  Pulmonary:      Effort: Pulmonary effort is normal       Breath sounds: Normal breath sounds  Abdominal:      General: Bowel sounds are normal       Palpations: Abdomen is soft  Skin:     General: Skin is warm  Capillary Refill: Capillary refill takes less than 2 seconds  Neurological:      Mental Status: She is alert and oriented to person, place, and time  Psychiatric:         Speech: Speech normal          Behavior: Behavior normal       Comments: Very talkative, conversational  Somewhat odd affect           Labs:  Recent Labs     04/08/22  1829 04/09/22  0506 04/10/22  0547 04/11/22  0453   WBC 4 17* 5  03 4 60 4 54     Recent Labs     22  1829 22  0506 04/10/22  0547 22  0453   HGB 13 1 12 8 12 6 12 3       Recent Labs     22  1829 22  0506 04/10/22  0547 22  0450   CREATININE 1 12 1 07 1 22 1 16       History:  Social History     Socioeconomic History    Marital status:      Spouse name: None    Number of children: None    Years of education: None    Highest education level: None   Occupational History    None   Tobacco Use    Smoking status: Former Smoker     Types: Cigarettes     Quit date: 1995     Years since quittin 4    Smokeless tobacco: Never Used   Vaping Use    Vaping Use: Never used   Substance and Sexual Activity    Alcohol use: Never    Drug use: No    Sexual activity: Not Currently   Other Topics Concern    None   Social History Narrative    None     Social Determinants of Health     Financial Resource Strain: Not on file   Food Insecurity: No Food Insecurity    Worried About Running Out of Food in the Last Year: Never true    Oleg of Food in the Last Year: Never true   Transportation Needs: No Transportation Needs    Lack of Transportation (Medical): No    Lack of Transportation (Non-Medical): No   Physical Activity: Not on file   Stress: Not on file   Social Connections: Not on file   Intimate Partner Violence: Not on file   Housing Stability: Low Risk     Unable to Pay for Housing in the Last Year: No    Number of Places Lived in the Last Year: 1    Unstable Housing in the Last Year: No     Financial Resource Strain: Not on file   Food Insecurity: No Food Insecurity    Worried About [address] in the Last Year: Never true    [address] in the Last Year: Never true   Transportation Needs: No Transportation Needs    Lack of Transportation (Medical): No    Lack of Transportation (Non-Medical):  No   Physical Activity: Not on file   Stress: Not on file   Social Connections: Not on file   Intimate Partner Violence: Not on file   Housing Stability: Low Risk     Unable to Pay for Housing in the Last Year: No    Number of Places Lived in the Last Year: 1    Unstable Housing in the Last Year: No      Diagnosis Date    A-fib [facility])     Anxiety     Chronic pain     Chronic respiratory failure with hypoxia (HCC)     COPD [facility] disease) [facility]     Dementia ([facility] )     Dorsalgia, unspecified     Edema     Encephalopathy     GERD (gastroesophageal reflux disease)     Hypertension     Morbid obesity ([facility] )     Muscle weakness (generalized)     Opioid dependence (HCC)     Overactive bladder     Pneumonia     Psychiatric disorder     anxiety, bipolar    Radiculopathy of thoracolumbar region     Sciatica     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

## 2022-04-11 NOTE — PLAN OF CARE
Problem: MOBILITY - ADULT  Goal: Maintain or return to baseline ADL function  Description: INTERVENTIONS:  -  Assess patient's ability to carry out ADLs; assess patient's baseline for ADL function and identify physical deficits which impact ability to perform ADLs (bathing, care of mouth/teeth, toileting, grooming, dressing, etc )  - Assess/evaluate cause of self-care deficits   - Assess range of motion  - Assess patient's mobility; develop plan if impaired  - Assess patient's need for assistive devices and provide as appropriate  - Encourage maximum independence but intervene and supervise when necessary  - Involve family in performance of ADLs  - Assess for home care needs following discharge   - Consider OT consult to assist with ADL evaluation and planning for discharge  - Provide patient education as appropriate  4/11/2022 0713 by Stevan Meadows RN  Outcome: Progressing  4/11/2022 0713 by Stevan Meadows RN  Outcome: Progressing  Goal: Maintains/Returns to pre admission functional level  Description: INTERVENTIONS:  - Perform BMAT or MOVE assessment daily    - Set and communicate daily mobility goal to care team and patient/family/caregiver  - Collaborate with rehabilitation services on mobility goals if consulted  - Perform Range of Motion  times a day  - Reposition patient every  hours    - Dangle patient  times a day  - Stand patient times a day  - Ambulate patient  times a day  - Out of bed to chair  times a day   - Out of bed for meals  times a day  - Out of bed for toileting  - Record patient progress and toleration of activity level   4/11/2022 0713 by Stevan Meadows RN  Outcome: Progressing  4/11/2022 0713 by Stevan Meadows RN  Outcome: Progressing     Problem: Prexisting or High Potential for Compromised Skin Integrity  Goal: Skin integrity is maintained or improved  Description: INTERVENTIONS:  - Identify patients at risk for skin breakdown  - Assess and monitor skin integrity  - Assess and monitor nutrition and hydration status  - Monitor labs   - Assess for incontinence   - Turn and reposition patient  - Assist with mobility/ambulation  - Relieve pressure over bony prominences  - Avoid friction and shearing  - Provide appropriate hygiene as needed including keeping skin clean and dry  - Evaluate need for skin moisturizer/barrier cream  - Collaborate with interdisciplinary team   - Patient/family teaching  - Consider wound care consult   4/11/2022 0713 by Radha Hammonds RN  Outcome: Progressing  4/11/2022 0713 by Radha Hammonds RN  Outcome: Progressing     Problem: PAIN - ADULT  Goal: Verbalizes/displays adequate comfort level or baseline comfort level  Description: Interventions:  - Encourage patient to monitor pain and request assistance  - Assess pain using appropriate pain scale  - Administer analgesics based on type and severity of pain and evaluate response  - Implement non-pharmacological measures as appropriate and evaluate response  - Consider cultural and social influences on pain and pain management  - Notify physician/advanced practitioner if interventions unsuccessful or patient reports new pain  4/11/2022 0713 by Radha Hammonds RN  Outcome: Progressing  4/11/2022 0713 by Radha Hammonds RN  Outcome: Progressing     Problem: INFECTION - ADULT  Goal: Absence or prevention of progression during hospitalization  Description: INTERVENTIONS:  - Assess and monitor for signs and symptoms of infection  - Monitor lab/diagnostic results  - Monitor all insertion sites, i e  indwelling lines, tubes, and drains  - Monitor endotracheal if appropriate and nasal secretions for changes in amount and color  - [place] appropriate cooling/warming therapies per order  - Administer medications as ordered  - Instruct and encourage patient and family to use good hand hygiene technique  - Identify and instruct in appropriate isolation precautions for identified infection/condition  4/11/2022 0713 by Lindie Koyanagi, RN  Outcome: Progressing  4/11/2022 0713 by Lindie Koyanagi, RN  Outcome: Progressing  Goal: Absence of fever/infection during neutropenic period  Description: INTERVENTIONS:  - Monitor WBC    4/11/2022 0713 by Lindie Koyanagi, RN  Outcome: Progressing  4/11/2022 0713 by Lindie Koyanagi, RN  Outcome: Progressing     Problem: SAFETY ADULT  Goal: Maintain or return to baseline ADL function  Description: INTERVENTIONS:  -  Assess patient's ability to carry out ADLs; assess patient's baseline for ADL function and identify physical deficits which impact ability to perform ADLs (bathing, care of mouth/teeth, toileting, grooming, dressing, etc )  - Assess/evaluate cause of self-care deficits   - Assess range of motion  - Assess patient's mobility; develop plan if impaired  - Assess patient's need for assistive devices and provide as appropriate  - Encourage maximum independence but intervene and supervise when necessary  - Involve family in performance of ADLs  - Assess for home care needs following discharge   - Consider OT consult to assist with ADL evaluation and planning for discharge  - Provide patient education as appropriate  4/11/2022 0713 by Lindie Koyanagi, RN  Outcome: Progressing  4/11/2022 0713 by Lindie Koyanagi, RN  Outcome: Progressing  Goal: Maintains/Returns to pre admission functional level  Description: INTERVENTIONS:  - Perform BMAT or MOVE assessment daily    - Set and communicate daily mobility goal to care team and patient/family/caregiver  - Collaborate with rehabilitation services on mobility goals if consulted  - Perform Range of Motion times a day  - Reposition patient every  hours    - Dangle patient times a day  - Stand patient  times a day  - Ambulate patient  times a day  - Out of bed to chair  times a day   - Out of bed for meals  times a day  - Out of bed for toileting  - Record patient progress and toleration of activity level   4/11/2022 0713 by Lindie Koyanagi, RN  Outcome: Progressing  4/11/2022 0713 by Lakhwinder Miller RN  Outcome: Progressing  Goal: Patient will remain free of falls  Description: INTERVENTIONS:  - Educate patient/family on patient safety including physical limitations  - Instruct patient to call for assistance with activity   - Consult OT/PT to assist with strengthening/mobility   - Keep Call bell within reach  - Keep bed low and locked with side rails adjusted as appropriate  - Keep care items and personal belongings within reach  - Initiate and maintain comfort rounds  - Make Fall Risk Sign visible to staff  - Offer Toileting every  Hours, in advance of need  - Initiate/Maintain alarm  - Obtain necessary fall risk management equipment:   - Apply yellow socks and bracelet for high fall risk patients  - Consider moving patient to room near nurses station  4/11/2022 0713 by Lakhwinder Miller RN  Outcome: Progressing  4/11/2022 0713 by Lakhwinder Miller RN  Outcome: Progressing

## 2022-04-11 NOTE — PLAN OF CARE
Problem: OCCUPATIONAL THERAPY ADULT  Goal: Performs self-care activities at highest level of function for planned discharge setting  See evaluation for individualized goals  Description: Treatment Interventions: ADL retraining,Functional transfer training,UE strengthening/ROM,Endurance training,Cognitive reorientation,Patient/family training,Compensatory technique education          See flowsheet documentation for full assessment, interventions and recommendations  Note: Limitation: Decreased ADL status,Decreased UE ROM,Decreased UE strength,Decreased Safe judgement during ADL,Decreased cognition,Decreased endurance,Decreased high-level ADLs  Prognosis: Fair  Assessment: Pt is a [de-identified] female admitted to the hospital 2* symptoms of increased agitation, confusion  Pt noted with +blood cultures  Pt with PMH A-fib, COPD, chronic pain, dementia, bipolar, encephalopathy, UTI, back sx, and knee sx  Prior to 3/22, pt had assistance with her ADLs; at rehab, pt states need for assistance with functional mobility; pt is a poor historian  During initial eval, pt demonstrated deficits with her functional balance, functional mobility, ADL status, transfer safety, b/l UE strength, activity tolerance(currently fair=15-20mins), and cognition(i e memory, orientation, problem-solving, judgement/safety)  Pt would benefit from continued OT tx for the above deficits  3-5xwk/1-2wks        OT Discharge Recommendation: Post acute rehabilitation services

## 2022-04-11 NOTE — TELEPHONE ENCOUNTER
Voicemail message left for patients daughter Roland Petersen that there are no further recommendations from a urological standpoint as patient is currently hospitalized and was evaluated by ID

## 2022-04-12 ENCOUNTER — TELEPHONE (OUTPATIENT)
Dept: OTHER | Facility: OTHER | Age: 82
End: 2022-04-12

## 2022-04-12 ENCOUNTER — NURSING HOME VISIT (OUTPATIENT)
Dept: GERIATRICS | Facility: OTHER | Age: 82
End: 2022-04-12
Payer: COMMERCIAL

## 2022-04-12 DIAGNOSIS — R82.71 BACTERIA IN URINE: ICD-10-CM

## 2022-04-12 DIAGNOSIS — R13.11 ORAL PHASE DYSPHAGIA: ICD-10-CM

## 2022-04-12 DIAGNOSIS — F02.81 BEHAVIORAL VARIANT FRONTOTEMPORAL DEMENTIA (HCC): Primary | Chronic | ICD-10-CM

## 2022-04-12 DIAGNOSIS — I48.21 PERMANENT ATRIAL FIBRILLATION (HCC): ICD-10-CM

## 2022-04-12 DIAGNOSIS — R26.2 AMBULATORY DYSFUNCTION: ICD-10-CM

## 2022-04-12 DIAGNOSIS — G31.09 BEHAVIORAL VARIANT FRONTOTEMPORAL DEMENTIA (HCC): Primary | Chronic | ICD-10-CM

## 2022-04-12 DIAGNOSIS — I50.32 CHRONIC HEART FAILURE WITH PRESERVED EJECTION FRACTION (HCC): ICD-10-CM

## 2022-04-12 DIAGNOSIS — I15.8 OTHER SECONDARY HYPERTENSION: ICD-10-CM

## 2022-04-12 PROBLEM — R78.81 POSITIVE BLOOD CULTURE: Status: RESOLVED | Noted: 2022-04-09 | Resolved: 2022-04-12

## 2022-04-12 PROBLEM — I50.33 ACUTE ON CHRONIC DIASTOLIC HEART FAILURE (HCC): Status: RESOLVED | Noted: 2022-03-06 | Resolved: 2022-04-12

## 2022-04-12 PROBLEM — R31.0 GROSS HEMATURIA: Status: RESOLVED | Noted: 2022-03-31 | Resolved: 2022-04-12

## 2022-04-12 PROCEDURE — 99306 1ST NF CARE HIGH MDM 50: CPT | Performed by: FAMILY MEDICINE

## 2022-04-12 NOTE — TELEPHONE ENCOUNTER
Attempted to call Charis Lee, patient's daughter  Left  for patient's daughter to call office back  Per [facility] 1 hour ago (12:39 PM)     BL    Due to hematuria, CT is necessary at this time  [facility] and informed her of this information  She will relay info to daughter

## 2022-04-12 NOTE — PROGRESS NOTES
[address]  [address] [place] [address]  Facility: [facility]    NAME: Jose Botello  AGE: [de-identified] y o  SEX: female    DATE OF ENCOUNTER: 4/12/2022    Code status:  No CPR pt remains DNR/DNI    Assessment and Plan     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[facility] )  With recent hospitalization, needs NH care  On added Zyprexa and Seroquel  Will monitor closely  Ordered labs  Ordered ST  Ambulatory dysfunction  Ordered PT/OT to improve gait, transfer, ADLs, strength, transfer  Oral phase dysphagia  On mechanical soft diet  Ordered ST  Bacteria in urine  Asymptomatic with ESBL, per ID no further antibiotic treatment unless typical symptoms  (HFpEF) heart failure with preserved ejection fraction ([facility] )  Will continue with close monitoring, on Lasix  Ordered labs  Atrial fibrillation (HCC)  On ELiquis, HR controlled  Ordered labs  Hypertension  BP stable with Losartan, Amlodipine, and Lasix  Ordered labs  All medications and routine orders were reviewed and updated as needed  Plan discussed with: Patient    Chief Complaint   Pt has no specific complaint     History of Present Illness   Pt with PMSSFH and medical hx of this note who was sent to the hospital on 4/8, per family request, and decision considering pt's behaviors as sign and symptoms of UTI, now is back to [facility] to continue with NH care and STR  Pt initially was started on antibiotic for bacteria in urine but after ID evaluation of pt's condition it was decided that her bacteriuria with ESBL was considered asymptomatic and ABX was discontinued, and it was recommended to avoid further future tx unless typical symptoms  Pt's behaviors was considered as part of her dementia, with psychologic presentation and pt was started on Seroquel and Zyprexa  She had positive Bcx which was considered as contaminant        HISTORY:  Past Medical History:   Diagnosis Date    [facility]fib ([facility] )     Acute on chronic diastolic heart failure (HCC) 3/6/2022    Anxiety     Chronic pain     Chronic respiratory failure with hypoxia (HCC)     COPD (chronic obstructive pulmonary disease) (HCC)     Dementia (HCC)     Dorsalgia, unspecified     Edema     Encephalopathy     GERD (gastroesophageal reflux disease)     Gross hematuria 3/31/2022    Hypertension     Morbid obesity (HCC)     Muscle weakness (generalized)     Opioid dependence (HCC)     Overactive bladder     Pneumonia     Positive blood culture 2022    Psychiatric disorder     anxiety, bipolar    Radiculopathy of thoracolumbar region     Sciatica     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 ([facility] )     Urinary incontinence     UTI (urinary tract infection)      History reviewed  No pertinent family history  Social History     Socioeconomic History    Marital status:      Spouse name: None    Number of children: None    Years of education: None    Highest education level: None   Occupational History    None   Tobacco Use    Smoking status: Former Smoker     Types: Cigarettes     Quit date: 1995     Years since quittin 4    Smokeless tobacco: Never Used   Vaping Use    Vaping Use: Never used   Substance and Sexual Activity    Alcohol use: Never    Drug use: No    Sexual activity: Not Currently   Other Topics Concern    None   Social History Narrative    None     Social Determinants of Health     Financial Resource Strain: Not on file   Food Insecurity: No Food Insecurity    Worried About Running Out of Food in the Last Year: Never true    Oleg of Food in the Last Year: Never true   Transportation Needs: No Transportation Needs    Lack of Transportation (Medical): No    Lack of Transportation (Non-Medical):  No   Physical Activity: Not on file   Stress: Not on file   Social Connections: Not on file   Intimate Partner Violence: Not on file   Housing Stability: Low Risk     Unable to Pay for Housing in the Last Year: No    Number of Places Lived in the Last Year: 1    Unstable Housing in the Last Year: No       Allergies: Allergies   Allergen Reactions    Aspirin     Iodinated Diagnostic Agents Throat Swelling     Pt states "when I was 14years old driving home from the hospital I felt like I couldn't think and it went away after 10 minutes"     Iodine - Food Allergy     Nsaids     Other Other (See Comments)     difficulty swallowing for short period       Review of Systems     Review of Systems   Unable to perform ROS: Dementia   Psychiatric/Behavioral:        Reports seeing her sister in the room! Medications and orders     All medications reviewed and updated in Nursing Home EMR  Objective     Vitals: Reviewed     Physical Exam  Vitals and nursing note reviewed  Constitutional:       General: She is not in acute distress  Appearance: She is well-developed  She is obese  She is not ill-appearing, toxic-appearing or diaphoretic  HENT:      Head: Normocephalic and atraumatic  Right Ear: External ear normal       Left Ear: External ear normal       Ears:      Comments: [place]     Nose: Nose normal  No congestion or rhinorrhea  Mouth/Throat:      Mouth: Mucous membranes are moist       Comments: Missing teeth   Eyes:      General: No scleral icterus  Right eye: No discharge  Left eye: No discharge  Conjunctiva/sclera: Conjunctivae normal       Pupils: Pupils are equal, round, and reactive to light  Cardiovascular:      Rate and Rhythm: Normal rate  Rhythm irregular  Heart sounds: Normal heart sounds  No murmur heard  No friction rub  No gallop  Comments: L ACW cardiac device in place   Pulmonary:      Effort: Pulmonary effort is normal  No respiratory distress  Breath sounds: Normal breath sounds  No stridor  No wheezing, rhonchi or rales  Chest:      Chest wall: No tenderness     Abdominal:      General: Bowel sounds are normal  There is no distension  Palpations: Abdomen is soft  There is no mass  Tenderness: There is no abdominal tenderness  There is no guarding or rebound  Hernia: No hernia is present  Comments: Protuberant    Genitourinary:     Comments: Deferred  Musculoskeletal:         General: No swelling, tenderness, deformity or signs of injury  Cervical back: Normal range of motion and neck supple  No rigidity or tenderness  Right lower leg: No edema  Left lower leg: No edema  Comments: Lying in bed, limited ROM   Lymphadenopathy:      Cervical: No cervical adenopathy  Skin:     General: Skin is warm and dry  Color[place]: Skin is not jaundiced or pale  Findings: No bruising, erythema, lesion or rash  Comments: Didn't examine sacral area  Neurological:      Cranial Nerves: Cranial nerve deficit present  Comments: Some involuntary movement of Upper body, mouth  Psychiatric:         Behavior: Behavior normal          Pertinent Laboratory/Diagnostic Studies: The following labs/studies were reviewed please see chart or hospital paperwork for details            Renae Hodgson MD  4/12/2022 5:39 PM

## 2022-04-12 NOTE — TELEPHONE ENCOUNTER
Per [facility]     March 31, 2022    LUCY Crystal         9:26 AM  Note  Please let patient's daughter Oneil Treviño know that I will be referring her mother to Infectious Disease for recurrent UTI/ESBL  I am also going to treat her with Cipro for a mild UTI at this time  We will then initiate preventive treatments such as D mannose, Hiprex, estrogen cream, continuation of her daily probiotic      I would also like her to get a CT scan to further evaluate her blood in the urine  She did have a normal cystoscopy in November  This may be related to her infections or her Eliquis    However I would like to rule out any other pathology        Please review is CT is needed

## 2022-04-12 NOTE — TELEPHONE ENCOUNTER
Facility stated, patients daughter would like to verify if CT is still needed and is requesting another call back

## 2022-04-12 NOTE — ASSESSMENT & PLAN NOTE
With recent hospitalization, needs NH care  On added Zyprexa and Seroquel  Will monitor closely  Ordered labs   Ordered ST

## 2022-04-12 NOTE — TELEPHONE ENCOUNTER
Facility is requesting a call back from the office  Patient is scheduled for a CT renal protocol on 4/13/2022    Facility would like to confirm that this imaging was not done when patient was in patient at hospital

## 2022-04-12 NOTE — TELEPHONE ENCOUNTER
Called and spoke to Melissa, informed Melissa that patient did not have CT renal protocol in hospital  Melissa verbalized understanding

## 2022-04-13 ENCOUNTER — TRANSITIONAL CARE MANAGEMENT (OUTPATIENT)
Dept: FAMILY MEDICINE CLINIC | Facility: CLINIC | Age: 82
End: 2022-04-13

## 2022-04-13 ENCOUNTER — HOSPITAL ENCOUNTER (OUTPATIENT)
Dept: CT IMAGING | Facility: HOSPITAL | Age: 82
Discharge: HOME | End: 2022-04-13
Payer: COMMERCIAL

## 2022-04-13 ENCOUNTER — TELEPHONE (OUTPATIENT)
Dept: UROLOGY | Facility: CLINIC | Age: 82
End: 2022-04-13

## 2022-04-13 DIAGNOSIS — R31.0 GROSS HEMATURIA: ICD-10-CM

## 2022-04-13 DIAGNOSIS — N39.0 FREQUENT UTI: ICD-10-CM

## 2022-04-13 LAB — BACTERIA BLD CULT: NORMAL

## 2022-04-13 PROCEDURE — G1004 CDSM NDSC: HCPCS

## 2022-04-13 PROCEDURE — 74178 CT ABD&PLV WO CNTR FLWD CNTR: CPT

## 2022-04-13 RX ADMIN — IOHEXOL 100 ML: 350 INJECTION, SOLUTION INTRAVENOUS at 10:15

## 2022-04-13 NOTE — LETTER
[facility]  [address] [facility] [address]      April 21, 2022    MRN: [id number]     Phone: [phone]     Dear Ms  Vero Santos recently had a(n) Cat Scan performed on 4/13/2022 at  [address] [place] [address] that was requested by Syble Saint, [address]  The study was reviewed by a radiologist, which is a physician who specializes in medical imaging  The radiologist issued a report describing his or her findings  In that report there was a finding that the radiologist felt warranted further discussion with your health care provider and that discussion would be beneficial to you  The results were sent to [address] [place] [address] on 04/18/2022 10:31 PM  We recommend that you contact [address] [place] [address] at [phone] or set up an appointment to discuss the results of the imaging test  If you have already heard from Syble Saint, [address] regarding the results of your study, you can disregard this letter  This letter is not meant to alarm you, but intended to encourage you to follow-up on your results with the provider that sent you for the imaging study  In addition, we have enclosed answers to frequently asked questions by other patients who have also received a letter to review results with their health care provider (see page two)  Thank you for choosing [facility] for your medical imaging needs  FREQUENTLY ASKED QUESTIONS    1  Why am I receiving this letter? [address] [place] [address] requires us to notify patients who have findings on imaging exams that may require more testing or follow-up with a health professional within the next 3 months          2  How serious is the finding on the imaging test?  This letter is sent to all patients who may need follow-up or more testing within the next 3 months  Receiving this letter does not necessarily mean you have a life-threatening imaging finding or disease  Recommendations in the radiologists imaging report are general in nature and it is up to your healthcare provider to say whether those recommendations make sense for your situation  You are strongly encouraged to talk to your health care provider about the results and ask whether additional steps need to be taken  3  Where can I get a copy of the final report for my recent radiology exam?  To get a full copy of the report you can access your records online at [email or web address] or please contact [address] [place] [address] Records Department at [phone] Monday through Friday between 8 am and 6 pm          4  What do I need to do now? Please contact your health care provider who requested the imaging study to discuss what further actions (if any) are needed  You may have already heard from (your ordering provider) in regard to this test in which case you can disregard this letter  NOTICE IN ACCORDANCE WITH THE [facility] PATIENT TEST RESULT INFORMATION ACT OF 2018    You are receiving this notice as a result of a determination by your diagnostic imaging service that further discussions of your test results are warranted and would be beneficial to you  The complete results of your test or tests have been or will be sent to the health care practitioner that ordered the test or tests  It is recommended that you contact your health care practitioner to discuss your results as soon as possible

## 2022-04-13 NOTE — TELEPHONE ENCOUNTER
Called and spoke with patients daughter  She was made aware patient is currently taking methenamine as ordered and that prescription for vitamin c was faxed to [facility] for patient to begin taking  Daughter thankful for the call

## 2022-04-19 ENCOUNTER — TELEPHONE (OUTPATIENT)
Dept: GERIATRICS | Age: 82
End: 2022-04-19

## 2022-04-19 ENCOUNTER — TELEPHONE (OUTPATIENT)
Dept: UROLOGY | Facility: CLINIC | Age: 82
End: 2022-04-19

## 2022-04-19 ENCOUNTER — TELEPHONE (OUTPATIENT)
Dept: GERIATRICS | Facility: OTHER | Age: 82
End: 2022-04-19

## 2022-04-19 NOTE — TELEPHONE ENCOUNTER
Patient's son, Prudence Yung ([phone]) called with a couple of questions  1  While patient was in the hospital she was put on anti-psychic medications  The family would like to discuss discontinuing these meds  2  Recent lab results showed the patient's blood clotting factor is out of range  What can be done about this?

## 2022-04-19 NOTE — RESULT ENCOUNTER NOTE
Please let patient know that there was an area of questionable focal think getting in her bladder  The recommendation would be a cystoscopy if they would like to proceed with this to further evaluate    I am not sure if she would tolerate this due to her confusion though

## 2022-04-19 NOTE — TELEPHONE ENCOUNTER
Called and spoke with patients daughter Carlitos Madrigal in regards to patients CT scan results and recommendation for cystoscopy  Daughter not interested in proceeding with cysto at this time as patient has dementia and is currently on 2 new antipsychotic medications  Daughter does not think patient will tolerate procedure at this time  Daughter requested patient be scheduled for 3 month f/u virtual visit as recommended at last visit  She requested I contact \[facility]\"" to arrange appointment  Called and spoke with [facility] and patient was scheduled for virtual visit on 6/29/22 @ 11:00 with Andrae Jordan  [facility] requested our office contact them for virtual visit  Number listed in appointment notes  Daughter Carlitos Madrigal stated she would speak with [facility] to get appointment date and time and she would rearrange her schedule to ensure she is available for visit

## 2022-04-20 NOTE — TELEPHONE ENCOUNTER
I had a phone conversation with pt's son per his request  Son was questioning if pt is on Trazodone, Seroquel  and Zoloft as he has read in an article that they would work the best for frontotemporal dementia  I confirmed that pt is already on those meds  I also informed son that pt still continues with yelling episodes but when anyone talks to her she gets quite, and pt has been getting out of bed by staff and sometimes she is brought to the activity room and has been doing ok  Son didn't want pt to be on Zyprexa and stated that "we want her to be taken off of it"  I explained that with pt's current condition and the fact that she was just recently  discharged from the hospital with starting on Zyprexa, it is not in pt's best benefit to stop it, and it is better to have it continued for few more days and see how pt does and if decided to discontinue the medication we will do it gradually   Son agreed, and stated that he would f/u with staff next week

## 2022-04-21 NOTE — TELEPHONE ENCOUNTER
Christopher Turcios patient - Pollo Nathaniel from [address] [facility] [facility] ([phone]) RTRN'd my call to say yes please just send orders saying that we dced the cholestyramine And contact precautions? She also wanted to know if patient needs a F/U appointment?   Thx

## 2022-04-26 ENCOUNTER — TELEPHONE (OUTPATIENT)
Dept: NEUROLOGY | Facility: CLINIC | Age: 82
End: 2022-04-26

## 2022-04-29 ENCOUNTER — TELEPHONE (OUTPATIENT)
Dept: NEUROLOGY | Facility: CLINIC | Age: 82
End: 2022-04-29

## 2022-04-29 ENCOUNTER — TELEPHONE (OUTPATIENT)
Dept: GERIATRICS | Age: 82
End: 2022-04-29

## 2022-04-29 NOTE — TELEPHONE ENCOUNTER
Patient's son, Blessing Shah ([phone]) would like the provider to call him back to discuss patient's medications

## 2022-04-29 NOTE — TELEPHONE ENCOUNTER
Eric'd call from [address]  Pt is scheduled on 5/4/22 with Dr Annia Andrews on 3:30 pm at the [facility] office  Nursing facility says they won't have transport to [facility] office as they are in [facility]  Facility is asking if patient can be rescheduled to an office closer to [facility]? Either [facility], [address] [place] [address]  This is a new patient consult visit for Dementia  Please assist in Rescheduling patient  Call Leon Marquez from [address] # [phone] to reschedule  Thank you!

## 2022-05-02 ENCOUNTER — TELEPHONE (OUTPATIENT)
Dept: NEUROLOGY | Facility: CLINIC | Age: 82
End: 2022-05-02

## 2022-05-02 NOTE — TELEPHONE ENCOUNTER
Attempted to call Jose Humphrey  Phone rang on and on  Instantly transferred to 3rd floor charge nurse  Nurse transferred me back  After being transferred back to charge nurse, nurse disconnected phone call after stating it was me again  Will attempt to call Jose Humphrey again later

## 2022-05-02 NOTE — TELEPHONE ENCOUNTER
LVM for patient to call office to reschedule appointment with provider as Dr Verito Caldwell has to leave early on May 4th  We currently have May 10th availability with Ariella and Dr Dewayne Muse

## 2022-05-03 ENCOUNTER — TELEPHONE (OUTPATIENT)
Dept: OTHER | Facility: OTHER | Age: 82
End: 2022-05-03

## 2022-05-03 NOTE — TELEPHONE ENCOUNTER
Sid Zamora returned phone call  Explained that [facility] sees for dementia now  Sid Zamora verbalized understanding

## 2022-05-05 ENCOUNTER — NURSING HOME VISIT (OUTPATIENT)
Dept: GERIATRICS | Facility: OTHER | Age: 82
End: 2022-05-05
Payer: COMMERCIAL

## 2022-05-05 DIAGNOSIS — I48.21 PERMANENT ATRIAL FIBRILLATION (HCC): ICD-10-CM

## 2022-05-05 DIAGNOSIS — G31.09 BEHAVIORAL VARIANT FRONTOTEMPORAL DEMENTIA (HCC): Chronic | ICD-10-CM

## 2022-05-05 DIAGNOSIS — I50.32 CHRONIC HEART FAILURE WITH PRESERVED EJECTION FRACTION (HCC): ICD-10-CM

## 2022-05-05 DIAGNOSIS — I15.8 OTHER SECONDARY HYPERTENSION: ICD-10-CM

## 2022-05-05 DIAGNOSIS — F02.81 BEHAVIORAL VARIANT FRONTOTEMPORAL DEMENTIA (HCC): Chronic | ICD-10-CM

## 2022-05-05 DIAGNOSIS — R31.0 GROSS HEMATURIA: Primary | ICD-10-CM

## 2022-05-05 PROCEDURE — 99309 SBSQ NF CARE MODERATE MDM 30: CPT | Performed by: FAMILY MEDICINE

## 2022-05-05 RX ORDER — QUETIAPINE FUMARATE 25 MG/1
25 TABLET, FILM COATED ORAL EVERY MORNING
COMMUNITY
End: 2022-07-19 | Stop reason: ALTCHOICE

## 2022-05-05 NOTE — PROGRESS NOTES
[address]  [phone]  Facility: [facility]/          NAME: Raquel Fitzgerald  AGE: [de-identified] y o  SEX: female    DATE OF ENCOUNTER: 5/5/2022    Chief Complaint     Pt has no specific complaint     History of Present Illness     HPI    The following portions of the patient's history were reviewed and updated as appropriate (from facility chart and hospital records): allergies, current medications, past family history, past medical history, past social history, past surgical history and problem list  Pt was seen and examined for f/u on FTLD, dysphagia, Afib, HTN, and CHF  BP today at lower side but otherwise has been stable  Wt stable  Pt continues with behaviors and yelling, has been having some Hematuria last few days, HR controlled  No sign or symptoms of CHF exacerbation  Meal completion good  Per nursing aid report pt continues with delusional thought and some hallucinations of seeing animals in the room or talking to some people  Pt had a fall on 5/3 with no reported injury when she was found on the floor on her stomach off her WC  Pt's Zyprexa was DC'd on 5/3 with staff report of pt's constant yelling and not sleeping the night before , and small dose of Seroquel was added for am dose  Pt has a CT renal at end of April which was ordered by [facility] urology, and daughter has made a second opinion with  urology  I have received a letter from daughter through [facility] [address] from our office, with being concern of pt's antipsychotic treatment and requesting mom to be taken off Seroquel and Zyprexa       Review of Systems     Review of Systems   Unable to perform ROS: Dementia   Musculoskeletal:        Pt denies having any pain        Active Problem List     Patient Active Problem List   Diagnosis    Atrial fibrillation ([address] )    Hypertension    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[address] )    PAD (peripheral artery disease) ([address] )    Morbid obesity due to excess calories ([facility] )    Acute encephalopathy    COPD (chronic obstructive pulmonary disease) (HCC)    Overactive bladder    Hypokalemia    Moderate episode of recurrent major depressive disorder (HCC)    Chronic pain    Physical deconditioning    Bipolar affective disorder, currently manic, moderate ([facility])    (HFpEF) heart failure with preserved ejection fraction ([facility])    Pacemaker    Frequent UTI    Dementia ([facility])    Generalized weakness    Fungal infection of the groin    Anxiety    C  difficile diarrhea    Sleep apnea    Ambulatory dysfunction    Gross hematuria    Acute metabolic encephalopathy    UTI (urinary tract infection)    Bacteria in urine    Oral phase dysphagia       Objective     Vitals: Reviewed     Physical Exam  Vitals and nursing note reviewed  Constitutional:       General: She is not in acute distress  Appearance: She is well-developed  She is obese  She is not ill-appearing, toxic-appearing or diaphoretic  HENT:      Head: Normocephalic and atraumatic  Right Ear: External ear normal       Left Ear: External ear normal       Ears:      Comments: [place]     Nose: Nose normal  No congestion  Eyes:      General: No scleral icterus  Right eye: No discharge  Left eye: No discharge  Extraocular Movements: Extraocular movements intact  Conjunctiva/sclera: Conjunctivae normal       Pupils: Pupils are equal, round, and reactive to light  Cardiovascular:      Rate and Rhythm: Normal rate  Rhythm irregular  Heart sounds: Normal heart sounds  No murmur heard  No friction rub  No gallop  Pulmonary:      Effort: Pulmonary effort is normal  No respiratory distress  Breath sounds: Normal breath sounds  No stridor  No wheezing, rhonchi or rales  Chest:      Chest wall: No tenderness  Abdominal:      General: Bowel sounds are normal  There is no distension  Palpations: Abdomen is soft  There is no mass  Tenderness:  There is no abdominal tenderness  There is no guarding or rebound  Hernia: No hernia is present  Comments: Protuberant    Genitourinary:     Comments: Deferred  Musculoskeletal:         General: No swelling, tenderness, deformity or signs of injury  Cervical back: Normal range of motion and neck supple  No rigidity or tenderness  Right lower leg: No edema  Left lower leg: No edema  Comments: Sitting, in bed, limited ROM   Lymphadenopathy:      Cervical: No cervical adenopathy  Skin:     General: Skin is warm and dry  Coloration: Skin is not jaundiced or pale  Findings: No bruising, erythema, lesion or rash  Comments: Didn't examine sacral area  Neurological:      Cranial Nerves: Cranial nerve deficit present  Comments: Difficult with pt's dementia, not oriented, forgetful  Follows commands  Psychiatric:      Comments: Delusional thought processing         Pertinent Laboratory/Diagnostic Studies:  5/3: CBC, 4/19: CMP    Current Medications   Medication list in facility chart was reviewed and necessary changes made  Assessment and Plan   Gross hematuria  Recurrent, will hold Eliquis for 3 days  Pt will have urology appointment per daughter arrangment which was reschedule for later this month (due to transport staffing), recent CT done in April  CT result as : There is irregular mucosal thickening within the bladder and mild perivesicular stranding  Findings likely on the the basis of chronic cystitis  However, acute on chronic cystitis is not definitely excluded        Area of questionable focal bladder wall thickening at the region of the right ureterovesicular junction (6/135)  This could be due to nondistention, however possibility of underlying mucosal lesion is not excluded  Consider genitourinary consultation   Mirian Celis f/u on urology recommendation, will continue with monitoring  will continue with Estrogen vaginal cream, and Methenamine hippurate       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[facility] )  Off Zyprexa, on increased dose of seroquel  Will continue with monitoring  (HFpEF) heart failure with preserved ejection fraction (HCC)  No exacerbation, will continue with fluid restriction and lasix  Last lab done in April  Hypertension  BP at lower side today but overall stable  On Amlodipine,Losartan  Atrial fibrillation ([facility] )  On Eliquis, will hold medication for 3 days  Will continue with monitoring         Eric Alvarado MD  6/5/15075:31 PM

## 2022-05-05 NOTE — ASSESSMENT & PLAN NOTE
Recurrent, will hold Eliquis for 3 days  Pt will have urology appointment per daughter arrangment which was reschedule for later this month (due to transport staffing), recent CT done in April  CT result as : There is irregular mucosal thickening within the bladder and mild perivesicular stranding  Findings likely on the the basis of chronic cystitis  However, acute on chronic cystitis is not definitely excluded        Area of questionable focal bladder wall thickening at the region of the right ureterovesicular junction (6/135)  This could be due to nondistention, however possibility of underlying mucosal lesion is not excluded  Consider genitourinary consultation   Chloé Davis f/u on urology recommendation, will continue with monitoring      will continue with Estrogen vaginal cream, and Methenamine hippurate

## 2022-05-11 ENCOUNTER — HOSPITAL ENCOUNTER (EMERGENCY)
Facility: HOSPITAL | Age: 82
Discharge: NURSING, REHAB OR LONG-TERM CARE | End: 2022-05-12
Attending: EMERGENCY MEDICINE | Admitting: EMERGENCY MEDICINE
Payer: COMMERCIAL

## 2022-05-11 ENCOUNTER — APPOINTMENT (EMERGENCY)
Dept: RADIOLOGY | Facility: HOSPITAL | Age: 82
End: 2022-05-11
Payer: COMMERCIAL

## 2022-05-11 DIAGNOSIS — R46.89 AGGRESSIVE BEHAVIOR: Primary | ICD-10-CM

## 2022-05-11 DIAGNOSIS — N39.0 UTI (URINARY TRACT INFECTION): ICD-10-CM

## 2022-05-11 DIAGNOSIS — R07.9 CHEST PAIN: ICD-10-CM

## 2022-05-11 DIAGNOSIS — F03.90 DEMENTIA WITHOUT BEHAVIORAL DISTURBANCE, UNSPECIFIED DEMENTIA TYPE (HCC): ICD-10-CM

## 2022-05-11 LAB
2HR DELTA HS TROPONIN: -1 NG/L
ALBUMIN SERPL BCP-MCNC: 3.1 G/DL (ref 3.5–5)
ALP SERPL-CCNC: 70 U/L (ref 46–116)
ALT SERPL W P-5'-P-CCNC: 13 U/L (ref 12–78)
ANION GAP SERPL CALCULATED.3IONS-SCNC: 8 MMOL/L (ref 4–13)
AST SERPL W P-5'-P-CCNC: 21 U/L (ref 5–45)
ATRIAL RATE: 258 BPM
BACTERIA UR QL AUTO: ABNORMAL /HPF
BASOPHILS # BLD AUTO: 0.03 THOUSANDS/ΜL (ref 0–0.1)
BASOPHILS NFR BLD AUTO: 0 % (ref 0–1)
BILIRUB SERPL-MCNC: 0.35 MG/DL (ref 0.2–1)
BILIRUB UR QL STRIP: NEGATIVE
BUN SERPL-MCNC: 16 MG/DL (ref 5–25)
CALCIUM ALBUM COR SERPL-MCNC: 10.1 MG/DL (ref 8.3–10.1)
CALCIUM SERPL-MCNC: 9.4 MG/DL (ref 8.3–10.1)
CARDIAC TROPONIN I PNL SERPL HS: 12 NG/L
CARDIAC TROPONIN I PNL SERPL HS: 13 NG/L
CHLORIDE SERPL-SCNC: 103 MMOL/L (ref 100–108)
CLARITY UR: ABNORMAL
CO2 SERPL-SCNC: 30 MMOL/L (ref 21–32)
COLOR UR: YELLOW
CREAT SERPL-MCNC: 1.14 MG/DL (ref 0.6–1.3)
EOSINOPHIL # BLD AUTO: 0.18 THOUSAND/ΜL (ref 0–0.61)
EOSINOPHIL NFR BLD AUTO: 2 % (ref 0–6)
ERYTHROCYTE [DISTWIDTH] IN BLOOD BY AUTOMATED COUNT: 14.5 % (ref 11.6–15.1)
GFR SERPL CREATININE-BSD FRML MDRD: 45 ML/MIN/1.73SQ M
GLUCOSE SERPL-MCNC: 107 MG/DL (ref 65–140)
GLUCOSE UR STRIP-MCNC: NEGATIVE MG/DL
HCT VFR BLD AUTO: 40.9 % (ref 34.8–46.1)
HGB BLD-MCNC: 12.6 G/DL (ref 11.5–15.4)
HGB UR QL STRIP.AUTO: ABNORMAL
IMM GRANULOCYTES # BLD AUTO: 0.05 THOUSAND/UL (ref 0–0.2)
IMM GRANULOCYTES NFR BLD AUTO: 1 % (ref 0–2)
KETONES UR STRIP-MCNC: NEGATIVE MG/DL
LEUKOCYTE ESTERASE UR QL STRIP: ABNORMAL
LYMPHOCYTES # BLD AUTO: 1.9 THOUSANDS/ΜL (ref 0.6–4.47)
LYMPHOCYTES NFR BLD AUTO: 23 % (ref 14–44)
MCH RBC QN AUTO: 27.2 PG (ref 26.8–34.3)
MCHC RBC AUTO-ENTMCNC: 30.8 G/DL (ref 31.4–37.4)
MCV RBC AUTO: 88 FL (ref 82–98)
MONOCYTES # BLD AUTO: 0.57 THOUSAND/ΜL (ref 0.17–1.22)
MONOCYTES NFR BLD AUTO: 7 % (ref 4–12)
NEUTROPHILS # BLD AUTO: 5.68 THOUSANDS/ΜL (ref 1.85–7.62)
NEUTS SEG NFR BLD AUTO: 67 % (ref 43–75)
NITRITE UR QL STRIP: POSITIVE
NON-SQ EPI CELLS URNS QL MICRO: ABNORMAL /HPF
NRBC BLD AUTO-RTO: 0 /100 WBCS
PH UR STRIP.AUTO: 5.5 [PH]
PLATELET # BLD AUTO: 179 THOUSANDS/UL (ref 149–390)
PMV BLD AUTO: 10.1 FL (ref 8.9–12.7)
POTASSIUM SERPL-SCNC: 3.5 MMOL/L (ref 3.5–5.3)
PROT SERPL-MCNC: 7.3 G/DL (ref 6.4–8.2)
PROT UR STRIP-MCNC: NEGATIVE MG/DL
QRS AXIS: 66 DEGREES
QRSD INTERVAL: 94 MS
QT INTERVAL: 370 MS
QTC INTERVAL: 402 MS
RBC # BLD AUTO: 4.63 MILLION/UL (ref 3.81–5.12)
RBC #/AREA URNS AUTO: ABNORMAL /HPF
SODIUM SERPL-SCNC: 141 MMOL/L (ref 136–145)
SP GR UR STRIP.AUTO: 1.02 (ref 1–1.03)
T WAVE AXIS: 266 DEGREES
UROBILINOGEN UR QL STRIP.AUTO: 0.2 E.U./DL
VENTRICULAR RATE: 71 BPM
WBC # BLD AUTO: 8.41 THOUSAND/UL (ref 4.31–10.16)
WBC #/AREA URNS AUTO: ABNORMAL /HPF

## 2022-05-11 PROCEDURE — 87077 CULTURE AEROBIC IDENTIFY: CPT

## 2022-05-11 PROCEDURE — 93005 ELECTROCARDIOGRAM TRACING: CPT

## 2022-05-11 PROCEDURE — 71045 X-RAY EXAM CHEST 1 VIEW: CPT

## 2022-05-11 PROCEDURE — 81001 URINALYSIS AUTO W/SCOPE: CPT

## 2022-05-11 PROCEDURE — 84484 ASSAY OF TROPONIN QUANT: CPT

## 2022-05-11 PROCEDURE — 99285 EMERGENCY DEPT VISIT HI MDM: CPT

## 2022-05-11 PROCEDURE — 80053 COMPREHEN METABOLIC PANEL: CPT

## 2022-05-11 PROCEDURE — 36415 COLL VENOUS BLD VENIPUNCTURE: CPT

## 2022-05-11 PROCEDURE — 96372 THER/PROPH/DIAG INJ SC/IM: CPT

## 2022-05-11 PROCEDURE — 85025 COMPLETE CBC W/AUTO DIFF WBC: CPT

## 2022-05-11 PROCEDURE — 87186 SC STD MICRODIL/AGAR DIL: CPT

## 2022-05-11 PROCEDURE — 93010 ELECTROCARDIOGRAM REPORT: CPT | Performed by: INTERNAL MEDICINE

## 2022-05-11 PROCEDURE — 87181 SC STD AGAR DILUTION PER AGT: CPT

## 2022-05-11 PROCEDURE — 87086 URINE CULTURE/COLONY COUNT: CPT

## 2022-05-11 RX ORDER — CEPHALEXIN 500 MG/1
500 CAPSULE ORAL EVERY 6 HOURS SCHEDULED
Qty: 28 CAPSULE | Refills: 0 | Status: SHIPPED | OUTPATIENT
Start: 2022-05-11 | End: 2022-05-18

## 2022-05-11 RX ORDER — LORAZEPAM 1 MG/1
1 TABLET ORAL ONCE
Status: DISCONTINUED | OUTPATIENT
Start: 2022-05-11 | End: 2022-05-11

## 2022-05-11 RX ORDER — OLANZAPINE 10 MG/1
2.5 INJECTION, POWDER, LYOPHILIZED, FOR SOLUTION INTRAMUSCULAR ONCE
Status: COMPLETED | OUTPATIENT
Start: 2022-05-11 | End: 2022-05-11

## 2022-05-11 RX ORDER — GABAPENTIN 300 MG/1
600 CAPSULE ORAL 3 TIMES DAILY
Status: DISCONTINUED | OUTPATIENT
Start: 2022-05-11 | End: 2022-05-12 | Stop reason: HOSPADM

## 2022-05-11 RX ORDER — QUETIAPINE FUMARATE 25 MG/1
50 TABLET, FILM COATED ORAL
Status: COMPLETED | OUTPATIENT
Start: 2022-05-11 | End: 2022-05-11

## 2022-05-11 RX ORDER — QUETIAPINE FUMARATE 25 MG/1
50 TABLET, FILM COATED ORAL
Status: DISCONTINUED | OUTPATIENT
Start: 2022-05-11 | End: 2022-05-11

## 2022-05-11 RX ORDER — LORAZEPAM 2 MG/ML
1 INJECTION INTRAMUSCULAR ONCE
Status: COMPLETED | OUTPATIENT
Start: 2022-05-11 | End: 2022-05-11

## 2022-05-11 RX ADMIN — QUETIAPINE FUMARATE 50 MG: 25 TABLET ORAL at 20:51

## 2022-05-11 RX ADMIN — GABAPENTIN 600 MG: 300 CAPSULE ORAL at 20:51

## 2022-05-11 RX ADMIN — WATER: 1 INJECTION INTRAMUSCULAR; INTRAVENOUS; SUBCUTANEOUS at 23:45

## 2022-05-11 RX ADMIN — OLANZAPINE 2.5 MG: 10 INJECTION, POWDER, LYOPHILIZED, FOR SOLUTION INTRAMUSCULAR at 23:44

## 2022-05-11 RX ADMIN — APIXABAN 5 MG: 5 TABLET, FILM COATED ORAL at 20:51

## 2022-05-11 RX ADMIN — LORAZEPAM 1 MG: 2 INJECTION INTRAMUSCULAR; INTRAVENOUS at 22:32

## 2022-05-11 RX ADMIN — TRAZODONE HYDROCHLORIDE 150 MG: 50 TABLET ORAL at 20:51

## 2022-05-11 NOTE — DISCHARGE INSTRUCTIONS
Please have geriatric practitioner see her regarding possible increase in Lasix dose due to CXR performed here

## 2022-05-11 NOTE — ED NOTES
[facility] contacted for transportation back to nursing home     [address] [place] [address]  05/11/22 8690

## 2022-05-11 NOTE — ED PROVIDER NOTES
History  Chief Complaint   Patient presents with    Aggressive Behavior     Pt brought in by EMS after coming here from a home with aggressive behavior  Per EMS pt was hitting staff and other pt  Pt also was said to be throwing objects  Pt denies cp/sob/n/v/d or fevers     [de-identified] YOF with PMH atrial fibrillation, frontal temporal dementia, recurrent UTI, recurrent history of C diff presented from [facility] with aggressive behavior per staff  Spoke with staff member who stated that patient has been throwing objects and hitting staff as well as other residents  Staff member states that patient is a fall risk and so she tends to stay in bed most of the day however when they bring her out and sit her in the hallway she will try and kick whoever walks by  Per staff, pt was on Seroquel and Zyprexa however Zyprexa was d/c on 5/3 due to pt not sleeping the night before and Seroquel was stopped due to daughter's request of pt to be off of all antipsychotics  Eliquis was recently held for 3 days on 5/5 due to hematuria  Spoke with daughter who stated that patient has had recurrent UTIs, some treated, some not  Daughter reports that about 10 months ago patient was at baseline, able to use the phone, iPad as well as interact appropriately with people  However she states a UTI is ultimately what caused her mental status changes  Daughter reports that she tends to have a change in mental status when she has a UTI  Daughter reports that patient does follow with Urology  4/13/22 CT Renal stone: There is irregular mucosal thickening within the bladder and mild perivesicular stranding  Findings likely on the the basis of chronic cystitis  However, acute on chronic cystitis is not definitely excluded  Area of questionable focal bladder wall thickening at the region of the right ureterovesicular junction (6/135)  This could be due to nondistention, however possibility of underlying mucosal lesion is not excluded  Consider genitourinary consultation  Prior to Admission Medications   Prescriptions Last Dose Informant Patient Reported? Taking?    ALPRAZolam (XANAX) 1 mg tablet  Self Yes No   Sig: Take 0 5 mg by mouth 3 (three) times a day Hold for sedation and lethargy     Baclofen 5 MG TABS  Self No No   Sig: TAKE 1 TABLET EVERY MORNING   Calcium Ascorbate (VITAMIN C) 500 mg tablet   No No   Sig: Take 1 tablet (500 mg total) by mouth 2 (two) times a day   Disposable Gloves (Latex Gloves) MISC  Self No No   Sig: Use 4 (four) times a day as needed (cleaning) XLARGE   Eliquis 5 MG  Self No No   Sig: TAKE 1 TABLET BY MOUTH TWICE A DAY   Incontinence Supply Disposable (PROCare Bariatric Briefs) MISC  Self No No   Sig: Use every 2 (two) hours as needed (incontinence)   Incontinence Supply Disposable (SAPS health Incontinence Pads) MISC  Self No No   Sig: Use 1 each every 2 (two) hours   Incontinence Supply Disposable (Wings Adult Briefs XXL) MISC  Self No No   Sig: Use every 2 (two) hours   Infant Care Products (Baby Wipes) MISC  Self No No   Sig: Use every 2 (two) hours   QUEtiapine (SEROquel) 25 mg tablet   Yes No   Sig: Take 25 mg by mouth daily at bedtime I tab q am and I tab q pm    QUEtiapine (SEROquel) 50 mg tablet   No No   Sig: Take 1 tablet (50 mg total) by mouth daily at bedtime   amLODIPine (NORVASC) 10 mg tablet  Self No No   Sig: Take 1 tablet (10 mg total) by mouth daily   calcium carbonate-vitamin D (OSCAL-D) 500 mg-200 units per tablet  Self Yes No   Sig: Take 1 tablet by mouth 2 (two) times a day with meals     cholestyramine (QUESTRAN) 4 g packet  Self No No   Si packet mixed with 8 oz liquids and drink 4 times daily  Must be dosed 1 hour apart from other medications   conjugated estrogens (Premarin) vaginal cream   No No   Sig: Apply pea-sized amount to urethral opening and vaginal opening twice a week on Wednesday and  if she has no contraindications such as breast cancer or blood clots diphenhydrAMINE (BENADRYL) 50 mg capsule   No No   Sig: Take 1 capsule (50 mg total) by mouth 60 minutes pre-procedure for 1 dose Take 1 hour prior to contrast administration   ferrous sulfate 325 (65 Fe) mg tablet  Self No No   Sig: TAKE 1 TABLET BY MOUTH EVERY DAY WITH BREAKFAST   fluticasone (EQL Fluticasone Propionate) 50 mcg/act nasal spray  Self No No   Si spray into each nostril daily   furosemide (LASIX) 40 mg tablet   No No   Sig: Take 1 tablet (40 mg total) by mouth daily   gabapentin (NEURONTIN) 600 MG tablet  Self No No   Sig: TAKE 1 TABLET BY MOUTH THREE TIMES A DAY   latanoprost (XALATAN) 0 005 % ophthalmic solution  Self No No   Sig: Administer 1 drop to both eyes daily at bedtime   loratadine (CLARITIN) 10 mg tablet  Self No No   Sig: TAKE 1 TABLET BY MOUTH EVERY DAY   losartan (COZAAR) 25 mg tablet   No No   Sig: TAKE 1 TABLET (25 MG TOTAL) BY MOUTH DAILY     methenamine hippurate (HIPREX) 1 g tablet   No No   Sig: Take 1 tablet (1 g total) by mouth 2 (two) times a day with meals   methylPREDNISolone (MEDROL) 32 MG tablet   No No   Sig: Take 1 tablet (32 mg total) by mouth see administration instructions Take first dose 12 hours prior to contrast administration Take second dose 2 hours prior to contrast administration   nystatin (MYCOSTATIN) powder  Self No No   Sig: Apply topically 3 (three) times a day   pantoprazole (PROTONIX) 40 mg tablet   No No   Sig: TAKE 1 TABLET BY MOUTH DAILY BEFORE BREAKFAST   saccharomyces boulardii (FLORASTOR) 250 mg capsule   No No   Sig: Take 1 capsule (250 mg total) by mouth 2 (two) times a day   sertraline (ZOLOFT) 100 mg tablet  Self No No   Sig: TAKE 1 TABLET BY MOUTH EVERY DAY   traZODone (DESYREL) 100 mg tablet  Self No No   Sig: Take 1 5 tablets (150 mg total) by mouth daily at bedtime      Facility-Administered Medications: None       Past Medical History:   Diagnosis Date    A-fib ([facility] )     Acute on chronic diastolic heart failure ([facility] ) 3/6/2022    Anxiety     Chronic pain     Chronic respiratory failure with hypoxia (HCC)     COPD (chronic obstructive pulmonary disease) (HCC)     Dementia (HCC)     Dorsalgia, unspecified     Edema     Encephalopathy     GERD (gastroesophageal reflux disease)     Gross hematuria 3/31/2022    Hypertension     Morbid obesity (HCC)     Muscle weakness (generalized)     Opioid dependence (HCC)     Overactive bladder     Pneumonia     Positive blood culture 2022    Psychiatric disorder     anxiety, bipolar    Radiculopathy of thoracolumbar region     Sciatica     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 ([facility] )     Urinary incontinence     UTI (urinary tract infection)        Past Surgical History:   Procedure Laterality Date    APPENDECTOMY      BACK SURGERY      CHOLECYSTECTOMY      CYSTOSCOPY  2021    Dr Ferreira Leonard         History reviewed  No pertinent family history  I have reviewed and agree with the history as documented  E-Cigarette/Vaping    E-Cigarette Use Never User      E-Cigarette/Vaping Substances    Nicotine No     THC No     CBD No     Flavoring No     Other No     Unknown No      Social History     Tobacco Use    Smoking status: Former Smoker     Types: Cigarettes     Quit date: 1995     Years since quittin 5    Smokeless tobacco: Never Used   Vaping Use    Vaping Use: Never used   Substance Use Topics    Alcohol use: Never    Drug use: No       Review of Systems   Constitutional: Negative for chills and fever  HENT: Negative for ear pain and sore throat  Eyes: Negative for pain and visual disturbance  Respiratory: Negative for cough and shortness of breath  Cardiovascular: Positive for chest pain  Negative for palpitations  Gastrointestinal: Negative for abdominal pain and vomiting  Genitourinary: Negative for dysuria and hematuria     Musculoskeletal: Negative for arthralgias and back pain  Skin: Negative for color change and rash  Neurological: Negative for seizures and syncope  All other systems reviewed and are negative  Physical Exam  Physical Exam  Vitals and nursing note reviewed  Constitutional:       General: She is not in acute distress  Appearance: She is well-developed  HENT:      Head: Normocephalic and atraumatic  Eyes:      Conjunctiva/sclera: Conjunctivae normal    Cardiovascular:      Rate and Rhythm: Normal rate and regular rhythm  Heart sounds: No murmur heard  Pulmonary:      Effort: Pulmonary effort is normal  No respiratory distress  Breath sounds: Normal breath sounds  Abdominal:      Palpations: Abdomen is soft  Tenderness: There is no abdominal tenderness  Musculoskeletal:      Cervical back: Neck supple  Skin:     General: Skin is warm and dry  Capillary Refill: Capillary refill takes less than 2 seconds  Neurological:      General: No focal deficit present  Mental Status: She is alert and oriented to person, place, and time     Psychiatric:         Mood and Affect: Mood normal          Behavior: Behavior normal          Vital Signs  ED Triage Vitals [05/11/22 1316]   Temperature Pulse Respirations Blood Pressure SpO2   98 2 °F (36 8 °C) 80 18 109/54 92 %      Temp Source Heart Rate Source Patient Position - Orthostatic VS BP Location FiO2 (%)   Oral Monitor Lying Left arm --      Pain Score       No Pain           Vitals:    05/11/22 1316 05/11/22 1716 05/11/22 1955   BP: 109/54 141/96 95/51   Pulse: 80 87 86   Patient Position - Orthostatic VS: Lying Lying          Visual Acuity      ED Medications  Medications - No data to display    Diagnostic Studies  Results Reviewed     Procedure Component Value Units Date/Time    HS Troponin I 2hr [[id number]]  (Normal) Collected: 05/11/22 1718    Lab Status: Final result Specimen: Blood from Arm, Left Updated: 05/11/22 1747     hs TnI 2hr 12 ng/L      Delta 2hr hsTnI -1 ng/L     Urine Microscopic [[id number]]  (Abnormal) Collected: 05/11/22 1715    Lab Status: Final result Specimen: Urine, Straight Cath Updated: 05/11/22 1745     RBC, UA 4-10 /hpf      WBC, UA Innumerable /hpf      Epithelial Cells Occasional /hpf      Bacteria, UA Innumerable /hpf     Urine culture [[id number]] Collected: 05/11/22 1715    Lab Status:  In process Specimen: Urine, Straight Cath Updated: 05/11/22 1745    UA w Reflex to Microscopic w Reflex to Culture [[id number]]  (Abnormal) Collected: 05/11/22 1715    Lab Status: Final result Specimen: Urine, Straight Cath Updated: 05/11/22 1724     Color, UA Yellow     Clarity, UA Cloudy     Specific [place], UA 1 020     pH, UA 5 5     Leukocytes, UA Large     Nitrite, UA Positive     Protein, UA Negative mg/dl      Glucose, UA Negative mg/dl      Ketones, UA Negative mg/dl      Urobilinogen, UA 0 2 E U /dl      Bilirubin, UA Negative     Blood, UA Large    Comprehensive metabolic panel [[id number]]  (Abnormal) Collected: 05/11/22 1453    Lab Status: Final result Specimen: Blood from Arm, Right Updated: 05/11/22 1555     Sodium 141 mmol/L      Potassium 3 5 mmol/L      Chloride 103 mmol/L      CO2 30 mmol/L      ANION GAP 8 mmol/L      BUN 16 mg/dL      Creatinine 1 14 mg/dL      Glucose 107 mg/dL      Calcium 9 4 mg/dL      Corrected Calcium 10 1 mg/dL      AST 21 U/L      ALT 13 U/L      Alkaline Phosphatase 70 U/L      Total Protein 7 3 g/dL      Albumin 3 1 g/dL      Total Bilirubin 0 35 mg/dL      eGFR 45 ml/min/1 73sq m     [facility]:      Alondra guidelines for Chronic Kidney Disease (CKD):     Stage 1 with normal or high GFR (GFR > 90 mL/min/1 73 square meters)    Stage 2 Mild CKD (GFR = 60-89 mL/min/1 73 square meters)    Stage 3A Moderate CKD (GFR = 45-59 mL/min/1 73 square meters)    Stage 3B Moderate CKD (GFR = 30-44 mL/min/1 73 square meters)    Stage 4 Severe CKD (GFR = 15-29 mL/min/1 73 square meters)    Stage 5 End Stage CKD (GFR <15 mL/min/1 73 square meters)  Note: GFR calculation is accurate only with a steady state creatinine    HS Troponin 0hr (reflex protocol) [[id number]]  (Normal) Collected: 05/11/22 1453    Lab Status: Final result Specimen: Blood from Arm, Right Updated: 05/11/22 1524     hs TnI 0hr 13 ng/L     CBC and differential [[id number]]  (Abnormal) Collected: 05/11/22 1453    Lab Status: Final result Specimen: Blood from Arm, Right Updated: 05/11/22 1458     WBC 8 41 Thousand/uL      RBC 4 63 Million/uL      Hemoglobin 12 6 g/dL      Hematocrit 40 9 %      MCV 88 fL      MCH 27 2 pg      MCHC 30 8 g/dL      RDW 14 5 %      MPV 10 1 fL      Platelets 507 Thousands/uL      nRBC 0 /100 WBCs      Neutrophils Relative 67 %      Immat GRANS % 1 %      Lymphocytes Relative 23 %      Monocytes Relative 7 %      Eosinophils Relative 2 %      Basophils Relative 0 %      Neutrophils Absolute 5 68 Thousands/µL      Immature Grans Absolute 0 05 Thousand/uL      Lymphocytes Absolute 1 90 Thousands/µL      Monocytes Absolute 0 57 Thousand/µL      Eosinophils Absolute 0 18 Thousand/µL      Basophils Absolute 0 03 Thousands/µL                  X-ray chest 1 view portable   Final Result by [place] Michelle DO (05/11 1545)      Mild to moderate pulmonary venous congestion  Possible small left pleural effusion                    Workstation performed: [id number]                    Procedures  ECG 12 Lead Documentation Only    Date/Time: 5/11/2022 2:02 PM  Performed by: Rigo Da Silva PA-C  Authorized by: Rigo Da Silva PA-C     ECG reviewed by me, the ED Provider: yes    Patient location:  ED  Previous ECG:     Previous ECG:  Compared to current    Similarity:  Changes noted  Interpretation:     Interpretation: abnormal    Rate:     ECG rate:  71    ECG rate assessment: normal    Rhythm:     Rhythm: atrial fibrillation    Ectopy:     Ectopy: aberrant    T waves:     T waves: inverted      Inverted:  V4, V5 and V6             ED Course  ED Course as of 05/11/22 2029   Wed May 11, 2022   1406 Spoke with RN at [facility]: pt extremely combative today- staff as well as other residents, fall risk, kicks anyone who comes near her, screaming and yelling if left alone  2222 [facility] with Vitaylivet Miller: she reports pt's mental status changes a lot with UTIs, has a significant history of treated and untreated UTIs  Has been told in the past to come to the ED but she always refuses  Does follow with urology  1532 hs TnI 0hr: 20   6742 X-ray chest 1 view portable  Mild to moderate pulmonary venous congestion  Possible small left pleural effusion   1558 Albumin(!): 3 1   1732 Nitrite, UA(!): Positive   1732 Leukocytes, UA(!): Large   1732 Blood, UA(!): Large   1755 Per last Urology note: do not recommend any further courses of abx unless pt has classic symptoms  1907 Spoke at length with daughter- discussed that I would not be discharging pt on antibiotics given her last Urology note  Advised her that we would call her with culture results and then we can adequately treat from there   [address] with son at length regarding why we are not sending patient home on antibiotics and why she is not being admitted  He expressed that they are at their last resort and would really like to at least try antibiotics  We discussed the risk of starting an antibiotic as well as the culture results coming back as resistant  However they are willing to accept that risk  Will discharge patient on Keflex for 7 days  2027 Orders put in for night medications from facility active med list         SBIRT 22yo+    Flowsheet Row Most Recent Value   SBIRT (25 yo +)    In order to provide better care to our patients, we are screening all of our patients for alcohol and drug use  Would it be okay to ask you these screening questions?  No Filed at: 05/11/2022 1530          MDM  Number of Diagnoses or Management Options  Aggressive behavior: established and worsening  Chest pain: new and requires workup  Dementia without behavioral disturbance, unspecified dementia type ([facility] ): established and worsening  UTI (urinary tract infection)  Diagnosis management comments: [de-identified] YOF with PMH atrial fibrillation, frontal temporal dementia, recurrent UTI, recurrent history of C diff presented from [facility] with aggressive behavior per staff  Upon arrival patient's only complaint was intermittent chest pain  Cbc and CMP were ordered which were both normal   UA showed signs of UTI which appeared to be chronic for her based upon all the UAs back until December  EKG showed new T-wave inversions  Trop 13, delta 1 at 2 hours  Chest x-ray showed mild-to-moderate pulmonary venous congestion with a possible small left pleural effusion  However patient is not having any symptoms of shortness breath or does not appear to have any difficulty breathing  I had a very lengthy discussion with the daughter regarding her care  I stated that based upon review of previous urology notes they have recommended not treating the UTI unless patient is symptomatic  Daughter argued that her change in mental status is her only symptom of a UTI  However [place] [facility] [place] state that there has not been an acute change in patient's mental status  Daughter was very pleasant to speak to and seem to care very much about her mother and was concerned about the UTI being untreated  We discussed acute on chronic UTIs as well as patient's diagnosis of dementia  I have recommended that we wait until the culture of her urine comes back to appropriately treat her  She is currently showing no signs of sepsis as her vital signs are normal and she does not have a white count  Daughter was in agreement to send patient back to [facility] and await on culture results  Daughter reports that she does have a follow-up with [facility] on the 23rd    I also discussed that a discussion should be had with geriatric practitioner who sees her at [facility] regarding increasing Lasix dose due to CXR performed here  As I was writing the note discharge the patient and I received notification that the son of the patient would like to speak to me regarding why we are not sending patient home on antibiotics  We did have a lengthy discussion that she is showing no signs of acute UTI such as elevated heart rate, temperature, change in mental status per nursing home  He stated that him and his sister were at the last resort and would just like to try a course of antibiotics to see if that would help patient's mental status  I discussed the risks of prescribing antibiotics were not indicated as well as the possibility of the culture coming back to resistant to Keflex  However they would like to accept these risks and would like me to discharge patient with Keflex which I agreed to do  I have discussed with the daughter and pt our plan to discharge them from the ED and they are in agreement with this plan  The patient was provided a written after visit summary with strict RTED precautions         Amount and/or Complexity of Data Reviewed  Clinical lab tests: ordered and reviewed  Tests in the radiology section of CPT®: ordered and reviewed  Decide to obtain previous medical records or to obtain history from someone other than the patient: yes  Obtain history from someone other than the patient: yes (Daughter, [facility])  Review and summarize past medical records: yes  Independent visualization of images, tracings, or specimens: yes        Disposition  Final diagnoses:   Aggressive behavior   Dementia without behavioral disturbance, unspecified dementia type ([facility] )   Chest pain   UTI (urinary tract infection)     Time reflects when diagnosis was documented in both MDM as applicable and the Disposition within this note     Time User Action Codes Description Comment    5/11/2022  5:55 PM Onel Garcia Add [R49 68] Aggressive behavior     5/11/2022  5:55 PM Coventry Uledi Add [F03 90] Dementia without behavioral disturbance, unspecified dementia type ([facility] )     5/11/2022  5:55 PM Coventry Uledi Add [R07 9] Chest pain     5/11/2022  7:46 PM Coventry Uledi Add [N39 0] UTI (urinary tract infection)       ED Disposition     ED Disposition   Discharge    Condition   Stable    Date/Time   Wed May 11, 2022  5:55 PM    Comment   Anabela Velasquez discharge to home/self care  Follow-up Information     Follow up With Specialties Details Why Contact Info Additional Information    [facility] Schedule an appointment as soon as possible for a visit   [address] [phone]       [address]  Emergency Department Emergency Medicine  If symptoms worsen [facility] [id number]  [address] [place] [address] Emergency Department, [address] [place] [address], [id number]          Patient's Medications   Discharge Prescriptions    CEPHALEXIN (KEFLEX) 500 MG CAPSULE    Take 1 capsule (500 mg total) by mouth every 6 (six) hours for 7 days       Start Date: 5/11/2022 End Date: 5/18/2022       Order Dose: 500 mg       Quantity: 28 capsule    Refills: 0       No discharge procedures on file      PDMP Review       Value Time User    PDMP Reviewed  Yes 2/9/2022 12:05 PM LUCY Arellano          ED Provider  Electronically Signed by           Slime Dewey PA-C  05/11/22 34 Caitlin Brower PA-C  05/11/22 2029

## 2022-05-12 VITALS
OXYGEN SATURATION: 94 % | SYSTOLIC BLOOD PRESSURE: 95 MMHG | RESPIRATION RATE: 18 BRPM | HEART RATE: 68 BPM | DIASTOLIC BLOOD PRESSURE: 51 MMHG | TEMPERATURE: 98.2 F | BODY MASS INDEX: 41.99 KG/M2 | WEIGHT: 260.14 LBS

## 2022-05-12 RX ORDER — OLANZAPINE 10 MG/1
2.5 INJECTION, POWDER, LYOPHILIZED, FOR SOLUTION INTRAMUSCULAR ONCE
Status: DISCONTINUED | OUTPATIENT
Start: 2022-05-12 | End: 2022-05-12 | Stop reason: HOSPADM

## 2022-05-12 NOTE — ED NOTES
[facility] updated with results and prescriptions for patient   Spoke to staff member at [address], RN  05/11/22 5995

## 2022-05-14 LAB
BACTERIA UR CULT: ABNORMAL
BACTERIA UR CULT: ABNORMAL

## 2022-05-18 ENCOUNTER — TELEPHONE (OUTPATIENT)
Dept: GERIATRICS | Age: 82
End: 2022-05-18

## 2022-05-18 NOTE — TELEPHONE ENCOUNTER
Daughter Oneida Sylvesteryers called regarding her mother Zelalem Rose  1940  Patient has been screaming and would like to stop the seroquel  Please call daughter [phone]

## 2022-06-10 ENCOUNTER — TELEPHONE (OUTPATIENT)
Dept: GERIATRICS | Age: 82
End: 2022-06-10

## 2022-06-10 NOTE — TELEPHONE ENCOUNTER
LUCY Jackson with [facility] ([phone]) called to schedule a geriatric assessment  Patient is in skilled nursing with no expectation of discharge  Explained that the patient would at least need to be in independent living to be seen by this practice  Suggested [facility] may be able to accommodate an appointment  Caller became annoyed that she is unable to obtain care for the patient  I offered to give details to the practice administrator for consideration

## 2022-06-12 NOTE — CASE MANAGEMENT
[address] [place] [address] Rd , [address] for Juan Pablo Casanova, was in to assess this pt and is agreeable to have her return on 9/28  Yes

## 2022-06-13 ENCOUNTER — HOSPITAL ENCOUNTER (EMERGENCY)
Facility: HOSPITAL | Age: 82
Discharge: NURSING, REHAB OR LONG-TERM CARE | End: 2022-06-14
Attending: EMERGENCY MEDICINE
Payer: COMMERCIAL

## 2022-06-13 DIAGNOSIS — F02.81 BEHAVIORAL VARIANT FRONTOTEMPORAL DEMENTIA (HCC): Chronic | ICD-10-CM

## 2022-06-13 DIAGNOSIS — G31.09 BEHAVIORAL VARIANT FRONTOTEMPORAL DEMENTIA (HCC): Chronic | ICD-10-CM

## 2022-06-13 DIAGNOSIS — N39.0 UTI (URINARY TRACT INFECTION): ICD-10-CM

## 2022-06-13 DIAGNOSIS — Z00.8 ENCOUNTER FOR PSYCHOLOGICAL EVALUATION: Primary | ICD-10-CM

## 2022-06-13 LAB
ALBUMIN SERPL BCP-MCNC: 3.5 G/DL (ref 3–5.2)
ALP SERPL-CCNC: 69 U/L (ref 43–122)
ALT SERPL W P-5'-P-CCNC: 13 U/L
AMPHETAMINES SERPL QL SCN: NEGATIVE
ANION GAP SERPL CALCULATED.3IONS-SCNC: 7 MMOL/L (ref 5–14)
AST SERPL W P-5'-P-CCNC: 27 U/L (ref 14–36)
BACTERIA UR QL AUTO: ABNORMAL /HPF
BARBITURATES UR QL: NEGATIVE
BASOPHILS # BLD AUTO: 0.03 THOUSANDS/ΜL (ref 0–0.1)
BASOPHILS NFR BLD AUTO: 1 % (ref 0–1)
BENZODIAZ UR QL: POSITIVE
BILIRUB SERPL-MCNC: 0.37 MG/DL
BILIRUB UR QL STRIP: NEGATIVE
BUN SERPL-MCNC: 12 MG/DL (ref 5–25)
CALCIUM SERPL-MCNC: 9.2 MG/DL (ref 8.4–10.2)
CHLORIDE SERPL-SCNC: 107 MMOL/L (ref 97–108)
CLARITY UR: ABNORMAL
CO2 SERPL-SCNC: 27 MMOL/L (ref 22–30)
COCAINE UR QL: NEGATIVE
COLOR UR: ABNORMAL
CREAT SERPL-MCNC: 0.87 MG/DL (ref 0.6–1.2)
EOSINOPHIL # BLD AUTO: 0.18 THOUSAND/ΜL (ref 0–0.61)
EOSINOPHIL NFR BLD AUTO: 3 % (ref 0–6)
ERYTHROCYTE [DISTWIDTH] IN BLOOD BY AUTOMATED COUNT: 15.6 % (ref 11.6–15.1)
ETHANOL SERPL-MCNC: <10 MG/DL (ref 0–10)
FLUAV RNA RESP QL NAA+PROBE: NEGATIVE
FLUBV RNA RESP QL NAA+PROBE: NEGATIVE
GFR SERPL CREATININE-BSD FRML MDRD: 63 ML/MIN/1.73SQ M
GLUCOSE SERPL-MCNC: 112 MG/DL (ref 70–99)
GLUCOSE UR STRIP-MCNC: NEGATIVE MG/DL
HCT VFR BLD AUTO: 39.2 % (ref 34.8–46.1)
HGB BLD-MCNC: 12 G/DL (ref 11.5–15.4)
HGB UR QL STRIP.AUTO: 150
IMM GRANULOCYTES # BLD AUTO: 0.01 THOUSAND/UL (ref 0–0.2)
IMM GRANULOCYTES NFR BLD AUTO: 0 % (ref 0–2)
KETONES UR STRIP-MCNC: NEGATIVE MG/DL
LEUKOCYTE ESTERASE UR QL STRIP: 500
LYMPHOCYTES # BLD AUTO: 1.38 THOUSANDS/ΜL (ref 0.6–4.47)
LYMPHOCYTES NFR BLD AUTO: 22 % (ref 14–44)
MCH RBC QN AUTO: 26.7 PG (ref 26.8–34.3)
MCHC RBC AUTO-ENTMCNC: 30.6 G/DL (ref 31.4–37.4)
MCV RBC AUTO: 87 FL (ref 82–98)
METHADONE UR QL: NEGATIVE
MONOCYTES # BLD AUTO: 0.49 THOUSAND/ΜL (ref 0.17–1.22)
MONOCYTES NFR BLD AUTO: 8 % (ref 4–12)
NEUTROPHILS # BLD AUTO: 4.29 THOUSANDS/ΜL (ref 1.85–7.62)
NEUTS SEG NFR BLD AUTO: 66 % (ref 43–75)
NITRITE UR QL STRIP: POSITIVE
NON-SQ EPI CELLS URNS QL MICRO: ABNORMAL /HPF
NRBC BLD AUTO-RTO: 0 /100 WBCS
OPIATES UR QL SCN: NEGATIVE
OXYCODONE+OXYMORPHONE UR QL SCN: NEGATIVE
PCP UR QL: NEGATIVE
PH UR STRIP.AUTO: 5 [PH]
PLATELET # BLD AUTO: 175 THOUSANDS/UL (ref 149–390)
PMV BLD AUTO: 9.7 FL (ref 8.9–12.7)
POTASSIUM SERPL-SCNC: 4.1 MMOL/L (ref 3.6–5)
PROT SERPL-MCNC: 7.1 G/DL (ref 5.9–8.4)
PROT UR STRIP-MCNC: ABNORMAL MG/DL
RBC # BLD AUTO: 4.49 MILLION/UL (ref 3.81–5.12)
RBC #/AREA URNS AUTO: ABNORMAL /HPF
RSV RNA RESP QL NAA+PROBE: NEGATIVE
SARS-COV-2 RNA RESP QL NAA+PROBE: NEGATIVE
SODIUM SERPL-SCNC: 141 MMOL/L (ref 137–147)
SP GR UR STRIP.AUTO: 1.02 (ref 1–1.04)
THC UR QL: NEGATIVE
TSH SERPL DL<=0.05 MIU/L-ACNC: 0.98 UIU/ML (ref 0.45–4.5)
UROBILINOGEN UA: NEGATIVE MG/DL
WBC # BLD AUTO: 6.38 THOUSAND/UL (ref 4.31–10.16)
WBC #/AREA URNS AUTO: ABNORMAL /HPF

## 2022-06-13 PROCEDURE — 81003 URINALYSIS AUTO W/O SCOPE: CPT | Performed by: EMERGENCY MEDICINE

## 2022-06-13 PROCEDURE — 87077 CULTURE AEROBIC IDENTIFY: CPT | Performed by: EMERGENCY MEDICINE

## 2022-06-13 PROCEDURE — 36415 COLL VENOUS BLD VENIPUNCTURE: CPT | Performed by: EMERGENCY MEDICINE

## 2022-06-13 PROCEDURE — 80307 DRUG TEST PRSMV CHEM ANLYZR: CPT | Performed by: EMERGENCY MEDICINE

## 2022-06-13 PROCEDURE — 84443 ASSAY THYROID STIM HORMONE: CPT | Performed by: EMERGENCY MEDICINE

## 2022-06-13 PROCEDURE — 93005 ELECTROCARDIOGRAM TRACING: CPT

## 2022-06-13 PROCEDURE — 80053 COMPREHEN METABOLIC PANEL: CPT | Performed by: EMERGENCY MEDICINE

## 2022-06-13 PROCEDURE — 82077 ASSAY SPEC XCP UR&BREATH IA: CPT | Performed by: EMERGENCY MEDICINE

## 2022-06-13 PROCEDURE — 87086 URINE CULTURE/COLONY COUNT: CPT | Performed by: EMERGENCY MEDICINE

## 2022-06-13 PROCEDURE — 96372 THER/PROPH/DIAG INJ SC/IM: CPT

## 2022-06-13 PROCEDURE — 0241U HB NFCT DS VIR RESP RNA 4 TRGT: CPT | Performed by: EMERGENCY MEDICINE

## 2022-06-13 PROCEDURE — 99285 EMERGENCY DEPT VISIT HI MDM: CPT

## 2022-06-13 PROCEDURE — 87181 SC STD AGAR DILUTION PER AGT: CPT | Performed by: EMERGENCY MEDICINE

## 2022-06-13 PROCEDURE — NC001 PR NO CHARGE: Performed by: EMERGENCY MEDICINE

## 2022-06-13 PROCEDURE — 85025 COMPLETE CBC W/AUTO DIFF WBC: CPT | Performed by: EMERGENCY MEDICINE

## 2022-06-13 PROCEDURE — 99205 OFFICE O/P NEW HI 60 MIN: CPT | Performed by: STUDENT IN AN ORGANIZED HEALTH CARE EDUCATION/TRAINING PROGRAM

## 2022-06-13 PROCEDURE — 87186 SC STD MICRODIL/AGAR DIL: CPT | Performed by: EMERGENCY MEDICINE

## 2022-06-13 PROCEDURE — 99285 EMERGENCY DEPT VISIT HI MDM: CPT | Performed by: EMERGENCY MEDICINE

## 2022-06-13 RX ORDER — HALOPERIDOL 5 MG/ML
5 INJECTION INTRAMUSCULAR EVERY 6 HOURS PRN
Status: DISCONTINUED | OUTPATIENT
Start: 2022-06-13 | End: 2022-06-13

## 2022-06-13 RX ORDER — ALPRAZOLAM 0.5 MG/1
0.5 TABLET ORAL 3 TIMES DAILY PRN
Status: DISCONTINUED | OUTPATIENT
Start: 2022-06-13 | End: 2022-06-14 | Stop reason: HOSPADM

## 2022-06-13 RX ORDER — LATANOPROST 50 UG/ML
1 SOLUTION/ DROPS OPHTHALMIC
Status: DISCONTINUED | OUTPATIENT
Start: 2022-06-13 | End: 2022-06-14 | Stop reason: HOSPADM

## 2022-06-13 RX ORDER — LORATADINE 10 MG/1
10 TABLET ORAL DAILY
Status: DISCONTINUED | OUTPATIENT
Start: 2022-06-13 | End: 2022-06-14 | Stop reason: HOSPADM

## 2022-06-13 RX ORDER — QUETIAPINE FUMARATE 50 MG/1
50 TABLET, FILM COATED ORAL
Status: DISCONTINUED | OUTPATIENT
Start: 2022-06-13 | End: 2022-06-13

## 2022-06-13 RX ORDER — BENZTROPINE MESYLATE 1 MG/ML
1 INJECTION INTRAMUSCULAR; INTRAVENOUS ONCE
Status: COMPLETED | OUTPATIENT
Start: 2022-06-13 | End: 2022-06-13

## 2022-06-13 RX ORDER — LORAZEPAM 2 MG/ML
2 INJECTION INTRAMUSCULAR ONCE
Status: COMPLETED | OUTPATIENT
Start: 2022-06-13 | End: 2022-06-13

## 2022-06-13 RX ORDER — DOCUSATE SODIUM 100 MG/1
100 CAPSULE, LIQUID FILLED ORAL DAILY PRN
Status: DISCONTINUED | OUTPATIENT
Start: 2022-06-13 | End: 2022-06-14 | Stop reason: HOSPADM

## 2022-06-13 RX ORDER — AMLODIPINE BESYLATE 5 MG/1
10 TABLET ORAL DAILY
Status: DISCONTINUED | OUTPATIENT
Start: 2022-06-14 | End: 2022-06-14 | Stop reason: HOSPADM

## 2022-06-13 RX ORDER — HALOPERIDOL 5 MG/ML
5 INJECTION INTRAMUSCULAR ONCE
Status: COMPLETED | OUTPATIENT
Start: 2022-06-13 | End: 2022-06-13

## 2022-06-13 RX ORDER — PANTOPRAZOLE SODIUM 40 MG/1
40 TABLET, DELAYED RELEASE ORAL
Status: DISCONTINUED | OUTPATIENT
Start: 2022-06-14 | End: 2022-06-14 | Stop reason: HOSPADM

## 2022-06-13 RX ORDER — QUETIAPINE FUMARATE 25 MG/1
25 TABLET, FILM COATED ORAL ONCE
Status: COMPLETED | OUTPATIENT
Start: 2022-06-13 | End: 2022-06-13

## 2022-06-13 RX ORDER — BENZTROPINE MESYLATE 1 MG/ML
2 INJECTION INTRAMUSCULAR; INTRAVENOUS EVERY 8 HOURS PRN
Status: DISCONTINUED | OUTPATIENT
Start: 2022-06-13 | End: 2022-06-13

## 2022-06-13 RX ORDER — LORAZEPAM 1 MG/1
2 TABLET ORAL EVERY 6 HOURS PRN
Status: DISCONTINUED | OUTPATIENT
Start: 2022-06-13 | End: 2022-06-13

## 2022-06-13 RX ORDER — TRAZODONE HYDROCHLORIDE 50 MG/1
50 TABLET ORAL
Status: DISCONTINUED | OUTPATIENT
Start: 2022-06-14 | End: 2022-06-13

## 2022-06-13 RX ORDER — BENZTROPINE MESYLATE 1 MG/ML
1 INJECTION INTRAMUSCULAR; INTRAVENOUS EVERY 8 HOURS PRN
Status: DISCONTINUED | OUTPATIENT
Start: 2022-06-13 | End: 2022-06-14 | Stop reason: HOSPADM

## 2022-06-13 RX ORDER — GABAPENTIN 300 MG/1
600 CAPSULE ORAL 3 TIMES DAILY
Status: DISCONTINUED | OUTPATIENT
Start: 2022-06-14 | End: 2022-06-14 | Stop reason: HOSPADM

## 2022-06-13 RX ORDER — METHENAMINE HIPPURATE 1000 MG/1
1 TABLET ORAL 2 TIMES DAILY WITH MEALS
Status: DISCONTINUED | OUTPATIENT
Start: 2022-06-13 | End: 2022-06-14 | Stop reason: HOSPADM

## 2022-06-13 RX ORDER — RISPERIDONE 0.25 MG/1
0.25 TABLET, ORALLY DISINTEGRATING ORAL
Status: DISCONTINUED | OUTPATIENT
Start: 2022-06-13 | End: 2022-06-14 | Stop reason: HOSPADM

## 2022-06-13 RX ORDER — NITROFURANTOIN 25; 75 MG/1; MG/1
100 CAPSULE ORAL 2 TIMES DAILY WITH MEALS
Status: DISCONTINUED | OUTPATIENT
Start: 2022-06-13 | End: 2022-06-14 | Stop reason: HOSPADM

## 2022-06-13 RX ORDER — LORAZEPAM 2 MG/ML
0.5 INJECTION INTRAMUSCULAR EVERY 6 HOURS PRN
Status: DISCONTINUED | OUTPATIENT
Start: 2022-06-13 | End: 2022-06-14 | Stop reason: HOSPADM

## 2022-06-13 RX ORDER — FUROSEMIDE 40 MG/1
40 TABLET ORAL DAILY
Status: DISCONTINUED | OUTPATIENT
Start: 2022-06-14 | End: 2022-06-14 | Stop reason: HOSPADM

## 2022-06-13 RX ORDER — LOSARTAN POTASSIUM 25 MG/1
25 TABLET ORAL DAILY
Status: DISCONTINUED | OUTPATIENT
Start: 2022-06-14 | End: 2022-06-14 | Stop reason: HOSPADM

## 2022-06-13 RX ORDER — BACLOFEN 10 MG/1
5 TABLET ORAL DAILY PRN
Status: DISCONTINUED | OUTPATIENT
Start: 2022-06-13 | End: 2022-06-14 | Stop reason: HOSPADM

## 2022-06-13 RX ORDER — ZIPRASIDONE MESYLATE 20 MG/ML
10 INJECTION, POWDER, LYOPHILIZED, FOR SOLUTION INTRAMUSCULAR 2 TIMES DAILY PRN
Status: DISCONTINUED | OUTPATIENT
Start: 2022-06-13 | End: 2022-06-14 | Stop reason: HOSPADM

## 2022-06-13 RX ORDER — LORAZEPAM 2 MG/ML
2 INJECTION INTRAMUSCULAR EVERY 6 HOURS PRN
Status: DISCONTINUED | OUTPATIENT
Start: 2022-06-13 | End: 2022-06-13

## 2022-06-13 RX ORDER — ACETAMINOPHEN 325 MG/1
975 TABLET ORAL EVERY 6 HOURS PRN
Status: DISCONTINUED | OUTPATIENT
Start: 2022-06-13 | End: 2022-06-14 | Stop reason: HOSPADM

## 2022-06-13 RX ORDER — POTASSIUM CHLORIDE 750 MG/1
20 TABLET, EXTENDED RELEASE ORAL DAILY
Status: DISCONTINUED | OUTPATIENT
Start: 2022-06-14 | End: 2022-06-14 | Stop reason: HOSPADM

## 2022-06-13 RX ORDER — HALOPERIDOL 5 MG
5 TABLET ORAL EVERY 6 HOURS PRN
Status: DISCONTINUED | OUTPATIENT
Start: 2022-06-13 | End: 2022-06-13

## 2022-06-13 RX ORDER — LORAZEPAM 2 MG/ML
1 INJECTION INTRAMUSCULAR ONCE
Status: COMPLETED | OUTPATIENT
Start: 2022-06-13 | End: 2022-06-13

## 2022-06-13 RX ADMIN — APIXABAN 5 MG: 5 TABLET, FILM COATED ORAL at 17:01

## 2022-06-13 RX ADMIN — METHENAMINE HIPPURATE 1 G: 1 TABLET ORAL at 17:01

## 2022-06-13 RX ADMIN — LATANOPROST 1 DROP: 50 SOLUTION OPHTHALMIC at 22:24

## 2022-06-13 RX ADMIN — SERTRALINE HYDROCHLORIDE 150 MG: 50 TABLET ORAL at 16:16

## 2022-06-13 RX ADMIN — QUETIAPINE FUMARATE 25 MG: 25 TABLET ORAL at 16:16

## 2022-06-13 RX ADMIN — NITROFURANTOIN (MONOHYDRATE/MACROCRYSTALS) 100 MG: 75; 25 CAPSULE ORAL at 18:24

## 2022-06-13 RX ADMIN — LORATADINE 10 MG: 10 TABLET ORAL at 16:22

## 2022-06-13 RX ADMIN — RISPERIDONE 0.25 MG: 0.25 TABLET, ORALLY DISINTEGRATING ORAL at 22:23

## 2022-06-13 RX ADMIN — ALPRAZOLAM 0.5 MG: 0.5 TABLET ORAL at 22:23

## 2022-06-13 RX ADMIN — TRAZODONE HYDROCHLORIDE 150 MG: 100 TABLET ORAL at 22:23

## 2022-06-13 RX ADMIN — ZIPRASIDONE MESYLATE 10 MG: 20 INJECTION, POWDER, LYOPHILIZED, FOR SOLUTION INTRAMUSCULAR at 19:23

## 2022-06-13 RX ADMIN — LORAZEPAM 1 MG: 2 INJECTION INTRAMUSCULAR; INTRAVENOUS at 17:01

## 2022-06-13 RX ADMIN — BENZTROPINE MESYLATE 1 MG: 1 INJECTION INTRAMUSCULAR; INTRAVENOUS at 13:22

## 2022-06-13 RX ADMIN — LORAZEPAM 2 MG: 2 INJECTION INTRAMUSCULAR; INTRAVENOUS at 13:22

## 2022-06-13 RX ADMIN — HALOPERIDOL LACTATE 5 MG: 5 INJECTION, SOLUTION INTRAMUSCULAR at 13:22

## 2022-06-13 NOTE — ED NOTES
Patient is a resident of [address] [place] [address],[address] [facility]) and sent to the Ed for an admission to the Older Adult Unit by Tina Lee NP who saw her this AM   Patient has been increasingly agitated,will slap, punch and hit self, throw things, punch other residents, screaming and not redirectable  In the ED, her agitated behavior continues, and she has been clapping and waving her arms as if to push something away  She continues to scream and is difficult to redirect  At this time she is a poor historian  Patient is currently not followed by psychiatry per records from [facility]  [facility] was contacted, however and 302 petiton was not taken at that time      Patient is followed by nurse practioner Tina Lee (office [phone] and c   [phone])  There is no listing of a psychiatrist   However records indicate that patient has been experiencing dementia with behavior problems over the last 3 yrs, and was admitted to [place] [address] and [facility] for same in 2019    [facility]

## 2022-06-13 NOTE — TELEPHONE ENCOUNTER
Spoke with [employer] at [facility] nurse evaluating the patient today and she advised 302 to hospital in pt psych  Nursing home staff is working on the paper work with the [facility]

## 2022-06-13 NOTE — ED NOTES
Insurance  EVS (Eligibility Verification System) called - [phone]    Automated system indicates: [place] [employer] ID # [id number]

## 2022-06-13 NOTE — ED NOTES
Pt increasing in agitation, yelling and screaming  Provider has PRN Geodon 10 mg order, given with charge RN at this time         Pedro Stern RN  06/13/22 1926

## 2022-06-13 NOTE — ED PROVIDER NOTES
History  Chief Complaint   Patient presents with    Psychiatric Evaluation     From nursing facility-report agitation; noted excessivly loud talking and loud clapping of hands with refusal to assess vital signs     Patient is an 53-year-old female brought in by EMS for psychiatric evaluation  She has a patient and resident at [facility]  They state that the patient has been having increasing agitation and, she states that when she is in the hallway she starts to kick people were going by describing objects including her food and utensils and throwing a people  Patient has similar behavior in the past   She was admitted to the hospital approximately 1 month ago with concerns for urinary tract infection  However, according to the nursing staff at the patient's facility 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 Taking?    ALPRAZolam (XANAX) 1 mg tablet  Self Yes No   Sig: Take 0 5 mg by mouth 3 (three) times a day Hold for sedation and lethargy     Baclofen 5 MG TABS  Self No No   Sig: TAKE 1 TABLET EVERY MORNING   Calcium Ascorbate (VITAMIN C) 500 mg tablet   No No   Sig: Take 1 tablet (500 mg total) by mouth 2 (two) times a day   Disposable Gloves (Latex Gloves) MISC  Self No No   Sig: Use 4 (four) times a day as needed (cleaning) XLARGE   Eliquis 5 MG  Self No No   Sig: TAKE 1 TABLET BY MOUTH TWICE A DAY   Incontinence Supply Disposable (PROCare Bariatric Briefs) MISC  Self No No   Sig: Use every 2 (two) hours as needed (incontinence)   Incontinence Supply Disposable (SAPS health Incontinence Pads) MISC  Self No No   Sig: Use 1 each every 2 (two) hours   Incontinence Supply Disposable (Wings Adult Briefs XXL) MISC  Self No No   Sig: Use every 2 (two) hours   Infant Care Products (Baby Wipes) MISC  Self No No   Sig: Use every 2 (two) hours   QUEtiapine (SEROquel) 25 mg tablet   Yes No   Sig: Take 25 mg by mouth daily at bedtime I tab q am and I tab q pm    QUEtiapine (SEROquel) 50 mg tablet   No No   Sig: Take 1 tablet (50 mg total) by mouth daily at bedtime   amLODIPine (NORVASC) 10 mg tablet  Self No No   Sig: Take 1 tablet (10 mg total) by mouth daily   calcium carbonate-vitamin D (OSCAL-D) 500 mg-200 units per tablet  Self Yes No   Sig: Take 1 tablet by mouth 2 (two) times a day with meals     cholestyramine (QUESTRAN) 4 g packet  Self No No   Si packet mixed with 8 oz liquids and drink 4 times daily  Must be dosed 1 hour apart from other medications   conjugated estrogens (Premarin) vaginal cream   No No   Sig: Apply pea-sized amount to urethral opening and vaginal opening twice a week on Wednesday and  if she has no contraindications such as breast cancer or blood clots   diphenhydrAMINE (BENADRYL) 50 mg capsule   No No   Sig: Take 1 capsule (50 mg total) by mouth 60 minutes pre-procedure for 1 dose Take 1 hour prior to contrast administration   ferrous sulfate 325 (65 Fe) mg tablet  Self No No   Sig: TAKE 1 TABLET BY MOUTH EVERY DAY WITH BREAKFAST   fluticasone (EQL Fluticasone Propionate) 50 mcg/act nasal spray  Self No No   Si spray into each nostril daily   furosemide (LASIX) 40 mg tablet   No No   Sig: Take 1 tablet (40 mg total) by mouth daily   gabapentin (NEURONTIN) 600 MG tablet  Self No No   Sig: TAKE 1 TABLET BY MOUTH THREE TIMES A DAY   latanoprost (XALATAN) 0 005 % ophthalmic solution  Self No No   Sig: Administer 1 drop to both eyes daily at bedtime   loratadine (CLARITIN) 10 mg tablet  Self No No Sig: TAKE 1 TABLET BY MOUTH EVERY DAY   losartan (COZAAR) 25 mg tablet   No No   Sig: TAKE 1 TABLET (25 MG TOTAL) BY MOUTH DAILY     methenamine hippurate (HIPREX) 1 g tablet   No No   Sig: Take 1 tablet (1 g total) by mouth 2 (two) times a day with meals   methylPREDNISolone (MEDROL) 32 MG tablet   No No   Sig: Take 1 tablet (32 mg total) by mouth see administration instructions Take first dose 12 hours prior to contrast administration Take second dose 2 hours prior to contrast administration   nystatin (MYCOSTATIN) powder  Self No No   Sig: Apply topically 3 (three) times a day   pantoprazole (PROTONIX) 40 mg tablet   No No   Sig: TAKE 1 TABLET BY MOUTH DAILY BEFORE BREAKFAST   saccharomyces boulardii (FLORASTOR) 250 mg capsule   No No   Sig: Take 1 capsule (250 mg total) by mouth 2 (two) times a day   sertraline (ZOLOFT) 100 mg tablet  Self No No   Sig: TAKE 1 TABLET BY MOUTH EVERY DAY   traZODone (DESYREL) 100 mg tablet  Self No No   Sig: Take 1 5 tablets (150 mg total) by mouth daily at bedtime      Facility-Administered Medications: None       Past Medical History:   Diagnosis Date    A-fib ([facility] )     Acute on chronic diastolic heart failure (HCC) 3/6/2022    Anxiety     Chronic pain     Chronic respiratory failure with hypoxia (HCC)     COPD (chronic obstructive pulmonary disease) (HCC)     Dementia (HCC)     Dorsalgia, unspecified     Edema     Encephalopathy     GERD (gastroesophageal reflux disease)     Gross hematuria 3/31/2022    Hypertension     Morbid obesity (HCC)     Muscle weakness (generalized)     Opioid dependence ([facility])     Overactive bladder     Pneumonia     Positive blood culture 4/9/2022    Psychiatric disorder     anxiety, bipolar    Radiculopathy of thoracolumbar region     Sciatica     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 ([facility] )     Urinary incontinence     UTI (urinary tract infection)        Past Surgical History: Procedure Laterality Date    APPENDECTOMY      BACK SURGERY      CHOLECYSTECTOMY      CYSTOSCOPY  2021    Dr Olayinka Lopes         History reviewed  No pertinent family history  I have reviewed and agree with the history as documented  E-Cigarette/Vaping    E-Cigarette Use Never User      E-Cigarette/Vaping Substances    Nicotine No     THC No     CBD No     Flavoring No     Other No     Unknown No      Social History     Tobacco Use    Smoking status: Former Smoker     Types: Cigarettes     Quit date: 1995     Years since quittin 6    Smokeless tobacco: Never Used   Vaping Use    Vaping Use: Never used   Substance Use Topics    Alcohol use: Never    Drug use: No       Review of Systems   Unable to perform ROS: Dementia       Physical Exam  Physical Exam  Vitals and nursing note reviewed  Constitutional:       General: She is not in acute distress  Appearance: She is well-developed  HENT:      Head: Normocephalic and atraumatic  Eyes:      Conjunctiva/sclera: Conjunctivae normal       Pupils: Pupils are equal, round, and reactive to light  Neck:      Trachea: No tracheal deviation  Cardiovascular:      Rate and Rhythm: Normal rate and regular rhythm  Pulmonary:      Effort: Pulmonary effort is normal  No respiratory distress  Breath sounds: Normal breath sounds  No wheezing or rales  Abdominal:      General: Bowel sounds are normal  There is no distension  Palpations: Abdomen is soft  Tenderness: There is no abdominal tenderness  There is no guarding or rebound  Musculoskeletal:         General: No tenderness or deformity  Normal range of motion  Cervical back: Normal range of motion and neck supple  Skin:     General: Skin is warm and dry  Capillary Refill: Capillary refill takes less than 2 seconds  Findings: No rash  Neurological:      General: No focal deficit present        Cranial Nerves: Cranial nerves are intact  Motor: Motor function is intact  Coordination: Coordination is intact  Comments: Patient is nonambulatory at baseline     Psychiatric:         Behavior: Behavior normal          Vital Signs  ED Triage Vitals [06/13/22 1312]   Temperature Pulse Resp BP SpO2   99 5 °F (37 5 °C) -- -- -- --      Temp Source Heart Rate Source Patient Position - Orthostatic VS BP Location FiO2 (%)   Tympanic -- -- -- --      Pain Score       --           Vitals:         Visual Acuity      ED Medications  Medications   acetaminophen (TYLENOL) tablet 975 mg (has no administration in time range)   ALPRAZolam (XANAX) tablet 0 5 mg (has no administration in time range)   amLODIPine (NORVASC) tablet 10 mg (has no administration in time range)   baclofen tablet 5 mg (has no administration in time range)   docusate sodium (COLACE) capsule 100 mg (has no administration in time range)   apixaban (ELIQUIS) tablet 5 mg (has no administration in time range)   furosemide (LASIX) tablet 40 mg (has no administration in time range)   gabapentin (NEURONTIN) capsule 600 mg (has no administration in time range)   latanoprost (XALATAN) 0 005 % ophthalmic solution 1 drop (has no administration in time range)   loratadine (CLARITIN) tablet 10 mg (has no administration in time range)   losartan (COZAAR) tablet 25 mg (has no administration in time range)   pantoprazole (PROTONIX) EC tablet 40 mg (has no administration in time range)   potassium chloride (K-DUR,KLOR-CON) CR tablet 20 mEq (has no administration in time range)   QUEtiapine (SEROquel) tablet 50 mg (has no administration in time range)   QUEtiapine (SEROquel) tablet 25 mg (has no administration in time range)   traZODone (DESYREL) tablet 150 mg (has no administration in time range)   traZODone (DESYREL) tablet 50 mg (has no administration in time range)   sertraline (ZOLOFT) tablet 150 mg (has no administration in time range)   benztropine (COGENTIN) injection 1 mg (1 mg Intramuscular Given 6/13/22 1322)   haloperidol lactate (HALDOL) injection 5 mg (5 mg Intramuscular Given 6/13/22 1322)   LORazepam (ATIVAN) injection 2 mg (2 mg Intramuscular Given 6/13/22 1322)       Diagnostic Studies  Results Reviewed     Procedure Component Value Units Date/Time    Ethanol [[id number]]  (Normal) Collected: 06/13/22 1353    Lab Status: Final result Specimen: Blood from Arm, Right Updated: 06/13/22 1426     Ethanol Lvl <10 mg/dL     Comprehensive metabolic panel [[id number]]  (Abnormal) Collected: 06/13/22 1353    Lab Status: Final result Specimen: Blood from Arm, Right Updated: 06/13/22 1426     Sodium 141 mmol/L      Potassium 4 1 mmol/L      Chloride 107 mmol/L      CO2 27 mmol/L      ANION GAP 7 mmol/L      BUN 12 mg/dL      Creatinine 0 87 mg/dL      Glucose 112 mg/dL      Calcium 9 2 mg/dL      AST 27 U/L      ALT 13 U/L      Alkaline Phosphatase 69 U/L      Total Protein 7 1 g/dL      Albumin 3 5 g/dL      Total Bilirubin 0 37 mg/dL      eGFR 63 ml/min/1 73sq m     Narrative:      [place] guidelines for Chronic Kidney Disease (CKD):     Stage 1 with normal or high GFR (GFR > 90 mL/min/1 73 square meters)    Stage 2 Mild CKD (GFR = 60-89 mL/min/1 73 square meters)    Stage 3A Moderate CKD (GFR = 45-59 mL/min/1 73 square meters)    Stage 3B Moderate CKD (GFR = 30-44 mL/min/1 73 square meters)    Stage 4 Severe CKD (GFR = 15-29 mL/min/1 73 square meters)    Stage 5 End Stage CKD (GFR <15 mL/min/1 73 square meters)  Note: GFR calculation is accurate only with a steady state creatinine    COVID/FLU/RSV - 2 hour TAT [[id number]]  (Normal) Collected: 06/13/22 1343    Lab Status: Final result Specimen: Nares from Nose Updated: 06/13/22 1425     SARS-CoV-2 Negative     INFLUENZA A PCR Negative     INFLUENZA B PCR Negative     RSV PCR Negative    Narrative:      FOR PEDIATRIC PATIENTS - copy/paste COVID Guidelines URL to browser: [email or web address]    SARS-CoV-2 assay is a Nucleic Acid Amplification assay intended for the  qualitative detection of nucleic acid from SARS-CoV-2 in nasopharyngeal  swabs  Results are for the presumptive identification of SARS-CoV-2 RNA  Positive results are indicative of infection with SARS-CoV-2, the virus  causing COVID-19, but do not rule out bacterial infection or co-infection  with other viruses  Laboratories within the [place] and its  territories are required to report all positive results to the appropriate  public health authorities  Negative results do not preclude SARS-CoV-2  infection and should not be used as the sole basis for treatment or other  patient management decisions  Negative results must be combined with  clinical observations, patient history, and epidemiological information  This test has not been FDA cleared or approved  This test has been authorized by FDA under an Emergency Use Authorization  (EUA)  This test is only authorized for the duration of time the  declaration that circumstances exist justifying the authorization of the  emergency use of an in vitro diagnostic tests for detection of SARS-CoV-2  virus and/or diagnosis of COVID-19 infection under section 564(b)(1) of  the Act, 21 U  S C  569WIG-5(U)(6), unless the authorization is terminated  or revoked sooner  The test has been validated but independent review by FDA  and CLIA is pending  Test performed using [employer] GeneXpert: This RT-PCR assay targets N2,  a region unique to SARS-CoV-2  A conserved region in the E-gene was chosen  for pan-Sarbecovirus detection which includes SARS-CoV-2      CBC and differential [[id number]]  (Abnormal) Collected: 06/13/22 1353    Lab Status: Final result Specimen: Blood from Arm, Right Updated: 06/13/22 1401     WBC 6 38 Thousand/uL      RBC 4 49 Million/uL      Hemoglobin 12 0 g/dL      Hematocrit 39 2 %      MCV 87 fL MCH 26 7 pg      MCHC 30 6 g/dL      RDW 15 6 %      MPV 9 7 fL      Platelets 576 Thousands/uL      nRBC 0 /100 WBCs      Neutrophils Relative 66 %      Immat GRANS % 0 %      Lymphocytes Relative 22 %      Monocytes Relative 8 %      Eosinophils Relative 3 %      Basophils Relative 1 %      Neutrophils Absolute 4 29 Thousands/µL      Immature Grans Absolute 0 01 Thousand/uL      Lymphocytes Absolute 1 38 Thousands/µL      Monocytes Absolute 0 49 Thousand/µL      Eosinophils Absolute 0 18 Thousand/µL      Basophils Absolute 0 03 Thousands/µL     TSH [[id number]] Collected: 06/13/22 1353    Lab Status: In process Specimen: Blood from Arm, Right Updated: 06/13/22 1358    Rapid drug screen, urine [[id number]]     Lab Status: No result Specimen: Urine     POCT alcohol breath test [[id number]]     Lab Status: No result     UA w Reflex to Microscopic w Reflex to Culture [[id number]]     Lab Status: No result Specimen: Urine                  No orders to display              Procedures  Procedures         ED Course  ED Course as of 06/13/22 1454   Mon Jun 13, 2022   1352 Procedure Note: EKG  Date/Time: 06/13/22 1:52 PM   Performed by: Selina Mir  Authorized by: Selina Mir  ECG interpreted by me, the ED Provider: yes   The EKG demonstrates:  Rate 76  Rhythm afib  QTc 441  No ST elevations/depressions                                                 MDM  Number of Diagnoses or Management Options  Encounter for psychological evaluation  Diagnosis management comments: Patient was sent by her nursing home for evaluation of involuntary psychiatric admission  I I am unable to complete a full psychiatric assessment  Patient is requiring medication for agitation  She is not threat to harm herself or others, she is constantly screaming and clapping her hands  Plan to have psychiatry evaluate patient for possible completion of a 302 versus discharging back to her nursing home facility         Amount and/or Complexity of Data Reviewed  Clinical lab tests: ordered and reviewed        Disposition  Final diagnoses:   Encounter for psychological evaluation     Time reflects when diagnosis was documented in both MDM as applicable and the Disposition within this note     Time User Action Codes Description Comment    6/13/2022  2:21 PM Heath Mcdaniel Add [Z00 8] Encounter for psychological evaluation       ED Disposition     ED Disposition   Transfer to [address] [place] [address]   --    Date/Time   Mon Jun 13, 2022  2:21 PM    Comment   Milena Mason should be transferred out to [facility] and has been medically cleared  Follow-up Information    None         Patient's Medications   Discharge Prescriptions    No medications on file       No discharge procedures on file      PDMP Review       Value Time User    PDMP Reviewed  Yes 2/9/2022 12:05 PM LUCY Ireland          ED Provider  Electronically Signed by           Deep Billings DO  06/13/22 [id number]

## 2022-06-13 NOTE — ED NOTES
Pt is asleep after Geodon injection, breathing on own and appears to be in no distress  Unaware if patient has ever received Geodon in past, patient is placed on O2 monitor  When placing patient on monitor she was easily aroused  Provider aware and in agreement        Mc Taveras RN  06/13/22 [id number]

## 2022-06-13 NOTE — CONSULTS
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  Patient was oriented to self and place as well as time but not to situation  This orientation fluctuated with time and most of her speech was unintelligible  Patient displayed impulsivity that is what would be expected in advanced frontotemporal lobe dementia however also displayed fluctuating consciousness which is seen in delirium  Given that patient has been admitted several times for UTI most recently 1 month ago in [facility] and due to the fact that her mental status fluctuates around these UTIs, we will reassess again tomorrow after UA to determine organic causes for altered mental status and re-evaluate for 302  Had extensive conversation with nurse practitioner at from [facility] as well as daughter and based on conversation with daughter, changing medication that has anticholinergic effect to low-dose risperidone which patient has done well on before      Diagnosis:  Multifactorial - Delirium in the setting of possible UTI, frontotemporal lobe dementia sequela, medication reaction, mood disturbance, polypharmacy    Recommended Treatment:    Will observe based on treatment interventions and outcome of UA to determine infectious etiology   Recommend 1-1 continual observation for patient and staff safety   Will reassess tomorrow for 302 appropriateness    Consider outpatient Neurology after discharge for further frontotemporal lobe dementia workup and treatment   Medication recommendations are as follows:  o Discontinue Seroquel  Per daughter most anti psychotics outside of risperidone worsen patient's mental status  This is made possible through anticholinergic properties of most antipsychotic medications and if patient is prone to delirium these medications can likely exacerbate  o Trial risperidone 0 25 mg HS for aggressive behavior  Patient was discharged on risperidone in 2018 from Dr Nargis Collado and stabilized on this medication  Daughter is in agreement that this medication is okay for patient  o Discontinue morning dose of 50 mg trazodone as this can be somewhat anticholinergic  o For agitation/sedation:  - 10 mg Geodon IM  If this does not work,  - 0 5 mg Ativan IM  Avoid higher dosing as this can worsen delirium  - 3rd line option is 5 mg Haldol if the above does not work adequately for patient agitation and aggression   Psychiatry will continue to follow  Please call or TigerText the on-call team (MARQUISE: [phone]) with any questions or concerns  Diagnoses were discussed with the patient  Prior records were reviewed in [address] [place] [address]  The assessment and plan was discussed with the primary team      Risks, benefits and possible side effects of Medications:   Patient was not able to verbalize understanding due to patient factors        PI  History of Present Illness   Chief Complaint:  Altered mental status    Yang Patel is a [de-identified] y o  female with chronic history of UTIs causing delirium, chronic colonization of ESBL, diagnosed with frontotemporal lobe dementia in 2016 with behavioral disturbance including agitation, admitted for altered mental status  Prior records were reviewed  Today, patient was seen examined today at bedside  Patient is floridly altered in mental status however is able to answer where she is the year, the months, and who her daughter is    Other than this, patient is having trouble both understanding languages well as producing language associated impulsive statements, as well as syntactical errors amongst articulation errors  Additionally, patient seems to have repetitive motor movements at times  Patient was evaluated twice today and both times had fluctuating consciousness  Patient was however able to deny SHABNAM, HI, AMANDA  Extensive conversation with nurse practitioner Duane Valle office:  [phone], cell phone [phone]  at [facility]  summary below:  [facility] of March of 2022 and patient behavior is at baseline for what she has seen from this patient  Before March patient had not been in a nursing home however she has an extensive history with behaviors associated with UTI and frontotemporal dementia  She was diagnosed with frontotemporal dementia in 2016 around when she started having behavioral disturbance  She has not seen a neurologist for further workup or management  Power of  and medical power of  is her daughter Deena Aleman who believes and has psychotics make the patient symptoms worse  Extensive conversation with medical power of , daughter named Deena Aleman:  Daughter states that due to infection, patient mental status declines  Patient baseline mental status includes being able to walk, balance a checkbook, operate an iPad to face time grandchildren in [place]  Daughter reports that last baseline was approximately March of this year  Patient states that this alternating mental status started approximately 9 years ago in 2013 when patient had possible stroke and fractured hip  Her daughter, hospital put patient on antipsychotics at this time and this worsened mental status from patient  Over the years, when patient has UTI, patient is additionally combative and aggressive and is usually placed on anti psychotics even at the refusal of daughter for patient    Patient daughter states that this alters her mental status even worse and she ultimately has been bouncing around from rehab facility to hospital in part due to antipsychotic medications  Daughter states that the only antipsychotic medication that has worked for this patient is risperidone however even after attempted conversations with nursing home, they still have her on antipsychotic medications that daughter believes is exacerbating her baseline  Provided education for patient in reference to how frontal lobe temporal dementia can present in a similar manner  Patient verbalized understanding  Discussed plan with patient daughter  Daughter is in agreement  Extensive conversation with medical power of ,    Psychiatric Review of Systems and PHx     Problems with sleep:  Unable to assess due to patient factors  Appetite changes:Unable to assess due to patient factors  Weight changes:Unable to assess due to patient factors  Low energy/anergy:Unable to assess due to patient factors  Low interest/pleasure/anhedonia:Unable to assess due to patient factors  Somatic symptoms:Unable to assess due to patient factors  Anxiety/panic:Unable to assess due to patient factors  Nataliya: Unable to assess due to patient factors  Guilt/hopeless: Unable to assess due to patient factors  Self injurious behavior/risky behavior: Unable to assess due to patient factors    Historical Information   Prior psychiatric diagnoses:  Several however due to frontotemporal lobe dementia this is clouded    Patient had no past psychiatric diagnoses prior to onset of dementia  Inpatient hospitalizations: per chart, several   Most recent [facility] in [address] however daughter mentioned a good admission with positive results with medications in 2018 with Dr Leticia Dobbs here at [facility] attempts: unknown  Self-harm behaviors: unknown  Outpatient treatment:  Nurse practitioner states they utilize tele health for psychiatry for patient  Psychiatric medication trial:  Multiple per chart however risperidone has had the greatest success according to daughter of patient    Substance Abuse History:  Social History     Tobacco Use    Smoking status: Former Smoker     Types: Cigarettes     Quit date: 1995     Years since quittin 6    Smokeless tobacco: Never Used   Vaping Use    Vaping Use: Never used   Substance Use Topics    Alcohol use: Never    Drug use: No         I am unable to assess the patient for substance use within the past 12 months as they are unable or unwilling to answer    Family Psychiatric History:   Unknown    Social History  Marital history:    Children: yes  Living arrangement:  [facility]  Functioning Relationships: good relationship with children    Traumatic History:   Abuse: none reported  Other Traumatic Events: none reported    Past Medical History:   Diagnosis Date    A-fib ([facility] )     Acute on chronic diastolic heart failure ([place] [address] ) 3/6/2022    Anxiety     Chronic pain     Chronic respiratory failure with hypoxia ([facility])     COPD (chronic obstructive pulmonary disease) ([facility])     Dementia ([facility])     Dorsalgia, unspecified     Edema     Encephalopathy     GERD (gastroesophageal reflux disease)     Gross hematuria 3/31/2022    Hypertension     Morbid obesity ([facility])     Muscle weakness (generalized)     Opioid dependence ([facility])     Overactive bladder     Pneumonia     Positive blood culture 2022    Psychiatric disorder     anxiety, bipolar    Radiculopathy of thoracolumbar region     Sciatica     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 ([place] [address] )     Urinary incontinence     UTI (urinary tract infection)        Medical Review of Systems and MSE   tion and Medical ROS  Medical Review Of Systems:  Review of Systems - Negative except as noted in HPI    Meds/Allergies   all current active meds have been reviewed and current meds:   Current Facility-Administered Medications   Medication Dose Route Frequency    acetaminophen (TYLENOL) tablet 975 mg  975 mg Oral Q6H PRN  ALPRAZolam (XANAX) tablet 0 5 mg  0 5 mg Oral TID PRN    [START ON 6/14/2022] amLODIPine (NORVASC) tablet 10 mg  10 mg Oral Daily    apixaban (ELIQUIS) tablet 5 mg  5 mg Oral BID    baclofen tablet 5 mg  5 mg Oral Daily PRN    benztropine (COGENTIN) injection 1 mg  1 mg Intramuscular Q8H PRN    docusate sodium (COLACE) capsule 100 mg  100 mg Oral Daily PRN    [START ON 6/14/2022] furosemide (LASIX) tablet 40 mg  40 mg Oral Daily    [START ON 6/14/2022] gabapentin (NEURONTIN) capsule 600 mg  600 mg Oral TID    latanoprost (XALATAN) 0 005 % ophthalmic solution 1 drop  1 drop Both Eyes HS    loratadine (CLARITIN) tablet 10 mg  10 mg Oral Daily    [START ON 6/14/2022] losartan (COZAAR) tablet 25 mg  25 mg Oral Daily    methenamine hippurate (HIPREX) tablet 1 g  1 g Oral BID With Meals    [START ON 6/14/2022] pantoprazole (PROTONIX) EC tablet 40 mg  40 mg Oral Early Morning    [START ON 6/14/2022] potassium chloride (K-DUR,KLOR-CON) CR tablet 20 mEq  20 mEq Oral Daily    risperiDONE (RisperDAL M-TAB) disintegrating tablet 0 25 mg  0 25 mg Oral HS    sertraline (ZOLOFT) tablet 150 mg  150 mg Oral Daily    traZODone (DESYREL) tablet 150 mg  150 mg Oral HS     Allergies   Allergen Reactions    Aspirin     Iodinated Diagnostic Agents Throat Swelling     Pt states "when I was 14years old driving home from the hospital I felt like I couldn't think and it went away after 10 minutes"     Iodine - Food Allergy     Nsaids     Other Other (See Comments)     difficulty swallowing for short period       Objective   Vital signs in last 24 hours:  Temp:  [99 5 °F (37 5 °C)] 99 5 °F (37 5 °C)  HR:  [85] 85  Resp:  [20] 20  BP: (112)/(69) 112/69    Mental Status Exam:  Appearance:  alert, Poor eye contact, appears stated age, marginal grooming/hygiene, poor dentition, obese and Dressed in shirt and diaper, in no acute apparent distress medically however emotionally distressed at times   Behavior:  calm, limited cooperativity and laying in bed   Motor: restless and fidgety and Repetitive movements, clapping, yelling and singing at times   Speech:  spontaneous, dysarthric and Alternating between normal volume and loud   Mood:  Unable to obtain due to patient factors   Affect:  constricted, dysphoric, anxious and irritable   Thought Process:  unable to assess due to patient factors   Thought Content: unable to assess due to patient factors   Perceptual disturbances: denies current hallucinations and Cannot be assessed due to patient factors   Risk Potential: No active suicidal ideation, No active homicidal ideation   Cognition: oriented to person, place, time, and cognition not formally tested   Insight:  Poor   Judgment: Poor     Laboratory results:  I have personally reviewed all pertinent laboratory/tests results  The following portions of the patient's history were reviewed and updated as appropriate: allergies, current medications, past family history, past medical history, past social history, past surgical history and problem list     This note was not shared with the patient due to reasonable likelihood of causing patient harm  This note was created using dictation system  There may be translation, syntax,  or grammatical errors  If you have any questions, please contact the dictating provider      Ko Mata, DO

## 2022-06-13 NOTE — ED PROVIDER NOTES
Patient signed out to me by Dr Elaine Nguyen  Patient [de-identified]year old female with h/o frontal dementia with increasing agitation and aggressive behaviors at [facility]  Patient was admitted to the hospital about 1 month ago  Upon my exam patient is lying in bed screaming  She is intermittently redirectable  Patient is lying in bed  Maintaining airway and secretions  No stridor  No respiratory distress  Moving in bed independently  Patient's labs are stable  No evidence of end-organ damage  Pending urine    4:32 PM  Psychiatrist in for evaluation    4:42 PM  Discussed with psychiatrist and psychiatry resident regarding patient  At this time would do will avoid any anticholinergic medications like Benadryl as well as any antipsychotics of Haldol  We are able to give short term Xanax as needed as well as small doses of Ativan  Will need a a urine specimen which will place order for straight cath  Will also discuss about further options for sedation if necessary    5:49 PM  Discussed with Dr Brittany Erickson from Psychiatry -   For agitation/sedation:  10 mg Geodon IM  If this does not work,  0 5 mg Ativan IM   3rd line option is 5 mg Haldol if the above does not work adequately for patient agitation and aggression    6:13 PM  In review patient has a history of bacterial colonization of urine  Patient's last urine culture did show significant resistance  However susceptible of 2 different bacteria to Macrobid  Will give 1st dose here  Will await urine culture    7:34 PM  Patient given Geodon IM and resting in bed  Patient maintaining airway and secretions  No stridor  No respiratory distress  Will place on monitor  10:46 PM  Patient intermittently agitated and yelling then will rest  Will s/o to night team for follow up    Portions of the record may have been created with voice recognition software   Occasional wrong word or "sound a like" substitutions may have occurred due to the inherent limitations of voice recognition software  Read the chart carefully and recognize, using context, where substitutions have occurred           Lisa Hollins,   06/13/22 0542

## 2022-06-14 VITALS
HEART RATE: 68 BPM | OXYGEN SATURATION: 96 % | SYSTOLIC BLOOD PRESSURE: 137 MMHG | TEMPERATURE: 96.8 F | RESPIRATION RATE: 20 BRPM | DIASTOLIC BLOOD PRESSURE: 84 MMHG

## 2022-06-14 LAB
ATRIAL RATE: 78 BPM
QRS AXIS: 29 DEGREES
QRSD INTERVAL: 92 MS
QT INTERVAL: 392 MS
QTC INTERVAL: 441 MS
T WAVE AXIS: -78 DEGREES
VENTRICULAR RATE: 76 BPM

## 2022-06-14 PROCEDURE — 93010 ELECTROCARDIOGRAM REPORT: CPT | Performed by: INTERNAL MEDICINE

## 2022-06-14 PROCEDURE — 99214 OFFICE O/P EST MOD 30 MIN: CPT | Performed by: STUDENT IN AN ORGANIZED HEALTH CARE EDUCATION/TRAINING PROGRAM

## 2022-06-14 RX ORDER — NITROFURANTOIN 25; 75 MG/1; MG/1
100 CAPSULE ORAL 2 TIMES DAILY
Qty: 10 CAPSULE | Refills: 0 | Status: SHIPPED | OUTPATIENT
Start: 2022-06-14 | End: 2022-07-19 | Stop reason: ALTCHOICE

## 2022-06-14 RX ORDER — NITROFURANTOIN 25; 75 MG/1; MG/1
100 CAPSULE ORAL 2 TIMES DAILY
Qty: 10 CAPSULE | Refills: 0 | Status: SHIPPED | OUTPATIENT
Start: 2022-06-14 | End: 2022-06-14 | Stop reason: SDUPTHER

## 2022-06-14 RX ORDER — RISPERIDONE 0.25 MG/1
0.25 TABLET, ORALLY DISINTEGRATING ORAL
Qty: 30 TABLET | Refills: 0 | Status: SHIPPED | OUTPATIENT
Start: 2022-06-14 | End: 2022-07-19 | Stop reason: ALTCHOICE

## 2022-06-14 RX ADMIN — APIXABAN 5 MG: 5 TABLET, FILM COATED ORAL at 19:19

## 2022-06-14 RX ADMIN — NITROFURANTOIN (MONOHYDRATE/MACROCRYSTALS) 100 MG: 75; 25 CAPSULE ORAL at 16:29

## 2022-06-14 RX ADMIN — AMLODIPINE BESYLATE 10 MG: 5 TABLET ORAL at 10:00

## 2022-06-14 RX ADMIN — POTASSIUM CHLORIDE 20 MEQ: 750 TABLET, EXTENDED RELEASE ORAL at 10:00

## 2022-06-14 RX ADMIN — PANTOPRAZOLE SODIUM 40 MG: 40 TABLET, DELAYED RELEASE ORAL at 05:41

## 2022-06-14 RX ADMIN — SERTRALINE HYDROCHLORIDE 150 MG: 50 TABLET ORAL at 10:00

## 2022-06-14 RX ADMIN — GABAPENTIN 600 MG: 300 CAPSULE ORAL at 16:29

## 2022-06-14 RX ADMIN — GABAPENTIN 600 MG: 300 CAPSULE ORAL at 10:00

## 2022-06-14 RX ADMIN — FUROSEMIDE 40 MG: 40 TABLET ORAL at 10:00

## 2022-06-14 RX ADMIN — NITROFURANTOIN (MONOHYDRATE/MACROCRYSTALS) 100 MG: 75; 25 CAPSULE ORAL at 10:57

## 2022-06-14 RX ADMIN — LORATADINE 10 MG: 10 TABLET ORAL at 10:00

## 2022-06-14 RX ADMIN — METHENAMINE HIPPURATE 1 G: 1 TABLET ORAL at 10:06

## 2022-06-14 RX ADMIN — LOSARTAN POTASSIUM 25 MG: 25 TABLET, FILM COATED ORAL at 10:00

## 2022-06-14 RX ADMIN — METHENAMINE HIPPURATE 1 G: 1 TABLET ORAL at 16:29

## 2022-06-14 RX ADMIN — APIXABAN 5 MG: 5 TABLET, FILM COATED ORAL at 10:00

## 2022-06-14 NOTE — ED NOTES
Call placed to the patient's daughter, Adelita Quarles, to let her know of the discharge plans  She stated she will follow up with her mother's provider

## 2022-06-14 NOTE — ED NOTES
Contacted [facility] to set up transport  They will contact the department when there is a  time  Cuca Munoz RN  06/14/22 1664    Spoke with Arben Martini from facility, per Arben Martini there is no time restriction on when patient can return  ED to contact supervisor phone when there is a transport time  [phone]        Cuca Munoz RN  06/14/22 5003

## 2022-06-14 NOTE — ED NOTES
Patient is resting quietly with eyes closed and with easy non labored respirations with sitter at bedside       Jerry Flor RN  06/14/22 0184

## 2022-06-14 NOTE — PROGRESS NOTES
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  Patient improving with longer periods of lucidity and is headed in the direction of her baseline mental state which includes yelling and clapping periodically with some mild inappropriateness of mostly a verbal nature  These things are sequelae of her frontotemporal lobe dementia and will have minimal resolution with medication  These will likely continue to deteriorate however are made worse by anticholinergic side effects from medications including most antipsychotics  Patient worsened altered mental status and delirium is direct result of UTI but this is resolving  Patient's aggressive behavior (ie hitting herself or others) and increasingly worse mentation with reduced ability to be redirected, these are likely signs patient has UTI  Patient has history of negative leukocytosis and negative fever in the presence of UTI  Resolution of aggression and altered mentation is as a result of reducing patient anticholinergic burden (deprescribing anti psychotics and other anticholinergic medications) and antibiotic treatment of UTI  Diagnosis: multifactorial delirium in the setting of UTI and frontotemporal dementia with behavioral disturbance    Recommended Treatment:    No indication for inpatient psychiatry at this time   Patient is stable for discharge pending medical clearance   Avoid medications with anticholinergic burden as this will worsen mentation for this patient  This includes most antipsychotics   Recommend continuing current psychiatric regimen:  o Zoloft 150 mg daily for depression anxiety  o Gabapentin t i d   For anxiety and pain  o Xanax TID PRN for anxiety  o Risperidone 0 25 mg HS for sundowning  I discussed the following titration with nurse practitioner Franklyn Sanchez:  - Okay for primary care to slowly titrate up to maximum 0 5 mg HS for sundowning  - Okay for primary care to slowly titrate up to daytime dose of 0 25 mg to 0 5 mg if agitation worsens however please check urine for presence of bacteria, WBC, nitrites   For agitation, IM 10mg Geodon PRN q8 is ok for no more than three times in a 24hour period of time as more than this will greatly increase risk for arrythmia  Ok to use small dose 0 5 IM ativan if agitation continues  24 [facility] will sign off  Please call or TigerText the on-call team ([facility]: [phone]) with any questions or concerns   Discussed with primary team      Subjective     Maxine Burgess is a [de-identified] y o  female with past medical history of recurrent UTI and diarrhea, frontotemporal dementia with behavioral disturbance, AFib, heart failure  Pt was seen and evaluated for length of stay  Patient was seen and evaluated today at bedside  Patient mentation and mental status is significantly improved today  Patient was pleasant, cooperative, maintained a longer period of lucidity  Patient still had some intermittent yelling and clapping as well as inappropriate behaviors thought to be more secondary to frontal temporal lobe dementia as opposed to any psychiatric causes  Patient mentation improved with reduction of anti psychotics  Patient states that she is feeling good and denies SI, AVH, HI  Patient decompensates during interview and displays some tangentiality and significant schizophasia which is in part due to poor hearing  Patient states that she has been able to sleep a little bit better and her appetite has been adequate  Patient denies homicidal ideation this morning however endorses homicidal ideation this afternoon specific for a dog that she used to have I just want to shoot him    Patient confirms that she does not currently have a dog  The above is likely sequela I of the frontotemporal lobe dementia and may be some residual delirium from the UTI  Had extensive conversation with Verito Lawrence her daughter and her medical power of   When we spoke yesterday, Verito Lawrence had endorsed being in agreement with the plan to start low-dose risperidone and discontinue Seroquel  Today, she states that she did not agree to this  Later in the conversation where ever, she states that she is okay with this and agrees to the plan of low-dose risperidone  She mentioned that she is very hesitant about this but understands that this may be necessary for patient moving forward  Daughter insists that patient needs IV antibiotics and that Marlene Alvarado will not help this patient  It was explained to this daughter that her mother's doing better and patient was apprised and stated 0 good and seemed satisfied momentarily before then stating but she is only going to be good for a little while and then she is going to go right back  Marlene Alvarado does not work for her  She has had it too much daughter became combative on the phone however was able to be redirected and deescalated  Daughter expressed gratitude for treatment and identified that patient baseline was in March of this year however daughter has seen patient after March and insists that the violent behavior is all part of UTI presentation and not part of her frontotemporal lobe dementia  Attempted Education with daughter again about the personality and aggression sequelae of frontotemporal lobe dementia  Daughter did confirm that patient yelling and screaming and clapping for long periods of time is part of her baseline and directly as a result of her frontotemporal lobe dementia      Had extensive conversation with Octavia Paez works under Dr Janet Low:  Identified that patient decline in mentation is likely delirium in the setting of UTI and encouraged frequent scheduled toileting by the nursing home to reduce instance of UTIs  Chart review yielded that patient is unlikely to have white count on CBC and temperature in relationship to her UTI however urine culture will be needed to help assess what antibiotics patient is less resistant to at this standpoint  Recommended adhering to beers criteria with reduction of benzodiazepines, reduction of polypharmacy when necessary encouraged goals of care conversation with family at some juncture as patient continues to have recurrent UTIs with altered mentation complicated by the fact that patient has neurodegenerative disease  Informed nurse practitioner aggressive behavior is likely not due to sole psychiatric cause but due to sequela from UTI and frontotemporal lobe dementia  Additionally, identified to nurse practitioner that anticholinergic burden can worsen frontotemporal lobe dementia sequela as well as delirium  Discussed titration of risperidone and encouraged geriatric informed psychiatric care when necessary       Behavior over the last 24 hours: improved  Sleep: improving  Appetite: adequate  Medication side effects: none verbalized  ROS: Review of systems could not be obtained due to patient factors        Mental Status Exam      Appearance:  alert, good eye contact, appears stated age, marginal grooming/hygiene, obese and In no apparent acute distress   Behavior:  calm, cooperative and laying in bed   Motor: no abnormal movements, restless and fidgety and normal gait and balance   Speech:  spontaneous, normal rate and Marginally coherent, fluctuating articulation errors and schizophasia, fluctuating volume thought to be sequela of frontotemporal lobe dementia   Mood:  "Good"   Affect:  euthymic and brighter than previous   Thought Process:  Inconsistent and fluctuating mentation, intermittently logical, organized and linear   Thought Content: no verbalized delusions or overt paranoia   Perceptual disturbances: no reported hallucinations and does not appear to be responding to internal stimuli at this time   Risk Potential: No active suicidal ideation, No active homicidal ideation, Potential for aggression As sequela of frontotemporal dementia    Cognition: memory impaired due to Frontotemporal lobe dementia, age-appropriate attention span and concentration, cognition not formally tested and Oriented to self only   Insight:  Improving   Judgment: Improving     Risks, benefits and possible side effects of Medications:   Risks, benefits, and possible side effects of medications have been explained to the patient, who is presently unable to verbalize understanding  Labs: I have personally reviewed all pertinent laboratory results  This note was not shared with the patient due to reasonable likelihood of causing patient harm    This note was created using dictation system  There may be translation, syntax,  or grammatical errors  If you have any questions, please contact the dictating provider      DO Kathi Denton [facility] Psychiatry Residency, PGY-I

## 2022-06-14 NOTE — ED NOTES
Patient awakened for vital signs and for depends and linen change  Patient is alert and cooperative, following instructions, thought she was in "the house" but then asked "do you think I will be able to go home today?" Informed patient that I thought this was a very good possibility  Patient repositioned in bed and drinking fluids       Enio Barens RN  06/14/22 0367

## 2022-06-14 NOTE — ED CARE HANDOFF
Emergency Department Sign Out Note        Sign out and transfer of care from Dr Pedro Orellana  See Separate Emergency Department note  The patient, Deanne Rivers, was evaluated by the previous provider for UTI/Psych Eval     Workup Completed:  See previous notes    ED Course / Workup Pending (followup): Patient seen and evaluated by Psychiatry, started on Risperdal, mood and behavior has improved  At baseline patient has frontotemporal dementia which leads to her having outburst   Patient is stable to be discharged back to facility per psychiatry                                     ED Course as of 06/14/22 1513   Mon Jun 13, 2022   1352 Procedure Note: EKG  Date/Time: 06/13/22 1:52 PM   Performed by: Tiffanie Bassett  Authorized by: Tiffanie Bassett  ECG interpreted by me, the ED Provider: yes   The EKG demonstrates:  Rate 76  Rhythm afib  QTc 441  No ST elevations/depressions       1514 Patient needs psych assessment  Pending UA  No external evidence of infection  e Jun 14, 2022   5211 Patient undergoing psych eval for involuntary commitment vs return to facility  Med recs from psych being followed  Awaiting disposition recommendation  1458 Patient seen by Dr Poornima Rai, Psychiatry, patient improving after being on antibiotics, I can maintain on current risperidone regiment for behavior issues  Stable for discharge back to facility  No grounds for admission this time for psychiatry       Procedures  MDM        Disposition  Final diagnoses:   Encounter for psychological evaluation   UTI (urinary tract infection)   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[facility] [address] )     Time reflects when diagnosis was documented in both MDM as applicable and the Disposition within this note     Time User Action Codes Description Comment    6/13/2022  2:21 PM Jay Zamudio Clarcona Peconic [Z00 8] Encounter for psychological evaluation     6/14/2022  2:59 PM Patricia Segal [N39 0] Urinary tract infection     6/14/2022  3:00 PM Law Prasad Rohan Add [T83 510D,  N39 0] Urinary tract infection associated with cystostomy catheter, subsequent encounter     6/14/2022  3:00 PM Omar Hereford Remove [Q96 263Z,  N39 0] Urinary tract infection associated with cystostomy catheter, subsequent encounter     6/14/2022  3:00 PM Omar Hereford Add [N39 0] UTI (urinary tract infection)     6/14/2022  3:04 PM Jaylin RosadoRohan Remove [N39 0] Urinary tract infection     6/14/2022  3:10 PM Omar Hereford Add [G31 09,  F02 81] Behavioral variant frontotemporal dementia [facility])       ED Disposition     ED Disposition   Discharge    Condition   Stable    Date/Time   Tue Jun 14, 2022  2:59 PM    Comment   Alejo Anderson discharge to home/self care  MD Wolf Estrada   Sending MD Dr Lisa Norris    None       Patient's Medications   Discharge Prescriptions    NITROFURANTOIN (MACROBID) 100 MG CAPSULE    Take 1 capsule (100 mg total) by mouth 2 (two) times a day       Start Date: 6/14/2022 End Date: --       Order Dose: 100 mg       Quantity: 10 capsule    Refills: 0    RISPERIDONE (RISPERDAL M-TAB) 0 25 MG DISINTEGRATING TABLET    Take 1 tablet (0 25 mg total) by mouth daily at bedtime       Start Date: 6/14/2022 End Date: 7/14/2022       Order Dose: 0 25 mg       Quantity: 30 tablet    Refills: 0     No discharge procedures on file         ED Provider  Electronically Signed by     Kira Ardon DO  06/14/22 1477

## 2022-06-14 NOTE — ED NOTES
This RN spoke with Arben Martini  He was advised patient  at 1900        Cuca Munoz RN  06/14/22 7015

## 2022-06-14 NOTE — ED NOTES
Patient knows her name but at times seems to be seeing and talking to someone who isn't there  Patient was incontinent of large amount of urine - cleansed with linen and depends change  Patient cooperative with care measures  Resting more quietly since receiving medications earlier       Fernando Alvarez RN  06/14/22 7811

## 2022-06-14 NOTE — ED NOTES
This RN assumes care of patient at this time  Pt appears to be asleep, calm and cooperative with no signs or symptoms of respiratory distress        Yenni Sellers RN  06/14/22 6994

## 2022-06-14 NOTE — ED NOTES
Pt is awake and yelling incomprehensible fraises at staff and interacting with things in room  Provider made aware        Chichi Bingham RN  06/13/22 2055

## 2022-06-16 ENCOUNTER — NURSING HOME VISIT (OUTPATIENT)
Dept: GERIATRICS | Facility: OTHER | Age: 82
End: 2022-06-16
Payer: COMMERCIAL

## 2022-06-16 DIAGNOSIS — I15.8 OTHER SECONDARY HYPERTENSION: ICD-10-CM

## 2022-06-16 DIAGNOSIS — F02.81 BEHAVIORAL VARIANT FRONTOTEMPORAL DEMENTIA (HCC): Primary | Chronic | ICD-10-CM

## 2022-06-16 DIAGNOSIS — I50.32 CHRONIC HEART FAILURE WITH PRESERVED EJECTION FRACTION (HCC): ICD-10-CM

## 2022-06-16 DIAGNOSIS — I48.21 PERMANENT ATRIAL FIBRILLATION (HCC): ICD-10-CM

## 2022-06-16 DIAGNOSIS — G31.09 BEHAVIORAL VARIANT FRONTOTEMPORAL DEMENTIA (HCC): Primary | Chronic | ICD-10-CM

## 2022-06-16 DIAGNOSIS — R13.11 ORAL PHASE DYSPHAGIA: ICD-10-CM

## 2022-06-16 PROCEDURE — 99305 1ST NF CARE MODERATE MDM 35: CPT | Performed by: FAMILY MEDICINE

## 2022-06-16 RX ORDER — SERTRALINE HYDROCHLORIDE 100 MG/1
150 TABLET, FILM COATED ORAL DAILY
COMMUNITY

## 2022-06-16 NOTE — PROGRESS NOTES
[facility]  [address] [place] [address]  [facility]ility[facility]    NAME: Baljit Olivier  AGE: [de-identified] y o  SEX: female    DATE OF ENCOUNTER: 6/16/2022    Code status:  No CPR    Assessment and Plan     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[address] )  On Risperidone, off HS Seroquel  Will continue with monitoring, will consider gradually discontinuing Seroquel and increasing Risperidone dose, will also consider decreasing Trazodone dose per psych recommendation,continuing with Zoloft  Atrial fibrillation ([facility] [address] )  On Eliquis, ordered labs  Will continue with monitoring  (HFpEF) heart failure with preserved ejection fraction (HCC)  On Lasix, and fluid restriction  Will continue with monitoring  Hypertension  BP stable with Amlodipine and Losartan, ordered labs  Will continue with monitoring  Oral phase dysphagia  On mechanical soft diet  Will continue with monitoring  All medications and routine orders were reviewed and updated as needed  Plan discussed with: Nursing staff     Chief Complaint     Pt is not providing any info     History of Present Illness   Pt with PMSSFH and medical problem as this note who was sent to ER for psychiatric evaluation on 6/13 and was admitted to the psych unit, now is back to  since 6/14 with some medical adjustment  She is now on Risperidone and Macrobid  She received Haldol, and ativan at the hospital  ADM advanced practitioner had a phone conversation with hospital psychiatrist and it was suggested pt's Seroquel to be [address] and if pt's behaviors continues to add am Risperidone  Also to decrease her Trazodone to 100mg  I had a phone conversation with Rachna Miranda (ADM NP), and pt's Seroquel was decreased with discontinued HS dose  Pt continues with screaming not stop today       HISTORY:  Past Medical History:   Diagnosis Date    A-fib [facility])     Acute on chronic diastolic heart failure ([facility] [address] ) 3/6/2022    Anxiety  Chronic pain     Chronic respiratory failure with hypoxia (HCC)     COPD (chronic obstructive pulmonary disease) (HCC)     Dementia (HCC)     Dorsalgia, unspecified     Edema     Encephalopathy     GERD (gastroesophageal reflux disease)     Gross hematuria 3/31/2022    Hypertension     Morbid obesity (HCC)     Muscle weakness (generalized)     Opioid dependence (HCC)     Overactive bladder     Pneumonia     Positive blood culture 2022    Psychiatric disorder     anxiety, bipolar    Radiculopathy of thoracolumbar region     Sciatica     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 ([facility] [address] )     Urinary incontinence     UTI (urinary tract infection)      History reviewed  No pertinent family history  Social History     Socioeconomic History    Marital status:      Spouse name: None    Number of children: None    Years of education: None    Highest education level: None   Occupational History    None   Tobacco Use    Smoking status: Former Smoker     Types: Cigarettes     Quit date: 1995     Years since quittin 6    Smokeless tobacco: Never Used   Vaping Use    Vaping Use: Never used   Substance and Sexual Activity    Alcohol use: Never    Drug use: No    Sexual activity: Not Currently   Other Topics Concern    None   Social History Narrative    None     Social Determinants of Health     Financial Resource Strain: Not on file   Food Insecurity: No Food Insecurity    Worried About Running Out of Food in the Last Year: Never true    Oleg of Food in the Last Year: Never true   Transportation Needs: No Transportation Needs    Lack of Transportation (Medical): No    Lack of Transportation (Non-Medical):  No   Physical Activity: Not on file   Stress: Not on file   Social Connections: Not on file   Intimate Partner Violence: Not on file   Housing Stability: Low Risk     Unable to Pay for Housing in the Last Year: No    Number of Places Lived in the Last Year: 1    Unstable Housing in the Last Year: No       Allergies: Allergies   Allergen Reactions    Aspirin     Iodinated Diagnostic Agents Throat Swelling     Pt states "when I was 14years old driving home from the hospital I felt like I couldn't think and it went away after 10 minutes"     Iodine - Food Allergy     Nsaids     Other Other (See Comments)     difficulty swallowing for short period       Review of Systems     Review of Systems   Unable to perform ROS: Dementia       Medications and orders     All medications reviewed and updated in Nursing Home EMR  Objective     Vitals:Reviewed     Physical Exam  Vitals and nursing note reviewed  Constitutional:       General: She is not in acute distress  Appearance: She is well-developed  She is obese  She is not ill-appearing, toxic-appearing or diaphoretic  HENT:      Head: Normocephalic and atraumatic  Right Ear: External ear normal       Left Ear: External ear normal       Nose: Nose normal  No congestion or rhinorrhea  Mouth/Throat:      Mouth: Mucous membranes are moist       Comments: Missing teeth   Eyes:      General: No scleral icterus  Right eye: No discharge  Left eye: No discharge  Extraocular Movements: Extraocular movements intact  Conjunctiva/sclera: Conjunctivae normal       Pupils: Pupils are equal, round, and reactive to light  Cardiovascular:      Rate and Rhythm: Normal rate  Rhythm irregular  Heart sounds: Normal heart sounds  No murmur heard  No friction rub  No gallop  Pulmonary:      Effort: Pulmonary effort is normal  No respiratory distress  Breath sounds: Normal breath sounds  No stridor  No wheezing, rhonchi or rales  Chest:      Chest wall: No tenderness  Abdominal:      General: Bowel sounds are normal  There is no distension  Palpations: Abdomen is soft  There is no mass  Tenderness: There is no abdominal tenderness   There is no guarding or rebound  Hernia: No hernia is present  Comments: Protuberant    Genitourinary:     Comments: Deferred  Musculoskeletal:         General: No swelling, tenderness, deformity or signs of injury  Cervical back: Normal range of motion and neck supple  No rigidity or tenderness  Right lower leg: No edema  Left lower leg: No edema  Comments: In bed, limited ROM   Lymphadenopathy:      Cervical: No cervical adenopathy  Skin:     General: Skin is warm and dry  Coloration: Skin is not jaundiced or pale  Findings: Erythema present  No bruising, lesion or rash  Comments: Didn't examine sacral area  Scabs on first and second R and left dorsal feet  Neurological:      Mental Status: She is oriented to person, place, and time  Cranial Nerves: Cranial nerve deficit present  Comments: Limited with pt's dementia, not following commands currently    Psychiatric:      Comments: Was yelling and holding on her pillow and her pillowcase          Pertinent Laboratory/Diagnostic Studies: The following labs/studies were reviewed please see chart or hospital paperwork for details            Venu Villanueva MD  6/16/2022 8:13 PM

## 2022-06-17 ENCOUNTER — TELEPHONE (OUTPATIENT)
Dept: UROLOGY | Facility: CLINIC | Age: 82
End: 2022-06-17

## 2022-06-17 LAB
BACTERIA UR CULT: ABNORMAL
BACTERIA UR CULT: ABNORMAL

## 2022-06-17 NOTE — TELEPHONE ENCOUNTER
----- Message from Barbra Albert on behalf of Stefania Clancy sent at 6/17/2022  4:02 PM EDT -----  Regarding: Question regarding URINE MICROSCOPIC  This message is being sent by Barbra Albert on behalf of Stefania Clancy  I'm sorry to bother you Adolph Gustafsondanielle Cassette from ED is not in my mom's  contacts  I need to see her urine culture and applicable antibiotics to treat her  This is from her 6/13 [facility] stay  It's important  Thank you    Barbra Albert

## 2022-06-17 NOTE — ASSESSMENT & PLAN NOTE
On Risperidone, off HS Seroquel  Will continue with monitoring, will consider gradually discontinuing Seroquel and increasing Risperidone dose, will also consider decreasing Trazodone dose per psych recommendation,continuing with Zoloft

## 2022-06-17 NOTE — TELEPHONE ENCOUNTER
Called and spoke with patients daughter Pricila Horn  She was made aware patients urine culture showed >100,000 cfu/mL E  Coli ESBL  Also aware this is preliminary result  States she will await final result and see if she can view it in [address]  If unable to view once finalized, she will call the office next week to have report faxed

## 2022-06-21 ENCOUNTER — NURSING HOME VISIT (OUTPATIENT)
Dept: GERIATRICS | Facility: OTHER | Age: 82
End: 2022-06-21
Payer: COMMERCIAL

## 2022-06-21 DIAGNOSIS — I15.8 OTHER SECONDARY HYPERTENSION: ICD-10-CM

## 2022-06-21 DIAGNOSIS — I50.32 CHRONIC HEART FAILURE WITH PRESERVED EJECTION FRACTION (HCC): ICD-10-CM

## 2022-06-21 DIAGNOSIS — F41.9 ANXIETY: ICD-10-CM

## 2022-06-21 DIAGNOSIS — I48.21 PERMANENT ATRIAL FIBRILLATION (HCC): ICD-10-CM

## 2022-06-21 DIAGNOSIS — G31.09 BEHAVIORAL VARIANT FRONTOTEMPORAL DEMENTIA (HCC): Primary | Chronic | ICD-10-CM

## 2022-06-21 DIAGNOSIS — F02.81 BEHAVIORAL VARIANT FRONTOTEMPORAL DEMENTIA (HCC): Primary | Chronic | ICD-10-CM

## 2022-06-21 PROCEDURE — 99309 SBSQ NF CARE MODERATE MDM 30: CPT | Performed by: FAMILY MEDICINE

## 2022-06-21 NOTE — ASSESSMENT & PLAN NOTE
Pt continues with ongoing behaviors  Plan is to be transferred to [address] [place] [address] Road facility  Continues with NH care  Wt stable

## 2022-06-21 NOTE — PROGRESS NOTES
[facility]  [address]  [phone]  Facility: [facility]          NAME: Mai Howe  AGE: [de-identified] y o  SEX: female    DATE OF ENCOUNTER: 6/21/2022    Chief Complaint     Pt is not providing any info     History of Present Illness     HPI    The following portions of the patient's history were reviewed and updated as appropriate (from facility chart and hospital records): allergies, current medications, past family history, past medical history, past social history, past surgical history and problem list  Pt was seen and examined for f/u on CHF, Afib, HTN, dysphagia  Pt's wt has been stable  No sign or symptoms of CHF exacerbation  BP stable  Pt continues with yelling out constantly  HR controled  Tolerating mechanical soft diet  On decreased dose of Seroquel  Pt had labs today which was reviewed  Pt stays in bed  Wt has been stable  Today we ( Corrine/administrative, Timo/ , Naomie/ ADM NP, Arabella/Oraum NP and I ) had a phone conference with pt's daughter, and our concern regarding pt's ongoing behaviors was discussed, Daughter agreed that facility to initiate the process to transfer the pt to [address] [place] [address] (if bed available) as that facility would be better fit for pt's care  considering her condition and psychological need  Daughter also was informed that pt's appointment with ID will be rescheduled to address her hx of recurrent UTI, and Colonizing with ESBL  Tolerating mechanical soft diet       Review of Systems     Review of Systems   Unable to perform ROS: Dementia       Active Problem List     Patient Active Problem List   Diagnosis    Atrial fibrillation ([address] )    Hypertension    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[address] )    PAD (peripheral artery disease) ([address] )    Morbid obesity due to excess calories ([address] )    Acute encephalopathy    COPD (chronic obstructive pulmonary disease) ([address] )    Overactive bladder    Hypokalemia  Moderate episode of recurrent major depressive disorder (HCC)    Chronic pain    Physical deconditioning    Bipolar affective disorder, currently manic, moderate (HCC)    (HFpEF) heart failure with preserved ejection fraction (HCC)    Pacemaker    Frequent UTI    Dementia (HCC)    Generalized weakness    Fungal infection of the groin    Anxiety    C  difficile diarrhea    Sleep apnea    Ambulatory dysfunction    Gross hematuria    Acute metabolic encephalopathy    UTI (urinary tract infection)    Bacteria in urine    Oral phase dysphagia       Objective     Vitals: Reviewed     Physical Exam  Vitals reviewed  Constitutional:       General: She is not in acute distress  Appearance: She is well-developed  She is obese  She is not ill-appearing, toxic-appearing or diaphoretic  HENT:      Head: Normocephalic and atraumatic  Right Ear: External ear normal       Left Ear: External ear normal       Nose: Nose normal  No congestion or rhinorrhea  Mouth/Throat:      Comments: Missing teeth   Eyes:      General: No scleral icterus  Right eye: No discharge  Left eye: No discharge  Extraocular Movements: Extraocular movements intact  Conjunctiva/sclera: Conjunctivae normal       Pupils: Pupils are equal, round, and reactive to light  Cardiovascular:      Rate and Rhythm: Normal rate  Rhythm irregular  Heart sounds: Normal heart sounds  No murmur heard  No friction rub  No gallop  Pulmonary:      Effort: Pulmonary effort is normal  No respiratory distress  Breath sounds: Normal breath sounds  No stridor  No wheezing, rhonchi or rales  Chest:      Chest wall: No tenderness  Abdominal:      General: Bowel sounds are normal  There is no distension  Palpations: Abdomen is soft  There is no mass  Tenderness: There is no abdominal tenderness  There is no guarding or rebound  Hernia: No hernia is present     Genitourinary:     Comments: Deferred  Musculoskeletal:         General: No swelling, tenderness, deformity or signs of injury  Cervical back: Normal range of motion and neck supple  No rigidity or tenderness  Right lower leg: No edema  Left lower leg: No edema  Comments: In bed, lying, not following commands  Lymphadenopathy:      Cervical: No cervical adenopathy  Skin:     General: Skin is warm and dry  Coloration: Skin is not jaundiced or pale  Findings: Erythema present  No bruising, lesion or rash  Comments: Didn't examine sacral area  Great toe and 2nd toes B/L and dorsally has +scabs    Neurological:      Mental Status: She is alert  Cranial Nerves: Cranial nerve deficit present  Comments: Limited with pt's dementia   Psychiatric:         Behavior: Behavior normal          Pertinent Laboratory/Diagnostic Studies:  Labs done today and reviewed     Current Medications   Medication list in facility chart was reviewed and necessary changes made  Assessment and Plan     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[facility] )  Pt continues with ongoing behaviors  Plan is to be transferred to [address] [place] [address] Drive facility  Continues with NH care  Wt stable  Atrial fibrillation (HCC)  HR controlled, on Eliquis  Will continue with monitoring  Hypertension  BP stable with Losartan and Amlodipine  Will continue with monitoring  (HFpEF) heart failure with preserved ejection fraction (HCC)  Stable with fluid restriction and lasix  Will continue with monitoring  Anxiety  On Alprazolam  Will continue with monitoring       Clarissa Barron MD  2/89/80264:96 PM

## 2022-06-23 NOTE — PROGRESS NOTES
Pt sleeping throughout the afternoon  No aggression or agitation noted  Pt continued to be med compliant  Pt redirectable at times  Pt currently eating lunch  Will continue to monitor  VOICEMAIL  1. Caller Name: Karl Morillo                      Call Back Number: [phone]    2. Message: Pt left vm stating he got an ultrasound at [facility] yesterday and would like to know the results.     3. Patient approves office to leave a detailed voicemail/MyChart message: N\A

## 2022-07-11 ENCOUNTER — NURSING HOME VISIT (OUTPATIENT)
Dept: GERIATRICS | Facility: OTHER | Age: 82
End: 2022-07-11
Payer: COMMERCIAL

## 2022-07-11 DIAGNOSIS — F02.818 BEHAVIORAL VARIANT FRONTOTEMPORAL DEMENTIA (HCC): Primary | Chronic | ICD-10-CM

## 2022-07-11 DIAGNOSIS — G31.09 BEHAVIORAL VARIANT FRONTOTEMPORAL DEMENTIA (HCC): Primary | Chronic | ICD-10-CM

## 2022-07-11 PROCEDURE — 99308 SBSQ NF CARE LOW MDM 20: CPT | Performed by: NURSE PRACTITIONER

## 2022-07-11 NOTE — PROGRESS NOTES
[address], [address], [facility] SPECIALTY [facility], [address] [place] [address]  [phone]    NAME: Levora Batch  AGE: [de-identified] y o  SEX: female    Progress Note    [place]  POS: 32 (LTC)    Assessment/Plan:    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[address] )  Followed by facility Psychiatry  Recent started on Zoloft 150mg daily (6/11/2022)  Currently on the following meds:  * Trazodone 150mg at HS  * Trazodone 50mg at HS  * Lorazepam 0 25mg QID  * Risperidone 0 5mg TID  * Gabapentin 600mg TID  CMP on 7/21/2022: to check electrolyte levels: Na/Ca/K  Mg level on 7/21/2022  Chief complaint / Reason for visit: Acute visit    History of Present Illness: This is an 68-year-old female patient currently admitted at [facility] for debility  Patient is seen and examined today per nursing request related to pharmacy triggered medication review report  Patient is recently placed on Sertraline 150 mg daily since 6/11/2022  Pharmacy report indicated possible medication interaction/ electrolyte imbalance and recommending follow-up and monitoring of Calcium, Sodium, Magnesium levels  Patient had CMP done on 06/21/2022: baseline values  Will repeat CMP on July 21, 2022  Patient is in bed for this visit currently being provided personal care by CNA  Patient has been observed to be screaming loudly - per nursing this is patient's baseline - unable to redirect or stop unrelenting screaming despite having needs met  Unable to redirect or assess for ROS by this provider  Observed to constantly make slapping noises against her abdomen, spitting at staff and walls  Patient also be observed to scream out loud  random numbers to self followed by non stop screaming, Help me! But wont state needs when asked  Review of Systems:  Per history of present illness, all other systems reviewed and negative      HISTORY:  Medical Hx: Reviewed, unchanged  Family Hx: Reviewed, unchanged  Soc Hx: Reviewed,  unchanged    ALLERGY: Reviewed, unchanged  Allergies   Allergen Reactions    Aspirin     Iodinated Diagnostic Agents Throat Swelling     Pt states "when I was 14years old driving home from the hospital I felt like I couldn't think and it went away after 10 minutes"     Iodine - Food Allergy     Nsaids     Other Other (See Comments)     difficulty swallowing for short period        PHYSICAL EXAM:  Vital Signs:  97 9F -P78 -R17 BP: 134/56 SpO2: 97% RA  Weight: 245 0 lbs (5/23/2022)    General: NAD  Obese  Head: Atraumatic  Normocephalic  Eye Exam: Unable to assess due behavioral disturbance  Oral Exam: Unable to assess due behavioral disturbance   Neck Exam: no anterior cervical lymphadenopathy noted, neck supple  Cardiovascular: Unable to assess due behavioral disturbance   Pulmonary: Unable to assess due behavioral disturbance   Abdominal: soft, non-tender, nondistended, bowel sounds audible x 4 quadrants  Ave meal completion: 50-75%  : Non distended bladder  Incontinent  BM every 1-2 days  Last documented BM: 7/10/2022  Extremities and skin: no edema noted, no rashes  Intact skin  Multiple dry scab to toes: stable  Neurological:  Alert  Bed bound  Psych: Belligerent, constantly screaming  Spitting at staff and walls  Unable to redirect  Laboratory results / Imaging reviewed: Hard copy/ies in medical chart:    * CMP (6/21/2022):   GLUCOSE 98 mg/dL 65-99  Final         BUN 15 mg/dL 7-25  Final         CREATININE 0 78 mg/dL 0 40-1 10  Final         SODIUM 143 mmol/L 135-145  Final         POTASSIUM 3 8 mmol/L 3 5-5 2  Final         CHLORIDE 107 mmol/L 100-109  Final         CARBON DIOXIDE 32 mmol/L 23-31 H Final         CALCIUM 9 7 mg/dL 8 5-10 1  Final         ALKALINE PHOSPHATASE 64 U/L   Final         ALBUMIN 2 7 g/dL 3 5-4 8 L Final         BILIRUBIN,TOTAL 0 4 mg/dL 0 2-1 0  Final   Use of this assay is not recommended for patients undergoing treatment   with eltrombopag due to the potential for falsely elevated results   PROTEIN, TOTAL 6 5 g/dL 6 3-8 3  Final         AST 15 U/L <41  Final         ALT 12 U/L <56  Final         ANION GAP 4  3-11  Final         eGFRcr 77  >59  Final         eGFRcr COMMENT (Note)    Final       * CBC (6/22/2022):   HEMOGLOBIN 11 8 g/dL 11 5-14 5  Final         HEMATOCRIT 36 1 % 35 0-43 0  Final         WBC 4 9 thou/cmm 4 0-10 0  Final         RBC 4 38 mill/cmm 3 70-4 70  Final         PLATELET COUNT 274 thou/cmm 140-350 L Final         MPV 8 6 fL 7 5-11 3  Final         MCV 83 fL   Final         MCH 27 0 pg 26 0-34 0  Final         MCHC 32 7 g/dL 32 0-37 0  Final         RDW 16 9 % 12 0-16 0 H Final         DIFFERENTIAL TYPE AUTO    Final         ABSOLUTE NEUT 2 6 thou/cmm 1 8-7 8  Final         ABSOLUTE LYMPH 1 5 thou/cmm 1 0-3 0  Final         ABSOLUTE MONO 0 5 thou/cmm 0 3-1 0  Final         ABSOLUTE EOS 0 2 thou/cmm 0 0-0 5  Final         ABSOLUTE BASO 0 0 thou/cmm 0 0-0 1  Final         NEUTROPHILS 52 %   Final         LYMPHOCYTES 32 %   Final         MONOCYTES 11 %   Final         EOSINOPHILS 4 %   Final         BASOPHILS 1 %   Final       Current Medications: All medications reviewed and updated in Nursing Home Chart    Please note:  Voice-recognition software may have been used in the preparation of this document  Occasional wrong word or "sound-alike" substitutions may have occurred due to the inherent limitations of voice recognition software  Interpretation should be guided by context      LUCY Cortez  7/11/2022

## 2022-07-12 NOTE — ASSESSMENT & PLAN NOTE
Followed by facility Psychiatry  Recent started on Zoloft 150mg daily (6/11/2022)  Currently on the following meds:  * Trazodone 150mg at HS  * Trazodone 50mg at HS  * Lorazepam 0 25mg QID  * Risperidone 0 5mg TID  * Gabapentin 600mg TID  CMP on 7/21/2022: to check electrolyte levels: Na/Ca/K  Mg level on 7/21/2022

## 2022-07-18 ENCOUNTER — NURSING HOME VISIT (OUTPATIENT)
Dept: GERIATRICS | Facility: OTHER | Age: 82
End: 2022-07-18
Payer: COMMERCIAL

## 2022-07-18 DIAGNOSIS — I50.32 CHRONIC HEART FAILURE WITH PRESERVED EJECTION FRACTION (HCC): ICD-10-CM

## 2022-07-18 DIAGNOSIS — I48.21 PERMANENT ATRIAL FIBRILLATION (HCC): ICD-10-CM

## 2022-07-18 DIAGNOSIS — Z86.19 HISTORY OF CLOSTRIDIUM DIFFICILE INFECTION: ICD-10-CM

## 2022-07-18 DIAGNOSIS — G89.29 OTHER CHRONIC PAIN: ICD-10-CM

## 2022-07-18 DIAGNOSIS — F02.818 BEHAVIORAL VARIANT FRONTOTEMPORAL DEMENTIA (HCC): Primary | Chronic | ICD-10-CM

## 2022-07-18 DIAGNOSIS — N39.0 FREQUENT UTI: ICD-10-CM

## 2022-07-18 DIAGNOSIS — G31.09 BEHAVIORAL VARIANT FRONTOTEMPORAL DEMENTIA (HCC): Primary | Chronic | ICD-10-CM

## 2022-07-18 PROCEDURE — 99309 SBSQ NF CARE MODERATE MDM 30: CPT | Performed by: NURSE PRACTITIONER

## 2022-07-18 NOTE — PROGRESS NOTES
[address], [address], [facility] SPECIALTY [facility], [facility]  [phone]    NAME: Jesusita Mercado  AGE: [de-identified] y o  SEX: female    Progress Note    [place]  POS: 32 (LTC)    Assessment/Plan:    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[facility] )  Signficant behavioral disturbance - often difficult to redirect by staff  Continue LCTF supportive care  Continue to respond to patient's need/ calls   Continue to reorient/redirection as often as needed  Continue fall precaution  Very good meal completion: %   Continue the following meds:  * Trazodone BID 50mgin AM/ 150mg at HS  * Zoloft 150g daily  * Lorazepam 0 25mg QID  * Risperidone 0 5mg TID  * Gabapentin 600mg TID  Nursing continue to monitor hydration/additional intake  CMP and Mg on 7/21/2022  Atrial fibrillation ([facility] )  Has pacemaker in place  Stable and controlled  BP range (7/1-18/2022) = 102/53 to 150/60  HR range (May-July, 2022) = 67 to 78/min  Continue the following meds:  * Eliquis 5mg BID  * Amlodipine 10mg daily - with HOLD parameters  * Losartan 25mg daily  * Pantoprazole 40mg daily in AM (gastroprotective)    (HFpEF) heart failure with preserved ejection fraction (HCC)  Wt Readings from Last 3 Encounters:   05/11/22 118 kg (260 lb 2 3 oz)   04/08/22 118 kg (259 lb 0 7 oz)   03/07/22 122 kg (268 lb)   No recent weight after May, 2022  Last documented weight: 245 0 lbs (5/23/2022)  Euvolemic on assessment  No B/L LE edema seen  LCTA  BP and HR stable and well controlled  Not on O2 supplement  Continue Furosemide 40mg daily/ KCl 20mEq daily  Continue Amlodipine/Losartan daily    Frequent UTI  Patient denies any  related symptoms on this visit  Seen and managed by [facility]  Last seen on May, 2022   Multifactorial  Known ESBL resistance  Continue Hiprex 1G BID/ Vit C 500mg BID  Continue Estrogen cream 2 x weekly    History of Clostridium difficile infection  Continue Probiotic 250mg BID    Chronic pain  Patient denies any pain on this visit  Continue Baclofen 5mg daily/ Gabapentin TID/ Tylenol 650mg BID + [id number] PRN  Continue pain assessment Q shift      Chief complaint / Reason for visit: Follow-up visit    History of Present Illness: This is an 32-year-old female patient admitted at [facility] for Dementia  Patient is seen and examined today to follow up acute and chronic medical conditions: Behavioral Variant Frontotemporal Dementia, Atrial Fibrillation - with pacemaker, HFpEF, Hx of recurrent UTI - known ESBL resistance and colonozation, Hx of C diff infection, Chronic Pain  Patient is in bed for this visit - alert, cooperative, loud vocalizations, not in distress  Patient is verbal with clear coherent speech - oriented to name/birthday only  Patient reported that she is "[place]"  Patient exhibited intermittent screaming - usually repetitive request that she has been made aware that will be provided for her but not at that very moment  Patient can be redirected effectively but and able to sustain longer than 5 minutes  Patient is cooperative and follows instructions during physical examination without difficulty  Needs constant redirection in order to refocus and follow instructions  Noted that patient had repetitive behaviors noted on this visit: sharp clapping to get attention or express need  When asked why she is screaming loudly when she asked for something,  patient reported, " I don't know  I can't help it"  Patient noted to have a hoarse voice  Patient participated in ROS assessment on this visit - stated I am all right - denies any pain, chest pain, shortness of breath, headache, fever/generalized malaise, dizziness/vertigo, GI/ related concerns, feeling anxious, poor appetite  Patient is observed to be screaming while awake with short period of calm/quiet during meals then resume screaming again with no specific purpose or expressed need when attended to by CNA when this provider is visiting unit  Per nursing, this is baseline for patient since she is admitted to the facility - that multiple medication changes/adjustment done without significant improvement  Per nursing, family does not want any further change to patient's current behavior medication at this time  Review of Systems:  Per history of present illness, all other systems reviewed and negative  HISTORY:  Medical Hx: Reviewed, unchanged  Family Hx: Reviewed, unchanged  Soc Hx: Reviewed,  unchanged    ALLERGY: Reviewed, unchanged  Allergies   Allergen Reactions    Aspirin     Iodinated Diagnostic Agents Throat Swelling     Pt states "when I was 14years old driving home from the hospital I felt like I couldn't think and it went away after 10 minutes"     Iodine - Food Allergy     Nsaids     Other Other (See Comments)     difficulty swallowing for short period        PHYSICAL EXAM:  Vital Signs:  T97 9F -P78 -R20 BP: 120/76 SpO2: 97% RA  Weight:  245 0 lbs (5/23/2022) <= 249 1 lbs (4/27/2022) * No recent weight on file    General: NAD  Morbidly obese  Head: Atraumatic  Normocephalic  Eye Exam: anicteric sclera, no discharge, PERRLA, No injection  Wears glasses  Oral Exam: moist mucous membranes, no buccaloropharyngeal erythema, palatine tonsils WNL  Neck Exam: no anterior cervical lymphadenopathy noted, neck supple  Cardiovascular: regular rate, irregular rhythm, (+) murmurs, rubs, or gallops  Pulmonary: no wheeze, no rhonchi, no rales  No chest tenderness  Abdominal: soft, non-tender, nondistended, bowel sounds audible x 4 quadrants  Ave meal completion: %  : Non distended bladder  Incontinent  BM every 1-2 days  Last documented BM: 7/18/2022  Patient voids 4-5 x per day  Extremities and skin: no edema noted, no rashes  Intact skin  Multiple dry scab to toes - stable - no signs of infection  Neurological: alert, cooperative and responsive, Oriented x 2, moving all 4 extremities symmetrically  Bed bound      Laboratory results / Imaging reviewed: Hard copy/ies in medical chart  Last labs done on 6/21/2022  Current Medications:   All medications reviewed and updated in Nursing Home Chart    CURRENT MEDICATION LIST IN [facility]:    Current Outpatient Medications:     acetaminophen (TYLENOL) 325 mg tablet, Take 650 mg by mouth every 6 (six) hours as needed, Disp: , Rfl:     acetaminophen (TYLENOL) 325 mg tablet, Take 650 mg by mouth 2 (two) times a day, Disp: , Rfl:     amLODIPine (NORVASC) 10 mg tablet, Take 1 tablet (10 mg total) by mouth daily, Disp: 90 tablet, Rfl: 1    ascorbic acid (VITAMIN C) 500 mg tablet, Take 500 mg by mouth 2 (two) times a day, Disp: , Rfl:     Baclofen 5 MG TABS, TAKE 1 TABLET EVERY MORNING, Disp: 90 tablet, Rfl: 1    calcium carbonate-vitamin D (OSCAL-D) 500 mg-200 units per tablet, Take 1 tablet by mouth 2 (two) times a day with meals  , Disp: , Rfl:     conjugated estrogens (PREMARIN) vaginal cream, Insert 1 g into the vagina 2 (two) times a week, Disp: , Rfl:     docusate sodium (COLACE) 100 mg capsule, Take 100 mg by mouth 2 (two) times a day, Disp: , Rfl:     Eliquis 5 MG, TAKE 1 TABLET BY MOUTH TWICE A DAY, Disp: 180 tablet, Rfl: 1    ferrous sulfate 325 (65 Fe) mg tablet, TAKE 1 TABLET BY MOUTH EVERY DAY WITH BREAKFAST, Disp: 90 tablet, Rfl: 1    fluticasone (EQL Fluticasone Propionate) 50 mcg/act nasal spray, 1 spray into each nostril daily, Disp: 16 g, Rfl: 3    furosemide (LASIX) 40 mg tablet, Take 1 tablet (40 mg total) by mouth daily, Disp: 30 tablet, Rfl: 0    gabapentin (NEURONTIN) 600 MG tablet, TAKE 1 TABLET BY MOUTH THREE TIMES A DAY, Disp: 270 tablet, Rfl: 1    Incontinence Supply Disposable (PROCare Bariatric Briefs) MISC, Use every 2 (two) hours as needed (incontinence), Disp: 120 each, Rfl: 1    Incontinence Supply Disposable (SAPS health Incontinence Pads) MISC, Use 1 each every 2 (two) hours, Disp: 150 each, Rfl: 1    Incontinence Supply Disposable (Wings Adult Briefs XXL) MISC, Use every 2 (two) hours, Disp: 100 each, Rfl: 3    Infant Care Products (Baby Wipes) MISC, Use every 2 (two) hours, Disp: 100 each, Rfl: 3    latanoprost (XALATAN) 0 005 % ophthalmic solution, Administer 1 drop to both eyes daily at bedtime, Disp: 7 5 mL, Rfl: 0    loratadine (CLARITIN) 10 mg tablet, TAKE 1 TABLET BY MOUTH EVERY DAY, Disp: 90 tablet, Rfl: 1    LORazepam (ATIVAN) 0 5 mg tablet, Take 0 25 mg by mouth 4 (four) times a day, Disp: , Rfl:     losartan (COZAAR) 25 mg tablet, TAKE 1 TABLET (25 MG TOTAL) BY MOUTH DAILY  , Disp: 90 tablet, Rfl: 0    methenamine hippurate (HIPREX) 1 g tablet, Take 1 tablet (1 g total) by mouth 2 (two) times a day with meals, Disp: 60 tablet, Rfl: 12    nystatin (MYCOSTATIN) powder, Apply topically 3 (three) times a day, Disp: 15 g, Rfl: 0    pantoprazole (PROTONIX) 40 mg tablet, TAKE 1 TABLET BY MOUTH DAILY BEFORE BREAKFAST, Disp: 90 tablet, Rfl: 3    potassium chloride (K-DUR,KLOR-CON) 20 mEq tablet, Take 20 mEq by mouth in the morning, Disp: , Rfl:     risperiDONE (RisperDAL) 0 5 mg tablet, Take 0 5 mg by mouth 3 (three) times a day, Disp: , Rfl:     saccharomyces boulardii (FLORASTOR) 250 mg capsule, Take 1 capsule (250 mg total) by mouth 2 (two) times a day, Disp: 60 capsule, Rfl: 0    sertraline (ZOLOFT) 100 mg tablet, Take 150 mg by mouth daily, Disp: , Rfl:     traZODone (DESYREL) 100 mg tablet, Take 1 5 tablets (150 mg total) by mouth daily at bedtime, Disp: 135 tablet, Rfl: 0    traZODone (DESYREL) 50 mg tablet, Take 50 mg by mouth in the morning, Disp: , Rfl:       Please note:  Voice-recognition software may have been used in the preparation of this document  Occasional wrong word or "sound-alike" substitutions may have occurred due to the inherent limitations of voice recognition software  Interpretation should be guided by context      LISA LinaresNP  7/19/2022

## 2022-07-19 PROBLEM — Z86.19 HISTORY OF CLOSTRIDIUM DIFFICILE INFECTION: Status: ACTIVE | Noted: 2022-07-19

## 2022-07-19 RX ORDER — RISPERIDONE 0.5 MG/1
0.5 TABLET, FILM COATED ORAL 3 TIMES DAILY
COMMUNITY
End: 2022-09-13 | Stop reason: ALTCHOICE

## 2022-07-19 RX ORDER — ACETAMINOPHEN 325 MG/1
650 TABLET ORAL 2 TIMES DAILY
COMMUNITY

## 2022-07-19 RX ORDER — ASCORBIC ACID 500 MG
500 TABLET ORAL 2 TIMES DAILY
COMMUNITY

## 2022-07-19 RX ORDER — ACETAMINOPHEN 325 MG/1
650 TABLET ORAL EVERY 6 HOURS PRN
COMMUNITY

## 2022-07-19 RX ORDER — LORAZEPAM 0.5 MG/1
0.25 TABLET ORAL 4 TIMES DAILY
COMMUNITY
End: 2022-07-26 | Stop reason: SDUPTHER

## 2022-07-19 RX ORDER — POTASSIUM CHLORIDE 20 MEQ/1
20 TABLET, EXTENDED RELEASE ORAL DAILY
COMMUNITY

## 2022-07-19 RX ORDER — TRAZODONE HYDROCHLORIDE 50 MG/1
50 TABLET ORAL DAILY
COMMUNITY

## 2022-07-19 RX ORDER — DOCUSATE SODIUM 100 MG/1
100 CAPSULE, LIQUID FILLED ORAL 2 TIMES DAILY
COMMUNITY

## 2022-07-19 NOTE — ASSESSMENT & PLAN NOTE
Has pacemaker in place  Stable and controlled  BP range (7/1-18/2022) = 102/53 to 150/60  HR range (May-July, 2022) = 67 to 78/min  Continue the following meds:  * Eliquis 5mg BID  * Amlodipine 10mg daily - with HOLD parameters  * Losartan 25mg daily  * Pantoprazole 40mg daily in AM (gastroprotective)

## 2022-07-19 NOTE — ASSESSMENT & PLAN NOTE
Signficant behavioral disturbance - often difficult to redirect by staff  Continue LCTF supportive care  Continue to respond to patient's need/ calls   Continue to reorient/redirection as often as needed  Continue fall precaution  Very good meal completion: %   Continue the following meds:  * Trazodone BID 50mgin AM/ 150mg at HS  * Zoloft 150g daily  * Lorazepam 0 25mg QID  * Risperidone 0 5mg TID  * Gabapentin 600mg TID  Nursing continue to monitor hydration/additional intake  CMP and Mg on 7/21/2022

## 2022-07-19 NOTE — ASSESSMENT & PLAN NOTE
Patient denies any  related symptoms on this visit  Seen and managed by [facility]  Last seen on May, 2022   Multifactorial  Known ESBL resistance  Continue Hiprex 1G BID/ Vit C 500mg BID  Continue Estrogen cream 2 x weekly

## 2022-07-19 NOTE — ASSESSMENT & PLAN NOTE
Patient denies any pain on this visit    Continue Baclofen 5mg daily/ Gabapentin TID/ Tylenol 650mg BID + [id number] PRN  Continue pain assessment Q shift

## 2022-07-26 DIAGNOSIS — F41.9 ANXIETY: Primary | ICD-10-CM

## 2022-07-26 RX ORDER — LORAZEPAM 0.5 MG/1
0.25 TABLET ORAL 4 TIMES DAILY
Qty: 60 TABLET | Refills: 0 | Status: SHIPPED | OUTPATIENT
Start: 2022-07-26

## 2022-07-28 NOTE — PROGRESS NOTES
Progress Note - [address] 70 y o  female MRN: [id number]  Unit/Bed#: CK9 561-01 Encounter: [id number]    The patient was seen for continuing care and reviewed with treatment team   The patient continues to be agitated and screaming nonstop  Clinical picture appears to be hyperactive delirium  So far we have not been able to find an organic cause for this  Different possibilities include the affects of fentanyl and also paradoxical effects of Depakote as well as reducing Neurontin which might be causing some form of withdrawal due to its GABAergic activity  Her sleep was not as good as previous nights  Continues to disrobe    Mental Status Evaluation:  Appearance:  Marginal/poor hygiene   Behavior:  psychomotor agitation and Uncooperative   Mood:  Uncertain   Thought Content:  Cannot be assessed due to patient factors   Perceptual Disturbances: Appears to be responding to internal stimuli   Risk Potential: Potentially aggresive and violent   Orientation:   Inattentive and disorganized and delirious         Assessment/Plan    Principal Problem:    Bipolar affective disorder, currently manic, moderate ([facility])  Active Problems:    Dementia with behavioral disturbance    Continuous opioid dependence ([facility])    Abnormal EKG    Delirium due to another medical condition, acute, hyperactive      Recommended Treatment: We will increase gabapentin back to 400 mg 3 times daily I will continue with Depakote taper  We will increase lorazepam and start risperidone  I will also reduce Duragesic from 50 to 37 mcg every 72 hours   Continue with pharmacotherapy, group therapy, milieu therapy and occupational therapy    The patient will be maintained on the following medications:    Current Facility-Administered Medications:  acetaminophen 325 mg Oral Q8H PRN LUCY Falcon   acetaminophen 650 mg Oral Q8H [address] LUCY Sanchez   aluminum-magnesium hydroxide-simethicone 15 mL Oral Q4H PRN Catalino Liang Report received from Haylie care assumed at this time.    Jayden Taylor MD   amLODIPine 10 mg Oral Daily Yamel M Homa, CRNP   benztropine 1 mg Intramuscular Q8H PRN Greg Crespo MD   benztropine 1 mg Oral Q8H PRN Greg Crespo MD   cholecalciferol 1,000 Units Oral Daily Yamel M Homa, CRNP   divalproex sodium 500 mg Oral HS Klever Lacy MD   docusate sodium 100 mg Oral BID Yamel M Homa, CRNP   [START ON 9/20/2018] fentaNYL 12 mcg Transdermal Q72H Klever Lacy MD   [START ON 9/20/2018] fentaNYL 25 mcg Transdermal Q72H Klever Lacy MD   furosemide 40 mg Oral Daily Yamel M Homa, CRNP   gabapentin 400 mg Oral TID Klever Lacy MD   haloperidol 10 mg Oral Q8H PRN Klever Lacy MD   haloperidol lactate 5 mg Intramuscular Q6H PRN Klever Lacy MD   hydrOXYzine HCL 25 mg Oral Q6H PRN Greg Crespo MD   latanoprost 1 drop Both Eyes HS Yamel M Homa, CRNP   lidocaine 1 patch Transdermal Daily Yamel M Homa, CRNP   LORazepam 1 mg Intramuscular Q6H PRN Yfn Becker, CRNP   LORazepam 2 mg Oral Q6H PRN Klever Lacy MD   LORazepam 2 mg Oral TID after meals Klever Lacy MD   losartan 25 mg Oral Daily Yamel M Homa, CRNP   magnesium hydroxide 30 mL Oral Daily PRN Greg Crespo MD   methocarbamol 500 mg Oral Q6H [address] Yamel M Homa, CRNP   nystatin  Topical BID Carmella Kline PA-C   OLANZapine 10 mg Intramuscular Q8H PRN Klever Lacy MD   oxyCODONE 5 mg Oral Q4H PRN Yfn Becker, CRNP   pantoprazole 40 mg Oral Early Morning Yamel M Homa, CRNP   phenol 1 spray Mouth/Throat Q2H PRN Yamel M Homa, CRNP   risperiDONE 2 mg Oral BID Klever Lacy MD   rivaroxaban 20 mg Oral Daily With [employer], CRNP   sterile water       sterile water

## 2022-08-11 ENCOUNTER — NURSING HOME VISIT (OUTPATIENT)
Dept: GERIATRICS | Facility: OTHER | Age: 82
End: 2022-08-11
Payer: COMMERCIAL

## 2022-08-11 DIAGNOSIS — I15.8 OTHER SECONDARY HYPERTENSION: ICD-10-CM

## 2022-08-11 DIAGNOSIS — F41.9 ANXIETY: ICD-10-CM

## 2022-08-11 DIAGNOSIS — F32.A DEPRESSION, UNSPECIFIED DEPRESSION TYPE: ICD-10-CM

## 2022-08-11 DIAGNOSIS — F31.12 BIPOLAR AFFECTIVE DISORDER, CURRENTLY MANIC, MODERATE (HCC): Primary | ICD-10-CM

## 2022-08-11 PROBLEM — R30.0 DIFFICULT OR PAINFUL URINATION: Status: ACTIVE | Noted: 2021-10-19

## 2022-08-11 PROBLEM — R33.9 URINARY RETENTION: Status: ACTIVE | Noted: 2019-04-19

## 2022-08-11 PROBLEM — D50.9 IRON DEFICIENCY ANEMIA: Status: ACTIVE | Noted: 2019-04-19

## 2022-08-11 PROCEDURE — 99309 SBSQ NF CARE MODERATE MDM 30: CPT | Performed by: INTERNAL MEDICINE

## 2022-08-11 NOTE — PROGRESS NOTES
[address] Nursing home notes  LONG TERM CARE       NAME: Hussain Dickerson  AGE: [de-identified] y o  SEX: female    DATE OF ENCOUNTER: 8/11/2022    Assessment and Plan   Bipolar affective disorder, currently manic, moderate (HCC)  Continues to have fluctuation in mood   Continue current meds  Continue monitoring symptoms     Hypertension  BP reviewed from facility records, BP acceptable range  Continue current meds  Continue monitoring BP    Depression  On multiple medications  Continue with current meds  Will consider decreasing once patient is stable as patient on high doses of meds  Continue monitoring symptoms     Anxiety  Still seems to be anxious  Continue current meds  Continue monitoring symptoms      Chief Complaint     Wet and reports needs change    History of Present Illness     [de-identified] yo feamle seen for monthly visit and med renewal  Patient seen for bipolar, HTN, depression and anxiety  Reviewed nursing notes since last visit       PMHx     Past Medical History:   Diagnosis Date    A-fib [facility])     Acute on chronic diastolic heart failure (HCC) 3/6/2022    Anxiety     Chronic pain     Chronic respiratory failure with hypoxia (HCC)     COPD (chronic obstructive pulmonary disease) (HCC)     Dementia (HCC)     Dorsalgia, unspecified     Edema     Encephalopathy     GERD (gastroesophageal reflux disease)     Gross hematuria 3/31/2022    Hypertension     Morbid obesity (HCC)     Muscle weakness (generalized)     Opioid dependence (HCC)     Overactive bladder     Pneumonia     Positive blood culture 4/9/2022    Psychiatric disorder     anxiety, bipolar    Radiculopathy of thoracolumbar region     Sciatica     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 ([facility] [address] )     Urinary incontinence     UTI (urinary tract infection)      Past Surgical History:   Procedure Laterality Date    APPENDECTOMY      BACK SURGERY      CHOLECYSTECTOMY      CYSTOSCOPY  11/16/2021 Dr Stock Primer       No family history on file  Social History     Socioeconomic History    Marital status:      Spouse name: Not on file    Number of children: Not on file    Years of education: Not on file    Highest education level: Not on file   Occupational History    Not on file   Tobacco Use    Smoking status: Former Smoker     Types: Cigarettes     Quit date: 1995     Years since quittin 7    Smokeless tobacco: Never Used   Vaping Use    Vaping Use: Never used   Substance and Sexual Activity    Alcohol use: Never    Drug use: No    Sexual activity: Not Currently   Other Topics Concern    Not on file   Social History Narrative    Not on file     Social Determinants of Health     Financial Resource Strain: Not on file   Food Insecurity: No Food Insecurity    Worried About Running Out of Food in the Last Year: Never true    Oleg of Food in the Last Year: Never true   Transportation Needs: No Transportation Needs    Lack of Transportation (Medical): No    Lack of Transportation (Non-Medical): No   Physical Activity: Not on file   Stress: Not on file   Social Connections: Not on file   Intimate Partner Violence: Not on file   Housing Stability: Low Risk     Unable to Pay for Housing in the Last Year: No    Number of Places Lived in the Last Year: 1    Unstable Housing in the Last Year: No     Allergies   Allergen Reactions    Aspirin     Iodinated Diagnostic Agents Throat Swelling     Pt states "when I was 14years old driving home from the hospital I felt like I couldn't think and it went away after 10 minutes"     Iodine - Food Allergy     Nsaids     Other Other (See Comments)     difficulty swallowing for short period       Review of Systems     Denies any pain or shortness of breath but limited due to her dementia  All other review of system negative      Objective   Vital signs reviewed from facility records      PHYSICAL EXAM:  GENERAL: no acute distress  SKIN: warm, dry, no rash, no cyanosis  HEENT: normocephalic, atraumatic, no JVD, no Thyromegaly, no lymphadenopathy  LUNGS: CTA, no wheezing, no rales, expanded equally, no chest tenderness   HEART: irregularly irregular rhythm, normal rate, no murmur, no gallop  ABDOMEN: soft non tender non distended bs+, no guarding or rebound tenderness, obese  :  no suprapubic tenderness  MUSCULOSKELETAL: strength about 4+/5 all extremities, no calf tenderness  NEUROLOGY: awake, alert,  CN2-12 intact  PSYCH: cooperative, pleasant, impaired memory       Pertinent Laboratory/Diagnostic Studies:  Recent labs and diagnostic tests reviewed in nursing home EMR    Current Medications   Medications reviewed from nursing home EMR          Ayaz Carrillo MD

## 2022-08-11 NOTE — ASSESSMENT & PLAN NOTE
BP reviewed from facility records, BP acceptable range  Continue current meds  Continue monitoring BP

## 2022-08-11 NOTE — ASSESSMENT & PLAN NOTE
On multiple medications  Continue with current meds  Will consider decreasing once patient is stable as patient on high doses of meds  Continue monitoring symptoms

## 2022-08-30 ENCOUNTER — NURSING HOME VISIT (OUTPATIENT)
Dept: GERIATRICS | Facility: OTHER | Age: 82
End: 2022-08-30
Payer: COMMERCIAL

## 2022-08-30 DIAGNOSIS — N30.00 ACUTE CYSTITIS WITHOUT HEMATURIA: Primary | ICD-10-CM

## 2022-08-30 PROCEDURE — 99309 SBSQ NF CARE MODERATE MDM 30: CPT | Performed by: INTERNAL MEDICINE

## 2022-08-30 NOTE — PROGRESS NOTES
[address] Nursing home notes  LONG TERM CARE       NAME: Lj Cruz  AGE: [de-identified] y o  SEX: female    DATE OF ENCOUNTER: 8/30/2022    Assessment and Plan   Acute cystitis without hematuria  Started on rocephin but had changed to cefepime after family talked to ID who initially recommended gentamycin but after NP discussion with ID and after culture and sensitivity reviewed it was agreed to go with cefepime for 3 day course  Continue monitoring IV line as patient at times pulls it and small about of blood around the IV  Continue to encourage oral fluids  NP to follow up with ID on Thursday  Continue monitoring for worsening symptoms       Chief Complaint     No new complaints but confused and limited due to her cognition     History of Present Illness     [de-identified] yo female seen for complicated UTI  As per report family had contacted ID due to her worsening behavior and UTI treatment  At which time ID had recommended adjusted antibiotics  Reviewed nursing notes since last visit       PMHx     Past Medical History:   Diagnosis Date    A-fib [facility])     Acute on chronic diastolic heart failure (HCC) 3/6/2022    Anxiety     Chronic pain     Chronic respiratory failure with hypoxia (HCC)     COPD (chronic obstructive pulmonary disease) (HCC)     Dementia (HCC)     Dorsalgia, unspecified     Edema     Encephalopathy     GERD (gastroesophageal reflux disease)     Gross hematuria 3/31/2022    Hypertension     Morbid obesity (HCC)     Muscle weakness (generalized)     Opioid dependence (HCC)     Overactive bladder     Pneumonia     Positive blood culture 4/9/2022    Psychiatric disorder     anxiety, bipolar    Radiculopathy of thoracolumbar region     Sciatica     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 ([facility] [address] )     Urinary incontinence     UTI (urinary tract infection)      Past Surgical History:   Procedure Laterality Date    APPENDECTOMY      BACK SURGERY  CHOLECYSTECTOMY      CYSTOSCOPY  2021    Dr Stock Primer       No family history on file  Social History     Socioeconomic History    Marital status:      Spouse name: Not on file    Number of children: Not on file    Years of education: Not on file    Highest education level: Not on file   Occupational History    Not on file   Tobacco Use    Smoking status: Former Smoker     Types: Cigarettes     Quit date: 1995     Years since quittin 8    Smokeless tobacco: Never Used   Vaping Use    Vaping Use: Never used   Substance and Sexual Activity    Alcohol use: Never    Drug use: No    Sexual activity: Not Currently   Other Topics Concern    Not on file   Social History Narrative    Not on file     Social Determinants of Health     Financial Resource Strain: Not on file   Food Insecurity: No Food Insecurity    Worried About Running Out of Food in the Last Year: Never true    Oleg of Food in the Last Year: Never true   Transportation Needs: No Transportation Needs    Lack of Transportation (Medical): No    Lack of Transportation (Non-Medical):  No   Physical Activity: Not on file   Stress: Not on file   Social Connections: Not on file   Intimate Partner Violence: Not on file   Housing Stability: Low Risk     Unable to Pay for Housing in the Last Year: No    Number of Places Lived in the Last Year: 1    Unstable Housing in the Last Year: No     Allergies   Allergen Reactions    Aspirin     Iodinated Diagnostic Agents Throat Swelling     Pt states "when I was 14years old driving home from the hospital I felt like I couldn't think and it went away after 10 minutes"     Iodine - Food Allergy     Nsaids     Other Other (See Comments)     difficulty swallowing for short period       Review of Systems     Denies any pain or shortness of breath  Wants to go the bathroom  All other review of system negative      Objective   Vital signs reviewed from facility records  PHYSICAL EXAM:  GENERAL: no acute distress   SKIN: warm, dry, no rash, no cyanosis, IV on the left hand with small amount of blood around it and informed staff about it  HEENT: normocephalic, atraumatic, no JVD, no Thyromegaly, no lymphadenopathy  LUNGS: CTA, no wheezing, no rales, expanded equally, no chest tenderness   HEART: irregularly irregular rhythm, normal rate, no murmur, no gallop  ABDOMEN: soft non tender non distended bs+, no guarding or rebound tenderness  :  no suprapubic tenderness  MUSCULOSKELETAL: strength about 4+/5 all extremities, no calf tenderness  NEUROLOGY: awake, alert, CN2-12 intact  PSYCH: cooperative, restless, impaired memory      Pertinent Laboratory/Diagnostic Studies:  Recent labs and diagnostic tests reviewed in nursing home EMR    Current Medications   Medications reviewed from nursing home EMR          Krystal Pendleton MD

## 2022-08-30 NOTE — ASSESSMENT & PLAN NOTE
Started on rocephin but had changed to cefepime after family talked to ID who initially recommended gentamycin but after NP discussion with ID and after culture and sensitivity reviewed it was agreed to go with cefepime for 3 day course  Continue monitoring IV line as patient at times pulls it and small about of blood around the IV  Continue to encourage oral fluids  NP to follow up with ID on Thursday  Continue monitoring for worsening symptoms

## 2022-09-12 ENCOUNTER — NURSING HOME VISIT (OUTPATIENT)
Dept: GERIATRICS | Facility: OTHER | Age: 82
End: 2022-09-12
Payer: COMMERCIAL

## 2022-09-12 DIAGNOSIS — N39.0 FREQUENT UTI: ICD-10-CM

## 2022-09-12 DIAGNOSIS — I50.32 CHRONIC HEART FAILURE WITH PRESERVED EJECTION FRACTION (HCC): ICD-10-CM

## 2022-09-12 DIAGNOSIS — G31.09 BEHAVIORAL VARIANT FRONTOTEMPORAL DEMENTIA (HCC): Primary | Chronic | ICD-10-CM

## 2022-09-12 DIAGNOSIS — F02.818 BEHAVIORAL VARIANT FRONTOTEMPORAL DEMENTIA (HCC): Primary | Chronic | ICD-10-CM

## 2022-09-12 DIAGNOSIS — I48.21 PERMANENT ATRIAL FIBRILLATION (HCC): ICD-10-CM

## 2022-09-12 PROCEDURE — 99309 SBSQ NF CARE MODERATE MDM 30: CPT | Performed by: NURSE PRACTITIONER

## 2022-09-12 NOTE — PROGRESS NOTES
[address], [address], [facility] SPECIALTY [facility], [address] [facility]  [phone]    NAME: Symone Reid  AGE: [de-identified] y o  SEX: female    Progress Note    Location: [place]  POS: 32 (LTC)    Assessment/Plan:    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[facility] [address] )  Continues with behavioral disturbance: yelling loudly to express need  Continue LCTF supportive care  Continue to respond to patient's need/ calls   Continue to reorient/redirection as often as needed  Continue fall precaution  Very good meal completion: %   Recently weaned off Risperidone by [employer] provider  Continue the following meds:  * Trazodone BID 50mg AM/ 150mg at HS  * Zoloft 150g daily  * Lorazepam 0 25mg QID  * Gabapentin 600mg TID  Nursing continue to monitor hydration/additional intake  Renal and hepatic functions WNL (7/21/2022)  Has pending TSH/Vit B12/Total T4 and Free T4 on 9/13/2022  Code status/Goal: Confrot measures  DNR/ DNI or any advanced airway management including mechanicla ventilation  Limited additional interventions  Avoid intensive care if possible  Determine use or limitation of antibiotics when infection occurs  No hydration and artificial nutrition by tube  IVF OK       [facility] fibrillation ([facility] )  Has pacemaker in place  Stable and controlled  BP range (9/1-12/2022) = 98/44 to 145/41  HR range (Aug-Sept, 2022) = 65 to 71/min  Continue the following meds:  * Eliquis 5mg BID  * Amlodipine 10mg daily/ Losartan 25mg daily  * Pantoprazole 40mg daily in AM (gastroprotective)     (HFpEF) heart failure with preserved ejection fraction (HCC)  Wt Readings from Last 3 Encounters:  05/11/22 118 kg (260 lb 2 3 oz)  04/08/22 118 kg (259 lb 0 7 oz)  03/07/22 122 kg (268 lb)  No recent weight after May, 2022  Last documented weight: 245 0 lbs (5/23/2022)  Euvolemic on assessment  No B/L LE edema seen  On oral fluid restriction  BP and HR stable and well controlled    Continue Furosemide 40mg daily/ KCl 20mEq daily  Nursing to resume monthly weight check     Frequent UTI  Recent management for UTI (August, 2022)  Completed IV Cefepime per ID recommendation  Last seen by [facility] ID office on 7/20/2022  = Known ESBL resistance and colonization  Change oral fluid limit from 1,500ml to to 1,800ml per day  Continue Hiprex 1G BID/ Vit C 500mg BID  Continue Estrogen cream 2 x weekly    Chief complaint / Reason for visit: Follow -up visit    History of Present Illness: This is an 82-year-old female patient admitted at [facility] for Dementia  Patient is seen and examined today to follow up acute and chronic medical conditions: Behavioral Variant Frontotemporal Dementia, Atrial Fibrillation - with pacemaker, HFpEF, 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, Hx of C diff infection, Chronic Pain      Patient is in bed for this visit - alert, inconsistently cooperative, loud vocalizations, not in distress  Patient exhibited paranoid and anxious behavior - per nursing, patient is upset that her CNA took her break  Able to redirect and refocus by this provider but only for short period of time and yelling resumes again  Eventually this provider was told to " get out of my room" by patient  Per nursing, patient was  upset because I tripped on her call bell while i was in the room  Patient is verbal with clear speech - voice hoarse and raspy sounding -  per nursing, this is patient baseline voice since admitted to the facility  Patient did not participate during mentation/orientation assessment nor participated during ROS assessment  Per nursing, patient often request for water - mouth dry  Will increase oral fluid restriction to 1,800ml daily  Overall, nursing described behavior as slightly improved - more redirectable and has interrmittent period of verbal engagement now compared to prior behaviors  No acute medical concerns fro this visit      Review of Systems:  Per history of present illness, all other systems reviewed and negative  HISTORY:  Medical Hx: Reviewed, unchanged  Family Hx: Reviewed, unchanged  Soc Hx: Reviewed,  unchanged    ALLERGY: Reviewed, unchanged  Allergies   Allergen Reactions    Aspirin     Iodinated Diagnostic Agents Throat Swelling     Pt states "when I was 14years old driving home from the hospital I felt like I couldn't think and it went away after 10 minutes"     Iodine - Food Allergy     Nsaids     Other Other (See Comments)     difficulty swallowing for short period        PHYSICAL EXAM:  Vital Signs:  T98 0F -P68 -R18 BP: 120/48  SpO2: 98% RA  Weight: 245 0 lbs (5/23/2022)    General: NAD  with Morbid obesity  Head: Atraumatic  Normocephalic  Eye Exam: anicteric sclera, no discharge, PERRLA, No injection  Wears glasses  Oral Exam: moist mucous membranes, no buccaloropharyngeal erythema, palatine tonsils WNL  Neck Exam: no anterior cervical lymphadenopathy noted, neck supple  Cardiovascular: regular rate, irregular rhythm, (+) murmurs, rubs, or gallops  Pulmonary: no wheeze, no rhonchi, no rales  No chest tenderness  Abdominal: soft, non-tender, nondistended, bowel sounds audible x 4 quadrants  Ave meal completion: %  : Non distended bladder  Incontinent  Daily BM  Patient voids 4-5 x per day  Extremities and skin: no edema noted, no rashes  Intact skin  Neurological: alert, inconsistently cooperative and responsive,  moving all 4 extremities symmetrically  Bed bound      Laboratory results / Imaging reviewed: Hard copy/ies in medical chart:    * CMP (7/21/2022):   GLUCOSE 93 mg/dL 65-99  Final         BUN 18 mg/dL 7-25  Final         CREATININE 0 77 mg/dL 0 40-1 10  Final         SODIUM 144 mmol/L 135-145  Final         POTASSIUM 3 7 mmol/L 3 5-5 2  Final         CHLORIDE 108 mmol/L 100-109  Final         CARBON DIOXIDE 31 mmol/L 23-31  Final         CALCIUM 9 2 mg/dL 8 5-10 1  Final         ALKALINE PHOSPHATASE 56 U/L   Final         ALBUMIN 2 8 g/dL 3 5-4 8 L Final  BILIRUBIN,TOTAL 0 2 mg/dL 0 2-1 0  Final   Use of this assay is not recommended for patients undergoing treatment   with eltrombopag due to the potential for falsely elevated results       PROTEIN, TOTAL 6 5 g/dL 6 3-8 3  Final         AST 11 U/L <41  Final         ALT 11 U/L <56  Final         ANION GAP 5  3-11  Final         eGFRcr 78  >59  Final         eGFRcr COMMENT (Note)    Final       * Mg (7/21/2022) = 2 0 (WNL)    * CBC with diff (6/21/2022):   HEMOGLOBIN 11 8 g/dL 11 5-14 5  Final         HEMATOCRIT 36 1 % 35 0-43 0  Final         WBC 4 9 thou/cmm 4 0-10 0  Final         RBC 4 38 mill/cmm 3 70-4 70  Final         PLATELET COUNT 808 thou/cmm 140-350 L Final         MPV 8 6 fL 7 5-11 3  Final         MCV 83 fL   Final         MCH 27 0 pg 26 0-34 0  Final         MCHC 32 7 g/dL 32 0-37 0  Final         RDW 16 9 % 12 0-16 0 H Final         DIFFERENTIAL TYPE AUTO    Final         ABSOLUTE NEUT 2 6 thou/cmm 1 8-7 8  Final         ABSOLUTE LYMPH 1 5 thou/cmm 1 0-3 0  Final         ABSOLUTE MONO 0 5 thou/cmm 0 3-1 0  Final         ABSOLUTE EOS 0 2 thou/cmm 0 0-0 5  Final         ABSOLUTE BASO 0 0 thou/cmm 0 0-0 1  Final         NEUTROPHILS 52 %   Final         LYMPHOCYTES 32 %   Final         MONOCYTES 11 %   Final         EOSINOPHILS 4 %   Final         BASOPHILS 1 %   Final       Current Medications: All medications reviewed and updated in Nursing Home Chart    Please note:  Voice-recognition software may have been used in the preparation of this document  Occasional wrong word or "sound-alike" substitutions may have occurred due to the inherent limitations of voice recognition software  Interpretation should be guided by context      LUCY Soliz  9/13/2022

## 2022-10-11 ENCOUNTER — NURSING HOME VISIT (OUTPATIENT)
Dept: GERIATRICS | Facility: OTHER | Age: 82
End: 2022-10-11
Payer: COMMERCIAL

## 2022-10-11 DIAGNOSIS — R13.11 ORAL PHASE DYSPHAGIA: ICD-10-CM

## 2022-10-11 DIAGNOSIS — K59.04 CHRONIC IDIOPATHIC CONSTIPATION: ICD-10-CM

## 2022-10-11 DIAGNOSIS — K21.9 GASTROESOPHAGEAL REFLUX DISEASE, UNSPECIFIED WHETHER ESOPHAGITIS PRESENT: Primary | ICD-10-CM

## 2022-10-11 DIAGNOSIS — I15.8 OTHER SECONDARY HYPERTENSION: ICD-10-CM

## 2022-10-11 PROBLEM — A04.72 C. DIFFICILE DIARRHEA: Status: RESOLVED | Noted: 2022-03-06 | Resolved: 2022-10-11

## 2022-10-11 PROBLEM — N39.0 UTI (URINARY TRACT INFECTION): Status: RESOLVED | Noted: 2022-04-08 | Resolved: 2022-10-11

## 2022-10-11 PROCEDURE — 99309 SBSQ NF CARE MODERATE MDM 30: CPT | Performed by: INTERNAL MEDICINE

## 2022-10-11 RX ORDER — OMEPRAZOLE 20 MG/1
20 CAPSULE, DELAYED RELEASE ORAL DAILY
COMMUNITY

## 2022-10-11 NOTE — PROGRESS NOTES
[address] Nursing home notes  LONG TERM CARE       NAME: Ines Chinchilla  AGE: [de-identified] y o  SEX: female    DATE OF ENCOUNTER: 10/11/2022    Assessment and Plan   Gastroesophageal reflux disease  Continue ppi  Continue monitoring symptoms     Hypertension  BP reviewed from facility records, mostly acceptable range  Continue current meds   Continue monitoring BP    Chronic idiopathic constipation  Seems stable  Continue current meds  Continue monitoring bowels     Oral phase dysphagia  Seems stable  Continues on mechanical soft  Continue monitoring for aspiration       Chief Complaint     No new complaints    History of Present Illness     [de-identified] yo female seen for monthly visit and med renewal  Patient seen for GERD, HTN, constipation and dysphagia  Reviewed nursing notes since last visit  PMHx     Past Medical History:   Diagnosis Date   • A-fib [facility])    • Acute on chronic diastolic heart failure ([facility] [address] ) 3/6/2022   • Anxiety    • Chronic pain    • Chronic respiratory failure with hypoxia ([facility])    • COPD (chronic obstructive pulmonary disease) ([facility])    • Dementia ([facility])    • Dorsalgia, unspecified    • Edema    • Encephalopathy    • GERD (gastroesophageal reflux disease)    • Gross hematuria 3/31/2022   • Hypertension    • Morbid obesity ([facility] [address] )    • Muscle weakness (generalized)    • Opioid dependence ([facility])    • Overactive bladder    • Pneumonia    • Positive blood culture 4/9/2022   • Psychiatric disorder     anxiety, bipolar   • Radiculopathy of thoracolumbar region    • Sciatica    • 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 ([facility] [address] )    • Urinary incontinence    • UTI (urinary tract infection)      Past Surgical History:   Procedure Laterality Date   • APPENDECTOMY     • BACK SURGERY     • CHOLECYSTECTOMY     • CYSTOSCOPY  11/16/2021    Dr Lizeth Weinstein    • KNEE SURGERY       No family history on file  Social History     Socioeconomic History   • Marital status:   Spouse name: Not on file   • Number of children: Not on file   • Years of education: Not on file   • Highest education level: Not on file   Occupational History   • Not on file   Tobacco Use   • Smoking status: Former Smoker     Types: Cigarettes     Quit date: 1995     Years since quittin 9   • Smokeless tobacco: Never Used   Vaping Use   • Vaping Use: Never used   Substance and Sexual Activity   • Alcohol use: Never   • Drug use: No   • Sexual activity: Not Currently   Other Topics Concern   • Not on file   Social History Narrative   • Not on file     Social Determinants of Health     Financial Resource Strain: Not on file   Food Insecurity: No Food Insecurity   • Worried About Running Out of Food in the Last Year: Never true   • Ran Out of Food in the Last Year: Never true   Transportation Needs: No Transportation Needs   • Lack of Transportation (Medical): No   • Lack of Transportation (Non-Medical): No   Physical Activity: Not on file   Stress: Not on file   Social Connections: Not on file   Intimate Partner Violence: Not on file   Housing Stability: Low Risk    • Unable to Pay for Housing in the Last Year: No   • Number of Places Lived in the Last Year: 1   • Unstable Housing in the Last Year: No     Allergies   Allergen Reactions   • Aspirin    • Iodinated Diagnostic Agents Throat Swelling     Pt states "when I was 14years old driving home from the hospital I felt like I couldn't think and it went away after 10 minutes"    • Iodine - Food Allergy    • Nsaids    • Other Other (See Comments)     difficulty swallowing for short period       Review of Systems     Pain with urination which is chronic  Back pain which is chronic   Weakness of the legs   All other review of system negative      Objective   Vital signs reviewed from facility records       PHYSICAL EXAM:  GENERAL: no acute distress  SKIN: warm, dry, no rash, no cyanosis  HEENT: normocephalic, atraumatic, no JVD, no Thyromegaly, no lymphadenopathy  LUNGS: CTA, no wheezing, no rales, expanded equally, no chest tenderness   HEART: irregularly irregular rhythm, normal rate, no murmur, no gallop  ABDOMEN: soft non tender non distended bs+, no guarding or rebound tenderness  :  no suprapubic tenderness  MUSCULOSKELETAL: strength about 4+/5 all extremities, no calf tenderness  NEUROLOGY: awake, alert, CN2-12 intact  PSYCH: cooperative, pleasant, impaired memory       Pertinent Laboratory/Diagnostic Studies:  Recent labs and diagnostic tests reviewed in nursing home EMR    Current Medications   Medications reviewed from nursing home EMR          Alley Strickland MD

## 2022-10-11 NOTE — ASSESSMENT & PLAN NOTE
BP reviewed from facility records, mostly acceptable range  Continue current meds   Continue monitoring BP

## 2022-11-07 ENCOUNTER — EPISODE CHANGES (OUTPATIENT)
Dept: CASE MANAGEMENT | Facility: OTHER | Age: 82
End: 2022-11-07

## 2022-11-07 ENCOUNTER — PATIENT OUTREACH (OUTPATIENT)
Dept: FAMILY MEDICINE CLINIC | Facility: CLINIC | Age: 82
End: 2022-11-07

## 2022-11-07 NOTE — PROGRESS NOTES
Chart review completed for the following sections:  • Recent Vital Signs  • Allergies/Contradictions  • Medication Review   • History   • SDOH   • Problem List  • Immunizations  • Past hospitalizations and major procedures, including surgery  • Significant past illnesses and treatment history including: History and Physical  • Relevant past medications related to the patient's condition    Telephone call placed to patient's daughter, Jacki Coats to discuss [address]  and I left a message on her VM and requested that she return my call  I am familiar with this patient and assisted with long term placement of patient at the time  Telephone call placed to [facility]  and I confirmed that patient is still residing there  I am closing this patient to [address]  due to patient not having eligibility due to living in a Nursing Home  I am removing myself and RNCM from the care team and will resolve the socially complex and NCQA episodes

## 2022-11-09 ENCOUNTER — NURSING HOME VISIT (OUTPATIENT)
Dept: GERIATRICS | Facility: OTHER | Age: 82
End: 2022-11-09

## 2022-11-09 DIAGNOSIS — G31.09 BEHAVIORAL VARIANT FRONTOTEMPORAL DEMENTIA (HCC): Primary | Chronic | ICD-10-CM

## 2022-11-09 DIAGNOSIS — N39.0 FREQUENT UTI: ICD-10-CM

## 2022-11-09 DIAGNOSIS — I48.21 PERMANENT ATRIAL FIBRILLATION (HCC): ICD-10-CM

## 2022-11-09 DIAGNOSIS — F02.818 BEHAVIORAL VARIANT FRONTOTEMPORAL DEMENTIA (HCC): Primary | Chronic | ICD-10-CM

## 2022-11-09 DIAGNOSIS — I50.32 CHRONIC HEART FAILURE WITH PRESERVED EJECTION FRACTION (HCC): ICD-10-CM

## 2022-11-21 ENCOUNTER — NURSING HOME VISIT (OUTPATIENT)
Dept: GERIATRICS | Facility: OTHER | Age: 82
End: 2022-11-21

## 2022-11-21 DIAGNOSIS — R29.6 RECURRENT FALLS: ICD-10-CM

## 2022-11-21 DIAGNOSIS — N89.8 VAGINAL DISCHARGE: Primary | ICD-10-CM

## 2022-11-22 PROBLEM — N89.8 VAGINAL DISCHARGE: Status: ACTIVE | Noted: 2022-11-22

## 2022-11-22 PROBLEM — R29.6 RECURRENT FALLS: Status: ACTIVE | Noted: 2022-11-22

## 2022-11-23 NOTE — PROGRESS NOTES
[address], [address], [facility], [address] [place] [address]  [phone]    NAME: Lacho Roth  AGE: [de-identified] y o  SEX: female    Progress Note    [place]  POS: 32 (LT)    Assessment/Plan:    Vaginal discharge  Per CNA on-going for 2 weeks now  (+) small to moderate brownish discharges on direct assessment - no signs of erosion or any skin breakdown at the juan jose-genital areas including the both groin and perineum  Unable to find GYN history/ notes in epic  Patient denies any vaginal discomfort including dysuria/ burning sensation or low hypogastric discomfort  Currently on Eliquis 5mg BID  Patient on Estrogen conjugate cream vaginally 2 x weekly since June, 2022  No anemia (11/10/2022)  Will order transvaginal U/S if family is agreeable    Recurrent falls  Hx of recurrent falls  Nursing reported that patient found on floor this morning  Patient reported that she tried to walk to get something and she fell  Neuro check initiated by nursing per protocol  Patient alert and at baseline mentation/ orientation on this visit  Patient report pain to Left elbow: (+) swelling and erythema  Will order XRAY to Left elbow to rule out fracture or any osseous abnormality  Chief complaint / Reason for visit: Acute visit    History of Present Illness: This is an 42-year-old female patient admitted [facility] for Debility  Patient is seen and examined today per nursing request for vaginal bleeding as reported by CNA this morning  Per nurse, has a known history recurrent UTI with hematuria and a chronic MASD that is healing well on B/L gluteal areas, however, noted brown- reddish discharge coming from vaginal area  Patient was also found on the floor this morning - neuro check initiated and nursing assessment showed no evident injury  Patient is still in bed for this visit - slightly irritated but redirectable  Patient is alert, cooperative, and not in distress    Patient is verbal with loud but clear coherent speech  Patient reported pain to left elbow - on assessment noted erythema and swelling to site  No other injuries found in this visit - with order x-ray to rule out any fracture or dislocation  Assessment to bilateral gluteal areas showed resolving dermatitis - no maceration seen  External vaginal assessment showed known irritation, (+) moderate amounts of brownish colored vaginal discharges otherwise WNL  B/L groin/ perineum and rectal areas free of rashes or any form of skin irritation or open skin  One CNA present on this visit - reported that bleeding have been on-going for the last 2 weeks now  Noted brownish staining on brief  Will order transvaginal U/S if family is agreeable  Will coordinated with [facility] today  Review of Systems:  Per history of present illness, all other systems reviewed and negative  HISTORY:  Medical Hx: Reviewed, unchanged  Family Hx: Reviewed, unchanged  Soc Hx: Reviewed,  unchanged    ALLERGY: Reviewed, unchanged  Allergies   Allergen Reactions   • Aspirin    • Iodinated Diagnostic Agents Throat Swelling     Pt states "when I was 14years old driving home from the hospital I felt like I couldn't think and it went away after 10 minutes"    • Iodine - Food Allergy    • Nsaids    • Other Other (See Comments)     difficulty swallowing for short period        PHYSICAL EXAM:  Vital Signs:  T98 3F -P65 -R18 BP: 93/74 SpO2: 97% RA  Weight: 249 8 lbs (11/16/2022)    General: NAD  with Morbid obesity  Head: Atraumatic  Normocephalic  Eye Exam: anicteric sclera, no discharge, PERRLA, No injection  Wears glasses  Oral Exam: moist mucous membranes, no buccaloropharyngeal erythema, palatine tonsils WNL  Neck Exam: no anterior cervical lymphadenopathy noted, neck supple  Cardiovascular: regular rate, irregular rhythm, (+) murmurs, rubs, or gallops  Pulmonary: no wheeze, no rhonchi, no rales  No chest tenderness     Abdominal: soft, non-tender, nondistended, bowel sounds audible x 4 quadrants  Ave meal completion: %  : Non distended bladder  Incontinent  Daily BM  Patient voids 4-5 x per day  Small to moderate brownish vaginal discharges  External vaginal area WNL - no skin breakdown/ erosion/ maceration  B/L gluteal areas - resolving MASD  B/L groin intact skin  Perineum intact  (+) external hemorrhoids - no swelling/irritation noted  localized pink rash to mid back and along surgical scar to back  Extremities and skin: no edema noted, no rashes  Intact skin  Erythema and swelling to Left elbow S/P fall today  Neurological: alert, inconsistently cooperative and responsive,  moving all 4 extremities symmetrically  Bed bound      Laboratory results / Imaging reviewed: Hard copy/ies in medical chart:    * CBC with diff (11/10/2022):   HEMOGLOBIN 11 9 g/dL 11 5-14 5  Final         HEMATOCRIT 35 4 % 35 0-43 0  Final         WBC 6 7 thou/cmm 4 0-10 0  Final         RBC 4 14 mill/cmm 3 70-4 70  Final         PLATELET COUNT 747 thou/cmm 140-350  Final         MPV 7 7 fL 7 5-11 3  Final         MCV 85 fL   Final         MCH 28 8 pg 26 0-34 0  Final         MCHC 33 7 g/dL 32 0-37 0  Final         RDW 15 2 % 12 0-16 0  Final         DIFFERENTIAL TYPE AUTO    Final         ABSOLUTE NEUT 4 6 thou/cmm 1 8-7 8  Final         ABSOLUTE LYMPH 1 4 thou/cmm 1 0-3 0  Final         ABSOLUTE MONO 0 5 thou/cmm 0 3-1 0  Final         ABSOLUTE EOS 0 1 thou/cmm 0 0-0 5  Final         ABSOLUTE BASO 0 0 thou/cmm 0 0-0 1  Final         NEUTROPHILS 69 %   Final         LYMPHOCYTES 21 %   Final         MONOCYTES 8 %   Final         EOSINOPHILS 2 %   Final         BASOPHILS 0 %   Final       * BMP (11/10/2022):   GLUCOSE 92 mg/dL 65-99  Final         BUN 15 mg/dL 7-25  Final         CREATININE 0 70 mg/dL 0 40-1 10  Final         SODIUM 141 mmol/L 135-145  Final         POTASSIUM 3 8 mmol/L 3 5-5 2  Final         CHLORIDE 105 mmol/L 100-109  Final         CARBON DIOXIDE 31 mmol/L 23-31  Final  CALCIUM 9 1 mg/dL 8 5-10 1  Final         ANION GAP 5  3-11  Final         eGFRcr 87  >59  Final         eGFRcr COMMENT (Note)    Final       Current Medications: All medications reviewed and updated in Nursing Home Chart    Please note:  Voice-recognition software may have been used in the preparation of this document  Occasional wrong word or "sound-alike" substitutions may have occurred due to the inherent limitations of voice recognition software  Interpretation should be guided by context      LUCY Marquez  11/22/2022

## 2022-11-23 NOTE — ASSESSMENT & PLAN NOTE
Hx of recurrent falls  Nursing reported that patient found on floor this morning  Patient reported that she tried to walk to get something and she fell  Neuro check initiated by nursing per protocol  Patient alert and at baseline mentation/ orientation on this visit  Patient report pain to Left elbow: (+) swelling and erythema  Will order XRAY to Left elbow to rule out fracture or any osseous abnormality

## 2022-11-23 NOTE — ASSESSMENT & PLAN NOTE
Per CNA on-going for 2 weeks now  (+) small to moderate brownish discharges on direct assessment - no signs of erosion or any skin breakdown at the juan jose-genital areas including the both groin and perineum  Unable to find GYN history/ notes in epic  Patient denies any vaginal discomfort including dysuria/ burning sensation or low hypogastric discomfort  Currently on Eliquis 5mg BID  Patient on Estrogen conjugate cream vaginally 2 x weekly since June, 2022  No anemia (11/10/2022)    Will order transvaginal U/S if family is agreeable

## 2022-11-28 NOTE — PROGRESS NOTES
[address], [address], [facility], [address] [place] [address]  [phone]    NAME: Sunita Ortiz  AGE: [de-identified] y o  SEX: female    Progress Note    [place]  POS: 32 (LTC)    Assessment/Plan:    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[facility] [address] )  Continues with intermittent behavioral disturbance: yelling loudly to express need  Continue LCTF supportive care  Continue to reorient/redirection as often as needed  Continue fall precaution  Very good meal completion: %   Continue the following meds:  * Trazodone BID 50mg AM/ 150mg at HS  * Zoloft 150g daily  * Lorazepam 0 25mg QID  * Gabapentin 600mg TID  Nursing continue to monitor hydration/additional intake  Code status/Goal: Comfort measures       Atrial fibrillation ([email or web address] )  Has pacemaker in place  Stable and controlled  BP range (11/1-9/2022) = 117/66 to 145/44  Continue Eliquis 5mg BID / Amlodipine 10mg daily/ Losartan 25mg daily  Continue Pantoprazole 40mg daily in AM (gastroprotective)     (HFpEF) heart failure with preserved ejection fraction (HCC)  Wt Readings from Last 3 Encounters:  05/11/22          118 kg (260 lb 2 3 oz)  04/08/22          118 kg (259 lb 0 7 oz)  03/07/22          122 kg (268 lb)  On weekly weight check (since Oct, 2022)  Wt: 247 6 lbs (11/9/2022) <= 248 0 lbs (11/3/2022) <= 245 2 lbs (11/26/2022)  Euvolemic on assessment  No B/L LE edema  On oral fluid restriction: 1,800 ml/day  BP and HR stable and well controlled  Continue Furosemide 40mg daily/ KCl 20mEq daily     Frequent UTI  Last known management of UTI (Oct, 2022)  Continue Hiprex 1G BID/ Vit C 500mg BID  Continue Estrogen cream 2 x weekly  Followed by [facility] ID office  Last seen on 7/20/2022    Chief complaint / Reason for visit: Follow-up visit    History of Present Illness: This is an 72-year-old female patient admitted at [facility] for Dementia    Patient is seen and examined today to follow up acute and chronic medical conditions: Behavioral Variant Frontotemporal Dementia, Atrial Fibrillation - with pacemaker, HFpEF, 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, Hx of C diff infection, Chronic Pain      Patient is in bed for this visit - alert, cooperative, calm, very pleasant and not in distress  Patient is verbally engaged with clear coherent speech - voice hoarse and raspy sounding - oriented to name/birthday  Patient reported feeling well today - denies any acute medical concerns for this visit  Per nursing, patient have been " very good on the last 2 days - described as lucid, calm, no screaming - no acute medical concerns for this visit  Review of Systems:  Per history of present illness, all other systems reviewed and negative  HISTORY:  Medical Hx: Reviewed, unchanged  Family Hx: Reviewed, unchanged  Soc Hx: Reviewed,  unchanged    ALLERGY: Reviewed, unchanged  Allergies   Allergen Reactions   • Aspirin    • Iodinated Diagnostic Agents Throat Swelling     Pt states "when I was 14years old driving home from the hospital I felt like I couldn't think and it went away after 10 minutes"    • Iodine - Food Allergy    • Nsaids    • Other Other (See Comments)     difficulty swallowing for short period        PHYSICAL EXAM:  Vital Signs:  T97 8F -P65 -R18 BP: 136/55 SpO2: 97% RA  Weight: 247 6 lbs (11/9/2022) <= 248 0 lbs (11/3/2022) <= 245 2 lbs (11/26/2022)    General: NAD  with Morbid obesity  Head: Atraumatic  Normocephalic  Eye Exam: anicteric sclera, no discharge, PERRLA, No injection  Wears glasses  Oral Exam: moist mucous membranes, no buccaloropharyngeal erythema, palatine tonsils WNL  Neck Exam: no anterior cervical lymphadenopathy noted, neck supple  Cardiovascular: regular rate, irregular rhythm, (+) murmurs, rubs, or gallops  Pulmonary: no wheeze, no rhonchi, no rales  No chest tenderness  Abdominal: soft, non-tender, nondistended, bowel sounds audible x 4 quadrants   Ave meal completion: %  : Non distended bladder  Incontinent  Daily BM  Patient voids 4-5 x per day  Extremities and skin: no edema noted, no rashes  Intact skin  Neurological: alert, cooperative, lucid and responsive,  moving all 4 extremities symmetrically  Bed bound  Ambulatory dysfunction  Laboratory results / Imaging reviewed: Hard copy/ies in medical chart:    * Drawn on 9/3/2022:  [] Show All Details Result Units Ref  Range Flag Status    T4, TOTAL 6 3 ug/dL 4 5-10 9  Final           THYROID STIM HORMONE 0 86 uIU/mL 0 36-3 74  Final           VITAMIN B12 446 pg/mL >211  Final       Current Medications: All medications reviewed and updated in Nursing Home Chart    Please note:  Voice-recognition software may have been used in the preparation of this document  Occasional wrong word or "sound-alike" substitutions may have occurred due to the inherent limitations of voice recognition software  Interpretation should be guided by LUCY Santacruz  11/28/2022

## 2022-12-06 ENCOUNTER — NURSING HOME VISIT (OUTPATIENT)
Dept: GERIATRICS | Facility: OTHER | Age: 82
End: 2022-12-06

## 2022-12-06 DIAGNOSIS — F32.A DEPRESSION, UNSPECIFIED DEPRESSION TYPE: Primary | ICD-10-CM

## 2022-12-06 DIAGNOSIS — I15.8 OTHER SECONDARY HYPERTENSION: ICD-10-CM

## 2022-12-06 DIAGNOSIS — F41.9 ANXIETY: ICD-10-CM

## 2022-12-06 DIAGNOSIS — K21.9 GASTROESOPHAGEAL REFLUX DISEASE, UNSPECIFIED WHETHER ESOPHAGITIS PRESENT: ICD-10-CM

## 2022-12-06 NOTE — ASSESSMENT & PLAN NOTE
BP reviewed from facility records   Some elevated readings but also some low SBP readings   Will continue current meds  Continue monitoring BP

## 2022-12-06 NOTE — ASSESSMENT & PLAN NOTE
Still anxious  She also complaints of anxiety   Continue current meds as increasing her meds at present can increase risk of falls and other adverse events  Continue with safety measures  Continue monitoring symptoms

## 2022-12-06 NOTE — ASSESSMENT & PLAN NOTE
On multiple medications  Continue current meds  Continue with supportive care   Continue monitoring symptoms

## 2022-12-06 NOTE — PROGRESS NOTES
[address] Nursing home notes  LONG TERM CARE       NAME: Ines Chinchilla  AGE: [de-identified] y o  SEX: female    DATE OF ENCOUNTER: 12/6/2022    Assessment and Plan   Depression  On multiple medications  Continue current meds  Continue with supportive care   Continue monitoring symptoms     Anxiety  Still anxious  She also complaints of anxiety   Continue current meds as increasing her meds at present can increase risk of falls and other adverse events  Continue with safety measures  Continue monitoring symptoms     Gastroesophageal reflux disease  No complaints   Continue current meds  Continue monitoring symptoms     Hypertension  BP reviewed from facility records  Some elevated readings but also some low SBP readings   Will continue current meds  Continue monitoring BP        Chief Complaint     No new complaints    History of Present Illness     [de-identified] yo female seen for monthly visit and med renewal  Patient seen for depression, anxiety, HTN and GERD  Reviewed nursing notes since last visit       PMHx     Past Medical History:   Diagnosis Date   • A-fib [facility])    • Acute on chronic diastolic heart failure ([facility] [address] ) 3/6/2022   • Anxiety    • Chronic pain    • Chronic respiratory failure with hypoxia ([facility])    • COPD (chronic obstructive pulmonary disease) ([facility])    • Dementia ([facility])    • Dorsalgia, unspecified    • Edema    • Encephalopathy    • GERD (gastroesophageal reflux disease)    • Gross hematuria 3/31/2022   • Hypertension    • Morbid obesity ([facility] [address] )    • Muscle weakness (generalized)    • Opioid dependence ([facility])    • Overactive bladder    • Pneumonia    • Positive blood culture 4/9/2022   • Psychiatric disorder     anxiety, bipolar   • Radiculopathy of thoracolumbar region    • Sciatica    • 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 ([facility] [address] )    • Urinary incontinence    • UTI (urinary tract infection)      Past Surgical History:   Procedure Laterality Date   • APPENDECTOMY     • BACK SURGERY     • CHOLECYSTECTOMY     • CYSTOSCOPY  2021    Dr David Marti    • KNEE SURGERY       No family history on file  Social History     Socioeconomic History   • Marital status:      Spouse name: Not on file   • Number of children: Not on file   • Years of education: Not on file   • Highest education level: Not on file   Occupational History   • Not on file   Tobacco Use   • Smoking status: Former     Types: Cigarettes     Quit date: 1995     Years since quittin 0   • Smokeless tobacco: Never   Vaping Use   • Vaping Use: Never used   Substance and Sexual Activity   • Alcohol use: Never   • Drug use: No   • Sexual activity: Not Currently   Other Topics Concern   • Not on file   Social History Narrative   • Not on file     Social Determinants of Health     Financial Resource Strain: Not on file   Food Insecurity: No Food Insecurity   • Worried About Running Out of Food in the Last Year: Never true   • Ran Out of Food in the Last Year: Never true   Transportation Needs: No Transportation Needs   • Lack of Transportation (Medical): No   • Lack of Transportation (Non-Medical):  No   Physical Activity: Not on file   Stress: Not on file   Social Connections: Not on file   Intimate Partner Violence: Not on file   Housing Stability: Low Risk    • Unable to Pay for Housing in the Last Year: No   • Number of Places Lived in the Last Year: 1   • Unstable Housing in the Last Year: No     Allergies   Allergen Reactions   • Aspirin    • Iodinated Diagnostic Agents Throat Swelling     Pt states "when I was 14years old driving home from the hospital I felt like I couldn't think and it went away after 10 minutes"    • Iodine - Food Allergy    • Nsaids    • Other Other (See Comments)     difficulty swallowing for short period       Review of Systems     Reports being anxious that is normal as per her  Burning during urination chronic   All other review of system negative      Objective   Vital signs reviewed from the facility records  PHYSICAL EXAM:  GENERAL: no acute distress  SKIN: warm, dry, no rash, no cyanosis  HEENT: normocephalic, atraumatic, no JVD, no Thyromegaly, no lymphadenopathy  LUNGS: CTA, no wheezing, no rales, expanded equally, no chest tenderness   HEART: irregularly irregular rhythm, normal rate, no murmur, no gallop  ABDOMEN: soft non tender non distended bs+, no guarding or rebound tenderness, morbidly obese  :  no suprapubic tenderness  MUSCULOSKELETAL: strength about 4+/5 all extremities,  no calf tenderness  NEUROLOGY: awake, alert, CN2-12 intact  PSYCH: cooperative, anxious and yelling at times       Pertinent Laboratory/Diagnostic Studies:  Recent labs and diagnostic tests reviewed in nursing home EMR    Current Medications   Medications reviewed from nursing home EMR          Vlad Zabala MD

## 2022-12-19 ENCOUNTER — TELEPHONE (OUTPATIENT)
Age: 82
End: 2022-12-19

## 2022-12-19 NOTE — TELEPHONE ENCOUNTER
Patient's daughter, Lenin Heath ([phone]) called to advise her mother has had diarrhea for 3 weeks and should would like to know what to do  She would like to speak with the provider

## 2022-12-21 ENCOUNTER — NURSING HOME VISIT (OUTPATIENT)
Dept: GERIATRICS | Facility: OTHER | Age: 82
End: 2022-12-21

## 2022-12-21 DIAGNOSIS — N39.0 FREQUENT UTI: ICD-10-CM

## 2022-12-21 DIAGNOSIS — K52.9 FREQUENT STOOLS: Primary | ICD-10-CM

## 2022-12-21 DIAGNOSIS — G31.09 BEHAVIORAL VARIANT FRONTOTEMPORAL DEMENTIA (HCC): Chronic | ICD-10-CM

## 2022-12-21 DIAGNOSIS — F02.818 BEHAVIORAL VARIANT FRONTOTEMPORAL DEMENTIA (HCC): Chronic | ICD-10-CM

## 2022-12-22 PROBLEM — K52.9 FREQUENT STOOLS: Status: ACTIVE | Noted: 2022-12-22

## 2022-12-22 NOTE — PROGRESS NOTES
[address], [address], [facility], [address] [facility]  [phone]    NAME: Wendi Schultz  AGE: [de-identified] y o  SEX: female    Progress Note    [place]  POS: 32 (LTC)    Assessment/Plan:    Frequent stools  D/C Ashganhda supplement  D/C Vit C 500mg BID  Imaging: Abdominal XRAY (Obstructuin series) to rule out obstruction  Check Hemoccult stools x 3   Labs: to check for Inflammatory Bowel disease/ Pancreatitis  * CRP  * Fecal Calprotectin  * Vit B12  * Mg  * Lipase  * Amylase  Last WBC and differentials (12/8/2022) WNL  Renal functions WNL (12/8/2022)  C diff stool stest (12/9/2022): Negative  Rapid COVID-19 test (12/20/2022): Negative  Resume Banatrol PLUS TID - HOLD for Constipation  Continue Probiotic BID    Frequent UTI  Patient reported dysuria on this visit  Recently managed for UTI on Nov, 2022  Start Pyridium 100 mgTID x 2 days  Check UA with C&S    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[facility] [address] )  Has been successfully weaned off Risperdal this year  Per nursing, other than patient more alert, no significant change in behavioral disturbance  GDR Lorazepam today:  * Lorazepam 0 25mg Q12 hours x 1 week  * Lorazepam 0 25mg daily at HS x 1 week then D/C  To continue to monitor new or worsening behavioral disturbance  Continue LTCF supportive care  Chief complaint / Reason for visit: Acute visit    History of Present Illness: This is an 28-year-old female patient admitted at [facility] for dementia  Patient is seen and examined today per nursing request related to patient's family's concern regarding persistent diarrhea  Patient is also followed and managed by [facility] provider  Patient had completed 5 days of Banatrol PLUS - with some relief but not resolved  Per nursing, patient had always had non formed stools since admission to LTCF but had increased frequency over the last 3 weeks or more with strong malodor -stools are described as " mushy"    C  difficile stool test on 12/9/2022: Negative  Patient is in bed for this visit -agitated and screaming loudly -easily redirectable  1 CNA present on this visit  Patient is alert, cooperative, and not in distress  Patient reported back pain - from mid lumbar up to mid scapular area -has scheduled Bengay twice a day to affected site  Patient also reported of the, " it hurts so bad I cannot stand"  Patient denies abdominal pain/nausea/vomiting or change in appetite or any other acute medical concerns during ROS assessment  Bilateral gluteal areas intact and no signs of skin breakdown, bowel sounds are hyperactive x 4, soft and nontender with deep palpation  Review of BM log showed BM 3-4 times/day with soft alternating with loose stools since 12/1/2022  Patient not on scheduled laxatives or fiber supplement  History of recurrent UTI with antibiotic multiple times this year  Will work-up for possible inflammatory bowel disease, bowel obstruction, pancreatitis  [employer] provider made aware -agreeable to plan of care  This provider called patient's RP (daughter)  Discussed today's findings, assessment and plan of care -agreeable  RP also expressed request to discontinue patient's Lorazepam in setting of " past history of worsening of behavioral disturbance with lorazepam when used during patient's last hospitalization"  The patient is currently on Lorazepam 0 25 mg QID for anxiety: Informed RP that gradual dose reduction is necessary to avoid withdrawal symptoms -agreeable  RP is also good informed that patient's supplement (Ashghanda) could have potentially worsen patient's frequent stools : Has a known side effect of  GI upset and diarrhea  RP agreeable to discontinue supplement  Answered questions as presented to the best of this provider's knowledge       Called and spoke with PCP (Dr Sonia Stanton) and updated in patient's family is concern, persistent loose stools, findings when meds were reviewed, conversation with patient's RP and plan of care -agreeable  Per nursing, other than the persistent loose stools no other acute medical concerns for this visit  Review of Systems:  Per history of present illness, all other systems reviewed and negative  HISTORY:  Medical Hx: Reviewed, unchanged  Family Hx: Reviewed, unchanged  Soc Hx: Reviewed,  unchanged    ALLERGY: Reviewed, unchanged  Allergies   Allergen Reactions   • Aspirin    • Iodinated Diagnostic Agents Throat Swelling     Pt states "when I was 14years old driving home from the hospital I felt like I couldn't think and it went away after 10 minutes"    • Iodine - Food Allergy    • Nsaids    • Other Other (See Comments)     difficulty swallowing for short period        PHYSICAL EXAM:  Vital Signs: T98 0F -P67 -R19 BP: 114/61 SpO2: 93% RA  Weight: 251 6 lbs (12/21/2022) <= 242 8 lbs (11/23/2022) <= 245 2 lbs (10/26/2022)    General: NAD  with Morbid obesity  Head: Atraumatic  Normocephalic  Eye Exam: anicteric sclera, no discharge, PERRLA, No injection  Wears glasses  Oral Exam: moist mucous membranes, no buccaloropharyngeal erythema, palatine tonsils WNL  Neck Exam: no anterior cervical lymphadenopathy noted, neck supple  Cardiovascular: regular rate, irregular rhythm, (+) murmurs, rubs, or gallops  Pulmonary: no wheeze, no rhonchi, no rales  No chest tenderness  Abdominal: soft, non-tender, nondistended, bowel sounds audible x 4 quadrants  Ave meal completion: %  : Non distended bladder  Incontinent  Daily BM: recently 3-4x per day since 12/1/2022  No fecal incontinence in brief on this assessment  Patient voids 4-5 x per day  B/L gluteal areas intact - No MASD  B/L groin intact skin  Perineum intact  Extremities and skin: no edema noted, no rashes  Intact skin  Neurological: alert, cooperative and responsive,  moving all 4 extremities symmetrically  Bed bound       Laboratory results / Imaging reviewed: Hard copy/ies in medical chart:    * BMP (12/8/2022):  Kevin Dowd 108 mg/dL 65-99 H Final         BUN 18 mg/dL 7-25  Final         CREATININE 0 80 mg/dL 0 40-1 10  Final         SODIUM 144 mmol/L 135-145  Final         POTASSIUM 4 1 mmol/L 3 5-5 2  Final         CHLORIDE 109 mmol/L 100-109  Final         CARBON DIOXIDE 30 mmol/L 23-31  Final         CALCIUM 8 7 mg/dL 8 5-10 1  Final         ANION GAP 4  3-11  Final         eGFRcr 74  >59  Final         eGFRcr COMMENT (Note)    Final       * CBC with diff (12/8/2022):   HEMOGLOBIN 11 1 g/dL 11 5-14 5 L Final         HEMATOCRIT 33 8 % 35 0-43 0 L Final         WBC 5 4 thou/cmm 4 0-10 0  Final         RBC 4 05 mill/cmm 3 70-4 70  Final         PLATELET COUNT 860 thou/cmm 140-350  Final         MPV 7 6 fL 7 5-11 3  Final         MCV 84 fL   Final         MCH 27 5 pg 26 0-34 0  Final         MCHC 32 9 g/dL 32 0-37 0  Final         RDW 15 4 % 12 0-16 0  Final         DIFFERENTIAL TYPE AUTO    Final         ABSOLUTE NEUT 3 1 thou/cmm 1 8-7 8  Final         ABSOLUTE LYMPH 1 7 thou/cmm 1 0-3 0  Final         ABSOLUTE MONO 0 5 thou/cmm 0 3-1 0  Final         ABSOLUTE EOS 0 1 thou/cmm 0 0-0 5  Final         ABSOLUTE BASO 0 0 thou/cmm 0 0-0 1  Final         NEUTROPHILS 56 %   Final         LYMPHOCYTES 32 %   Final         MONOCYTES 10 %   Final         EOSINOPHILS 2 %   Final         BASOPHILS 0 %   Final       Current Medications: All medications reviewed and updated in Nursing Home Chart    Please note: This note was completed in part utilizing a voice-recognition software may have been used in the preparation of this document  Grammatical errors, random word insertion, spelling mistakes, and incomplete sentences may be an occasional consequence of the system secondary to software limitations, ambient noise and hardware issues  Occasional wrong word or "sound-alike" substitutions may have occurred due to the inherent limitations of voice recognition software   At the time of dictation, efforts were made to edit, clarify and/or correct errors  Interpretation should be guided by context  Please read the chart carefully and recognize, using context, where substitutions have occurred  If you have any questions or concerns about the context, text or information contained within the body of this dictation, please contact myself, the provider, for further clarification        LUCY Pelaez  12/22/2022

## 2022-12-23 NOTE — ASSESSMENT & PLAN NOTE
Patient reported dysuria on this visit  Recently managed for UTI on Nov, 2022  Start Pyridium 100 mgTID x 2 days  Check UA with C&S

## 2022-12-23 NOTE — ASSESSMENT & PLAN NOTE
Has been successfully weaned off Risperdal this year  Per nursing, other than patient more alert, no significant change in behavioral disturbance  GDR Lorazepam today:  * Lorazepam 0 25mg Q12 hours x 1 week  * Lorazepam 0 25mg daily at HS x 1 week then D/C  To continue to monitor new or worsening behavioral disturbance  Continue LTCF supportive care

## 2022-12-23 NOTE — ASSESSMENT & PLAN NOTE
D/C Ashganhda supplement  D/C Vit C 500mg BID  Imaging: Abdominal XRAY (Obstructuin series) to rule out obstruction  Check Hemoccult stools x 3   Labs: to check for Inflammatory Bowel disease/ Pancreatitis  * CRP  * Fecal Calprotectin  * Vit B12  * Mg  * Lipase  * Amylase  Last WBC and differentials (12/8/2022) WNL  Renal functions WNL (12/8/2022)  C diff stool stest (12/9/2022): Negative  Rapid COVID-19 test (12/20/2022): Negative  Resume Banatrol PLUS TID - HOLD for Constipation  Continue Probiotic BID

## 2023-01-05 NOTE — PROGRESS NOTES
[address], [address], [facility] SPECIALTY [facility], [address] [place] [address]  [phone]    NAME: Geno Engle  AGE: [de-identified] y o  SEX: female    Progress Note    [place]  POS: 32 (LTC)    Assessment/Plan:    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[facility] [address] )  Per nursing, unchanged status of behavioral disturbance: screaming with periods of calm and lucidity  Patient managed as well by [employer] Providers:  = Lorazepam D/C'd on 12/28/2022   = replaced with Alprazolam 0 5mg TID  To continue to monitor new or worsening behavioral disturbance  Continue LTCF supportive care  P[facility]   Has pacemaker in place  BP range (1/1-6/2023) = 121/68 to 155/62  HR range (1/1-6/2023) = 60/min (as documented)  Continue Eliquis 5mg BID  Continue Amlodipine 10mg daily/ Losartan 25mg daily/ Furosemide 40mg daily  Continue Omeprazle 20mg daily in AM (gastroprotective)     (HFpEF) heart failure with preserved ejection fraction ([facility] [address] )  Wt Readings from Last 3 Encounters:  05/11/22          118 kg (260 lb 2 3 oz)  04/08/22          118 kg (259 lb 0 7 oz)  03/07/22          122 kg (268 lb)  On weekly weight check (since Oct, 2022)  Wt: 252 6 lbs (1/6/2023) <= 251 2 lbs (12/28/2022) <= 251 6 lbs (12/21/2022) <= 245 6 lbs (12/7/2022)  Euvolemic on assessment  No B/L LE edema  On oral fluid restriction: 1,800 ml/day  BP and HR stable and well controlled  Continue Furosemide 40mg daily/ KCl 20mEq daily    Frequent stools  Lab results (12/22/2022): * Fecal Calprotectin: borderline elevated @ 119  * CRP: WNL  * Amylase/ Lipase: WNL  * Mg level: WNL  * Vit B12 WNL  Abdl XRAY (12/22/2022): "There is a modest amount of stool in the colon and rectum  CONCLUSION: No obstruction or ileus"    Improved frequency of stools on review on this visit  Hemoccult stools x 1 : NegativeLast WBC and differentials (12/8/2022) WNL  C diff stool stest (12/9/2022): Negative  Continue Banatrol PLUS TID - HOLD for Constipation  Continue Probiotic BID  Recommend Colonoscopy if family is agreeable  Chief complaint / Reason for visit: ollow-up visit    History of Present Illness: This is an 82-year-old female patient admitted at [facility] for Dementia  Patient is seen and examined today to follow up acute and chronic medical conditions: Behavioral Variant Frontotemporal Dementia, Atrial Fibrillation - with pacemaker, HFpEF, 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, Hx of C diff infection, Chronic Pain      Patient is in bed for this visit - alert, cooperative, slightly agitated by re-directable, pleasant and not in distress  Patient is verbally engaged with clear coherent speech - voice hoarse and raspy sounding - oriented to name/birthday  Patient reported feeling tired today, " I did not sleep well  alst night" - denies any acute medical concerns for this visit  Per nursing, no acute medical concerns for this visit  Nursing and prior provider notes reviewed on this visit  Review of Systems:  Per history of present illness, all other systems reviewed and negative  HISTORY:  Medical Hx: Reviewed, unchanged  Family Hx: Reviewed, unchanged  Soc Hx: Reviewed,  unchanged    ALLERGY: Reviewed, unchanged  Allergies   Allergen Reactions   • Aspirin    • Iodinated Contrast Media Throat Swelling     Pt states "when I was 14years old driving home from the hospital I felt like I couldn't think and it went away after 10 minutes"    • Iodine - Food Allergy    • Nsaids    • Other Other (See Comments)     difficulty swallowing for short period        PHYSICAL EXAM:  Vital Signs: T98 0 -P65 -R18 BP: 146/72 SpO2: 93% RA  Weight: 252 6 lbs (1/6/2023) <= 251 2 lbs (12/28/2022) <= 251 6 lbs (12/21/2022) <= 245 6 lbs (12/7/2022)    General: NAD  with Morbid obesity  Head: Atraumatic  Normocephalic  Eye Exam: anicteric sclera, no discharge, PERRLA, No injection  Wears glasses    Oral Exam: moist mucous membranes, no buccaloropharyngeal erythema, palatine tonsils WNL  Neck Exam: no anterior cervical lymphadenopathy noted, neck supple  Cardiovascular: regular rate, irregular rhythm, (+) murmurs, rubs, or gallops  Pulmonary: no wheeze, no rhonchi, no rales  No chest tenderness  Abdominal: soft, non-tender, nondistended, bowel sounds audible x 4 quadrants  Ave meal completion: %  : Non distended bladder  Incontinent  Daily BM  Patient voids 4-5 x per day  Extremities and skin: no edema noted, no rashes  Intact skin  Neurological: alert, cooperative, lucid and responsive,  moving all 4 extremities symmetrically  Bed bound  Ambulatory dysfunction  Laboratory results / Imaging reviewed: Hard copy/ies in medical chart:    * Fecal Calprotectin (12/22/2022) = 119 (Mildly High)    * Drawn (12/22/2022):   AMYLASE 23 U/L <101  Final           CRP 5 0 mg/L <7 0  Final           LIPASE 52 U/L  L Final           MAGNESIUM 1 9 mg/dL 1 4-2 2  Final           VITAMIN B12 305 pg/mL >211  Final       * Abdominal XRAY (12/22/2022): No obstruction or ileus  Current Medications: All medications reviewed and updated in Nursing Home Chart    Please note: This note was completed in part utilizing a voice-recognition software may have been used in the preparation of this document  Grammatical errors, random word insertion, spelling mistakes, and incomplete sentences may be an occasional consequence of the system secondary to software limitations, ambient noise and hardware issues  Occasional wrong word or "sound-alike" substitutions may have occurred due to the inherent limitations of voice recognition software  At the time of dictation, efforts were made to edit, clarify and/or correct errors  Interpretation should be guided by context  Please read the chart carefully and recognize, using context, where substitutions have occurred   If you have any questions or concerns about the context, text or information contained within the body of this dictation, please contact myself, the provider, for further clarification        LUCY Huerta  1/7/2023

## 2023-01-06 ENCOUNTER — NURSING HOME VISIT (OUTPATIENT)
Dept: GERIATRICS | Facility: OTHER | Age: 83
End: 2023-01-06

## 2023-01-06 DIAGNOSIS — K52.9 FREQUENT STOOLS: ICD-10-CM

## 2023-01-06 DIAGNOSIS — I48.21 PERMANENT ATRIAL FIBRILLATION (HCC): ICD-10-CM

## 2023-01-06 DIAGNOSIS — G31.09 BEHAVIORAL VARIANT FRONTOTEMPORAL DEMENTIA (HCC): Primary | Chronic | ICD-10-CM

## 2023-01-06 DIAGNOSIS — F02.818 BEHAVIORAL VARIANT FRONTOTEMPORAL DEMENTIA (HCC): Primary | Chronic | ICD-10-CM

## 2023-01-06 DIAGNOSIS — I50.32 CHRONIC HEART FAILURE WITH PRESERVED EJECTION FRACTION (HCC): ICD-10-CM

## 2023-01-07 RX ORDER — ALPRAZOLAM 0.5 MG/1
0.5 TABLET ORAL 2 TIMES DAILY
COMMUNITY

## 2023-02-07 ENCOUNTER — NURSING HOME VISIT (OUTPATIENT)
Dept: GERIATRICS | Facility: OTHER | Age: 83
End: 2023-02-07

## 2023-02-07 DIAGNOSIS — R13.11 ORAL PHASE DYSPHAGIA: ICD-10-CM

## 2023-02-07 DIAGNOSIS — G31.09 BEHAVIORAL VARIANT FRONTOTEMPORAL DEMENTIA (HCC): Primary | Chronic | ICD-10-CM

## 2023-02-07 DIAGNOSIS — F02.818 BEHAVIORAL VARIANT FRONTOTEMPORAL DEMENTIA (HCC): Primary | Chronic | ICD-10-CM

## 2023-02-07 DIAGNOSIS — R26.2 AMBULATORY DYSFUNCTION: ICD-10-CM

## 2023-02-07 NOTE — PROGRESS NOTES
[id number] Nursing home notes  LONG TERM CARE       NAME: Antonina Cousins  AGE: [de-identified] y o  SEX: female    DATE OF ENCOUNTER: 2/7/2023    Assessment and Plan   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  Family had reported to staff some change in speech and suspect maybe a stroke but do not really see much changes since last visit  Discussed with NP reported she was going to talk to family about CT of the head they request which may not change her management as she already has a diagnosis of afib and on eliquis    Since family do not want to be aggressive and it will not change the management  She discussed with family and they would like to go ahead with speech for now  Continue current meds  Continue with supportive care     Ambulatory dysfunction  Hx of fall and last one last month  Continue with safety measures  Continue with fall precautions  bedbound     Oral phase dysphagia  Seems stable   Continues on mechanical soft diet  Continue monitoring for aspiration         Chief Complaint     Noises at night     History of Present Illness     [de-identified] yo female seen for monthly visit and med renewal  Patient seen for dementia, ambulatory dysfunction and dysphagia  Reviewed nursing notes since last visit       PMHx     Past Medical History:   Diagnosis Date   • A-fib [facility])    • Acute on chronic diastolic heart failure ([facility] ) 3/6/2022   • Anxiety    • Chronic pain    • Chronic respiratory failure with hypoxia ([facility])    • COPD (chronic obstructive pulmonary disease) ([facility])    • Dementia ([facility])    • Dorsalgia, unspecified    • Edema    • Encephalopathy    • GERD (gastroesophageal reflux disease)    • Gross hematuria 3/31/2022   • Hypertension    • Morbid obesity ([facility] )    • Muscle weakness (generalized)    • Opioid dependence ([facility])    • Overactive bladder    • Pneumonia    • Positive blood culture 4/9/2022   • Psychiatric disorder     anxiety, bipolar   • Radiculopathy of thoracolumbar region    • Sciatica    • 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 ([facility] [address] )    • Urinary incontinence    • UTI (urinary tract infection)      Past Surgical History:   Procedure Laterality Date   • APPENDECTOMY     • BACK SURGERY     • CHOLECYSTECTOMY     • CYSTOSCOPY  2021    Dr Veronica Hillman    • KNEE SURGERY       No family history on file  Social History     Socioeconomic History   • Marital status:      Spouse name: Not on file   • Number of children: Not on file   • Years of education: Not on file   • Highest education level: Not on file   Occupational History   • Not on file   Tobacco Use   • Smoking status: Former     Types: Cigarettes     Quit date: 1995     Years since quittin 2   • Smokeless tobacco: Never   Vaping Use   • Vaping Use: Never used   Substance and Sexual Activity   • Alcohol use: Never   • Drug use: No   • Sexual activity: Not Currently   Other Topics Concern   • Not on file   Social History Narrative   • Not on file     Social Determinants of Health     Financial Resource Strain: Not on file   Food Insecurity: No Food Insecurity   • Worried About Running Out of Food in the Last Year: Never true   • Ran Out of Food in the Last Year: Never true   Transportation Needs: No Transportation Needs   • Lack of Transportation (Medical): No   • Lack of Transportation (Non-Medical):  No   Physical Activity: Not on file   Stress: Not on file   Social Connections: Not on file   Intimate Partner Violence: Not on file   Housing Stability: Low Risk    • Unable to Pay for Housing in the Last Year: No   • Number of Places Lived in the Last Year: 1   • Unstable Housing in the Last Year: No     Allergies   Allergen Reactions   • Aspirin    • Iodinated Contrast Media Throat Swelling     Pt states "when I was 14years old driving home from the hospital I felt like I couldn't think and it went away after 10 minutes"    • Iodine - Food Allergy    • Nsaids    • Other Other (See Comments)     difficulty swallowing for short period       Review of Systems     Denies any pain or shortness of breath   All other review of system negative      Objective   Vital signs reviewed from facility records  PHYSICAL EXAM:  GENERAL: no acute distress  SKIN: warm, dry, no rash, no cyanosis  HEENT: normocephalic, atraumatic, no JVD, no Thyromegaly, no lymphadenopathy  LUNGS: CTA, no wheezing, no rales, expanded equally, no chest tenderness   HEART: irregularly irregular rhythm, normal rate, no murmur, no gallop  ABDOMEN: soft non tender non distended bs+, no guarding or rebound tenderness, morbidly obese  :  no suprapubic tenderness  MUSCULOSKELETAL: strength about 4+/5 all extremities, no calf tenderness  NEUROLOGY: awake, alert, CN2-12 intact  PSYCH: cooperative, pleasant, anxious, yelling prior to entering the room  Pertinent Laboratory/Diagnostic Studies:  Recent labs and diagnostic tests reviewed in nursing home EMR    Current Medications   Medications reviewed from nursing home EMR          Raudel Hua MD

## 2023-02-07 NOTE — ASSESSMENT & PLAN NOTE
Hx of fall and last one last month  Continue with safety measures  Continue with fall precautions  bedbound

## 2023-02-07 NOTE — ASSESSMENT & PLAN NOTE
Family had reported to staff some change in speech and suspect maybe a stroke but do not really see much changes since last visit  Discussed with NP reported she was going to talk to family about CT of the head they request which may not change her management as she already has a diagnosis of afib and on eliquis    Since family do not want to be aggressive and it will not change the management  She discussed with family and they would like to go ahead with speech for now     Continue current meds  Continue with supportive care

## 2023-03-08 ENCOUNTER — NURSING HOME VISIT (OUTPATIENT)
Dept: GERIATRICS | Facility: OTHER | Age: 83
End: 2023-03-08

## 2023-03-08 DIAGNOSIS — I50.32 CHRONIC HEART FAILURE WITH PRESERVED EJECTION FRACTION (HCC): ICD-10-CM

## 2023-03-08 DIAGNOSIS — F02.818 BEHAVIORAL VARIANT FRONTOTEMPORAL DEMENTIA (HCC): Primary | Chronic | ICD-10-CM

## 2023-03-08 DIAGNOSIS — I48.21 PERMANENT ATRIAL FIBRILLATION (HCC): ICD-10-CM

## 2023-03-08 DIAGNOSIS — G31.09 BEHAVIORAL VARIANT FRONTOTEMPORAL DEMENTIA (HCC): Primary | Chronic | ICD-10-CM

## 2023-03-08 DIAGNOSIS — K52.9 FREQUENT STOOLS: ICD-10-CM

## 2023-03-29 RX ORDER — MUSCLE RUB CREAM 100; 150 MG/G; MG/G
1 CREAM TOPICAL 2 TIMES DAILY
COMMUNITY

## 2023-03-29 RX ORDER — CARBOXYMETHYLCELLULOSE SODIUM 5 MG/ML
1 SOLUTION/ DROPS OPHTHALMIC DAILY
COMMUNITY

## 2023-03-29 NOTE — PROGRESS NOTES
"[address], [address], [place], [place] [facility]  [phone]    NAME: Scar Horn  AGE: 82 y o  SEX: female    Progress Note    Location: [place]  POS: 28 (LTC)    Assessment/Plan:    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[facility] )  Unchanged status of behavioral disturbance: screaming with periods of calm and lucidity  Patient managed also by [employer] Providers  Continue LTCF supportive care      P[facility] At[facility]   Pacemaker in place  BP range (3/1-8/2023) = 115/67 to 139/51  Continue Eliquis 5mg BID  Continue Amlodipine 10mg daily/ Losartan 25mg daily     (HFpEF) heart failure with preserved ejection fraction (HCC)  Wt Readings from Last 3 Encounters:  05/11/22          118 kg (260 lb 2 3 oz)  04/08/22          118 kg (259 lb 0 7 oz)  03/07/22          122 kg (268 lb)  On weekly weight check  Wt: 258 2 lbs (3/8/2023) <= 254 4 lbs (3/2/2023)  Euvolemic on assessment  No B/L LE edema  Continue oral fluid restriction: 1,800 ml/day  Continue Furosemide 40mg daily/ KCl 20mEq daily     Frequent stools  Fecal Calprotectin (12/22/22): borderline elevated  = Per [employer] Provider - family does not want further evaluation including Colonoscopy  Per CNA no further \" mushy or unformed stools\"  No blood streaked adult brief noted on the last few weeks now  Continue Banatrol PLUS TID/ Probiotic BID    Chief complaint / Reason for visit: Follow- visit    History of Present Illness: This is an 61-year-old female patient admitted at [facility] for Dementia  Patient is seen and examined today to follow up acute and chronic medical conditions: Behavioral Variant Frontotemporal Dementia, Atrial Fibrillation - with pacemaker, HFpEF, 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, Hx of C diff infection, Chronic Pain      Patient is in bed for this visit - alert, cooperative, calm, very pleasant and not in distress   Patient is verbally engaged with clear coherent speech - voice " "hoarse and raspy sounding - oriented to name/birthday  Patient reported feeling well today, \" I feel fine\" - denies any acute medical concerns for this visit  Per nursing, no acute medical concerns for this visit  Nursing and prior provider notes reviewed on this visit  Discussed visit with PCP and nursing staff/ supervisor  Review of Systems:  Per history of present illness, all other systems reviewed and negative  HISTORY:  Medical Hx: Reviewed, unchanged  Family Hx: Reviewed, unchanged  Soc Hx: Reviewed,  unchanged    ALLERGY: Reviewed, unchanged  Allergies   Allergen Reactions   • Aspirin    • Iodinated Contrast Media Throat Swelling     Pt states \"when I was 14years old driving home from the hospital I felt like I couldn't think and it went away after 10 minutes\"    • Iodine - Food Allergy    • Nsaids    • Other Other (See Comments)     difficulty swallowing for short period        PHYSICAL EXAM:  Vital Signs: T97 5F -P60-R16 BP: 115/67 SpO2: 98% RA  Weight: 258 2 lbs (3/8/2023) <= 254 4 lbs (3/2/2023)    General: NAD  Well appearing  Not in acute distress  with Morbid obesity  Head: Atraumatic  Normocephalic  Eye Exam: anicteric sclera, no discharge, PERRLA, No injection  Wears glasses  Oral Exam: moist mucous membranes, no buccaloropharyngeal erythema, palatine tonsils WNL  Neck Exam: no anterior cervical lymphadenopathy noted, neck supple  Cardiovascular: regular rate, irregular rhythm, (+) murmurs, rubs, or gallops  Pulmonary: no wheeze, no rhonchi, no rales  No chest tenderness  Abdominal: soft, non-tender, nondistended, bowel sounds audible x 4 quadrants  Ave meal completion: %  : Non distended bladder  Incontinent  Daily BM  Extremities and skin: no edema noted, no rashes  Intact skin  Neurological: alert, cooperative, lucid and responsive,  moving all 4 extremities symmetrically  Bed bound  Non ambulatory      Laboratory results / Imaging reviewed: Hard copy/ies in medical " "chart  Last labs done on 12/22/2022  Current Medications: All medications reviewed and updated in Nursing Home Chart    Please note: This note was completed in part utilizing a voice-recognition software may have been used in the preparation of this document  Grammatical errors, random word insertion, spelling mistakes, and incomplete sentences may be an occasional consequence of the system secondary to software limitations, ambient noise and hardware issues  Occasional wrong word or \"sound-alike\" substitutions may have occurred due to the inherent limitations of voice recognition software  At the time of dictation, efforts were made to edit, clarify and/or correct errors  Interpretation should be guided by context  Please read the chart carefully and recognize, using context, where substitutions have occurred  If you have any questions or concerns about the context, text or information contained within the body of this dictation, please contact myself, the provider, for further clarification        LUCY Isidro  3/29/2023  "

## 2023-04-04 ENCOUNTER — NURSING HOME VISIT (OUTPATIENT)
Dept: GERIATRICS | Facility: OTHER | Age: 83
End: 2023-04-04

## 2023-04-04 DIAGNOSIS — I15.8 OTHER SECONDARY HYPERTENSION: ICD-10-CM

## 2023-04-04 DIAGNOSIS — G31.09 BEHAVIORAL VARIANT FRONTOTEMPORAL DEMENTIA (HCC): Chronic | ICD-10-CM

## 2023-04-04 DIAGNOSIS — K21.9 GASTROESOPHAGEAL REFLUX DISEASE, UNSPECIFIED WHETHER ESOPHAGITIS PRESENT: Primary | ICD-10-CM

## 2023-04-04 DIAGNOSIS — K59.04 CHRONIC IDIOPATHIC CONSTIPATION: ICD-10-CM

## 2023-04-04 DIAGNOSIS — F02.818 BEHAVIORAL VARIANT FRONTOTEMPORAL DEMENTIA (HCC): Chronic | ICD-10-CM

## 2023-04-04 DIAGNOSIS — F51.01 PRIMARY INSOMNIA: ICD-10-CM

## 2023-04-04 PROBLEM — R00.1 BRADYCARDIA, UNSPECIFIED: Status: ACTIVE | Noted: 2019-06-06

## 2023-04-04 PROBLEM — H40.9 UNSPECIFIED GLAUCOMA: Status: ACTIVE | Noted: 2019-06-14

## 2023-04-04 PROBLEM — F42.9 OBSESSIVE-COMPULSIVE DISORDER, UNSPECIFIED: Status: ACTIVE | Noted: 2019-06-14

## 2023-04-04 PROBLEM — M81.0 AGE-RELATED OSTEOPOROSIS WITHOUT CURRENT PATHOLOGICAL FRACTURE: Status: ACTIVE | Noted: 2019-06-14

## 2023-04-04 PROBLEM — G90.09 OTHER IDIOPATHIC PERIPHERAL AUTONOMIC NEUROPATHY: Status: ACTIVE | Noted: 2019-06-14

## 2023-04-04 PROBLEM — F33.9 MAJOR DEPRESSIVE DISORDER, RECURRENT, UNSPECIFIED (HCC): Status: ACTIVE | Noted: 2019-06-14

## 2023-04-04 RX ORDER — TRAZODONE HYDROCHLORIDE 100 MG/1
100 TABLET ORAL
COMMUNITY

## 2023-04-04 NOTE — ASSESSMENT & PLAN NOTE
Continues to yell at times  Continue with current meds  Continue with supportive care  Continue with redirection

## 2023-04-04 NOTE — PROGRESS NOTES
[address] home notes  LONG TERM CARE       NAME: Juan Noonan  AGE: 82 y o  SEX: female    DATE OF ENCOUNTER: 4/4/2023    Assessment and Plan   Gastroesophageal reflux disease  No new complaints  Continue current meds  Continue monitoring symptoms     Chronic idiopathic constipation  Seems stable  Continue current meds  Continue monitoring symptoms     Hypertension  BP reviewed from facility, acceptable range   Continue current meds  Continue monitoring BP    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[facility] [address] )  Continues to yell at times  Continue with current meds  Continue with supportive care  Continue with redirection       Chief Complaint     No new complaints but limited due to her cognition     History of Present Illness     81 yo female seen for monthly visit and med renewal  Patient seen for GERD, constipation, HTN and dementia  Reviewed nursing notes since last visit       PMHx     Past Medical History:   Diagnosis Date   • A-fib [facility])    • Acute on chronic diastolic heart failure ([facility] [address] ) 3/6/2022   • Anxiety    • Chronic pain    • Chronic respiratory failure with hypoxia ([facility])    • COPD (chronic obstructive pulmonary disease) ([facility])    • Dementia ([facility])    • Dorsalgia, unspecified    • Edema    • Encephalopathy    • GERD (gastroesophageal reflux disease)    • Gross hematuria 3/31/2022   • Hypertension    • Morbid obesity ([facility] [address] )    • Muscle weakness (generalized)    • Opioid dependence ([facility])    • Overactive bladder    • Pneumonia    • Positive blood culture 4/9/2022   • Psychiatric disorder     anxiety, bipolar   • Radiculopathy of thoracolumbar region    • Sciatica    • 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 ([facility] )    • Urinary incontinence    • UTI (urinary tract infection)      Past Surgical History:   Procedure Laterality Date   • APPENDECTOMY     • BACK SURGERY     • CHOLECYSTECTOMY     • CYSTOSCOPY  11/16/2021    Dr Luis Barriga    • Que "     No family history on file  Social History     Socioeconomic History   • Marital status:      Spouse name: Not on file   • Number of children: Not on file   • Years of education: Not on file   • Highest education level: Not on file   Occupational History   • Not on file   Tobacco Use   • Smoking status: Former     Types: Cigarettes     Quit date: 1995     Years since quittin 4   • Smokeless tobacco: Never   Vaping Use   • Vaping Use: Never used   Substance and Sexual Activity   • Alcohol use: Never   • Drug use: No   • Sexual activity: Not Currently   Other Topics Concern   • Not on file   Social History Narrative   • Not on file     Social Determinants of Health     Financial Resource Strain: Not on file   Food Insecurity: Not on file   Transportation Needs: Not on file   Physical Activity: Not on file   Stress: Not on file   Social Connections: Not on file   Intimate Partner Violence: Not on file   Housing Stability: Not on file     Allergies   Allergen Reactions   • Aspirin    • Iodinated Contrast Media Throat Swelling     Pt states \"when I was 14years old driving home from the hospital I felt like I couldn't think and it went away after 10 minutes\"    • Iodine - Food Allergy    • Nsaids    • Other Other (See Comments)     difficulty swallowing for short period       Review of Systems     Pain every where but her hx limited due to her cognition   All other review of system negative      Objective   Vital signs reviewed from facility records       PHYSICAL EXAM:  GENERAL: no acute distress  SKIN: warm, dry, no rash, no cyanosis  HEENT: normocephalic, atraumatic, no JVD, no Thyromegaly, no lymphadenopathy  LUNGS: CTA, no wheezing, no rales, expanded equally, no chest tenderness   HEART: irregularly irregular rhythm, normal rate, no murmur, no gallop  ABDOMEN: soft non tender non distended bs+, no guarding or rebound tenderness  :  no suprapubic tenderness  MUSCULOSKELETAL: strength about " 4+/5 all extremities but weaker lower extremities, no calf tenderness  NEUROLOGY: awake, alert  CN2-12 intact  PSYCH: cooperative, anxious and restless      Pertinent Laboratory/Diagnostic Studies:  Recent labs and diagnostic tests reviewed in nursing home EMR    Current Medications   Medications reviewed from nursing home EMR          Jay Abbott MD

## 2023-05-05 ENCOUNTER — NURSING HOME VISIT (OUTPATIENT)
Dept: GERIATRICS | Facility: OTHER | Age: 83
End: 2023-05-05

## 2023-05-05 DIAGNOSIS — G31.09 BEHAVIORAL VARIANT FRONTOTEMPORAL DEMENTIA (HCC): Chronic | ICD-10-CM

## 2023-05-05 DIAGNOSIS — I50.32 CHRONIC HEART FAILURE WITH PRESERVED EJECTION FRACTION (HCC): ICD-10-CM

## 2023-05-05 DIAGNOSIS — F02.818 BEHAVIORAL VARIANT FRONTOTEMPORAL DEMENTIA (HCC): Chronic | ICD-10-CM

## 2023-05-05 DIAGNOSIS — I48.21 PERMANENT ATRIAL FIBRILLATION (HCC): Primary | ICD-10-CM

## 2023-05-17 RX ORDER — GABAPENTIN 800 MG/1
800 TABLET ORAL 3 TIMES DAILY
COMMUNITY

## 2023-05-17 NOTE — ASSESSMENT & PLAN NOTE
Continues with period of calm and agitation  Continue LTCF supportive care    Continue Zoloft 150mg daily and Trazodone 150mg at HS  Continue Gabapentin 800mg TID

## 2023-05-17 NOTE — ASSESSMENT & PLAN NOTE
Pacemaker in place  BP range (5/1-5/2023) = 110/54 to 137/68  Continue Eliquis 5mg BID  Continue Amlodipine 10mg daily/ Losartan 25mg daily

## 2023-05-17 NOTE — ASSESSMENT & PLAN NOTE
Wt Readings from Last 3 Encounters:   05/11/22 118 kg (260 lb 2 3 oz)   04/08/22 118 kg (259 lb 0 7 oz)   03/07/22 122 kg (268 lb)   Small increments of weight gain  Wt: 270 1 lbs (5/3/2023) <= 263 1 lbs (4/5/2023) <= 258 2 lbs (3/8/2023)  Euvolemic on assessment     No B/L LE edema  Continue oral fluid restriction: 1,800 ml/day  Continue Furosemide 40mg daily/ KCl 20mEq daily  Renal functions WNL (3/16/2023)

## 2023-05-17 NOTE — PROGRESS NOTES
[address], [address] [place] [address], [address] [place] [address]  [phone]    NAME: Nichole Mckeon  AGE: 82 y o  SEX: female    Progress Note    Location[place]  POS: 32 (LTC)    Assessment/Plan:    [facility] ([facility]  Pacemaker in place  BP range (5/1-5/2023) = 110/54 to 137/68  Continue Eliquis 5mg BID  Continue Amlodipine 10mg daily/ Losartan 25mg daily    (HFpEF) heart failure with preserved ejection fraction (HCC)  Wt Readings from Last 3 Encounters:   05/11/22 118 kg (260 lb 2 3 oz)   04/08/22 118 kg (259 lb 0 7 oz)   03/07/22 122 kg (268 lb)   Small increments of weight gain  Wt: 270 1 lbs (5/3/2023) <= 263 1 lbs (4/5/2023) <= 258 2 lbs (3/8/2023)  Euvolemic on assessment  No B/L LE edema  Continue oral fluid restriction: 1,800 ml/day  Continue Furosemide 40mg daily/ KCl 20mEq daily  Renal functions WNL (3/16/2023)    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[facility] )  Continues with period of calm and agitation  Continue LTCF supportive care  Continue Zoloft 150mg daily and Trazodone 150mg at HS  Continue Gabapentin 800mg TID      Chief complaint / Reason for visit: Follow- visit    History of Present Illness: This is an 53-year-old female patient admitted at [facility] for Dementia  Patient is seen and examined today to follow up acute and chronic medical conditions: Behavioral Variant Frontotemporal Dementia, Atrial Fibrillation - with pacemaker, HFpEF, 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, Hx of C diff infection, Chronic Pain      Patient is in bed for this visit - sleepy, cooperative, calm, very pleasant and not in distress  Patient is verbally engaged with clear coherent speech - voice hoarse and raspy sounding - oriented to name/birthday  Patient reported being tired today but denies any acute medical concerns for this visit  Per nursing, no acute medical concerns for this visit  Nursing and prior provider notes reviewed on this visit   Discussed visit with "PCP and nursing staff/ supervisor  Review of Systems:  Per history of present illness, all other systems reviewed and negative  HISTORY:  Medical Hx: Reviewed, unchanged  Family Hx: Reviewed, unchanged  Soc Hx: Reviewed,  unchanged    ALLERGY: Reviewed, unchanged  Allergies   Allergen Reactions   • Aspirin    • Iodinated Contrast Media Throat Swelling     Pt states \"when I was 14years old driving home from the hospital I felt like I couldn't think and it went away after 10 minutes\"    • Iodine - Food Allergy    • Nsaids    • Other Other (See Comments)     difficulty swallowing for short period        PHYSICAL EXAM:  Vital Signs: T98 2F -P60 -R16 BP: 132/67 SpO2: 97% RA  Weight: 270 1 lbs (5/3/2023) <= 263 1 lbs (4/5/2023) <= 258 2 lbs (3/8/2023)    General: NAD  Well appearing  Not in acute distress  with Morbid obesity  Head: Atraumatic  Normocephalic  Eye Exam: anicteric sclera, no discharge, PERRLA, No injection  Wears glasses  Oral Exam: moist mucous membranes, no buccaloropharyngeal erythema, palatine tonsils WNL  Neck Exam: no anterior cervical lymphadenopathy noted, neck supple  Cardiovascular: regular rate, irregular rhythm, (+) murmurs, rubs, or gallops  Pulmonary: no wheeze, no rhonchi, no rales  No chest tenderness  Abdominal: soft, non-tender, nondistended, bowel sounds audible x 4 quadrants  Ave meal completion: %  : Non distended bladder  Incontinent  Daily BM  Extremities and skin: no edema noted, no rashes  Intact skin  Neurological: sleepy, cooperative, lucid and responsive, moving all 4 extremities symmetrically  Bed bound  Non ambulatory  Laboratory results / Imaging reviewed: Hard copy/ies in medical chart:    * CBC wo diff (3/16/2023) =  WNL    * BMP (3/16/2023) = WNL except:  Glu: 119 (H)    Current Medications: All medications reviewed and updated in Nursing Home Chart    Please note:  This note was completed in part utilizing a voice-recognition software may have " "been used in the preparation of this document  Grammatical errors, random word insertion, spelling mistakes, and incomplete sentences may be an occasional consequence of the system secondary to software limitations, ambient noise and hardware issues  Occasional wrong word or \"sound-alike\" substitutions may have occurred due to the inherent limitations of voice recognition software  At the time of dictation, efforts were made to edit, clarify and/or correct errors  Interpretation should be guided by context  Please read the chart carefully and recognize, using context, where substitutions have occurred  If you have any questions or concerns about the context, text or information contained within the body of this dictation, please contact myself, the provider, for further clarification        LUCY Leon  5/17/2023  "

## 2023-05-30 ENCOUNTER — NURSING HOME VISIT (OUTPATIENT)
Dept: GERIATRICS | Facility: OTHER | Age: 83
End: 2023-05-30

## 2023-05-30 DIAGNOSIS — F31.12 BIPOLAR AFFECTIVE DISORDER, CURRENTLY MANIC, MODERATE (HCC): ICD-10-CM

## 2023-05-30 DIAGNOSIS — F41.9 ANXIETY: ICD-10-CM

## 2023-05-30 DIAGNOSIS — N30.00 ACUTE CYSTITIS WITHOUT HEMATURIA: Primary | ICD-10-CM

## 2023-05-30 NOTE — ASSESSMENT & PLAN NOTE
At times very anxious  Continue current meds  Continue monitoring symptoms  Continue with supportive care

## 2023-05-30 NOTE — PROGRESS NOTES
[address] home notes  LONG TERM CARE       NAME: Inga Lindsey  AGE: 82 y o  SEX: female    DATE OF ENCOUNTER: 5/30/2023    Assessment and Plan   Acute cystitis  Currently on nitrofurantoin   Will complete course of antibiotics  Continue monitoring symptoms     Anxiety  At times very anxious  Continue current meds  Continue monitoring symptoms  Continue with supportive care     Bipolar affective disorder, currently manic, moderate (HCC)  Mood fluctuates  Continue current meds  Continue monitoring symptoms         Chief Complaint     No new complaints    History of Present Illness     83 yo female seen for monthly visit and med renewal  Patient seen for acute cystitis, anxiety and bipolar  Reviewed nursing notes since last visit  PMHx     Past Medical History:   Diagnosis Date   • A-fib [facility])    • Acute on chronic diastolic heart failure ([facility] [address] ) 3/6/2022   • Anxiety    • Chronic pain    • Chronic respiratory failure with hypoxia ([facility])    • COPD (chronic obstructive pulmonary disease) ([facility])    • Dementia ([facility])    • Dorsalgia, unspecified    • Edema    • Encephalopathy    • GERD (gastroesophageal reflux disease)    • Gross hematuria 3/31/2022   • Hypertension    • Morbid obesity ([facility] [address] )    • Muscle weakness (generalized)    • Opioid dependence ([facility])    • Overactive bladder    • Pneumonia    • Positive blood culture 4/9/2022   • Psychiatric disorder     anxiety, bipolar   • Radiculopathy of thoracolumbar region    • Sciatica    • 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 ([facility] )    • Urinary incontinence    • UTI (urinary tract infection)      Past Surgical History:   Procedure Laterality Date   • APPENDECTOMY     • BACK SURGERY     • CHOLECYSTECTOMY     • CYSTOSCOPY  11/16/2021    Dr Heraclio Benton    • KNEE SURGERY       No family history on file  Social History     Socioeconomic History   • Marital status:       Spouse name: Not on file   • Number of "children: Not on file   • Years of education: Not on file   • Highest education level: Not on file   Occupational History   • Not on file   Tobacco Use   • Smoking status: Former     Types: Cigarettes     Quit date: 1995     Years since quittin 5   • Smokeless tobacco: Never   Vaping Use   • Vaping Use: Never used   Substance and Sexual Activity   • Alcohol use: Never   • Drug use: No   • Sexual activity: Not Currently   Other Topics Concern   • Not on file   Social History Narrative   • Not on file     Social Determinants of Health     Financial Resource Strain: Not on file   Food Insecurity: Not on file   Transportation Needs: Not on file   Physical Activity: Not on file   Stress: Not on file   Social Connections: Not on file   Intimate Partner Violence: Not on file   Housing Stability: Not on file     Allergies   Allergen Reactions   • Aspirin    • Iodinated Contrast Media Throat Swelling     Pt states \"when I was 14years old driving home from the hospital I felt like I couldn't think and it went away after 10 minutes\"    • Iodine - Food Allergy    • Nsaids    • Other Other (See Comments)     difficulty swallowing for short period       Review of Systems     Denies any pain or shortness of breath   All other review of system negative but hx limited due to her cognition       Objective   Vital signs reviewed from facility records  PHYSICAL EXAM:  GENERAL: no acute distress  SKIN: warm, dry, no rash, no cyanosis  HEENT: normocephalic, atraumatic, no JVD, no Thyromegaly, no lymphadenopathy  LUNGS: CTA, no wheezing, no rales, expanded equally, no chest tenderness   HEART: irregularly irregular rhythm, normal rate, no murmur, no gallop  ABDOMEN: soft non tender non distended bs+, no guarding or rebound tenderness  :  no suprapubic tenderness  MUSCULOSKELETAL: strength about 4+/5 all extremities but weaker lower extremities, no calf tenderness  NEUROLOGY: awake, alert, CN2-12 intact     PSYCH: " cooperative, anxious  Pertinent Laboratory/Diagnostic Studies:  Recent labs and diagnostic tests reviewed in nursing home EMR    Current Medications   Medications reviewed from nursing home EMR          Ronnie Erickson MD

## 2023-06-02 ENCOUNTER — TELEPHONE (OUTPATIENT)
Dept: FAMILY MEDICINE CLINIC | Facility: CLINIC | Age: 83
End: 2023-06-02

## 2023-06-08 NOTE — PROGRESS NOTES
Cardiology Consultation     Lawrence Lewis  [id number]  1940  [facility] CARDIOLOGY [place]  [address] [address]      1  Chronic heart failure with preserved ejection fraction ([facility] [address] )        2  Permanent atrial fibrillation (HCC)  POCT ECG      3  Symptomatic bradycardia  POCT ECG      4  Pacemaker        5  Primary hypertension        6  Obstructive sleep apnea syndrome            Discussion/Summary:  Chronic diastolic heart failure  - TTE 3/7/2022 showed EF 55%, abnormal diastolic function, mildly dilated RV, moderate to severe LA, moderate RA  - Current diuretic regimen furosemide 40 mg p o  daily with potassium 20 mEq daily  -Appears euvolemic today  Permanent atrial fibrillation  - Anticoagulated Eliquis 5 mg twice daily  - Not on any AV brian blocking agents  Symptomatic bradycardia  - Status post Medtronic single-chamber PPM  - Device interrogation today showed normal device function, no significant arrhythmias noted  Hypertension  - Continue with amlodipine 10 mg daily, losartan 25 mg daily  - Blood pressure well controlled today  RAMOS  - Not wearing CPAP  Frontotemporal dementia  COPD     Follow-up in 6 months  History of Present Illness:  Lawrence Lewis is a 82y o  year old female with a past medical history of paroxysmal atrial fibrillation, symptomatic bradycardia status post PPM, diastolic heart failure, hypertension, frontotemporal dementia and RAMOS  She reports feeling okay today  She has been having intermittent episodes of headaches and migraines, however denies any episodes of chest pain or palpitations  She is bedbound currently  She reports significant pain in her feet when she tries to stand  She reports they are currently working with her to try to improve her mobility      Patient Active Problem List   Diagnosis   • Atrial fibrillation [facility])   • Hypertension   • 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[facility] )   • PAD (peripheral artery disease) ([facility] )   • Morbid obesity due to excess calories ([facility])   • Acute cystitis   • Acute encephalopathy   • COPD (chronic obstructive pulmonary disease) ([facility])   • Overactive bladder   • Hypokalemia   • Moderate episode of recurrent major depressive disorder ([facility])   • Chronic pain   • Physical deconditioning   • Bipolar affective disorder, currently manic, moderate ([facility])   • (HFpEF) heart failure with preserved ejection fraction ([facility])   • Pacemaker   • Frequent UTI   • Dementia ([facility])   • Generalized weakness   • Fungal infection of the groin   • Anxiety   • Sleep apnea   • Ambulatory dysfunction   • Gross hematuria   • Acute metabolic encephalopathy   • Bacteria in urine   • Oral phase dysphagia   • History of Clostridium difficile infection   • Bipolar disorder ([facility] )   • Depression   • Difficult or painful urination   • Gastroesophageal reflux disease   • Iron deficiency anemia   • Muscle weakness   • Psychosis ([facility])   • Sciatica   • Spinal stenosis of lumbar region   • Urinary retention   • Chronic idiopathic constipation   • Vaginal discharge   • Recurrent falls   • Frequent stools   • Bradycardia, unspecified   • Major depressive disorder, recurrent, unspecified ([facility])   • Obsessive-compulsive disorder, unspecified   • Other idiopathic peripheral autonomic neuropathy   • Unspecified glaucoma   • Age-related osteoporosis without current pathological fracture     Past Medical History:   Diagnosis Date   • A-fib ([facility])    • Acute on chronic diastolic heart failure ([facility]) 3/6/2022   • Anxiety    • Chronic pain    • Chronic respiratory failure with hypoxia ([facility])    • COPD (chronic obstructive pulmonary disease) ([facility])    • Dementia ([facility])    • Dorsalgia, unspecified    • Edema    • Encephalopathy    • GERD (gastroesophageal reflux disease)    • Gross hematuria 3/31/2022   • Hypertension    • Morbid obesity ([facility])    • Muscle weakness (generalized)    • Opioid dependence ([facility])    • Overactive bladder    • Pneumonia    • Positive blood culture 2022   • Psychiatric disorder     anxiety, bipolar   • Radiculopathy of thoracolumbar region    • Sciatica    • 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 ([facility] )    • Urinary incontinence    • UTI (urinary tract infection)      Social History     Socioeconomic History   • Marital status:      Spouse name: Not on file   • Number of children: Not on file   • Years of education: Not on file   • Highest education level: Not on file   Occupational History   • Not on file   Tobacco Use   • Smoking status: Former     Types: Cigarettes     Quit date: 1995     Years since quittin 5   • Smokeless tobacco: Never   Vaping Use   • Vaping Use: Never used   Substance and Sexual Activity   • Alcohol use: Never   • Drug use: No   • Sexual activity: Not Currently   Other Topics Concern   • Not on file   Social History Narrative   • Not on file     Social Determinants of Health     Financial Resource Strain: Not on file   Food Insecurity: No Food Insecurity (3/7/2022)    Hunger Vital Sign    • Worried About Running Out of Food in the Last Year: Never true    • Ran Out of Food in the Last Year: Never true   Transportation Needs: No Transportation Needs (3/7/2022)    PRAPARE - Transportation    • Lack of Transportation (Medical): No    • Lack of Transportation (Non-Medical): No   Physical Activity: Not on file   Stress: Not on file   Social Connections: Not on file   Intimate Partner Violence: Not on file   Housing Stability: Low Risk  (3/7/2022)    Housing Stability Vital Sign    • Unable to Pay for Housing in the Last Year: No    • Number of Places Lived in the Last Year: 1    • Unstable Housing in the Last Year: No      History reviewed  No pertinent family history    Past Surgical History:   Procedure Laterality Date   • APPENDECTOMY     • BACK SURGERY     • CHOLECYSTECTOMY     • CYSTOSCOPY  2021    Dr Ricardo Peres    • [facility]         Current Outpatient Medications:   •  acetaminophen (TYLENOL) 325 mg tablet, Take 650 mg by mouth 2 (two) times a day, Disp: , Rfl:   •  amLODIPine (NORVASC) 10 mg tablet, Take 1 tablet (10 mg total) by mouth daily, Disp: 90 tablet, Rfl: 1  •  Banana Flakes (BANATROL PLUS PO), Take 1 packet by mouth 3 (three) times a day, Disp: , Rfl:   •  conjugated estrogens (PREMARIN) vaginal cream, Insert 1 g into the vagina 2 (two) times a week, Disp: , Rfl:   •  Eliquis 5 MG, TAKE 1 TABLET BY MOUTH TWICE A DAY, Disp: 180 tablet, Rfl: 1  •  ferrous sulfate 325 (65 Fe) mg tablet, TAKE 1 TABLET BY MOUTH EVERY DAY WITH BREAKFAST, Disp: 90 tablet, Rfl: 1  •  furosemide (LASIX) 40 mg tablet, Take 1 tablet (40 mg total) by mouth daily, Disp: 30 tablet, Rfl: 0  •  gabapentin (NEURONTIN) 800 mg tablet, Take 800 mg by mouth 3 (three) times a day, Disp: , Rfl:   •  latanoprost (XALATAN) 0 005 % ophthalmic solution, Administer 1 drop to both eyes daily at bedtime, Disp: 7 5 mL, Rfl: 0  •  losartan (COZAAR) 25 mg tablet, TAKE 1 TABLET (25 MG TOTAL) BY MOUTH DAILY  , Disp: 90 tablet, Rfl: 0  •  miconazole (MICOTIN) 2 % powder, Apply 1 application   topically every 8 (eight) hours as needed = apply to B/L groin areas, Disp: , Rfl:   •  omeprazole (PriLOSEC) 20 mg delayed release capsule, Take 20 mg by mouth daily, Disp: , Rfl:   •  potassium chloride (K-DUR,KLOR-CON) 20 mEq tablet, Take 20 mEq by mouth in the morning, Disp: , Rfl:   •  traZODone (DESYREL) 100 mg tablet, Take 1 5 tablets (150 mg total) by mouth daily at bedtime, Disp: 135 tablet, Rfl: 0  •  calcium carbonate-vitamin D (OSCAL-D) 500 mg-200 units per tablet, Take 1 tablet by mouth 2 (two) times a day with meals  , Disp: , Rfl:   •  carboxymethylcellulose 0 5 % SOLN, Administer 1 drop to both eyes in the morning, Disp: , Rfl:   •  Incontinence Supply Disposable (PROCare Bariatric Briefs) MISC, Use every 2 (two) hours as needed (incontinence), Disp: 120 each, Rfl: 1  •  Incontinence Supply Disposable "([facility] health Incontinence Pads) MISC, Use 1 each every 2 (two) hours, Disp: 150 each, Rfl: 1  •  Incontinence Supply Disposable (Wings Adult Briefs XXL) MISC, Use every 2 (two) hours, Disp: 100 each, Rfl: 3  •  Infant Care Products (Baby Wipes) MISC, Use every 2 (two) hours, Disp: 100 each, Rfl: 3  •  Luvena Vaginal Moisturizer GEL, Insert 1 application  into the vagina in the morning, Disp: , Rfl:   •  menthol-methyl salicylate (BENGAY) 23-55 % cream, Apply 1 application  topically 2 (two) times a day = apply to BL shoulder, Disp: , Rfl:   •  methenamine hippurate (HIPREX) 1 g tablet, Take 1 tablet (1 g total) by mouth 2 (two) times a day with meals, Disp: 60 tablet, Rfl: 12  •  saccharomyces boulardii (FLORASTOR) 250 mg capsule, Take 1 capsule (250 mg total) by mouth 2 (two) times a day, Disp: 60 capsule, Rfl: 0  •  sertraline (ZOLOFT) 100 mg tablet, Take 150 mg by mouth daily, Disp: , Rfl:   Allergies   Allergen Reactions   • Aspirin    • Iodinated Contrast Media Throat Swelling     Pt states \"when I was 14years old driving home from the hospital I felt like I couldn't think and it went away after 10 minutes\"    • Iodine - Food Allergy    • Nsaids    • Other Other (See Comments)     difficulty swallowing for short period         Labs:  Lab Results   Component Value Date    ALT 13 2022    AST 27 2022    BUN 12 2022    CALCIUM 9 2 2022     2022    CO2 27 2022    CREATININE 0 87 2022    HCT 39 2 2022    HDL 46 2017    HGB 12 0 2022    HGBA1C 5 3 2017    K 4 1 2022    MG 1 9 03/10/2021    PHOS 2 8 2018     2022    TRIG 109 2017    WBC 6 38 2022       Imaging: No results found  EC2023: Ventricularly paced rhythm at 60 bpm    Review of Systems:  Review of Systems   Constitutional: Negative for chills, diaphoresis, fatigue and fever  HENT: Negative for congestion      Eyes: Negative for photophobia " and visual disturbance  Respiratory: Negative for chest tightness and shortness of breath  Cardiovascular: Negative for chest pain, palpitations and leg swelling  Gastrointestinal: Negative for abdominal distention, abdominal pain, diarrhea, nausea and vomiting  Genitourinary: Negative for difficulty urinating and dysuria  Musculoskeletal: Positive for arthralgias and gait problem  Negative for joint swelling  Skin: Negative for color change, pallor and rash  Neurological: Positive for weakness and headaches  Negative for dizziness, syncope and numbness  Psychiatric/Behavioral: Negative for agitation, behavioral problems and confusion  Vitals:    06/09/23 0938   BP: 118/64   Pulse: 60      There were no vitals filed for this visit          Physical Exam:  General appearance:  Appears stated age, alert, well appearing and in no distress  HEENT:  PERRLA, EOMI, no scleral icterus, no conjunctival pallor  NECK:  Supple, No elevated JVP, no thyromegaly, no carotid bruits  HEART:  Regular rate and rhythm, normal S3/P0, 2/6 systolic murmur  LUNGS:  Clear to auscultation bilaterally  ABDOMEN:  Soft, non-tender, positive bowel sounds, no rebound or guarding, no organomegaly   EXTREMITIES:  No edema  VASCULAR:  Normal pedal pulses   SKIN: No lesions or rashes on exposed skin  NEURO:  CN II-XII intact, no focal deficits

## 2023-06-09 ENCOUNTER — OFFICE VISIT (OUTPATIENT)
Dept: CARDIOLOGY CLINIC | Facility: CLINIC | Age: 83
End: 2023-06-09
Payer: COMMERCIAL

## 2023-06-09 ENCOUNTER — IN-CLINIC DEVICE VISIT (OUTPATIENT)
Dept: CARDIOLOGY CLINIC | Facility: CLINIC | Age: 83
End: 2023-06-09
Payer: COMMERCIAL

## 2023-06-09 VITALS — DIASTOLIC BLOOD PRESSURE: 64 MMHG | SYSTOLIC BLOOD PRESSURE: 118 MMHG | HEART RATE: 60 BPM

## 2023-06-09 DIAGNOSIS — R00.1 SYMPTOMATIC BRADYCARDIA: ICD-10-CM

## 2023-06-09 DIAGNOSIS — I50.32 CHRONIC HEART FAILURE WITH PRESERVED EJECTION FRACTION (HCC): Primary | ICD-10-CM

## 2023-06-09 DIAGNOSIS — Z95.0 PACEMAKER: ICD-10-CM

## 2023-06-09 DIAGNOSIS — I10 PRIMARY HYPERTENSION: ICD-10-CM

## 2023-06-09 DIAGNOSIS — Z95.0 CARDIAC PACEMAKER IN SITU: Primary | ICD-10-CM

## 2023-06-09 DIAGNOSIS — I48.21 PERMANENT ATRIAL FIBRILLATION (HCC): ICD-10-CM

## 2023-06-09 DIAGNOSIS — G47.33 OBSTRUCTIVE SLEEP APNEA SYNDROME: ICD-10-CM

## 2023-06-09 PROCEDURE — 93279 PRGRMG DEV EVAL PM/LDLS PM: CPT | Performed by: STUDENT IN AN ORGANIZED HEALTH CARE EDUCATION/TRAINING PROGRAM

## 2023-06-09 PROCEDURE — 93000 ELECTROCARDIOGRAM COMPLETE: CPT | Performed by: STUDENT IN AN ORGANIZED HEALTH CARE EDUCATION/TRAINING PROGRAM

## 2023-06-09 PROCEDURE — 99204 OFFICE O/P NEW MOD 45 MIN: CPT | Performed by: STUDENT IN AN ORGANIZED HEALTH CARE EDUCATION/TRAINING PROGRAM

## 2023-06-09 NOTE — PROGRESS NOTES
Results for orders placed or performed in visit on 06/09/23   Cardiac EP device report    Narrative    MDT SINGLE PM / [address] [place] [facility] CONDITIONAL  DEVICE INTERROGATED IN THE [place] OFFICE: BATTERY VOLTAGE ADEQUATE-9 YRS   73%  ALL AVAILABLE LEAD PARAMETERS WITHIN NORMAL LIMITS  NO SIGNIFICANT HIGH RATE EPISODES  NO PROGRAMMING CHANGES MADE TO DEVICE PARAMETERS  NORMAL DEVICE FUNCTION   NC

## 2023-06-30 ENCOUNTER — NURSING HOME VISIT (OUTPATIENT)
Age: 83
End: 2023-06-30

## 2023-06-30 VITALS
SYSTOLIC BLOOD PRESSURE: 132 MMHG | DIASTOLIC BLOOD PRESSURE: 71 MMHG | OXYGEN SATURATION: 97 % | HEART RATE: 59 BPM | TEMPERATURE: 97.6 F

## 2023-06-30 DIAGNOSIS — I10 PRIMARY HYPERTENSION: ICD-10-CM

## 2023-06-30 DIAGNOSIS — I48.21 PERMANENT ATRIAL FIBRILLATION (HCC): ICD-10-CM

## 2023-06-30 DIAGNOSIS — F03.90 DEMENTIA, UNSPECIFIED DEMENTIA SEVERITY, UNSPECIFIED DEMENTIA TYPE, UNSPECIFIED WHETHER BEHAVIORAL, PSYCHOTIC, OR MOOD DISTURBANCE OR ANXIETY (HCC): ICD-10-CM

## 2023-06-30 DIAGNOSIS — I50.32 CHRONIC HEART FAILURE WITH PRESERVED EJECTION FRACTION (HCC): ICD-10-CM

## 2023-06-30 DIAGNOSIS — J44.9 CHRONIC OBSTRUCTIVE PULMONARY DISEASE, UNSPECIFIED COPD TYPE (HCC): Primary | ICD-10-CM

## 2023-06-30 NOTE — PROGRESS NOTES
[facility]  [address] [facility] , [address]  [address]  LT Follow-up    NAME: Keanu Conway  AGE: 82 y o  SEX: female  : 1940     DATE: 2023    CODE STATUS: DNR     Assessment and Plan:     Problem List Items Addressed This Visit        Respiratory    COPD (chronic obstructive pulmonary disease) ([facility] ) - Primary     Not currently in any acute exacerbation  No current medication management            Cardiovascular and Mediastinum    Atrial fibrillation ([facility] [address] )     Heart rate stable  Pacemaker in place  Continue Eliquis for anticoagulation         Hypertension     Blood pressure at goal  BP goal less than 140/90  Continue amlodipine and losartan           (HFpEF) heart failure with preserved ejection fraction (HCC)     Wt Readings from Last 3 Encounters:   22 118 kg (260 lb 2 3 oz)   22 118 kg (259 lb 0 7 oz)   22 122 kg (268 lb)       Euvolemic on exam  Continue fluid restriction 1800 mL daily  Continue Lasix 40 mg daily  Continue potassium supplement daily  Routinely monitor BMP              Nervous and Auditory    Dementia (HCC)     Redirection, reorientation and distraction as appropriate   Fall/safety precautions  Supportive care, assistance with ADLs   Avoid deliriogenic medications   Encourage adequate hydration and nutrition   Maintain sleep/wake cycle                  History of Present Illness:     Patient is an 46-year-old female being seen at long-term care facility for follow-up of chronic medical conditions  She is doing well at long-term care facility  Nursing notes reviewed  Vital signs stable  No acute concerns        The following portions of the patient's history were reviewed and updated as appropriate: allergies, current medications, past family history, past medical history, past social history, past surgical history and problem list      Review of Systems:     Review of Systems   Constitutional: Negative for chills and fever  HENT: Negative for ear pain and sore throat  Eyes: Negative for pain and visual disturbance  Respiratory: Negative for cough and shortness of breath  Cardiovascular: Negative for chest pain and palpitations  Gastrointestinal: Positive for nausea  Negative for abdominal pain and vomiting  Genitourinary: Negative for dysuria and hematuria  Musculoskeletal: Negative for arthralgias and back pain  Skin: Negative for color change and rash  Neurological: Negative for seizures and syncope  All other systems reviewed and are negative         Problem List:     Patient Active Problem List   Diagnosis   • Atrial fibrillation ([place] [id number] )   • Hypertension   • 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[place] [address] )   • PAD (peripheral artery disease) ([place] [address] )   • Morbid obesity due to excess calories ([place] [address] )   • Acute cystitis   • Acute encephalopathy   • COPD (chronic obstructive pulmonary disease) ([facility])   • Overactive bladder   • Hypokalemia   • Moderate episode of recurrent major depressive disorder ([facility])   • Chronic pain   • Physical deconditioning   • Bipolar affective disorder, currently manic, moderate ([facility])   • (HFpEF) heart failure with preserved ejection fraction ([facility])   • Pacemaker   • Frequent UTI   • Dementia ([facility])   • Generalized weakness   • Fungal infection of the groin   • Anxiety   • Sleep apnea   • Ambulatory dysfunction   • Gross hematuria   • Acute metabolic encephalopathy   • Bacteria in urine   • Oral phase dysphagia   • History of Clostridium difficile infection   • Bipolar disorder ([place] [address] )   • Depression   • Difficult or painful urination   • Gastroesophageal reflux disease   • Iron deficiency anemia   • Muscle weakness   • Psychosis ([place] [address] )   • Sciatica   • Spinal stenosis of lumbar region   • Urinary retention   • Chronic idiopathic constipation   • Vaginal discharge   • Recurrent falls   • Frequent stools   • Bradycardia, unspecified   • Major depressive disorder, recurrent, unspecified ([facility] [address] )   • Obsessive-compulsive disorder, unspecified   • Other idiopathic peripheral autonomic neuropathy   • Unspecified glaucoma   • Age-related osteoporosis without current pathological fracture        Objective:     /71   Pulse 59   Temp 97 6 °F (36 4 °C)   LMP  (LMP Unknown)   SpO2 97%     Physical Exam  Vitals and nursing note reviewed  Constitutional:       General: She is not in acute distress  Appearance: She is well-developed  HENT:      Head: Normocephalic and atraumatic  Eyes:      Conjunctiva/sclera: Conjunctivae normal    Cardiovascular:      Rate and Rhythm: Normal rate and regular rhythm  Heart sounds: No murmur heard  Pulmonary:      Effort: Pulmonary effort is normal  No respiratory distress  Breath sounds: Normal breath sounds  Abdominal:      Palpations: Abdomen is soft  Tenderness: There is no abdominal tenderness  Musculoskeletal:         General: No swelling  Cervical back: Neck supple  Skin:     General: Skin is warm and dry  Capillary Refill: Capillary refill takes less than 2 seconds  Neurological:      Mental Status: She is alert  Mental status is at baseline  Motor: Weakness present  Psychiatric:         Mood and Affect: Mood normal          Pertinent Laboratory/Diagnostic Studies:    Laboratory Results: I have personally reviewed the pertinent laboratory results/reports     Radiology/Other Diagnostic Testing Results: I have personally reviewed pertinent reports        Petros Burns, [facility]  Geriatric Medicine

## 2023-06-30 NOTE — ASSESSMENT & PLAN NOTE
Wt Readings from Last 3 Encounters:   05/11/22 118 kg (260 lb 2 3 oz)   04/08/22 118 kg (259 lb 0 7 oz)   03/07/22 122 kg (268 lb)       Euvolemic on exam  Continue fluid restriction 1800 mL daily  Continue Lasix 40 mg daily  Continue potassium supplement daily  Routinely monitor BMP

## 2023-06-30 NOTE — ASSESSMENT & PLAN NOTE
Redirection, reorientation and distraction as appropriate   Fall/safety precautions  Supportive care, assistance with ADLs   Avoid deliriogenic medications   Encourage adequate hydration and nutrition   Maintain sleep/wake cycle

## 2023-07-27 ENCOUNTER — NURSING HOME VISIT (OUTPATIENT)
Dept: GERIATRICS | Facility: OTHER | Age: 83
End: 2023-07-27
Payer: COMMERCIAL

## 2023-07-27 DIAGNOSIS — G89.29 OTHER CHRONIC PAIN: ICD-10-CM

## 2023-07-27 DIAGNOSIS — K59.04 CHRONIC IDIOPATHIC CONSTIPATION: ICD-10-CM

## 2023-07-27 DIAGNOSIS — R26.2 AMBULATORY DYSFUNCTION: Primary | ICD-10-CM

## 2023-07-27 DIAGNOSIS — N39.0 FREQUENT UTI: ICD-10-CM

## 2023-07-27 PROCEDURE — 99309 SBSQ NF CARE MODERATE MDM 30: CPT | Performed by: INTERNAL MEDICINE

## 2023-07-27 RX ORDER — OXYCODONE HYDROCHLORIDE 5 MG/1
2.5 TABLET ORAL 2 TIMES DAILY
COMMUNITY

## 2023-07-27 NOTE — PROGRESS NOTES
[address] [facility] home notes  LONG TERM CARE       NAME: Chau El  AGE: 82 y.o. SEX: female    DATE OF ENCOUNTER: 7/27/2023    Assessment and Plan   Ambulatory dysfunction  Hx of falls   Multifactorial  Bed bound  Continue with safety measures  Continue with fall precautions    Frequent UTI  Being treated for another UTI with nitrofuratoin  Continue encouraging fluids  Continue current meds    Chronic pain  At times seems to be crying out in pain  Will add oxycodone 2.5mg po twice a day  Continue all other pain meds  Continue monitoring symptoms  Activity as tolerable     Chronic idiopathic constipation  Continue current meds  Continue monitoring for worsening symptoms due to addition of pain meds        Chief Complaint     Back pain    History of Present Illness     83 yo female seen for monthly visit and med renewal. Patient seen for UTI, chronic pain, constipation and ambulatory dysfunction. Reviewed nursing notes since last visit.      PMHx     Past Medical History:   Diagnosis Date   • A-fib [facility])    • Acute on chronic diastolic heart failure ([address]) 3/6/2022   • Anxiety    • Chronic pain    • Chronic respiratory failure with hypoxia ([facility])    • COPD (chronic obstructive pulmonary disease) ([facility])    • Dementia ([facility])    • Dorsalgia, unspecified    • Edema    • Encephalopathy    • GERD (gastroesophageal reflux disease)    • Gross hematuria 3/31/2022   • Hypertension    • Morbid obesity ([address])    • Muscle weakness (generalized)    • Opioid dependence ([facility])    • Overactive bladder    • Pneumonia    • Positive blood culture 4/9/2022   • Psychiatric disorder     anxiety, bipolar   • Radiculopathy of thoracolumbar region    • Sciatica    • 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 Benjiman Severs    • KNEE SURGERY       No family history on file. Social History     Socioeconomic History   • Marital status:      Spouse name: Not on file   • Number of children: Not on file   • Years of education: Not on file   • Highest education level: Not on file   Occupational History   • Not on file   Tobacco Use   • Smoking status: Former     Types: Cigarettes     Quit date: 1995     Years since quittin.7   • Smokeless tobacco: Never   Vaping Use   • Vaping Use: Never used   Substance and Sexual Activity   • Alcohol use: Never   • Drug use: No   • Sexual activity: Not Currently   Other Topics Concern   • Not on file   Social History Narrative   • Not on file     Social Determinants of Health     Financial Resource Strain: Not on file   Food Insecurity: No Food Insecurity (3/7/2022)    Hunger Vital Sign    • Worried About Running Out of Food in the Last Year: Never true    • Ran Out of Food in the Last Year: Never true   Transportation Needs: No Transportation Needs (3/7/2022)    PRAPARE - Transportation    • Lack of Transportation (Medical): No    • Lack of Transportation (Non-Medical):  No   Physical Activity: Not on file   Stress: Not on file   Social Connections: Not on file   Intimate Partner Violence: Not on file   Housing Stability: Low Risk  (3/7/2022)    Housing Stability Vital Sign    • Unable to Pay for Housing in the Last Year: No    • Number of Places Lived in the Last Year: 1    • Unstable Housing in the Last Year: No     Allergies   Allergen Reactions   • Aspirin    • Iodinated Contrast Media Throat Swelling     Pt states "when I was 14years old driving home from the hospital I felt like I couldn't think and it went away after 10 minutes"    • Iodine - Food Allergy    • Nsaids    • Other Other (See Comments)     difficulty swallowing for short period       Review of Systems     Back pain  All other review of system negative but poor historian Objective   Vital signs reviewed from facility records. PHYSICAL EXAM:  GENERAL: no acute distress  SKIN: warm, dry, no rash, no cyanosis  HEENT: normocephalic, atraumatic, no JVD, no Thyromegaly, no lymphadenopathy  LUNGS: CTA, no wheezing, no rales, expanded equally, no chest tenderness   HEART: normal rhythm, normal rate, no murmur, no gallop  ABDOMEN: soft non tender non distended bs+, no guarding or rebound tenderness, obese  :  no suprapubic tenderness  MUSCULOSKELETAL: strength about 4+/5 all extremities but weaker lower extremities, no calf tenderness  NEUROLOGY: awake, alert, CN2-12 intact. PSYCH: cooperative, anxious and restless      Pertinent Laboratory/Diagnostic Studies:  Recent labs and diagnostic tests reviewed in nursing home EMR    Current Medications   Medications reviewed from nursing home EMR.         Yessica Stokes MD

## 2023-07-27 NOTE — ASSESSMENT & PLAN NOTE
Being treated for another UTI with nitrofuratoin  Continue encouraging fluids  Continue current meds

## 2023-07-27 NOTE — ASSESSMENT & PLAN NOTE
Hx of falls   Multifactorial  Bed bound  Continue with safety measures  Continue with fall precautions

## 2023-07-27 NOTE — ASSESSMENT & PLAN NOTE
At times seems to be crying out in pain  Will add oxycodone 2.5mg po twice a day  Continue all other pain meds  Continue monitoring symptoms  Activity as tolerable

## 2023-08-01 ENCOUNTER — TELEPHONE (OUTPATIENT)
Age: 83
End: 2023-08-01

## 2023-08-01 NOTE — TELEPHONE ENCOUNTER
Hello,  Please advise if the following patient can be forced onto the schedule:    Patient: Melody Villegas    : 1940    MRN: [id number]     Call back #: [phone] (Ask for Arvind Worthington)     Insurance: [facility]    Reason for appointment: Mildly Displaced left ankle fx // DOI : 23 // wanting seen this week     Requested doctor/location: [facility]       Thank you.     E-mail Sent to arianna Sup

## 2023-08-01 NOTE — TELEPHONE ENCOUNTER
[facility]. Phone rings but there is no answer. If [facility] calls back - Dr. Cachorro Solorio (Primary Care Sports Medicine) has open availability on his [facility] schedule on Thursday 8/3 and Friday 8/4. Dr. Saige Rogers (Ortho Sports Surgeon) also has open availability on his [facility] schedule on Friday 8/4 that can be offered to the patient (per [address]).

## 2023-08-02 NOTE — TELEPHONE ENCOUNTER
Caller: Johanna Britt    Doctor: ortho    Reason for call: Calling to schedule appt.  Pt placed on Deuce's schedule for 8/3 per previous note

## 2023-08-02 NOTE — TELEPHONE ENCOUNTER
Caller: Kwan Coyne    Doctor: Ortho    Reason for call: [facility] states they are having phone issues and called back to check status of appt request. Call was disconnected    Call back#:

## 2023-08-03 ENCOUNTER — APPOINTMENT (OUTPATIENT)
Dept: RADIOLOGY | Facility: OTHER | Age: 83
End: 2023-08-03
Payer: COMMERCIAL

## 2023-08-03 ENCOUNTER — OFFICE VISIT (OUTPATIENT)
Dept: OBGYN CLINIC | Facility: OTHER | Age: 83
End: 2023-08-03
Payer: COMMERCIAL

## 2023-08-03 VITALS
WEIGHT: 288 LBS | BODY MASS INDEX: 46.28 KG/M2 | SYSTOLIC BLOOD PRESSURE: 129 MMHG | DIASTOLIC BLOOD PRESSURE: 74 MMHG | HEART RATE: 59 BPM | HEIGHT: 66 IN

## 2023-08-03 DIAGNOSIS — M79.671 PAIN IN RIGHT FOOT: ICD-10-CM

## 2023-08-03 DIAGNOSIS — S82.62XA CLOSED FRACTURE OF DISTAL LATERAL MALLEOLUS OF LEFT FIBULA, INITIAL ENCOUNTER: ICD-10-CM

## 2023-08-03 DIAGNOSIS — S99.912A LEFT ANKLE INJURY, INITIAL ENCOUNTER: Primary | ICD-10-CM

## 2023-08-03 DIAGNOSIS — M25.572 PAIN, JOINT, ANKLE AND FOOT, LEFT: ICD-10-CM

## 2023-08-03 PROCEDURE — 99204 OFFICE O/P NEW MOD 45 MIN: CPT | Performed by: FAMILY MEDICINE

## 2023-08-03 PROCEDURE — 73620 X-RAY EXAM OF FOOT: CPT

## 2023-08-03 PROCEDURE — 73600 X-RAY EXAM OF ANKLE: CPT

## 2023-08-03 PROCEDURE — 73590 X-RAY EXAM OF LOWER LEG: CPT

## 2023-08-03 RX ORDER — NITROFURANTOIN MACROCRYSTALS 100 MG/1
CAPSULE ORAL
COMMUNITY
Start: 2023-07-25

## 2023-08-03 RX ORDER — POLYMYXIN B SULFATE AND TRIMETHOPRIM 1; 10000 MG/ML; [USP'U]/ML
SOLUTION OPHTHALMIC
COMMUNITY
Start: 2023-07-16

## 2023-08-03 NOTE — PROGRESS NOTES
1. Left ankle injury, initial encounter  XR tibia fibula 2 vw left    XR ankle 3+ vw left    CT lower extremity wo contrast left    CANCELED: XR ankle 3+ vw left    CANCELED: XR foot 3+ vw right      2. Closed fracture of distal lateral malleolus of left fibula, initial encounter        3. Pain in right foot          Orders Placed This Encounter   Procedures   • XR tibia fibula 2 vw left   • XR ankle 3+ vw left   • CT lower extremity wo contrast left          IMAGING STUDIES: (I personally reviewed images in PACS and report):    -XR left ankle 8/3/2023: Nondisplaced fracture of medial malleolus. Questionable radiolucency over lateral malleolus, concerning for possible occult fracture. -XR left tibia/fibula 8/3/2023: Left knee prosthesis. Nondisplaced medial malleolus fracture. Questionable radiolucency over lateral malleolus, concerning for occult fracture. No tibial or fibular shaft fracture noted. -XR right foot 8/3/2023: Suspected fracture of proximal phalanx of second toe. PAST REPORTS:        ASSESSMENT/PLAN:  - Traumatic left medial malleolus fracture. - CT left ankle ordered to rule out lateral malleolar fracture  - Chris taping of right second toe  - Recommend continuing to wear cam boot on left side  - Continue nonweightbearing on left leg    Repeat X-ray next visit: None    Return for Follow-up after CT scan completed. There are no Patient Instructions on file for this visit.      __________________________________________________________________________    HISTORY OF PRESENT ILLNESS:    Patient presents with her daughter and daughter-in-law for initial evaluation of traumatic left ankle pain. Patient arrived via EMS on stretcher. Patient is bedbound and has dementia at baseline. Daughter reports patient fell out of bed 3 days ago, 7/31/2023, with noted swelling of her left ankle. Patient does not remember this event, however does report left ankle pain.   X-ray left ankle at [facility] reviewed left medial malleolus fracture. Patient has been wearing cam boot since. Patient states she doesn't experience any pain or numbness of her foot/ankle at this time. Review of Systems      Following history reviewed and update:    Past Medical History:   Diagnosis Date   • A-fib ([address])    • Acute on chronic diastolic heart failure ([address]) 3/6/2022   • Anxiety    • Chronic pain    • Chronic respiratory failure with hypoxia ([facility])    • COPD (chronic obstructive pulmonary disease) ([facility])    • Dementia ([facility])    • Dorsalgia, unspecified    • Edema    • Encephalopathy    • GERD (gastroesophageal reflux disease)    • Gross hematuria 3/31/2022   • Hypertension    • Morbid obesity ([address])    • Muscle weakness (generalized)    • Opioid dependence ([facility])    • Overactive bladder    • Pneumonia    • Positive blood culture 2022   • Psychiatric disorder     anxiety, bipolar   • Radiculopathy of thoracolumbar region    • Sciatica    • 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 López Perez    • [address] History   Social History     Substance and Sexual Activity   Alcohol Use Never     Social History     Substance and Sexual Activity   Drug Use No     Social History     Tobacco Use   Smoking Status Former   • Types: Cigarettes   • Quit date: 1995   • Years since quittin.7   Smokeless Tobacco Never     History reviewed. No pertinent family history.   Allergies   Allergen Reactions   • Aspirin    • Haloperidol Other (See Comments)   • Iodinated Contrast Media Throat Swelling     Pt states "when I was 14years old driving home from the hospital I felt like I couldn't think and it went away after 10 minutes"    • Iodine - Food Allergy    • Nsaids    • Other Other (See Comments)     difficulty swallowing for short period   • Ziprasidone Other (See Comments)     Patient has adverse affects as per patient`s daughter Leola Salinas) with antipsychotic medications. Physical Exam  /74 (BP Location: Right arm, Patient Position: Lying right side, Cuff Size: Large)   Pulse 59   Ht 5' 6" (1.676 m) Comment: verbal  Wt 131 kg (288 lb) Comment: verbal  LMP  (LMP Unknown)   BMI 46.48 kg/m²     Constitutional:  see vital signs  Gen: well-developed, normocephalic/atraumatic, well-groomed  Eyes: No inflammation or discharge of conjunctiva or lids; sclera clear   Pharynx: no inflammation, lesion, or mass of lips  Neck: supple, no masses, non-distended  MSK: no inflammation, lesion, mass, or clubbing of nails and digits except for other than mentioned below  SKIN: no visible rashes or skin lesions  Pulmonary/Chest: Effort normal. No respiratory distress.    NEURO: cranial nerves grossly intact  PSYCH:  Alert and oriented to person, place, and time; recent and remote memory intact; mood normal, no depression, anxiety, or agitation, judgment and insight good and intact     Ortho Exam      Physical Exam: Left Foot & Ankle      INSPECTION: (compared to contralateral foot & ankle)  - Overlying erythema: + Mild erythema over left medial malleolus  - Overlying ecchymosis: + Ecchymoses over right second toe, medial aspect of right foot, and distal half of left fibula  - Calf swelling: no  - Ankle swelling: +    PALPATION:  - Increased warmth: no  - Crepitus: no   - Bony prominences tenderness:  - Distal tibia: no  - Medial malleolus: +  - Distal fibula: no  - Lateral malleolus: +  - Tarsal bones:  - Talus, talar dome: no  - Calcaneus squeeze: negative  - Navicular: no  - Medial cuneiform: no  - Intermediate cuneiform: no  - Lateral cuneiform: no  - Cuboid: no  - Metatarsal bones: no  - Base of 5th metatarsal: no  - Muscle/soft tissues tenderness:  - Elgin's sign: negative  - Achilles tendon: no  - ATFL: no  - PTFL: no  - CFL: no     RANGE OF MOTION:  - Ankle:  - Plantar flexion: Limited due to pain  - Dorsi flexion: Limited  - Inversion: Limited  - Eversion: Limited  - Toes flexion: intact  - Toes extension: intact    STRENGTH:  - Ankle:  - Plantar flexion: 3/5  - Dorsi flexion: 3/5  - Inversion: 3/5  - Eversion: 3/5  - Toes flexion: 4/5  - Toes extension: 5/5    Achilles Tendon:  - Swellings, defects or tenderness: no  - Mejia’s Test: negative    MOTOR / SENSATION:  - Motor function grossly intact: yes  - Sensation grossly intact and symmetric: yes    VASCULAR:  - Dorsalis pedis pulse: intact    __________________________________________________________________________  Procedures

## 2023-08-16 ENCOUNTER — HOSPITAL ENCOUNTER (OUTPATIENT)
Dept: CT IMAGING | Facility: HOSPITAL | Age: 83
Discharge: HOME | End: 2023-08-16
Payer: COMMERCIAL

## 2023-08-16 DIAGNOSIS — S99.912A LEFT ANKLE INJURY, INITIAL ENCOUNTER: ICD-10-CM

## 2023-08-16 PROCEDURE — 73700 CT LOWER EXTREMITY W/O DYE: CPT

## 2023-08-30 DIAGNOSIS — G89.11 ACUTE PAIN DUE TO TRAUMA: ICD-10-CM

## 2023-08-30 DIAGNOSIS — G89.11 ACUTE PAIN DUE TO TRAUMA: Primary | ICD-10-CM

## 2023-08-30 RX ORDER — OXYCODONE HYDROCHLORIDE 5 MG/1
2.5 TABLET ORAL 3 TIMES DAILY
Qty: 21 TABLET | Refills: 0 | Status: SHIPPED | OUTPATIENT
Start: 2023-08-30 | End: 2023-08-30 | Stop reason: SDUPTHER

## 2023-08-30 RX ORDER — OXYCODONE HYDROCHLORIDE 5 MG/1
2.5 TABLET ORAL 3 TIMES DAILY
Qty: 21 TABLET | Refills: 0 | Status: SHIPPED | OUTPATIENT
Start: 2023-08-30 | End: 2023-09-13

## 2023-08-30 RX ORDER — OXYCODONE HYDROCHLORIDE 5 MG/1
2.5 TABLET ORAL 3 TIMES DAILY
COMMUNITY
End: 2023-08-30 | Stop reason: SDUPTHER

## 2023-09-07 ENCOUNTER — OFFICE VISIT (OUTPATIENT)
Dept: OBGYN CLINIC | Facility: OTHER | Age: 83
End: 2023-09-07
Payer: COMMERCIAL

## 2023-09-07 ENCOUNTER — APPOINTMENT (OUTPATIENT)
Dept: RADIOLOGY | Facility: OTHER | Age: 83
End: 2023-09-07
Payer: COMMERCIAL

## 2023-09-07 VITALS
BODY MASS INDEX: 46.28 KG/M2 | WEIGHT: 288 LBS | DIASTOLIC BLOOD PRESSURE: 59 MMHG | SYSTOLIC BLOOD PRESSURE: 92 MMHG | HEART RATE: 61 BPM | HEIGHT: 66 IN

## 2023-09-07 DIAGNOSIS — S82.392D CLOSED FRACTURE OF POSTERIOR MALLEOLUS OF LEFT TIBIA WITH ROUTINE HEALING, SUBSEQUENT ENCOUNTER: ICD-10-CM

## 2023-09-07 DIAGNOSIS — S99.912D LEFT ANKLE INJURY, SUBSEQUENT ENCOUNTER: ICD-10-CM

## 2023-09-07 DIAGNOSIS — S82.55XD CLOSED NONDISPLACED FRACTURE OF MEDIAL MALLEOLUS OF LEFT TIBIA WITH ROUTINE HEALING, SUBSEQUENT ENCOUNTER: ICD-10-CM

## 2023-09-07 DIAGNOSIS — S99.912D LEFT ANKLE INJURY, SUBSEQUENT ENCOUNTER: Primary | ICD-10-CM

## 2023-09-07 PROCEDURE — 73600 X-RAY EXAM OF ANKLE: CPT

## 2023-09-07 PROCEDURE — 99214 OFFICE O/P EST MOD 30 MIN: CPT | Performed by: FAMILY MEDICINE

## 2023-09-07 RX ORDER — TRAZODONE HYDROCHLORIDE 150 MG/1
TABLET ORAL
COMMUNITY
Start: 2023-09-04

## 2023-09-07 RX ORDER — LORAZEPAM 0.5 MG/1
TABLET ORAL
COMMUNITY
Start: 2023-08-23

## 2023-09-07 NOTE — PROGRESS NOTES
1. Left ankle injury, subsequent encounter    2. Closed nondisplaced fracture of medial malleolus of left tibia with routine healing, subsequent encounter    3. Closed fracture of posterior malleolus of left tibia with routine healing, subsequent encounter        Orders Placed This Encounter   Procedures   • Ambulatory Referral to Physical Therapy        IMAGING STUDIES: (I personally reviewed images in PACS and report):     XR left ankle 9/7/2023  Healing medial and posterior tibial malleoli fracture    PAST REPORTS:    CT left lower extremity 8/16/2023  Nondisplaced fractures of medial and posterior tibial malleoli  Nondisplaced posterior tibial malleolar fracture   Diffuse osteopenia. Calcifications/ossifications of ATFL and PTFL, likely a sequela of old trauma. Atherosclerotic calcifications. XR left ankle 8/3/2023  Nondisplaced fracture of medial malleolus. Questionable radiolucency over lateral malleolus, concerning for possible occult fracture. XR left tibia/fibula 8/3/2023  Left knee prosthesis. Nondisplaced medial malleolus fracture. Questionable radiolucency over lateral malleolus, concerning for occult fracture. No tibial or fibular shaft fracture noted. XR right foot 8/3/2023  Suspected fracture of proximal phalanx of second toe. PAST PROCEDURES:      ASSESSMENT/PLAN:    Traumatic nondisplaced fractures of medial and posterior tibial malleoli  Nondisplaced posterior tibial malleolar fracture    DOI: 7/31/2023  FUI: 5 weeks 3 days    Repeat X-ray next visit: None    Return in 6 weeks (on 10/19/2023), or if symptoms worsen or fail to improve.     Patient instructions below verbally summarized in person during encounter:  Patient Instructions   Cease cam boot   Continue non-weight bearing  May start physical therapy            __________________________________________________________________________    HISTORY OF PRESENT ILLNESS:    82 y.o. female with baseline dementia presents with her daughter for 5 weeks follow-up of traumatic left ankle injury sustained 2023 after falling out of bed. Most recent visit 8/3/2023 was concerned for nondisplaced medial malleolus fracture and possible fracture of proximal phalanx of second toe. CT left ankle was ordered to rule out lateral malleolar fracture and right second toe was jesika taped. Patient recommended to continue cam boot and nonweightbearing on left leg. Patient's daughter reports she has been non-ambulating at baseline but is resistance to wearing CAM boot in bed.       Review of Systems      Following history reviewed and update:    Past Medical History:   Diagnosis Date   • A-fib ([address])    • Acute on chronic diastolic heart failure ([address]) 3/6/2022   • Anxiety    • Chronic pain    • Chronic respiratory failure with hypoxia ([facility])    • COPD (chronic obstructive pulmonary disease) ([facility])    • Dementia ([facility])    • Dorsalgia, unspecified    • Edema    • Encephalopathy    • GERD (gastroesophageal reflux disease)    • Gross hematuria 3/31/2022   • Hypertension    • Morbid obesity ([address])    • Muscle weakness (generalized)    • Opioid dependence ([facility])    • Overactive bladder    • Pneumonia    • Positive blood culture 2022   • Psychiatric disorder     anxiety, bipolar   • Radiculopathy of thoracolumbar region    • Sciatica    • 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 Ball Mention    • [address] History   Social History     Substance and Sexual Activity   Alcohol Use Never     Social History     Substance and Sexual Activity   Drug Use No     Social History     Tobacco Use   Smoking Status Former   • Types: Cigarettes   • Quit date: 1995   • Years since quittin.8 Smokeless Tobacco Never     No family history on file. Allergies   Allergen Reactions   • Aspirin    • Haloperidol Other (See Comments)   • Iodinated Contrast Media Throat Swelling     Pt states "when I was 14years old driving home from the hospital I felt like I couldn't think and it went away after 10 minutes"    • Iodine - Food Allergy    • Nsaids    • Other Other (See Comments)     difficulty swallowing for short period   • Ziprasidone Other (See Comments)     Patient has adverse affects as per patient`s daughter Dufm Single) with antipsychotic medications. Physical Exam  BP 92/59 (BP Location: Right arm, Patient Position: Sitting, Cuff Size: Adult)   Pulse 61   Ht 5' 6" (1.676 m)   Wt 131 kg (288 lb)   LMP  (LMP Unknown)   BMI 46.48 kg/m²     Constitutional:  see vital signs  Gen: well-developed, normocephalic/atraumatic, well-groomed  Eyes: No inflammation or discharge of conjunctiva or lids; sclera clear   Pharynx: no inflammation, lesion, or mass of lips  Neck: supple, no masses, non-distended  MSK: no inflammation, lesion, mass, or clubbing of nails and digits except for other than mentioned below  SKIN: no visible rashes or skin lesions  Pulmonary/Chest: Effort normal. No respiratory distress.    NEURO: cranial nerves grossly intact  PSYCH:  Alert and oriented to person, place, and time; recent and remote memory intact; mood normal, no depression, anxiety, or agitation, judgment and insight good and intact     Ortho Exam    LEFT ANKLE  EXAM  GAIT: patient bed-bound at baseline    Inspection  Erythema: no  Ecchymosis: +ecchymosis on volar aspect of left foot  Edema:  none    Tenderness  Proximal Fibula: no  AiTFL: no  ATFL: no  CFL: no  PTFL: no  Achilles:  no  Deltoid: No  Peroneal: no  Tibialis Anterior: no  Tibialis Posterior: no    Bony Tenderpoints:  Lateral Malleolus: no  Base of 5th MT: no  Medial Malleolus: no  Navicular: no  Talar dome: No    ROM  Dorsiflexion: Limited  Plantarflexion: intact    Muscle Strength  Pronation: intact without pain  Supination: intact without pain    Tib-Fib Squeeze: negative  Calcaneal Squeeze: negative  Dorsiflexion (+) ER Stress Test: negative    __________________________________________________________________________  Procedures

## 2023-09-13 ENCOUNTER — REMOTE DEVICE CLINIC VISIT (OUTPATIENT)
Dept: CARDIOLOGY CLINIC | Facility: CLINIC | Age: 83
End: 2023-09-13
Payer: COMMERCIAL

## 2023-09-13 DIAGNOSIS — Z95.0 PRESENCE OF PERMANENT CARDIAC PACEMAKER: Primary | ICD-10-CM

## 2023-09-13 PROCEDURE — 93296 REM INTERROG EVL PM/IDS: CPT | Performed by: STUDENT IN AN ORGANIZED HEALTH CARE EDUCATION/TRAINING PROGRAM

## 2023-09-13 PROCEDURE — 93294 REM INTERROG EVL PM/LDLS PM: CPT | Performed by: STUDENT IN AN ORGANIZED HEALTH CARE EDUCATION/TRAINING PROGRAM

## 2023-09-13 NOTE — PROGRESS NOTES
MDT SINGLE PM / ACTIVE SYSTEM IS MRI CONDITIONAL   CARELINK TRANSMISSION:  BATTERY VOLTAGE ADEQUATE (9.5 YR.).   70.4% (>40%/VVI 60 PPM).   ALL LEAD PARAMETERS WITHIN NORMAL LIMITS.  NO SIGNIFICANT HIGH RATE EPISODES.  NORMAL DEVICE FUNCTION. [phone]

## 2023-09-18 NOTE — PROGRESS NOTES
Pt is calm and cooperative with care  Pt has moments of being very pleasant  Pt ate 75% of meal and was very happy to eat  Pt shows no signs of agitation or aggression  Pt medication compliant  Body Location Override (Optional - Billing Will Still Be Based On Selected Body Map Location If Applicable): right lower mid forehead

## 2024-01-04 NOTE — PLAN OF CARE
Patient has given consent to record this visit for documentation in their clinical record.     Problem: PHYSICAL THERAPY ADULT  Goal: Performs mobility at highest level of function for planned discharge setting  See evaluation for individualized goals  Treatment/Interventions: LE strengthening/ROM, Therapeutic exercise, Endurance training, Cognitive reorientation, Patient/family training, Equipment eval/education, Bed mobility, Continued evaluation, Spoke to nursing          See flowsheet documentation for full assessment, interventions and recommendations  Prognosis: Guarded  Problem List: Impaired balance, Decreased mobility, Decreased coordination, Decreased cognition, Impaired judgement, Decreased safety awareness, Obesity, Pain, Impaired sensation  Assessment: pt admitted to [facility] with change in mental status, agitation and impulsivity  pt seen while an inpatient and before transfer to behavioral health  pt now refered back to PT from Mario Szymanski  pt was resident of [facility], an snf but not sure if pt long term resident or not  pt was very confused and could not keep a train of thought  pt was loud, occasionally agitated and incontinent of large amount of stool  pt demonstrated severe functional limitatoins and has been sanjuanita lifted OOB to chair  pt demonstrated deficits in strength, balance, continence, activity tolerance due to agitatoin, cognition and self care  pt was able to roll side to side in bed with verbal cues  pt needed min assist for supine to sitting in bed but only tolerated a few seconds  pt OOB mobility defereed due to need for cleaning  pt will benefit from trial of P T for 1 week  assess standing and transfers/ amb  pt can return to [facility]  Barriers to Discharge: None     Recommendation:  (return to facility)     PT - OK to Discharge: Yes    See flowsheet documentation for full assessment
